# Patient Record
Sex: FEMALE | Race: WHITE | Employment: OTHER | URBAN - METROPOLITAN AREA
[De-identification: names, ages, dates, MRNs, and addresses within clinical notes are randomized per-mention and may not be internally consistent; named-entity substitution may affect disease eponyms.]

---

## 2017-01-13 ENCOUNTER — HOSPITAL ENCOUNTER (OUTPATIENT)
Dept: MAMMOGRAPHY | Age: 67
Discharge: HOME | End: 2017-01-13
Attending: INTERNAL MEDICINE
Payer: MEDICARE

## 2017-01-13 DIAGNOSIS — C50.919 MALIGNANT NEOPLASM OF BREAST (HCC): ICD-10-CM

## 2017-01-13 PROCEDURE — 77063 BREAST TOMOSYNTHESIS BI: CPT

## 2017-01-18 ENCOUNTER — CLINICAL SUPPORT (OUTPATIENT)
Dept: CARDIOLOGY CLINIC | Age: 67
End: 2017-01-18

## 2017-01-18 DIAGNOSIS — I50.32 CHRONIC DIASTOLIC CONGESTIVE HEART FAILURE (HCC): ICD-10-CM

## 2017-06-14 ENCOUNTER — OFFICE VISIT (OUTPATIENT)
Dept: CARDIOLOGY CLINIC | Age: 67
End: 2017-06-14

## 2017-06-14 VITALS
HEIGHT: 65 IN | BODY MASS INDEX: 27.99 KG/M2 | HEART RATE: 92 BPM | WEIGHT: 168 LBS | SYSTOLIC BLOOD PRESSURE: 122 MMHG | DIASTOLIC BLOOD PRESSURE: 62 MMHG

## 2017-06-14 DIAGNOSIS — R06.02 SHORTNESS OF BREATH: ICD-10-CM

## 2017-06-14 DIAGNOSIS — I10 ESSENTIAL HYPERTENSION: ICD-10-CM

## 2017-06-14 DIAGNOSIS — I50.32 CHRONIC DIASTOLIC CONGESTIVE HEART FAILURE (HCC): Primary | ICD-10-CM

## 2017-06-14 DIAGNOSIS — Z95.810 AUTOMATIC IMPLANTABLE CARDIOVERTER-DEFIBRILLATOR IN SITU: ICD-10-CM

## 2017-06-14 DIAGNOSIS — I34.1 MVP (MITRAL VALVE PROLAPSE): ICD-10-CM

## 2017-06-14 NOTE — PROGRESS NOTES
HISTORY OF PRESENT ILLNESS  Sj Colby is a 75 y.o. female. HPI Comments: Patient with cmp,chf.post  icd   On follow up patient denies any chest pains, palpitation or other significant symptoms. CHF   The history is provided by the patient. This is a recurrent problem. The problem occurs constantly. The problem has been gradually improving. Associated symptoms include shortness of breath. Pertinent negatives include no chest pain. The symptoms are aggravated by exertion. The symptoms are relieved by rest.   Valvular Heart Disease   The history is provided by the patient. This is a chronic problem. The problem occurs constantly. The problem has not changed since onset. Associated symptoms include shortness of breath. Pertinent negatives include no chest pain. Cardiomyopathy   The history is provided by the patient. This is a chronic problem. The problem has been resolved. Associated symptoms include shortness of breath. Pertinent negatives include no chest pain. Hypertension   The history is provided by the patient. This is a chronic problem. The problem occurs constantly. The problem has not changed since onset. Associated symptoms include shortness of breath. Pertinent negatives include no chest pain. Review of Systems   Constitutional: Negative for chills and fever. HENT: Negative for nosebleeds. Eyes: Negative for blurred vision and double vision. Respiratory: Positive for shortness of breath. Negative for cough, hemoptysis, sputum production and wheezing. Cardiovascular: Negative for chest pain, palpitations, orthopnea, claudication, leg swelling and PND. Gastrointestinal: Negative for heartburn, nausea and vomiting. Musculoskeletal: Negative for myalgias. Skin: Negative for rash. Neurological: Negative for dizziness and weakness. Endo/Heme/Allergies: Does not bruise/bleed easily.      Family History   Problem Relation Age of Onset    Hypertension Mother    [address] [facility] Cholesterol Mother     Heart Disease Father      paemaker implant at 80       Past Medical History:   Diagnosis Date    Abnormal nuclear cardiac imaging test 06/11/2010    Lg fixed anterior, septal, apical, inferoseptal defect sugg RCA & LAD disease or cardiomyopathy. EF 20%. Global hykn. Nondiagnostic EKG.  Adenocarcinoma of breast; locally advanced invasive ductal adenocarcinoma of left breast     Automatic implantable cardiac defibrillator in situ     Automatic implantable cardiac defibrillator in situ     Breast cancer (HCC)     Cancer ([address]     breast cancer left    Chemotherapy convalescence or palliative care 2012    Chronic combined systolic and diastolic heart failure (HCC)     Remains symptomatic, nyha class 3 ef improving.  Congestive heart failure, unspecified     chronic class ll    Echocardiogram 09/27/2010    EF 30%. Global hykn. Mild MR. Mild TR.  Hyperlipidemia     Hypertension     Mitral valve disorders 1/15/2014    mild to moderate mr     Mitral valve disorders 1/15/2014    mild to moderate mr     MVP (mitral valve prolapse)     Nonischemic cardiomyopathy (HCC)     EF 20-30% despite medical therapy    Nonspecific abnormal electrocardiogram (ECG) (EKG)     Osteopenia     Other primary cardiomyopathies     Pacemaker 10/27/10    s/p implantation, without complication    Poisoning by other and unspecified agents primarily affecting the cardiovascular system     Ef slightly better to 45%, STABLE back on chemo.  Radiation therapy     Radiation therapy complication 4780    S/P cardiac catheterization 07/08/10    Patent coronary arteries. LVEDP 25.  EF 25%. Global hykn. Moderate MR.  RA 10.  RV 40/10. PA 40/20.  20.  CO/CI 4.5/2.45 (Larry).     Shortness of breath     Syncope and collapse     Thyroid disease     goiter       Past Surgical History:   Procedure Laterality Date    CARDIAC SURG PROCEDURE UNLIST      Difibrillator; BI V ICD    HX MASTECTOMY 09/28/11    modified radical mastectomy and axillary dissection    HX ORTHOPAEDIC      HX OTHER SURGICAL      surgery to remove part of liver    HX PACEMAKER  10/27/10    s/p Medtronic biventricular AICD, without complication    HX RADICAL MASTECTOMY      NEUROLOGICAL PROCEDURE UNLISTED      Cervical fusion       Social History   Substance Use Topics    Smoking status: Never Smoker    Smokeless tobacco: Never Used    Alcohol use No       Allergies   Allergen Reactions    Adhesive Other (comments)     blisters       Current Outpatient Prescriptions   Medication Sig    carvedilol (COREG) 25 mg tablet TAKE 1 TABLET TWICE A DAY  WITH MEALS    furosemide (LASIX) 20 mg tablet TAKE 1 TABLET DAILY    lisinopril (PRINIVIL, ZESTRIL) 10 mg tablet Take 1 Tab by mouth daily.  zaleplon (SONATA) 10 mg capsule Take 10 mg by mouth nightly.  oxyCODONE-acetaminophen (PERCOCET) 5-325 mg per tablet 1 or 2 tablets by mouth every 4 hours as needed    multivitamin (HAIR,SKIN & NAILS) tablet Take 1 Tab by mouth daily.  raloxifene (EVISTA) 60 mg tablet Take  by mouth daily.  cyclobenzaprine (FLEXERIL) 10 mg tablet Take  by mouth three (3) times daily as needed.  MULTIVITAMIN PO Take  by mouth daily.  OTHER,NON-FORMULARY, daily. Vitamin D3 2000IU QD     No current facility-administered medications for this visit. Visit Vitals    /62    Pulse 92    Ht 5' 5\" (1.651 m)    Wt 76.2 kg (168 lb)    BMI 27.96 kg/m2         Physical Exam   Constitutional: She is oriented to person, place, and time. She appears well-developed and well-nourished. HENT:   Head: Normocephalic and atraumatic. Eyes: Conjunctivae are normal.   Neck: Neck supple. No JVD present. No tracheal deviation present. No thyromegaly present. Cardiovascular: Normal rate and regular rhythm. PMI is not displaced. Exam reveals no gallop, no S3 and no decreased pulses. Murmur heard.   High-pitched blowing holosystolic murmur is present with a grade of 2/6  at the apex  Pulmonary/Chest: No respiratory distress. She has no wheezes. She has no rales. She exhibits no tenderness. Abdominal: Soft. There is no tenderness. Musculoskeletal: She exhibits no edema. Neurological: She is alert and oriented to person, place, and time. Skin: Skin is warm. Psychiatric: She has a normal mood and affect. Ms. Javed Curiel has a reminder for a \"due or due soon\" health maintenance. I have asked that she contact her primary care provider for follow-up on this health maintenance. CARDIOLOGY STUDIES 10/1/2012 8/1/2012 5/1/2012 4/1/2012 2/1/2012 12/1/2011 9/1/2011   Myocardial Perfusion Scan Result - - - - - - -   Echocardiogram - Complete Result 50-55% EF 46% 45% 45-50% EF 45-50% EF 40% mild MR &TR EF 57%, mild mr   Coronary Angiogram Result - - - - - - -     SUMMARY:echo:6/2014  Left ventricle: The ventricle was mildly dilated. Systolic function was  normal. Ejection fraction was estimated in the range of 50 % to 55 %. There were no regional wall motion abnormalities. Doppler parameters were  consistent with abnormal left ventricular relaxation (grade 1 diastolic  dysfunction). Left atrium: The atrium was mildly dilated. Mitral valve: There was systolic bowing of the posterior leaflet, but  without diagnostic evidence for prolapse. There was mild to moderate  regurgitation. SUMMARY:echo:12/2014  Left ventricle: Systolic function was at the lower limits of normal.  Ejection fraction was estimated to be 53 %. There were no regional wall  motion abnormalities. Doppler parameters were consistent with abnormal  left ventricular relaxation (grade 1 diastolic dysfunction). Right ventricle: A pacing wire was present. Mitral valve: There was systolic bowing of the posterior leaflet, but  without diagnostic evidence for prolapse. There was mild regurgitation. Tricuspid valve: Pulmonary artery systolic pressure: 22 mmHg. 12/2015:echo    SUMMARY:  Left ventricle: Systolic function was normal. Ejection fraction was  estimated in the range of 50 % to 55 %. There was mild diffuse  hypokinesis. Doppler parameters were consistent with abnormal left  ventricular relaxation (grade 1 diastolic dysfunction). Mitral valve: There was systolic bowing of the anterior and posterior  leaflets, but without diagnostic evidence for prolapse. There was mild  regurgitation. Tricuspid valve: There was mild regurgitation. Tricuspid regurgitation  peak velocity: 2.3 m/sec. Pulmonary artery systolic pressure: 25 mmHg. Assessment         ICD-10-CM ICD-9-CM    1. Chronic diastolic congestive heart failure (HCC) I50.32 428.32      428.0     recent increase sob  lvef normal   2. Essential hypertension I10 401.9     stable   3. MVP (mitral valve prolapse) I34.1 424.0     mild mr  stable   4. Automatic implantable cardioverter-defibrillator in situ Z95.810 V45.02     normal function   5. Shortness of breath R06.02 786.05 PFT DLCO    check pft   Tounge swelling not related to lisinopril as she had swelling even after stopping lisinopril    There are no discontinued medications. Orders Placed This Encounter    PFT DLCO     Standing Status:   Future     Standing Expiration Date:   12/12/2017       Follow-up Disposition:  Return in about 4 weeks (around 7/12/2017).

## 2017-06-14 NOTE — LETTER
Christina Howard 1950 6/14/2017 Dear Gerardo Al MD 
 
I had the pleasure of evaluating  Ms. Elaine Milton in office today. Below are the relevant portions of my assessment and plan of care. ICD-10-CM ICD-9-CM 1. Chronic diastolic congestive heart failure (HCC) I50.32 428.32   
  428.0   
 recent increase sob 
lvef normal  
2. Essential hypertension I10 401.9   
 stable 3. MVP (mitral valve prolapse) I34.1 424.0   
 mild mr 
stable 4. Automatic implantable cardioverter-defibrillator in situ Z95.810 V45.02   
 normal function 5. Shortness of breath R06.02 786.05 PFT DLCO check pft Current Outpatient Prescriptions Medication Sig Dispense Refill  carvedilol (COREG) 25 mg tablet TAKE 1 TABLET TWICE A DAY  WITH MEALS 180 Tab 3  furosemide (LASIX) 20 mg tablet TAKE 1 TABLET DAILY 90 Tab 3  
 lisinopril (PRINIVIL, ZESTRIL) 10 mg tablet Take 1 Tab by mouth daily. 90 Tab 1  
 zaleplon (SONATA) 10 mg capsule Take 10 mg by mouth nightly.  oxyCODONE-acetaminophen (PERCOCET) 5-325 mg per tablet 1 or 2 tablets by mouth every 4 hours as needed 40 Tab 0  
 multivitamin (HAIR,SKIN & NAILS) tablet Take 1 Tab by mouth daily.  raloxifene (EVISTA) 60 mg tablet Take  by mouth daily.  cyclobenzaprine (FLEXERIL) 10 mg tablet Take  by mouth three (3) times daily as needed.  MULTIVITAMIN PO Take  by mouth daily.  OTHER,NON-FORMULARY, daily. Vitamin D3 2000IU QD Orders Placed This Encounter  PFT DLCO Standing Status:   Future Standing Expiration Date:   12/12/2017 If you have questions, please do not hesitate to call me. I look forward to following Ms. Trevino along with you. Sincerely, Anahy Velazquez MD

## 2017-06-14 NOTE — MR AVS SNAPSHOT
Visit Information Date & Time Provider Department Dept. Phone Encounter #  
 6/14/2017 10:00 AM Janene Garvin MD [facility] [address] [place] [address] [id number] Follow-up Instructions Return in about 4 weeks (around 7/12/2017). Follow-up and Disposition History Your Appointments 7/10/2017 10:45 AM  
Office Visit with Janene Garvin MD  
[facility]) Appt Note: 4 wk post pft; 4 wk post pft w/ medtronic [address], [address] [place] [address]  
[address], [address] [address]  
  
    
 10/16/2017  8:00 AM  
PROCEDURE with [facility] [place] ([address]) Appt Note: carelink [address], [address] [facility] [address]  
[address], [address] [place] [address] 1/15/2018  8:00 AM  
CARELINK with [facility] [place] ([address]) Appt Note: carelink [address], [address] [place] [address]  
[address], [address] [place] [address] 4/16/2018  8:00 AM  
[facility]EL[facility] [place] ([address]) Appt Note: carelink [address], [address] [facility] [address]  
[phone]  
  
    
 7/23/2018 10:30 AM  
PROCEDURE with Janene Garvin MD  
[facility]) Appt Note: medtronic ck [address], [address] [place] [address]  
[address], [address] [place] [address] Upcoming Health Maintenance Date Due Hepatitis C Screening 1950 DTaP/Tdap/Td series (1 - Tdap) 1/26/1970 FOBT Q 1 YEAR AGE 50-75 1/26/1999 ZOSTER VACCINE AGE 60> 1/26/2009 GLAUCOMA SCREENING Q2Y 1/26/2014 Pneumococcal 65+ High/Highest Risk (1 of 2 - PCV13) 1/26/2014 MEDICARE YEARLY EXAM 1/26/2014 INFLUENZA AGE 9 TO ADULT 8/1/2017 BREAST CANCER SCRN MAMMOGRAM 1/13/2019 Allergies as of 6/14/2017  Review Complete On: 6/14/2017 By: Ricardo Gomes MD  
  
 Severity Noted Reaction Type Reactions Adhesive  10/27/2011    Other (comments)  
 blisters Current Immunizations  Never Reviewed No immunizations on file. Not reviewed this visit You Were Diagnosed With   
  
 Codes Comments Chronic diastolic congestive heart failure (HCC)    -  Primary ICD-10-CM: I50.32 
ICD-9-CM: 428.32, 428.0 recent increase sob 
lvef normal  
 Essential hypertension     ICD-10-CM: I10 
ICD-9-CM: 401.9 stable MVP (mitral valve prolapse)     ICD-10-CM: I34.1 ICD-9-CM: 424.0 mild mr 
stable Automatic implantable cardioverter-defibrillator in situ     ICD-10-CM: Z95.810 ICD-9-CM: V45.02 normal function Shortness of breath     ICD-10-CM: R06.02 
ICD-9-CM: 786.05 check pft  
  
Vitals BP Pulse Height(growth percentile) Weight(growth percentile) BMI OB Status 122/62 92 5' 5\" (1.651 m) 168 lb (76.2 kg) 27.96 kg/m2 Postmenopausal  
 Smoking Status Never Smoker Vitals History BMI and BSA Data Body Mass Index Body Surface Area  
 27.96 kg/m 2 1.87 m 2 Preferred Pharmacy Pharmacy Name Phone  [address] [phone] Your Updated Medication List  
  
   
This list is accurate as of: 6/14/17 10:32 AM.  Always use your most recent med list.  
  
  
  
  
 carvedilol 25 mg tablet Commonly known as:  COREG  
TAKE 1 TABLET TWICE A DAY  WITH MEALS  
  
 EVISTA 60 mg tablet Generic drug:  raloxifene Take  by mouth daily. FLEXERIL 10 mg tablet Generic drug:  cyclobenzaprine Take  by mouth three (3) times daily as needed. furosemide 20 mg tablet Commonly known as:  LASIX TAKE 1 TABLET DAILY  
  
 HAIR,SKIN AND NAILS tablet Generic drug:  multivitamin Take 1 Tab by mouth daily. lisinopril 10 mg tablet Commonly known as:  Barbarann Plunk Take 1 Tab by mouth daily. MULTIVITAMIN PO Take  by mouth daily. OTHER(NON-FORMULARY)  
daily. Vitamin D3 2000IU QD  
  
 oxyCODONE-acetaminophen 5-325 mg per tablet Commonly known as:  PERCOCET  
1 or 2 tablets by mouth every 4 hours as needed  
  
 zaleplon 10 mg capsule Commonly known as:  SONATA Take 10 mg by mouth nightly. Follow-up Instructions Return in about 4 weeks (around 7/12/2017). To-Do List   
 06/21/2017 PFT:  PFT DLCO Introducing [facility] SERVICES! Dear Sowmya Chery: 
Thank you for requesting a [employer] account. Our records indicate that you already have an active [employer] account. You can access your account anytime at [email or web address] Did you know that you can access your hospital and ER discharge instructions at any time in [employer]? You can also review all of your test results from your hospital stay or ER visit. Additional Information If you have questions, please visit the Frequently Asked Questions section of the [employer] website at [email or web address] Remember, [employer] is NOT to be used for urgent needs. For medical emergencies, dial 911. Now available from your iPhone and Android! Please provide this summary of care documentation to your next provider. Your primary care clinician is listed as Marybeth Mccarthy. If you have any questions after today's visit, please call [phone].

## 2017-06-29 ENCOUNTER — HOSPITAL ENCOUNTER (OUTPATIENT)
Dept: RESPIRATORY THERAPY | Age: 67
Discharge: HOME | End: 2017-06-29
Attending: INTERNAL MEDICINE
Payer: MEDICARE

## 2017-06-29 DIAGNOSIS — R06.02 SHORTNESS OF BREATH: ICD-10-CM

## 2017-06-29 PROCEDURE — 94729 DIFFUSING CAPACITY: CPT

## 2017-06-29 PROCEDURE — 94727 GAS DIL/WSHOT DETER LNG VOL: CPT

## 2017-06-29 PROCEDURE — 94060 EVALUATION OF WHEEZING: CPT

## 2017-07-03 ENCOUNTER — TELEPHONE (OUTPATIENT)
Dept: CARDIOLOGY CLINIC | Age: 67
End: 2017-07-03

## 2017-07-03 NOTE — TELEPHONE ENCOUNTER
Called and scheduled patient for Pulmonary eval for 7/11/17 @ 10:45am with Dr Jackie Lovell and advised patient of date and time of appointment.

## 2017-07-03 NOTE — TELEPHONE ENCOUNTER
----- Message from Dipak Hernandez MD sent at 6/30/2017  8:02 AM EDT -----  Abnormal pft  Refer to pulmonary

## 2017-07-10 ENCOUNTER — OFFICE VISIT (OUTPATIENT)
Dept: CARDIOLOGY CLINIC | Age: 67
End: 2017-07-10

## 2017-07-10 VITALS
BODY MASS INDEX: 28.49 KG/M2 | DIASTOLIC BLOOD PRESSURE: 63 MMHG | HEIGHT: 65 IN | HEART RATE: 83 BPM | SYSTOLIC BLOOD PRESSURE: 121 MMHG | WEIGHT: 171 LBS

## 2017-07-10 DIAGNOSIS — I10 ESSENTIAL HYPERTENSION: ICD-10-CM

## 2017-07-10 DIAGNOSIS — I42.8 NONISCHEMIC CARDIOMYOPATHY (HCC): ICD-10-CM

## 2017-07-10 DIAGNOSIS — Z95.810 AUTOMATIC IMPLANTABLE CARDIOVERTER-DEFIBRILLATOR IN SITU: ICD-10-CM

## 2017-07-10 DIAGNOSIS — R94.2 ABNORMAL PFT: ICD-10-CM

## 2017-07-10 DIAGNOSIS — I50.32 CHRONIC DIASTOLIC CONGESTIVE HEART FAILURE (HCC): Primary | ICD-10-CM

## 2017-07-10 RX ORDER — LISINOPRIL 10 MG/1
TABLET ORAL
Qty: 90 TAB | Refills: 1 | Status: SHIPPED | OUTPATIENT
Start: 2017-07-10 | End: 2018-01-31 | Stop reason: SDUPTHER

## 2017-07-10 NOTE — PROGRESS NOTES
HISTORY OF PRESENT ILLNESS  Mohsen Pimentel is a 75 y.o. female. HPI Comments: Patient with cmp,chf.post  icd   On follow up patient denies any chest pains, palpitation or other significant symptoms. Patient is here for follow up of diagnostic tests. Results will be discussed. Valvular Heart Disease   The history is provided by the patient. This is a chronic problem. The problem occurs constantly. The problem has not changed since onset. Associated symptoms include shortness of breath. Pertinent negatives include no chest pain. Cardiomyopathy   The history is provided by the patient. This is a chronic problem. The problem has been resolved. Associated symptoms include shortness of breath. Pertinent negatives include no chest pain. Hypertension   The history is provided by the patient. This is a chronic problem. The problem occurs constantly. The problem has not changed since onset. Associated symptoms include shortness of breath. Pertinent negatives include no chest pain. CHF   The history is provided by the patient. This is a recurrent problem. The problem occurs constantly. The problem has been gradually improving. Associated symptoms include shortness of breath. Pertinent negatives include no chest pain. The symptoms are aggravated by exertion. The symptoms are relieved by rest.       Review of Systems   Constitutional: Negative for chills and fever. HENT: Negative for nosebleeds. Eyes: Negative for blurred vision and double vision. Respiratory: Positive for shortness of breath. Negative for cough, hemoptysis, sputum production and wheezing. Cardiovascular: Negative for chest pain, palpitations, orthopnea, claudication, leg swelling and PND. Gastrointestinal: Negative for heartburn, nausea and vomiting. Musculoskeletal: Negative for myalgias. Skin: Negative for rash. Neurological: Negative for dizziness and weakness. Endo/Heme/Allergies: Does not bruise/bleed easily.      Family History   Problem Relation Age of Onset    Hypertension Mother     High Cholesterol Mother     Heart Disease Father      paemaker implant at 80       Past Medical History:   Diagnosis Date    Abnormal nuclear cardiac imaging test 06/11/2010    Lg fixed anterior, septal, apical, inferoseptal defect sugg RCA & LAD disease or cardiomyopathy. EF 20%. Global hykn. Nondiagnostic EKG.  Adenocarcinoma of breast; locally advanced invasive ductal adenocarcinoma of left breast     Automatic implantable cardiac defibrillator in situ     Automatic implantable cardiac defibrillator in situ     Breast cancer (HCC)     Cancer ([address]     breast cancer left    Chemotherapy convalescence or palliative care 2012    Chronic combined systolic and diastolic heart failure (HCC)     Remains symptomatic, nyha class 3 ef improving.  Congestive heart failure, unspecified     chronic class ll    Echocardiogram 09/27/2010    EF 30%. Global hykn. Mild MR. Mild TR.  Hyperlipidemia     Hypertension     Mitral valve disorders 1/15/2014    mild to moderate mr     Mitral valve disorders 1/15/2014    mild to moderate mr     MVP (mitral valve prolapse)     Nonischemic cardiomyopathy (HCC)     EF 20-30% despite medical therapy    Nonspecific abnormal electrocardiogram (ECG) (EKG)     Osteopenia     Other primary cardiomyopathies     Pacemaker 10/27/10    s/p implantation, without complication    Poisoning by other and unspecified agents primarily affecting the cardiovascular system     Ef slightly better to 45%, STABLE back on chemo.  Radiation therapy     Radiation therapy complication 9289    S/P cardiac catheterization 07/08/10    Patent coronary arteries. LVEDP 25.  EF 25%. Global hykn. Moderate MR.  RA 10.  RV 40/10. PA 40/20.  20.  CO/CI 4.5/2.45 (Larry).     Shortness of breath     Syncope and collapse     Thyroid disease     goiter       Past Surgical History:   Procedure Laterality Date    CARDIAC SURG PROCEDURE UNLIST      Difibrillator; BI V ICD    HX MASTECTOMY  09/28/11    modified radical mastectomy and axillary dissection    HX ORTHOPAEDIC      HX OTHER SURGICAL      surgery to remove part of liver    HX PACEMAKER  10/27/10    s/p Medtronic biventricular AICD, without complication    HX RADICAL MASTECTOMY      NEUROLOGICAL PROCEDURE UNLISTED      Cervical fusion       Social History   Substance Use Topics    Smoking status: Never Smoker    Smokeless tobacco: Never Used    Alcohol use No       Allergies   Allergen Reactions    Adhesive Other (comments)     blisters       Current Outpatient Prescriptions   Medication Sig    carvedilol (COREG) 25 mg tablet TAKE 1 TABLET TWICE A DAY  WITH MEALS    furosemide (LASIX) 20 mg tablet TAKE 1 TABLET DAILY    lisinopril (PRINIVIL, ZESTRIL) 10 mg tablet Take 1 Tab by mouth daily.  zaleplon (SONATA) 10 mg capsule Take 10 mg by mouth nightly.  oxyCODONE-acetaminophen (PERCOCET) 5-325 mg per tablet 1 or 2 tablets by mouth every 4 hours as needed    multivitamin (HAIR,SKIN & NAILS) tablet Take 1 Tab by mouth daily.  cyclobenzaprine (FLEXERIL) 10 mg tablet Take  by mouth three (3) times daily as needed.  MULTIVITAMIN PO Take  by mouth daily.  OTHER,NON-FORMULARY, daily. Vitamin D3 2000IU QD    raloxifene (EVISTA) 60 mg tablet Take  by mouth daily. No current facility-administered medications for this visit. Visit Vitals    /63    Pulse 83    Ht 5' 5\" (1.651 m)    Wt 77.6 kg (171 lb)    BMI 28.46 kg/m2         Physical Exam   Constitutional: She is oriented to person, place, and time. She appears well-developed and well-nourished. HENT:   Head: Normocephalic and atraumatic. Eyes: Conjunctivae are normal.   Neck: Neck supple. No JVD present. No tracheal deviation present. No thyromegaly present. Cardiovascular: Normal rate and regular rhythm. PMI is not displaced.   Exam reveals no gallop, no S3 and no decreased pulses. Murmur heard. High-pitched blowing holosystolic murmur is present with a grade of 2/6  at the apex  Pulmonary/Chest: No respiratory distress. She has no wheezes. She has no rales. She exhibits no tenderness. Abdominal: Soft. There is no tenderness. Musculoskeletal: She exhibits no edema. Neurological: She is alert and oriented to person, place, and time. Skin: Skin is warm. Psychiatric: She has a normal mood and affect. Ms. Enzo Guzman has a reminder for a \"due or due soon\" health maintenance. I have asked that she contact her primary care provider for follow-up on this health maintenance. CARDIOLOGY STUDIES 10/1/2012 8/1/2012 5/1/2012 4/1/2012 2/1/2012 12/1/2011 9/1/2011   Myocardial Perfusion Scan Result - - - - - - -   Echocardiogram - Complete Result 50-55% EF 46% 45% 45-50% EF 45-50% EF 40% mild MR &TR EF 57%, mild mr   Coronary Angiogram Result - - - - - - -   Some recent data might be hidden     SUMMARY:echo:6/2014  Left ventricle: The ventricle was mildly dilated. Systolic function was  normal. Ejection fraction was estimated in the range of 50 % to 55 %. There were no regional wall motion abnormalities. Doppler parameters were  consistent with abnormal left ventricular relaxation (grade 1 diastolic  dysfunction). Left atrium: The atrium was mildly dilated. Mitral valve: There was systolic bowing of the posterior leaflet, but  without diagnostic evidence for prolapse. There was mild to moderate  regurgitation. SUMMARY:echo:12/2014  Left ventricle: Systolic function was at the lower limits of normal.  Ejection fraction was estimated to be 53 %. There were no regional wall  motion abnormalities. Doppler parameters were consistent with abnormal  left ventricular relaxation (grade 1 diastolic dysfunction). Right ventricle: A pacing wire was present. Mitral valve: There was systolic bowing of the posterior leaflet, but  without diagnostic evidence for prolapse.  There was mild regurgitation. Tricuspid valve: Pulmonary artery systolic pressure: 22 mmHg. 12/2015:echo    SUMMARY:  Left ventricle: Systolic function was normal. Ejection fraction was  estimated in the range of 50 % to 55 %. There was mild diffuse  hypokinesis. Doppler parameters were consistent with abnormal left  ventricular relaxation (grade 1 diastolic dysfunction). Mitral valve: There was systolic bowing of the anterior and posterior  leaflets, but without diagnostic evidence for prolapse. There was mild  regurgitation. Tricuspid valve: There was mild regurgitation. Tricuspid regurgitation  peak velocity: 2.3 m/sec. Pulmonary artery systolic pressure: 25 mmHg. pft-6/2017  Maximal Mid Expiratory Flow rate is reduced to 43 % predicted  Forced Expiratory Volume in one second is reduced to 69 % predicted  FEV 1% is reduced     Volumes: All Volumes are reduced except for RV    Flow Volume Loop:  Nonspecific obstructive pattern in Flow Volume Loop    Bronchodilator:  No significant improvement with bronchodilator therapy    Diffusion:  Abnormal Diffusion Capacity reduced to 62 % predicted  DLCO normalizes to alveolar ventilation    Impression:  Moderate obstructive defect, Predominately small airways, Mild restrictive ventilatory defect, Reduced diffusion capacity indicating a decrease in alveolar surface area for gas exchange      Assessment         ICD-10-CM ICD-9-CM    1. Chronic diastolic congestive heart failure (HCC) I50.32 428.32 SINGLE,DUAL,OR MULTIPLE LEAD IMPLANT CARD DEFIB SYST     428.0 IMPLANTABLE CARDIOVASULAR DB SYST    stable  compensated   2. Essential hypertension I10 401.9    3. Nonischemic cardiomyopathy (HCC) I42.8 425.4     ef better   4. Automatic implantable cardioverter-defibrillator in situ Z95.810 V45.02 SINGLE,DUAL,OR MULTIPLE LEAD IMPLANT CARD DEFIB SYST      IMPLANTABLE CARDIOVASULAR DB SYST    stable   5.  Abnormal PFT R94.2 794.2     referred to pulmonary   Tounge swelling not related to lisinopril as she had swelling even after stopping lisinopril    There are no discontinued medications. Orders Placed This Encounter    IMPLANTABLE CARDIOVASULAR DB SYST    SINGLE,DUAL,OR MULTIPLE LEAD IMPLANT CARD DEFIB SYST     Order Specific Question:   Reason for Exam:     Answer:   icd       Follow-up Disposition:  Return in about 6 months (around 1/10/2018).

## 2017-07-10 NOTE — MR AVS SNAPSHOT
Visit Information Date & Time Provider Department Dept. Phone Encounter #  
 7/10/2017 10:45 AM Fred Sun MD [facility] [id number] Follow-up Instructions Return in about 6 months (around 1/10/2018). Your Appointments 7/11/2017 10:45 AM  
New Patient with Giovanni Kebede MD  
[address] ([facility]) Appt Note: DR Jovita Mayo REYES-ABN PFT DONE [facility] 6/29/17  
 [address] [facility], [address]  
  
   
 [address] [place] [address], . [phone]  
  
    
 10/16/2017  8:00 AM  
[facility] [place] [facility] [place] ([facility]) Appt Note: carelink [address], [address] [place] [address]  
[address], [address] [place] [address] [address] 1/15/2018  8:00 AM  
CARELINK with [facility] [place] [place] ([facility]) Appt Note: carelink [address], [address] [place] [address]  
[address], [address] [address]  
  
    
 1/22/2018 10:00 AM  
Office Visit with Fred Sun MD  
[facility]) Appt Note: 6m  
 [address], [address] [place] [address]  
[address], [address] [place] [address] [address] 4/16/2018  8:00 AM  
[facility] with [facility] [place] [facility] [place] ([facility]) Appt Note: carelink [address], [address] [place] [address]  
[phone]  
  
    
 7/23/2018 10:30 AM  
PROCEDURE with Fred Sun MD  
[facility]) Appt Note: medtr[employer] ck [address], [address] [place] [address]  
[address], [address] [place] [address] [address] Upcoming Health Maintenance  Date Due  
 Hepatitis C Screening 1950 DTaP/Tdap/Td series (1 - Tdap) 1/26/1970 FOBT Q 1 YEAR AGE 50-75 1/26/1999 ZOSTER VACCINE AGE 60> 1/26/2009 GLAUCOMA SCREENING Q2Y 1/26/2014 Pneumococcal 65+ High/Highest Risk (1 of 2 - PCV13) 1/26/2014 MEDICARE YEARLY EXAM 1/26/2014 INFLUENZA AGE 9 TO ADULT 8/1/2017 BREAST CANCER SCRN MAMMOGRAM 1/13/2019 Allergies as of 7/10/2017  Review Complete On: 7/10/2017 By: Gerda Menendez MD  
  
 Severity Noted Reaction Type Reactions Adhesive  10/27/2011    Other (comments)  
 blisters Current Immunizations  Never Reviewed No immunizations on file. Not reviewed this visit You Were Diagnosed With   
  
 Codes Comments Chronic diastolic congestive heart failure (HCC)    -  Primary ICD-10-CM: I50.32 
ICD-9-CM: 428.32, 428.0 stable 
compensated Essential hypertension     ICD-10-CM: I10 
ICD-9-CM: 401.9 Nonischemic cardiomyopathy ([facility] [address]     ICD-10-CM: I42.8 ICD-9-CM: 425.4 ef better Automatic implantable cardioverter-defibrillator in situ     ICD-10-CM: Z95.810 ICD-9-CM: V45.02 stable Abnormal PFT     ICD-10-CM: R94.2 ICD-9-CM: 794.2 referred to pulmonary Vitals BP Pulse Height(growth percentile) Weight(growth percentile) BMI OB Status 121/63 83 5' 5\" (1.651 m) 171 lb (77.6 kg) 28.46 kg/m2 Postmenopausal  
 Smoking Status Never Smoker Vitals History BMI and BSA Data Body Mass Index Body Surface Area  
 28.46 kg/m 2 1.89 m 2 Preferred Pharmacy Pharmacy Name Phone  [facility] [phone] Your Updated Medication List  
  
   
This list is accurate as of: 7/10/17 11:06 AM.  Always use your most recent med list.  
  
  
  
  
 carvedilol 25 mg tablet Commonly known as:  COREG  
TAKE 1 TABLET TWICE A DAY  WITH MEALS  
  
 EVISTA 60 mg tablet Generic drug:  raloxifene Take  by mouth daily. FLEXERIL 10 mg tablet Generic drug:  cyclobenzaprine Take  by mouth three (3) times daily as needed. furosemide 20 mg tablet Commonly known as:  LASIX TAKE 1 TABLET DAILY  
  
 HAIR,SKIN AND NAILS tablet Generic drug:  multivitamin Take 1 Tab by mouth daily. lisinopril 10 mg tablet Commonly known as:  [place] Kofi Take 1 Tab by mouth daily. MULTIVITAMIN PO Take  by mouth daily. OTHER(NON-FORMULARY)  
daily. Vitamin D3 2000IU QD  
  
 oxyCODONE-acetaminophen 5-325 mg per tablet Commonly known as:  PERCOCET  
1 or 2 tablets by mouth every 4 hours as needed  
  
 zaleplon 10 mg capsule Commonly known as:  SONATA Take 10 mg by mouth nightly. We Performed the Following IMPLANTABLE CARDIOVASULAR DB SYST C2014697 CPT(R)] [facility] MULTIPLE LEAD IMPLANT CARD DEFIB SYST [31604 CPT(R)] Follow-up Instructions Return in about 6 months (around 1/10/2018). Introducing [facility]! Dear Alka Mariano: 
Thank you for requesting a [employer] account. Our records indicate that you already have an active TopPatch account. You can access your account anytime at [email or web address] Did you know that you can access your hospital and ER discharge instructions at any time in TopPatch? You can also review all of your test results from your hospital stay or ER visit. Additional Information If you have questions, please visit the Frequently Asked Questions section of the TopPatch website at [email or web address] Remember, TopPatch is NOT to be used for urgent needs. For medical emergencies, dial 911. Now available from your iPhone and Android! Please provide this summary of care documentation to your next provider. Your primary care clinician is listed as Junior Singh. If you have any questions after today's visit, please call [phone].

## 2017-07-10 NOTE — PROGRESS NOTES
1. Have you been to the ER, urgent care clinic since your last visit? Hospitalized since your last visit? No    2. Have you seen or consulted any other health care providers outside of the [address] [place] [address] since your last visit? Include any pap smears or colon screening. No     3. Since your last visit, have you had any of the following symptoms? shortness of breath. 4.  Have you had any blood work, X-rays or cardiac testing? No              5.  Where do you normally have your labs drawn?   [facility]    6. Do you need any refills today?    No

## 2017-07-11 ENCOUNTER — OFFICE VISIT (OUTPATIENT)
Dept: PULMONOLOGY | Age: 67
End: 2017-07-11

## 2017-07-11 ENCOUNTER — HOSPITAL ENCOUNTER (OUTPATIENT)
Dept: LAB | Age: 67
Discharge: HOME | End: 2017-07-11
Payer: MEDICARE

## 2017-07-11 VITALS
BODY MASS INDEX: 28.32 KG/M2 | TEMPERATURE: 98.1 F | WEIGHT: 170 LBS | OXYGEN SATURATION: 97 % | DIASTOLIC BLOOD PRESSURE: 66 MMHG | RESPIRATION RATE: 18 BRPM | HEIGHT: 65 IN | SYSTOLIC BLOOD PRESSURE: 112 MMHG | HEART RATE: 82 BPM

## 2017-07-11 DIAGNOSIS — R94.2 ABNORMAL PFT: ICD-10-CM

## 2017-07-11 DIAGNOSIS — R06.02 SOB (SHORTNESS OF BREATH): ICD-10-CM

## 2017-07-11 DIAGNOSIS — I42.8 OTHER CARDIOMYOPATHY (HCC): ICD-10-CM

## 2017-07-11 DIAGNOSIS — R06.02 SOB (SHORTNESS OF BREATH): Primary | ICD-10-CM

## 2017-07-11 LAB
BASOPHILS # BLD AUTO: 0 K/UL (ref 0–0.06)
BASOPHILS # BLD: 0 % (ref 0–2)
DIFFERENTIAL METHOD BLD: NORMAL
EOSINOPHIL # BLD: 0.1 K/UL (ref 0–0.4)
EOSINOPHIL NFR BLD: 2 % (ref 0–5)
ERYTHROCYTE [DISTWIDTH] IN BLOOD BY AUTOMATED COUNT: 13.3 % (ref 11.6–14.5)
HCT VFR BLD AUTO: 40.6 % (ref 35–45)
HGB BLD-MCNC: 13.4 G/DL (ref 12–16)
LYMPHOCYTES # BLD AUTO: 27 % (ref 21–52)
LYMPHOCYTES # BLD: 1.6 K/UL (ref 0.9–3.6)
MCH RBC QN AUTO: 31.2 PG (ref 24–34)
MCHC RBC AUTO-ENTMCNC: 33 G/DL (ref 31–37)
MCV RBC AUTO: 94.6 FL (ref 74–97)
MONOCYTES # BLD: 0.5 K/UL (ref 0.05–1.2)
MONOCYTES NFR BLD AUTO: 8 % (ref 3–10)
NEUTS SEG # BLD: 3.9 K/UL (ref 1.8–8)
NEUTS SEG NFR BLD AUTO: 63 % (ref 40–73)
PLATELET # BLD AUTO: 240 K/UL (ref 135–420)
PMV BLD AUTO: 11.1 FL (ref 9.2–11.8)
RBC # BLD AUTO: 4.29 M/UL (ref 4.2–5.3)
WBC # BLD AUTO: 6 K/UL (ref 4.6–13.2)

## 2017-07-11 PROCEDURE — 85025 COMPLETE CBC W/AUTO DIFF WBC: CPT | Performed by: INTERNAL MEDICINE

## 2017-07-11 PROCEDURE — 36415 COLL VENOUS BLD VENIPUNCTURE: CPT | Performed by: INTERNAL MEDICINE

## 2017-07-11 PROCEDURE — 82785 ASSAY OF IGE: CPT | Performed by: INTERNAL MEDICINE

## 2017-07-11 RX ORDER — MONTELUKAST SODIUM 10 MG/1
10 TABLET ORAL DAILY
Qty: 30 TAB | Refills: 6 | Status: SHIPPED | OUTPATIENT
Start: 2017-07-11 | End: 2017-07-13 | Stop reason: SDUPTHER

## 2017-07-11 RX ORDER — OMEPRAZOLE 40 MG/1
40 CAPSULE, DELAYED RELEASE ORAL DAILY
COMMUNITY
End: 2019-05-30

## 2017-07-11 NOTE — PROGRESS NOTES
PATIENT'S O2 SATS:   97% Room Air Rest, 82 Heart Rate                                          96% Room Air Activity, 98 Heart Rate - Patient Walked 330  Ft

## 2017-07-11 NOTE — PROGRESS NOTES
[facility]  Pulmonary, Critical Care, and Sleep Medicine      Pulmonary Office Initial Consultation    Name: Ernesto Albarado     : 1950     Date: 2017        Subjective:   Patient has been referred for evaluation of:  SOB and abnormal PFT's. Patient is a 75 y.o. female has been experiencing SOB for past 1 year. SOB:   Symptoms occur with exertion and at night. Able to walk about about 3-4  Blocks. Cannot climb stairs. Activities of daily living are affected and improves with pacing. Has orthopnea/ paroxysmal nocturnal dyspnea  SOB relieved with pacing and resting. C/o sinus congestion. Denies any significant Cough:  Has some wheezing, no chest pain, fever, chills, night sweats dyspepsia, reflux. Has had AICD placed and replaced. Left mastectomy with chemo- radiation : 5 years back  Weight gain of 50 lbs over few years. Comorbid conditions include- DM, HTN, CHF- nonischemic cardiomyopathy, Breast ca- treated: s/p mastectomy -L and chemo radiation  Non smoker  Occupational exposure-none    Past Medical History:   Diagnosis Date    Abnormal nuclear cardiac imaging test 2010    Lg fixed anterior, septal, apical, inferoseptal defect sugg RCA & LAD disease or cardiomyopathy. EF 20%. Global hykn. Nondiagnostic EKG.  Adenocarcinoma of breast; locally advanced invasive ductal adenocarcinoma of left breast     Automatic implantable cardiac defibrillator in situ     Automatic implantable cardiac defibrillator in situ     Breast cancer (HCC)     Cancer ([facility] [address]     breast cancer left    Chemotherapy convalescence or palliative care     Chronic combined systolic and diastolic heart failure (HCC)     Remains symptomatic, nyha class 3 ef improving.  Congestive heart failure, unspecified     chronic class ll    Echocardiogram 2010    EF 30%. Global hykn. Mild MR. Mild TR.     Hyperlipidemia     Hypertension     Mitral valve disorders 1/15/2014 mild to moderate mr     Mitral valve disorders 1/15/2014    mild to moderate mr     MVP (mitral valve prolapse)     Nonischemic cardiomyopathy (HCC)     EF 20-30% despite medical therapy    Nonspecific abnormal electrocardiogram (ECG) (EKG)     Osteopenia     Other primary cardiomyopathies     Pacemaker 10/27/10    s/p implantation, without complication    Poisoning by other and unspecified agents primarily affecting the cardiovascular system     Ef slightly better to 45%, STABLE back on chemo.  Radiation therapy     Radiation therapy complication 3684    S/P cardiac catheterization 07/08/10    Patent coronary arteries. LVEDP 25.  EF 25%. Global hykn. Moderate MR.  RA 10.  RV 40/10. PA 40/20.  20.  CO/CI 4.5/2.45 (Larry).  Shortness of breath     Syncope and collapse     Thyroid disease     goiter     Past Surgical History:   Procedure Laterality Date    CARDIAC SURG PROCEDURE UNLIST      Difibrillator; BI V ICD    HX MASTECTOMY  09/28/11    modified radical mastectomy and axillary dissection    HX ORTHOPAEDIC      HX OTHER SURGICAL      surgery to remove part of liver    HX PACEMAKER  10/27/10    s/p Medtronic biventricular AICD, without complication    HX RADICAL MASTECTOMY      NEUROLOGICAL PROCEDURE UNLISTED      Cervical fusion     Social History     Social History    Marital status:      Spouse name: N/A    Number of children: N/A    Years of education: N/A     Social History Main Topics    Smoking status: Never Smoker    Smokeless tobacco: Never Used    Alcohol use No    Drug use: No    Sexual activity: Not Asked     Other Topics Concern    None     Social History Narrative     Family History   Problem Relation Age of Onset    Hypertension Mother     High Cholesterol Mother     Heart Disease Father      paemaker implant at 80     Allergies   Allergen Reactions    Adhesive Other (comments)     blisters     .   Current Outpatient Prescriptions   Medication Sig Dispense Refill    omeprazole (PRILOSEC) 40 mg capsule Take 40 mg by mouth daily.  lisinopril (PRINIVIL, ZESTRIL) 10 mg tablet TAKE 1 TABLET DAILY 90 Tab 1    carvedilol (COREG) 25 mg tablet TAKE 1 TABLET TWICE A DAY  WITH MEALS 180 Tab 3    furosemide (LASIX) 20 mg tablet TAKE 1 TABLET DAILY 90 Tab 3    zaleplon (SONATA) 10 mg capsule Take 10 mg by mouth nightly.  oxyCODONE-acetaminophen (PERCOCET) 5-325 mg per tablet 1 or 2 tablets by mouth every 4 hours as needed 40 Tab 0    multivitamin (HAIR,SKIN & NAILS) tablet Take 1 Tab by mouth daily.  raloxifene (EVISTA) 60 mg tablet Take  by mouth daily.  cyclobenzaprine (FLEXERIL) 10 mg tablet Take  by mouth three (3) times daily as needed.  MULTIVITAMIN PO Take  by mouth daily.  OTHER,NON-FORMULARY, daily.  Vitamin D3 2000IU QD           Review of Systems:  HEENT: No epistaxis, no nasal drainage, no difficulty in swallowing, no redness in eyes  Respiratory: as above  Cardiovascular: no chest pain, no palpitations, no chronic leg edema, no syncope  Gastrointestinal: no abd pain, no vomiting, no diarrhea, no bleeding symptoms  Genitourinary: No urinary symptoms or hematuria  Integument/breast: No ulcers or rashes  Musculoskeletal:Neg  Neurological: No focal weakness, no seizures, no headaches  Behvioral/Psych: No anxiety, no depression  Constitutional: No fever, no chills, no weight loss, no night sweats     Objective:     Visit Vitals    /66 (BP 1 Location: Right arm, BP Patient Position: Sitting)    Pulse 82    Temp 98.1 °F (36.7 °C) (Oral)    Resp 18    Ht 5' 5\" (1.651 m)    Wt 77.1 kg (170 lb)    SpO2 97%  Comment: RA Rest    BMI 28.29 kg/m2        Physical Exam:   General: comfortable, no acute distress  HEENT: pupils reactive, sclera anicteric, EOM intact  Neck: No adenopathy or thyroid swelling, no lymphadenopathy or JVD, supple  Chest: multiple scars from previous surgery, surgically absent left breast  CVS: S1S2  RS: Mod AE bilaterally, no tactile fremitus or egophony, no accessory muscle use  Abd: soft, non tender, no hepatosplenomegaly  Neuro: non focal, awake, alert  Extrm: no leg edema, clubbing or cyanosis  Skin: no rash    Data review:   Pertinent labs: CBC, BMP, LFT's    PFT:    Date FVC FEV1  FEV1/FVC NPA03-14 TLC RV RV/TLC VC DLCO   6/29/2017 75 69 67 43 77 81 nl  62                                       FLOWS:  Maximal Mid Expiratory Flow rate is reduced to 43 % predicted  Forced Expiratory Volume in one second is reduced to 69 % predicted  FEV 1% is reduced   Volumes: All Volumes are reduced except for RV  Flow Volume Loop:  Nonspecific obstructive pattern in Flow Volume Loop  Bronchodilator:  No significant improvement with bronchodilator therapy  Diffusion:  Abnormal Diffusion Capacity reduced to 62 % predicted  DLCO normalizes to alveolar ventilation  Impression:  Moderate obstructive defect, Predominately small airways, Mild restrictive ventilatory defect, Reduced diffusion capacity indicating a decrease in alveolar surface area for gas exchange    6 min walk test;walked 330 feet with no O2 desaturation or significant heart rate change. DI- stable    Echo: 1/18/2017:  Left ventricle: Systolic function was normal. Ejection fraction was  estimated to be 50 %. There were no regional wall motion abnormalities. Doppler parameters were consistent with abnormal left ventricular  relaxation (grade 1 diastolic dysfunction). Right ventricle: The size was normal. Systolic function was reduced. Left atrium: Size was normal.  Right atrium: Size was normal.  Mitral valve: There was systolic bowing of the anterior and posterior  leaflets, but without diagnostic evidence for prolapse. There was mild  regurgitation. Aortic valve: The valve was trileaflet. Leaflets exhibited normal  thickness and normal cuspal separation. Tricuspid valve: Pulmonary artery systolic pressure: 18 mmHg. Pulmonic valve:  There was mild regurgitation. Imaging:  I have personally reviewed the patients radiographs and have reviewed the reports:  CXr 7/11/2017:   My read- no acute process. CXR 8/26/2014:  Comparison 02/05/1  AICD. No change in lead placement. No visualized lead fracture. Lungs appear  unremarkable. Normal size heart. Impression: No acute findings. Patient Active Problem List   Diagnosis Code    Congestive heart failure (HCC) I50.9    Hypertension I10    MVP (mitral valve prolapse) I34.1    Nonischemic cardiomyopathy (HCC) I42.8    Cancer (HCC) C80.1    Adenocarcinoma of breast; locally advanced invasive ductal adenocarcinoma of left breast C50.919    Poisoning by other and unspecified agents primarily affecting the cardiovascular system T46.904A    Syncope and collapse R55    Other primary cardiomyopathies I42.8    Shortness of breath R06.02    Nonspecific abnormal electrocardiogram (ECG) (EKG) R94.31    Automatic implantable cardioverter-defibrillator in situ Z95.810    Mitral valve disorders I05.9    Abnormal PFT R94.2     IMPRESSION:   · SOB on exertion with nocturnal decompensation. Clinical data suggests multifactorial etiology with progressive symptoms. over past 1 year: worsening reactive airways due to untreated rhinosinusitis, silent reflux and or a component of diastolic heart failure. Doubt if any relationship to chemo- radiation for breast ca ( completed 5 years back). Doubt VTE  · Abnormal PFT- combined restrictive and Obstructive component with reduced DLCO ( corrects with Volume adjustment.) Suspect restrictive component from chest wall deformity  · H/o invasive ductal breast Ca  · H/o non ischemic cardiomyopathy  · AICD      RECOMMENDATIONS:   · Will obtain CXR and if needed HRCT  · Will start with treating obstructive airways component- predominant small airways so will use Spiriva Respimet 1.25 ( asthma dose).   · Add singulair to address chronic rhinosinuitis component  · Check CBC and IgE  · GERD precautions  · Preventive vaccinations  · Monitor response to interventions and make appropriate adjustments  · Will consider Sleep evaluation if fails to improve  · Healthy weight and active lifestyle  · Follow up in 2 months     Health maintenance screens deferred to Primary care provider.      Mert Gallardo MD

## 2017-07-11 NOTE — LETTER
Request for Examination Exam Date: 2017 Patient's Name:  Edmundo Galvez SS#:  [id number] :  1950 Pregnant: No 
 
Address:  [address] [place] [address] Phone#:  [phone] (home) Ordering Physician: Millie Robertson MD 
 
Technician: Catrachita Rooney LPN Insurance Information: See Attached Exams Ordered: CXR: PA & LAT Clinical Data/ Brief History: Adenocarcinoma of left breast C50.919, CHF I50.32 Preliminary Exam Report: 
 
 
 
 
 
 
 
 
___________________________  __________________ ___________ Radiologist                  Date         Time

## 2017-07-11 NOTE — PATIENT INSTRUCTIONS

## 2017-07-11 NOTE — COMMUNICATION BODY
[facility]  Pulmonary, Critical Care, and Sleep Medicine      Pulmonary Office Initial Consultation    Name: Yudelka Jimenez     : 1950     Date: 2017        Subjective:   Patient has been referred for evaluation of:  SOB and abnormal PFT's. Patient is a 75 y.o. female has been experiencing SOB for past 1 year. SOB:   Symptoms occur with exertion and at night. Able to walk about about 3-4  Blocks. Cannot climb stairs. Activities of daily living are affected and improves with pacing. Has orthopnea/ paroxysmal nocturnal dyspnea  SOB relieved with pacing and resting. C/o sinus congestion. Denies any significant Cough:  Has some wheezing, no chest pain, fever, chills, night sweats dyspepsia, reflux. Has had AICD placed and replaced. Left mastectomy with chemo- radiation : 5 years back  Weight gain of 50 lbs over few years. Comorbid conditions include- DM, HTN, CHF- nonischemic cardiomyopathy, Breast ca- treated: s/p mastectomy -L and chemo radiation  Non smoker  Occupational exposure-none    Past Medical History:   Diagnosis Date    Abnormal nuclear cardiac imaging test 2010    Lg fixed anterior, septal, apical, inferoseptal defect sugg RCA & LAD disease or cardiomyopathy. EF 20%. Global hykn. Nondiagnostic EKG.  Adenocarcinoma of breast; locally advanced invasive ductal adenocarcinoma of left breast     Automatic implantable cardiac defibrillator in situ     Automatic implantable cardiac defibrillator in situ     Breast cancer (HCC)     Cancer ([facility] [address]     breast cancer left    Chemotherapy convalescence or palliative care     Chronic combined systolic and diastolic heart failure (HCC)     Remains symptomatic, nyha class 3 ef improving.  Congestive heart failure, unspecified     chronic class ll    Echocardiogram 2010    EF 30%. Global hykn. Mild MR. Mild TR.     Hyperlipidemia     Hypertension     Mitral valve disorders 1/15/2014 mild to moderate mr     Mitral valve disorders 1/15/2014    mild to moderate mr     MVP (mitral valve prolapse)     Nonischemic cardiomyopathy (HCC)     EF 20-30% despite medical therapy    Nonspecific abnormal electrocardiogram (ECG) (EKG)     Osteopenia     Other primary cardiomyopathies     Pacemaker 10/27/10    s/p implantation, without complication    Poisoning by other and unspecified agents primarily affecting the cardiovascular system     Ef slightly better to 45%, STABLE back on chemo.  Radiation therapy     Radiation therapy complication 3414    S/P cardiac catheterization 07/08/10    Patent coronary arteries. LVEDP 25.  EF 25%. Global hykn. Moderate MR.  RA 10.  RV 40/10. PA 40/20.  20.  CO/CI 4.5/2.45 (Larry).  Shortness of breath     Syncope and collapse     Thyroid disease     goiter     Past Surgical History:   Procedure Laterality Date    CARDIAC SURG PROCEDURE UNLIST      Difibrillator; BI V ICD    HX MASTECTOMY  09/28/11    modified radical mastectomy and axillary dissection    HX ORTHOPAEDIC      HX OTHER SURGICAL      surgery to remove part of liver    HX PACEMAKER  10/27/10    s/p Medtronic biventricular AICD, without complication    HX RADICAL MASTECTOMY      NEUROLOGICAL PROCEDURE UNLISTED      Cervical fusion     Social History     Social History    Marital status:      Spouse name: N/A    Number of children: N/A    Years of education: N/A     Social History Main Topics    Smoking status: Never Smoker    Smokeless tobacco: Never Used    Alcohol use No    Drug use: No    Sexual activity: Not Asked     Other Topics Concern    None     Social History Narrative     Family History   Problem Relation Age of Onset    Hypertension Mother     High Cholesterol Mother     Heart Disease Father      paemaker implant at 80     Allergies   Allergen Reactions    Adhesive Other (comments)     blisters     .   Current Outpatient Prescriptions   Medication Sig Dispense Refill    omeprazole (PRILOSEC) 40 mg capsule Take 40 mg by mouth daily.  lisinopril (PRINIVIL, ZESTRIL) 10 mg tablet TAKE 1 TABLET DAILY 90 Tab 1    carvedilol (COREG) 25 mg tablet TAKE 1 TABLET TWICE A DAY  WITH MEALS 180 Tab 3    furosemide (LASIX) 20 mg tablet TAKE 1 TABLET DAILY 90 Tab 3    zaleplon (SONATA) 10 mg capsule Take 10 mg by mouth nightly.  oxyCODONE-acetaminophen (PERCOCET) 5-325 mg per tablet 1 or 2 tablets by mouth every 4 hours as needed 40 Tab 0    multivitamin (HAIR,SKIN & NAILS) tablet Take 1 Tab by mouth daily.  raloxifene (EVISTA) 60 mg tablet Take  by mouth daily.  cyclobenzaprine (FLEXERIL) 10 mg tablet Take  by mouth three (3) times daily as needed.  MULTIVITAMIN PO Take  by mouth daily.  OTHER,NON-FORMULARY, daily.  Vitamin D3 2000IU QD           Review of Systems:  HEENT: No epistaxis, no nasal drainage, no difficulty in swallowing, no redness in eyes  Respiratory: as above  Cardiovascular: no chest pain, no palpitations, no chronic leg edema, no syncope  Gastrointestinal: no abd pain, no vomiting, no diarrhea, no bleeding symptoms  Genitourinary: No urinary symptoms or hematuria  Integument/breast: No ulcers or rashes  Musculoskeletal:Neg  Neurological: No focal weakness, no seizures, no headaches  Behvioral/Psych: No anxiety, no depression  Constitutional: No fever, no chills, no weight loss, no night sweats     Objective:     Visit Vitals    /66 (BP 1 Location: Right arm, BP Patient Position: Sitting)    Pulse 82    Temp 98.1 °F (36.7 °C) (Oral)    Resp 18    Ht 5' 5\" (1.651 m)    Wt 77.1 kg (170 lb)    SpO2 97%  Comment: RA Rest    BMI 28.29 kg/m2        Physical Exam:   General: comfortable, no acute distress  HEENT: pupils reactive, sclera anicteric, EOM intact  Neck: No adenopathy or thyroid swelling, no lymphadenopathy or JVD, supple  Chest: multiple scars from previous surgery, surgically absent left breast  CVS: S1S2  RS: Mod AE bilaterally, no tactile fremitus or egophony, no accessory muscle use  Abd: soft, non tender, no hepatosplenomegaly  Neuro: non focal, awake, alert  Extrm: no leg edema, clubbing or cyanosis  Skin: no rash    Data review:   Pertinent labs: CBC, BMP, LFT's    PFT:    Date FVC FEV1  FEV1/FVC [id number] TLC RV RV/TLC VC DLCO   6/29/2017 75 69 67 43 77 81 nl  62                                       FLOWS:  Maximal Mid Expiratory Flow rate is reduced to 43 % predicted  Forced Expiratory Volume in one second is reduced to 69 % predicted  FEV 1% is reduced   Volumes: All Volumes are reduced except for RV  Flow Volume Loop:  Nonspecific obstructive pattern in Flow Volume Loop  Bronchodilator:  No significant improvement with bronchodilator therapy  Diffusion:  Abnormal Diffusion Capacity reduced to 62 % predicted  DLCO normalizes to alveolar ventilation  Impression:  Moderate obstructive defect, Predominately small airways, Mild restrictive ventilatory defect, Reduced diffusion capacity indicating a decrease in alveolar surface area for gas exchange    6 min walk test;walked 330 feet with no O2 desaturation or significant heart rate change. DI- stable    Echo: 1/18/2017:  Left ventricle: Systolic function was normal. Ejection fraction was  estimated to be 50 %. There were no regional wall motion abnormalities. Doppler parameters were consistent with abnormal left ventricular  relaxation (grade 1 diastolic dysfunction). Right ventricle: The size was normal. Systolic function was reduced. Left atrium: Size was normal.  Right atrium: Size was normal.  Mitral valve: There was systolic bowing of the anterior and posterior  leaflets, but without diagnostic evidence for prolapse. There was mild  regurgitation. Aortic valve: The valve was trileaflet. Leaflets exhibited normal  thickness and normal cuspal separation. Tricuspid valve: Pulmonary artery systolic pressure: 18 mmHg. Pulmonic valve:  There was mild regurgitation. Imaging:  I have personally reviewed the patients radiographs and have reviewed the reports:  CXr 7/11/2017:   My read- no acute process. CXR 8/26/2014:  Comparison 02/05/1  AICD. No change in lead placement. No visualized lead fracture. Lungs appear  unremarkable. Normal size heart. Impression: No acute findings. Patient Active Problem List   Diagnosis Code    Congestive heart failure (HCC) I50.9    Hypertension I10    MVP (mitral valve prolapse) I34.1    Nonischemic cardiomyopathy (HCC) I42.8    Cancer (HCC) C80.1    Adenocarcinoma of breast; locally advanced invasive ductal adenocarcinoma of left breast C50.919    Poisoning by other and unspecified agents primarily affecting the cardiovascular system T46.904A    Syncope and collapse R55    Other primary cardiomyopathies I42.8    Shortness of breath R06.02    Nonspecific abnormal electrocardiogram (ECG) (EKG) R94.31    Automatic implantable cardioverter-defibrillator in situ Z95.810    Mitral valve disorders I05.9    Abnormal PFT R94.2     IMPRESSION:   · SOB on exertion with nocturnal decompensation. Clinical data suggests multifactorial etiology with progressive symptoms. over past 1 year: worsening reactive airways due to untreated rhinosinusitis, silent reflux and or a component of diastolic heart failure. Doubt if any relationship to chemo- radiation for breast ca ( completed 5 years back). Doubt VTE  · Abnormal PFT- combined restrictive and Obstructive component with reduced DLCO ( corrects with Volume adjustment.) Suspect restrictive component from chest wall deformity  · H/o invasive ductal breast Ca  · H/o non ischemic cardiomyopathy  · AICD      RECOMMENDATIONS:   · Will obtain CXR and if needed HRCT  · Will start with treating obstructive airways component- predominant small airways so will use Spiriva Respimet 1.25 ( asthma dose).   · Add singulair to address chronic rhinosinuitis component  · Check CBC and IgE  · GERD precautions  · Preventive vaccinations  · Monitor response to interventions and make appropriate adjustments  · Will consider Sleep evaluation if fails to improve  · Healthy weight and active lifestyle  · Follow up in 2 months     Health maintenance screens deferred to Primary care provider.      Checo Pacheco MD

## 2017-07-11 NOTE — LETTER
2017 7:37 PM 
 
Patient:  Kaykay Jhaveri YOB: 1950 Date of Visit: 2017 Dear Juanis Wilson MD 
[address] [place] [address] [address] 
VIA Facsimile: [phone] 
 : Thank you for referring Ms. Daniel Tariq to me for evaluation/treatment. Below are the relevant portions of my assessment and plan of care. [facility] Pulmonary, Critical Care, and Sleep Medicine Pulmonary Office Initial Consultation Name: Kaykay Jhaveri : 1950 Date: 2017 Subjective:  
Patient has been referred for evaluation of:  SOB and abnormal PFT's. Patient is a 75 y.o. female has been experiencing SOB for past 1 year. SOB:  
Symptoms occur with exertion and at night. Able to walk about about 3-4  Blocks. Cannot climb stairs. Activities of daily living are affected and improves with pacing. Has orthopnea/ paroxysmal nocturnal dyspnea SOB relieved with pacing and resting. C/o sinus congestion. Denies any significant Cough: 
Has some wheezing, no chest pain, fever, chills, night sweats dyspepsia, reflux. Has had AICD placed and replaced. Left mastectomy with chemo- radiation : 5 years back Weight gain of 50 lbs over few years. Comorbid conditions include- DM, HTN, CHF- nonischemic cardiomyopathy, Breast ca- treated: s/p mastectomy -L and chemo radiation Non smoker Occupational exposure-none Past Medical History:  
Diagnosis Date  Abnormal nuclear cardiac imaging test 2010 Lg fixed anterior, septal, apical, inferoseptal defect sugg RCA & LAD disease or cardiomyopathy. EF 20%. Global hykn. Nondiagnostic EKG.  Adenocarcinoma of breast; locally advanced invasive ductal adenocarcinoma of left breast   
 Automatic implantable cardiac defibrillator in situ  Automatic implantable cardiac defibrillator in situ  Breast cancer ([facility] [place] [address]  Cancer ([facility] [place] [address] breast cancer left  Chemotherapy convalescence or palliative care 2012  Chronic combined systolic and diastolic heart failure ([facility] [address] Remains symptomatic, nyha class 3 ef improving.  Congestive heart failure, unspecified   
 chronic class ll  Echocardiogram 09/27/2010 EF 30%. Global hykn. Mild MR. Mild TR.  Hyperlipidemia  Hypertension  Mitral valve disorders 1/15/2014  
 mild to moderate mr  Mitral valve disorders 1/15/2014  
 mild to moderate mr  MVP (mitral valve prolapse)  Nonischemic cardiomyopathy ([address] EF 20-30% despite medical therapy  Nonspecific abnormal electrocardiogram (ECG) (EKG)  Osteopenia  Other primary cardiomyopathies  Pacemaker 10/27/10  
 s/p implantation, without complication  Poisoning by other and unspecified agents primarily affecting the cardiovascular system Ef slightly better to 45%, STABLE back on chemo.  Radiation therapy  Radiation therapy complication 0131  S/P cardiac catheterization 07/08/10 Patent coronary arteries. LVEDP 25.  EF 25%. Global hykn. Moderate MR.  RA 10.  RV 40/10. PA 40/20.  20.  CO/CI 4.5/2.45 (Larry).  Shortness of breath  Syncope and collapse  Thyroid disease   
 goiter Past Surgical History:  
Procedure Laterality Date  CARDIAC SURG PROCEDURE UNLIST Difibrillator; BI V ICD  HX MASTECTOMY  09/28/11  
 modified radical mastectomy and axillary dissection  HX ORTHOPAEDIC    
 HX OTHER SURGICAL    
 surgery to remove part of liver  HX PACEMAKER  10/27/10  
 s/p Medtronic biventricular AICD, without complication  HX RADICAL MASTECTOMY  NEUROLOGICAL PROCEDURE UNLISTED Cervical fusion Social History Social History  Marital status:  Spouse name: N/A  
 Number of children: N/A  
 Years of education: N/A Social History Main Topics  Smoking status: Never Smoker  Smokeless tobacco: Never Used  Alcohol use No  
 Drug use:  No  
  Sexual activity: Not Asked Other Topics Concern  None Social History Narrative Family History Problem Relation Age of Onset  Hypertension Mother  High Cholesterol Mother  Heart Disease Father   
  paemaker implant at 80 Allergies Allergen Reactions  Adhesive Other (comments)  
  blisters Garoyce Fraser Current Outpatient Prescriptions Medication Sig Dispense Refill  omeprazole (PRILOSEC) 40 mg capsule Take 40 mg by mouth daily.  lisinopril (PRINIVIL, ZESTRIL) 10 mg tablet TAKE 1 TABLET DAILY 90 Tab 1  carvedilol (COREG) 25 mg tablet TAKE 1 TABLET TWICE A DAY  WITH MEALS 180 Tab 3  furosemide (LASIX) 20 mg tablet TAKE 1 TABLET DAILY 90 Tab 3  
 zaleplon (SONATA) 10 mg capsule Take 10 mg by mouth nightly.  oxyCODONE-acetaminophen (PERCOCET) 5-325 mg per tablet 1 or 2 tablets by mouth every 4 hours as needed 40 Tab 0  
 multivitamin (HAIR,SKIN & NAILS) tablet Take 1 Tab by mouth daily.  raloxifene (EVISTA) 60 mg tablet Take  by mouth daily.  cyclobenzaprine (FLEXERIL) 10 mg tablet Take  by mouth three (3) times daily as needed.  MULTIVITAMIN PO Take  by mouth daily.  OTHER,NON-FORMULARY, daily. Vitamin D3 2000IU QD Review of Systems: 
HEENT: No epistaxis, no nasal drainage, no difficulty in swallowing, no redness in eyes Respiratory: as above Cardiovascular: no chest pain, no palpitations, no chronic leg edema, no syncope Gastrointestinal: no abd pain, no vomiting, no diarrhea, no bleeding symptoms Genitourinary: No urinary symptoms or hematuria Integument/breast: No ulcers or rashes Musculoskeletal:Neg 
Neurological: No focal weakness, no seizures, no headaches Behvioral/Psych: No anxiety, no depression Constitutional: No fever, no chills, no weight loss, no night sweats Objective:  
 
Visit Vitals  /66 (BP 1 Location: Right arm, BP Patient Position: Sitting)  Pulse 82  Temp 98.1 °F (36.7 °C) (Oral)  Resp 18  Ht 5' 5\" (1.651 m)  Wt 77.1 kg (170 lb)  SpO2 97% Comment: RA Rest  
 BMI 28.29 kg/m2 Physical Exam:  
General: comfortable, no acute distress HEENT: pupils reactive, sclera anicteric, EOM intact Neck: No adenopathy or thyroid swelling, no lymphadenopathy or JVD, supple Chest: multiple scars from previous surgery, surgically absent left breast 
CVS: S1S2 
RS: Mod AE bilaterally, no tactile fremitus or egophony, no accessory muscle use Abd: soft, non tender, no hepatosplenomegaly Neuro: non focal, awake, alert Extrm: no leg edema, clubbing or cyanosis Skin: no rash Data review:  
Pertinent labs: CBC, BMP, LFT's PFT: 
 
Date FVC FEV1  FEV1/FVC [id number] TLC RV RV/TLC VC DLCO  
6/29/2017 75 69 67 43 77 81 nl  62 FLOWS: 
Maximal Mid Expiratory Flow rate is reduced to 43 % predicted Forced Expiratory Volume in one second is reduced to 69 % predicted FEV 1% is reduced Volumes: All Volumes are reduced except for RV Flow Volume Loop: 
Nonspecific obstructive pattern in Flow Volume Loop Bronchodilator: No significant improvement with bronchodilator therapy Diffusion: Abnormal Diffusion Capacity reduced to 62 % predicted DLCO normalizes to alveolar ventilation Impression: Moderate obstructive defect, Predominately small airways, Mild restrictive ventilatory defect, Reduced diffusion capacity indicating a decrease in alveolar surface area for gas exchange 6 min walk test;walked 330 feet with no O2 desaturation or significant heart rate change. DI- stable Echo: 1/18/2017: 
Left ventricle: Systolic function was normal. Ejection fraction was 
estimated to be 50 %. There were no regional wall motion abnormalities. Doppler parameters were consistent with abnormal left ventricular 
relaxation (grade 1 diastolic dysfunction). Right ventricle: The size was normal. Systolic function was reduced.  
Left atrium: Size was normal. 
 Right atrium: Size was normal. 
Mitral valve: There was systolic bowing of the anterior and posterior 
leaflets, but without diagnostic evidence for prolapse. There was mild 
regurgitation. Aortic valve: The valve was trileaflet. Leaflets exhibited normal 
thickness and normal cuspal separation. Tricuspid valve: Pulmonary artery systolic pressure: 18 mmHg. Pulmonic valve: There was mild regurgitation. Imaging: 
I have personally reviewed the patients radiographs and have reviewed the reports: 
CXr 7/11/2017: My read- no acute process. CXR 8/26/2014: 
Comparison 02/05/1 
AICD. No change in lead placement. No visualized lead fracture. Lungs appear 
unremarkable. Normal size heart. Impression: No acute findings. Patient Active Problem List  
Diagnosis Code  Congestive heart failure (HCC) I50.9  Hypertension I10  
 MVP (mitral valve prolapse) I34.1  Nonischemic cardiomyopathy (HCC) I42.8  Cancer ([facility] [address] C80.1  Adenocarcinoma of breast; locally advanced invasive ductal adenocarcinoma of left breast C50.919  
 Poisoning by other and unspecified agents primarily affecting the cardiovascular system T46.904A  Syncope and collapse R55  Other primary cardiomyopathies I42.8  Shortness of breath R06.02  
 Nonspecific abnormal electrocardiogram (ECG) (EKG) R94.31  
 Automatic implantable cardioverter-defibrillator in situ Z95.810  
 Mitral valve disorders I05.9  Abnormal PFT R94.2 IMPRESSION:  
· SOB on exertion with nocturnal decompensation. Clinical data suggests multifactorial etiology with progressive symptoms. over past 1 year: worsening reactive airways due to untreated rhinosinusitis, silent reflux and or a component of diastolic heart failure. Doubt if any relationship to chemo- radiation for breast ca ( completed 5 years back). Doubt VTE · Abnormal PFT- combined restrictive and Obstructive component with reduced DLCO ( corrects with Volume adjustment.) Suspect restrictive component from chest wall deformity · H/o invasive ductal breast Ca · H/o non ischemic cardiomyopathy · AICD RECOMMENDATIONS:  
· Will obtain CXR and if needed HRCT · Will start with treating obstructive airways component- predominant small airways so will use Spiriva Respimet 1.25 ( asthma dose). · Add singulair to address chronic rhinosinuitis component · Check CBC and IgE · GERD precautions · Preventive vaccinations · Monitor response to interventions and make appropriate adjustments · Will consider Sleep evaluation if fails to improve · Healthy weight and active lifestyle · Follow up in 2 months Health maintenance screens deferred to Primary care provider. Berenice Clark MD 
 
 
 
 
 
 
If you have questions, please do not hesitate to call me. I look forward to following MsJesus Belinda along with you.  
 
 
 
Sincerely, 
 
 
Berenice Clark MD

## 2017-07-11 NOTE — MR AVS SNAPSHOT
Visit Information Date & Time Provider Department Dept. Phone Encounter #  
 7/11/2017 10:45 AM Bart Martínez MD [facility] [id number] Your Appointments 10/16/2017  8:00 AM  
PROCEDURE with [facility] ([facility]) Appt Note: carelink [address], [address] [place] [address]  
[address], [address] [place] 1/15/2018  8:00 AM  
CARELINK with [facility] ([facility]) Appt Note: carelink [address], [address] [place] [address]  
[address], [address] [address]  
  
    
 1/22/2018 10:00 AM  
Office Visit with Gerald Giraldo MD  
[facility]) Appt Note: 6m  
 [address], [address] [place] [address]  
[address], [address] 4/16/2018  8:00 AM  
CARELINK with [facility] ([facility]) Appt Note: carelink [address], [address] [place] [address]  
[phone]  
  
    
 7/23/2018 10:30 AM  
PROCEDURE with Gerald Giraldo MD  
[facility]) Appt Note: medtr[employer] ck [address], [address] [place] [address]  
[address], [address] [place] Upcoming Health Maintenance Date Due Hepatitis C Screening 1950 DTaP/Tdap/Td series (1 - Tdap) 1/26/1970 FOBT Q 1 YEAR AGE 50-75 1/26/1999 ZOSTER VACCINE AGE 60> 1/26/2009 GLAUCOMA SCREENING Q2Y 1/26/2014 Pneumococcal 65+ High/Highest Risk (1 of 2 - PCV13) 1/26/2014 MEDICARE YEARLY EXAM 1/26/2014 INFLUENZA AGE 9 TO ADULT 8/1/2017 BREAST CANCER SCRN MAMMOGRAM 1/13/2019 Allergies as of 7/11/2017  Review Complete On: 7/11/2017 By: Santo Man MD  
  
 Severity Noted Reaction Type Reactions Adhesive  10/27/2011    Other (comments)  
 blisters Current Immunizations  Reviewed on 7/11/2017 Name Date Influenza High Dose Vaccine PF 9/1/2016 Pneumococcal Conjugate (PCV-13) 10/1/2016 Pneumococcal Polysaccharide (PPSV-23) 10/1/2015 Reviewed by Cookie Rueda LPN on 2/22/4107 at 44:55 AM  
You Were Diagnosed With   
  
 Codes Comments SOB (shortness of breath)    -  Primary ICD-10-CM: R06.02 
ICD-9-CM: 786.05 Abnormal PFT     ICD-10-CM: R94.2 ICD-9-CM: 794.2 Vitals BP Pulse Temp Resp Height(growth percentile) Weight(growth percentile) 112/66 (BP 1 Location: Right arm, BP Patient Position: Sitting) 82 98.1 °F (36.7 °C) (Oral) 18 5' 5\" (1.651 m) 170 lb (77.1 kg) SpO2 BMI OB Status Smoking Status 97% 28.29 kg/m2 Postmenopausal Never Smoker BMI and BSA Data Body Mass Index Body Surface Area  
 28.29 kg/m 2 1.88 m 2 Preferred Pharmacy Pharmacy Name Phone  [facility] [phone] Your Updated Medication List  
  
   
This list is accurate as of: 7/11/17 11:39 AM.  Always use your most recent med list.  
  
  
  
  
 carvedilol 25 mg tablet Commonly known as:  COREG  
TAKE 1 TABLET TWICE A DAY  WITH MEALS  
  
 EVISTA 60 mg tablet Generic drug:  raloxifene Take  by mouth daily. FLEXERIL 10 mg tablet Generic drug:  cyclobenzaprine Take  by mouth three (3) times daily as needed. furosemide 20 mg tablet Commonly known as:  LASIX TAKE 1 TABLET DAILY  
  
 HAIR,SKIN AND NAILS tablet Generic drug:  multivitamin Take 1 Tab by mouth daily. lisinopril 10 mg tablet Commonly known as:  PRINIVIL, ZESTRIL  
TAKE 1 TABLET DAILY  
  
 montelukast 10 mg tablet Commonly known as:  SINGULAIR Take 1 Tab by mouth daily.   
  
 MULTIVITAMIN PO  
 Take  by mouth daily. omeprazole 40 mg capsule Commonly known as:  PRILOSEC Take 40 mg by mouth daily. OTHER(NON-FORMULARY)  
daily. Vitamin D3 2000IU QD  
  
 oxyCODONE-acetaminophen 5-325 mg per tablet Commonly known as:  PERCOCET  
1 or 2 tablets by mouth every 4 hours as needed * tiotropium bromide 1.25 mcg/actuation inhaler Commonly known as:  Yu Given Take 2 Puffs by inhalation daily. * tiotropium bromide 1.25 mcg/actuation inhaler Commonly known as:  Yu Given Take 2 Puffs by inhalation daily. zaleplon 10 mg capsule Commonly known as:  SONATA Take 10 mg by mouth nightly. * Notice: This list has 2 medication(s) that are the same as other medications prescribed for you. Read the directions carefully, and ask your doctor or other care provider to review them with you. Prescriptions Sent to Pharmacy Refills  
 montelukast (SINGULAIR) 10 mg tablet 6 Sig: Take 1 Tab by mouth daily. Class: Normal  
 Pharmacy: [facility] [address] [facility] Ph #: [phone] Route: Oral  
 tiotropium bromide (SPIRIVA RESPIMAT) 1.25 mcg/actuation inhaler 3 Sig: Take 2 Puffs by inhalation daily. Class: Normal  
 Pharmacy: [facility] [address] [place] [facility] Ph #: [phone] Route: Inhalation We Performed the Following AMB POC XRAY, CHEST, 2 VIEWS, FRONTAL [09906 CPT(R)] To-Do List   
 07/11/2017 Lab:  CBC WITH AUTOMATED DIFF   
  
 07/11/2017 Lab:  IMMUNOGLOBULIN E, QT Patient Instructions Gastroesophageal Reflux Disease (GERD): Care Instructions Your Care Instructions Gastroesophageal reflux disease (GERD) is the backward flow of stomach acid into the esophagus. The esophagus is the tube that leads from your throat to your stomach. A one-way valve prevents the stomach acid from moving up into this tube.  When you have GERD, this valve does not close tightly enough. If you have mild GERD symptoms including heartburn, you may be able to control the problem with antacids or over-the-counter medicine. Changing your diet, losing weight, and making other lifestyle changes can also help reduce symptoms. Follow-up care is a key part of your treatment and safety. Be sure to make and go to all appointments, and call your doctor if you are having problems. Its also a good idea to know your test results and keep a list of the medicines you take. How can you care for yourself at home? · Take your medicines exactly as prescribed. Call your doctor if you think you are having a problem with your medicine. · Your doctor may recommend over-the-counter medicine. For mild or occasional indigestion, antacids, such as Tums, Gaviscon, Mylanta, or Maalox, may help. Your doctor also may recommend over-the-counter acid reducers, such as Pepcid AC, Tagamet HB, Zantac 75, or Prilosec. Read and follow all instructions on the label. If you use these medicines often, talk with your doctor. · Change your eating habits. ¨ Its best to eat several small meals instead of two or three large meals. ¨ After you eat, wait 2 to 3 hours before you lie down. ¨ Chocolate, mint, and alcohol can make GERD worse. ¨ Spicy foods, foods that have a lot of acid (like tomatoes and oranges), and coffee can make GERD symptoms worse in some people. If your symptoms are worse after you eat a certain food, you may want to stop eating that food to see if your symptoms get better. · Do not smoke or chew tobacco. Smoking can make GERD worse. If you need help quitting, talk to your doctor about stop-smoking programs and medicines. These can increase your chances of quitting for good. · If you have GERD symptoms at night, raise the head of your bed 6 to 8 inches by putting the frame on blocks or placing a foam wedge under the head of your mattress. (Adding extra pillows does not work.) · Do not wear tight clothing around your middle. · Lose weight if you need to. Losing just 5 to 10 pounds can help. When should you call for help? Call your doctor now or seek immediate medical care if: 
· You have new or different belly pain. · Your stools are black and tarlike or have streaks of blood. Watch closely for changes in your health, and be sure to contact your doctor if: 
· Your symptoms have not improved after 2 days. · Food seems to catch in your throat or chest. 
Where can you learn more? Go to http://karthik-miracle.info/. Enter C580 in the search box to learn more about \"Gastroesophageal Reflux Disease (GERD): Care Instructions. \" Current as of: August 9, 2016 Content Version: 11.3 © 0210-3362 [employer]. Care instructions adapted under license by [employer] (which disclaims liability or warranty for this information). If you have questions about a medical condition or this instruction, always ask your healthcare professional. [address] any warranty or liability for your use of this information. Introducing [facility]! Dear Cole Viveros: 
Thank you for requesting a [employer] account. Our records indicate that you already have an active [employer] account. You can access your account anytime at [email or web address] Did you know that you can access your hospital and ER discharge instructions at any time in [employer]? You can also review all of your test results from your hospital stay or ER visit. Additional Information If you have questions, please visit the Frequently Asked Questions section of the [employer] website at [email or web address] Remember, [employer] is NOT to be used for urgent needs. For medical emergencies, dial 911. Now available from your iPhone and Android! Please provide this summary of care documentation to your next provider. Your primary care clinician is listed as Mikel Espinal. If you have any questions after today's visit, please call [phone].

## 2017-07-12 LAB — IGE SERPL-ACNC: 17 IU/ML (ref 0–100)

## 2017-07-13 RX ORDER — MONTELUKAST SODIUM 10 MG/1
10 TABLET ORAL DAILY
Qty: 90 TAB | Refills: 3 | Status: SHIPPED | COMMUNITY
Start: 2017-07-13 | End: 2018-07-06 | Stop reason: SDUPTHER

## 2017-09-22 ENCOUNTER — OFFICE VISIT (OUTPATIENT)
Dept: PULMONOLOGY | Age: 67
End: 2017-09-22

## 2017-09-22 VITALS
TEMPERATURE: 98.6 F | WEIGHT: 168 LBS | SYSTOLIC BLOOD PRESSURE: 106 MMHG | OXYGEN SATURATION: 98 % | DIASTOLIC BLOOD PRESSURE: 62 MMHG | RESPIRATION RATE: 16 BRPM | HEIGHT: 65 IN | HEART RATE: 88 BPM | BODY MASS INDEX: 27.99 KG/M2

## 2017-09-22 DIAGNOSIS — J43.9 MIXED RESTRICTIVE AND OBSTRUCTIVE LUNG DISEASE (HCC): ICD-10-CM

## 2017-09-22 DIAGNOSIS — J30.1 NON-SEASONAL ALLERGIC RHINITIS DUE TO POLLEN: ICD-10-CM

## 2017-09-22 DIAGNOSIS — J45.40 MODERATE PERSISTENT ASTHMA WITHOUT COMPLICATION: Primary | ICD-10-CM

## 2017-09-22 DIAGNOSIS — J98.4 MIXED RESTRICTIVE AND OBSTRUCTIVE LUNG DISEASE (HCC): ICD-10-CM

## 2017-09-22 RX ORDER — BUDESONIDE AND FORMOTEROL FUMARATE DIHYDRATE 80; 4.5 UG/1; UG/1
2 AEROSOL RESPIRATORY (INHALATION) 2 TIMES DAILY
COMMUNITY
End: 2017-10-26 | Stop reason: SDUPTHER

## 2017-09-22 NOTE — COMMUNICATION BODY
[facility]  Pulmonary, Critical Care, and Sleep Medicine      Pulmonary Office visit    Name: Mohsen Pimentel     : 1950     Date: 2017        Subjective:   Patient has been referred for evaluation of:  SOB and abnormal PFT's.    17  Improved significantly after starting Singulair and Spiriva Respimet. Improved SOB and cough. Developed cough with nasal congestion, postnasal drip last week and diagnosed with Bronchitis by PCP- added Symbicort and cough suppressant with benefit noted. Denies much productive mucus. Denies fever, chills. Denies any chest pain. No nausea, vomiting. HPI:  Patient is a 75 y.o. female has been experiencing SOB for past 1 year. SOB:   Symptoms occur with exertion and at night. Able to walk about about 3-4  Blocks. Cannot climb stairs. Activities of daily living are affected and improves with pacing. Has orthopnea/ paroxysmal nocturnal dyspnea  SOB relieved with pacing and resting. C/o sinus congestion. Denies any significant Cough:  Has some wheezing, no chest pain, fever, chills, night sweats dyspepsia, reflux. Has had AICD placed and replaced. Left mastectomy with chemo- radiation : 5 years back  Weight gain of 50 lbs over few years. Comorbid conditions include- DM, HTN, CHF- nonischemic cardiomyopathy, Breast ca- treated: s/p mastectomy -L and chemo radiation  Non smoker  Occupational exposure-none    Past Medical History:   Diagnosis Date    Abnormal nuclear cardiac imaging test 2010    Lg fixed anterior, septal, apical, inferoseptal defect sugg RCA & LAD disease or cardiomyopathy. EF 20%. Global hykn. Nondiagnostic EKG.     Adenocarcinoma of breast; locally advanced invasive ductal adenocarcinoma of left breast     Automatic implantable cardiac defibrillator in situ     Automatic implantable cardiac defibrillator in situ     [facility]     Cancer ([facility]     breast cancer left    Chemotherapy convalescence or palliative care 2012    Chronic combined systolic and diastolic heart failure (HCC)     Remains symptomatic, nyha class 3 ef improving.  Congestive heart failure, unspecified     chronic class ll    Echocardiogram 09/27/2010    EF 30%. Global hykn. Mild MR. Mild TR.  Hyperlipidemia     Hypertension     Mitral valve disorders 1/15/2014    mild to moderate mr     Mitral valve disorders 1/15/2014    mild to moderate mr     MVP (mitral valve prolapse)     Nonischemic cardiomyopathy (HCC)     EF 20-30% despite medical therapy    Nonspecific abnormal electrocardiogram (ECG) (EKG)     Osteopenia     Other primary cardiomyopathies     Pacemaker 10/27/10    s/p implantation, without complication    Poisoning by other and unspecified agents primarily affecting the cardiovascular system     Ef slightly better to 45%, STABLE back on chemo.  Radiation therapy     Radiation therapy complication 8921    S/P cardiac catheterization 07/08/10    Patent coronary arteries. LVEDP 25.  EF 25%. Global hykn. Moderate MR.  RA 10.  RV 40/10. PA 40/20.  20.  CO/CI 4.5/2.45 (Larry).  Shortness of breath     Syncope and collapse     Thyroid disease     goiter     Past Surgical History:   Procedure Laterality Date    CARDIAC SURG PROCEDURE UNLIST      Difibrillator; BI V ICD    HX MASTECTOMY  09/28/11    modified radical mastectomy and axillary dissection    HX ORTHOPAEDIC      HX OTHER SURGICAL      surgery to remove part of liver    HX PACEMAKER  10/27/10    s/p Medtronic biventricular AICD, without complication    HX RADICAL MASTECTOMY      NEUROLOGICAL PROCEDURE UNLISTED      Cervical fusion       Allergies   Allergen Reactions    Adhesive Other (comments)     blisters     . Current Outpatient Prescriptions   Medication Sig Dispense Refill    budesonide-formoterol (SYMBICORT) 80-4.5 mcg/actuation HFAA inhaler Take 2 Puffs by inhalation two (2) times a day.       inhalational spacing device 1 Each by Does Not Apply route as needed. 1 Device 0    montelukast (SINGULAIR) 10 mg tablet Take 1 Tab by mouth daily. 90 Tab 3    omeprazole (PRILOSEC) 40 mg capsule Take 40 mg by mouth daily.  tiotropium bromide (SPIRIVA RESPIMAT) 1.25 mcg/actuation inhaler Take 2 Puffs by inhalation daily. 1 Inhaler 0    tiotropium bromide (SPIRIVA RESPIMAT) 1.25 mcg/actuation inhaler Take 2 Puffs by inhalation daily. 3 Inhaler 3    lisinopril (PRINIVIL, ZESTRIL) 10 mg tablet TAKE 1 TABLET DAILY 90 Tab 1    carvedilol (COREG) 25 mg tablet TAKE 1 TABLET TWICE A DAY  WITH MEALS 180 Tab 3    furosemide (LASIX) 20 mg tablet TAKE 1 TABLET DAILY 90 Tab 3    zaleplon (SONATA) 10 mg capsule Take 10 mg by mouth nightly.  multivitamin (HAIR,SKIN & NAILS) tablet Take 1 Tab by mouth daily.  raloxifene (EVISTA) 60 mg tablet Take  by mouth daily.  cyclobenzaprine (FLEXERIL) 10 mg tablet Take  by mouth three (3) times daily as needed.  MULTIVITAMIN PO Take  by mouth daily.  OTHER,NON-FORMULARY, daily.  Vitamin D3 2000IU QD      oxyCODONE-acetaminophen (PERCOCET) 5-325 mg per tablet 1 or 2 tablets by mouth every 4 hours as needed 40 Tab 0     Review of Systems:  HEENT: No epistaxis, no nasal drainage, no difficulty in swallowing, no redness in eyes  Respiratory: as above  Cardiovascular: no chest pain, no palpitations, no chronic leg edema, no syncope  Gastrointestinal: no abd pain, no vomiting, no diarrhea, no bleeding symptoms  Genitourinary: No urinary symptoms or hematuria  Integument/breast: No ulcers or rashes  Musculoskeletal:Neg  Neurological: No focal weakness, no seizures, no headaches  Behvioral/Psych: No anxiety, no depression  Constitutional: No fever, no chills, no weight loss, no night sweats     Objective:     Visit Vitals    /62 (BP 1 Location: Right arm, BP Patient Position: Sitting)    Pulse 88    Temp 98.6 °F (37 °C) (Oral)    Resp 16    Ht 5' 5\" (1.651 m)  Wt 76.2 kg (168 lb)    SpO2 98%  Comment: RA Rest    BMI 27.96 kg/m2        Physical Exam:   General: comfortable, no acute distress  HEENT: pupils reactive, sclera anicteric, EOM intact  Neck: No adenopathy or thyroid swelling, no lymphadenopathy or JVD, supple  Chest: multiple scars from previous surgery, surgically absent left breast  CVS: S1S2  RS: Mod AE bilaterally, no tactile fremitus or egophony, no accessory muscle use  Abd: soft, non tender, no hepatosplenomegaly  Neuro: non focal, awake, alert  Extrm: no leg edema, clubbing or cyanosis  Skin: no rash    Data review:   Pertinent labs: CBC, BMP, LFT's    PFT:    Date FVC FEV1  FEV1/FVC [id number] TLC RV RV/TLC VC DLCO   6/29/2017 75 69 67 43 77 81 nl  62                                       FLOWS:  Maximal Mid Expiratory Flow rate is reduced to 43 % predicted  Forced Expiratory Volume in one second is reduced to 69 % predicted  FEV 1% is reduced   Volumes: All Volumes are reduced except for RV  Flow Volume Loop:  Nonspecific obstructive pattern in Flow Volume Loop  Bronchodilator:  No significant improvement with bronchodilator therapy  Diffusion:  Abnormal Diffusion Capacity reduced to 62 % predicted  DLCO normalizes to alveolar ventilation  Impression:  Moderate obstructive defect, Predominately small airways, Mild restrictive ventilatory defect, Reduced diffusion capacity indicating a decrease in alveolar surface area for gas exchange    6 min walk test;walked 330 feet with no O2 desaturation or significant heart rate change. DI- stable    Echo: 1/18/2017:  Left ventricle: Systolic function was normal. Ejection fraction was  estimated to be 50 %. There were no regional wall motion abnormalities. Doppler parameters were consistent with abnormal left ventricular  relaxation (grade 1 diastolic dysfunction). Right ventricle: The size was normal. Systolic function was reduced.   Left atrium: Size was normal.  Right atrium: Size was normal.  Mitral valve: There was systolic bowing of the anterior and posterior  leaflets, but without diagnostic evidence for prolapse. There was mild  regurgitation. Aortic valve: The valve was trileaflet. Leaflets exhibited normal  thickness and normal cuspal separation. Tricuspid valve: Pulmonary artery systolic pressure: 18 mmHg. Pulmonic valve: There was mild regurgitation. Imaging:  I have personally reviewed the patients radiographs and have reviewed the reports:  CXr 7/11/2017:   My read- no acute process. CXR 8/26/2014:  Comparison 02/05/1  AICD. No change in lead placement. No visualized lead fracture. Lungs appear  unremarkable. Normal size heart. Impression: No acute findings. Patient Active Problem List   Diagnosis Code    Congestive heart failure (HCC) I50.9    Hypertension I10    MVP (mitral valve prolapse) I34.1    Nonischemic cardiomyopathy (HCC) I42.8    Cancer (HCC) C80.1    Adenocarcinoma of breast; locally advanced invasive ductal adenocarcinoma of left breast C50.919    Poisoning by other and unspecified agents primarily affecting the cardiovascular system T46.904A    Syncope and collapse R55    Other primary cardiomyopathies I42.8    Shortness of breath R06.02    Nonspecific abnormal electrocardiogram (ECG) (EKG) R94.31    Automatic implantable cardioverter-defibrillator in situ Z95.810    Mitral valve disorders I05.9    Abnormal PFT R94.2     IMPRESSION:   · SOB on exertion with nocturnal decompensation. Clinical data suggests multifactorial etiology with progressive symptoms. over past 1 year: worsening reactive airways due to untreated rhinosinusitis, silent reflux and or a component of diastolic heart failure. Doubt if any relationship to chemo- radiation for breast ca ( completed 5 years back). Doubt VTE  · Abnormal PFT- combined restrictive and Obstructive component with reduced DLCO ( corrects with Volume adjustment.) Suspect restrictive component from chest wall deformity. Improved symptoms with bronchodilators  · H/o invasive ductal breast Ca  · H/o non ischemic cardiomyopathy  · AICD      RECOMMENDATIONS:     · Continue treating obstructive airways component- predominant small airways so will use Spiriva Respimet 1.25 ( asthma dose). and Symbicort added  · Continue singulair to address chronic rhinosinuitis component  · Checked CBC and IgE- no eosinophilia and normal IgE  · GERD precautions  · Preventive vaccinations  · Monitor response to interventions and make appropriate adjustments  · Will consider CXR/HRCT and consider Sleep evaluation if fails to improve  · Healthy weight and active lifestyle  · Follow up in 3 months     Health maintenance screens deferred to Primary care provider.      Dayo Christianson MD

## 2017-09-22 NOTE — MR AVS SNAPSHOT
Visit Information Date & Time Provider Department Dept. Phone Encounter #  
 9/22/2017  1:30 PM Checo Pacheco MD [facility] \[facility]\"" [id number] Follow-up Instructions Return in about 3 months (around 12/22/2017). Your Appointments 10/16/2017  8:00 AM  
PROCEDURE with [facility] PACER Cardiology [facility] ([facility]) Appt Note: carelink [address] [facility] [address]  
[address], [address] [place] 1/15/2018  8:00 AM  
CARELINK with [facility] [place] [facility] [place] [place] Cardiology [facility] ([facility]) Appt Note: carelink [address], [address] [facility] [address]  
[address], [address] [address]  
  
    
 1/22/2018 10:00 AM  
Office Visit with Sharmin Austin MD  
[facility]) Appt Note: 6m  
 [address], [address] [facility] [address]  
[address], [address] [place] 4/16/2018  8:00 AM  
CARELINK with [facility] [place] [place] Cardiology [facility] ([facility]) Appt Note: carelink [address], [address] [facility] [address]  
[phone]  
  
    
 7/23/2018 10:30 AM  
PROCEDURE with Sharmin Austin MD  
[facility]) Appt Note: medtr[employer] ck [address], [address] [facility] [address]  
[address], [address] [place] [address] [place] Upcoming Health Maintenance Date Due Hepatitis C Screening 1950 DTaP/Tdap/Td series (1 - Tdap) 1/26/1970 FOBT Q 1 YEAR AGE 50-75 1/26/1999 ZOSTER VACCINE AGE 60> 11/26/2008 GLAUCOMA SCREENING Q2Y 1/26/2014 MEDICARE YEARLY EXAM 1/26/2014 INFLUENZA AGE 9 TO ADULT 8/1/2017 BREAST CANCER SCRN MAMMOGRAM 1/13/2019 Allergies as of 9/22/2017  Review Complete On: 9/22/2017 By: Reese Malloy MD  
  
 Severity Noted Reaction Type Reactions Adhesive  10/27/2011    Other (comments)  
 blisters Current Immunizations  Reviewed on 9/22/2017 Name Date Influenza High Dose Vaccine PF 9/1/2016 Pneumococcal Conjugate (PCV-13) 10/1/2016 Pneumococcal Polysaccharide (PPSV-23) 10/1/2015 Reviewed by Emily Seth LPN on 0/35/1899 at  1:53 PM  
Vitals BP Pulse Temp Resp Height(growth percentile) Weight(growth percentile) 106/62 (BP 1 Location: Right arm, BP Patient Position: Sitting) 88 98.6 °F (37 °C) (Oral) 16 5' 5\" (1.651 m) 168 lb (76.2 kg) SpO2 BMI OB Status Smoking Status 98% 27.96 kg/m2 Postmenopausal Never Smoker BMI and BSA Data Body Mass Index Body Surface Area  
 27.96 kg/m 2 1.87 m 2 Preferred Pharmacy Pharmacy Name Phone [facility], [address] Your Updated Medication List  
  
   
This list is accurate as of: 9/22/17  2:02 PM.  Always use your most recent med list.  
  
  
  
  
 carvedilol 25 mg tablet Commonly known as:  COREG  
TAKE 1 TABLET TWICE A DAY  WITH MEALS  
  
 EVISTA 60 mg tablet Generic drug:  raloxifene Take  by mouth daily. FLEXERIL 10 mg tablet Generic drug:  cyclobenzaprine Take  by mouth three (3) times daily as needed. furosemide 20 mg tablet Commonly known as:  LASIX TAKE 1 TABLET DAILY  
  
 HAIR,SKIN AND NAILS tablet Generic drug:  multivitamin Take 1 Tab by mouth daily. inhalational spacing device 1 Each by Does Not Apply route as needed. lisinopril 10 mg tablet Commonly known as:  PRINIVIL, ZESTRIL  
TAKE 1 TABLET DAILY  
  
 montelukast 10 mg tablet Commonly known as:  SINGULAIR Take 1 Tab by mouth daily. MULTIVITAMIN PO Take  by mouth daily. omeprazole 40 mg capsule Commonly known as:  PRILOSEC  
 Take 40 mg by mouth daily. OTHER(NON-FORMULARY)  
daily. Vitamin D3 2000IU QD  
  
 oxyCODONE-acetaminophen 5-325 mg per tablet Commonly known as:  PERCOCET  
1 or 2 tablets by mouth every 4 hours as needed SYMBICORT 80-4.5 mcg/actuation Hfaa inhaler Generic drug:  budesonide-formoterol Take 2 Puffs by inhalation two (2) times a day. * tiotropium bromide 1.25 mcg/actuation inhaler Commonly known as:  Sueellen Tamiko Take 2 Puffs by inhalation daily. * tiotropium bromide 1.25 mcg/actuation inhaler Commonly known as:  Sueellen Tamiko Take 2 Puffs by inhalation daily. zaleplon 10 mg capsule Commonly known as:  SONATA Take 10 mg by mouth nightly. * Notice: This list has 2 medication(s) that are the same as other medications prescribed for you. Read the directions carefully, and ask your doctor or other care provider to review them with you. Follow-up Instructions Return in about 3 months (around 12/22/2017). To-Do List   
 01/15/2018 10:30 AM  
  Appointment with ELMIRA TEE at [address] [place] [address] ([phone]) PAYMENT  For Non-Medicare patients - $15.00 will be collected from you at the time of your exam.  You will be billed $35.00 from the reading [facility]. OUTSIDE FILMS  - Any outside films related to the study being scheduled should be brought with you on the day of the exam.  If this cannot be done there may be a delay in the reading of the study. MEDICATIONS  - Patient must bring a complete list of all medications currently taking to include prescriptions, over-the-counter meds, herbals, vitamins & any dietary supplements  GENERAL INSTRUCTIONS  - On the day of your exam do not use any bath powder, deodorant or lotions on the armpit area. -Tenderness of breasts may cause an increase of discomfort during procedure.   If you are experiencing breast tenderness on the day of your appointment and would like to reschedule, please call [phone]. Introducing [facility]! Dear Tyra Torres: 
Thank you for requesting a [employer] account. Our records indicate that you already have an active [employer] account. You can access your account anytime at [email or web address] Did you know that you can access your hospital and ER discharge instructions at any time in [employer]? You can also review all of your test results from your hospital stay or ER visit. Additional Information If you have questions, please visit the Frequently Asked Questions section of the [employer] website at [email or web address] Remember, [employer] is NOT to be used for urgent needs. For medical emergencies, dial 911. Now available from your iPhone and Android! Please provide this summary of care documentation to your next provider. Your primary care clinician is listed as Maximino Toure. If you have any questions after today's visit, please call [phone].

## 2017-09-22 NOTE — PROGRESS NOTES
Ms. Ashlie Souza has a reminder for a \"due or due soon\" health maintenance. I have asked that she contact her primary care provider for follow-up on this health maintenance.

## 2017-09-22 NOTE — LETTER
2017 2:23 PM 
 
Patient:  Edmundo Galvez YOB: 1950 Date of Visit: 2017 Dear Ambreen Lozano MD 
[address] [place] [address] [address] 
VIA Facsimile: [phone] 
 : Thank you for referring Ms. Marin Mosley to me for evaluation/treatment. Below are the relevant portions of my assessment and plan of care. [facility] Pulmonary, Critical Care, and Sleep Medicine Pulmonary Office visit Name: Edmundo Galvez : 1950 Date: 2017 Subjective:  
Patient has been referred for evaluation of:  SOB and abnormal PFT's. 
 
17 Improved significantly after starting Singulair and Spiriva Respimet. Improved SOB and cough. Developed cough with nasal congestion, postnasal drip last week and diagnosed with Bronchitis by PCP- added Symbicort and cough suppressant with benefit noted. Denies much productive mucus. Denies fever, chills. Denies any chest pain. No nausea, vomiting. HPI: 
Patient is a 75 y.o. female has been experiencing SOB for past 1 year. SOB:  
Symptoms occur with exertion and at night. Able to walk about about 3-4  Blocks. Cannot climb stairs. Activities of daily living are affected and improves with pacing. Has orthopnea/ paroxysmal nocturnal dyspnea SOB relieved with pacing and resting. C/o sinus congestion. Denies any significant Cough: 
Has some wheezing, no chest pain, fever, chills, night sweats dyspepsia, reflux. Has had AICD placed and replaced. Left mastectomy with chemo- radiation : 5 years back Weight gain of 50 lbs over few years. Comorbid conditions include- DM, HTN, CHF- nonischemic cardiomyopathy, Breast ca- treated: s/p mastectomy -L and chemo radiation Non smoker Occupational exposure-none Past Medical History:  
Diagnosis Date  Abnormal nuclear cardiac imaging test 2010  Lg fixed anterior, septal, apical, inferoseptal defect sugg RCA & LAD disease or cardiomyopathy. EF 20%. Global hykn. Nondiagnostic EKG.  Adenocarcinoma of breast; locally advanced invasive ductal adenocarcinoma of left breast   
 Automatic implantable cardiac defibrillator in situ  Automatic implantable cardiac defibrillator in situ  Breast cancer ([facility] [address]  Cancer ([facility] [address] breast cancer left  Chemotherapy convalescence or palliative care 2012  Chronic combined systolic and diastolic heart failure ([facility] [address] Remains symptomatic, nyha class 3 ef improving.  Congestive heart failure, unspecified   
 chronic class ll  Echocardiogram 09/27/2010 EF 30%. Global hykn. Mild MR. Mild TR.  Hyperlipidemia  Hypertension  Mitral valve disorders 1/15/2014  
 mild to moderate mr  Mitral valve disorders 1/15/2014  
 mild to moderate mr  MVP (mitral valve prolapse)  Nonischemic cardiomyopathy ([facility] [address] EF 20-30% despite medical therapy  Nonspecific abnormal electrocardiogram (ECG) (EKG)  Osteopenia  Other primary cardiomyopathies  Pacemaker 10/27/10  
 s/p implantation, without complication  Poisoning by other and unspecified agents primarily affecting the cardiovascular system Ef slightly better to 45%, STABLE back on chemo.  Radiation therapy  Radiation therapy complication 8662  S/P cardiac catheterization 07/08/10 Patent coronary arteries. LVEDP 25.  EF 25%. Global hykn. Moderate MR.  RA 10.  RV 40/10. PA 40/20.  20.  CO/CI 4.5/2.45 (Larry).  Shortness of breath  Syncope and collapse  Thyroid disease   
 goiter Past Surgical History:  
Procedure Laterality Date  CARDIAC SURG PROCEDURE UNLIST Difibrillator; BI V ICD  HX MASTECTOMY  09/28/11  
 modified radical mastectomy and axillary dissection  HX ORTHOPAEDIC    
 HX OTHER SURGICAL    
 surgery to remove part of liver  HX PACEMAKER  10/27/10  
 s/p [employer]tronic biventricular AICD, without complication  HX RADICAL MASTECTOMY  NEUROLOGICAL PROCEDURE UNLISTED Cervical fusion Allergies Allergen Reactions  Adhesive Other (comments)  
  blisters Annamary Ripa Current Outpatient Prescriptions Medication Sig Dispense Refill  budesonide-formoterol (SYMBICORT) 80-4.5 mcg/actuation HFAA inhaler Take 2 Puffs by inhalation two (2) times a day.  inhalational spacing device 1 Each by Does Not Apply route as needed. 1 Device 0  
 montelukast (SINGULAIR) 10 mg tablet Take 1 Tab by mouth daily. 90 Tab 3  
 omeprazole (PRILOSEC) 40 mg capsule Take 40 mg by mouth daily.  tiotropium bromide (SPIRIVA RESPIMAT) 1.25 mcg/actuation inhaler Take 2 Puffs by inhalation daily. 1 Inhaler 0  
 tiotropium bromide (SPIRIVA RESPIMAT) 1.25 mcg/actuation inhaler Take 2 Puffs by inhalation daily. 3 Inhaler 3  
 lisinopril (PRINIVIL, ZESTRIL) 10 mg tablet TAKE 1 TABLET DAILY 90 Tab 1  carvedilol (COREG) 25 mg tablet TAKE 1 TABLET TWICE A DAY  WITH MEALS 180 Tab 3  furosemide (LASIX) 20 mg tablet TAKE 1 TABLET DAILY 90 Tab 3  
 zaleplon (SONATA) 10 mg capsule Take 10 mg by mouth nightly.  multivitamin (HAIR,SKIN & NAILS) tablet Take 1 Tab by mouth daily.  raloxifene (EVISTA) 60 mg tablet Take  by mouth daily.  cyclobenzaprine (FLEXERIL) 10 mg tablet Take  by mouth three (3) times daily as needed.  MULTIVITAMIN PO Take  by mouth daily.  OTHER,NON-FORMULARY, daily. Vitamin D3 2000IU QD    
 oxyCODONE-acetaminophen (PERCOCET) 5-325 mg per tablet 1 or 2 tablets by mouth every 4 hours as needed 40 Tab 0 Review of Systems: 
HEENT: No epistaxis, no nasal drainage, no difficulty in swallowing, no redness in eyes Respiratory: as above Cardiovascular: no chest pain, no palpitations, no chronic leg edema, no syncope Gastrointestinal: no abd pain, no vomiting, no diarrhea, no bleeding symptoms Genitourinary: No urinary symptoms or hematuria Integument/breast: No ulcers or rashes Musculoskeletal:Neg 
Neurological: No focal weakness, no seizures, no headaches Behvioral/Psych: No anxiety, no depression Constitutional: No fever, no chills, no weight loss, no night sweats Objective:  
 
Visit Vitals  /62 (BP 1 Location: Right arm, BP Patient Position: Sitting)  Pulse 88  Temp 98.6 °F (37 °C) (Oral)  Resp 16  
 Ht 5' 5\" (1.651 m)  Wt 76.2 kg (168 lb)  SpO2 98% Comment: RA Rest  
 BMI 27.96 kg/m2 Physical Exam:  
General: comfortable, no acute distress HEENT: pupils reactive, sclera anicteric, EOM intact Neck: No adenopathy or thyroid swelling, no lymphadenopathy or JVD, supple Chest: multiple scars from previous surgery, surgically absent left breast 
CVS: S1S2 
RS: Mod AE bilaterally, no tactile fremitus or egophony, no accessory muscle use Abd: soft, non tender, no hepatosplenomegaly Neuro: non focal, awake, alert Extrm: no leg edema, clubbing or cyanosis Skin: no rash Data review:  
Pertinent labs: CBC, BMP, LFT's PFT: 
 
Date FVC FEV1  FEV1/FVC UNA53-01 TLC RV RV/TLC VC DLCO  
6/29/2017 75 69 67 43 77 81 nl  62 FLOWS: 
Maximal Mid Expiratory Flow rate is reduced to 43 % predicted Forced Expiratory Volume in one second is reduced to 69 % predicted FEV 1% is reduced Volumes: All Volumes are reduced except for RV Flow Volume Loop: 
Nonspecific obstructive pattern in Flow Volume Loop Bronchodilator: No significant improvement with bronchodilator therapy Diffusion: Abnormal Diffusion Capacity reduced to 62 % predicted DLCO normalizes to alveolar ventilation Impression: Moderate obstructive defect, Predominately small airways, Mild restrictive ventilatory defect, Reduced diffusion capacity indicating a decrease in alveolar surface area for gas exchange 6 min walk test;walked 330 feet with no O2 desaturation or significant heart rate change. DI- stable Echo: 1/18/2017: 
 Left ventricle: Systolic function was normal. Ejection fraction was 
estimated to be 50 %. There were no regional wall motion abnormalities. Doppler parameters were consistent with abnormal left ventricular 
relaxation (grade 1 diastolic dysfunction). Right ventricle: The size was normal. Systolic function was reduced. Left atrium: Size was normal. 
Right atrium: Size was normal. 
Mitral valve: There was systolic bowing of the anterior and posterior 
leaflets, but without diagnostic evidence for prolapse. There was mild 
regurgitation. Aortic valve: The valve was trileaflet. Leaflets exhibited normal 
thickness and normal cuspal separation. Tricuspid valve: Pulmonary artery systolic pressure: 18 mmHg. Pulmonic valve: There was mild regurgitation. Imaging: 
I have personally reviewed the patients radiographs and have reviewed the reports: 
CXr 7/11/2017: My read- no acute process. CXR 8/26/2014: 
Comparison 02/05/1 
AICD. No change in lead placement. No visualized lead fracture. Lungs appear 
unremarkable. Normal size heart. Impression: No acute findings. Patient Active Problem List  
Diagnosis Code  Congestive heart failure (HCC) I50.9  Hypertension I10  
 MVP (mitral valve prolapse) I34.1  Nonischemic cardiomyopathy (HCC) I42.8  Cancer ([facility] [address] C80.1  Adenocarcinoma of breast; locally advanced invasive ductal adenocarcinoma of left breast C50.919  
 Poisoning by other and unspecified agents primarily affecting the cardiovascular system T46.904A  Syncope and collapse R55  Other primary cardiomyopathies I42.8  Shortness of breath R06.02  
 Nonspecific abnormal electrocardiogram (ECG) (EKG) R94.31  
 Automatic implantable cardioverter-defibrillator in situ Z95.810  
 Mitral valve disorders I05.9  Abnormal PFT R94.2 IMPRESSION:  
· SOB on exertion with nocturnal decompensation. Clinical data suggests multifactorial etiology with progressive symptoms. over past 1 year: worsening reactive airways due to untreated rhinosinusitis, silent reflux and or a component of diastolic heart failure. Doubt if any relationship to chemo- radiation for breast ca ( completed 5 years back). Doubt VTE · Abnormal PFT- combined restrictive and Obstructive component with reduced DLCO ( corrects with Volume adjustment.) Suspect restrictive component from chest wall deformity. Improved symptoms with bronchodilators · H/o invasive ductal breast Ca · H/o non ischemic cardiomyopathy · AICD RECOMMENDATIONS:  
 
· Continue treating obstructive airways component- predominant small airways so will use Spiriva Respimet 1.25 ( asthma dose). and Symbicort added · Continue singulair to address chronic rhinosinuitis component · Checked CBC and IgE- no eosinophilia and normal IgE · GERD precautions · Preventive vaccinations · Monitor response to interventions and make appropriate adjustments · Will consider CXR/HRCT and consider Sleep evaluation if fails to improve · Healthy weight and active lifestyle · Follow up in 3 months Health maintenance screens deferred to Primary care provider. Checo Pacheco MD 
 
 
 
 
 
 
If you have questions, please do not hesitate to call me. I look forward to following MsJesus Belinda along with you.  
 
 
 
Sincerely, 
 
 
Checo Pacheco MD

## 2017-09-22 NOTE — PROGRESS NOTES
[facility]  Pulmonary, Critical Care, and Sleep Medicine      Pulmonary Office visit    Name: Freddy Barrios     : 1950     Date: 2017        Subjective:   Patient has been referred for evaluation of:  SOB and abnormal PFT's.    17  Improved significantly after starting Singulair and Spiriva Respimet. Improved SOB and cough. Developed cough with nasal congestion, postnasal drip last week and diagnosed with Bronchitis by PCP- added Symbicort and cough suppressant with benefit noted. Denies much productive mucus. Denies fever, chills. Denies any chest pain. No nausea, vomiting. HPI:  Patient is a 75 y.o. female has been experiencing SOB for past 1 year. SOB:   Symptoms occur with exertion and at night. Able to walk about about 3-4  Blocks. Cannot climb stairs. Activities of daily living are affected and improves with pacing. Has orthopnea/ paroxysmal nocturnal dyspnea  SOB relieved with pacing and resting. C/o sinus congestion. Denies any significant Cough:  Has some wheezing, no chest pain, fever, chills, night sweats dyspepsia, reflux. Has had AICD placed and replaced. Left mastectomy with chemo- radiation : 5 years back  Weight gain of 50 lbs over few years. Comorbid conditions include- DM, HTN, CHF- nonischemic cardiomyopathy, Breast ca- treated: s/p mastectomy -L and chemo radiation  Non smoker  Occupational exposure-none    Past Medical History:   Diagnosis Date    Abnormal nuclear cardiac imaging test 2010    Lg fixed anterior, septal, apical, inferoseptal defect sugg RCA & LAD disease or cardiomyopathy. EF 20%. Global hykn. Nondiagnostic EKG.     Adenocarcinoma of breast; locally advanced invasive ductal adenocarcinoma of left breast     Automatic implantable cardiac defibrillator in situ     Automatic implantable cardiac defibrillator in situ     Breast cancer ([facility] [address]     Cancer ([facility] [address]     breast cancer left    Chemotherapy convalescence or palliative care 2012    Chronic combined systolic and diastolic heart failure (HCC)     Remains symptomatic, nyha class 3 ef improving.  Congestive heart failure, unspecified     chronic class ll    Echocardiogram 09/27/2010    EF 30%. Global hykn. Mild MR. Mild TR.  Hyperlipidemia     Hypertension     Mitral valve disorders 1/15/2014    mild to moderate mr     Mitral valve disorders 1/15/2014    mild to moderate mr     MVP (mitral valve prolapse)     Nonischemic cardiomyopathy (HCC)     EF 20-30% despite medical therapy    Nonspecific abnormal electrocardiogram (ECG) (EKG)     Osteopenia     Other primary cardiomyopathies     Pacemaker 10/27/10    s/p implantation, without complication    Poisoning by other and unspecified agents primarily affecting the cardiovascular system     Ef slightly better to 45%, STABLE back on chemo.  Radiation therapy     Radiation therapy complication 5592    S/P cardiac catheterization 07/08/10    Patent coronary arteries. LVEDP 25.  EF 25%. Global hykn. Moderate MR.  RA 10.  RV 40/10. PA 40/20.  20.  CO/CI 4.5/2.45 (Larry).  Shortness of breath     Syncope and collapse     Thyroid disease     goiter     Past Surgical History:   Procedure Laterality Date    CARDIAC SURG PROCEDURE UNLIST      Difibrillator; BI V ICD    HX MASTECTOMY  09/28/11    modified radical mastectomy and axillary dissection    HX ORTHOPAEDIC      HX OTHER SURGICAL      surgery to remove part of liver    HX PACEMAKER  10/27/10    s/p Medtronic biventricular AICD, without complication    HX RADICAL MASTECTOMY      NEUROLOGICAL PROCEDURE UNLISTED      Cervical fusion       Allergies   Allergen Reactions    Adhesive Other (comments)     blisters     . Current Outpatient Prescriptions   Medication Sig Dispense Refill    budesonide-formoterol (SYMBICORT) 80-4.5 mcg/actuation HFAA inhaler Take 2 Puffs by inhalation two (2) times a day.       inhalational spacing device 1 Each by Does Not Apply route as needed. 1 Device 0    montelukast (SINGULAIR) 10 mg tablet Take 1 Tab by mouth daily. 90 Tab 3    omeprazole (PRILOSEC) 40 mg capsule Take 40 mg by mouth daily.  tiotropium bromide (SPIRIVA RESPIMAT) 1.25 mcg/actuation inhaler Take 2 Puffs by inhalation daily. 1 Inhaler 0    tiotropium bromide (SPIRIVA RESPIMAT) 1.25 mcg/actuation inhaler Take 2 Puffs by inhalation daily. 3 Inhaler 3    lisinopril (PRINIVIL, ZESTRIL) 10 mg tablet TAKE 1 TABLET DAILY 90 Tab 1    carvedilol (COREG) 25 mg tablet TAKE 1 TABLET TWICE A DAY  WITH MEALS 180 Tab 3    furosemide (LASIX) 20 mg tablet TAKE 1 TABLET DAILY 90 Tab 3    zaleplon (SONATA) 10 mg capsule Take 10 mg by mouth nightly.  multivitamin (HAIR,SKIN & NAILS) tablet Take 1 Tab by mouth daily.  raloxifene (EVISTA) 60 mg tablet Take  by mouth daily.  cyclobenzaprine (FLEXERIL) 10 mg tablet Take  by mouth three (3) times daily as needed.  MULTIVITAMIN PO Take  by mouth daily.  OTHER,NON-FORMULARY, daily.  Vitamin D3 2000IU QD      oxyCODONE-acetaminophen (PERCOCET) 5-325 mg per tablet 1 or 2 tablets by mouth every 4 hours as needed 40 Tab 0     Review of Systems:  HEENT: No epistaxis, no nasal drainage, no difficulty in swallowing, no redness in eyes  Respiratory: as above  Cardiovascular: no chest pain, no palpitations, no chronic leg edema, no syncope  Gastrointestinal: no abd pain, no vomiting, no diarrhea, no bleeding symptoms  Genitourinary: No urinary symptoms or hematuria  Integument/breast: No ulcers or rashes  Musculoskeletal:Neg  Neurological: No focal weakness, no seizures, no headaches  Behvioral/Psych: No anxiety, no depression  Constitutional: No fever, no chills, no weight loss, no night sweats     Objective:     Visit Vitals    /62 (BP 1 Location: Right arm, BP Patient Position: Sitting)    Pulse 88    Temp 98.6 °F (37 °C) (Oral)    Resp 16    Ht 5' 5\" (1.651 m)  Wt 76.2 kg (168 lb)    SpO2 98%  Comment: RA Rest    BMI 27.96 kg/m2        Physical Exam:   General: comfortable, no acute distress  HEENT: pupils reactive, sclera anicteric, EOM intact  Neck: No adenopathy or thyroid swelling, no lymphadenopathy or JVD, supple  Chest: multiple scars from previous surgery, surgically absent left breast  CVS: S1S2  RS: Mod AE bilaterally, no tactile fremitus or egophony, no accessory muscle use  Abd: soft, non tender, no hepatosplenomegaly  Neuro: non focal, awake, alert  Extrm: no leg edema, clubbing or cyanosis  Skin: no rash    Data review:   Pertinent labs: CBC, BMP, LFT's    PFT:    Date FVC FEV1  FEV1/FVC PKO51-89 TLC RV RV/TLC VC DLCO   6/29/2017 75 69 67 43 77 81 nl  62                                       FLOWS:  Maximal Mid Expiratory Flow rate is reduced to 43 % predicted  Forced Expiratory Volume in one second is reduced to 69 % predicted  FEV 1% is reduced   Volumes: All Volumes are reduced except for RV  Flow Volume Loop:  Nonspecific obstructive pattern in Flow Volume Loop  Bronchodilator:  No significant improvement with bronchodilator therapy  Diffusion:  Abnormal Diffusion Capacity reduced to 62 % predicted  DLCO normalizes to alveolar ventilation  Impression:  Moderate obstructive defect, Predominately small airways, Mild restrictive ventilatory defect, Reduced diffusion capacity indicating a decrease in alveolar surface area for gas exchange    6 min walk test;walked 330 feet with no O2 desaturation or significant heart rate change. DI- stable    Echo: 1/18/2017:  Left ventricle: Systolic function was normal. Ejection fraction was  estimated to be 50 %. There were no regional wall motion abnormalities. Doppler parameters were consistent with abnormal left ventricular  relaxation (grade 1 diastolic dysfunction). Right ventricle: The size was normal. Systolic function was reduced.   Left atrium: Size was normal.  Right atrium: Size was normal.  Mitral valve: There was systolic bowing of the anterior and posterior  leaflets, but without diagnostic evidence for prolapse. There was mild  regurgitation. Aortic valve: The valve was trileaflet. Leaflets exhibited normal  thickness and normal cuspal separation. Tricuspid valve: Pulmonary artery systolic pressure: 18 mmHg. Pulmonic valve: There was mild regurgitation. Imaging:  I have personally reviewed the patients radiographs and have reviewed the reports:  CXr 7/11/2017:   My read- no acute process. CXR 8/26/2014:  Comparison 02/05/1  AICD. No change in lead placement. No visualized lead fracture. Lungs appear  unremarkable. Normal size heart. Impression: No acute findings. Patient Active Problem List   Diagnosis Code    Congestive heart failure (HCC) I50.9    Hypertension I10    MVP (mitral valve prolapse) I34.1    Nonischemic cardiomyopathy (HCC) I42.8    Cancer (HCC) C80.1    Adenocarcinoma of breast; locally advanced invasive ductal adenocarcinoma of left breast C50.919    Poisoning by other and unspecified agents primarily affecting the cardiovascular system T46.904A    Syncope and collapse R55    Other primary cardiomyopathies I42.8    Shortness of breath R06.02    Nonspecific abnormal electrocardiogram (ECG) (EKG) R94.31    Automatic implantable cardioverter-defibrillator in situ Z95.810    Mitral valve disorders I05.9    Abnormal PFT R94.2     IMPRESSION:   · SOB on exertion with nocturnal decompensation. Clinical data suggests multifactorial etiology with progressive symptoms. over past 1 year: worsening reactive airways due to untreated rhinosinusitis, silent reflux and or a component of diastolic heart failure. Doubt if any relationship to chemo- radiation for breast ca ( completed 5 years back). Doubt VTE  · Abnormal PFT- combined restrictive and Obstructive component with reduced DLCO ( corrects with Volume adjustment.) Suspect restrictive component from chest wall deformity. Improved symptoms with bronchodilators  · H/o invasive ductal breast Ca  · H/o non ischemic cardiomyopathy  · AICD      RECOMMENDATIONS:     · Continue treating obstructive airways component- predominant small airways so will use Spiriva Respimet 1.25 ( asthma dose). and Symbicort added  · Continue singulair to address chronic rhinosinuitis component  · Checked CBC and IgE- no eosinophilia and normal IgE  · GERD precautions  · Preventive vaccinations  · Monitor response to interventions and make appropriate adjustments  · Will consider CXR/HRCT and consider Sleep evaluation if fails to improve  · Healthy weight and active lifestyle  · Follow up in 3 months     Health maintenance screens deferred to Primary care provider.      Fede Gomez MD

## 2017-10-24 ENCOUNTER — TELEPHONE (OUTPATIENT)
Dept: PULMONOLOGY | Age: 67
End: 2017-10-24

## 2017-10-24 NOTE — TELEPHONE ENCOUNTER
Spoke with pt. She was seen in 9/2017 and Spiriva was added. Pt taking Spiriva, Symbicort and Singular. About 3am she wakes up with feeling of SOB.

## 2017-10-24 NOTE — TELEPHONE ENCOUNTER
Pt states that she don't think her meds last long enough think she need something else states that she can't breath at night.  pls call [phone]

## 2017-10-26 RX ORDER — BUDESONIDE AND FORMOTEROL FUMARATE DIHYDRATE 80; 4.5 UG/1; UG/1
2 AEROSOL RESPIRATORY (INHALATION) 2 TIMES DAILY
Qty: 3 INHALER | Refills: 1 | Status: SHIPPED | OUTPATIENT
Start: 2017-10-26 | End: 2017-10-30 | Stop reason: CLARIF

## 2017-10-30 ENCOUNTER — DOCUMENTATION ONLY (OUTPATIENT)
Dept: PULMONOLOGY | Age: 67
End: 2017-10-30

## 2017-10-30 RX ORDER — FLUTICASONE PROPIONATE AND SALMETEROL 250; 50 UG/1; UG/1
1 POWDER RESPIRATORY (INHALATION) 2 TIMES DAILY
Qty: 1 INHALER | Refills: 0 | Status: SHIPPED | COMMUNITY
Start: 2017-10-30 | End: 2017-11-10

## 2017-10-30 NOTE — PROGRESS NOTES
Called pt regarding Symbicort ins will not cover unless she tried the preferred formulary. D/C Symbicort 80/4.5 mcg 2 puffs BID. She report tried one puff a day  Inhaler-does not last long. Will try Advair Discus 250//50 mcg 1 puff via inhalation BID.   Sample # 1 pt will  and will try first before mail order px

## 2017-11-10 DIAGNOSIS — R06.02 SHORTNESS OF BREATH: ICD-10-CM

## 2017-11-10 DIAGNOSIS — R94.2 ABNORMAL PFT: Primary | ICD-10-CM

## 2017-11-10 RX ORDER — BUDESONIDE AND FORMOTEROL FUMARATE DIHYDRATE 80; 4.5 UG/1; UG/1
2 AEROSOL RESPIRATORY (INHALATION) 2 TIMES DAILY
Qty: 1 INHALER | Refills: 3 | Status: SHIPPED | OUTPATIENT
Start: 2017-11-10 | End: 2018-01-22

## 2017-11-10 RX ORDER — BUDESONIDE AND FORMOTEROL FUMARATE DIHYDRATE 80; 4.5 UG/1; UG/1
2 AEROSOL RESPIRATORY (INHALATION) 2 TIMES DAILY
Qty: 1 INHALER | Refills: 0 | Status: SHIPPED | COMMUNITY
Start: 2017-11-10 | End: 2017-12-19 | Stop reason: CLARIF

## 2017-11-10 NOTE — TELEPHONE ENCOUNTER
Spoke with patient, she has not tried either [facility] or [employer]. Pt would prefer to try the [employer] first since 2 x per day instead of once a day.

## 2017-11-10 NOTE — TELEPHONE ENCOUNTER
Pt ins will not cover Symbicort until trial of preferred formulary  Advair-pt tried report not effective. Will try [facility]- sample and script sent to pt mail order pharmacy. If this failed, next will be Breo before going back to [facility] pre authorization then.

## 2017-11-10 NOTE — TELEPHONE ENCOUNTER
[place] to start prior auth. For Symbicort since failed Advair. During the process the pt also must have tried and failed [facility] and [facility] which are covered medications.

## 2017-11-10 NOTE — TELEPHONE ENCOUNTER
Pt was taking Symbicort but had to change to Advair due to formulary per note in system from Samia Ramírez NP 10-31-17. Pt has been taking the Advair and states it is not helping, she is having tightness in her chest and wants to go back to Symbicort.

## 2017-11-10 NOTE — TELEPHONE ENCOUNTER
Pt to  sample for [facility]. If this does not work for pt she will then need to try Breo.

## 2017-11-10 NOTE — TELEPHONE ENCOUNTER
Pt said that she was given a sample of advair  but she does not feel like it is helping her. She wants to know if she can go back on the symbicort.  Please call pt [phone]

## 2017-12-04 RX ORDER — FUROSEMIDE 20 MG/1
TABLET ORAL
Qty: 90 TAB | Refills: 3 | Status: SHIPPED | OUTPATIENT
Start: 2017-12-04 | End: 2018-07-09

## 2017-12-04 RX ORDER — CARVEDILOL 25 MG/1
TABLET ORAL
Qty: 180 TAB | Refills: 3 | Status: SHIPPED | OUTPATIENT
Start: 2017-12-04 | End: 2018-09-11 | Stop reason: SDUPTHER

## 2017-12-19 ENCOUNTER — OFFICE VISIT (OUTPATIENT)
Dept: PULMONOLOGY | Age: 67
End: 2017-12-19

## 2017-12-19 VITALS
SYSTOLIC BLOOD PRESSURE: 124 MMHG | HEIGHT: 65 IN | RESPIRATION RATE: 16 BRPM | HEART RATE: 89 BPM | DIASTOLIC BLOOD PRESSURE: 50 MMHG | WEIGHT: 168 LBS | TEMPERATURE: 98.2 F | BODY MASS INDEX: 27.99 KG/M2 | OXYGEN SATURATION: 98 %

## 2017-12-19 DIAGNOSIS — J44.9 CHRONIC ASTHMATIC BRONCHITIS (HCC): Primary | ICD-10-CM

## 2017-12-19 DIAGNOSIS — J30.89 CHRONIC NONSEASONAL ALLERGIC RHINITIS DUE TO OTHER ALLERGEN: ICD-10-CM

## 2017-12-19 RX ORDER — CHOLECALCIFEROL (VITAMIN D3) 125 MCG
1 CAPSULE ORAL DAILY
Status: ON HOLD | COMMUNITY
End: 2019-09-26

## 2017-12-19 RX ORDER — MOMETASONE FUROATE 50 UG/1
2 SPRAY, METERED NASAL DAILY
Qty: 1 CONTAINER | Refills: 3 | Status: SHIPPED | OUTPATIENT
Start: 2017-12-19 | End: 2018-10-04 | Stop reason: SDUPTHER

## 2017-12-19 NOTE — PROGRESS NOTES
[facility]  Pulmonary, Critical Care, and Sleep Medicine      Pulmonary Office visit    Name: Tre Cordon     : 1950     Date: 2017        Subjective:   Patient has been referred for evaluation of:  SOB and abnormal PFT's.    17  Improved significantly after starting Singulair and Spiriva Respimet. and now [facility]  Improved SOB and cough. Developed cough with nasal congestion, postnasal drip last week and diagnosed with Bronchitis  Denies much productive mucus. Denies fever, chills. Denies any chest pain. No nausea, vomiting. HPI:  Patient is a 75 y.o. female has been experiencing SOB for past 1 year. SOB:   Symptoms occur with exertion and at night. Able to walk about about 3-4  Blocks. Cannot climb stairs. Activities of daily living are affected and improves with pacing. Has orthopnea/ paroxysmal nocturnal dyspnea  SOB relieved with pacing and resting. C/o sinus congestion. Denies any significant Cough:  Has some wheezing, no chest pain, fever, chills, night sweats dyspepsia, reflux. Has had AICD placed and replaced. Left mastectomy with chemo- radiation : 5 years back  Weight gain of 50 lbs over few years. Comorbid conditions include- DM, HTN, CHF- nonischemic cardiomyopathy, Breast ca- treated: s/p mastectomy -L and chemo radiation  Non smoker  Occupational exposure-none    Past Medical History:   Diagnosis Date    Abnormal nuclear cardiac imaging test 2010    Lg fixed anterior, septal, apical, inferoseptal defect sugg RCA & LAD disease or cardiomyopathy. EF 20%. Global hykn. Nondiagnostic EKG.     Adenocarcinoma of breast; locally advanced invasive ductal adenocarcinoma of left breast     Automatic implantable cardiac defibrillator in situ     Automatic implantable cardiac defibrillator in situ     [facility] [address]     Cancer ([facility] [address]     breast cancer left    Chemotherapy convalescence or palliative care 2012    Chronic combined systolic and diastolic heart failure (HCC)     Remains symptomatic, nyha class 3 ef improving.  Congestive heart failure, unspecified     chronic class ll    Echocardiogram 09/27/2010    EF 30%. Global hykn. Mild MR. Mild TR.  Hyperlipidemia     Hypertension     Mitral valve disorders(424.0) 1/15/2014    mild to moderate mr     Mitral valve disorders(424.0) 1/15/2014    mild to moderate mr     MVP (mitral valve prolapse)     Nonischemic cardiomyopathy (HCC)     EF 20-30% despite medical therapy    Nonspecific abnormal electrocardiogram (ECG) (EKG)     Osteopenia     Other primary cardiomyopathies     Pacemaker 10/27/10    s/p implantation, without complication    Poisoning by other and unspecified agents primarily affecting the cardiovascular system(972.9)     Ef slightly better to 45%, STABLE back on chemo.  Radiation therapy     Radiation therapy complication 2428    S/P cardiac catheterization 07/08/10    Patent coronary arteries. LVEDP 25.  EF 25%. Global hykn. Moderate MR.  RA 10.  RV 40/10. PA 40/20.  20.  CO/CI 4.5/2.45 (Larry).  Shortness of breath     Syncope and collapse     Thyroid disease     goiter     Past Surgical History:   Procedure Laterality Date    CARDIAC SURG PROCEDURE UNLIST      Difibrillator; BI V ICD    HX MASTECTOMY  09/28/11    modified radical mastectomy and axillary dissection    HX ORTHOPAEDIC      HX OTHER SURGICAL      surgery to remove part of liver    HX PACEMAKER  10/27/10    s/p [employer]tronic biventricular AICD, without complication    HX RADICAL MASTECTOMY      NEUROLOGICAL PROCEDURE UNLISTED      Cervical fusion       Allergies   Allergen Reactions    Adhesive Other (comments)     blisters     . Current Outpatient Prescriptions   Medication Sig Dispense Refill    cholecalciferol, vitamin D3, 2,000 unit tab Take  by mouth.       furosemide (LASIX) 20 mg tablet TAKE 1 TABLET DAILY 90 Tab 3    budesonide-formoterol (SYMBICORT) 80-4.5 mcg/actuation HFAA Take 2 Puffs by inhalation two (2) times a day. 1 Inhaler 3    mometasone-formoterol (DULERA) 100-5 mcg/actuation HFA inhaler Take 2 Puffs by inhalation two (2) times a day. 3 Inhaler 3    montelukast (SINGULAIR) 10 mg tablet Take 1 Tab by mouth daily. 90 Tab 3    omeprazole (PRILOSEC) 40 mg capsule Take 40 mg by mouth daily.  tiotropium bromide (SPIRIVA RESPIMAT) 1.25 mcg/actuation inhaler Take 2 Puffs by inhalation daily. 3 Inhaler 3    lisinopril (PRINIVIL, ZESTRIL) 10 mg tablet TAKE 1 TABLET DAILY 90 Tab 1    zaleplon (SONATA) 10 mg capsule Take 10 mg by mouth nightly.  multivitamin (HAIR,SKIN & NAILS) tablet Take 1 Tab by mouth daily.  raloxifene (EVISTA) 60 mg tablet Take  by mouth daily.  MULTIVITAMIN PO Take  by mouth daily.  carvedilol (COREG) 25 mg tablet TAKE 1 TABLET TWICE A DAY  WITH MEALS 180 Tab 3    budesonide-formoterol (SYMBICORT) 80-4.5 mcg/actuation HFAA Take 2 Puffs by inhalation two (2) times a day. 1 Inhaler 0    mometasone-formoterol (DULERA) 100-5 mcg/actuation HFA inhaler Take 2 Puffs by inhalation two (2) times a day. 1 Inhaler 0    inhalational spacing device 1 Each by Does Not Apply route as needed. 1 Device 0    oxyCODONE-acetaminophen (PERCOCET) 5-325 mg per tablet 1 or 2 tablets by mouth every 4 hours as needed 40 Tab 0    cyclobenzaprine (FLEXERIL) 10 mg tablet Take  by mouth three (3) times daily as needed.          Review of Systems:  HEENT: No epistaxis, no nasal drainage, no difficulty in swallowing, no redness in eyes  Respiratory: as above  Cardiovascular: no chest pain, no palpitations, no chronic leg edema, no syncope  Gastrointestinal: no abd pain, no vomiting, no diarrhea, no bleeding symptoms  Genitourinary: No urinary symptoms or hematuria  Integument/breast: No ulcers or rashes  Musculoskeletal:Neg  Neurological: No focal weakness, no seizures, no headaches  Behvioral/Psych: No anxiety, no depression  Constitutional: No fever, no chills, no weight loss, no night sweats     Objective:     Visit Vitals    /50 (BP 1 Location: Right arm, BP Patient Position: Sitting)    Pulse 89    Temp 98.2 °F (36.8 °C) (Oral)    Resp 16    Ht 5' 5\" (1.651 m)    Wt 76.2 kg (168 lb)    SpO2 98%    BMI 27.96 kg/m2        Physical Exam:   General: comfortable, no acute distress  HEENT: pupils reactive, sclera anicteric, EOM intact  Neck: No adenopathy or thyroid swelling, no lymphadenopathy or JVD, supple  Chest: multiple scars from previous surgery, surgically absent left breast  CVS: S1S2  RS: Mod AE bilaterally, no tactile fremitus or egophony, no accessory muscle use  Abd: soft, non tender, no hepatosplenomegaly  Neuro: non focal, awake, alert  Extrm: no leg edema, clubbing or cyanosis  Skin: no rash    Data review:   Pertinent labs: CBC, BMP, LFT's    PFT:    Date FVC FEV1  FEV1/FVC [id number] TLC RV RV/TLC VC DLCO   6/29/2017 75 69 67 43 77 81 nl  62                                       FLOWS:  Maximal Mid Expiratory Flow rate is reduced to 43 % predicted  Forced Expiratory Volume in one second is reduced to 69 % predicted  FEV 1% is reduced   Volumes: All Volumes are reduced except for RV  Flow Volume Loop:  Nonspecific obstructive pattern in Flow Volume Loop  Bronchodilator:  No significant improvement with bronchodilator therapy  Diffusion:  Abnormal Diffusion Capacity reduced to 62 % predicted  DLCO normalizes to alveolar ventilation  Impression:  Moderate obstructive defect, Predominately small airways, Mild restrictive ventilatory defect, Reduced diffusion capacity indicating a decrease in alveolar surface area for gas exchange    6 min walk test;walked 330 feet with no O2 desaturation or significant heart rate change. DI- stable    Echo: 1/18/2017:  Left ventricle: Systolic function was normal. Ejection fraction was  estimated to be 50 %. There were no regional wall motion abnormalities.   Doppler parameters were consistent with abnormal left ventricular  relaxation (grade 1 diastolic dysfunction). Right ventricle: The size was normal. Systolic function was reduced. Left atrium: Size was normal.  Right atrium: Size was normal.  Mitral valve: There was systolic bowing of the anterior and posterior  leaflets, but without diagnostic evidence for prolapse. There was mild  regurgitation. Aortic valve: The valve was trileaflet. Leaflets exhibited normal  thickness and normal cuspal separation. Tricuspid valve: Pulmonary artery systolic pressure: 18 mmHg. Pulmonic valve: There was mild regurgitation. Imaging:  I have personally reviewed the patients radiographs and have reviewed the reports:  CXr 7/11/2017:   My read- no acute process. CXR 8/26/2014:  Comparison 02/05/1  AICD. No change in lead placement. No visualized lead fracture. Lungs appear  unremarkable. Normal size heart. Impression: No acute findings. Patient Active Problem List   Diagnosis Code    Congestive heart failure (HCC) I50.9    Hypertension I10    MVP (mitral valve prolapse) I34.1    Nonischemic cardiomyopathy (HCC) I42.8    Cancer (HCC) C80.1    Adenocarcinoma of breast; locally advanced invasive ductal adenocarcinoma of left breast C50.919    Poisoning by other and unspecified agents primarily affecting the cardiovascular system(972.9) T46.904A    Syncope and collapse R55    Other primary cardiomyopathies I42.8    Shortness of breath R06.02    Nonspecific abnormal electrocardiogram (ECG) (EKG) R94.31    Automatic implantable cardioverter-defibrillator in situ Z95.810    Mitral valve disorders(424.0) I05.9    Abnormal PFT R94.2     IMPRESSION:   · SOB on exertion with nocturnal decompensation. Clinical data suggests multifactorial etiology with progressive symptoms. over past 1 year: worsening reactive airways due to untreated rhinosinusitis, silent reflux and or a component of diastolic heart failure.  Finally some improvement with current inhalers. Doubt if any relationship to chemo- radiation for breast ca ( completed 5 years back). Doubt VTE  · Abnormal PFT- combined restrictive and Obstructive component with reduced DLCO ( corrects with Volume adjustment.) Suspect restrictive component from chest wall deformity. Improved symptoms with bronchodilators  · H/o invasive ductal breast Ca  · H/o non ischemic cardiomyopathy  · AICD      RECOMMENDATIONS:     · Continue treating obstructive airways component- predominant small airways so will use Spiriva Respimet 1.25 ( asthma dose) and Dulera  · Continue singulair to address chronic rhinosinuitis component  · Add Nasonex   · GERD precautions  · Preventive vaccinations  · Monitor response to interventions and make appropriate adjustments  · Will consider CXR/HRCT and consider Sleep evaluation if fails to improve  · Healthy weight and active lifestyle  · Follow up in 3 months     Health maintenance screens deferred to Primary care provider.      Shira Morales MD

## 2018-01-15 ENCOUNTER — HOSPITAL ENCOUNTER (OUTPATIENT)
Dept: MAMMOGRAPHY | Age: 68
Discharge: HOME | End: 2018-01-15
Attending: INTERNAL MEDICINE
Payer: MEDICARE

## 2018-01-15 DIAGNOSIS — Z12.31 VISIT FOR SCREENING MAMMOGRAM: ICD-10-CM

## 2018-01-15 PROCEDURE — 77063 BREAST TOMOSYNTHESIS BI: CPT

## 2018-01-22 ENCOUNTER — OFFICE VISIT (OUTPATIENT)
Dept: CARDIOLOGY CLINIC | Age: 68
End: 2018-01-22

## 2018-01-22 VITALS
HEART RATE: 80 BPM | BODY MASS INDEX: 27.66 KG/M2 | OXYGEN SATURATION: 97 % | HEIGHT: 65 IN | WEIGHT: 166 LBS | SYSTOLIC BLOOD PRESSURE: 122 MMHG | DIASTOLIC BLOOD PRESSURE: 60 MMHG

## 2018-01-22 DIAGNOSIS — I50.32 CHRONIC DIASTOLIC CONGESTIVE HEART FAILURE (HCC): Primary | ICD-10-CM

## 2018-01-22 DIAGNOSIS — I42.8 NONISCHEMIC CARDIOMYOPATHY (HCC): ICD-10-CM

## 2018-01-22 DIAGNOSIS — I10 ESSENTIAL HYPERTENSION: ICD-10-CM

## 2018-01-22 DIAGNOSIS — Z95.810 AUTOMATIC IMPLANTABLE CARDIOVERTER-DEFIBRILLATOR IN SITU: ICD-10-CM

## 2018-01-22 DIAGNOSIS — I05.9 MITRAL VALVE DISEASE: ICD-10-CM

## 2018-01-22 NOTE — PROGRESS NOTES
1. Have you been to the ER, urgent care clinic since your last visit? Hospitalized since your last visit? No    2. Have you seen or consulted any other health care providers outside of the [address] [place] [address] since your last visit? Include any pap smears or colon screening.   PCP and Dr Darcie Atwood      Patient did not bring meds or list but states everything is the same

## 2018-01-22 NOTE — PROGRESS NOTES
HISTORY OF PRESENT ILLNESS  Anders Pennington is a 75 y.o. female. HPI Comments: Patient with cmp,chf.post  icd   On follow up patient denies any chest pains, palpitation or other significant symptoms. Patient is here for follow up of diagnostic tests. Results will be discussed. CHF   The history is provided by the patient. This is a recurrent problem. The problem occurs constantly. The problem has been gradually improving. Associated symptoms include shortness of breath. Pertinent negatives include no chest pain. The symptoms are aggravated by exertion. The symptoms are relieved by rest.   Hypertension   The history is provided by the patient. This is a chronic problem. The problem occurs constantly. The problem has not changed since onset. Associated symptoms include shortness of breath. Pertinent negatives include no chest pain. Valvular Heart Disease   The history is provided by the patient. This is a chronic problem. The problem occurs constantly. The problem has not changed since onset. Associated symptoms include shortness of breath. Pertinent negatives include no chest pain. Cardiomyopathy   The history is provided by the patient. This is a chronic problem. The problem has been resolved. Associated symptoms include shortness of breath. Pertinent negatives include no chest pain. Review of Systems   Constitutional: Negative for chills and fever. HENT: Negative for nosebleeds. Eyes: Negative for blurred vision and double vision. Respiratory: Positive for shortness of breath. Negative for cough, hemoptysis, sputum production and wheezing. Cardiovascular: Negative for chest pain, palpitations, orthopnea, claudication, leg swelling and PND. Gastrointestinal: Negative for heartburn, nausea and vomiting. Musculoskeletal: Negative for myalgias. Skin: Negative for rash. Neurological: Negative for dizziness and weakness. Endo/Heme/Allergies: Does not bruise/bleed easily.      Family History   Problem Relation Age of Onset    Hypertension Mother     High Cholesterol Mother     Heart Disease Father      paemaker implant at 80       Past Medical History:   Diagnosis Date    Abnormal nuclear cardiac imaging test 06/11/2010    Lg fixed anterior, septal, apical, inferoseptal defect sugg RCA & LAD disease or cardiomyopathy. EF 20%. Global hykn. Nondiagnostic EKG.  Adenocarcinoma of breast; locally advanced invasive ductal adenocarcinoma of left breast     Automatic implantable cardiac defibrillator in situ     Automatic implantable cardiac defibrillator in situ     Breast cancer (HCC)     Cancer ([facility] [address]     breast cancer left    Chemotherapy convalescence or palliative care 2012    Chronic combined systolic and diastolic heart failure (HCC)     Remains symptomatic, nyha class 3 ef improving.  Congestive heart failure, unspecified     chronic class ll    Echocardiogram 09/27/2010    EF 30%. Global hykn. Mild MR. Mild TR.  Hyperlipidemia     Hypertension     Mitral valve disorders(424.0) 1/15/2014    mild to moderate mr     Mitral valve disorders(424.0) 1/15/2014    mild to moderate mr     MVP (mitral valve prolapse)     Nonischemic cardiomyopathy (HCC)     EF 20-30% despite medical therapy    Nonspecific abnormal electrocardiogram (ECG) (EKG)     Osteopenia     Other primary cardiomyopathies     Pacemaker 10/27/10    s/p implantation, without complication    Poisoning by other and unspecified agents primarily affecting the cardiovascular system(972.9)     Ef slightly better to 45%, STABLE back on chemo.  Radiation therapy     Radiation therapy complication 8272    S/P cardiac catheterization 07/08/10    Patent coronary arteries. LVEDP 25.  EF 25%. Global hykn. Moderate MR.  RA 10.  RV 40/10. PA 40/20.  20.  CO/CI 4.5/2.45 (Larry).     Shortness of breath     Syncope and collapse     Thyroid disease     goiter       Past Surgical History:   Procedure Laterality Date    CARDIAC SURG PROCEDURE UNLIST      Difibrillator; BI V ICD    HX MASTECTOMY  09/28/11    modified radical mastectomy and axillary dissection    HX ORTHOPAEDIC      HX OTHER SURGICAL      surgery to remove part of liver    HX PACEMAKER  10/27/10    s/p [employer]tr[employer] biventricular AICD, without complication    HX RADICAL MASTECTOMY      NEUROLOGICAL PROCEDURE UNLISTED      Cervical fusion       Social History   Substance Use Topics    Smoking status: Never Smoker    Smokeless tobacco: Never Used    Alcohol use No       Allergies   Allergen Reactions    Adhesive Other (comments)     blisters       Current Outpatient Prescriptions   Medication Sig    cholecalciferol, vitamin D3, 2,000 unit tab Take  by mouth.  mometasone (NASONEX) 50 mcg/actuation nasal spray 2 Sprays by Both Nostrils route daily.  carvedilol (COREG) 25 mg tablet TAKE 1 TABLET TWICE A DAY  WITH MEALS    furosemide (LASIX) 20 mg tablet TAKE 1 TABLET DAILY    mometasone-formoterol (DULERA) 100-5 mcg/actuation HFA inhaler Take 2 Puffs by inhalation two (2) times a day.  montelukast (SINGULAIR) 10 mg tablet Take 1 Tab by mouth daily.  omeprazole (PRILOSEC) 40 mg capsule Take 40 mg by mouth daily.  tiotropium bromide (SPIRIVA RESPIMAT) 1.25 mcg/actuation inhaler Take 2 Puffs by inhalation daily.  lisinopril (PRINIVIL, ZESTRIL) 10 mg tablet TAKE 1 TABLET DAILY    multivitamin (HAIR,SKIN & NAILS) tablet Take 1 Tab by mouth daily.  raloxifene (EVISTA) 60 mg tablet Take  by mouth daily.  MULTIVITAMIN PO Take  by mouth daily. No current facility-administered medications for this visit. Visit Vitals    /60    Pulse 80    Ht 5' 5\" (1.651 m)    Wt 75.3 kg (166 lb)    SpO2 97%    BMI 27.62 kg/m2         Physical Exam   Constitutional: She is oriented to person, place, and time. She appears well-developed and well-nourished. HENT:   Head: Normocephalic and atraumatic.    Eyes: Conjunctivae are normal.   Neck: Neck supple. No JVD present. No tracheal deviation present. No thyromegaly present. Cardiovascular: Normal rate and regular rhythm. PMI is not displaced. Exam reveals no gallop, no S3 and no decreased pulses. Murmur heard. High-pitched blowing holosystolic murmur is present with a grade of 2/6  at the apex  Pulmonary/Chest: No respiratory distress. She has no wheezes. She has no rales. She exhibits no tenderness. Abdominal: Soft. There is no tenderness. Musculoskeletal: She exhibits no edema. Neurological: She is alert and oriented to person, place, and time. Skin: Skin is warm. Psychiatric: She has a normal mood and affect. Ms. Yady Lawson has a reminder for a \"due or due soon\" health maintenance. I have asked that she contact her primary care provider for follow-up on this health maintenance. CARDIOLOGY STUDIES 10/1/2012 8/1/2012 5/1/2012 4/1/2012 2/1/2012 12/1/2011 9/1/2011   Myocardial Perfusion Scan Result - - - - - - -   Echocardiogram - Complete Result 50-55% EF 46% 45% 45-50% EF 45-50% EF 40% mild MR &TR EF 57%, mild mr   Coronary Angiogram Result - - - - - - -   Some recent data might be hidden     SUMMARY:echo:6/2014  Left ventricle: The ventricle was mildly dilated. Systolic function was  normal. Ejection fraction was estimated in the range of 50 % to 55 %. There were no regional wall motion abnormalities. Doppler parameters were  consistent with abnormal left ventricular relaxation (grade 1 diastolic  dysfunction). Left atrium: The atrium was mildly dilated. Mitral valve: There was systolic bowing of the posterior leaflet, but  without diagnostic evidence for prolapse. There was mild to moderate  regurgitation. SUMMARY:echo:12/2014  Left ventricle: Systolic function was at the lower limits of normal.  Ejection fraction was estimated to be 53 %. There were no regional wall  motion abnormalities.  Doppler parameters were consistent with abnormal  left ventricular relaxation (grade 1 diastolic dysfunction). Right ventricle: A pacing wire was present. Mitral valve: There was systolic bowing of the posterior leaflet, but  without diagnostic evidence for prolapse. There was mild regurgitation. Tricuspid valve: Pulmonary artery systolic pressure: 22 mmHg. 12/2015:echo    SUMMARY:  Left ventricle: Systolic function was normal. Ejection fraction was  estimated in the range of 50 % to 55 %. There was mild diffuse  hypokinesis. Doppler parameters were consistent with abnormal left  ventricular relaxation (grade 1 diastolic dysfunction). Mitral valve: There was systolic bowing of the anterior and posterior  leaflets, but without diagnostic evidence for prolapse. There was mild  regurgitation. Tricuspid valve: There was mild regurgitation. Tricuspid regurgitation  peak velocity: 2.3 m/sec. Pulmonary artery systolic pressure: 25 mmHg. pft-6/2017  Maximal Mid Expiratory Flow rate is reduced to 43 % predicted  Forced Expiratory Volume in one second is reduced to 69 % predicted  FEV 1% is reduced     Volumes: All Volumes are reduced except for RV    Flow Volume Loop:  Nonspecific obstructive pattern in Flow Volume Loop    Bronchodilator:  No significant improvement with bronchodilator therapy    Diffusion:  Abnormal Diffusion Capacity reduced to 62 % predicted  DLCO normalizes to alveolar ventilation    Impression:  Moderate obstructive defect, Predominately small airways, Mild restrictive ventilatory defect, Reduced diffusion capacity indicating a decrease in alveolar surface area for gas exchange      Assessment         ICD-10-CM ICD-9-CM    1. Chronic diastolic congestive heart failure (HCC) I50.32 428.32 2D ECHO COMPLETE ADULT (TTE)     428.0     stable  lvef better   2. Essential hypertension I10 401.9 2D ECHO COMPLETE ADULT (TTE)    controlled   3. Nonischemic cardiomyopathy (HCC) I42.8 425.4     imoproved ef   4.  Automatic implantable cardioverter-defibrillator in situ Z95.810 V45.02     normal function   5. Mitral valve disease I05.9 394.9     mr mild   Tounge swelling not related to lisinopril as she had swelling even after stopping lisinopril        Orders Placed This Encounter    2D ECHO COMPLETE ADULT (TTE)     Standing Status:   Future     Standing Expiration Date:   7/21/2018     Order Specific Question:   Reason for Exam:     Answer:   see diagnosis       Follow-up Disposition:  Return in about 6 months (around 7/22/2018).

## 2018-01-22 NOTE — MR AVS SNAPSHOT
Jackie Pulse 
 
 
 [address], [address] [facility] [address] 
[phone] Patient: Brian Barkley MRN: [id number] IFT:5/67/2672 Visit Information Date & Time Provider Department Dept. Phone Encounter #  
 1/22/2018 10:00 AM Kailash Cantu MD [facility] [address] [place] [address] [id number] Follow-up Instructions Return in about 6 months (around 7/22/2018). Your Appointments 4/16/2018  8:00 AM  
[facility] PAC[facility] [place] ([address]) Appt Note: carelink [address], [address] [facility] [address]  
[phone]  
  
   
 [address], [address] [place] [address] Road [address]  
  
    
 7/23/2018 10:30 AM  
PROCEDURE with Kailash Cantu MD  
[facility]) Appt Note: medtronic ck [address], [address] [facility] [address]  
[address] [facility] [place], [address] [facility] [place] [address] [facility] Upcoming Health Maintenance Date Due Hepatitis C Screening 1950 DTaP/Tdap/Td series (1 - Tdap) 1/26/1970 FOBT Q 1 YEAR AGE 50-75 1/26/1999 ZOSTER VACCINE AGE 60> 11/26/2008 GLAUCOMA SCREENING Q2Y 1/26/2014 MEDICARE YEARLY EXAM 1/26/2014 Influenza Age 5 to Adult 8/1/2017 BREAST CANCER SCRN MAMMOGRAM 1/15/2020 Allergies as of 1/22/2018  Review Complete On: 1/22/2018 By: Kailash Cantu MD  
  
 Severity Noted Reaction Type Reactions Adhesive  10/27/2011    Other (comments)  
 blisters Current Immunizations  Reviewed on 12/19/2017 Name Date Influenza High Dose Vaccine PF 9/1/2016 Pneumococcal Conjugate (PCV-13) 10/1/2016 Pneumococcal Polysaccharide (PPSV-23) 10/1/2015 Not reviewed this visit You Were Diagnosed With   
  
 Codes Comments Chronic diastolic congestive heart failure (HCC)    -  Primary ICD-10-CM: I50.32 
ICD-9-CM: 428.32, 428.0 stable 
lvef better Essential hypertension     ICD-10-CM: I10 
ICD-9-CM: 401.9 controlled Nonischemic cardiomyopathy ([facility] [address]     ICD-10-CM: I42.8 ICD-9-CM: 425.4 imoproved ef Automatic implantable cardioverter-defibrillator in situ     ICD-10-CM: Z95.810 ICD-9-CM: V45.02 normal function Mitral valve disease     ICD-10-CM: I05.9 ICD-9-CM: 394.9 mr mild Vitals BP Pulse Height(growth percentile) Weight(growth percentile) SpO2 BMI  
 122/60 80 5' 5\" (1.651 m) 166 lb (75.3 kg) 97% 27.62 kg/m2 OB Status Smoking Status Postmenopausal Never Smoker Vitals History BMI and BSA Data Body Mass Index Body Surface Area  
 27.62 kg/m 2 1.86 m 2 Preferred Pharmacy Pharmacy Name Phone  [address] [phone] Your Updated Medication List  
  
   
This list is accurate as of: 1/22/18 10:24 AM.  Always use your most recent med list.  
  
  
  
  
 carvedilol 25 mg tablet Commonly known as:  COREG  
TAKE 1 TABLET TWICE A DAY  WITH MEALS  
  
 cholecalciferol (vitamin D3) 2,000 unit Tab Take  by mouth. EVISTA 60 mg tablet Generic drug:  raloxifene Take  by mouth daily. furosemide 20 mg tablet Commonly known as:  LASIX TAKE 1 TABLET DAILY  
  
 HAIR,SKIN AND NAILS tablet Generic drug:  multivitamin Take 1 Tab by mouth daily. lisinopril 10 mg tablet Commonly known as:  PRINIVIL, ZESTRIL  
TAKE 1 TABLET DAILY  
  
 mometasone 50 mcg/actuation nasal spray Commonly known as:  NASONEX  
2 Sprays by Both Nostrils route daily. mometasone-formoterol 100-5 mcg/actuation HFA inhaler Commonly known as:  Wendy Peterman Take 2 Puffs by inhalation two (2) times a day. montelukast 10 mg tablet Commonly known as:  SINGULAIR Take 1 Tab by mouth daily. MULTIVITAMIN PO Take  by mouth daily. omeprazole 40 mg capsule Commonly known as:  PRILOSEC Take 40 mg by mouth daily. tiotropium bromide 1.25 mcg/actuation inhaler Commonly known as:  Beau Holloway Take 2 Puffs by inhalation daily. Follow-up Instructions Return in about 6 months (around 7/22/2018). To-Do List   
 01/25/2018 Cardiac Services:  2D ECHO COMPLETE ADULT (TTE) Introducing [facility] SERVICES! Dear Cindy Haro: 
Thank you for requesting a [email or web address] account. Our records indicate that you already have an active [email or web address] account. You can access your account anytime at [email or web address] Did you know that you can access your hospital and ER discharge instructions at any time in [email or web address]? You can also review all of your test results from your hospital stay or ER visit. Additional Information If you have questions, please visit the Frequently Asked Questions section of the [email or web address] website at [email or web address] Remember, [email or web address] is NOT to be used for urgent needs. For medical emergencies, dial 911. Now available from your iPhone and Android! Please provide this summary of care documentation to your next provider. Your primary care clinician is listed as Tasha Barajas. If you have any questions after today's visit, please call [phone].

## 2018-01-22 NOTE — LETTER
Darnell Coombs 1950 1/22/2018 Dear Ulysses Bolk, MD 
 
I had the pleasure of evaluating  Ms. Carmina Scherer in office today. Below are the relevant portions of my assessment and plan of care. ICD-10-CM ICD-9-CM 1. Chronic diastolic congestive heart failure (HCC) I50.32 428.32 2D ECHO COMPLETE ADULT (TTE) 428.0   
 stable 
lvef better 2. Essential hypertension I10 401.9 2D ECHO COMPLETE ADULT (TTE)  
 controlled 3. Nonischemic cardiomyopathy (HCC) I42.8 425.4   
 imoproved ef  
4. Automatic implantable cardioverter-defibrillator in situ Z95.810 V45.02   
 normal function 5. Mitral valve disease I05.9 394.9   
 mr mild Current Outpatient Prescriptions Medication Sig Dispense Refill  cholecalciferol, vitamin D3, 2,000 unit tab Take  by mouth.  mometasone (NASONEX) 50 mcg/actuation nasal spray 2 Sprays by Both Nostrils route daily. 1 Container 3  carvedilol (COREG) 25 mg tablet TAKE 1 TABLET TWICE A DAY  WITH MEALS 180 Tab 3  furosemide (LASIX) 20 mg tablet TAKE 1 TABLET DAILY 90 Tab 3  
 mometasone-formoterol (DULERA) 100-5 mcg/actuation HFA inhaler Take 2 Puffs by inhalation two (2) times a day. 3 Inhaler 3  
 montelukast (SINGULAIR) 10 mg tablet Take 1 Tab by mouth daily. 90 Tab 3  
 omeprazole (PRILOSEC) 40 mg capsule Take 40 mg by mouth daily.  tiotropium bromide (SPIRIVA RESPIMAT) 1.25 mcg/actuation inhaler Take 2 Puffs by inhalation daily. 3 Inhaler 3  
 lisinopril (PRINIVIL, ZESTRIL) 10 mg tablet TAKE 1 TABLET DAILY 90 Tab 1  
 multivitamin (HAIR,SKIN & NAILS) tablet Take 1 Tab by mouth daily.  raloxifene (EVISTA) 60 mg tablet Take  by mouth daily.  MULTIVITAMIN PO Take  by mouth daily. Orders Placed This Encounter  2D ECHO COMPLETE ADULT (TTE) Standing Status:   Future Standing Expiration Date:   7/21/2018 Order Specific Question:   Reason for Exam: Answer:   see diagnosis If you have questions, please do not hesitate to call me. I look forward to following Ms. Trevino along with you. Sincerely, Brendan Ramirez MD

## 2018-02-01 RX ORDER — LISINOPRIL 10 MG/1
TABLET ORAL
Qty: 90 TAB | Refills: 1 | Status: SHIPPED | OUTPATIENT
Start: 2018-02-01 | End: 2018-09-11 | Stop reason: SDUPTHER

## 2018-05-04 RX ORDER — TIOTROPIUM BROMIDE INHALATION SPRAY 1.56 UG/1
SPRAY, METERED RESPIRATORY (INHALATION)
Qty: 1 INHALER | Refills: 3 | Status: SHIPPED | OUTPATIENT
Start: 2018-05-04 | End: 2018-05-08 | Stop reason: SDUPTHER

## 2018-05-08 NOTE — TELEPHONE ENCOUNTER
We received a fax from [facility] that the prescription for Spiriva Respimat be ordered for 90 day supply with 3 refills. The order is pended to BLANKA Marshall NP.

## 2018-07-06 RX ORDER — MONTELUKAST SODIUM 10 MG/1
10 TABLET ORAL DAILY
Qty: 90 TAB | Refills: 3 | Status: SHIPPED | OUTPATIENT
Start: 2018-07-06 | End: 2020-09-08 | Stop reason: SDUPTHER

## 2018-07-09 ENCOUNTER — OFFICE VISIT (OUTPATIENT)
Dept: CARDIOLOGY CLINIC | Age: 68
End: 2018-07-09

## 2018-07-09 VITALS
SYSTOLIC BLOOD PRESSURE: 134 MMHG | DIASTOLIC BLOOD PRESSURE: 66 MMHG | HEART RATE: 71 BPM | HEIGHT: 65 IN | BODY MASS INDEX: 28.16 KG/M2 | WEIGHT: 169 LBS

## 2018-07-09 DIAGNOSIS — I05.9 MITRAL VALVE DISEASE: ICD-10-CM

## 2018-07-09 DIAGNOSIS — I50.32 CHRONIC DIASTOLIC CONGESTIVE HEART FAILURE (HCC): Primary | ICD-10-CM

## 2018-07-09 DIAGNOSIS — Z95.810 AUTOMATIC IMPLANTABLE CARDIOVERTER-DEFIBRILLATOR IN SITU: ICD-10-CM

## 2018-07-09 DIAGNOSIS — I42.8 NONISCHEMIC CARDIOMYOPATHY (HCC): ICD-10-CM

## 2018-07-09 NOTE — PROGRESS NOTES
Patient didn't bring medications, verbally reviewed    1. Have you been to the ER, urgent care clinic since your last visit? Hospitalized since your last visit? No    2. Have you seen or consulted any other health care providers outside of the [facility] since your last visit? Include any pap smears or colon screening.  Yes Where: PCP Routine/ Oncology Routine / Pulmonary Routine

## 2018-07-09 NOTE — LETTER
[facility] 1950 7/9/2018 Dear Shameka Presisabel, MD 
 
I had the pleasure of evaluating  Ms. Gilles Hand in office today. Below are the relevant portions of my assessment and plan of care. ICD-10-CM ICD-9-CM 1. Chronic diastolic congestive heart failure (HCC) I50.32 428.32 IMPLANTABLE CARDIOVASULAR DB SYST  
  428.0 SINGLE,DUAL,OR MULTIPLE LEAD IMPLANT CARD DEFIB SYST  
 compensated 2. Mitral valve disease I05.9 394.9   
 stable 3. Nonischemic cardiomyopathy (HCC) I42.8 425.4   
 improved 4. Automatic implantable cardioverter-defibrillator in situ Z95.810 V45.02 IMPLANTABLE CARDIOVASULAR DB SYST  
   SINGLE,DUAL,OR MULTIPLE LEAD IMPLANT CARD DEFIB SYST  
 normal function Current Outpatient Prescriptions Medication Sig Dispense Refill  calcium-cholecalciferol, d3, (CALCIUM 600 + D) 600-125 mg-unit tab Take  by mouth.  montelukast (SINGULAIR) 10 mg tablet Take 1 Tab by mouth daily. 90 Tab 3  
 tiotropium bromide (SPIRIVA RESPIMAT) 1.25 mcg/actuation inhaler Take 2 Puffs by inhalation daily. 3 Inhaler 3  
 lisinopril (PRINIVIL, ZESTRIL) 10 mg tablet TAKE 1 TABLET DAILY 90 Tab 1  cholecalciferol, vitamin D3, 2,000 unit tab Take  by mouth.  mometasone (NASONEX) 50 mcg/actuation nasal spray 2 Sprays by Both Nostrils route daily. 1 Container 3  carvedilol (COREG) 25 mg tablet TAKE 1 TABLET TWICE A DAY  WITH MEALS 180 Tab 3  
 mometasone-formoterol (DULERA) 100-5 mcg/actuation HFA inhaler Take 2 Puffs by inhalation two (2) times a day. 3 Inhaler 3  
 omeprazole (PRILOSEC) 40 mg capsule Take 40 mg by mouth daily.  multivitamin (HAIR,SKIN & NAILS) tablet Take 1 Tab by mouth daily.  raloxifene (EVISTA) 60 mg tablet Take  by mouth daily. Orders Placed This Encounter  IMPLANTABLE CARDIOVASULAR DB SYST  
 SINGLE,DUAL,OR MULTIPLE LEAD IMPLANT CARD DEFIB SYST Order Specific Question:   Reason for Exam:   Answer:   icd  
  calcium-cholecalciferol, d3, (CALCIUM 600 + D) 600-125 mg-unit tab Sig: Take  by mouth. If you have questions, please do not hesitate to call me. I look forward to following MsJesus Maodoyle along with you. Sincerely, Marv Wilson MD

## 2018-07-09 NOTE — MR AVS SNAPSHOT
[address] [facility] [address] 
 
 
 [address], [address] [facility] [address] 
[phone] Patient: Sharan Babcock MRN: [id number] LBT:5/76/6599 Visit Information Date & Time Provider Department Dept. Phone Encounter #  
 7/9/2018 10:30 AM Jose F Santos MD [facility] [address] [place] [address] [id number] Follow-up Instructions Return in about 3 months (around 10/9/2018). Your Appointments 10/8/2018  8:15 AM  
[facility] PACER [facility] [place] ([facility]) Appt Note: carelink; cl flanagan [address], [address] [facility] [address]  
[phone]  
  
   
 [address], [address] [place] [address]  
  
    
 10/15/2018 10:00 AM  
Office Visit with Jose F Santos MD  
[facility]) Appt Note: 3 months [address], [address] [facility] [address]  
[address], [address] [facility] [address] 1/14/2019  9:00 AM  
[facility] BP [facility] [place] ([facility]) Appt Note: cl flanagan [address], [address] [facility] [address]  
[address], [address] [facility] [address] 4/15/2019  9:00 AM  
[facility] BP [facility] [place] ([facility]) Appt Note: cl flanagan [address], [address] [facility] [address]  
[phone] Upcoming Health Maintenance Date Due Hepatitis C Screening 1950 DTaP/Tdap/Td series (1 - Tdap) 1/26/1970 FOBT Q 1 YEAR AGE 50-75 1/26/1999 ZOSTER VACCINE AGE 60> 11/26/2008 GLAUCOMA SCREENING Q2Y 1/26/2014 MEDICARE YEARLY EXAM 3/14/2018 Influenza Age 5 to Adult 8/1/2018 BREAST CANCER SCRN MAMMOGRAM 1/15/2020 Allergies as of 7/9/2018  Review Complete On: 7/9/2018 By: Jose F Santos MD  
  
 Severity Noted Reaction Type Reactions Adhesive  10/27/2011    Other (comments)  
 blisters Current Immunizations  Reviewed on 12/19/2017 Name Date Influenza High Dose Vaccine PF 9/1/2016 Pneumococcal Conjugate (PCV-13) 10/1/2016 Pneumococcal Polysaccharide (PPSV-23) 10/1/2015 Not reviewed this visit You Were Diagnosed With   
  
 Codes Comments Chronic diastolic congestive heart failure (HCC)    -  Primary ICD-10-CM: I50.32 
ICD-9-CM: 428.32, 428.0 compensated Mitral valve disease     ICD-10-CM: I05.9 ICD-9-CM: 394.9 stable Nonischemic cardiomyopathy ([facility] [address]     ICD-10-CM: I42.8 ICD-9-CM: 425.4 improved Automatic implantable cardioverter-defibrillator in situ     ICD-10-CM: Z95.810 ICD-9-CM: V45.02 normal function Vitals BP Pulse Height(growth percentile) Weight(growth percentile) BMI OB Status 134/66 71 5' 5\" (1.651 m) 169 lb (76.7 kg) 28.12 kg/m2 Postmenopausal  
 Smoking Status Never Smoker Vitals History BMI and BSA Data Body Mass Index Body Surface Area  
 28.12 kg/m 2 1.88 m 2 Preferred Pharmacy Pharmacy Name Phone Poly Woo, [address] Your Updated Medication List  
  
   
This list is accurate as of 7/9/18 10:55 AM.  Always use your most recent med list.  
  
  
  
  
 CALCIUM 600 + D 600-125 mg-unit Tab Generic drug:  calcium-cholecalciferol (d3) Take  by mouth. carvedilol 25 mg tablet Commonly known as:  COREG  
TAKE 1 TABLET TWICE A DAY  WITH MEALS  
  
 cholecalciferol (vitamin D3) 2,000 unit Tab Take  by mouth. EVISTA 60 mg tablet Generic drug:  raloxifene Take  by mouth daily. HAIR,SKIN AND NAILS tablet Generic drug:  multivitamin Take 1 Tab by mouth daily. lisinopril 10 mg tablet Commonly known as:  PRINIVIL, ZESTRIL  
TAKE 1 TABLET DAILY  
  
 mometasone 50 mcg/actuation nasal spray Commonly known as:  NASONEX  
2 Sprays by Both Nostrils route daily. mometasone-formoterol 100-5 mcg/actuation HFA inhaler Commonly known as:  Marija Clink Take 2 Puffs by inhalation two (2) times a day. montelukast 10 mg tablet Commonly known as:  SINGULAIR Take 1 Tab by mouth daily. omeprazole 40 mg capsule Commonly known as:  PRILOSEC Take 40 mg by mouth daily. tiotropium bromide 1.25 mcg/actuation inhaler Commonly known as:  Robertha Raja Take 2 Puffs by inhalation daily. We Performed the Following IMPLANTABLE CARDIOVASULAR DB SYST [id number] CPT(R)] [facility] - [facility] MULTIPLE LEAD IMPLANT CARD DEFIB SYST [79177 CPT(R)] Follow-up Instructions Return in about 3 months (around 10/9/2018). To-Do List   
 01/21/2019 2:30 PM  
  Appointment with [facility] DEV TEE at [address] [place] [address] ([phone]) PAYMENT  For Non-Medicare patients - $15.00 will be collected from you at the time of your exam.  You will be billed $35.00 from the reading [facility]. OUTSIDE FILMS  - Any outside films related to the study being scheduled should be brought with you on the day of the exam.  If this cannot be done there may be a delay in the reading of the study. MEDICATIONS  - Patient must bring a complete list of all medications currently taking to include prescriptions, over-the-counter meds, herbals, vitamins & any dietary supplements  GENERAL INSTRUCTIONS  - On the day of your exam do not use any bath powder, deodorant or lotions on the armpit area. -Tenderness of breasts may cause an increase of discomfort during procedure. If you are experiencing breast tenderness on the day of your appointment and would like to reschedule, please call [phone].   
  
 01/21/2019 3:00 PM  
  Appointment with [facility] BONE DEXA RM 1 at [facility] RAD BONE DENSITY ([phone]) OUTSIDE FILMS  - Any outside films related to the study being scheduled should be brought with you on the day of the exam.  If this cannot be done there may be a delay in the reading of the study. MEDICATIONS  - Patient must bring a complete list of all medications currently taking to include prescriptions, over-the-counter meds, herbals, vitamins & any dietary supplements - Patient must discontinue use of calcium, vitamins, or calcium supplements including antacids (calcium based) 24 hours before scan. GENERAL INSTRUCTIONS  - [facility] cannot accommodate patients on stretchers, patient must be able to walk into the room and be able to sit up for a portion of the exam.  
  
  
Introducing [facility] SERVICES! Dear Maria Del Rosario Kamas: 
Thank you for requesting a [employer] account. Our records indicate that you already have an active [employer] account. You can access your account anytime at [email or web address] Did you know that you can access your hospital and ER discharge instructions at any time in [employer]? You can also review all of your test results from your hospital stay or ER visit. Additional Information If you have questions, please visit the Frequently Asked Questions section of the [employer] website at [email or web address] Remember, [employer] is NOT to be used for urgent needs. For medical emergencies, dial 911. Now available from your iPhone and Android! Please provide this summary of care documentation to your next provider. Your primary care clinician is listed as Konrad Sicard. If you have any questions after today's visit, please call [phone].

## 2018-07-09 NOTE — PROGRESS NOTES
HISTORY OF PRESENT ILLNESS  General Deb is a 70 y.o. female. HPI Comments: Patient with cmp,chf.post  icd   On follow up patient denies any chest pains, palpitation or other significant symptoms. Patient is here for follow up of diagnostic tests. Results will be discussed. He feels extremely fatigued tired and weak. Recently reported decrease in renal function. CHF   The history is provided by the patient. This is a recurrent problem. The problem occurs constantly. The problem has been gradually improving. Associated symptoms include shortness of breath. Pertinent negatives include no chest pain. The symptoms are aggravated by exertion. The symptoms are relieved by rest.   Hypertension   The history is provided by the patient. This is a chronic problem. The problem occurs constantly. The problem has not changed since onset. Associated symptoms include shortness of breath. Pertinent negatives include no chest pain. Valvular Heart Disease   The history is provided by the patient. This is a chronic problem. The problem occurs constantly. The problem has not changed since onset. Associated symptoms include shortness of breath. Pertinent negatives include no chest pain. Cardiomyopathy   The history is provided by the patient. This is a chronic problem. The problem has been resolved. Associated symptoms include shortness of breath. Pertinent negatives include no chest pain. Review of Systems   Constitutional: Negative for chills and fever. HENT: Negative for nosebleeds. Eyes: Negative for blurred vision and double vision. Respiratory: Positive for shortness of breath. Negative for cough, hemoptysis, sputum production and wheezing. Cardiovascular: Negative for chest pain, palpitations, orthopnea, claudication, leg swelling and PND. Gastrointestinal: Negative for heartburn, nausea and vomiting. Musculoskeletal: Negative for myalgias. Skin: Negative for rash.    Neurological: Negative for dizziness and weakness. Endo/Heme/Allergies: Does not bruise/bleed easily. Family History   Problem Relation Age of Onset    Hypertension Mother     High Cholesterol Mother     Heart Disease Father      paemaker implant at 80       Past Medical History:   Diagnosis Date    Abnormal nuclear cardiac imaging test 06/11/2010    Lg fixed anterior, septal, apical, inferoseptal defect sugg RCA & LAD disease or cardiomyopathy. EF 20%. Global hykn. Nondiagnostic EKG.  Adenocarcinoma of breast; locally advanced invasive ductal adenocarcinoma of left breast     Automatic implantable cardiac defibrillator in situ     Automatic implantable cardiac defibrillator in situ     Breast cancer (HCC)     Cancer ([facility] [address]     breast cancer left    Chemotherapy convalescence or palliative care 2012    Chronic combined systolic and diastolic heart failure (HCC)     Remains symptomatic, nyha class 3 ef improving.  Congestive heart failure, unspecified     chronic class ll    Echocardiogram 09/27/2010    EF 30%. Global hykn. Mild MR. Mild TR.  Hyperlipidemia     Hypertension     Mitral valve disorders(424.0) 1/15/2014    mild to moderate mr     Mitral valve disorders(424.0) 1/15/2014    mild to moderate mr     MVP (mitral valve prolapse)     Nonischemic cardiomyopathy (HCC)     EF 20-30% despite medical therapy    Nonspecific abnormal electrocardiogram (ECG) (EKG)     Osteopenia     Other primary cardiomyopathies ([facility] [address]     Pacemaker 10/27/10    s/p implantation, without complication    Poisoning by other and unspecified agents primarily affecting the cardiovascular system(972.9)     Ef slightly better to 45%, STABLE back on chemo.  Radiation therapy     Radiation therapy complication 5724    S/P cardiac catheterization 07/08/10    Patent coronary arteries. LVEDP 25.  EF 25%. Global hykn. Moderate MR.  RA 10.  RV 40/10. PA 40/20.  20.  CO/CI 4.5/2.45 (Larry).     Shortness of breath     Syncope and collapse     Thyroid disease     goiter       Past Surgical History:   Procedure Laterality Date    CARDIAC SURG PROCEDURE UNLIST      Difibrillator; BI V ICD    HX MASTECTOMY  09/28/11    modified radical mastectomy and axillary dissection    HX ORTHOPAEDIC      HX OTHER SURGICAL      surgery to remove part of liver    HX PACEMAKER  10/27/10    s/p [employer] biventricular AICD, without complication    HX RADICAL MASTECTOMY      NEUROLOGICAL PROCEDURE UNLISTED      Cervical fusion       Social History   Substance Use Topics    Smoking status: Never Smoker    Smokeless tobacco: Never Used    Alcohol use No       Allergies   Allergen Reactions    Adhesive Other (comments)     blisters       Current Outpatient Prescriptions   Medication Sig    calcium-cholecalciferol, d3, (CALCIUM 600 + D) 600-125 mg-unit tab Take  by mouth.  montelukast (SINGULAIR) 10 mg tablet Take 1 Tab by mouth daily.  tiotropium bromide (SPIRIVA RESPIMAT) 1.25 mcg/actuation inhaler Take 2 Puffs by inhalation daily.  lisinopril (PRINIVIL, ZESTRIL) 10 mg tablet TAKE 1 TABLET DAILY    cholecalciferol, vitamin D3, 2,000 unit tab Take  by mouth.  mometasone (NASONEX) 50 mcg/actuation nasal spray 2 Sprays by Both Nostrils route daily.  carvedilol (COREG) 25 mg tablet TAKE 1 TABLET TWICE A DAY  WITH MEALS    mometasone-formoterol (DULERA) 100-5 mcg/actuation HFA inhaler Take 2 Puffs by inhalation two (2) times a day.  omeprazole (PRILOSEC) 40 mg capsule Take 40 mg by mouth daily.  multivitamin (HAIR,SKIN & NAILS) tablet Take 1 Tab by mouth daily.  raloxifene (EVISTA) 60 mg tablet Take  by mouth daily. No current facility-administered medications for this visit. Visit Vitals    /66    Pulse 71    Ht 5' 5\" (1.651 m)    Wt 76.7 kg (169 lb)    BMI 28.12 kg/m2         Physical Exam   Constitutional: She is oriented to person, place, and time. She appears well-developed and well-nourished. HENT:   Head: Normocephalic and atraumatic. Eyes: Conjunctivae are normal.   Neck: Neck supple. No JVD present. No tracheal deviation present. No thyromegaly present. Cardiovascular: Normal rate and regular rhythm. PMI is not displaced. Exam reveals no gallop, no S3 and no decreased pulses. Murmur heard. High-pitched blowing holosystolic murmur is present with a grade of 2/6  at the apex  Pulmonary/Chest: No respiratory distress. She has no wheezes. She has no rales. She exhibits no tenderness. Abdominal: Soft. There is no tenderness. Musculoskeletal: She exhibits no edema. Neurological: She is alert and oriented to person, place, and time. Skin: Skin is warm. Psychiatric: She has a normal mood and affect. Ms. Poncho Pagan has a reminder for a \"due or due soon\" health maintenance. I have asked that she contact her primary care provider for follow-up on this health maintenance. CARDIOLOGY STUDIES 10/1/2012 8/1/2012 5/1/2012 4/1/2012 2/1/2012 12/1/2011 9/1/2011   Myocardial Perfusion Scan Result - - - - - - -   Echocardiogram - Complete Result 50-55% EF 46% 45% 45-50% EF 45-50% EF 40% mild MR &TR EF 57%, mild mr   Coronary Angiogram Result - - - - - - -   Some recent data might be hidden     SUMMARY:echo:6/2014  Left ventricle: The ventricle was mildly dilated. Systolic function was  normal. Ejection fraction was estimated in the range of 50 % to 55 %. There were no regional wall motion abnormalities. Doppler parameters were  consistent with abnormal left ventricular relaxation (grade 1 diastolic  dysfunction). Left atrium: The atrium was mildly dilated. Mitral valve: There was systolic bowing of the posterior leaflet, but  without diagnostic evidence for prolapse. There was mild to moderate  regurgitation. SUMMARY:echo:12/2014  Left ventricle: Systolic function was at the lower limits of normal.  Ejection fraction was estimated to be 53 %.  There were no regional wall  motion abnormalities. Doppler parameters were consistent with abnormal  left ventricular relaxation (grade 1 diastolic dysfunction). Right ventricle: A pacing wire was present. Mitral valve: There was systolic bowing of the posterior leaflet, but  without diagnostic evidence for prolapse. There was mild regurgitation. Tricuspid valve: Pulmonary artery systolic pressure: 22 mmHg. 12/2015:echo    SUMMARY:  Left ventricle: Systolic function was normal. Ejection fraction was  estimated in the range of 50 % to 55 %. There was mild diffuse  hypokinesis. Doppler parameters were consistent with abnormal left  ventricular relaxation (grade 1 diastolic dysfunction). Mitral valve: There was systolic bowing of the anterior and posterior  leaflets, but without diagnostic evidence for prolapse. There was mild  regurgitation. Tricuspid valve: There was mild regurgitation. Tricuspid regurgitation  peak velocity: 2.3 m/sec. Pulmonary artery systolic pressure: 25 mmHg. pft-6/2017  Maximal Mid Expiratory Flow rate is reduced to 43 % predicted  Forced Expiratory Volume in one second is reduced to 69 % predicted  FEV 1% is reduced     Volumes: All Volumes are reduced except for RV    Flow Volume Loop:  Nonspecific obstructive pattern in Flow Volume Loop    Bronchodilator:  No significant improvement with bronchodilator therapy    Diffusion:  Abnormal Diffusion Capacity reduced to 62 % predicted  DLCO normalizes to alveolar ventilation    Impression:  Moderate obstructive defect, Predominately small airways, Mild restrictive ventilatory defect, Reduced diffusion capacity indicating a decrease in alveolar surface area for gas exchange  I Have personally reviewed recent relevant labs available and discussed with patient    Interpretation Summary -6/2018  · Calculated left ventricular ejection fraction is 55%. Left ventricular mild concentric hypertrophy observed.  Mild (grade 1) left ventricular diastolic dysfunction. · Left atrial cavity size is mildly dilated. · There was systolic bowing of the anterior and posterior leaflets, but without diagnostic evidence for prolapse. Mild mitral valve regurgitation. · Mild tricuspid valve regurgitation is present. Pulmonary arterial systolic pressure is 18 mmHg. · Mild pulmonic valve regurgitation is present. · Small pericardial effusion. Assessment         ICD-10-CM ICD-9-CM    1. Chronic diastolic congestive heart failure (HCC) I50.32 428.32 IMPLANTABLE CARDIOVASULAR DB SYST     428.0 SINGLE,DUAL,OR MULTIPLE LEAD IMPLANT CARD DEFIB SYST    compensated   2. Mitral valve disease I05.9 394.9     stable   3. Nonischemic cardiomyopathy (HCC) I42.8 425.4     improved   4. Automatic implantable cardioverter-defibrillator in situ Z95.810 V45.02 IMPLANTABLE CARDIOVASULAR DB SYST      SINGLE,DUAL,OR MULTIPLE LEAD IMPLANT CARD DEFIB SYST    normal function   Tounge swelling not related to lisinopril as she had swelling even after stopping lisinopril    07/09/18  I will hold Lasix asrecent decrease in renal function. No clinical sign of fluid overload. Orders Placed This Encounter    IMPLANTABLE CARDIOVASULAR DB SYST    SINGLE,DUAL,OR MULTIPLE LEAD IMPLANT CARD DEFIB SYST     Order Specific Question:   Reason for Exam:     Answer:   icd       Follow-up Disposition:  Return in about 3 months (around 10/9/2018).

## 2018-09-11 RX ORDER — CARVEDILOL 25 MG/1
TABLET ORAL
Qty: 6 TAB | Refills: 1 | Status: SHIPPED | OUTPATIENT
Start: 2018-09-11 | End: 2018-11-27 | Stop reason: SDUPTHER

## 2018-09-11 RX ORDER — LISINOPRIL 10 MG/1
TABLET ORAL
Qty: 3 TAB | Refills: 1 | Status: SHIPPED | OUTPATIENT
Start: 2018-09-11 | End: 2018-09-15 | Stop reason: SDUPTHER

## 2018-09-17 RX ORDER — LISINOPRIL 10 MG/1
TABLET ORAL
Qty: 90 TAB | Refills: 1 | Status: SHIPPED | OUTPATIENT
Start: 2018-09-17 | End: 2018-10-15 | Stop reason: SDUPTHER

## 2018-09-17 RX ORDER — LISINOPRIL 10 MG/1
TABLET ORAL
Qty: 90 TAB | Refills: 2 | Status: SHIPPED | OUTPATIENT
Start: 2018-09-17 | End: 2019-04-29 | Stop reason: SDUPTHER

## 2018-10-02 ENCOUNTER — TELEPHONE (OUTPATIENT)
Dept: PULMONOLOGY | Age: 68
End: 2018-10-02

## 2018-10-02 RX ORDER — MONTELUKAST SODIUM 10 MG/1
10 TABLET ORAL DAILY
Qty: 90 TAB | Refills: 3 | Status: SHIPPED | OUTPATIENT
Start: 2018-10-02 | End: 2018-10-15 | Stop reason: SDUPTHER

## 2018-10-02 RX ORDER — AZITHROMYCIN 500 MG/1
500 TABLET, FILM COATED ORAL DAILY
Qty: 3 TAB | Refills: 0 | Status: SHIPPED | OUTPATIENT
Start: 2018-10-02 | End: 2018-10-15 | Stop reason: ALTCHOICE

## 2018-10-02 RX ORDER — PREDNISONE 10 MG/1
TABLET ORAL
Qty: 18 TAB | Refills: 0 | Status: SHIPPED | OUTPATIENT
Start: 2018-10-02 | End: 2018-10-15 | Stop reason: ALTCHOICE

## 2018-10-02 NOTE — TELEPHONE ENCOUNTER
Pr c/o dry hacky cough, wheezing, tightness in chest x 4 weeks. Getting worse over time. Not chest pain. No fever. Taking Spiriva and Symbicort. Does not have a rescue inhaler.  Also needs refill on Singular to [employer] [facility]

## 2018-10-09 RX ORDER — MOMETASONE FUROATE 50 UG/1
SPRAY, METERED NASAL
Qty: 1 CONTAINER | Refills: 3 | Status: SHIPPED | OUTPATIENT
Start: 2018-10-09 | End: 2019-01-06 | Stop reason: SDUPTHER

## 2018-10-15 ENCOUNTER — OFFICE VISIT (OUTPATIENT)
Dept: CARDIOLOGY CLINIC | Age: 68
End: 2018-10-15

## 2018-10-15 VITALS
SYSTOLIC BLOOD PRESSURE: 141 MMHG | DIASTOLIC BLOOD PRESSURE: 62 MMHG | BODY MASS INDEX: 29.16 KG/M2 | WEIGHT: 175 LBS | HEART RATE: 77 BPM | HEIGHT: 65 IN

## 2018-10-15 DIAGNOSIS — I50.32 CHRONIC DIASTOLIC CONGESTIVE HEART FAILURE (HCC): Primary | ICD-10-CM

## 2018-10-15 DIAGNOSIS — I10 ESSENTIAL HYPERTENSION: ICD-10-CM

## 2018-10-15 DIAGNOSIS — I42.8 NONISCHEMIC CARDIOMYOPATHY (HCC): ICD-10-CM

## 2018-10-15 DIAGNOSIS — J20.9 ACUTE BRONCHITIS, UNSPECIFIED ORGANISM: ICD-10-CM

## 2018-10-15 DIAGNOSIS — Z95.810 AUTOMATIC IMPLANTABLE CARDIOVERTER-DEFIBRILLATOR IN SITU: ICD-10-CM

## 2018-10-15 DIAGNOSIS — I05.9 MITRAL VALVE DISEASE: ICD-10-CM

## 2018-10-15 NOTE — LETTER
Shari Has 1950 
 
10/15/2018 Dear MD Donna Hill MD Estrella Factor, MD 
 
I had the pleasure of evaluating  Ms. Rick Olmstead in office today. Below are the relevant portions of my assessment and plan of care. ICD-10-CM ICD-9-CM 1. Chronic diastolic congestive heart failure (HCC) I50.32 428.32   
  428.0 Stable. Continue treatment. Class II 2. Mitral valve disease I05.9 394.9 Mitral regurgitation stable. 3. Nonischemic cardiomyopathy (HCC) I42.8 425.4 LVEF improved 4. Automatic implantable cardioverter-defibrillator in situ Z95.810 V45.02 Normal function. 5. Essential hypertension I10 401.9 Stable 6. Acute bronchitis, unspecified organism J20.9 466.0 REFERRAL TO PULMONARY DISEASE Persistent cough and wheezing in spite of prednisone and antibiotic. Will refer to pulmonary Current Outpatient Prescriptions Medication Sig Dispense Refill  mometasone (NASONEX) 50 mcg/actuation nasal spray USE 2 SPRAYS IN EACH       NOSTRIL DAILY 1 Container 3  
 lisinopril (PRINIVIL, ZESTRIL) 10 mg tablet TAKE 1 TABLET DAILY 90 Tab 2  carvedilol (COREG) 25 mg tablet TAKE 1 TABLET TWICE A DAY  WITH MEALS 6 Tab 1  calcium-cholecalciferol, d3, (CALCIUM 600 + D) 600-125 mg-unit tab Take  by mouth.  montelukast (SINGULAIR) 10 mg tablet Take 1 Tab by mouth daily. 90 Tab 3  
 tiotropium bromide (SPIRIVA RESPIMAT) 1.25 mcg/actuation inhaler Take 2 Puffs by inhalation daily. 3 Inhaler 3  cholecalciferol, vitamin D3, 2,000 unit tab Take  by mouth.  mometasone-formoterol (DULERA) 100-5 mcg/actuation HFA inhaler Take 2 Puffs by inhalation two (2) times a day. 3 Inhaler 3  
 omeprazole (PRILOSEC) 40 mg capsule Take 40 mg by mouth daily.  multivitamin (HAIR,SKIN & NAILS) tablet Take 1 Tab by mouth daily.  raloxifene (EVISTA) 60 mg tablet Take  by mouth daily. Orders Placed This Encounter  REFERRAL TO PULMONARY DISEASE Referral Priority:   Routine Referral Type:   Consultation Referral Reason:   Specialty Services Required Referred to Provider:   Vijaya Gutierrez MD  
 
If you have questions, please do not hesitate to call me. I look forward to following Ms. Trevino along with you. Sincerely, Ellie Mohamud MD

## 2018-10-15 NOTE — PROGRESS NOTES
Patient didn't bring medications, verbally reviewed    1. Have you been to the ER, urgent care clinic since your last visit? Hospitalized since your last visit? No    2. Have you seen or consulted any other health care providers outside of the [address] [place] [address] since your last visit? Include any pap smears or colon screening.  Yes Where: PCP Reason for visit: Routine

## 2018-10-15 NOTE — PROGRESS NOTES
HISTORY OF PRESENT ILLNESS  Stephen Jones is a 70 y.o. female. HPI Comments: Patient with cmp,chf.post  icd   On follow up patient denies any chest pains, palpitation or other significant symptoms. Patient is here for follow up of diagnostic tests. Results will be discussed. He feels extremely fatigued tired and weak. Recently reported decrease in renal function. CHF   The history is provided by the patient. This is a recurrent problem. The problem occurs constantly. The problem has been gradually improving. Associated symptoms include shortness of breath. Pertinent negatives include no chest pain. The symptoms are aggravated by exertion. The symptoms are relieved by rest.   Cardiomyopathy   The history is provided by the patient. This is a chronic problem. The problem has been resolved. Associated symptoms include shortness of breath. Pertinent negatives include no chest pain. Hypertension   The history is provided by the patient. This is a chronic problem. The problem occurs constantly. The problem has not changed since onset. Associated symptoms include shortness of breath. Pertinent negatives include no chest pain. Valvular Heart Disease   The history is provided by the patient. This is a chronic problem. The problem occurs constantly. The problem has not changed since onset. Associated symptoms include shortness of breath. Pertinent negatives include no chest pain. Review of Systems   Constitutional: Negative for chills and fever. HENT: Negative for nosebleeds. Eyes: Negative for blurred vision and double vision. Respiratory: Positive for shortness of breath. Negative for cough, hemoptysis, sputum production and wheezing. Cardiovascular: Negative for chest pain, palpitations, orthopnea, claudication, leg swelling and PND. Gastrointestinal: Negative for heartburn, nausea and vomiting. Musculoskeletal: Negative for myalgias. Skin: Negative for rash.    Neurological: Negative for dizziness and weakness. Endo/Heme/Allergies: Does not bruise/bleed easily. Family History   Problem Relation Age of Onset    Hypertension Mother     High Cholesterol Mother     Heart Disease Father      paemaker implant at 80       Past Medical History:   Diagnosis Date    Abnormal nuclear cardiac imaging test 06/11/2010    Lg fixed anterior, septal, apical, inferoseptal defect sugg RCA & LAD disease or cardiomyopathy. EF 20%. Global hykn. Nondiagnostic EKG.  Acute bronchitis 10/15/2018    Persistent cough and wheezing in spite of prednisone and antibiotic. Will refer to pulmonary    Adenocarcinoma of breast; locally advanced invasive ductal adenocarcinoma of left breast     Automatic implantable cardiac defibrillator in situ     Automatic implantable cardiac defibrillator in situ     [facility] [address]     Cancer ([facility] [address]     breast cancer left    Chemotherapy convalescence or palliative care 2012    Chronic combined systolic and diastolic heart failure (HCC)     Remains symptomatic, nyha class 3 ef improving.  Congestive heart failure, unspecified     chronic class ll    Echocardiogram 09/27/2010    EF 30%. Global hykn. Mild MR. Mild TR.  Hyperlipidemia     Hypertension     Mitral valve disorders(424.0) 1/15/2014    mild to moderate mr     Mitral valve disorders(424.0) 1/15/2014    mild to moderate mr     MVP (mitral valve prolapse)     Nonischemic cardiomyopathy (HCC)     EF 20-30% despite medical therapy    Nonspecific abnormal electrocardiogram (ECG) (EKG)     Osteopenia     Other primary cardiomyopathies     Pacemaker 10/27/10    s/p implantation, without complication    Poisoning by other and unspecified agents primarily affecting the cardiovascular system(972.9)     Ef slightly better to 45%, STABLE back on chemo.  Radiation therapy     Radiation therapy complication 1974    S/P cardiac catheterization 07/08/10    Patent coronary arteries. LVEDP 25.  EF 25%. Global hykn. Moderate MR.  RA 10.  RV 40/10. PA 40/20.  20.  CO/CI 4.5/2.45 (Larry).  Shortness of breath     Syncope and collapse     Thyroid disease     goiter       Past Surgical History:   Procedure Laterality Date    CARDIAC SURG PROCEDURE UNLIST      Difibrillator; BI V ICD    HX MASTECTOMY  09/28/11    modified radical mastectomy and axillary dissection    HX ORTHOPAEDIC      HX OTHER SURGICAL      surgery to remove part of liver    HX PACEMAKER  10/27/10    s/p Medtronic biventricular AICD, without complication    HX RADICAL MASTECTOMY      NEUROLOGICAL PROCEDURE UNLISTED      Cervical fusion       Social History   Substance Use Topics    Smoking status: Never Smoker    Smokeless tobacco: Never Used    Alcohol use No       Allergies   Allergen Reactions    Adhesive Other (comments)     blisters       Current Outpatient Prescriptions   Medication Sig    mometasone (NASONEX) 50 mcg/actuation nasal spray USE 2 SPRAYS IN EACH       NOSTRIL DAILY    lisinopril (PRINIVIL, ZESTRIL) 10 mg tablet TAKE 1 TABLET DAILY    carvedilol (COREG) 25 mg tablet TAKE 1 TABLET TWICE A DAY  WITH MEALS    calcium-cholecalciferol, d3, (CALCIUM 600 + D) 600-125 mg-unit tab Take  by mouth.  montelukast (SINGULAIR) 10 mg tablet Take 1 Tab by mouth daily.  tiotropium bromide (SPIRIVA RESPIMAT) 1.25 mcg/actuation inhaler Take 2 Puffs by inhalation daily.  cholecalciferol, vitamin D3, 2,000 unit tab Take  by mouth.  mometasone-formoterol (DULERA) 100-5 mcg/actuation HFA inhaler Take 2 Puffs by inhalation two (2) times a day.  omeprazole (PRILOSEC) 40 mg capsule Take 40 mg by mouth daily.  multivitamin (HAIR,SKIN & NAILS) tablet Take 1 Tab by mouth daily.  raloxifene (EVISTA) 60 mg tablet Take  by mouth daily. No current facility-administered medications for this visit.         Visit Vitals    /62    Pulse 77    Ht 5' 5\" (1.651 m)    Wt 79.4 kg (175 lb)    BMI 29.12 kg/m2 Physical Exam   Constitutional: She is oriented to person, place, and time. She appears well-developed and well-nourished. HENT:   Head: Normocephalic and atraumatic. Eyes: Conjunctivae are normal.   Neck: Neck supple. No JVD present. No tracheal deviation present. No thyromegaly present. Cardiovascular: Normal rate and regular rhythm. PMI is not displaced. Exam reveals no gallop, no S3 and no decreased pulses. Murmur heard. High-pitched blowing holosystolic murmur is present with a grade of 2/6  at the apex  Pulmonary/Chest: No respiratory distress. She has wheezes. She has no rales. She exhibits no tenderness. Abdominal: Soft. There is no tenderness. Musculoskeletal: She exhibits no edema. Neurological: She is alert and oriented to person, place, and time. Skin: Skin is warm. Psychiatric: She has a normal mood and affect. Ms. Lauren Choudhary has a reminder for a \"due or due soon\" health maintenance. I have asked that she contact her primary care provider for follow-up on this health maintenance. No flowsheet data found. SUMMARY:echo:6/2014  Left ventricle: The ventricle was mildly dilated. Systolic function was  normal. Ejection fraction was estimated in the range of 50 % to 55 %. There were no regional wall motion abnormalities. Doppler parameters were  consistent with abnormal left ventricular relaxation (grade 1 diastolic  dysfunction). Left atrium: The atrium was mildly dilated. Mitral valve: There was systolic bowing of the posterior leaflet, but  without diagnostic evidence for prolapse. There was mild to moderate  regurgitation. SUMMARY:echo:12/2014  Left ventricle: Systolic function was at the lower limits of normal.  Ejection fraction was estimated to be 53 %. There were no regional wall  motion abnormalities. Doppler parameters were consistent with abnormal  left ventricular relaxation (grade 1 diastolic dysfunction).     Right ventricle: A pacing wire was present. Mitral valve: There was systolic bowing of the posterior leaflet, but  without diagnostic evidence for prolapse. There was mild regurgitation. Tricuspid valve: Pulmonary artery systolic pressure: 22 mmHg. 12/2015:echo    SUMMARY:  Left ventricle: Systolic function was normal. Ejection fraction was  estimated in the range of 50 % to 55 %. There was mild diffuse  hypokinesis. Doppler parameters were consistent with abnormal left  ventricular relaxation (grade 1 diastolic dysfunction). Mitral valve: There was systolic bowing of the anterior and posterior  leaflets, but without diagnostic evidence for prolapse. There was mild  regurgitation. Tricuspid valve: There was mild regurgitation. Tricuspid regurgitation  peak velocity: 2.3 m/sec. Pulmonary artery systolic pressure: 25 mmHg. pft-6/2017  Maximal Mid Expiratory Flow rate is reduced to 43 % predicted  Forced Expiratory Volume in one second is reduced to 69 % predicted  FEV 1% is reduced     Volumes: All Volumes are reduced except for RV    Flow Volume Loop:  Nonspecific obstructive pattern in Flow Volume Loop    Bronchodilator:  No significant improvement with bronchodilator therapy    Diffusion:  Abnormal Diffusion Capacity reduced to 62 % predicted  DLCO normalizes to alveolar ventilation    Impression:  Moderate obstructive defect, Predominately small airways, Mild restrictive ventilatory defect, Reduced diffusion capacity indicating a decrease in alveolar surface area for gas exchange  I Have personally reviewed recent relevant labs available and discussed with patient    Interpretation Summary -6/2018  · Calculated left ventricular ejection fraction is 55%. Left ventricular mild concentric hypertrophy observed. Mild (grade 1) left ventricular diastolic dysfunction. · Left atrial cavity size is mildly dilated. · There was systolic bowing of the anterior and posterior leaflets, but without diagnostic evidence for prolapse.  Mild mitral valve regurgitation. · Mild tricuspid valve regurgitation is present. Pulmonary arterial systolic pressure is 18 mmHg. · Mild pulmonic valve regurgitation is present. · Small pericardial effusion. Assessment         ICD-10-CM ICD-9-CM    1. Chronic diastolic congestive heart failure (HCC) I50.32 428.32      428.0     Stable. Continue treatment. Class II   2. Mitral valve disease I05.9 394.9     Mitral regurgitation stable. 3. Nonischemic cardiomyopathy (HCC) I42.8 425.4     LVEF improved   4. Automatic implantable cardioverter-defibrillator in situ Z95.810 V45.02     Normal function. 5. Essential hypertension I10 401.9     Stable   6. Acute bronchitis, unspecified organism J20.9 466.0 REFERRAL TO PULMONARY DISEASE    Persistent cough and wheezing in spite of prednisone and antibiotic. Will refer to pulmonary   Tounge swelling not related to lisinopril as she had swelling even after stopping lisinopril  7/2018  I will hold Lasix as recent decrease in renal function. No clinical sign of fluid overload. 10/2018  Shortness of breath due to acute bronchitis bilateral wheezing. Will refer back to pulmonary. No sign of fluid overload. Will keep off Lasix  Orders Placed This Encounter    REFERRAL TO PULMONARY DISEASE     Referral Priority:   Routine     Referral Type:   Consultation     Referral Reason:   Specialty Services Required     Referred to Provider:   Hue Mendoza MD       Follow-up Disposition:  Return in about 3 months (around 1/15/2019) for Clinical Data setup/transmission.

## 2018-10-15 NOTE — MR AVS SNAPSHOT
[address] [facility] [address] 
 
 
 [address] [facility], [address] [facility] [address] 
[phone] Patient: Venessa Obrien MRN: [id number] OTU:5/35/[id number] Visit Information Date & Time Provider Department Dept. Phone Encounter #  
 10/15/2018 10:00 AM Yas Keller MD [facility] [address] [facility] [id number] Follow-up Instructions Return in about 3 months (around 1/15/2019) for carelink setup/transmission. Your Appointments 11/19/2018  9:45 AM  
Follow Up with Aleyda Capone MD  
[address] (Jenita Locket) Appt Note: from 12/2017  
 [address] [facility]  
  
   
 [address] [place] [address], [address] [place] [address] Drive [address]  
  
    
 1/14/2019  9:00 AM  
[facility] [place] (Jenita Locket) Appt Note: cl flanagan [address] [facility] [facility] [address]  
[address], [address] [address]  
  
    
 1/21/2019 10:00 AM  
Office Visit with Yas Keller MD  
[facility]) Appt Note: 3 months [address] [facility] [facility] [address]  
[address], [address] [place] [address] Sylvie [place] 4/15/2019  9:00 AM  
[facility] [place] (Jenita Locket) Appt Note: cl fl[place]agan [address] [facility] [facility] [address]  
[phone]  
  
    
 7/22/2019 11:00 AM  
PROCEDURE with Yas Keller MD  
[facility]) Appt Note: medtronic check [address], [address] [place] [address]  
[address], [address] [place] [address] [place] Upcoming Health Maintenance Date Due Hepatitis C Screening 1950 DTaP/Tdap/Td series (1 - Tdap) 1/26/1970 Shingrix Vaccine Age 50> (1 of 2) 1/26/1999 FOBT Q 1 YEAR AGE 50-75 1/26/1999 GLAUCOMA SCREENING Q2Y 1/26/2014 MEDICARE YEARLY EXAM 3/14/2018 Influenza Age 5 to Adult 8/1/2018 BREAST CANCER SCRN MAMMOGRAM 1/15/2020 Allergies as of 10/15/2018  Review Complete On: 10/15/2018 By: Ree Ramirez' Severity Noted Reaction Type Reactions Adhesive  10/27/2011    Other (comments)  
 blisters Current Immunizations  Reviewed on 12/19/2017 Name Date Influenza High Dose Vaccine PF 9/1/2016 Pneumococcal Conjugate (PCV-13) 10/1/2016 Pneumococcal Polysaccharide (PPSV-23) 10/1/2015 Not reviewed this visit You Were Diagnosed With   
  
 Codes Comments Chronic diastolic congestive heart failure (HCC)    -  Primary ICD-10-CM: I50.32 
ICD-9-CM: 428.32, 428.0 Stable. Continue treatment. Class II Mitral valve disease     ICD-10-CM: I05.9 ICD-9-CM: 394.9 Mitral regurgitation stable. Nonischemic cardiomyopathy ([facility] [address]     ICD-10-CM: I42.8 ICD-9-CM: 425.4 LVEF improved Automatic implantable cardioverter-defibrillator in situ     ICD-10-CM: Z95.810 ICD-9-CM: V45.02 Normal function. Essential hypertension     ICD-10-CM: I10 
ICD-9-CM: 401.9 Stable Acute bronchitis, unspecified organism     ICD-10-CM: J20.9 ICD-9-CM: 466.0 Persistent cough and wheezing in spite of prednisone and antibiotic. Will refer to pulmonary Vitals BP Pulse Height(growth percentile) Weight(growth percentile) BMI OB Status 141/62 77 5' 5\" (1.651 m) 175 lb (79.4 kg) 29.12 kg/m2 Postmenopausal  
 Smoking Status Never Smoker Vitals History BMI and BSA Data Body Mass Index Body Surface Area  
 29.12 kg/m 2 1.91 m 2 Preferred Pharmacy Pharmacy Name Phone [facility] [address] [phone] Your Updated Medication List  
  
   
This list is accurate as of 10/15/18 10:07 AM.  Always use your most recent med list.  
  
  
  
  
 CALCIUM 600 + D 600-125 mg-unit Tab Generic drug:  calcium-cholecalciferol (d3) Take  by mouth. carvedilol 25 mg tablet Commonly known as:  COREG  
TAKE 1 TABLET TWICE A DAY  WITH MEALS  
  
 cholecalciferol (vitamin D3) 2,000 unit Tab Take  by mouth. EVISTA 60 mg tablet Generic drug:  raloxifene Take  by mouth daily. HAIR,SKIN AND NAILS tablet Generic drug:  multivitamin Take 1 Tab by mouth daily. lisinopril 10 mg tablet Commonly known as:  PRINIVIL, ZESTRIL  
TAKE 1 TABLET DAILY  
  
 mometasone 50 mcg/actuation nasal spray Commonly known as:  NASONEX  
USE 2 SPRAYS IN EACH       NOSTRIL DAILY  
  
 mometasone-formoterol 100-5 mcg/actuation HFA inhaler Commonly known as:  Shelby Stack Take 2 Puffs by inhalation two (2) times a day. montelukast 10 mg tablet Commonly known as:  SINGULAIR Take 1 Tab by mouth daily. omeprazole 40 mg capsule Commonly known as:  PRILOSEC Take 40 mg by mouth daily. tiotropium bromide 1.25 mcg/actuation inhaler Commonly known as:  Regina Churchman Take 2 Puffs by inhalation daily. We Performed the Following REFERRAL TO PULMONARY DISEASE [ZDH13 Custom] Follow-up Instructions Return in about 3 months (around 1/15/2019) for carelink setup/transmission. To-Do List   
 01/29/2019 11:00 AM  
  Appointment with ELMIRA TEE at [address] [place] [address] ([phone]) PAYMENT  For Non-Medicare patients - $15.00 will be collected from you at the time of your exam.  You will be billed $35.00 from the reading [facility]. OUTSIDE FILMS  - Any outside films related to the study being scheduled should be brought with you on the day of the exam.  If this cannot be done there may be a delay in the reading of the study.   MEDICATIONS  - Patient must bring a complete list of all medications currently taking to include prescriptions, over-the-counter meds, herbals, vitamins & any dietary supplements  GENERAL INSTRUCTIONS  - On the day of your exam do not use any bath powder, deodorant or lotions on the armpit area. -Tenderness of breasts may cause an increase of discomfort during procedure. If you are experiencing breast tenderness on the day of your appointment and would like to reschedule, please call [phone].   
  
 01/29/2019 11:30 AM  
  Appointment with [facility] BONE DEXA RM 1 at [facility] RAD BONE DENSITY ([phone]) OUTSIDE FILMS  - Any outside films related to the study being scheduled should be brought with you on the day of the exam.  If this cannot be done there may be a delay in the reading of the study. MEDICATIONS  - Patient must bring a complete list of all medications currently taking to include prescriptions, over-the-counter meds, herbals, vitamins & any dietary supplements - Patient must discontinue use of calcium, vitamins, or calcium supplements including antacids (calcium based) 24 hours before scan. FOR DEXA  PATIENTS:  CHECK IN INSTRUCTIONS Check in to the [facility] [address] 15 minutes prior to your appointment. [facility] (3rd Floor)  [address] [place] [address] cannot accommodate patients on stretchers, patient must be able to walk into the room and be able to sit up for a portion of the exam.  
  
  
Referral Information Referral ID Referred By Referred To  
  
 [id number] AMY, 128 Ayesha Arroyo MD   
   [address] [place] [address], [address] [facility] Phone: [phone] Fax: [phone] Visits Status Start Date End Date 1 New Request 10/15/18 10/15/19 If your referral has a status of pending review or denied, additional information will be sent to support the outcome of this decision. Introducing [facility]! Dear Aram De La Fuente: Thank you for requesting a [employer] account. Our records indicate that you already have an active [employer] account. You can access your account anytime at [email or web address] Did you know that you can access your hospital and ER discharge instructions at any time in [employer]? You can also review all of your test results from your hospital stay or ER visit. Additional Information If you have questions, please visit the Frequently Asked Questions section of the [employer] website at [email or web address] Remember, [employer] is NOT to be used for urgent needs. For medical emergencies, dial 911. Now available from your iPhone and Android! Please provide this summary of care documentation to your next provider. Your primary care clinician is listed as Kenton Route. If you have any questions after today's visit, please call [phone].

## 2018-10-16 ENCOUNTER — OFFICE VISIT (OUTPATIENT)
Dept: PULMONOLOGY | Age: 68
End: 2018-10-16

## 2018-10-16 VITALS
TEMPERATURE: 98.6 F | HEIGHT: 65 IN | DIASTOLIC BLOOD PRESSURE: 78 MMHG | RESPIRATION RATE: 18 BRPM | HEART RATE: 88 BPM | WEIGHT: 176 LBS | OXYGEN SATURATION: 97 % | SYSTOLIC BLOOD PRESSURE: 144 MMHG | BODY MASS INDEX: 29.32 KG/M2

## 2018-10-16 DIAGNOSIS — R49.0 HOARSENESS: ICD-10-CM

## 2018-10-16 DIAGNOSIS — R05.9 COUGH: ICD-10-CM

## 2018-10-16 DIAGNOSIS — J45.909 UNCOMPLICATED ASTHMA, UNSPECIFIED ASTHMA SEVERITY, UNSPECIFIED WHETHER PERSISTENT: Primary | ICD-10-CM

## 2018-10-16 RX ORDER — PREDNISONE 20 MG/1
TABLET ORAL
Qty: 14 TAB | Refills: 0 | Status: SHIPPED | OUTPATIENT
Start: 2018-10-16 | End: 2018-11-19 | Stop reason: ALTCHOICE

## 2018-10-16 NOTE — MR AVS SNAPSHOT
[address] [facility], [address] [address] 
[phone] Patient: Annalee Garcia MRN: [id number] SNI:[id number]/90/3461 Visit Information Date & Time Provider Department Dept. Phone Encounter #  
 10/16/2018  3:45 PM Helen Lyons MD [facility] [place] [phone] Follow-up Instructions Return in about 3 weeks (around 11/6/2018). Your Appointments 11/19/2018  9:45 AM  
Follow Up with Edilma Al MD  
[address] ([address]) Appt Note: from 12/2017  
 [address] [place] [address], [address]  
  
   
 [address] [place] [address], [address] [place] [address] Drive [address]  
  
    
 1/14/2019  9:00 AM  
[facility]  [facility] [place] ([address]) Appt Note: cl flanagan [address], [address] [facility] [address]  
[address], [address] [address]  
  
    
 1/21/2019 10:00 AM  
Office Visit with Aldo Gómez MD  
[facility]) Appt Note: 3 months [address], [address] [place] [address]  
[address], [address] [facility] [address] 4/15/2019  9:00 AM  
[facility]  [facility] ([address]) Appt Note: cl flanagan [address], [address] [facility] [address]  
[phone]  
  
    
 7/22/2019 11:00 AM  
PROCEDURE with Aldo Gómez MD  
[facility]) Appt Note: medtronic check [address], [address] [place] [address]  
[address], [facility] Upcoming Health Maintenance Date Due Hepatitis C Screening 1950 DTaP/Tdap/Td series (1 - Tdap) 1/26/1970 Shingrix Vaccine Age 50> (1 of 2) 1/26/1999 FOBT Q 1 YEAR AGE 50-75 1/26/1999 GLAUCOMA SCREENING Q2Y 1/26/2014 MEDICARE YEARLY EXAM 3/14/2018 Influenza Age 5 to Adult 8/1/2018 BREAST CANCER SCRN MAMMOGRAM 1/15/2020 Allergies as of 10/16/2018  Review Complete On: 10/16/2018 By: Coco Keller LPN Severity Noted Reaction Type Reactions Adhesive  10/27/2011    Other (comments)  
 blisters Current Immunizations  Reviewed on 12/19/2017 Name Date Influenza High Dose Vaccine PF 9/1/2016 Pneumococcal Conjugate (PCV-13) 10/1/2016 Pneumococcal Polysaccharide (PPSV-23) 10/1/2015 Not reviewed this visit You Were Diagnosed With   
  
 Codes Comments Uncomplicated asthma, unspecified asthma severity, unspecified whether persistent    -  Primary ICD-10-CM: J45.909 ICD-9-CM: 493.90 Cough     ICD-10-CM: R05 ICD-9-CM: 786.2 Hoarseness     ICD-10-CM: R49.0 ICD-9-CM: 784.42 Vitals BP Pulse Temp Resp Height(growth percentile) Weight(growth percentile) 144/78 (BP 1 Location: Left arm, BP Patient Position: Sitting) 88 98.6 °F (37 °C) (Oral) 18 5' 5\" (1.651 m) 176 lb (79.8 kg) SpO2 BMI OB Status Smoking Status 97% 29.29 kg/m2 Postmenopausal Never Smoker BMI and BSA Data Body Mass Index Body Surface Area  
 29.29 kg/m 2 1.91 m 2 Preferred Pharmacy Pharmacy Name [facility], [address] Your Updated Medication List  
  
   
This list is accurate as of 10/16/18  5:20 PM.  Always use your most recent med list.  
  
  
  
  
 CALCIUM 600 + D 600-125 mg-unit Tab Generic drug:  calcium-cholecalciferol (d3) Take  by mouth. carvedilol 25 mg tablet Commonly known as:  COREG  
TAKE 1 TABLET TWICE A DAY  WITH MEALS  
  
 cholecalciferol (vitamin D3) 2,000 unit Tab Take  by mouth. EVISTA 60 mg tablet Generic drug:  raloxifene Take  by mouth daily. HAIR,SKIN AND NAILS tablet Generic drug:  multivitamin Take 1 Tab by mouth daily. lisinopril 10 mg tablet Commonly known as:  PRINIVIL, ZESTRIL  
TAKE 1 TABLET DAILY  
  
 mometasone 50 mcg/actuation nasal spray Commonly known as:  NASONEX  
USE 2 SPRAYS IN EACH       NOSTRIL DAILY  
  
 mometasone-formoterol 100-5 mcg/actuation HFA inhaler Commonly known as:  Rachel Anthony Take 2 Puffs by inhalation two (2) times a day. montelukast 10 mg tablet Commonly known as:  SINGULAIR Take 1 Tab by mouth daily. omeprazole 40 mg capsule Commonly known as:  PRILOSEC Take 40 mg by mouth daily. predniSONE 20 mg tablet Commonly known as:  DELTASONE  
2 tablets now then 2 tablets every morning with breakfast  
  
 tiotropium bromide 1.25 mcg/actuation inhaler Commonly known as:  Verla Schools Take 2 Puffs by inhalation daily. Prescriptions Sent to Pharmacy Refills  
 predniSONE (DELTASONE) 20 mg tablet 0 Si tablets now then 2 tablets every morning with breakfast  
 Class: Normal  
 Pharmacy: [address] #: [phone] Follow-up Instructions Return in about 3 weeks (around 2018). To-Do List   
 2019 11:00 AM  
  Appointment with ELMIRA TEE at [address] [place] [address] ([phone]) PAYMENT  For Non-Medicare patients - $15.00 will be collected from you at the time of your exam.  You will be billed $35.00 from the reading [facility]. OUTSIDE FILMS  - Any outside films related to the study being scheduled should be brought with you on the day of the exam.  If this cannot be done there may be a delay in the reading of the study.   MEDICATIONS  - Patient must bring a complete list of all medications currently taking to include prescriptions, over-the-counter meds, herbals, vitamins & any dietary supplements  GENERAL INSTRUCTIONS  - On the day of your exam do not use any bath powder, deodorant or lotions on the armpit area. -Tenderness of breasts may cause an increase of discomfort during procedure. If you are experiencing breast tenderness on the day of your appointment and would like to reschedule, please call [phone].   
  
 01/29/2019 11:30 AM  
  Appointment with [facility] DEXA RM 1 at [facility] ([phone]) OUTSIDE FILMS  - Any outside films related to the study being scheduled should be brought with you on the day of the exam.  If this cannot be done there may be a delay in the reading of the study. MEDICATIONS  - Patient must bring a complete list of all medications currently taking to include prescriptions, over-the-counter meds, herbals, vitamins & any dietary supplements - Patient must discontinue use of calcium, vitamins, or calcium supplements including antacids (calcium based) 24 hours before scan. FOR DEXA  PATIENTS:  CHECK IN INSTRUCTIONS Check in to the [facility] [address] 15 minutes prior to your appointment. [place] [address] (3rd Floor)  [address] [place] [place] [address] cannot accommodate patients on stretchers, patient must be able to walk into the room and be able to sit up for a portion of the exam.  
  
  
Patient Instructions Continue Spiriva Respimat 2 inhalations daily Continue Symbicort 2 inhalations twice daily try using a spacer which she would exhale first and then inhale the medication from the tube always wash mouth with water and spit it out after inhaling your 2 puffs of Symbicort Albuterol inhalations every 4 hours as needed but if you require albuterol too often to control respiratory symptoms call the office for severe symptoms go to the emergency room Prednisone 2 tablets every morning with breakfast for 6 days Always call for symptoms such as worsening shortness of breath Introducing hospitals & HEALTH SERVICES! Dear Bronson Star: Thank you for requesting a [employer] account. Our records indicate that you already have an active [employer] account. You can access your account anytime at [email or web address] Did you know that you can access your hospital and ER discharge instructions at any time in [employer]? You can also review all of your test results from your hospital stay or ER visit. Additional Information If you have questions, please visit the Frequently Asked Questions section of the [employer] website at [email or web address] Remember, [employer] is NOT to be used for urgent needs. For medical emergencies, dial 911. Now available from your iPhone and Android! Please provide this summary of care documentation to your next provider. Your primary care clinician is listed as Jayce Dahl. If you have any questions after today's visit, please call [phone].

## 2018-10-16 NOTE — PROGRESS NOTES
[facility]  Pulmonary, Critical Care, and Sleep Medicine      Chief complaint:  Coughing shortness of breath hoarseness    HPI:    Tracy Daily    is 70years old and returns the office today for follow-up concerning 6 weeks of coughing which was treated with antibiotics initially persists as well as intermittent shortness of breath and also the patient relates episodic hoarseness for the last several years. She denies any chest pain leg pain or swelling and relates she continues to take Symbicort and Spiriva      Allergies   Allergen Reactions    Adhesive Other (comments)     blisters     Current Outpatient Prescriptions   Medication Sig    mometasone (NASONEX) 50 mcg/actuation nasal spray USE 2 SPRAYS IN EACH       NOSTRIL DAILY    lisinopril (PRINIVIL, ZESTRIL) 10 mg tablet TAKE 1 TABLET DAILY    carvedilol (COREG) 25 mg tablet TAKE 1 TABLET TWICE A DAY  WITH MEALS    calcium-cholecalciferol, d3, (CALCIUM 600 + D) 600-125 mg-unit tab Take  by mouth.  montelukast (SINGULAIR) 10 mg tablet Take 1 Tab by mouth daily.  tiotropium bromide (SPIRIVA RESPIMAT) 1.25 mcg/actuation inhaler Take 2 Puffs by inhalation daily.  cholecalciferol, vitamin D3, 2,000 unit tab Take  by mouth.  omeprazole (PRILOSEC) 40 mg capsule Take 40 mg by mouth daily.  multivitamin (HAIR,SKIN & NAILS) tablet Take 1 Tab by mouth daily.  raloxifene (EVISTA) 60 mg tablet Take  by mouth daily.  mometasone-formoterol (DULERA) 100-5 mcg/actuation HFA inhaler Take 2 Puffs by inhalation two (2) times a day. No current facility-administered medications for this visit. Past Medical History:   Diagnosis Date    Abnormal nuclear cardiac imaging test 06/11/2010    Lg fixed anterior, septal, apical, inferoseptal defect sugg RCA & LAD disease or cardiomyopathy. EF 20%. Global hykn. Nondiagnostic EKG.     Acute bronchitis 10/15/2018    Persistent cough and wheezing in spite of prednisone and antibiotic. Will refer to pulmonary    Adenocarcinoma of breast; locally advanced invasive ductal adenocarcinoma of left breast     Automatic implantable cardiac defibrillator in situ     Automatic implantable cardiac defibrillator in situ     [facility]     Cancer ([facility]     breast cancer left    Chemotherapy convalescence or palliative care 2012    Chronic combined systolic and diastolic heart failure (HCC)     Remains symptomatic, nyha class 3 ef improving.  Congestive heart failure, unspecified     chronic class ll    Echocardiogram 09/27/2010    EF 30%. Global hykn. Mild MR. Mild TR.  Hyperlipidemia     Hypertension     Mitral valve disorders(424.0) 1/15/2014    mild to moderate mr     Mitral valve disorders(424.0) 1/15/2014    mild to moderate mr     MVP (mitral valve prolapse)     Nonischemic cardiomyopathy (HCC)     EF 20-30% despite medical therapy    Nonspecific abnormal electrocardiogram (ECG) (EKG)     Osteopenia     Other primary cardiomyopathies     Pacemaker 10/27/10    s/p implantation, without complication    Poisoning by other and unspecified agents primarily affecting the cardiovascular system(972.9)     Ef slightly better to 45%, STABLE back on chemo.  Radiation therapy     Radiation therapy complication 2265    S/P cardiac catheterization 07/08/10    Patent coronary arteries. LVEDP 25.  EF 25%. Global hykn. Moderate MR.  RA 10.  RV 40/10. PA 40/20.  20.  CO/CI 4.5/2.45 (Larry).     Shortness of breath     Syncope and collapse     Thyroid disease     goiter     Past Surgical History:   Procedure Laterality Date    CARDIAC SURG PROCEDURE UNLIST      Difibrillator; BI V ICD    HX MASTECTOMY  09/28/11    modified radical mastectomy and axillary dissection    HX ORTHOPAEDIC      HX OTHER SURGICAL      surgery to remove part of liver    HX PACEMAKER  10/27/10    s/p Medtronic biventricular AICD, without complication    HX RADICAL MASTECTOMY      NEUROLOGICAL PROCEDURE UNLISTED      Cervical fusion     Social History     Social History    Marital status:      Spouse name: N/A    Number of children: N/A    Years of education: N/A     Occupational History    Not on file. Social History Main Topics    Smoking status: Never Smoker    Smokeless tobacco: Never Used    Alcohol use No    Drug use: No    Sexual activity: Not on file     Other Topics Concern    Not on file     Social History Narrative     Family History   Problem Relation Age of Onset    Hypertension Mother     High Cholesterol Mother     Heart Disease Father      paemaker implant at 80       Review of systems:  She denies fever chills poor appetite weight loss facial pain nasal stuffiness or allergy symptoms at this time although she has a history of seasonal al occasional sinus problems    Physical Exam:  Visit Vitals    /78 (BP 1 Location: Left arm, BP Patient Position: Sitting)    Pulse 88    Temp 98.6 °F (37 °C) (Oral)    Resp 18    Ht 5' 5\" (1.651 m)    Wt 79.8 kg (176 lb)    SpO2 97%  Comment: RA Rest    BMI 29.29 kg/m2       Well-developed well-nourished  HEENT: WNL chest: Symmetrical expansion no dullness no wheezes rales rubs  Heart: Regular rhythm no gallop no murmur no JVD no peripheral edema  Extremities: No cyanosis clubbing or  Neurological: Alert and oriented    Labs:    O2 sat room air at rest 97%    Impression:     Chronic intermittent hoarseness possibly due to inhaled steroids  Hoarseness coughing occasional wheezing and shortness of breath despite absence of wheezing most likely related to asthma    Plan:     7-day course of prednisone 40 mg a day  Continue Spiriva and Symbicort with spacer which may help hoarseness  Consider other evaluation for hoarseness if spacer unsuccessful  As needed albuterol follow-up in 3 weeks with Dr.V Colton Padron [facility]    CC: MD Nav Oropeza MD    [address] [facility]. [address]  [place], [address] [address]     P: [phone]     F: [phone]

## 2018-10-16 NOTE — PATIENT INSTRUCTIONS
Continue Spiriva Respimat 2 inhalations daily    Continue Symbicort 2 inhalations twice daily try using a spacer which she would exhale first and then inhale the medication from the tube always wash mouth with water and spit it out after inhaling your 2 puffs of Symbicort    Albuterol inhalations every 4 hours as needed but if you require albuterol too often to control respiratory symptoms call the office for severe symptoms go to the emergency room    Prednisone 2 tablets every morning with breakfast for 6 days    Always call for symptoms such as worsening shortness of breath

## 2018-10-16 NOTE — PROGRESS NOTES
Ms. Julita Arroyo has a reminder for a \"due or due soon\" health maintenance. I have asked that she contact her primary care provider for follow-up on this health maintenance. Chief Complaint   Patient presents with    Cough    Shortness of Breath     1. Have you been to the ER, urgent care clinic since your last visit? Hospitalized since your last visit? No    2. Have you seen or consulted any other health care providers outside of the [address] [place] [address] since your last visit? Include any pap smears or colon screening.  No

## 2018-11-16 ENCOUNTER — HOSPITAL ENCOUNTER (OUTPATIENT)
Dept: ULTRASOUND IMAGING | Age: 68
Discharge: HOME | End: 2018-11-16
Attending: FAMILY MEDICINE
Payer: MEDICARE

## 2018-11-16 DIAGNOSIS — E04.1 THYROID NODULE: ICD-10-CM

## 2018-11-16 PROCEDURE — 76536 US EXAM OF HEAD AND NECK: CPT

## 2018-11-19 ENCOUNTER — OFFICE VISIT (OUTPATIENT)
Dept: PULMONOLOGY | Age: 68
End: 2018-11-19

## 2018-11-19 VITALS
SYSTOLIC BLOOD PRESSURE: 130 MMHG | HEART RATE: 86 BPM | RESPIRATION RATE: 20 BRPM | DIASTOLIC BLOOD PRESSURE: 76 MMHG | OXYGEN SATURATION: 92 % | BODY MASS INDEX: 28.99 KG/M2 | HEIGHT: 65 IN | WEIGHT: 174 LBS | TEMPERATURE: 97.6 F

## 2018-11-19 DIAGNOSIS — R94.2 ABNORMAL PFT: ICD-10-CM

## 2018-11-19 DIAGNOSIS — R05.9 COUGH: ICD-10-CM

## 2018-11-19 DIAGNOSIS — I42.8 NONISCHEMIC CARDIOMYOPATHY (HCC): ICD-10-CM

## 2018-11-19 DIAGNOSIS — J84.9 ILD (INTERSTITIAL LUNG DISEASE) (HCC): ICD-10-CM

## 2018-11-19 DIAGNOSIS — J45.42 MODERATE PERSISTENT ASTHMA WITH STATUS ASTHMATICUS: Primary | ICD-10-CM

## 2018-11-19 DIAGNOSIS — C50.912 ADENOCARCINOMA OF LEFT BREAST (HCC): ICD-10-CM

## 2018-11-19 RX ORDER — IPRATROPIUM BROMIDE AND ALBUTEROL SULFATE 2.5; .5 MG/3ML; MG/3ML
3 SOLUTION RESPIRATORY (INHALATION)
Qty: 120 NEBULE | Refills: 3 | Status: SHIPPED | OUTPATIENT
Start: 2018-11-19 | End: 2019-05-02 | Stop reason: SDUPTHER

## 2018-11-19 RX ORDER — IPRATROPIUM BROMIDE AND ALBUTEROL SULFATE 2.5; .5 MG/3ML; MG/3ML
3 SOLUTION RESPIRATORY (INHALATION)
Qty: 1 NEBULE | Refills: 0 | Status: SHIPPED | COMMUNITY
Start: 2018-11-19 | End: 2018-11-19

## 2018-11-19 NOTE — LETTER
2018 11:48 AM 
 
Patient:  Matt Ward YOB: 1950 Date of Visit: 2018 Dear Casi Flood MD 
[address] [place] [address] [address] [address] VIA Facsimile: [phone] 
 : Thank you for referring Ms. Gabbi Carreon to me for evaluation/treatment. Below are the relevant portions of my assessment and plan of care. [facility] Pulmonary, Critical Care, and Sleep Medicine Pulmonary Office visit Name: Matt Ward : 1950 Date: 2018 Subjective:  
Patient has been referred for evaluation of:  SOB and abnormal PFT's. Recently has had increased SOB and cough- persistent despite treatment. Saw Dr. John Salas and Dr. Andreas Amos in October. 18 Has had continued c/o cough- choking with minimal expectoration. C/o markedly increased SOB with minimal activity. Antibiotics and Prednisone taper with minimal improvement. Started on trelegy last week by Dr. Andreas Amos. Feels weak, fatigued . Appetite OK. Denies much productive mucus. Denies fever, chills. Denies any chest pain. No nausea, vomiting. HPI: 
Patient is a 70 y.o. female has been experiencing SOB for past 1 year. SOB:  
Symptoms occur with exertion and at night. Able to walk about about 3-4  Blocks. Cannot climb stairs. Activities of daily living are affected and improves with pacing. Has orthopnea/ paroxysmal nocturnal dyspnea SOB relieved with pacing and resting. C/o sinus congestion. Denies any significant Cough: 
Has some wheezing, no chest pain, fever, chills, night sweats dyspepsia, reflux. Has had AICD placed and replaced. Left mastectomy with chemo- radiation : 5 years back Weight gain of 50 lbs over few years. Comorbid conditions include- DM, HTN, CHF- nonischemic cardiomyopathy, Breast ca- treated: s/p mastectomy -L and chemo radiation Non smoker Occupational exposure-none Past Medical History:  
Diagnosis Date  Abnormal nuclear cardiac imaging test 06/11/2010 Lg fixed anterior, septal, apical, inferoseptal defect sugg RCA & LAD disease or cardiomyopathy. EF 20%. Global hykn. Nondiagnostic EKG.  Acute bronchitis 10/15/2018 Persistent cough and wheezing in spite of prednisone and antibiotic. 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   
 Automatic implantable cardiac defibrillator in situ  Automatic implantable cardiac defibrillator in situ  Breast [facility] ([facility] [address]  Cancer ([facility] [address] breast cancer left  Chemotherapy convalescence or palliative care 2012  Chronic combined systolic and diastolic heart failure ([facility] [address] Remains symptomatic, nyha class 3 ef improving.  Congestive heart failure, unspecified   
 chronic class ll  Echocardiogram 09/27/2010 EF 30%. Global hykn. Mild MR. Mild TR.  Hyperlipidemia  Hypertension  Mitral valve disorders(424.0) 1/15/2014  
 mild to moderate mr  Mitral valve disorders(424.0) 1/15/2014  
 mild to moderate mr  MVP (mitral valve prolapse)  Nonischemic cardiomyopathy ([facility] [address] EF 20-30% despite medical therapy  Nonspecific abnormal electrocardiogram (ECG) (EKG)  Osteopenia  Other primary cardiomyopathies  Pacemaker 10/27/10  
 s/p implantation, without complication  Poisoning by other and unspecified agents primarily affecting the cardiovascular system(972.9) Ef slightly better to 45%, STABLE back on chemo.  Radiation therapy  Radiation therapy complication 4882  S/P cardiac catheterization 07/08/10 Patent coronary arteries. LVEDP 25.  EF 25%. Global hykn. Moderate MR.  RA 10.  RV 40/10. PA 40/20.  20.  CO/CI 4.5/2.45 (Larry).  Shortness of breath  Syncope and collapse  Thyroid disease   
 goiter Past Surgical History:  
Procedure Laterality Date  CARDIAC SURG PROCEDURE UNLIST Difibrillator; BI V ICD  HX MASTECTOMY  09/28/11  
 modified radical mastectomy and axillary dissection  HX ORTHOPAEDIC    
 HX OTHER SURGICAL    
 surgery to remove part of liver  HX PACEMAKER  10/27/10  
 s/p Medtronic biventricular AICD, without complication  HX RADICAL MASTECTOMY  NEUROLOGICAL PROCEDURE UNLISTED Cervical fusion Allergies Allergen Reactions  Adhesive Other (comments)  
  blisters Mickeal Rink Current Outpatient Medications Medication Sig Dispense Refill  fluticasone/umeclidin/vilanter (TRELEGY ELLIPTA IN) Take  by inhalation.  mometasone (NASONEX) 50 mcg/actuation nasal spray USE 2 SPRAYS IN EACH       NOSTRIL DAILY 1 Container 3  
 lisinopril (PRINIVIL, ZESTRIL) 10 mg tablet TAKE 1 TABLET DAILY 90 Tab 2  carvedilol (COREG) 25 mg tablet TAKE 1 TABLET TWICE A DAY  WITH MEALS 6 Tab 1  
 montelukast (SINGULAIR) 10 mg tablet Take 1 Tab by mouth daily. 90 Tab 3  cholecalciferol, vitamin D3, 2,000 unit tab Take  by mouth.  omeprazole (PRILOSEC) 40 mg capsule Take 40 mg by mouth daily.  multivitamin (HAIR,SKIN & NAILS) tablet Take 1 Tab by mouth daily.  raloxifene (EVISTA) 60 mg tablet Take  by mouth daily.  calcium-cholecalciferol, d3, (CALCIUM 600 + D) 600-125 mg-unit tab Take  by mouth.  tiotropium bromide (SPIRIVA RESPIMAT) 1.25 mcg/actuation inhaler Take 2 Puffs by inhalation daily. 3 Inhaler 3  
 mometasone-formoterol (DULERA) 100-5 mcg/actuation HFA inhaler Take 2 Puffs by inhalation two (2) times a day. 3 Inhaler 3 Review of Systems: 
HEENT: No epistaxis, no nasal drainage, no difficulty in swallowing, no redness in eyes Respiratory: as above Cardiovascular: no chest pain, no palpitations, no chronic leg edema, no syncope Gastrointestinal: no abd pain, no vomiting, no diarrhea, no bleeding symptoms Genitourinary: No urinary symptoms or hematuria Integument/breast: No ulcers or rashes Musculoskeletal:Neg 
 Neurological: No focal weakness, no seizures, no headaches Behvioral/Psych: No anxiety, no depression Constitutional: No fever, no chills, no weight loss, no night sweats Objective: 
 
Visit Vitals /76 (BP 1 Location: Right arm, BP Patient Position: Sitting) Pulse 86 Temp 97.6 °F (36.4 °C) (Oral) Resp 20 Ht 5' 5\" (1.651 m) Wt 78.9 kg (174 lb) SpO2 92% BMI 28.96 kg/m² Physical Exam:  
General: comfortable, no acute distress HEENT: pupils reactive, sclera anicteric, EOM intact Neck: No adenopathy or thyroid swelling, no lymphadenopathy or JVD, supple Chest: multiple scars from previous surgery, surgically absent left breast 
CVS: S1S2 
RS: Mod AE bilaterally, no tactile fremitus or egophony, no accessory muscle use, faint crackles Abd: soft, non tender, no hepatosplenomegaly Neuro: non focal, awake, alert Extrm: no leg edema, clubbing or cyanosis Skin: no rash Data review: 
Pertinent labs: CBC, BMP, LFT's PFT: 
 
Date FVC FEV1  FEV1/FVC [id number] TLC RV RV/TLC VC DLCO  
6/29/2017 75 69 67 43 77 81 nl  62 FLOWS: 
Maximal Mid Expiratory Flow rate is reduced to 43 % predicted Forced Expiratory Volume in one second is reduced to 69 % predicted FEV 1% is reduced Volumes: All Volumes are reduced except for RV Flow Volume Loop: 
Nonspecific obstructive pattern in Flow Volume Loop Bronchodilator: No significant improvement with bronchodilator therapy Diffusion: Abnormal Diffusion Capacity reduced to 62 % predicted DLCO normalizes to alveolar ventilation Impression: Moderate obstructive defect, Predominately small airways, Mild restrictive ventilatory defect, Reduced diffusion capacity indicating a decrease in alveolar surface area for gas exchange 6 min walk test;walked 330 feet with no O2 desaturation or significant heart rate change. DI- stable Echo: 1/18/2017: 
 Left ventricle: Systolic function was normal. Ejection fraction was 
estimated to be 50 %. There were no regional wall motion abnormalities. Doppler parameters were consistent with abnormal left ventricular 
relaxation (grade 1 diastolic dysfunction). Right ventricle: The size was normal. Systolic function was reduced. Left atrium: Size was normal. 
Right atrium: Size was normal. 
Mitral valve: There was systolic bowing of the anterior and posterior 
leaflets, but without diagnostic evidence for prolapse. There was mild 
regurgitation. Aortic valve: The valve was trileaflet. Leaflets exhibited normal 
thickness and normal cuspal separation. Tricuspid valve: Pulmonary artery systolic pressure: 18 mmHg. Pulmonic valve: There was mild regurgitation. Imaging: 
I have personally reviewed the patients radiographs and have reviewed the reports: CXR Results  (Last 48 hours) None My read on CXR- 11/19/18- Bilateral interstitial infiltrates- new compared to last CXR 
 
 
CXR 8/26/2014: 
 
AICD. No change in lead placement. No visualized lead fracture. Lungs appear 
unremarkable. Normal size heart. Impression: No acute findings. Patient Active Problem List  
Diagnosis Code  Congestive heart failure (HCC) I50.9  Hypertension I10  
 MVP (mitral valve prolapse) I34.1  Nonischemic cardiomyopathy (HCC) I42.8  Cancer ([facility] [address] C80.1  Adenocarcinoma of breast; locally advanced invasive ductal adenocarcinoma of left breast C50.919  
 Poisoning by other and unspecified agents primarily affecting the cardiovascular system(972.9) T46.904A  Syncope and collapse R55  Other primary cardiomyopathies I42.8  Shortness of breath R06.02  
 Nonspecific abnormal electrocardiogram (ECG) (EKG) R94.31  
 Automatic implantable cardioverter-defibrillator in situ Z95.810  
 Mitral valve disease I05.9  Abnormal PFT R94.2  Acute bronchitis J20.9 IMPRESSION:  
 · SOB on exertion with significant symptomatic decompensation. CXR concerning for ILD- chronic diastolic heart failure/ vs ? Carcinomatosis or chemo- radiation late pneumonitis · Clinical data suggests multifactorial etiology with progressive symptoms. over past 1 year: worsening reactive airways due to untreated rhinosinusitis, silent reflux and or a component of diastolic heart failure. · Abnormal PFT- combined restrictive and Obstructive component with reduced DLCO ( corrects with Volume adjustment.) Suspect restrictive component from chest wall deformity. Had initial Improved symptoms with bronchodilators · H/o invasive ductal breast Ca · H/o non ischemic cardiomyopathy · AICD RECOMMENDATIONS:  
 
· Continue treating obstructive airways component- TRELEGY · Add nebulized bronchodilators responded in clinic after treatment. · Will obtain HRCT and decide on next steps · Will check PFT's · Continue singulair to address chronic rhinosinuitis component- Nasonex · GERD precautions · Preventive vaccinations- recieved · Monitor response to interventions and make appropriate adjustments · Healthy weight and active lifestyle · Follow up in 1 week after completion of HRCT and PFT Health maintenance screens deferred to Primary care provider. Michael Hinojosa MD 
 
 
 
 
 
 
If you have questions, please do not hesitate to call me. I look forward to following MsJesus Belinda along with you.  
 
 
 
Sincerely, 
 
 
Michael Hinojosa MD

## 2018-11-19 NOTE — PROGRESS NOTES
[facility] Pulmonary, Critical Care, and Sleep Medicine Pulmonary Office visit Name: Igor Saeed : 1950 Date: 2018 Subjective:  
Patient has been referred for evaluation of:  SOB and abnormal PFT's. Recently has had increased SOB and cough- persistent despite treatment. Saw Dr. Dereje Cunningham and Dr. Marnie Paige in October. 18 Has had continued c/o cough- choking with minimal expectoration. C/o markedly increased SOB with minimal activity. Antibiotics and Prednisone taper with minimal improvement. Started on trelegy last week by Dr. Marnie Paige. Feels weak, fatigued . Appetite OK. Denies much productive mucus. Denies fever, chills. Denies any chest pain. No nausea, vomiting. HPI: 
Patient is a 70 y.o. female has been experiencing SOB for past 1 year. SOB:  
Symptoms occur with exertion and at night. Able to walk about about 3-4  Blocks. Cannot climb stairs. Activities of daily living are affected and improves with pacing. Has orthopnea/ paroxysmal nocturnal dyspnea SOB relieved with pacing and resting. C/o sinus congestion. Denies any significant Cough: 
Has some wheezing, no chest pain, fever, chills, night sweats dyspepsia, reflux. Has had AICD placed and replaced. Left mastectomy with chemo- radiation : 5 years back Weight gain of 50 lbs over few years. Comorbid conditions include- DM, HTN, CHF- nonischemic cardiomyopathy, Breast ca- treated: s/p mastectomy -L and chemo radiation Non smoker Occupational exposure-none Past Medical History:  
Diagnosis Date  Abnormal nuclear cardiac imaging test 2010 Lg fixed anterior, septal, apical, inferoseptal defect sugg RCA & LAD disease or cardiomyopathy. EF 20%. Global hykn. Nondiagnostic EKG.  Acute bronchitis 10/15/2018 Persistent cough and wheezing in spite of prednisone and antibiotic. 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   
 Automatic implantable cardiac defibrillator in situ  Automatic implantable cardiac defibrillator in situ  Breast cancer ([facility] [address]  Cancer ([facility] [address] breast cancer left  Chemotherapy convalescence or palliative care 2012  Chronic combined systolic and diastolic heart failure ([facility] [address] Remains symptomatic, nyha class 3 ef improving.  Congestive heart failure, unspecified   
 chronic class ll  Echocardiogram 09/27/2010 EF 30%. Global hykn. Mild MR. Mild TR.  Hyperlipidemia  Hypertension  Mitral valve disorders(424.0) 1/15/2014  
 mild to moderate mr  Mitral valve disorders(424.0) 1/15/2014  
 mild to moderate mr  MVP (mitral valve prolapse)  Nonischemic cardiomyopathy ([facility] [address] EF 20-30% despite medical therapy  Nonspecific abnormal electrocardiogram (ECG) (EKG)  Osteopenia  Other primary cardiomyopathies  Pacemaker 10/27/10  
 s/p implantation, without complication  Poisoning by other and unspecified agents primarily affecting the cardiovascular system(972.9) Ef slightly better to 45%, STABLE back on chemo.  Radiation therapy  Radiation therapy complication 6666  S/P cardiac catheterization 07/08/10 Patent coronary arteries. LVEDP 25.  EF 25%. Global hykn. Moderate MR.  RA 10.  RV 40/10. PA 40/20.  20.  CO/CI 4.5/2.45 (Larry).  Shortness of breath  Syncope and collapse  Thyroid disease   
 goiter Past Surgical History:  
Procedure Laterality Date  CARDIAC SURG PROCEDURE UNLIST Difibrillator; BI V ICD  HX MASTECTOMY  09/28/11  
 modified radical mastectomy and axillary dissection  HX ORTHOPAEDIC    
 HX OTHER SURGICAL    
 surgery to remove part of liver  HX PACEMAKER  10/27/10  
 s/p [employer]tr[employer] biventricular AICD, without complication  HX RADICAL MASTECTOMY  NEUROLOGICAL PROCEDURE UNLISTED    
 Cervical fusion Allergies Allergen Reactions  Adhesive Other (comments)  
  blisters Mike Resendez Current Outpatient Medications Medication Sig Dispense Refill  fluticasone/umeclidin/vilanter (TRELEGY ELLIPTA IN) Take  by inhalation.  mometasone (NASONEX) 50 mcg/actuation nasal spray USE 2 SPRAYS IN EACH       NOSTRIL DAILY 1 Container 3  
 lisinopril (PRINIVIL, ZESTRIL) 10 mg tablet TAKE 1 TABLET DAILY 90 Tab 2  carvedilol (COREG) 25 mg tablet TAKE 1 TABLET TWICE A DAY  WITH MEALS 6 Tab 1  
 montelukast (SINGULAIR) 10 mg tablet Take 1 Tab by mouth daily. 90 Tab 3  cholecalciferol, vitamin D3, 2,000 unit tab Take  by mouth.  omeprazole (PRILOSEC) 40 mg capsule Take 40 mg by mouth daily.  multivitamin (HAIR,SKIN & NAILS) tablet Take 1 Tab by mouth daily.  raloxifene (EVISTA) 60 mg tablet Take  by mouth daily.  calcium-cholecalciferol, d3, (CALCIUM 600 + D) 600-125 mg-unit tab Take  by mouth.  tiotropium bromide (SPIRIVA RESPIMAT) 1.25 mcg/actuation inhaler Take 2 Puffs by inhalation daily. 3 Inhaler 3  
 mometasone-formoterol (DULERA) 100-5 mcg/actuation HFA inhaler Take 2 Puffs by inhalation two (2) times a day. 3 Inhaler 3 Review of Systems: 
HEENT: No epistaxis, no nasal drainage, no difficulty in swallowing, no redness in eyes Respiratory: as above Cardiovascular: no chest pain, no palpitations, no chronic leg edema, no syncope Gastrointestinal: no abd pain, no vomiting, no diarrhea, no bleeding symptoms Genitourinary: No urinary symptoms or hematuria Integument/breast: No ulcers or rashes Musculoskeletal:Neg 
Neurological: No focal weakness, no seizures, no headaches Behvioral/Psych: No anxiety, no depression Constitutional: No fever, no chills, no weight loss, no night sweats Objective:  
 
Visit Vitals /76 (BP 1 Location: Right arm, BP Patient Position: Sitting) Pulse 86 Temp 97.6 °F (36.4 °C) (Oral) Resp 20 Ht 5' 5\" (1.651 m) Wt 78.9 kg (174 lb) SpO2 92% BMI 28.96 kg/m² Physical Exam:  
General: comfortable, no acute distress HEENT: pupils reactive, sclera anicteric, EOM intact Neck: No adenopathy or thyroid swelling, no lymphadenopathy or JVD, supple Chest: multiple scars from previous surgery, surgically absent left breast 
CVS: S1S2 
RS: Mod AE bilaterally, no tactile fremitus or egophony, no accessory muscle use, faint crackles Abd: soft, non tender, no hepatosplenomegaly Neuro: non focal, awake, alert Extrm: no leg edema, clubbing or cyanosis Skin: no rash Data review: 
Pertinent labs: CBC, BMP, LFT's PFT: 
 
Date FVC FEV1  FEV1/FVC BRY73-68 TLC RV RV/TLC VC DLCO  
6/29/2017 75 69 67 43 77 81 nl  62 FLOWS: 
Maximal Mid Expiratory Flow rate is reduced to 43 % predicted Forced Expiratory Volume in one second is reduced to 69 % predicted FEV 1% is reduced Volumes: All Volumes are reduced except for RV Flow Volume Loop: 
Nonspecific obstructive pattern in Flow Volume Loop Bronchodilator: No significant improvement with bronchodilator therapy Diffusion: Abnormal Diffusion Capacity reduced to 62 % predicted DLCO normalizes to alveolar ventilation Impression: Moderate obstructive defect, Predominately small airways, Mild restrictive ventilatory defect, Reduced diffusion capacity indicating a decrease in alveolar surface area for gas exchange 6 min walk test;walked 330 feet with no O2 desaturation or significant heart rate change. DI- stable Echo: 1/18/2017: 
Left ventricle: Systolic function was normal. Ejection fraction was 
estimated to be 50 %. There were no regional wall motion abnormalities. Doppler parameters were consistent with abnormal left ventricular 
relaxation (grade 1 diastolic dysfunction). Right ventricle: The size was normal. Systolic function was reduced.  
Left atrium: Size was normal. 
Right atrium: Size was normal. 
 Mitral valve: There was systolic bowing of the anterior and posterior 
leaflets, but without diagnostic evidence for prolapse. There was mild 
regurgitation. Aortic valve: The valve was trileaflet. Leaflets exhibited normal 
thickness and normal cuspal separation. Tricuspid valve: Pulmonary artery systolic pressure: 18 mmHg. Pulmonic valve: There was mild regurgitation. Imaging: 
I have personally reviewed the patients radiographs and have reviewed the reports: CXR Results  (Last 48 hours) None My read on CXR- 11/19/18- Bilateral interstitial infiltrates- new compared to last CXR 
 
 
CXR 8/26/2014: 
 
AICD. No change in lead placement. No visualized lead fracture. Lungs appear 
unremarkable. Normal size heart. Impression: No acute findings. Patient Active Problem List  
Diagnosis Code  Congestive heart failure (HCC) I50.9  Hypertension I10  
 MVP (mitral valve prolapse) I34.1  Nonischemic cardiomyopathy (HCC) I42.8  Cancer ([facility] [address] C80.1  Adenocarcinoma of breast; locally advanced invasive ductal adenocarcinoma of left breast C50.919  
 Poisoning by other and unspecified agents primarily affecting the cardiovascular system(972.9) T46.904A  Syncope and collapse R55  Other primary cardiomyopathies I42.8  Shortness of breath R06.02  
 Nonspecific abnormal electrocardiogram (ECG) (EKG) R94.31  
 Automatic implantable cardioverter-defibrillator in situ Z95.810  
 Mitral valve disease I05.9  Abnormal PFT R94.2  Acute bronchitis J20.9 IMPRESSION:  
· SOB on exertion with significant symptomatic decompensation. CXR concerning for ILD- chronic diastolic heart failure/ vs ? Carcinomatosis or chemo- radiation late pneumonitis · Clinical data suggests multifactorial etiology with progressive symptoms. over past 1 year: worsening reactive airways due to untreated rhinosinusitis, silent reflux and or a component of diastolic heart failure. · Abnormal PFT- combined restrictive and Obstructive component -Asthma with reduced DLCO ( corrects with Volume adjustment.) Suspect restrictive component from chest wall deformity. Had initial Improved symptoms with bronchodilators · H/o invasive ductal breast Ca · H/o non ischemic cardiomyopathy · AICD RECOMMENDATIONS:  
 
· Continue treating obstructive airways component- TRELEGY · Add nebulized bronchodilators responded in clinic after treatment. · Will obtain HRCT and decide on next steps · Will check PFT's · Continue singulair to address chronic rhinosinuitis component- Nasonex · GERD precautions · Preventive vaccinations- recieved · Monitor response to interventions and make appropriate adjustments · Healthy weight and active lifestyle · Follow up in 1 week after completion of HRCT and PFT Health maintenance screens deferred to Primary care provider.  
  
Vijaya Gutierrez MD

## 2018-11-19 NOTE — COMMUNICATION BODY
[facility] Pulmonary, Critical Care, and Sleep Medicine Pulmonary Office visit Name: Kendra Travis : 1950 Date: 2018 Subjective:  
Patient has been referred for evaluation of:  SOB and abnormal PFT's. Recently has had increased SOB and cough- persistent despite treatment. Saw Dr. Cole Aceves and Dr. Michelle Mendoza in October. 18 Has had continued c/o cough- choking with minimal expectoration. C/o markedly increased SOB with minimal activity. Antibiotics and Prednisone taper with minimal improvement. Started on trelegy last week by Dr. Michelle Mendoza. Feels weak, fatigued . Appetite OK. Denies much productive mucus. Denies fever, chills. Denies any chest pain. No nausea, vomiting. HPI: 
Patient is a 70 y.o. female has been experiencing SOB for past 1 year. SOB:  
Symptoms occur with exertion and at night. Able to walk about about 3-4  Blocks. Cannot climb stairs. Activities of daily living are affected and improves with pacing. Has orthopnea/ paroxysmal nocturnal dyspnea SOB relieved with pacing and resting. C/o sinus congestion. Denies any significant Cough: 
Has some wheezing, no chest pain, fever, chills, night sweats dyspepsia, reflux. Has had AICD placed and replaced. Left mastectomy with chemo- radiation : 5 years back Weight gain of 50 lbs over few years. Comorbid conditions include- DM, HTN, CHF- nonischemic cardiomyopathy, Breast ca- treated: s/p mastectomy -L and chemo radiation Non smoker Occupational exposure-none Past Medical History:  
Diagnosis Date  Abnormal nuclear cardiac imaging test 2010 Lg fixed anterior, septal, apical, inferoseptal defect sugg RCA & LAD disease or cardiomyopathy. EF 20%. Global hykn. Nondiagnostic EKG.  Acute bronchitis 10/15/2018 Persistent cough and wheezing in spite of prednisone and antibiotic. 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   
 Automatic implantable cardiac defibrillator in situ  Automatic implantable cardiac defibrillator in situ  Breast cancer ([facility] [address]  Cancer ([facility] [address] breast cancer left  Chemotherapy convalescence or palliative care 2012  Chronic combined systolic and diastolic heart failure ([facility] [address] Remains symptomatic, nyha class 3 ef improving.  Congestive heart failure, unspecified   
 chronic class ll  Echocardiogram 09/27/2010 EF 30%. Global hykn. Mild MR. Mild TR.  Hyperlipidemia  Hypertension  Mitral valve disorders(424.0) 1/15/2014  
 mild to moderate mr  Mitral valve disorders(424.0) 1/15/2014  
 mild to moderate mr  MVP (mitral valve prolapse)  Nonischemic cardiomyopathy ([facility] [address] EF 20-30% despite medical therapy  Nonspecific abnormal electrocardiogram (ECG) (EKG)  Osteopenia  Other primary cardiomyopathies  Pacemaker 10/27/10  
 s/p implantation, without complication  Poisoning by other and unspecified agents primarily affecting the cardiovascular system(972.9) Ef slightly better to 45%, STABLE back on chemo.  Radiation therapy  Radiation therapy complication 0154  S/P cardiac catheterization 07/08/10 Patent coronary arteries. LVEDP 25.  EF 25%. Global hykn. Moderate MR.  RA 10.  RV 40/10. PA 40/20.  20.  CO/CI 4.5/2.45 (Larry).  Shortness of breath  Syncope and collapse  Thyroid disease   
 goiter Past Surgical History:  
Procedure Laterality Date  CARDIAC SURG PROCEDURE UNLIST Difibrillator; BI V ICD  HX MASTECTOMY  09/28/11  
 modified radical mastectomy and axillary dissection  HX ORTHOPAEDIC    
 HX OTHER SURGICAL    
 surgery to remove part of liver  HX PACEMAKER  10/27/10  
 s/p Medtronic biventricular AICD, without complication  HX RADICAL MASTECTOMY  NEUROLOGICAL PROCEDURE UNLISTED    
 Cervical fusion Allergies Allergen Reactions  Adhesive Other (comments)  
  blisters Mike Resendez Current Outpatient Medications Medication Sig Dispense Refill  fluticasone/umeclidin/vilanter (TRELEGY ELLIPTA IN) Take  by inhalation.  mometasone (NASONEX) 50 mcg/actuation nasal spray USE 2 SPRAYS IN EACH       NOSTRIL DAILY 1 Container 3  
 lisinopril (PRINIVIL, ZESTRIL) 10 mg tablet TAKE 1 TABLET DAILY 90 Tab 2  carvedilol (COREG) 25 mg tablet TAKE 1 TABLET TWICE A DAY  WITH MEALS 6 Tab 1  
 montelukast (SINGULAIR) 10 mg tablet Take 1 Tab by mouth daily. 90 Tab 3  cholecalciferol, vitamin D3, 2,000 unit tab Take  by mouth.  omeprazole (PRILOSEC) 40 mg capsule Take 40 mg by mouth daily.  multivitamin (HAIR,SKIN & NAILS) tablet Take 1 Tab by mouth daily.  raloxifene (EVISTA) 60 mg tablet Take  by mouth daily.  calcium-cholecalciferol, d3, (CALCIUM 600 + D) 600-125 mg-unit tab Take  by mouth.  tiotropium bromide (SPIRIVA RESPIMAT) 1.25 mcg/actuation inhaler Take 2 Puffs by inhalation daily. 3 Inhaler 3  
 mometasone-formoterol (DULERA) 100-5 mcg/actuation HFA inhaler Take 2 Puffs by inhalation two (2) times a day. 3 Inhaler 3 Review of Systems: 
HEENT: No epistaxis, no nasal drainage, no difficulty in swallowing, no redness in eyes Respiratory: as above Cardiovascular: no chest pain, no palpitations, no chronic leg edema, no syncope Gastrointestinal: no abd pain, no vomiting, no diarrhea, no bleeding symptoms Genitourinary: No urinary symptoms or hematuria Integument/breast: No ulcers or rashes Musculoskeletal:Neg 
Neurological: No focal weakness, no seizures, no headaches Behvioral/Psych: No anxiety, no depression Constitutional: No fever, no chills, no weight loss, no night sweats Objective:  
 
Visit Vitals /76 (BP 1 Location: Right arm, BP Patient Position: Sitting) Pulse 86 Temp 97.6 °F (36.4 °C) (Oral) Resp 20 Ht 5' 5\" (1.651 m) Wt 78.9 kg (174 lb) SpO2 92% BMI 28.96 kg/m² Physical Exam:  
General: comfortable, no acute distress HEENT: pupils reactive, sclera anicteric, EOM intact Neck: No adenopathy or thyroid swelling, no lymphadenopathy or JVD, supple Chest: multiple scars from previous surgery, surgically absent left breast 
CVS: S1S2 
RS: Mod AE bilaterally, no tactile fremitus or egophony, no accessory muscle use, faint crackles Abd: soft, non tender, no hepatosplenomegaly Neuro: non focal, awake, alert Extrm: no leg edema, clubbing or cyanosis Skin: no rash Data review: 
Pertinent labs: CBC, BMP, LFT's PFT: 
 
Date FVC FEV1  FEV1/FVC ZDL14-04 TLC RV RV/TLC VC DLCO  
6/29/2017 75 69 67 43 77 81 nl  62 FLOWS: 
Maximal Mid Expiratory Flow rate is reduced to 43 % predicted Forced Expiratory Volume in one second is reduced to 69 % predicted FEV 1% is reduced Volumes: All Volumes are reduced except for RV Flow Volume Loop: 
Nonspecific obstructive pattern in Flow Volume Loop Bronchodilator: No significant improvement with bronchodilator therapy Diffusion: Abnormal Diffusion Capacity reduced to 62 % predicted DLCO normalizes to alveolar ventilation Impression: Moderate obstructive defect, Predominately small airways, Mild restrictive ventilatory defect, Reduced diffusion capacity indicating a decrease in alveolar surface area for gas exchange 6 min walk test;walked 330 feet with no O2 desaturation or significant heart rate change. DI- stable Echo: 1/18/2017: 
Left ventricle: Systolic function was normal. Ejection fraction was 
estimated to be 50 %. There were no regional wall motion abnormalities. Doppler parameters were consistent with abnormal left ventricular 
relaxation (grade 1 diastolic dysfunction). Right ventricle: The size was normal. Systolic function was reduced.  
Left atrium: Size was normal. 
Right atrium: Size was normal. 
 Mitral valve: There was systolic bowing of the anterior and posterior 
leaflets, but without diagnostic evidence for prolapse. There was mild 
regurgitation. Aortic valve: The valve was trileaflet. Leaflets exhibited normal 
thickness and normal cuspal separation. Tricuspid valve: Pulmonary artery systolic pressure: 18 mmHg. Pulmonic valve: There was mild regurgitation. Imaging: 
I have personally reviewed the patients radiographs and have reviewed the reports: CXR Results  (Last 48 hours) None My read on CXR- 11/19/18- Bilateral interstitial infiltrates- new compared to last CXR 
 
 
CXR 8/26/2014: 
 
AICD. No change in lead placement. No visualized lead fracture. Lungs appear 
unremarkable. Normal size heart. Impression: No acute findings. Patient Active Problem List  
Diagnosis Code  Congestive heart failure (HCC) I50.9  Hypertension I10  
 MVP (mitral valve prolapse) I34.1  Nonischemic cardiomyopathy (HCC) I42.8  Cancer ([facility] [address] C80.1  Adenocarcinoma of breast; locally advanced invasive ductal adenocarcinoma of left breast C50.919  
 Poisoning by other and unspecified agents primarily affecting the cardiovascular system(972.9) T46.904A  Syncope and collapse R55  Other primary cardiomyopathies I42.8  Shortness of breath R06.02  
 Nonspecific abnormal electrocardiogram (ECG) (EKG) R94.31  
 Automatic implantable cardioverter-defibrillator in situ Z95.810  
 Mitral valve disease I05.9  Abnormal PFT R94.2  Acute bronchitis J20.9 IMPRESSION:  
· SOB on exertion with significant symptomatic decompensation. CXR concerning for ILD- chronic diastolic heart failure/ vs ? Carcinomatosis or chemo- radiation late pneumonitis · Clinical data suggests multifactorial etiology with progressive symptoms. over past 1 year: worsening reactive airways due to untreated rhinosinusitis, silent reflux and or a component of diastolic heart failure. · Abnormal PFT- combined restrictive and Obstructive component with reduced DLCO ( corrects with Volume adjustment.) Suspect restrictive component from chest wall deformity. Had initial Improved symptoms with bronchodilators · H/o invasive ductal breast Ca · H/o non ischemic cardiomyopathy · AICD RECOMMENDATIONS:  
 
· Continue treating obstructive airways component- TRELEGY · Add nebulized bronchodilators responded in clinic after treatment. · Will obtain HRCT and decide on next steps · Will check PFT's · Continue singulair to address chronic rhinosinuitis component- Nasonex · GERD precautions · Preventive vaccinations- recieved · Monitor response to interventions and make appropriate adjustments · Healthy weight and active lifestyle · Follow up in 1 week after completion of HRCT and PFT Health maintenance screens deferred to Primary care provider.  
  
Yandel Mckeon MD

## 2018-11-19 NOTE — PROGRESS NOTES
Chief Complaint Patient presents with  Asthma  Cough  Shortness of Breath 1. Have you been to the ER, urgent care clinic since your last visit? Hospitalized since your last visit? No 
 
2. Have you seen or consulted any other health care providers outside of the [facility] since your last visit? Include any pap smears or colon screening.  Yes Where: Dr Michelle Mendoza PCP

## 2018-11-20 ENCOUNTER — TELEPHONE (OUTPATIENT)
Dept: PULMONOLOGY | Age: 68
End: 2018-11-20

## 2018-11-20 NOTE — TELEPHONE ENCOUNTER
[facility] received the nebulizer order. With Medicare guidelines the note has to have the diagnosis of asthma in the note somewhere. With review that is not listed.

## 2018-11-23 ENCOUNTER — CLINICAL SUPPORT (OUTPATIENT)
Dept: PULMONOLOGY | Age: 68
End: 2018-11-23

## 2018-11-23 VITALS — WEIGHT: 172 LBS | HEIGHT: 65 IN | BODY MASS INDEX: 28.66 KG/M2

## 2018-11-23 DIAGNOSIS — J40 BRONCHITIS: Primary | ICD-10-CM

## 2018-11-24 ENCOUNTER — HOSPITAL ENCOUNTER (OUTPATIENT)
Dept: CT IMAGING | Age: 68
Discharge: HOME | End: 2018-11-24
Attending: INTERNAL MEDICINE
Payer: MEDICARE

## 2018-11-24 DIAGNOSIS — R05.9 COUGH: ICD-10-CM

## 2018-11-24 DIAGNOSIS — J84.9 ILD (INTERSTITIAL LUNG DISEASE) (HCC): ICD-10-CM

## 2018-11-24 PROCEDURE — 71250 CT THORAX DX C-: CPT

## 2018-11-27 ENCOUNTER — OFFICE VISIT (OUTPATIENT)
Dept: PULMONOLOGY | Age: 68
End: 2018-11-27

## 2018-11-27 VITALS
OXYGEN SATURATION: 91 % | DIASTOLIC BLOOD PRESSURE: 62 MMHG | TEMPERATURE: 97.3 F | RESPIRATION RATE: 16 BRPM | WEIGHT: 172 LBS | SYSTOLIC BLOOD PRESSURE: 104 MMHG | HEIGHT: 65 IN | HEART RATE: 94 BPM | BODY MASS INDEX: 28.66 KG/M2

## 2018-11-27 DIAGNOSIS — I42.8 NONISCHEMIC CARDIOMYOPATHY (HCC): ICD-10-CM

## 2018-11-27 DIAGNOSIS — J45.40 MODERATE PERSISTENT ASTHMA WITHOUT COMPLICATION: ICD-10-CM

## 2018-11-27 DIAGNOSIS — C78.00 MALIGNANT NEOPLASM METASTATIC TO LUNG, UNSPECIFIED LATERALITY (HCC): Primary | ICD-10-CM

## 2018-11-27 DIAGNOSIS — C50.912 ADENOCARCINOMA OF LEFT BREAST (HCC): ICD-10-CM

## 2018-11-27 RX ORDER — CARVEDILOL 25 MG/1
TABLET ORAL
Qty: 180 TAB | Refills: 3 | Status: SHIPPED | OUTPATIENT
Start: 2018-11-27 | End: 2019-01-21

## 2018-11-27 NOTE — COMMUNICATION BODY
[facility] Pulmonary, Critical Care, and Sleep Medicine Pulmonary Office visit Name: Stephen Jones : 1950 Date: 2018 Subjective:  
Patient has been referred for evaluation of:  SOB and abnormal PFT's. Recently has had increased SOB and cough- persistent despite treatment. Saw Dr. Adam Mckeon and Dr. Alejandro Fox in October. 18 Here for follow up to discuss results of recent testing- Chest CT, PFT's 
Has had continued c/o cough- choking with minimal expectoration. C/o markedly increased SOB with minimal activity. Antibiotics and Prednisone taper with minimal improvement. Started on trelegy l by Dr. Alejandro Fox. Feels weak, fatigued . Appetite OK. Denies much productive mucus. Denies fever, chills. Denies any chest pain. No nausea, vomiting. HPI: 
Patient is a 70 y.o. female has been experiencing SOB for past 1 year. SOB:  
Symptoms occur with exertion and at night. Able to walk about about 3-4  Blocks. Cannot climb stairs. Activities of daily living are affected and improves with pacing. Has orthopnea/ paroxysmal nocturnal dyspnea SOB relieved with pacing and resting. C/o sinus congestion. Denies any significant Cough: 
Has some wheezing, no chest pain, fever, chills, night sweats dyspepsia, reflux. Has had AICD placed and replaced. Left mastectomy with chemo- radiation : 5 years back Weight gain of 50 lbs over few years. Comorbid conditions include- DM, HTN, CHF- nonischemic cardiomyopathy, Breast ca- treated: s/p mastectomy -L and chemo radiation Non smoker Occupational exposure-none Past Medical History:  
Diagnosis Date  Abnormal nuclear cardiac imaging test 2010 Lg fixed anterior, septal, apical, inferoseptal defect sugg RCA & LAD disease or cardiomyopathy. EF 20%. Global hykn. Nondiagnostic EKG.  Acute bronchitis 10/15/2018 Persistent cough and wheezing in spite of prednisone and antibiotic. 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   
 Automatic implantable cardiac defibrillator in situ  Automatic implantable cardiac defibrillator in situ  Breast cancer ([facility] [address]  Cancer ([facility] [address] breast cancer left  Chemotherapy convalescence or palliative care 2012  Chronic combined systolic and diastolic heart failure ([facility] [address] Remains symptomatic, nyha class 3 ef improving.  Congestive heart failure, unspecified   
 chronic class ll  Echocardiogram 09/27/2010 EF 30%. Global hykn. Mild MR. Mild TR.  Hyperlipidemia  Hypertension  Mitral valve disorders(424.0) 1/15/2014  
 mild to moderate mr  Mitral valve disorders(424.0) 1/15/2014  
 mild to moderate mr  MVP (mitral valve prolapse)  Nonischemic cardiomyopathy ([facility] [address] EF 20-30% despite medical therapy  Nonspecific abnormal electrocardiogram (ECG) (EKG)  Osteopenia  Other primary cardiomyopathies  Pacemaker 10/27/10  
 s/p implantation, without complication  Poisoning by other and unspecified agents primarily affecting the cardiovascular system(972.9) Ef slightly better to 45%, STABLE back on chemo.  Radiation therapy  Radiation therapy complication 1057  S/P cardiac catheterization 07/08/10 Patent coronary arteries. LVEDP 25.  EF 25%. Global hykn. Moderate MR.  RA 10.  RV 40/10. PA 40/20.  20.  CO/CI 4.5/2.45 (Larry).  Shortness of breath  Syncope and collapse  Thyroid disease   
 goiter Past Surgical History:  
Procedure Laterality Date  CARDIAC SURG PROCEDURE UNLIST Difibrillator; BI V ICD  HX MASTECTOMY  09/28/11  
 modified radical mastectomy and axillary dissection  HX ORTHOPAEDIC    
 HX OTHER SURGICAL    
 surgery to remove part of liver  HX PACEMAKER  10/27/10  
 s/p [employer]tronic biventricular AICD, without complication  HX RADICAL MASTECTOMY  NEUROLOGICAL PROCEDURE UNLISTED Cervical fusion Allergies Allergen Reactions  Adhesive Other (comments)  
  blisters Génesis Oneill Current Outpatient Medications Medication Sig Dispense Refill  carvedilol (COREG) 25 mg tablet TAKE 1 TABLET TWICE A DAY  WITH MEALS 180 Tab 3  
 fluticasone/umeclidin/vilanter (TRELEGY ELLIPTA IN) Take  by inhalation.  mometasone (NASONEX) 50 mcg/actuation nasal spray USE 2 SPRAYS IN EACH       NOSTRIL DAILY 1 Container 3  
 lisinopril (PRINIVIL, ZESTRIL) 10 mg tablet TAKE 1 TABLET DAILY 90 Tab 2  
 montelukast (SINGULAIR) 10 mg tablet Take 1 Tab by mouth daily. 90 Tab 3  cholecalciferol, vitamin D3, 2,000 unit tab Take  by mouth.  omeprazole (PRILOSEC) 40 mg capsule Take 40 mg by mouth daily.  multivitamin (HAIR,SKIN & NAILS) tablet Take 1 Tab by mouth daily.  raloxifene (EVISTA) 60 mg tablet Take  by mouth daily.  albuterol-ipratropium (DUO-NEB) 2.5 mg-0.5 mg/3 ml nebu 3 mL by Nebulization route every six (6) hours as needed. 120 Nebule 3  
 calcium-cholecalciferol, d3, (CALCIUM 600 + D) 600-125 mg-unit tab Take  by mouth.  mometasone-formoterol (DULERA) 100-5 mcg/actuation HFA inhaler Take 2 Puffs by inhalation two (2) times a day. 3 Inhaler 3 Review of Systems: 
HEENT: No epistaxis, no nasal drainage, no difficulty in swallowing, no redness in eyes Respiratory: as above Cardiovascular: no chest pain, no palpitations, no chronic leg edema, no syncope Gastrointestinal: no abd pain, no vomiting, no diarrhea, no bleeding symptoms Genitourinary: No urinary symptoms or hematuria Integument/breast: No ulcers or rashes Musculoskeletal:Neg 
Neurological: No focal weakness, no seizures, no headaches Behvioral/Psych: No anxiety, no depression Constitutional: No fever, no chills, no weight loss, no night sweats Objective:  
 
Visit Vitals /62 (BP 1 Location: Right arm, BP Patient Position: Sitting) Pulse 94 Temp 97.3 °F (36.3 °C) (Oral) Resp 16 Ht 5' 5\" (1.651 m) Wt 78 kg (172 lb) SpO2 91% BMI 28.62 kg/m² Physical Exam:  
General: comfortable, no acute distress HEENT: pupils reactive, sclera anicteric, EOM intact Neck: No adenopathy or thyroid swelling, no lymphadenopathy or JVD, supple Chest: multiple scars from previous surgery, surgically absent left breast 
CVS: S1S2 
RS: Mod AE bilaterally, no tactile fremitus or egophony, no accessory muscle use, faint crackles Abd: soft, non tender, no hepatosplenomegaly Neuro: non focal, awake, alert Extrm: no leg edema, clubbing or cyanosis Skin: no rash Data review: 
Pertinent labs: CBC, BMP, LFT's PFT: 
 
Date FVC FEV1  FEV1/FVC HTG90-53 TLC RV RV/TLC VC DLCO  
6/29/2017 75 69 67 43 77 81 nl  62  
11/2018  46  35     49 FLOWS: 
Maximal Mid Expiratory Flow rate is reduced to 43 % predicted Forced Expiratory Volume in one second is reduced to 69 % predicted FEV 1% is reduced Volumes: All Volumes are reduced except for RV Flow Volume Loop: 
Nonspecific obstructive pattern in Flow Volume Loop Bronchodilator: No significant improvement with bronchodilator therapy Diffusion: Abnormal Diffusion Capacity reduced to 62 % predicted DLCO normalizes to alveolar ventilation Impression: Moderate obstructive defect, Predominately small airways, Mild restrictive ventilatory defect, Reduced diffusion capacity indicating a decrease in alveolar surface area for gas exchange 6 min walk test;walked 330 feet with no O2 desaturation or significant heart rate change. DI- stable Echo: 1/18/2017: 
Left ventricle: Systolic function was normal. Ejection fraction was 
estimated to be 50 %. There were no regional wall motion abnormalities. Doppler parameters were consistent with abnormal left ventricular 
relaxation (grade 1 diastolic dysfunction). Right ventricle: The size was normal. Systolic function was reduced. Left atrium: Size was normal. 
Right atrium: Size was normal. 
Mitral valve: There was systolic bowing of the anterior and posterior 
leaflets, but without diagnostic evidence for prolapse. There was mild 
regurgitation. Aortic valve: The valve was trileaflet. Leaflets exhibited normal 
thickness and normal cuspal separation. Tricuspid valve: Pulmonary artery systolic pressure: 18 mmHg. Pulmonic valve: There was mild regurgitation. Imaging: 
I have personally reviewed the patients radiographs and have reviewed the reports: 
CT Results (most recent): 
Results from Hospital Encounter encounter on 11/24/18 CT CHEST WO CONT Narrative EX AM:  CT CHEST WO CONT INDICATION:   Interstitial lung disease. Cough. History of left breast cancer. COMPARISON: 5/21/2013 as well as 11/19/2018 exam's. CONTRAST:  None. TECHNIQUE: 
Multislice helical CT was performed from the thoracic inlet to the diaphragm 
without intravenous contrast administration. High resolution CT the chest was 
performed using 1.25 mm images on a high resolution algorithm from the thoracic 
inlet to the diaphragm in the supine position during inspiration. Selected 
expiratory images and prone inspiratory images of the lung bases were also 
acquired. Coronal and sagittal reformations were generated. FINDINGS: Limited evaluation of the thorax due to limited field-of-view as well 
as lack of intravenous contrast. 
 
Innumerable pulmonary nodules with associated patchy nodular opacities. Largest infiltrate right lung base medial and posterior segment. No pneumothorax or pleural effusions. Trachea bronchial tree is unremarkable. Heart is markedly enlarged without pericardial effusions. Left modified radical mastectomy. Lymph nodes in the AP window and middle compartment of the superior mediastinum. Largest series 3 image 22 measuring approximately 1.9 cm in its greatest 
dimension. Thyroid nodules. Limited evaluation. No pulmonary fibrosis is appreciated. Minimal air-trapping is seen. Impression IMPRESSION:  
1. Innumerable pulmonary nodules with associated nodular bilateral opacities as 
well as infiltrate right lung base without pneumothorax or pleural effusions. Metastatic disease with is suggested. Pneumonic infiltrate right lung base. 2. Minimal air trapping. Interstitial lung disease. Findings were discussed with Dr. Severo Daring. by me over the phone at the time and 
date of the dictation with read back. XR Results (most recent): 
Results from Appointment encounter on 11/19/18 XR CHEST PA LAT Narrative Chest PA and lateral 
 
INDICATION: Cough COMPARISON: 8/14 FINDINGS: 
 
Two views of the chest were obtained. Cardiac silhouette is mildly prominent in 
size. AICD type pacemaker is unchanged. Right lung base streaky opacity may 
represent atelectasis or developing infiltrate. Trace effusions may be 
considered. Osseous structures are stable. Impression IMPRESSION: 
 
Right lung base streaky opacity may represent atelectasis or developing 
infiltrate. CXR 8/26/2014: 
 
AICD. No change in lead placement. No visualized lead fracture. Lungs appear 
unremarkable. Normal size heart. Impression: No acute findings. Patient Active Problem List  
Diagnosis Code  Congestive heart failure (HCC) I50.9  Hypertension I10  
 MVP (mitral valve prolapse) I34.1  Nonischemic cardiomyopathy (HCC) I42.8  Cancer ([facility] [address] C80.1  Adenocarcinoma of breast; locally advanced invasive ductal adenocarcinoma of left breast C50.919  
 Poisoning by other and unspecified agents primarily affecting the cardiovascular system(972.9) T46.904A  Syncope and collapse R55  Other primary cardiomyopathies I42.8  Shortness of breath R06.02  
 Nonspecific abnormal electrocardiogram (ECG) (EKG) R94.31  
  Automatic implantable cardioverter-defibrillator in situ Z95.810  
 Mitral valve disease I05.9  Abnormal PFT R94.2  Acute bronchitis J20.9 IMPRESSION:  
· SOB on exertion with significant symptomatic decompensation. CXR concerning for ILD- chronic diastolic heart failure/ vs ? Carcinomatosis. Ct scan confirms multiple mets to both lungs. · Clinical data suggests multifactorial etiology with progressive symptoms. over past 1 year: worsening reactive airways due to untreated rhinosinusitis, silent reflux and or a component of diastolic heart failure. · Abnormal PFT- combined restrictive and Obstructive component-progression noted -Asthma with reduced DLCO ( corrects with Volume adjustment.) Suspect restrictive component from chest wall deformity. Had initial Improved symptoms with bronchodilators but now worsening could be endobronchial mets · H/o invasive ductal breast Ca · H/o non ischemic cardiomyopathy · AICD RECOMMENDATIONS:  
 
· Discussed PFT and Chest CT results in details and recommended Oncology input to  Facilitate further treatment. Discussed with Dr. Abdiel Barroso. She will see patient soon. Likely no need for further tissue · Continue treating obstructive airways component- TRELEGY · Continue Add nebulized bronchodilators responded in clinic after treatment. · Continue singulair to address chronic rhinosinuitis component- Nasonex · GERD precautions · Preventive vaccinations- recieved · Monitor response to interventions and make appropriate adjustments · Healthy weight and active lifestyle · Follow up in 2 months Health maintenance screens deferred to Primary care provider.  
  
Federica Larsen MD

## 2018-11-27 NOTE — PROGRESS NOTES
Chief Complaint Patient presents with  Asthma  
  follow up from 11/19/2018; CXR 11/19/2018, CT 11/24/2018, PFT 11/23/2018  Cough  Other ILD, Abnormal PFT 1. Have you been to the ER, urgent care clinic since your last visit? Hospitalized since your last visit? No 
 
2. Have you seen or consulted any other health care providers outside of the [address] [place] [address] since your last visit? Include any pap smears or colon screening.  Yes Where: Dr. Prem Sen, PCP

## 2018-11-27 NOTE — LETTER
2018 1:32 PM 
 
Patient:  Virginie Mayo YOB: 1950 Date of Visit: 2018 Dear Joanna Espinosa MD 
[address] [place] [address] [address] [address] VIA Facsimile: [phone] 
 : Thank you for referring Ms. Edward Garcia to me for evaluation/treatment. Below are the relevant portions of my assessment and plan of care. [facility] Pulmonary, Critical Care, and Sleep Medicine Pulmonary Office visit Name: Virginie Mayo : 1950 Date: 2018 Subjective:  
Patient has been referred for evaluation of:  SOB and abnormal PFT's. Recently has had increased SOB and cough- persistent despite treatment. Saw Dr. Brent Mullins and Dr. Karyn Rivas in October. 18 Here for follow up to discuss results of recent testing- Chest CT, PFT's 
Has had continued c/o cough- choking with minimal expectoration. C/o markedly increased SOB with minimal activity. Antibiotics and Prednisone taper with minimal improvement. Started on trelegy l by Dr. Karyn Rivas. Feels weak, fatigued . Appetite OK. Denies much productive mucus. Denies fever, chills. Denies any chest pain. No nausea, vomiting. HPI: 
Patient is a 70 y.o. female has been experiencing SOB for past 1 year. SOB:  
Symptoms occur with exertion and at night. Able to walk about about 3-4  Blocks. Cannot climb stairs. Activities of daily living are affected and improves with pacing. Has orthopnea/ paroxysmal nocturnal dyspnea SOB relieved with pacing and resting. C/o sinus congestion. Denies any significant Cough: 
Has some wheezing, no chest pain, fever, chills, night sweats dyspepsia, reflux. Has had AICD placed and replaced. Left mastectomy with chemo- radiation : 5 years back Weight gain of 50 lbs over few years. Comorbid conditions include- DM, HTN, CHF- nonischemic cardiomyopathy, Breast ca- treated: s/p mastectomy -L and chemo radiation Non smoker Occupational exposure-none Past Medical History:  
Diagnosis Date  Abnormal nuclear cardiac imaging test 06/11/2010 Lg fixed anterior, septal, apical, inferoseptal defect sugg RCA & LAD disease or cardiomyopathy. EF 20%. Global hykn. Nondiagnostic EKG.  Acute bronchitis 10/15/2018 Persistent cough and wheezing in spite of prednisone and antibiotic. 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   
 Automatic implantable cardiac defibrillator in situ  Automatic implantable cardiac defibrillator in situ  Breast cancer ([facility] [address]  Cancer ([address] breast cancer left  Chemotherapy convalescence or palliative care 2012  Chronic combined systolic and diastolic heart failure ([address] Remains symptomatic, nyha class 3 ef improving.  Congestive heart failure, unspecified   
 chronic class ll  Echocardiogram 09/27/2010 EF 30%. Global hykn. Mild MR. Mild TR.  Hyperlipidemia  Hypertension  Mitral valve disorders(424.0) 1/15/2014  
 mild to moderate mr  Mitral valve disorders(424.0) 1/15/2014  
 mild to moderate mr  MVP (mitral valve prolapse)  Nonischemic cardiomyopathy ([address] EF 20-30% despite medical therapy  Nonspecific abnormal electrocardiogram (ECG) (EKG)  Osteopenia  Other primary cardiomyopathies  Pacemaker 10/27/10  
 s/p implantation, without complication  Poisoning by other and unspecified agents primarily affecting the cardiovascular system(972.9) Ef slightly better to 45%, STABLE back on chemo.  Radiation therapy  Radiation therapy complication 9390  S/P cardiac catheterization 07/08/10 Patent coronary arteries. LVEDP 25.  EF 25%. Global hykn. Moderate MR.  RA 10.  RV 40/10. PA 40/20.  20.  CO/CI 4.5/2.45 (Larry).  Shortness of breath  Syncope and collapse  Thyroid disease   
 goiter Past Surgical History: Procedure Laterality Date  CARDIAC SURG PROCEDURE UNLIST Difibrillator; BI V ICD  HX MASTECTOMY  09/28/11  
 modified radical mastectomy and axillary dissection  HX ORTHOPAEDIC    
 HX OTHER SURGICAL    
 surgery to remove part of liver  HX PACEMAKER  10/27/10  
 s/p [employer] biventricular AICD, without complication  HX RADICAL MASTECTOMY  NEUROLOGICAL PROCEDURE UNLISTED Cervical fusion Allergies Allergen Reactions  Adhesive Other (comments)  
  blisters Aysha Palumbo Current Outpatient Medications Medication Sig Dispense Refill  carvedilol (COREG) 25 mg tablet TAKE 1 TABLET TWICE A DAY  WITH MEALS 180 Tab 3  
 fluticasone/umeclidin/vilanter (TRELEGY ELLIPTA IN) Take  by inhalation.  mometasone (NASONEX) 50 mcg/actuation nasal spray USE 2 SPRAYS IN EACH       NOSTRIL DAILY 1 Container 3  
 lisinopril (PRINIVIL, ZESTRIL) 10 mg tablet TAKE 1 TABLET DAILY 90 Tab 2  
 montelukast (SINGULAIR) 10 mg tablet Take 1 Tab by mouth daily. 90 Tab 3  cholecalciferol, vitamin D3, 2,000 unit tab Take  by mouth.  omeprazole (PRILOSEC) 40 mg capsule Take 40 mg by mouth daily.  multivitamin (HAIR,SKIN & NAILS) tablet Take 1 Tab by mouth daily.  raloxifene (EVISTA) 60 mg tablet Take  by mouth daily.  albuterol-ipratropium (DUO-NEB) 2.5 mg-0.5 mg/3 ml nebu 3 mL by Nebulization route every six (6) hours as needed. 120 Nebule 3  
 calcium-cholecalciferol, d3, (CALCIUM 600 + D) 600-125 mg-unit tab Take  by mouth.  mometasone-formoterol (DULERA) 100-5 mcg/actuation HFA inhaler Take 2 Puffs by inhalation two (2) times a day. 3 Inhaler 3 Review of Systems: 
HEENT: No epistaxis, no nasal drainage, no difficulty in swallowing, no redness in eyes Respiratory: as above Cardiovascular: no chest pain, no palpitations, no chronic leg edema, no syncope Gastrointestinal: no abd pain, no vomiting, no diarrhea, no bleeding symptoms Genitourinary: No urinary symptoms or hematuria Integument/breast: No ulcers or rashes Musculoskeletal:Neg 
Neurological: No focal weakness, no seizures, no headaches Behvioral/Psych: No anxiety, no depression Constitutional: No fever, no chills, no weight loss, no night sweats Objective: 
 
Visit Vitals /62 (BP 1 Location: Right arm, BP Patient Position: Sitting) Pulse 94 Temp 97.3 °F (36.3 °C) (Oral) Resp 16 Ht 5' 5\" (1.651 m) Wt 78 kg (172 lb) SpO2 91% BMI 28.62 kg/m² Physical Exam:  
General: comfortable, no acute distress HEENT: pupils reactive, sclera anicteric, EOM intact Neck: No adenopathy or thyroid swelling, no lymphadenopathy or JVD, supple Chest: multiple scars from previous surgery, surgically absent left breast 
CVS: S1S2 
RS: Mod AE bilaterally, no tactile fremitus or egophony, no accessory muscle use, faint crackles Abd: soft, non tender, no hepatosplenomegaly Neuro: non focal, awake, alert Extrm: no leg edema, clubbing or cyanosis Skin: no rash Data review: 
Pertinent labs: CBC, BMP, LFT's PFT: 
 
Date FVC FEV1  FEV1/FVC [id number] TLC RV RV/TLC VC DLCO  
6/29/2017 75 69 67 43 77 81 nl  62  
11/2018  46  35     49 FLOWS: 
Maximal Mid Expiratory Flow rate is reduced to 43 % predicted Forced Expiratory Volume in one second is reduced to 69 % predicted FEV 1% is reduced Volumes: All Volumes are reduced except for RV Flow Volume Loop: 
Nonspecific obstructive pattern in Flow Volume Loop Bronchodilator: No significant improvement with bronchodilator therapy Diffusion: Abnormal Diffusion Capacity reduced to 62 % predicted DLCO normalizes to alveolar ventilation Impression: Moderate obstructive defect, Predominately small airways, Mild restrictive ventilatory defect, Reduced diffusion capacity indicating a decrease in alveolar surface area for gas exchange 6 min walk test;walked 330 feet with no O2 desaturation or significant heart rate change. DI- stable Echo: 1/18/2017: 
Left ventricle: Systolic function was normal. Ejection fraction was 
estimated to be 50 %. There were no regional wall motion abnormalities. Doppler parameters were consistent with abnormal left ventricular 
relaxation (grade 1 diastolic dysfunction). Right ventricle: The size was normal. Systolic function was reduced. Left atrium: Size was normal. 
Right atrium: Size was normal. 
Mitral valve: There was systolic bowing of the anterior and posterior 
leaflets, but without diagnostic evidence for prolapse. There was mild 
regurgitation. Aortic valve: The valve was trileaflet. Leaflets exhibited normal 
thickness and normal cuspal separation. Tricuspid valve: Pulmonary artery systolic pressure: 18 mmHg. Pulmonic valve: There was mild regurgitation. Imaging: 
I have personally reviewed the patients radiographs and have reviewed the reports: 
CT Results (most recent): 
Results from Hospital Encounter encounter on 11/24/18 CT CHEST WO CONT Narrative EX AM:  CT CHEST WO CONT INDICATION:   Interstitial lung disease. Cough. History of left breast cancer. COMPARISON: 5/21/2013 as well as 11/19/2018 exam's. CONTRAST:  None. TECHNIQUE: 
Multislice helical CT was performed from the thoracic inlet to the diaphragm 
without intravenous contrast administration. High resolution CT the chest was 
performed using 1.25 mm images on a high resolution algorithm from the thoracic 
inlet to the diaphragm in the supine position during inspiration. Selected 
expiratory images and prone inspiratory images of the lung bases were also 
acquired. Coronal and sagittal reformations were generated. FINDINGS: Limited evaluation of the thorax due to limited field-of-view as well 
as lack of intravenous contrast. 
 
Innumerable pulmonary nodules with associated patchy nodular opacities. Largest infiltrate right lung base medial and posterior segment. No pneumothorax or pleural effusions. Trachea bronchial tree is unremarkable. Heart is markedly enlarged without pericardial effusions. Left modified radical mastectomy. Lymph nodes in the AP window and middle compartment of the superior mediastinum. Largest series 3 image 22 measuring approximately 1.9 cm in its greatest 
dimension. Thyroid nodules. Limited evaluation. No pulmonary fibrosis is appreciated. Minimal air-trapping is seen. Impression IMPRESSION:  
1. Innumerable pulmonary nodules with associated nodular bilateral opacities as 
well as infiltrate right lung base without pneumothorax or pleural effusions. Metastatic disease with is suggested. Pneumonic infiltrate right lung base. 2. Minimal air trapping. Interstitial lung disease. Findings were discussed with Dr. Maryann Velasquez. by me over the phone at the time and 
date of the dictation with read back. XR Results (most recent): 
Results from Appointment encounter on 11/19/18 XR CHEST PA LAT Narrative Chest PA and lateral 
 
INDICATION: Cough COMPARISON: 8/14 FINDINGS: 
 
Two views of the chest were obtained. Cardiac silhouette is mildly prominent in 
size. AICD type pacemaker is unchanged. Right lung base streaky opacity may 
represent atelectasis or developing infiltrate. Trace effusions may be 
considered. Osseous structures are stable. Impression IMPRESSION: 
 
Right lung base streaky opacity may represent atelectasis or developing 
infiltrate. CXR 8/26/2014: 
 
AICD. No change in lead placement. No visualized lead fracture. Lungs appear 
unremarkable. Normal size heart. Impression: No acute findings. Patient Active Problem List  
Diagnosis Code  Congestive heart failure (HCC) I50.9  Hypertension I10  
 MVP (mitral valve prolapse) I34.1  Nonischemic cardiomyopathy (HCC) I42.8  Cancer ([facility] [address] C80.1  Adenocarcinoma of breast; locally advanced invasive ductal adenocarcinoma of left breast C50.919  
 Poisoning by other and unspecified agents primarily affecting the cardiovascular system(972.9) T46.904A  Syncope and collapse R55  Other primary cardiomyopathies I42.8  Shortness of breath R06.02  
 Nonspecific abnormal electrocardiogram (ECG) (EKG) R94.31  
 Automatic implantable cardioverter-defibrillator in situ Z95.810  
 Mitral valve disease I05.9  Abnormal PFT R94.2  Acute bronchitis J20.9 IMPRESSION:  
· SOB on exertion with significant symptomatic decompensation. CXR concerning for ILD- chronic diastolic heart failure/ vs ? Carcinomatosis. Ct scan confirms multiple mets to both lungs. · Clinical data suggests multifactorial etiology with progressive symptoms. over past 1 year: worsening reactive airways due to untreated rhinosinusitis, silent reflux and or a component of diastolic heart failure. · Abnormal PFT- combined restrictive and Obstructive component-progression noted -Asthma with reduced DLCO ( corrects with Volume adjustment.) Suspect restrictive component from chest wall deformity. Had initial Improved symptoms with bronchodilators but now worsening could be endobronchial mets · H/o invasive ductal breast Ca · H/o non ischemic cardiomyopathy · AICD RECOMMENDATIONS:  
 
· Discussed PFT and Chest CT results in details and recommended Oncology input to  Facilitate further treatment. Discussed with Dr. Reynaldo Vincent. She will see patient soon. Likely no need for further tissue · Continue treating obstructive airways component- TRELEGY · Continue Add nebulized bronchodilators responded in clinic after treatment. · Continue singulair to address chronic rhinosinuitis component- Nasonex · GERD precautions · Preventive vaccinations- recieved · Monitor response to interventions and make appropriate adjustments · Healthy weight and active lifestyle · Follow up in 2 months Health maintenance screens deferred to Primary care provider. Connor Gao MD 
 
 
 
 
 
 
If you have questions, please do not hesitate to call me. I look forward to following Ms. Maodoyle along with you.  
 
 
 
Sincerely, 
 
 
Connor Gao MD

## 2018-11-27 NOTE — PROGRESS NOTES
[facility] Pulmonary, Critical Care, and Sleep Medicine Pulmonary Office visit Name: Lynne Burgos : 1950 Date: 2018 Subjective:  
Patient has been referred for evaluation of:  SOB and abnormal PFT's. Recently has had increased SOB and cough- persistent despite treatment. Saw Dr. Radha Carpenter and Dr. Allen Dorsey in October. 18 Here for follow up to discuss results of recent testing- Chest CT, PFT's 
Has had continued c/o cough- choking with minimal expectoration. C/o markedly increased SOB with minimal activity. Antibiotics and Prednisone taper with minimal improvement. Started on trelegy l by Dr. Allen Dorsey. Feels weak, fatigued . Appetite OK. Denies much productive mucus. Denies fever, chills. Denies any chest pain. No nausea, vomiting. HPI: 
Patient is a 70 y.o. female has been experiencing SOB for past 1 year. SOB:  
Symptoms occur with exertion and at night. Able to walk about about 3-4  Blocks. Cannot climb stairs. Activities of daily living are affected and improves with pacing. Has orthopnea/ paroxysmal nocturnal dyspnea SOB relieved with pacing and resting. C/o sinus congestion. Denies any significant Cough: 
Has some wheezing, no chest pain, fever, chills, night sweats dyspepsia, reflux. Has had AICD placed and replaced. Left mastectomy with chemo- radiation : 5 years back Weight gain of 50 lbs over few years. Comorbid conditions include- DM, HTN, CHF- nonischemic cardiomyopathy, Breast ca- treated: s/p mastectomy -L and chemo radiation Non smoker Occupational exposure-none Past Medical History:  
Diagnosis Date  Abnormal nuclear cardiac imaging test 2010 Lg fixed anterior, septal, apical, inferoseptal defect sugg RCA & LAD disease or cardiomyopathy. EF 20%. Global hykn. Nondiagnostic EKG.  Acute bronchitis 10/15/2018 Persistent cough and wheezing in spite of prednisone and antibiotic. 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   
 Automatic implantable cardiac defibrillator in situ  Automatic implantable cardiac defibrillator in situ  Breast cancer ([facility] [address]  Cancer ([facility] [address] breast cancer left  Chemotherapy convalescence or palliative care 2012  Chronic combined systolic and diastolic heart failure ([facility] [address] Remains symptomatic, nyha class 3 ef improving.  Congestive heart failure, unspecified   
 chronic class ll  Echocardiogram 09/27/2010 EF 30%. Global hykn. Mild MR. Mild TR.  Hyperlipidemia  Hypertension  Mitral valve disorders(424.0) 1/15/2014  
 mild to moderate mr  Mitral valve disorders(424.0) 1/15/2014  
 mild to moderate mr  MVP (mitral valve prolapse)  Nonischemic cardiomyopathy ([facility] [address] EF 20-30% despite medical therapy  Nonspecific abnormal electrocardiogram (ECG) (EKG)  Osteopenia  Other primary cardiomyopathies  Pacemaker 10/27/10  
 s/p implantation, without complication  Poisoning by other and unspecified agents primarily affecting the cardiovascular system(972.9) Ef slightly better to 45%, STABLE back on chemo.  Radiation therapy  Radiation therapy complication 0776  S/P cardiac catheterization 07/08/10 Patent coronary arteries. LVEDP 25.  EF 25%. Global hykn. Moderate MR.  RA 10.  RV 40/10. PA 40/20.  20.  CO/CI 4.5/2.45 (Larry).  Shortness of breath  Syncope and collapse  Thyroid disease   
 goiter Past Surgical History:  
Procedure Laterality Date  CARDIAC SURG PROCEDURE UNLIST Difibrillator; BI V ICD  HX MASTECTOMY  09/28/11  
 modified radical mastectomy and axillary dissection  HX ORTHOPAEDIC    
 HX OTHER SURGICAL    
 surgery to remove part of liver  HX PACEMAKER  10/27/10  
 s/p [employer] biventricular AICD, without complication  HX RADICAL MASTECTOMY  NEUROLOGICAL PROCEDURE UNLISTED Cervical fusion Allergies Allergen Reactions  Adhesive Other (comments)  
  blisters Trea Molinal Current Outpatient Medications Medication Sig Dispense Refill  carvedilol (COREG) 25 mg tablet TAKE 1 TABLET TWICE A DAY  WITH MEALS 180 Tab 3  
 fluticasone/umeclidin/vilanter (TRELEGY ELLIPTA IN) Take  by inhalation.  mometasone (NASONEX) 50 mcg/actuation nasal spray USE 2 SPRAYS IN EACH       NOSTRIL DAILY 1 Container 3  
 lisinopril (PRINIVIL, ZESTRIL) 10 mg tablet TAKE 1 TABLET DAILY 90 Tab 2  
 montelukast (SINGULAIR) 10 mg tablet Take 1 Tab by mouth daily. 90 Tab 3  cholecalciferol, vitamin D3, 2,000 unit tab Take  by mouth.  omeprazole (PRILOSEC) 40 mg capsule Take 40 mg by mouth daily.  multivitamin (HAIR,SKIN & NAILS) tablet Take 1 Tab by mouth daily.  raloxifene (EVISTA) 60 mg tablet Take  by mouth daily.  albuterol-ipratropium (DUO-NEB) 2.5 mg-0.5 mg/3 ml nebu 3 mL by Nebulization route every six (6) hours as needed. 120 Nebule 3  
 calcium-cholecalciferol, d3, (CALCIUM 600 + D) 600-125 mg-unit tab Take  by mouth.  mometasone-formoterol (DULERA) 100-5 mcg/actuation HFA inhaler Take 2 Puffs by inhalation two (2) times a day. 3 Inhaler 3 Review of Systems: 
HEENT: No epistaxis, no nasal drainage, no difficulty in swallowing, no redness in eyes Respiratory: as above Cardiovascular: no chest pain, no palpitations, no chronic leg edema, no syncope Gastrointestinal: no abd pain, no vomiting, no diarrhea, no bleeding symptoms Genitourinary: No urinary symptoms or hematuria Integument/breast: No ulcers or rashes Musculoskeletal:Neg 
Neurological: No focal weakness, no seizures, no headaches Behvioral/Psych: No anxiety, no depression Constitutional: No fever, no chills, no weight loss, no night sweats Objective:  
 
Visit Vitals /62 (BP 1 Location: Right arm, BP Patient Position: Sitting) Pulse 94 Temp 97.3 °F (36.3 °C) (Oral) Resp 16 Ht 5' 5\" (1.651 m) Wt 78 kg (172 lb) SpO2 91% BMI 28.62 kg/m² Physical Exam:  
General: comfortable, no acute distress HEENT: pupils reactive, sclera anicteric, EOM intact Neck: No adenopathy or thyroid swelling, no lymphadenopathy or JVD, supple Chest: multiple scars from previous surgery, surgically absent left breast 
CVS: S1S2 
RS: Mod AE bilaterally, no tactile fremitus or egophony, no accessory muscle use, faint crackles Abd: soft, non tender, no hepatosplenomegaly Neuro: non focal, awake, alert Extrm: no leg edema, clubbing or cyanosis Skin: no rash Data review: 
Pertinent labs: CBC, BMP, LFT's PFT: 
 
Date FVC FEV1  FEV1/FVC [id number] TLC RV RV/TLC VC DLCO  
6/29/2017 75 69 67 43 77 81 nl  62  
11/2018  46  35     49 FLOWS: 
Maximal Mid Expiratory Flow rate is reduced to 43 % predicted Forced Expiratory Volume in one second is reduced to 69 % predicted FEV 1% is reduced Volumes: All Volumes are reduced except for RV Flow Volume Loop: 
Nonspecific obstructive pattern in Flow Volume Loop Bronchodilator: No significant improvement with bronchodilator therapy Diffusion: Abnormal Diffusion Capacity reduced to 62 % predicted DLCO normalizes to alveolar ventilation Impression: Moderate obstructive defect, Predominately small airways, Mild restrictive ventilatory defect, Reduced diffusion capacity indicating a decrease in alveolar surface area for gas exchange 6 min walk test;walked 330 feet with no O2 desaturation or significant heart rate change. DI- stable Echo: 1/18/2017: 
Left ventricle: Systolic function was normal. Ejection fraction was 
estimated to be 50 %. There were no regional wall motion abnormalities. Doppler parameters were consistent with abnormal left ventricular 
relaxation (grade 1 diastolic dysfunction). Right ventricle: The size was normal. Systolic function was reduced. Left atrium: Size was normal. 
Right atrium: Size was normal. 
Mitral valve: There was systolic bowing of the anterior and posterior 
leaflets, but without diagnostic evidence for prolapse. There was mild 
regurgitation. Aortic valve: The valve was trileaflet. Leaflets exhibited normal 
thickness and normal cuspal separation. Tricuspid valve: Pulmonary artery systolic pressure: 18 mmHg. Pulmonic valve: There was mild regurgitation. Imaging: 
I have personally reviewed the patients radiographs and have reviewed the reports: 
CT Results (most recent): 
Results from Hospital Encounter encounter on 11/24/18 CT CHEST WO CONT Narrative EX AM:  CT CHEST WO CONT INDICATION:   Interstitial lung disease. Cough. History of left breast cancer. COMPARISON: 5/21/2013 as well as 11/19/2018 exam's. CONTRAST:  None. TECHNIQUE: 
Multislice helical CT was performed from the thoracic inlet to the diaphragm 
without intravenous contrast administration. High resolution CT the chest was 
performed using 1.25 mm images on a high resolution algorithm from the thoracic 
inlet to the diaphragm in the supine position during inspiration. Selected 
expiratory images and prone inspiratory images of the lung bases were also 
acquired. Coronal and sagittal reformations were generated. FINDINGS: Limited evaluation of the thorax due to limited field-of-view as well 
as lack of intravenous contrast. 
 
Innumerable pulmonary nodules with associated patchy nodular opacities. Largest infiltrate right lung base medial and posterior segment. No pneumothorax or pleural effusions. Trachea bronchial tree is unremarkable. Heart is markedly enlarged without pericardial effusions. Left modified radical mastectomy. Lymph nodes in the AP window and middle compartment of the superior mediastinum. Largest series 3 image 22 measuring approximately 1.9 cm in its greatest 
dimension. Thyroid nodules. Limited evaluation. No pulmonary fibrosis is appreciated. Minimal air-trapping is seen. Impression IMPRESSION:  
1. Innumerable pulmonary nodules with associated nodular bilateral opacities as 
well as infiltrate right lung base without pneumothorax or pleural effusions. Metastatic disease with is suggested. Pneumonic infiltrate right lung base. 2. Minimal air trapping. Interstitial lung disease. Findings were discussed with Dr. Jessica Workman by me over the phone at the time and 
date of the dictation with read back. XR Results (most recent): 
Results from Appointment encounter on 11/19/18 XR CHEST PA LAT Narrative Chest PA and lateral 
 
INDICATION: Cough COMPARISON: 8/14 FINDINGS: 
 
Two views of the chest were obtained. Cardiac silhouette is mildly prominent in 
size. AICD type pacemaker is unchanged. Right lung base streaky opacity may 
represent atelectasis or developing infiltrate. Trace effusions may be 
considered. Osseous structures are stable. Impression IMPRESSION: 
 
Right lung base streaky opacity may represent atelectasis or developing 
infiltrate. CXR 8/26/2014: 
 
AICD. No change in lead placement. No visualized lead fracture. Lungs appear 
unremarkable. Normal size heart. Impression: No acute findings. Patient Active Problem List  
Diagnosis Code  Congestive heart failure (HCC) I50.9  Hypertension I10  
 MVP (mitral valve prolapse) I34.1  Nonischemic cardiomyopathy (HCC) I42.8  Cancer ([facility] [address] C80.1  Adenocarcinoma of breast; locally advanced invasive ductal adenocarcinoma of left breast C50.919  
 Poisoning by other and unspecified agents primarily affecting the cardiovascular system(972.9) T46.904A  Syncope and collapse R55  Other primary cardiomyopathies I42.8  Shortness of breath R06.02  
 Nonspecific abnormal electrocardiogram (ECG) (EKG) R94.31  
  Automatic implantable cardioverter-defibrillator in situ Z95.810  
 Mitral valve disease I05.9  Abnormal PFT R94.2  Acute bronchitis J20.9 IMPRESSION:  
· SOB on exertion with significant symptomatic decompensation. CXR concerning for ILD- chronic diastolic heart failure/ vs ? Carcinomatosis. Ct scan confirms multiple mets to both lungs. · Clinical data suggests multifactorial etiology with progressive symptoms. over past 1 year: worsening reactive airways due to untreated rhinosinusitis, silent reflux and or a component of diastolic heart failure. · Abnormal PFT- combined restrictive and Obstructive component-progression noted -Asthma with reduced DLCO ( corrects with Volume adjustment.) Suspect restrictive component from chest wall deformity. Had initial Improved symptoms with bronchodilators but now worsening could be endobronchial mets · H/o invasive ductal breast Ca · H/o non ischemic cardiomyopathy · AICD RECOMMENDATIONS:  
 
· Discussed PFT and Chest CT results in details and recommended Oncology input to  Facilitate further treatment. Discussed with Dr. Abdiel Barroso. She will see patient soon. Likely no need for further tissue · Continue treating obstructive airways component- TRELEGY · Continue Add nebulized bronchodilators responded in clinic after treatment. · Continue singulair to address chronic rhinosinuitis component- Nasonex · GERD precautions · Preventive vaccinations- recieved · Monitor response to interventions and make appropriate adjustments · Healthy weight and active lifestyle · Follow up in 2 months Health maintenance screens deferred to Primary care provider.  
  
Federica Larsen MD

## 2018-12-04 ENCOUNTER — TELEPHONE (OUTPATIENT)
Dept: PULMONOLOGY | Age: 68
End: 2018-12-04

## 2018-12-04 RX ORDER — ACETAMINOPHEN, DIPHENHYDRAMINE HCL, PHENYLEPHRINE HCL 325; 25; 5 MG/1; MG/1; MG/1
1 TABLET ORAL
COMMUNITY
End: 2019-11-04

## 2018-12-04 RX ORDER — GLUCOSAMINE/CHONDR SU A SOD 750-600 MG
1 TABLET ORAL DAILY
COMMUNITY
End: 2019-11-04

## 2018-12-07 ENCOUNTER — HOSPITAL ENCOUNTER (OUTPATIENT)
Dept: PET IMAGING | Age: 68
Discharge: HOME | End: 2018-12-07
Attending: INTERNAL MEDICINE
Payer: MEDICARE

## 2018-12-07 ENCOUNTER — HOSPITAL ENCOUNTER (OUTPATIENT)
Dept: CT IMAGING | Age: 68
Discharge: HOME | End: 2018-12-07
Attending: INTERNAL MEDICINE
Payer: MEDICARE

## 2018-12-07 DIAGNOSIS — C50.412 MALIGNANT NEOPLASM OF UPPER-OUTER QUADRANT OF LEFT FEMALE BREAST (HCC): ICD-10-CM

## 2018-12-07 DIAGNOSIS — C50.412 PRIMARY MALIGNANT NEOPLASM OF UPPER OUTER QUADRANT OF FEMALE BREAST, LEFT (HCC): ICD-10-CM

## 2018-12-07 LAB — CREAT UR-MCNC: 0.8 MG/DL (ref 0.6–1.3)

## 2018-12-07 PROCEDURE — 82565 ASSAY OF CREATININE: CPT

## 2018-12-07 PROCEDURE — 74011636320 HC RX REV CODE- 636/320: Performed by: INTERNAL MEDICINE

## 2018-12-07 PROCEDURE — 70460 CT HEAD/BRAIN W/DYE: CPT

## 2018-12-07 PROCEDURE — A9552 F18 FDG: HCPCS

## 2018-12-07 RX ADMIN — IOPAMIDOL 80 ML: 612 INJECTION, SOLUTION INTRAVENOUS at 12:00

## 2018-12-10 ENCOUNTER — ANESTHESIA EVENT (OUTPATIENT)
Dept: ENDOSCOPY | Age: 68
End: 2018-12-10
Payer: MEDICARE

## 2018-12-11 ENCOUNTER — APPOINTMENT (OUTPATIENT)
Dept: GENERAL RADIOLOGY | Age: 68
End: 2018-12-11
Attending: INTERNAL MEDICINE
Payer: MEDICARE

## 2018-12-11 ENCOUNTER — ANESTHESIA (OUTPATIENT)
Dept: ENDOSCOPY | Age: 68
End: 2018-12-11
Payer: MEDICARE

## 2018-12-11 ENCOUNTER — HOSPITAL ENCOUNTER (OUTPATIENT)
Age: 68
Setting detail: OUTPATIENT SURGERY
Discharge: HOME | End: 2018-12-11
Attending: INTERNAL MEDICINE | Admitting: INTERNAL MEDICINE
Payer: MEDICARE

## 2018-12-11 VITALS
TEMPERATURE: 96.8 F | RESPIRATION RATE: 14 BRPM | WEIGHT: 170 LBS | SYSTOLIC BLOOD PRESSURE: 134 MMHG | HEIGHT: 65 IN | OXYGEN SATURATION: 91 % | HEART RATE: 76 BPM | DIASTOLIC BLOOD PRESSURE: 71 MMHG | BODY MASS INDEX: 28.32 KG/M2

## 2018-12-11 LAB
ATRIAL RATE: 78 BPM
CALCULATED P AXIS, ECG09: 33 DEGREES
CALCULATED R AXIS, ECG10: -15 DEGREES
CALCULATED T AXIS, ECG11: 64 DEGREES
DIAGNOSIS, 93000: NORMAL
P-R INTERVAL, ECG05: 160 MS
Q-T INTERVAL, ECG07: 412 MS
QRS DURATION, ECG06: 130 MS
QTC CALCULATION (BEZET), ECG08: 469 MS
VENTRICULAR RATE, ECG03: 78 BPM

## 2018-12-11 PROCEDURE — 93005 ELECTROCARDIOGRAM TRACING: CPT

## 2018-12-11 PROCEDURE — 74011250636 HC RX REV CODE- 250/636: Performed by: INTERNAL MEDICINE

## 2018-12-11 PROCEDURE — 88112 CYTOPATH CELL ENHANCE TECH: CPT

## 2018-12-11 PROCEDURE — 88305 TISSUE EXAM BY PATHOLOGIST: CPT

## 2018-12-11 PROCEDURE — 76040000008: Performed by: INTERNAL MEDICINE

## 2018-12-11 PROCEDURE — 88172 CYTP DX EVAL FNA 1ST EA SITE: CPT

## 2018-12-11 PROCEDURE — 74011000250 HC RX REV CODE- 250

## 2018-12-11 PROCEDURE — 71045 X-RAY EXAM CHEST 1 VIEW: CPT

## 2018-12-11 PROCEDURE — 88173 CYTOPATH EVAL FNA REPORT: CPT

## 2018-12-11 PROCEDURE — 77030012699 HC VLV SUC CNTRL OCOA -A: Performed by: INTERNAL MEDICINE

## 2018-12-11 PROCEDURE — 74011000250 HC RX REV CODE- 250: Performed by: NURSE ANESTHETIST, CERTIFIED REGISTERED

## 2018-12-11 PROCEDURE — 77030018823 HC SLV COMPR VENO -B: Performed by: INTERNAL MEDICINE

## 2018-12-11 PROCEDURE — 77030003406 HC NDL ASPIR BIOP OCOA -C: Performed by: INTERNAL MEDICINE

## 2018-12-11 PROCEDURE — 74011250636 HC RX REV CODE- 250/636

## 2018-12-11 PROCEDURE — 77030009046 HC CATH BRNCH BLLN OCOA -B: Performed by: INTERNAL MEDICINE

## 2018-12-11 PROCEDURE — 77030013140 HC MSK NEB VYRM -A: Performed by: INTERNAL MEDICINE

## 2018-12-11 PROCEDURE — 88177 CYTP FNA EVAL EA ADDL: CPT

## 2018-12-11 PROCEDURE — 77030003454 HC NDL BIOP BRONCH BSC -B: Performed by: INTERNAL MEDICINE

## 2018-12-11 PROCEDURE — 76060000033 HC ANESTHESIA 1 TO 1.5 HR: Performed by: INTERNAL MEDICINE

## 2018-12-11 PROCEDURE — 77030019988 HC FCPS ENDOSC DISP BSC -B: Performed by: INTERNAL MEDICINE

## 2018-12-11 PROCEDURE — 74011000250 HC RX REV CODE- 250: Performed by: INTERNAL MEDICINE

## 2018-12-11 PROCEDURE — 88342 IMHCHEM/IMCYTCHM 1ST ANTB: CPT

## 2018-12-11 PROCEDURE — 88374 M/PHMTRC ALYS ISHQUANT/SEMIQ: CPT

## 2018-12-11 PROCEDURE — 74011250636 HC RX REV CODE- 250/636: Performed by: NURSE ANESTHETIST, CERTIFIED REGISTERED

## 2018-12-11 RX ORDER — ROCURONIUM BROMIDE 10 MG/ML
INJECTION, SOLUTION INTRAVENOUS AS NEEDED
Status: DISCONTINUED | OUTPATIENT
Start: 2018-12-11 | End: 2018-12-11 | Stop reason: HOSPADM

## 2018-12-11 RX ORDER — SODIUM CHLORIDE 0.9 % (FLUSH) 0.9 %
5-10 SYRINGE (ML) INJECTION EVERY 8 HOURS
Status: DISCONTINUED | OUTPATIENT
Start: 2018-12-11 | End: 2018-12-11 | Stop reason: HOSPADM

## 2018-12-11 RX ORDER — PROPOFOL 10 MG/ML
INJECTION, EMULSION INTRAVENOUS AS NEEDED
Status: DISCONTINUED | OUTPATIENT
Start: 2018-12-11 | End: 2018-12-11 | Stop reason: HOSPADM

## 2018-12-11 RX ORDER — LIDOCAINE HYDROCHLORIDE 20 MG/ML
INJECTION, SOLUTION INFILTRATION; PERINEURAL AS NEEDED
Status: DISCONTINUED | OUTPATIENT
Start: 2018-12-11 | End: 2018-12-11 | Stop reason: HOSPADM

## 2018-12-11 RX ORDER — LIDOCAINE HYDROCHLORIDE 20 MG/ML
INJECTION, SOLUTION EPIDURAL; INFILTRATION; INTRACAUDAL; PERINEURAL AS NEEDED
Status: DISCONTINUED | OUTPATIENT
Start: 2018-12-11 | End: 2018-12-11 | Stop reason: HOSPADM

## 2018-12-11 RX ORDER — SODIUM CHLORIDE, SODIUM LACTATE, POTASSIUM CHLORIDE, CALCIUM CHLORIDE 600; 310; 30; 20 MG/100ML; MG/100ML; MG/100ML; MG/100ML
75 INJECTION, SOLUTION INTRAVENOUS CONTINUOUS
Status: DISCONTINUED | OUTPATIENT
Start: 2018-12-11 | End: 2018-12-11 | Stop reason: HOSPADM

## 2018-12-11 RX ORDER — FAMOTIDINE 20 MG/1
20 TABLET, FILM COATED ORAL ONCE
Status: DISCONTINUED | OUTPATIENT
Start: 2018-12-11 | End: 2018-12-11 | Stop reason: HOSPADM

## 2018-12-11 RX ORDER — SODIUM CHLORIDE 0.9 % (FLUSH) 0.9 %
5-10 SYRINGE (ML) INJECTION AS NEEDED
Status: DISCONTINUED | OUTPATIENT
Start: 2018-12-11 | End: 2018-12-11 | Stop reason: HOSPADM

## 2018-12-11 RX ORDER — SODIUM CHLORIDE, SODIUM LACTATE, POTASSIUM CHLORIDE, CALCIUM CHLORIDE 600; 310; 30; 20 MG/100ML; MG/100ML; MG/100ML; MG/100ML
25 INJECTION, SOLUTION INTRAVENOUS CONTINUOUS
Status: DISCONTINUED | OUTPATIENT
Start: 2018-12-11 | End: 2018-12-11 | Stop reason: HOSPADM

## 2018-12-11 RX ORDER — IPRATROPIUM BROMIDE AND ALBUTEROL SULFATE 2.5; .5 MG/3ML; MG/3ML
3 SOLUTION RESPIRATORY (INHALATION)
Status: COMPLETED | OUTPATIENT
Start: 2018-12-11 | End: 2018-12-11

## 2018-12-11 RX ORDER — FENTANYL CITRATE 50 UG/ML
INJECTION, SOLUTION INTRAMUSCULAR; INTRAVENOUS AS NEEDED
Status: DISCONTINUED | OUTPATIENT
Start: 2018-12-11 | End: 2018-12-11 | Stop reason: HOSPADM

## 2018-12-11 RX ORDER — SUCCINYLCHOLINE CHLORIDE 20 MG/ML
INJECTION INTRAMUSCULAR; INTRAVENOUS AS NEEDED
Status: DISCONTINUED | OUTPATIENT
Start: 2018-12-11 | End: 2018-12-11 | Stop reason: HOSPADM

## 2018-12-11 RX ORDER — DEXAMETHASONE SODIUM PHOSPHATE 4 MG/ML
INJECTION, SOLUTION INTRA-ARTICULAR; INTRALESIONAL; INTRAMUSCULAR; INTRAVENOUS; SOFT TISSUE AS NEEDED
Status: DISCONTINUED | OUTPATIENT
Start: 2018-12-11 | End: 2018-12-11 | Stop reason: HOSPADM

## 2018-12-11 RX ADMIN — ROCURONIUM BROMIDE 10 MG: 10 INJECTION, SOLUTION INTRAVENOUS at 13:14

## 2018-12-11 RX ADMIN — LIDOCAINE HYDROCHLORIDE 40 MG: 20 INJECTION, SOLUTION EPIDURAL; INFILTRATION; INTRACAUDAL; PERINEURAL at 12:56

## 2018-12-11 RX ADMIN — FENTANYL CITRATE 50 MCG: 50 INJECTION, SOLUTION INTRAMUSCULAR; INTRAVENOUS at 12:56

## 2018-12-11 RX ADMIN — DEXAMETHASONE SODIUM PHOSPHATE 8 MG: 4 INJECTION, SOLUTION INTRA-ARTICULAR; INTRALESIONAL; INTRAMUSCULAR; INTRAVENOUS; SOFT TISSUE at 13:11

## 2018-12-11 RX ADMIN — PROPOFOL 20 MG: 10 INJECTION, EMULSION INTRAVENOUS at 13:22

## 2018-12-11 RX ADMIN — PROPOFOL 30 MG: 10 INJECTION, EMULSION INTRAVENOUS at 13:02

## 2018-12-11 RX ADMIN — FENTANYL CITRATE 50 MCG: 50 INJECTION, SOLUTION INTRAMUSCULAR; INTRAVENOUS at 12:55

## 2018-12-11 RX ADMIN — IPRATROPIUM BROMIDE AND ALBUTEROL SULFATE 3 ML: .5; 3 SOLUTION RESPIRATORY (INHALATION) at 14:30

## 2018-12-11 RX ADMIN — SUCCINYLCHOLINE CHLORIDE 100 MG: 20 INJECTION INTRAMUSCULAR; INTRAVENOUS at 12:59

## 2018-12-11 RX ADMIN — ROCURONIUM BROMIDE 10 MG: 10 INJECTION, SOLUTION INTRAVENOUS at 13:21

## 2018-12-11 RX ADMIN — FAMOTIDINE 20 MG: 10 INJECTION, SOLUTION INTRAVENOUS at 12:29

## 2018-12-11 RX ADMIN — SODIUM CHLORIDE, SODIUM LACTATE, POTASSIUM CHLORIDE, AND CALCIUM CHLORIDE 75 ML/HR: 600; 310; 30; 20 INJECTION, SOLUTION INTRAVENOUS at 12:19

## 2018-12-11 RX ADMIN — PROPOFOL 100 MG: 10 INJECTION, EMULSION INTRAVENOUS at 12:58

## 2018-12-11 NOTE — H&P
H&P Update: 
Demetrius Corey was seen and examined. History and physical has been reviewed. The patient has been examined. There have been no significant clinical changes since the completion of the originally dated History and Physical by Dr. Maren Espinal on 11/27/18. Pt is appropriate for bronchoscopy with EBUS/TBNA and biopsies. Mert Nava MD/MPH Pulmonary, Critical Care Medicine [facility]

## 2018-12-11 NOTE — ANESTHESIA PREPROCEDURE EVALUATION
Anesthetic History               Review of Systems / Medical History      Pulmonary            Asthma        Neuro/Psych   Within defined limits           Cardiovascular    Hypertension          Pacemaker      Comments: EF 20-30% despite medical therapy   GI/Hepatic/Renal     GERD           Endo/Other      Hypothyroidism       Other Findings              Physical Exam    Airway  Mallampati: II  TM Distance: 4 - 6 cm  Neck ROM: normal range of motion   Mouth opening: Normal     Cardiovascular    Rhythm: regular  Rate: normal         Dental    Dentition: Poor dentition     Pulmonary  Breath sounds clear to auscultation               Abdominal  GI exam deferred       Other Findings            Anesthetic Plan    ASA: 3  Anesthesia type: general          Induction: Intravenous  Anesthetic plan and risks discussed with: Patient

## 2018-12-11 NOTE — PROCEDURES
[address] [facility], [address] [facility]  Pulmonary, Critical Care, and Sleep Medicine          Name: Annalee Garcia MRN: [id number]   : 1950 Hospital: [address] [place] [address]   Date: 2018        Bronchoscopy with Endobronchial Ultrasound and Transbronchial Needle Aspiration Report    Procedure: Diagnostic bronchoscopy with biopsies including EBUS/TBNA    Indication: Abnormal chest imaging and mediastinal/hilar lymphadenopathy with multiple nodules    Consent/Treatment: Informed consent was obtained from the  patient after risks, benefits and alternatives were explained. Timeout verified the correct patient and correct procedure. Anesthesia:   General anesthesia was performed by anesthesiology    Procedure Details:   -- The bronchoscope was introduced through an endotracheal tube. -- The trachea and fani were completely inspected and showed very extensive airway inflammation. Pt developed coughing paroxysms with very minimal bronchoscope manipulation. -- The right-sided endobronchial anatomy was completely inspected and showed extensive airway inflammation, along with endobronchial invasion most pronounced in the bronchus intermedius. An obstructing mass was seen blocking the entire right middle lobe, and right lower lobe. -- The left-sided endobronchial anatomy was completely inspected and showed extensive airway inflammation. -- The flexible bronchoscope was removed and the endobronchial ultrasound scope was inserted into the endotracheal tube  -- The mediastinal and hilar lymph nodes were inspected showing lymphadenopathy at the following sites: Station 4R, 4L, 7, 10R, 11R, and 11L  -- Transbronchial needle aspiration using a 21 gauge needle was then performed using ultrasound guidance to the following lymph nodes/sites: Station 7--8 passes were made.   On-site pathologist noted lesional cells on multiple passes, some additional passes were made for cell block. Also since patient has metastatic disease within the lungs, no further nodes/sites were sampled. --The endobronchial ultrasound was placed above the obstructing right middle lobe lesion and showed increased vascularity within the center using Doppler. -- Following transbronchial needle aspiration, the endobronchial ultrasound scope was removed and the flexible bronchoscope was then reinserted. -- The airways were reinspected and showed no active bleeding. Bloody secretions were aspirated as completely as possible  -- The bronchoscope was inserted into the bronchus intermedius. The radial EBUS probe was inserted without guide sheath and found abnormal tissue at the obstructing mass, but showed patent airways beyond. Transbronchial biopsies were obtained from that site. Due to the bleeding from the site, only one biopsy was made. -- Endobronchial biopsies were obtained at the RML x 2 passes. Additional passes were not obtained due to increased oozing from the site. -- Bleeding was controlled with multiple aliquots of cold saline. -- Bronchial washings were collected for cytology  -- BAL was performed at the bronchus intermedius/RML/RLL  -- The flexible bronchoscope was then removed. -- CXR was ordered post procedure which did not show evidence of a pneumothorax.     --Images from the procedure are below and scanned into the patient's chart    Specimens:   Bronchial washings were sent for  cytology  The bronchoscope was wedged in the RML/RLL and bronchoalveolar lavage was performed; material was sent for  cytology  Endobronchial biopsies from the RML were sent for surgical pathology  Transbronchial biopsy from RML was performed using radial EBUS guidance and sent for  cytology  Transbronchial needle aspiration from station 7 was sent for  cytology    Rapid On-Site Evaluation: A preliminary diagnosis of carcinoma    Complications: none    Estimated Blood Loss: Jam Solis MD/MPH     Pulmonary, Critical Care Medicine  [facility]

## 2018-12-11 NOTE — DISCHARGE INSTRUCTIONS
Bronchoscopy: What to Expect at [address] [facility]    Bronchoscopy lets your doctor look at your airway through a tube called a bronchoscope. Afterward, you may feel tired for 1 or 2 days. Your mouth may feel very dry for several hours after the procedure. You may also have a sore throat and a hoarse voice for a few days. Sucking on throat lozenges or gargling with warm salt water may help soothe your sore throat. If a sample of tissue (biopsy) was taken, you may spit up a small amount of blood or have bloody saliva. This is normal.  This care sheet gives you a general idea about how long it will take for you to recover. But each person recovers at a different pace. Follow the steps below to get better as quickly as possible. How can you care for yourself at home? Activity    · Do not eat anything for 2 hours after the procedure.     · Rest when you feel tired. Getting enough sleep will help you recover.     · Avoid strenuous activities, such as bicycle riding, jogging, weight lifting, or aerobic exercise, until your doctor says it is okay.     · Ask your doctor when you can drive again. Diet    · You can eat your normal diet. If your stomach is upset, try bland, low-fat foods like plain rice, broiled chicken, toast, and yogurt.     · If it is painful to swallow, start out with cold drinks, flavored ice pops, and ice cream. Next, try soft foods like pudding, yogurt, canned or cooked fruit, scrambled eggs, and mashed potatoes. Avoid eating hard or scratchy foods like chips or raw vegetables. Avoid orange or tomato juice and other acidic foods that can sting the throat.     · Drink plenty of fluids to avoid becoming dehydrated (unless your doctor tells you not to). Medicines    · Take pain medicines exactly as directed. ? If the doctor gave you a prescription medicine for pain, take it as prescribed.   ? If you are not taking a prescription pain medicine, ask your doctor if you can take an over-the-counter medicine.     · If you think your pain medicine is making you sick to your stomach:  ? Take your medicine after meals (unless your doctor has told you not to). ? Ask your doctor for a different pain medicine.     · If your doctor prescribed antibiotics, take them as directed. Do not stop taking them just because you feel better. You need to take the full course of antibiotics. Follow-up care is a key part of your treatment and safety. Be sure to make and go to all appointments, and call your doctor if you are having problems. It's also a good idea to know your test results and keep a list of the medicines you take. When should you call for help? Call 911 anytime you think you may need emergency care. For example, call if:    · You passed out (lost consciousness).     · You have sudden chest pain and shortness of breath.     · You cough up large amounts of bright red blood.     · You have severe pain in your chest.     · You have severe trouble breathing.    Call your doctor now or seek immediate medical care if:    · You cough up more than a few tablespoons of blood.     · You have pain that does not get better after you take pain medicine.     · You have a fever over 100°F.     · You still sound hoarse after a few days.     · You have bubbles under the skin around the collarbone. These may crackle and pop when you press on them.    Watch closely for changes in your health, and be sure to contact your doctor if you have any problems. Where can you learn more? Go to http://karthik-miracle.info/. Enter B891 in the search box to learn more about \"Bronchoscopy: What to Expect at Home. \"  Current as of: December 6, 2017  Content Version: 11.8  © 3681-7209 [employer]. Care instructions adapted under license by [employer] (which disclaims liability or warranty for this information).  If you have questions about a medical condition or this instruction, always ask your healthcare professional. [place] [address] any warranty or liability for your use of this information. DISCHARGE SUMMARY from Nurse    PATIENT INSTRUCTIONS:    After general anesthesia or intravenous sedation, for 24 hours or while taking prescription Narcotics:  · Limit your activities  · Do not drive and operate hazardous machinery  · Do not make important personal or business decisions  · Do  not drink alcoholic beverages  · If you have not urinated within 8 hours after discharge, please contact your surgeon on call. Report the following to your surgeon:  · Excessive pain, swelling, redness or odor of or around the surgical area  · Temperature over 100.5  · Nausea and vomiting lasting longer than 4 hours or if unable to take medications  · Any signs of decreased circulation or nerve impairment to extremity: change in color, persistent  numbness, tingling, coldness or increase pain  · Any questions    *  Please give a list of your current medications to your Primary Care Provider. *  Please update this list whenever your medications are discontinued, doses are      changed, or new medications (including over-the-counter products) are added. *  Please carry medication information at all times in case of emergency situations. These are general instructions for a healthy lifestyle:    No smoking/ No tobacco products/ Avoid exposure to second hand smoke  Surgeon General's Warning:  Quitting smoking now greatly reduces serious risk to your health.     Obesity, smoking, and sedentary lifestyle greatly increases your risk for illness    A healthy diet, regular physical exercise & weight monitoring are important for maintaining a healthy lifestyle    You may be retaining fluid if you have a history of heart failure or if you experience any of the following symptoms:  Weight gain of 3 pounds or more overnight or 5 pounds in a week, increased swelling in our hands or feet or shortness of breath while lying flat in bed. Please call your doctor as soon as you notice any of these symptoms; do not wait until your next office visit. Recognize signs and symptoms of STROKE:    F-face looks uneven    A-arms unable to move or move unevenly    S-speech slurred or non-existent    T-time-call 911 as soon as signs and symptoms begin-DO NOT go       Back to bed or wait to see if you get better-TIME IS BRAIN. Warning Signs of HEART ATTACK     Call 911 if you have these symptoms:   Chest discomfort. Most heart attacks involve discomfort in the center of the chest that lasts more than a few minutes, or that goes away and comes back. It can feel like uncomfortable pressure, squeezing, fullness, or pain.  Discomfort in other areas of the upper body. Symptoms can include pain or discomfort in one or both arms, the back, neck, jaw, or stomach.  Shortness of breath with or without chest discomfort.  Other signs may include breaking out in a cold sweat, nausea, or lightheadedness. Don't wait more than five minutes to call 911 - MINUTES MATTER! Fast action can save your life. Calling 911 is almost always the fastest way to get lifesaving treatment. Emergency Medical Services staff can begin treatment when they arrive -- up to an hour sooner than if someone gets to the hospital by car. The discharge information has been reviewed with the patient and spouse. The patient and spouse verbalized understanding. Discharge medications reviewed with the patient and spouse and appropriate educational materials and side effects teaching were provided.   ___________________________________________________________________________________________________________________________________

## 2018-12-12 ENCOUNTER — TELEPHONE (OUTPATIENT)
Dept: CARDIOLOGY CLINIC | Age: 68
End: 2018-12-12

## 2018-12-12 DIAGNOSIS — I50.32 CHRONIC DIASTOLIC CONGESTIVE HEART FAILURE (HCC): Primary | ICD-10-CM

## 2018-12-12 DIAGNOSIS — I42.8 NONISCHEMIC CARDIOMYOPATHY (HCC): ICD-10-CM

## 2018-12-12 NOTE — ANESTHESIA POSTPROCEDURE EVALUATION
Procedure(s):  ENDOSCOPIC BRONCHOSCOPY ULTRASOUND (EBUS)/ FNA / Bx's / Washings / BAL.     Anesthesia Post Evaluation      Multimodal analgesia: multimodal analgesia used between 6 hours prior to anesthesia start to PACU discharge  Patient location during evaluation: bedside  Patient participation: complete - patient participated  Level of consciousness: awake  Pain management: adequate  Airway patency: patent  Anesthetic complications: no  Cardiovascular status: stable  Respiratory status: acceptable  Hydration status: acceptable  Post anesthesia nausea and vomiting:  controlled      Visit Vitals  /71 (BP 1 Location: Right arm, BP Patient Position: At rest)   Pulse 76   Temp 36 °C (96.8 °F)   Resp 14   Ht 5' 5\" (1.651 m)   Wt 77.1 kg (170 lb)   SpO2 91%   BMI 28.29 kg/m²

## 2018-12-14 ENCOUNTER — TELEPHONE (OUTPATIENT)
Dept: PULMONOLOGY | Age: 68
End: 2018-12-14

## 2018-12-14 RX ORDER — PREDNISONE 20 MG/1
40 TABLET ORAL
Qty: 10 TAB | Refills: 0 | Status: SHIPPED | OUTPATIENT
Start: 2018-12-14 | End: 2018-12-19

## 2018-12-14 RX ORDER — BENZONATATE 200 MG/1
200 CAPSULE ORAL
Qty: 30 CAP | Refills: 0 | Status: SHIPPED | OUTPATIENT
Start: 2018-12-14 | End: 2018-12-24

## 2018-12-14 NOTE — TELEPHONE ENCOUNTER
Called patient gave her prelim path report showing metastatic adenocarcinoma on biopsies. Pt has continued cough, will prescribe prednisone x 5 days and tessalon pearls TID. Also advised to take antacid while on steroids.       Garry Kim MD/MPH     Pulmonary, Critical Care Medicine  [facility]

## 2018-12-17 ENCOUNTER — HOSPITAL ENCOUNTER (OUTPATIENT)
Dept: INTERVENTIONAL RADIOLOGY/VASCULAR | Age: 68
Discharge: HOME | End: 2018-12-17
Attending: RADIOLOGY | Admitting: RADIOLOGY
Payer: MEDICARE

## 2018-12-17 PROCEDURE — 77001 FLUOROGUIDE FOR VEIN DEVICE: CPT

## 2018-12-17 NOTE — PROCEDURES
INTERVENTIONAL RADIOLOGY POST PICC LINE NOTE       December 17, 2018       12:16 PM     Preoperative Diagnosis:   IV Access    Postoperative Diagnosis:  Same. :  Santo Nova PA-C    Assistant:  None. Attending Physician: Dr. Jesús Mehta    Type of Anesthesia:  1% Lidocaine local    Procedure/Description: right basilic upper extremity PICC Line. Findings:  right basilic 5 [place] catheter placed. Tip at SVC/RA junction. OK to use. Length 37 cm. Estimated blood Loss: Minimal    Specimen Removed: None    Drains: None     Complications:  None. Condition:  Stable.     Discharge Plan:  discharge home

## 2018-12-19 ENCOUNTER — HOSPITAL ENCOUNTER (OUTPATIENT)
Dept: NON INVASIVE DIAGNOSTICS | Age: 68
Discharge: HOME | End: 2018-12-19
Attending: INTERNAL MEDICINE
Payer: MEDICARE

## 2018-12-19 VITALS
HEIGHT: 65 IN | DIASTOLIC BLOOD PRESSURE: 62 MMHG | BODY MASS INDEX: 28.32 KG/M2 | SYSTOLIC BLOOD PRESSURE: 104 MMHG | WEIGHT: 170 LBS

## 2018-12-19 DIAGNOSIS — I42.8 NONISCHEMIC CARDIOMYOPATHY (HCC): ICD-10-CM

## 2018-12-19 DIAGNOSIS — I50.32 CHRONIC DIASTOLIC CONGESTIVE HEART FAILURE (HCC): ICD-10-CM

## 2018-12-19 LAB
ECHO AO ROOT DIAM: 2.82 CM
ECHO LA AREA 4C: 20.8 CM2
ECHO LA VOL 2C: 57.69 ML (ref 22–52)
ECHO LA VOL 4C: 55.9 ML (ref 22–52)
ECHO LA VOL BP: 66.75 ML (ref 22–52)
ECHO LA VOL/BSA BIPLANE: 36.16 ML/M2 (ref 16–28)
ECHO LA VOLUME INDEX A2C: 31.25 ML/M2 (ref 16–28)
ECHO LA VOLUME INDEX A4C: 30.28 ML/M2 (ref 16–28)
ECHO LV E' LATERAL VELOCITY: 5.35 CM/S
ECHO LV E' SEPTAL VELOCITY: 5.5 CM/S
ECHO LV GLOBAL LONGITUDINAL STRAIN (GLS): -13.7 %
ECHO LV INTERNAL DIMENSION DIASTOLIC: 4.13 CM (ref 3.9–5.3)
ECHO LV INTERNAL DIMENSION SYSTOLIC: 3.02 CM
ECHO LV IVSD: 0.93 CM (ref 0.6–0.9)
ECHO LV MASS 2D: 156.1 G (ref 67–162)
ECHO LV MASS INDEX 2D: 84.6 G/M2 (ref 43–95)
ECHO LV POSTERIOR WALL DIASTOLIC: 1.1 CM (ref 0.6–0.9)
ECHO LVOT DIAM: 1.99 CM
ECHO LVOT PEAK GRADIENT: 2.6 MMHG
ECHO LVOT PEAK VELOCITY: 80.11 CM/S
ECHO LVOT VTI: 18.57 CM
ECHO MV A VELOCITY: 108.51 CM/S
ECHO MV AREA PISA: 0.1 CM2
ECHO MV E DECELERATION TIME (DT): 200.9 MS
ECHO MV E VELOCITY: 0.77 CM/S
ECHO MV E/A RATIO: 0.01
ECHO MV E/E' LATERAL: 0.14
ECHO MV E/E' RATIO (AVERAGED): 0.14
ECHO MV E/E' SEPTAL: 0.14
ECHO MV EROA PISA: 0.1 CM2
ECHO MV REGURGITANT PEAK GRADIENT: 130.5 MMHG
ECHO MV REGURGITANT PEAK VELOCITY: 571.19 CM/S
ECHO MV REGURGITANT RADIUS PISA: 0.39 CM
ECHO MV REGURGITANT VOLUME: 10.85 CC
ECHO MV REGURGITANT VTIA: 203.98 CM
ECHO PULMONARY ARTERY SYSTOLIC PRESSURE (PASP): 28 MMHG
ECHO TV REGURGITANT MAX VELOCITY: 252.8 CM/S
ECHO TV REGURGITANT PEAK GRADIENT: 25.6 MMHG

## 2018-12-19 PROCEDURE — 0399T ECHO ADULT COMPLETE: CPT

## 2019-01-16 ENCOUNTER — HOSPITAL ENCOUNTER (OUTPATIENT)
Dept: INTERVENTIONAL RADIOLOGY/VASCULAR | Age: 69
Discharge: HOME | End: 2019-01-16
Attending: INTERNAL MEDICINE | Admitting: RADIOLOGY
Payer: MEDICARE

## 2019-01-16 VITALS
TEMPERATURE: 98.3 F | OXYGEN SATURATION: 90 % | DIASTOLIC BLOOD PRESSURE: 69 MMHG | WEIGHT: 170 LBS | HEIGHT: 65 IN | BODY MASS INDEX: 28.32 KG/M2 | SYSTOLIC BLOOD PRESSURE: 149 MMHG | RESPIRATION RATE: 15 BRPM | HEART RATE: 90 BPM

## 2019-01-16 DIAGNOSIS — C50.412 MALIGNANT NEOPLASM OF UPPER-OUTER QUADRANT OF LEFT FEMALE BREAST (HCC): ICD-10-CM

## 2019-01-16 LAB
ANION GAP BLD CALC-SCNC: 15 MMOL/L (ref 10–20)
ANION GAP SERPL CALC-SCNC: 6 MMOL/L (ref 3–18)
APTT PPP: 26.2 SEC (ref 23–36.4)
BUN BLD-MCNC: 18 MG/DL (ref 7–18)
BUN SERPL-MCNC: 19 MG/DL (ref 7–18)
BUN/CREAT SERPL: 22 (ref 12–20)
CA-I BLD-MCNC: 1.25 MMOL/L (ref 1.12–1.32)
CALCIUM SERPL-MCNC: 8.6 MG/DL (ref 8.5–10.1)
CHLORIDE BLD-SCNC: 105 MMOL/L (ref 100–108)
CHLORIDE SERPL-SCNC: 108 MMOL/L (ref 100–108)
CO2 BLD-SCNC: 25 MMOL/L (ref 19–24)
CO2 SERPL-SCNC: 26 MMOL/L (ref 21–32)
CREAT SERPL-MCNC: 0.86 MG/DL (ref 0.6–1.3)
CREAT UR-MCNC: 0.8 MG/DL (ref 0.6–1.3)
ERYTHROCYTE [DISTWIDTH] IN BLOOD BY AUTOMATED COUNT: 13.4 % (ref 11.6–14.5)
GLUCOSE BLD STRIP.AUTO-MCNC: 136 MG/DL (ref 74–106)
GLUCOSE SERPL-MCNC: 134 MG/DL (ref 74–99)
HCT VFR BLD AUTO: 33.4 % (ref 35–45)
HCT VFR BLD CALC: 32 % (ref 36–49)
HGB BLD-MCNC: 10.9 G/DL (ref 12–16)
HGB BLD-MCNC: 11.4 G/DL (ref 12–16)
INR BLD: 1 (ref 0.9–1.2)
INR PPP: 1 (ref 0.8–1.2)
MCH RBC QN AUTO: 31.6 PG (ref 24–34)
MCHC RBC AUTO-ENTMCNC: 34.1 G/DL (ref 31–37)
MCV RBC AUTO: 92.5 FL (ref 74–97)
PLATELET # BLD AUTO: 370 K/UL (ref 135–420)
PMV BLD AUTO: 9.5 FL (ref 9.2–11.8)
POTASSIUM BLD-SCNC: 4.1 MMOL/L (ref 3.5–5.5)
POTASSIUM SERPL-SCNC: 4 MMOL/L (ref 3.5–5.5)
PROTHROMBIN TIME: 13 SEC (ref 11.5–15.2)
RBC # BLD AUTO: 3.61 M/UL (ref 4.2–5.3)
SODIUM BLD-SCNC: 140 MMOL/L (ref 136–145)
SODIUM SERPL-SCNC: 140 MMOL/L (ref 136–145)
WBC # BLD AUTO: 9 K/UL (ref 4.6–13.2)

## 2019-01-16 PROCEDURE — 77030022017 HC DRSG HEMO QCLOT ZMED -A

## 2019-01-16 PROCEDURE — 85730 THROMBOPLASTIN TIME PARTIAL: CPT

## 2019-01-16 PROCEDURE — 85027 COMPLETE CBC AUTOMATED: CPT

## 2019-01-16 PROCEDURE — 77030031139 HC SUT VCRL2 J&J -A

## 2019-01-16 PROCEDURE — C1769 GUIDE WIRE: HCPCS

## 2019-01-16 PROCEDURE — 74011250636 HC RX REV CODE- 250/636: Performed by: RADIOLOGY

## 2019-01-16 PROCEDURE — 36561 INSERT TUNNELED CV CATH: CPT

## 2019-01-16 PROCEDURE — 80048 BASIC METABOLIC PNL TOTAL CA: CPT

## 2019-01-16 PROCEDURE — 74011250636 HC RX REV CODE- 250/636

## 2019-01-16 PROCEDURE — 74011250636 HC RX REV CODE- 250/636: Performed by: STUDENT IN AN ORGANIZED HEALTH CARE EDUCATION/TRAINING PROGRAM

## 2019-01-16 PROCEDURE — 74011000250 HC RX REV CODE- 250: Performed by: STUDENT IN AN ORGANIZED HEALTH CARE EDUCATION/TRAINING PROGRAM

## 2019-01-16 PROCEDURE — 80047 BASIC METABLC PNL IONIZED CA: CPT

## 2019-01-16 PROCEDURE — C1887 CATHETER, GUIDING: HCPCS

## 2019-01-16 PROCEDURE — 85610 PROTHROMBIN TIME: CPT

## 2019-01-16 PROCEDURE — C1893 INTRO/SHEATH, FIXED,NON-PEEL: HCPCS

## 2019-01-16 PROCEDURE — C1788 PORT, INDWELLING, IMP: HCPCS

## 2019-01-16 RX ORDER — CEFAZOLIN SODIUM 2 G/50ML
2 SOLUTION INTRAVENOUS ONCE
Status: COMPLETED | OUTPATIENT
Start: 2019-01-16 | End: 2019-01-16

## 2019-01-16 RX ORDER — FENTANYL CITRATE 50 UG/ML
12.5-5 INJECTION, SOLUTION INTRAMUSCULAR; INTRAVENOUS
Status: DISCONTINUED | OUTPATIENT
Start: 2019-01-16 | End: 2019-01-16 | Stop reason: HOSPADM

## 2019-01-16 RX ORDER — HEPARIN SODIUM (PORCINE) LOCK FLUSH IV SOLN 100 UNIT/ML 100 UNIT/ML
500 SOLUTION INTRAVENOUS AS NEEDED
Status: DISCONTINUED | OUTPATIENT
Start: 2019-01-16 | End: 2019-01-16 | Stop reason: HOSPADM

## 2019-01-16 RX ORDER — SODIUM CHLORIDE 9 MG/ML
20 INJECTION, SOLUTION INTRAVENOUS CONTINUOUS
Status: DISCONTINUED | OUTPATIENT
Start: 2019-01-16 | End: 2019-01-16 | Stop reason: HOSPADM

## 2019-01-16 RX ORDER — DIPHENHYDRAMINE HYDROCHLORIDE 50 MG/ML
INJECTION, SOLUTION INTRAMUSCULAR; INTRAVENOUS
Status: COMPLETED
Start: 2019-01-16 | End: 2019-01-16

## 2019-01-16 RX ORDER — MIDAZOLAM HYDROCHLORIDE 1 MG/ML
1 INJECTION, SOLUTION INTRAMUSCULAR; INTRAVENOUS
Status: DISCONTINUED | OUTPATIENT
Start: 2019-01-16 | End: 2019-01-16 | Stop reason: HOSPADM

## 2019-01-16 RX ADMIN — MIDAZOLAM HYDROCHLORIDE 0.5 MG: 2 INJECTION, SOLUTION INTRAMUSCULAR; INTRAVENOUS at 08:50

## 2019-01-16 RX ADMIN — FENTANYL CITRATE 25 MCG: 50 INJECTION INTRAMUSCULAR; INTRAVENOUS at 08:50

## 2019-01-16 RX ADMIN — MIDAZOLAM HYDROCHLORIDE 1 MG: 2 INJECTION, SOLUTION INTRAMUSCULAR; INTRAVENOUS at 08:45

## 2019-01-16 RX ADMIN — HEPARIN SODIUM (PORCINE) LOCK FLUSH IV SOLN 100 UNIT/ML 500 UNITS: 100 SOLUTION at 09:17

## 2019-01-16 RX ADMIN — DIPHENHYDRAMINE HYDROCHLORIDE 25 MG: 50 INJECTION INTRAMUSCULAR; INTRAVENOUS at 09:15

## 2019-01-16 RX ADMIN — LIDOCAINE HYDROCHLORIDE 300 MG: 10; .005 INJECTION, SOLUTION EPIDURAL; INFILTRATION; INTRACAUDAL; PERINEURAL at 08:45

## 2019-01-16 RX ADMIN — FENTANYL CITRATE 25 MCG: 50 INJECTION INTRAMUSCULAR; INTRAVENOUS at 08:56

## 2019-01-16 RX ADMIN — FENTANYL CITRATE 50 MCG: 50 INJECTION INTRAMUSCULAR; INTRAVENOUS at 08:45

## 2019-01-16 RX ADMIN — CEFAZOLIN SODIUM 2 G: 2 SOLUTION INTRAVENOUS at 08:30

## 2019-01-16 RX ADMIN — MIDAZOLAM HYDROCHLORIDE 0.5 MG: 2 INJECTION, SOLUTION INTRAMUSCULAR; INTRAVENOUS at 08:55

## 2019-01-16 NOTE — DISCHARGE INSTRUCTIONS
[address] Implanted Port Discharge Instructions      General Instructions:   A port is like an implanted IV. They are usually ordered for patients who will be getting chemotherapy, but can also be used as an IV for long term antibiotics, large amounts of fluids, and/or blood products. Your blood can be drawn from your port for labs also. Those patients who do not have good veins find the ports convenient as they can get the IV they need with one stick. The port can be used long term, and the care is easy. The device is under the skin, and once the skin heals, care is minimal. All that is required is the nurse who accesses the port will need to flush it with heparinized saline after each use. Ports are usually placed in the chest wall, usually on the right side. But they can be place in the arms and in the abdomen. Home Care Instructions: If your port is in your arm, do not allow blood pressure or other IVs to be place in that arm. Do not allow bra straps or any clothing to rub the skin over the port. Do not bathe or swim until the skin has healed and if the port is accessed. Once it is healed, and when the port is not accessed, it is okay to bathe and swim. Restrict yourself to light activity for the first 5 days after getting the port put in, after that, resume normal activity slowly. You may resume your normal diet and medications. Follow-Up Instructions: Please see your oncologist, or whatever physician ordered the port as he/she has requested of you. Call If: You should call your Physician and/or the Radiology Nurse if you notice redness, pus, swelling, or pain from the area of your incision. Call if you should develop a fever. The nurses who access your port will know to call your doctor if the port does not seem to be working properly. You need to tell the nurses who use the port if you should have any pain or swelling at the site during an infusion.       DISCHARGE SUMMARY from Nurse    PATIENT INSTRUCTIONS:    After general anesthesia or intravenous sedation, for 24 hours or while taking prescription Narcotics:  · Limit your activities  · Do not drive and operate hazardous machinery  · Do not make important personal or business decisions  · Do  not drink alcoholic beverages  · If you have not urinated within 8 hours after discharge, please contact your surgeon on call. Report the following to your surgeon:  · Excessive pain, swelling, redness or odor of or around the surgical area  · Temperature over 100.5  · Nausea and vomiting lasting longer than 4 hours or if unable to take medications  · Any signs of decreased circulation or nerve impairment to extremity: change in color, persistent  numbness, tingling, coldness or increase pain  · Any questions    What to do at Home:    *  Please give a list of your current medications to your Primary Care Provider. *  Please update this list whenever your medications are discontinued, doses are      changed, or new medications (including over-the-counter products) are added. *  Please carry medication information at all times in case of emergency situations. These are general instructions for a healthy lifestyle:    No smoking/ No tobacco products/ Avoid exposure to second hand smoke  Surgeon General's Warning:  Quitting smoking now greatly reduces serious risk to your health. Obesity, smoking, and sedentary lifestyle greatly increases your risk for illness    A healthy diet, regular physical exercise & weight monitoring are important for maintaining a healthy lifestyle    You may be retaining fluid if you have a history of heart failure or if you experience any of the following symptoms:  Weight gain of 3 pounds or more overnight or 5 pounds in a week, increased swelling in our hands or feet or shortness of breath while lying flat in bed.   Please call your doctor as soon as you notice any of these symptoms; do not wait until your next office visit. Recognize signs and symptoms of STROKE:    F-face looks uneven    A-arms unable to move or move unevenly    S-speech slurred or non-existent    T-time-call 911 as soon as signs and symptoms begin-DO NOT go       Back to bed or wait to see if you get better-TIME IS BRAIN. Warning Signs of HEART ATTACK     Call 911 if you have these symptoms:   Chest discomfort. Most heart attacks involve discomfort in the center of the chest that lasts more than a few minutes, or that goes away and comes back. It can feel like uncomfortable pressure, squeezing, fullness, or pain.  Discomfort in other areas of the upper body. Symptoms can include pain or discomfort in one or both arms, the back, neck, jaw, or stomach.  Shortness of breath with or without chest discomfort.  Other signs may include breaking out in a cold sweat, nausea, or lightheadedness. Don't wait more than five minutes to call 911 - MINUTES MATTER! Fast action can save your life. Calling 911 is almost always the fastest way to get lifesaving treatment. Emergency Medical Services staff can begin treatment when they arrive -- up to an hour sooner than if someone gets to the hospital by car. The discharge information has been reviewed with the patient and caregiver. The patient and caregiver verbalized understanding. Discharge medications reviewed with the patient and caregiver and appropriate educational materials and side effects teaching were provided.   ___________________________________________________________________________________________________________________________________    To Reach Us:       Patient Signature:  Date: 1/16/2019  Discharging Nurse: Shelia Timmons RN

## 2019-01-16 NOTE — H&P
OUTPATIENT HISTORY AND PHYSICAL      Today 1/16/2019     Indication/Symptoms:   Rayray Lewis is a 70 y.o. female with a history of left-sided breast cancer on chemotherapy via existing PICC line placement who presents for an image-guided mediport placement with moderate sedation. Of note patient has a right-sided pacemaker in place and a history of left mastectomy and radiation. She has been NPO since midnight and takes no blood thinning medications. Current Meds:    Prior to Admission medications    Medication Sig Start Date End Date Taking? Authorizing Provider   Biotin 2,500 mcg cap Take  by mouth daily. Provider, Historical   melatonin 10 mg tab Take  by mouth nightly. Provider, Historical   carvedilol (COREG) 25 mg tablet TAKE 1 TABLET TWICE A DAY  WITH MEALS 11/27/18   Zurdo Galarza MD   fluticasone/umeclidin/vilanter (TRELEGY ELLIPTA IN) Take  by inhalation daily. Provider, Historical   albuterol-ipratropium (DUO-NEB) 2.5 mg-0.5 mg/3 ml nebu 3 mL by Nebulization route every six (6) hours as needed. 11/19/18   Shu Tavera MD   mometasone (NASONEX) 50 mcg/actuation nasal spray USE 2 SPRAYS IN [facility]       NOSTRIL DAILY 10/9/18   Kvng HINSON MD   lisinopril (PRINIVIL, ZESTRIL) 10 mg tablet TAKE 1 TABLET DAILY 9/17/18   Zurdo Galarza MD   montelukast (SINGULAIR) 10 mg tablet Take 1 Tab by mouth daily. 7/6/18   Shu Tavera MD   cholecalciferol, vitamin D3, 2,000 unit tab Take  by mouth. Provider, Historical   mometasone-formoterol (DULERA) 100-5 mcg/actuation HFA inhaler Take 2 Puffs by inhalation two (2) times a day. 11/10/17   Porsche Trevizo NP   omeprazole (PRILOSEC) 40 mg capsule Take 40 mg by mouth daily. Provider, Historical   raloxifene (EVISTA) 60 mg tablet Take  by mouth daily. Provider, Historical       Allergies:       Allergies   Allergen Reactions    Adhesive Other (comments)     blisters       Comorbid Conditions:    Past Medical History:   Diagnosis Date    Abnormal nuclear cardiac imaging test 06/11/2010    Lg fixed anterior, septal, apical, inferoseptal defect sugg RCA & LAD disease or cardiomyopathy. EF 20%. Global hykn. Nondiagnostic EKG.  Acute bronchitis 10/15/2018    Persistent cough and wheezing in spite of prednisone and antibiotic. Will refer to pulmonary    Adenocarcinoma of breast; locally advanced invasive ductal adenocarcinoma of left breast     Asthma     Automatic implantable cardiac defibrillator in situ     Automatic implantable cardiac defibrillator in situ     [facility]     Cancer ([facility]     breast cancer left    Chemotherapy convalescence or palliative care 2012    Chronic combined systolic and diastolic heart failure (HCC)     Remains symptomatic, nyha class 3 ef improving.  Congestive heart failure, unspecified     chronic class ll    Echocardiogram 09/27/2010    EF 30%. Global hykn. Mild MR. Mild TR.  GERD (gastroesophageal reflux disease)     Hyperlipidemia     Hypertension     Mitral valve disorders(424.0) 1/15/2014    mild to moderate mr     Mitral valve disorders(424.0) 1/15/2014    mild to moderate mr     MVP (mitral valve prolapse)     Nonischemic cardiomyopathy (HCC)     EF 20-30% despite medical therapy    Nonspecific abnormal electrocardiogram (ECG) (EKG)     Osteopenia     Other primary cardiomyopathies     Pacemaker 10/27/10    s/p implantation, without complication    Poisoning by other and unspecified agents primarily affecting the cardiovascular system(972.9)     Ef slightly better to 45%, STABLE back on chemo.  Radiation therapy     Radiation therapy complication 8016    S/P cardiac catheterization 07/08/10    Patent coronary arteries. LVEDP 25.  EF 25%. Global hykn. Moderate MR.  RA 10.  RV 40/10. PA 40/20.  20.  CO/CI 4.5/2.45 (Larry).     Shortness of breath     Syncope and collapse     Thyroid disease     goiter          Past Surgical History: Procedure Laterality Date    CARDIAC SURG PROCEDURE UNLIST      Difibrillator; BI V ICD    HX MASTECTOMY  09/28/11    modified radical mastectomy and axillary dissection    HX ORTHOPAEDIC      HX OTHER SURGICAL      surgery to remove part of liver    HX PACEMAKER  10/27/10    s/p [employer] biventricular AICD, without complication    HX RADICAL MASTECTOMY      NEUROLOGICAL PROCEDURE UNLISTED      Cervical fusion     Data:    Visit Vitals  /74 (BP 1 Location: Right arm)   Pulse 92   Temp 98.3 °F (36.8 °C)   Resp 23   Ht 5' 5\" (1.651 m)   Wt 77.1 kg (170 lb)   SpO2 93%   Breastfeeding? No   BMI 28.29 kg/m²   :  Recent Labs     01/16/19  0734        Recent Labs     01/16/19  0749   INR 1.0       The H & P and/or progress notes and any available imaging were reviewed. The risks, indications and possible alternatives to the procedure, including doing nothing, were discussed and informed consent was obtained. Physical Exam:      Mental status:   Alert and oriented. Examination specific to the procedure proposed to be performed and any co morbid conditions:   Mallampati classification 2 ,  ASA 3   Heart:   Regular rate. Lungs:   Normal respiratory effort. Symmetrical rise and fall of chest    The patient is an appropriate candidate to undergo the planned procedure and sedation.     Shaista Loaizama

## 2019-01-16 NOTE — PROCEDURES
Interventional Radiology Brief Post Procedure Note     Procedure Performed: Mediport     : Baldomero Delaney PA-C     Assistant: None    Attending: Dr. Ladan Bah     Pre-operative Diagnosis: Left breast cancer requiring chemotherapy treatment     Post-operative Diagnosis: Same      Anesthesia: 1% lidocaine and IV moderate sedation with Versed and Fentanyl administered and monitored by qualified nursing staff.      Findings:    - Written and verbal informed consent was obtained. - Time Out was performed. - Permanent image storage performed on PACS. - Image-guided left chest single lumen Mediport placement performed using maximum barrier precautions and sterile technique.    - Please refer to report on PACS for full details.      Specimens: None     Complications: No immediate     Estimated Blood Loss:  Minimal     Blood transfusions:  None.     Implants: Please see PACS report.      Condition: Stable      Disposition: Nursing recovery unit      Impression: Successful left chest Mediport placement     Baldomero Delaney [facility]

## 2019-01-16 NOTE — PROGRESS NOTES
Cath holding summary    Patient escorted to cath holding from waiting area ambulatory, alert and oriented x 4, voicing no complaints. Changed into gown and placed on monitor. NPO since MN. Lab results, med rec and H&P reviewed on chart. PIV x 1 inserted without difficulty. Family to bedside. 0359 via I-STAT PT-INR 1.0, k+ 4.1, creatinine 0.8, BUN 18    0940 patient arrived to cath holding awake and alert, vital signs stable, left upper chest site clean dry and intact with no hematoma present , no C/O pain will continue to monitor.      1150 patient discharged home with family, vital signs stable, left upper chest site clean dry and intact with no hematoma present , no C/O pain

## 2019-01-17 RX ORDER — MOMETASONE FUROATE 50 UG/1
SPRAY, METERED NASAL
Qty: 1 CONTAINER | Refills: 3 | Status: SHIPPED | OUTPATIENT
Start: 2019-01-17 | End: 2019-04-05

## 2019-01-21 ENCOUNTER — OFFICE VISIT (OUTPATIENT)
Dept: CARDIOLOGY CLINIC | Age: 69
End: 2019-01-21

## 2019-01-21 VITALS
DIASTOLIC BLOOD PRESSURE: 42 MMHG | HEIGHT: 65 IN | WEIGHT: 172 LBS | BODY MASS INDEX: 28.66 KG/M2 | HEART RATE: 88 BPM | SYSTOLIC BLOOD PRESSURE: 93 MMHG

## 2019-01-21 DIAGNOSIS — Z95.810 AUTOMATIC IMPLANTABLE CARDIOVERTER-DEFIBRILLATOR IN SITU: ICD-10-CM

## 2019-01-21 DIAGNOSIS — I42.8 NONISCHEMIC CARDIOMYOPATHY (HCC): ICD-10-CM

## 2019-01-21 DIAGNOSIS — C50.412 MALIGNANT NEOPLASM OF UPPER-OUTER QUADRANT OF LEFT FEMALE BREAST, UNSPECIFIED ESTROGEN RECEPTOR STATUS (HCC): ICD-10-CM

## 2019-01-21 DIAGNOSIS — I50.32 CHRONIC DIASTOLIC CONGESTIVE HEART FAILURE (HCC): Primary | ICD-10-CM

## 2019-01-21 RX ORDER — MULTIVIT WITH MINERALS/HERBS
1 TABLET ORAL DAILY
Status: ON HOLD | COMMUNITY
End: 2019-09-26

## 2019-01-21 RX ORDER — BENZONATATE 100 MG/1
100 CAPSULE ORAL
COMMUNITY
End: 2019-09-26

## 2019-01-21 RX ORDER — BISMUTH SUBSALICYLATE 262 MG
1 TABLET,CHEWABLE ORAL DAILY
COMMUNITY
End: 2019-11-04

## 2019-01-21 RX ORDER — LANOLIN ALCOHOL/MO/W.PET/CERES
3000 CREAM (GRAM) TOPICAL DAILY
Status: ON HOLD | COMMUNITY
End: 2019-08-27

## 2019-01-21 RX ORDER — CARVEDILOL 6.25 MG/1
6.25 TABLET ORAL 2 TIMES DAILY WITH MEALS
Qty: 60 TAB | Refills: 6 | Status: SHIPPED | OUTPATIENT
Start: 2019-01-21 | End: 2019-04-05 | Stop reason: SDUPTHER

## 2019-01-21 RX ORDER — DEXAMETHASONE 4 MG/1
TABLET ORAL EVERY 12 HOURS
COMMUNITY
End: 2019-04-05

## 2019-01-21 NOTE — LETTER
Ernesto Albarado 1950 1/21/2019 Dear MD Tami Myrick MD Alverta Brush, MD Johnanna Coffer, MD 
 
I had the pleasure of evaluating  Ms. Tavares Vieira in office today. Below are the relevant portions of my assessment and plan of care. ICD-10-CM ICD-9-CM 1. Chronic diastolic congestive heart failure (HCC) I50.32 428.32   
  428.0   
 stable 2. Nonischemic cardiomyopathy (HCC) I42.8 425.4 ECHO ADULT COMPLETE Ejection fraction remains normal pre-chemo 3. Automatic implantable cardioverter-defibrillator in situ Z95.810 V45.02   
 normal function 4. Malignant neoplasm of upper-outer quadrant of left female breast, unspecified estrogen receptor status (HCC) C50.412 174.4 ECHO ADULT COMPLETE Recurrent metastatic tumor now on chemotherapy again Current Outpatient Medications Medication Sig Dispense Refill  multivitamin (ONE A DAY) tablet Take 1 Tab by mouth daily.  b complex vitamins (B COMPLEX 1) tablet Take 1 Tab by mouth daily.  cyanocobalamin 1,000 mcg tablet Take 3,000 mcg by mouth daily.  VALERIAN ROOT by Does Not Apply route.  plant stanol eliezer (CHOLEST OFF PO) Take  by mouth.  benzonatate (TESSALON PERLES) 100 mg capsule Take 100 mg by mouth three (3) times daily as needed for Cough.  dexamethasone (DECADRON) 4 mg tablet Take  by mouth every twelve (12) hours.  carvedilol (COREG) 6.25 mg tablet Take 1 Tab by mouth two (2) times daily (with meals). 60 Tab 6  Biotin 2,500 mcg cap Take  by mouth daily.  melatonin 10 mg tab Take  by mouth nightly.  fluticasone/umeclidin/vilanter (TRELEGY ELLIPTA IN) Take  by inhalation daily.  albuterol-ipratropium (DUO-NEB) 2.5 mg-0.5 mg/3 ml nebu 3 mL by Nebulization route every six (6) hours as needed.  120 Nebule 3  
 lisinopril (PRINIVIL, ZESTRIL) 10 mg tablet TAKE 1 TABLET DAILY 90 Tab 2  
  montelukast (SINGULAIR) 10 mg tablet Take 1 Tab by mouth daily. 90 Tab 3  cholecalciferol, vitamin D3, 2,000 unit tab Take  by mouth.  omeprazole (PRILOSEC) 40 mg capsule Take 40 mg by mouth daily.  raloxifene (EVISTA) 60 mg tablet Take  by mouth daily.  mometasone (NASONEX) 50 mcg/actuation nasal spray USE 2 SPRAYS IN EACH       NOSTRIL DAILY 1 Container 3  
 mometasone-formoterol (DULERA) 100-5 mcg/actuation HFA inhaler Take 2 Puffs by inhalation two (2) times a day. 3 Inhaler 3 Orders Placed This Encounter  multivitamin (ONE A DAY) tablet Sig: Take 1 Tab by mouth daily.  b complex vitamins (B COMPLEX 1) tablet Sig: Take 1 Tab by mouth daily.  cyanocobalamin 1,000 mcg tablet Sig: Take 3,000 mcg by mouth daily.  VALERIAN ROOT Sig: by Does Not Apply route.  plant stanol eliezer (CHOLEST OFF PO) Sig: Take  by mouth.  benzonatate (TESSALON PERLES) 100 mg capsule Sig: Take 100 mg by mouth three (3) times daily as needed for Cough.  dexamethasone (DECADRON) 4 mg tablet Sig: Take  by mouth every twelve (12) hours.  carvedilol (COREG) 6.25 mg tablet Sig: Take 1 Tab by mouth two (2) times daily (with meals). Dispense:  60 Tab Refill:  6 If you have questions, please do not hesitate to call me. I look forward to following Ms. Trevino along with you. Sincerely, Gerald Giraldo MD

## 2019-01-21 NOTE — PROGRESS NOTES
Patient brought medications list.    1. Have you been to the ER, urgent care clinic since your last visit? Hospitalized since your last visit? No    2. Have you seen or consulted any other health care providers outside of the [address] [place] [address] since your last visit? Include any pap smears or colon screening.  Yes Where: [facility] Reason for visit: [facility]/Cancer Treatment

## 2019-01-21 NOTE — PROGRESS NOTES
HISTORY OF PRESENT ILLNESS  Flako Ventura is a 70 y.o. female. Patient with cmp,chf.post  icd   On follow up patient denies any chest pains, palpitation or other significant symptoms. Patient is here for follow up of diagnostic tests. Results will be discussed. He feels extremely fatigued tired and weak. Recently reported decrease in renal function. CHF   The history is provided by the patient. This is a recurrent problem. The problem occurs constantly. The problem has been gradually improving. Associated symptoms include shortness of breath. Pertinent negatives include no chest pain. The symptoms are aggravated by exertion. The symptoms are relieved by rest.   Cardiomyopathy   The history is provided by the patient. This is a chronic problem. The problem has been resolved. Associated symptoms include shortness of breath. Pertinent negatives include no chest pain. Hypertension   The history is provided by the patient. This is a chronic problem. The problem occurs constantly. The problem has not changed since onset. Associated symptoms include shortness of breath. Pertinent negatives include no chest pain. Valvular Heart Disease   The history is provided by the patient. This is a chronic problem. The problem occurs constantly. The problem has not changed since onset. Associated symptoms include shortness of breath. Pertinent negatives include no chest pain. Review of Systems   Constitutional: Negative for chills and fever. HENT: Negative for nosebleeds. Eyes: Negative for blurred vision and double vision. Respiratory: Positive for shortness of breath. Negative for cough, hemoptysis, sputum production and wheezing. Cardiovascular: Negative for chest pain, palpitations, orthopnea, claudication, leg swelling and PND. Gastrointestinal: Negative for heartburn, nausea and vomiting. Musculoskeletal: Negative for myalgias. Skin: Negative for rash.    Neurological: Negative for dizziness and weakness. Endo/Heme/Allergies: Does not bruise/bleed easily. Family History   Problem Relation Age of Onset    Hypertension Mother     High Cholesterol Mother     Heart Disease Father         paemaker implant at 80       Past Medical History:   Diagnosis Date    Abnormal nuclear cardiac imaging test 06/11/2010    Lg fixed anterior, septal, apical, inferoseptal defect sugg RCA & LAD disease or cardiomyopathy. EF 20%. Global hykn. Nondiagnostic EKG.  Acute bronchitis 10/15/2018    Persistent cough and wheezing in spite of prednisone and antibiotic. Will refer to pulmonary    Adenocarcinoma of breast; locally advanced invasive ductal adenocarcinoma of left breast     Asthma     Automatic implantable cardiac defibrillator in situ     Automatic implantable cardiac defibrillator in situ     Breast cancer ([facility] [address]     Cancer ([facility] [address]     breast cancer left    Chemotherapy convalescence or palliative care 2012    Chronic combined systolic and diastolic heart failure (HCC)     Remains symptomatic, nyha class 3 ef improving.  Congestive heart failure, unspecified     chronic class ll    Echocardiogram 09/27/2010    EF 30%. Global hykn. Mild MR. Mild TR.  GERD (gastroesophageal reflux disease)     Hyperlipidemia     Hypertension     Mitral valve disorders(424.0) 1/15/2014    mild to moderate mr     Mitral valve disorders(424.0) 1/15/2014    mild to moderate mr     MVP (mitral valve prolapse)     Nonischemic cardiomyopathy (HCC)     EF 20-30% despite medical therapy    Nonspecific abnormal electrocardiogram (ECG) (EKG)     Osteopenia     Other primary cardiomyopathies     Pacemaker 10/27/10    s/p implantation, without complication    Poisoning by other and unspecified agents primarily affecting the cardiovascular system(972.9)     Ef slightly better to 45%, STABLE back on chemo.     Radiation therapy     Radiation therapy complication 6308    S/P cardiac catheterization 07/08/10 Patent coronary arteries. LVEDP 25.  EF 25%. Global hykn. Moderate MR.  RA 10.  RV 40/10. PA 40/20.  20.  CO/CI 4.5/2.45 (Larry).  Shortness of breath     Syncope and collapse     Thyroid disease     goiter       Past Surgical History:   Procedure Laterality Date    CARDIAC SURG PROCEDURE UNLIST      Difibrillator; BI V ICD    HX MASTECTOMY  09/28/11    modified radical mastectomy and axillary dissection    HX ORTHOPAEDIC      HX OTHER SURGICAL      surgery to remove part of liver    HX PACEMAKER  10/27/10    s/p [employer] biventricular AICD, without complication    HX RADICAL MASTECTOMY      IR INSERT TUNL CVC W PORT OVER 5 YEARS  1/16/2019    NEUROLOGICAL PROCEDURE UNLISTED      Cervical fusion       Social History     Tobacco Use    Smoking status: Never Smoker    Smokeless tobacco: Never Used   Substance Use Topics    Alcohol use: No       Allergies   Allergen Reactions    Adhesive Other (comments)     blisters       Current Outpatient Medications   Medication Sig    multivitamin (ONE A DAY) tablet Take 1 Tab by mouth daily.  b complex vitamins (B COMPLEX 1) tablet Take 1 Tab by mouth daily.  cyanocobalamin 1,000 mcg tablet Take 3,000 mcg by mouth daily.  VALERIAN ROOT by Does Not Apply route.  plant stanol eliezer (CHOLEST OFF PO) Take  by mouth.  benzonatate (TESSALON PERLES) 100 mg capsule Take 100 mg by mouth three (3) times daily as needed for Cough.  dexamethasone (DECADRON) 4 mg tablet Take  by mouth every twelve (12) hours.  carvedilol (COREG) 6.25 mg tablet Take 1 Tab by mouth two (2) times daily (with meals).  Biotin 2,500 mcg cap Take  by mouth daily.  melatonin 10 mg tab Take  by mouth nightly.  fluticasone/umeclidin/vilanter (TRELEGY ELLIPTA IN) Take  by inhalation daily.  albuterol-ipratropium (DUO-NEB) 2.5 mg-0.5 mg/3 ml nebu 3 mL by Nebulization route every six (6) hours as needed.     lisinopril (PRINIVIL, ZESTRIL) 10 mg tablet TAKE 1 TABLET DAILY    montelukast (SINGULAIR) 10 mg tablet Take 1 Tab by mouth daily.  cholecalciferol, vitamin D3, 2,000 unit tab Take  by mouth.  omeprazole (PRILOSEC) 40 mg capsule Take 40 mg by mouth daily.  raloxifene (EVISTA) 60 mg tablet Take  by mouth daily.  mometasone (NASONEX) 50 mcg/actuation nasal spray USE 2 SPRAYS IN EACH       NOSTRIL DAILY    mometasone-formoterol (DULERA) 100-5 mcg/actuation HFA inhaler Take 2 Puffs by inhalation two (2) times a day. No current facility-administered medications for this visit. Visit Vitals  BP 93/42   Pulse 88   Ht 5' 5\" (1.651 m)   Wt 78 kg (172 lb)   BMI 28.62 kg/m²         Physical Exam   Constitutional: She is oriented to person, place, and time. She appears well-developed and well-nourished. HENT:   Head: Normocephalic and atraumatic. Eyes: Conjunctivae are normal.   Neck: Neck supple. No JVD present. No tracheal deviation present. No thyromegaly present. Cardiovascular: Normal rate and regular rhythm. PMI is not displaced. Exam reveals no gallop, no S3 and no decreased pulses. Murmur heard. High-pitched blowing holosystolic murmur is present with a grade of 2/6 at the apex. Pulmonary/Chest: No respiratory distress. She has wheezes. She has no rales. She exhibits no tenderness. Abdominal: Soft. There is no tenderness. Musculoskeletal: She exhibits no edema. Neurological: She is alert and oriented to person, place, and time. Skin: Skin is warm. Psychiatric: She has a normal mood and affect. Ms. Main Trejo has a reminder for a \"due or due soon\" health maintenance. I have asked that she contact her primary care provider for follow-up on this health maintenance. No flowsheet data found. SUMMARY:echo:6/2014  Left ventricle: The ventricle was mildly dilated. Systolic function was  normal. Ejection fraction was estimated in the range of 50 % to 55 %. There were no regional wall motion abnormalities.  Doppler parameters were  consistent with abnormal left ventricular relaxation (grade 1 diastolic  dysfunction). Left atrium: The atrium was mildly dilated. Mitral valve: There was systolic bowing of the posterior leaflet, but  without diagnostic evidence for prolapse. There was mild to moderate  regurgitation. SUMMARY:echo:12/2014  Left ventricle: Systolic function was at the lower limits of normal.  Ejection fraction was estimated to be 53 %. There were no regional wall  motion abnormalities. Doppler parameters were consistent with abnormal  left ventricular relaxation (grade 1 diastolic dysfunction). Right ventricle: A pacing wire was present. Mitral valve: There was systolic bowing of the posterior leaflet, but  without diagnostic evidence for prolapse. There was mild regurgitation. Tricuspid valve: Pulmonary artery systolic pressure: 22 mmHg. 12/2015:echo    SUMMARY:  Left ventricle: Systolic function was normal. Ejection fraction was  estimated in the range of 50 % to 55 %. There was mild diffuse  hypokinesis. Doppler parameters were consistent with abnormal left  ventricular relaxation (grade 1 diastolic dysfunction). Mitral valve: There was systolic bowing of the anterior and posterior  leaflets, but without diagnostic evidence for prolapse. There was mild  regurgitation. Tricuspid valve: There was mild regurgitation. Tricuspid regurgitation  peak velocity: 2.3 m/sec. Pulmonary artery systolic pressure: 25 mmHg. pft-6/2017  Maximal Mid Expiratory Flow rate is reduced to 43 % predicted  Forced Expiratory Volume in one second is reduced to 69 % predicted  FEV 1% is reduced     Volumes:   All Volumes are reduced except for RV    Flow Volume Loop:  Nonspecific obstructive pattern in Flow Volume Loop    Bronchodilator:  No significant improvement with bronchodilator therapy    Diffusion:  Abnormal Diffusion Capacity reduced to 62 % predicted  DLCO normalizes to alveolar ventilation    Impression:  Moderate obstructive defect, Predominately small airways, Mild restrictive ventilatory defect, Reduced diffusion capacity indicating a decrease in alveolar surface area for gas exchange  I Have personally reviewed recent relevant labs available and discussed with patient    Interpretation Summary -6/2018  · Calculated left ventricular ejection fraction is 55%. Left ventricular mild concentric hypertrophy observed. Mild (grade 1) left ventricular diastolic dysfunction. · Left atrial cavity size is mildly dilated. · There was systolic bowing of the anterior and posterior leaflets, but without diagnostic evidence for prolapse. Mild mitral valve regurgitation. · Mild tricuspid valve regurgitation is present. Pulmonary arterial systolic pressure is 18 mmHg. · Mild pulmonic valve regurgitation is present. · Small pericardial effusion. Interpretation Summary 12/2018    · Left ventricular low normal systolic function. Estimated left ventricular ejection fraction is 51 - 55%. Visually measured ejection fraction. Normal left ventricular wall motion, no regional wall motion abnormality noted. Moderate (grade 2) left ventricular diastolic dysfunction. · There is no evidence of pulmonary hypertension. · Mild to moderate mitral valve regurgitation. · Left atrial cavity size is mildly dilated. Assessment         ICD-10-CM ICD-9-CM    1. Chronic diastolic congestive heart failure (HCC) I50.32 428.32      428.0     stable   2. Nonischemic cardiomyopathy (HCC) I42.8 425.4 ECHO ADULT COMPLETE    Ejection fraction remains normal pre-chemo   3. Automatic implantable cardioverter-defibrillator in situ Z95.810 V45.02     normal function   4.  Malignant neoplasm of upper-outer quadrant of left female breast, unspecified estrogen receptor status ([facility] [address] C50.412 174.4 ECHO ADULT COMPLETE    Recurrent metastatic tumor now on chemotherapy again   Tounge swelling not related to lisinopril as she had swelling even after stopping lisinopril  7/2018  I will hold Lasix as recent decrease in renal function. No clinical sign of fluid overload. 10/2018  Shortness of breath due to acute bronchitis bilateral wheezing. Will refer back to pulmonary. No sign of fluid overload. Will keep off Lasix  1/2019  Metastatic breast cancer now back on chemotherapy. Lung metastasis likely cause for shortness of breath which is improving. Low blood pressure will reduce Coreg to 6.25 twice a day. Continue lisinopril. Recheck echo in 2 months on chemotherapy  Orders Placed This Encounter    carvedilol (COREG) 6.25 mg tablet     Sig: Take 1 Tab by mouth two (2) times daily (with meals). Dispense:  60 Tab     Refill:  6       Follow-up Disposition:  Return in about 3 months (around 4/21/2019).

## 2019-01-29 ENCOUNTER — APPOINTMENT (OUTPATIENT)
Dept: MAMMOGRAPHY | Age: 69
End: 2019-01-29
Attending: NURSE PRACTITIONER
Payer: MEDICARE

## 2019-01-29 ENCOUNTER — HOSPITAL ENCOUNTER (OUTPATIENT)
Dept: BONE DENSITY | Age: 69
Discharge: HOME | End: 2019-01-29
Attending: NURSE PRACTITIONER
Payer: MEDICARE

## 2019-01-29 DIAGNOSIS — Z78.0 POST-MENOPAUSAL: ICD-10-CM

## 2019-01-29 PROCEDURE — 77080 DXA BONE DENSITY AXIAL: CPT

## 2019-02-07 ENCOUNTER — OFFICE VISIT (OUTPATIENT)
Dept: PULMONOLOGY | Age: 69
End: 2019-02-07

## 2019-02-07 VITALS
DIASTOLIC BLOOD PRESSURE: 60 MMHG | RESPIRATION RATE: 20 BRPM | HEIGHT: 65 IN | SYSTOLIC BLOOD PRESSURE: 112 MMHG | HEART RATE: 99 BPM | TEMPERATURE: 97.1 F | BODY MASS INDEX: 29.16 KG/M2 | OXYGEN SATURATION: 97 % | WEIGHT: 175 LBS

## 2019-02-07 DIAGNOSIS — C50.912 ADENOCARCINOMA OF LEFT BREAST (HCC): ICD-10-CM

## 2019-02-07 DIAGNOSIS — C79.9 METASTASIS FROM BREAST CANCER (HCC): ICD-10-CM

## 2019-02-07 DIAGNOSIS — J44.9 CHRONIC ASTHMATIC BRONCHITIS (HCC): Primary | ICD-10-CM

## 2019-02-07 DIAGNOSIS — I42.8 NONISCHEMIC CARDIOMYOPATHY (HCC): ICD-10-CM

## 2019-02-07 DIAGNOSIS — C50.919 METASTASIS FROM BREAST CANCER (HCC): ICD-10-CM

## 2019-02-07 PROBLEM — C78.00 MALIGNANT NEOPLASM METASTATIC TO LUNG (HCC): Status: ACTIVE | Noted: 2019-02-07

## 2019-02-07 NOTE — PROGRESS NOTES
[facility] Pulmonary, Critical Care, and Sleep Medicine Pulmonary Office visit Name: Tanvi Brown : 1950 Date: 2019 Subjective:  
 
 
19 Recent diagnosis of metastatic breast Ca- recurrence with Lung mets- parenchymal and endobronchial. Currently being treated with Paclitaxel with hypersensitivity reaction despite dexamethasone. ow tolerating better with [employer] She has now completed 4 cycles of chemotherapy She has been experiencing soreness in mouth- improving after stopping Trelegy Continues to have some SOB- much better compared to prior to treatment. Feels stronger Appetite OK. Denies much productive mucus. Denies fever, chills. Denies any chest pain. No nausea, vomiting. Awaiting follow up CT scan ordered by Dr. Linsey Hernandez HPI: 
Patient is a 78 y.o. female has been experiencing SOB for past 1 year. SOB:  
Symptoms occur with exertion and at night. Able to walk about about 3-4  Blocks. Cannot climb stairs. Activities of daily living are affected and improves with pacing. Has orthopnea/ paroxysmal nocturnal dyspnea SOB relieved with pacing and resting. C/o sinus congestion. Denies any significant Cough: 
Has some wheezing, no chest pain, fever, chills, night sweats dyspepsia, reflux. Has had AICD placed and replaced. Left mastectomy with chemo- radiation : 5 years back Weight gain of 50 lbs over few years. Comorbid conditions include- DM, HTN, CHF- nonischemic cardiomyopathy, Breast ca- treated: s/p mastectomy -L and chemo radiation Non smoker Occupational exposure-none Past Medical History:  
Diagnosis Date  Abnormal nuclear cardiac imaging test 2010 Lg fixed anterior, septal, apical, inferoseptal defect sugg RCA & LAD disease or cardiomyopathy. EF 20%. Global hykn. Nondiagnostic EKG.  Acute bronchitis 10/15/2018 Persistent cough and wheezing in spite of prednisone and antibiotic. 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   
 Asthma  Automatic implantable cardiac defibrillator in situ  Automatic implantable cardiac defibrillator in situ  Breast [facility] ([facility]  Cancer ([facility] [facility] breast cancer left  Chemotherapy convalescence or palliative care 2012  Chronic combined systolic and diastolic [facility] Remains symptomatic, nyha class 3 ef improving.  Congestive heart failure, unspecified   
 chronic class ll  Echocardiogram 09/27/2010 EF 30%. Global hykn. Mild MR. Mild TR.  GERD (gastroesophageal reflux disease)  Hyperlipidemia  Hypertension  Mitral valve disorders(424.0) 1/15/2014  
 mild to moderate mr  Mitral valve disorders(424.0) 1/15/2014  
 mild to moderate mr  MVP (mitral valve prolapse)  Nonischemic cardiomyopathy ([facility] [facility] EF 20-30% despite medical therapy  Nonspecific abnormal electrocardiogram (ECG) (EKG)  Osteopenia  Other primary cardiomyopathies  Pacemaker 10/27/10  
 s/p implantation, without complication  Poisoning by other and unspecified agents primarily affecting the cardiovascular system(972.9) Ef slightly better to 45%, STABLE back on chemo.  Radiation therapy  Radiation therapy complication 4755  S/P cardiac catheterization 07/08/10 Patent coronary arteries. LVEDP 25.  EF 25%. Global hykn. Moderate MR.  RA 10.  RV 40/10. PA 40/20.  20.  CO/CI 4.5/2.45 (Larry).  Shortness of breath  Syncope and collapse  Thyroid disease   
 goiter Past Surgical History:  
Procedure Laterality Date  CARDIAC SURG PROCEDURE UNLIST Difibrillator; BI V ICD  HX MASTECTOMY  09/28/11  
 modified radical mastectomy and axillary dissection  HX ORTHOPAEDIC    
 HX OTHER SURGICAL    
 surgery to remove part of liver  HX PACEMAKER  10/27/10 s/p [employer] biventricular AICD, without complication  HX RADICAL MASTECTOMY  IR INSERT TUNL CVC W PORT OVER 5 YEARS  1/16/2019  
 NEUROLOGICAL PROCEDURE UNLISTED Cervical fusion Allergies Allergen Reactions  Adhesive Other (comments)  
  blisters [facility] Current Outpatient Medications Medication Sig Dispense Refill  tiotropium-olodaterol (STIOLTO RESPIMAT) 2.5-2.5 mcg/actuation inhaler Take 2 Actuation(s) by inhalation daily. 1 Inhaler 0  
 multivitamin (ONE A DAY) tablet Take 1 Tab by mouth daily.  b complex vitamins (B COMPLEX 1) tablet Take 1 Tab by mouth daily.  cyanocobalamin 1,000 mcg tablet Take 3,000 mcg by mouth daily.  VALERIAN ROOT by Does Not Apply route.  benzonatate (TESSALON PERLES) 100 mg capsule Take 100 mg by mouth three (3) times daily as needed for Cough.  dexamethasone (DECADRON) 4 mg tablet Take  by mouth every twelve (12) hours.  carvedilol (COREG) 6.25 mg tablet Take 1 Tab by mouth two (2) times daily (with meals). 60 Tab 6  Biotin 2,500 mcg cap Take  by mouth daily.  melatonin 10 mg tab Take  by mouth nightly.  albuterol-ipratropium (DUO-NEB) 2.5 mg-0.5 mg/3 ml nebu 3 mL by Nebulization route every six (6) hours as needed. 120 Nebule 3  
 lisinopril (PRINIVIL, ZESTRIL) 10 mg tablet TAKE 1 TABLET DAILY 90 Tab 2  
 montelukast (SINGULAIR) 10 mg tablet Take 1 Tab by mouth daily. 90 Tab 3  cholecalciferol, vitamin D3, 2,000 unit tab Take  by mouth.  omeprazole (PRILOSEC) 40 mg capsule Take 40 mg by mouth daily.  raloxifene (EVISTA) 60 mg tablet Take  by mouth daily.  plant stanol eliezer (CHOLEST OFF PO) Take  by mouth.  mometasone (NASONEX) 50 mcg/actuation nasal spray USE 2 SPRAYS IN EACH       NOSTRIL DAILY 1 Container 3 Review of Systems: 
HEENT: No epistaxis, no nasal drainage, no difficulty in swallowing, no redness in eyes Respiratory: as above Cardiovascular: no chest pain, no palpitations, no chronic leg edema, no syncope Gastrointestinal: no abd pain, no vomiting, no diarrhea, no bleeding symptoms Genitourinary: No urinary symptoms or hematuria Integument/breast: No ulcers or rashes Musculoskeletal:Neg 
Neurological: No focal weakness, no seizures, no headaches Behvioral/Psych: No anxiety, no depression Constitutional: No fever, no chills, no weight loss, no night sweats Objective:  
 
Visit Vitals /60 (BP 1 Location: Right arm, BP Patient Position: Sitting) Pulse 99 Temp 97.1 °F (36.2 °C) (Oral) Resp 20 Ht 5' 5\" (1.651 m) Wt 79.4 kg (175 lb) SpO2 97% BMI 29.12 kg/m² Physical Exam:  
General: comfortable, no acute distress HEENT: pupils reactive, sclera anicteric, EOM intact Neck: No adenopathy or thyroid swelling, no lymphadenopathy or JVD, supple Chest: multiple scars from previous surgery, surgically absent left breast 
CVS: S1S2 
RS: Mod AE bilaterally, no tactile fremitus or egophony, no accessory muscle use, faint crackles, no wheezing Abd: soft, non tender, no hepatosplenomegaly Neuro: non focal, awake, alert Extrm: no leg edema, clubbing or cyanosis Skin: no rash Data review: 
Pertinent labs: CBC, BMP, LFT's PFT: 
 
Date FVC FEV1  FEV1/FVC AET60-98 TLC RV RV/TLC VC DLCO  
6/29/2017 75 69 67 43 77 81 nl  62  
11/2018  46  35     49 FLOWS: 
Maximal Mid Expiratory Flow rate is reduced to 43 % predicted Forced Expiratory Volume in one second is reduced to 69 % predicted FEV 1% is reduced Volumes: All Volumes are reduced except for RV Flow Volume Loop: 
Nonspecific obstructive pattern in Flow Volume Loop Bronchodilator: No significant improvement with bronchodilator therapy Diffusion: Abnormal Diffusion Capacity reduced to 62 % predicted DLCO normalizes to alveolar ventilation Impression: Moderate obstructive defect, Predominately small airways, Mild restrictive ventilatory defect, Reduced diffusion capacity indicating a decrease in alveolar surface area for gas exchange 6 min walk test;walked 330 feet with no O2 desaturation or significant heart rate change. DI- stable Echo: 1/18/2017: 
Left ventricle: Systolic function was normal. Ejection fraction was 
estimated to be 50 %. There were no regional wall motion abnormalities. Doppler parameters were consistent with abnormal left ventricular 
relaxation (grade 1 diastolic dysfunction). Right ventricle: The size was normal. Systolic function was reduced. Left atrium: Size was normal. 
Right atrium: Size was normal. 
Mitral valve: There was systolic bowing of the anterior and posterior 
leaflets, but without diagnostic evidence for prolapse. There was mild 
regurgitation. Aortic valve: The valve was trileaflet. Leaflets exhibited normal 
thickness and normal cuspal separation. Tricuspid valve: Pulmonary artery systolic pressure: 18 mmHg. Pulmonic valve: There was mild regurgitation. Imaging: 
I have personally reviewed the patients radiographs and have reviewed the reports: 
CT Results (most recent): 
Results from Hospital Encounter encounter on 12/07/18 CT HEAD W CONT Narrative Head CT with IV contrast 
 
HISTORY: Primary malignant neoplasm of upper outer quadrant of the female breast 
COMPARISON: CT 12/20/2012 Technique: CT images of the head were obtained after intravenous contrast 
administration. Patient received 80 mL Isovue 300. All CT scans at this facility are performed using dose optimization technique as 
appropriate to the performed exam, to include automated exposure control, 
adjustment of the mA and/or kV according to patient's size (Including 
appropriate matching for site-specific examinations), or use of iterative 
reconstruction technique. FINDINGS:  
Limited evaluation for intracranial hemorrhage due to IV contrast. No evidence of acute parenchymal hemorrhage or extra-axial fluid collection. No mass effect 
or shift of midline structures. No evidence of acute infarct. No enhancing mass. Visualized paranasal sinuses and mastoid air cells are aerated. The calvarium is 
intact. Impression IMPRESSION: 
 
No enhancing mass to suggest metastatic disease to the brain. No evidence of acute intracranial abnormality. Thank you for this referral. 
 
  
 
XR Results (most recent): 
Results from [facility] Encounter encounter on 12/11/18 XR CHEST PORT Narrative INDICATION:  Post bronchoscopy. COMPARISON:  11/19/2018. FINDINGS: A portable AP radiograph of the chest was obtained at 1409 hours. Increased bilateral interstitial markings. Cardiac silhouette and overlying AICD 
type pacemaker is stable. Right lung base streaky opacity, similar, may 
represent atelectasis or developing infiltrate. Additionally trace effusion may 
also be considered. Osseous structures are stable. No definite large 
pneumothorax. Impression IMPRESSION: Increased bilateral interstitial markings may represent congestion. Right lung base streaky opacity as above. No large pneumothorax. CXR 8/26/2014: 
 
AICD. No change in lead placement. No visualized lead fracture. Lungs appear 
unremarkable. Normal size heart. Impression: No acute findings. Patient Active Problem List  
Diagnosis Code  Congestive heart failure (HCC) I50.9  Hypertension I10  
 MVP (mitral valve prolapse) I34.1  Nonischemic cardiomyopathy (HCC) I42.8  Cancer ([facility] C80.1  Adenocarcinoma of breast; locally advanced invasive ductal adenocarcinoma of left breast C50.919  
 Poisoning by other and unspecified agents primarily affecting the cardiovascular system(972.9) T46.904A  Syncope and collapse R55  Other primary cardiomyopathies I42.8  Shortness of breath R06.02  
 Nonspecific abnormal electrocardiogram (ECG) (EKG) R94.31  
  Automatic implantable cardioverter-defibrillator in situ Z95.810  
 Mitral valve disease I05.9  Abnormal PFT R94.2  Acute bronchitis J20.9  Chronic asthmatic bronchitis ([address] J44.9  Malignant neoplasm metastatic to lung (HCC) C78.00 IMPRESSION:  
· Metastatic breast cancer to Lung with extensive endobronchial involvement. Clinical response to chemotherapy. side effects from Trelegy and Paclitaxel noted · SOB on exertion - Carcinomatosis and  Endobronchial inflammation/obstruction from tumor load. · Clinical data suggests multifactorial etiology with progressive symptoms. over past 1 year: worsening reactive airways due to untreated rhinosinusitis, silent reflux and or a component of diastolic heart failure. · Abnormal PFT- combined restrictive and Obstructive component-progression noted -Asthma with reduced DLCO ( corrects with Volume adjustment.) Suspect restrictive component from chest wall deformity · H/o invasive ductal breast Ca · H/o non ischemic cardiomyopathy · AICD RECOMMENDATIONS:  
 
· Will start Stiolto as an alternate agent- longer acting bronchodilator. Sample provided and inhalation technique taught · Will await further response to chemotherapy · Continue treating obstructive airways component · Continue nebulized bronchodilators PRN 
· Continue singulair to address chronic rhinosinuitis component- Nasonex · GERD precautions · Preventive vaccinations- recieved · Monitor response to interventions and make appropriate adjustments · Healthy weight and active lifestyle · Ct scan chest and echo scheduled- follow results · Follow up in 2 months Health maintenance screens deferred to Primary care provider.  
  
Jackie Berger MD

## 2019-02-07 NOTE — LETTER
2019 1:04 PM 
 
Patient:  Wendie Joyner YOB: 1950 Date of Visit: 2019 Dear Lazaro Morgan MD 
[address] [facility] [address] [address] VIA Facsimile: [phone] Julia Espinoza MD 
[address] [facility] [address] [id number] [address] [address] VIA Facsimile: [phone] 
 : Thank you for referring Ms. Vineet Alba to me for evaluation/treatment. Below are the relevant portions of my assessment and plan of care. [facility] Pulmonary, Critical Care, and Sleep Medicine Pulmonary Office visit Name: Wendie Joyner : 1950 Date: 2019 Subjective:  
 
 
19 Recent diagnosis of metastatic breast Ca- recurrence with Lung mets- parenchymal and endobronchial. Currently being treated with Paclitaxel with hypersensitivity reaction despite dexamethasone. ow tolerating better with Moriah White[place]oke She has now completed 4 cycles of chemotherapy She has been experiencing soreness in mouth- improving after stopping Trelegy Continues to have some SOB- much better compared to prior to treatment. Feels stronger Appetite OK. Denies much productive mucus. Denies fever, chills. Denies any chest pain. No nausea, vomiting. Awaiting follow up CT scan ordered by Dr. Xenia Kirby HPI: 
Patient is a 78 y.o. female has been experiencing SOB for past 1 year. SOB:  
Symptoms occur with exertion and at night. Able to walk about about 3-4  Blocks. Cannot climb stairs. Activities of daily living are affected and improves with pacing. Has orthopnea/ paroxysmal nocturnal dyspnea SOB relieved with pacing and resting. C/o sinus congestion. Denies any significant Cough: 
Has some wheezing, no chest pain, fever, chills, night sweats dyspepsia, reflux. Has had AICD placed and replaced. Left mastectomy with chemo- radiation : 5 years back Weight gain of 50 lbs over few years. Comorbid conditions include- DM, HTN, CHF- nonischemic cardiomyopathy, Breast ca- treated: s/p mastectomy -L and chemo radiation Non smoker Occupational exposure-none Past Medical History:  
Diagnosis Date  Abnormal nuclear cardiac imaging test 06/11/2010 Lg fixed anterior, septal, apical, inferoseptal defect sugg RCA & LAD disease or cardiomyopathy. EF 20%. Global hykn. Nondiagnostic EKG.  Acute bronchitis 10/15/2018 Persistent cough and wheezing in spite of prednisone and antibiotic. 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   
 Asthma  Automatic implantable cardiac defibrillator in situ  Automatic implantable cardiac defibrillator in situ  Breast cancer ([facility] [address]  Cancer ([facility] [address] breast cancer left  Chemotherapy convalescence or palliative care 2012  Chronic combined systolic and diastolic heart failure ([facility] [address] Remains symptomatic, nyha class 3 ef improving.  Congestive heart failure, unspecified   
 chronic class ll  Echocardiogram 09/27/2010 EF 30%. Global hykn. Mild MR. Mild TR.  GERD (gastroesophageal reflux disease)  Hyperlipidemia  Hypertension  Mitral valve disorders(424.0) 1/15/2014  
 mild to moderate mr  Mitral valve disorders(424.0) 1/15/2014  
 mild to moderate mr  MVP (mitral valve prolapse)  Nonischemic cardiomyopathy ([address] EF 20-30% despite medical therapy  Nonspecific abnormal electrocardiogram (ECG) (EKG)  Osteopenia  Other primary cardiomyopathies  Pacemaker 10/27/10  
 s/p implantation, without complication  Poisoning by other and unspecified agents primarily affecting the cardiovascular system(972.9) Ef slightly better to 45%, STABLE back on chemo.  Radiation therapy  Radiation therapy complication 1766  S/P cardiac catheterization 07/08/10 Patent coronary arteries. LVEDP 25.  EF 25%. Global hykn. Moderate MR.  RA 10.  RV 40/10. PA 40/20.  20.  CO/CI 4.5/2.45 (Larry).  Shortness of breath  Syncope and collapse  Thyroid disease   
 goiter Past Surgical History:  
Procedure Laterality Date  CARDIAC SURG PROCEDURE UNLIST Difibrillator; BI V ICD  HX MASTECTOMY  09/28/11  
 modified radical mastectomy and axillary dissection  HX ORTHOPAEDIC    
 HX OTHER SURGICAL    
 surgery to remove part of liver  HX PACEMAKER  10/27/10  
 s/p [employer] biventricular AICD, without complication  HX RADICAL MASTECTOMY  IR INSERT TUNL CVC W PORT OVER 5 YEARS  1/16/2019  
 NEUROLOGICAL PROCEDURE UNLISTED Cervical fusion Allergies Allergen Reactions  Adhesive Other (comments)  
  blisters Mac Chavis Current Outpatient Medications Medication Sig Dispense Refill  tiotropium-olodaterol (STIOLTO RESPIMAT) 2.5-2.5 mcg/actuation inhaler Take 2 Actuation(s) by inhalation daily. 1 Inhaler 0  
 multivitamin (ONE A DAY) tablet Take 1 Tab by mouth daily.  b complex vitamins (B COMPLEX 1) tablet Take 1 Tab by mouth daily.  cyanocobalamin 1,000 mcg tablet Take 3,000 mcg by mouth daily.  VALERIAN ROOT by Does Not Apply route.  benzonatate (TESSALON PERLES) 100 mg capsule Take 100 mg by mouth three (3) times daily as needed for Cough.  dexamethasone (DECADRON) 4 mg tablet Take  by mouth every twelve (12) hours.  carvedilol (COREG) 6.25 mg tablet Take 1 Tab by mouth two (2) times daily (with meals). 60 Tab 6  Biotin 2,500 mcg cap Take  by mouth daily.  melatonin 10 mg tab Take  by mouth nightly.  albuterol-ipratropium (DUO-NEB) 2.5 mg-0.5 mg/3 ml nebu 3 mL by Nebulization route every six (6) hours as needed. 120 Nebule 3  
 lisinopril (PRINIVIL, ZESTRIL) 10 mg tablet TAKE 1 TABLET DAILY 90 Tab 2  
 montelukast (SINGULAIR) 10 mg tablet Take 1 Tab by mouth daily.  90 Tab 3  
  cholecalciferol, vitamin D3, 2,000 unit tab Take  by mouth.  omeprazole (PRILOSEC) 40 mg capsule Take 40 mg by mouth daily.  raloxifene (EVISTA) 60 mg tablet Take  by mouth daily.  plant stanol eliezer (CHOLEST OFF PO) Take  by mouth.  mometasone (NASONEX) 50 mcg/actuation nasal spray USE 2 SPRAYS IN EACH       NOSTRIL DAILY 1 Container 3 Review of Systems: 
HEENT: No epistaxis, no nasal drainage, no difficulty in swallowing, no redness in eyes Respiratory: as above Cardiovascular: no chest pain, no palpitations, no chronic leg edema, no syncope Gastrointestinal: no abd pain, no vomiting, no diarrhea, no bleeding symptoms Genitourinary: No urinary symptoms or hematuria Integument/breast: No ulcers or rashes Musculoskeletal:Neg 
Neurological: No focal weakness, no seizures, no headaches Behvioral/Psych: No anxiety, no depression Constitutional: No fever, no chills, no weight loss, no night sweats Objective: 
 
Visit Vitals /60 (BP 1 Location: Right arm, BP Patient Position: Sitting) Pulse 99 Temp 97.1 °F (36.2 °C) (Oral) Resp 20 Ht 5' 5\" (1.651 m) Wt 79.4 kg (175 lb) SpO2 97% BMI 29.12 kg/m² Physical Exam:  
General: comfortable, no acute distress HEENT: pupils reactive, sclera anicteric, EOM intact Neck: No adenopathy or thyroid swelling, no lymphadenopathy or JVD, supple Chest: multiple scars from previous surgery, surgically absent left breast 
CVS: S1S2 
RS: Mod AE bilaterally, no tactile fremitus or egophony, no accessory muscle use, faint crackles, no wheezing Abd: soft, non tender, no hepatosplenomegaly Neuro: non focal, awake, alert Extrm: no leg edema, clubbing or cyanosis Skin: no rash Data review: 
Pertinent labs: CBC, BMP, LFT's PFT: 
 
Date FVC FEV1  FEV1/FVC [id number] TLC RV RV/TLC VC DLCO  
6/29/2017 75 69 67 43 77 81 nl  62  
11/2018  46  35     49 FLOWS: 
 Maximal Mid Expiratory Flow rate is reduced to 43 % predicted Forced Expiratory Volume in one second is reduced to 69 % predicted FEV 1% is reduced Volumes: All Volumes are reduced except for RV Flow Volume Loop: 
Nonspecific obstructive pattern in Flow Volume Loop Bronchodilator: No significant improvement with bronchodilator therapy Diffusion: Abnormal Diffusion Capacity reduced to 62 % predicted DLCO normalizes to alveolar ventilation Impression: Moderate obstructive defect, Predominately small airways, Mild restrictive ventilatory defect, Reduced diffusion capacity indicating a decrease in alveolar surface area for gas exchange 6 min walk test;walked 330 feet with no O2 desaturation or significant heart rate change. DI- stable Echo: 1/18/2017: 
Left ventricle: Systolic function was normal. Ejection fraction was 
estimated to be 50 %. There were no regional wall motion abnormalities. Doppler parameters were consistent with abnormal left ventricular 
relaxation (grade 1 diastolic dysfunction). Right ventricle: The size was normal. Systolic function was reduced. Left atrium: Size was normal. 
Right atrium: Size was normal. 
Mitral valve: There was systolic bowing of the anterior and posterior 
leaflets, but without diagnostic evidence for prolapse. There was mild 
regurgitation. Aortic valve: The valve was trileaflet. Leaflets exhibited normal 
thickness and normal cuspal separation. Tricuspid valve: Pulmonary artery systolic pressure: 18 mmHg. Pulmonic valve: There was mild regurgitation. Imaging: 
I have personally reviewed the patients radiographs and have reviewed the reports: 
CT Results (most recent): 
Results from Hospital Encounter encounter on 12/07/18 CT HEAD W CONT Narrative Head CT with IV contrast 
 
HISTORY: Primary malignant neoplasm of upper outer quadrant of the female breast 
COMPARISON: CT 12/20/2012 Technique: CT images of the head were obtained after intravenous contrast 
administration. Patient received 80 mL Isovue 300. All CT scans at this facility are performed using dose optimization technique as 
appropriate to the performed exam, to include automated exposure control, 
adjustment of the mA and/or kV according to patient's size (Including 
appropriate matching for site-specific examinations), or use of iterative 
reconstruction technique. FINDINGS:  
Limited evaluation for intracranial hemorrhage due to IV contrast. No evidence 
of acute parenchymal hemorrhage or extra-axial fluid collection. No mass effect 
or shift of midline structures. No evidence of acute infarct. No enhancing mass. Visualized paranasal sinuses and mastoid air cells are aerated. The calvarium is 
intact. Impression IMPRESSION: 
 
No enhancing mass to suggest metastatic disease to the brain. No evidence of acute intracranial abnormality. Thank you for this referral. 
 
  
 
XR Results (most recent): 
Results from [facility] Encounter encounter on 12/11/18 XR CHEST PORT Narrative INDICATION:  Post bronchoscopy. COMPARISON:  11/19/2018. FINDINGS: A portable AP radiograph of the chest was obtained at 1409 hours. Increased bilateral interstitial markings. Cardiac silhouette and overlying AICD 
type pacemaker is stable. Right lung base streaky opacity, similar, may 
represent atelectasis or developing infiltrate. Additionally trace effusion may 
also be considered. Osseous structures are stable. No definite large 
pneumothorax. Impression IMPRESSION: Increased bilateral interstitial markings may represent congestion. Right lung base streaky opacity as above. No large pneumothorax. CXR 8/26/2014: 
 
AICD. No change in lead placement. No visualized lead fracture. Lungs appear 
unremarkable. Normal size heart. Impression: No acute findings. Patient Active Problem List  
Diagnosis Code  Congestive heart failure (HCC) I50.9  Hypertension I10  
 MVP (mitral valve prolapse) I34.1  Nonischemic cardiomyopathy (HCC) I42.8  Cancer ([facility] [address] C80.1  Adenocarcinoma of breast; locally advanced invasive ductal adenocarcinoma of left breast C50.919  
 Poisoning by other and unspecified agents primarily affecting the cardiovascular system(972.9) T46.904A  Syncope and collapse R55  Other primary cardiomyopathies I42.8  Shortness of breath R06.02  
 Nonspecific abnormal electrocardiogram (ECG) (EKG) R94.31  
 Automatic implantable cardioverter-defibrillator in situ Z95.810  
 Mitral valve disease I05.9  Abnormal PFT R94.2  Acute bronchitis J20.9  Chronic asthmatic bronchitis ([facility] [address] J44.9  Malignant neoplasm metastatic to lung (HCC) C78.00 IMPRESSION:  
· Metastatic breast cancer to Lung with extensive endobronchial involvement. Clinical response to chemotherapy. side effects from Trelegy and Paclitaxel noted · SOB on exertion - Carcinomatosis and  Endobronchial inflammation/obstruction from tumor load. · Clinical data suggests multifactorial etiology with progressive symptoms. over past 1 year: worsening reactive airways due to untreated rhinosinusitis, silent reflux and or a component of diastolic heart failure. · Abnormal PFT- combined restrictive and Obstructive component-progression noted -Asthma with reduced DLCO ( corrects with Volume adjustment.) Suspect restrictive component from chest wall deformity · H/o invasive ductal breast Ca · H/o non ischemic cardiomyopathy · AICD RECOMMENDATIONS:  
 
· Will start Stiolto as an alternate agent- longer acting bronchodilator. Sample provided and inhalation technique taught · Will await further response to chemotherapy · Continue treating obstructive airways component · Continue nebulized bronchodilators PRN 
· Continue singulair to address chronic rhinosinuitis component- Nasonex · GERD precautions · Preventive vaccinations- recieved · Monitor response to interventions and make appropriate adjustments · Healthy weight and active lifestyle · Ct scan chest and echo scheduled- follow results · Follow up in 2 months Health maintenance screens deferred to Primary care provider. Mert Gallardo MD 
 
 
 
 
 
 
If you have questions, please do not hesitate to call me. I look forward to following Ms. Trevino along with you.  
 
 
 
Sincerely, 
 
 
Mert Gallardo MD

## 2019-02-07 NOTE — COMMUNICATION BODY
[facility] Pulmonary, Critical Care, and Sleep Medicine Pulmonary Office visit Name: Armida Craig : 1950 Date: 2019 Subjective:  
 
 
19 Recent diagnosis of metastatic breast Ca- recurrence with Lung mets- parenchymal and endobronchial. Currently being treated with Paclitaxel with hypersensitivity reaction despite dexamethasone. ow tolerating better with [place] She has now completed 4 cycles of chemotherapy She has been experiencing soreness in mouth- improving after stopping Trelegy Continues to have some SOB- much better compared to prior to treatment. Feels stronger Appetite OK. Denies much productive mucus. Denies fever, chills. Denies any chest pain. No nausea, vomiting. Awaiting follow up CT scan ordered by Dr. Crissy Sosa HPI: 
Patient is a 78 y.o. female has been experiencing SOB for past 1 year. SOB:  
Symptoms occur with exertion and at night. Able to walk about about 3-4  Blocks. Cannot climb stairs. Activities of daily living are affected and improves with pacing. Has orthopnea/ paroxysmal nocturnal dyspnea SOB relieved with pacing and resting. C/o sinus congestion. Denies any significant Cough: 
Has some wheezing, no chest pain, fever, chills, night sweats dyspepsia, reflux. Has had AICD placed and replaced. Left mastectomy with chemo- radiation : 5 years back Weight gain of 50 lbs over few years. Comorbid conditions include- DM, HTN, CHF- nonischemic cardiomyopathy, Breast ca- treated: s/p mastectomy -L and chemo radiation Non smoker Occupational exposure-none Past Medical History:  
Diagnosis Date  Abnormal nuclear cardiac imaging test 2010 Lg fixed anterior, septal, apical, inferoseptal defect sugg RCA & LAD disease or cardiomyopathy. EF 20%. Global hykn. Nondiagnostic EKG.  Acute bronchitis 10/15/2018 Persistent cough and wheezing in spite of prednisone and antibiotic. 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   
 Asthma  Automatic implantable cardiac defibrillator in situ  Automatic implantable cardiac defibrillator in situ  Breast cancer ([facility]  Cancer ([facility] breast cancer left  Chemotherapy convalescence or palliative care 2012  Chronic combined systolic and diastolic heart failure ([facility] Remains symptomatic, nyha class 3 ef improving.  Congestive heart failure, unspecified   
 chronic class ll  Echocardiogram 09/27/2010 EF 30%. Global hykn. Mild MR. Mild TR.  GERD (gastroesophageal reflux disease)  Hyperlipidemia  Hypertension  Mitral valve disorders(424.0) 1/15/2014  
 mild to moderate mr  Mitral valve disorders(424.0) 1/15/2014  
 mild to moderate mr  MVP (mitral valve prolapse)  Nonischemic cardiomyopathy ([facility] EF 20-30% despite medical therapy  Nonspecific abnormal electrocardiogram (ECG) (EKG)  Osteopenia  Other primary cardiomyopathies  Pacemaker 10/27/10  
 s/p implantation, without complication  Poisoning by other and unspecified agents primarily affecting the cardiovascular system(972.9) Ef slightly better to 45%, STABLE back on chemo.  Radiation therapy  Radiation therapy complication 8357  S/P cardiac catheterization 07/08/10 Patent coronary arteries. LVEDP 25.  EF 25%. Global hykn. Moderate MR.  RA 10.  RV 40/10. PA 40/20.  20.  CO/CI 4.5/2.45 (Larry).  Shortness of breath  Syncope and collapse  Thyroid disease   
 goiter Past Surgical History:  
Procedure Laterality Date  CARDIAC SURG PROCEDURE UNLIST Difibrillator; BI V ICD  HX MASTECTOMY  09/28/11  
 modified radical mastectomy and axillary dissection  HX ORTHOPAEDIC    
 HX OTHER SURGICAL    
 surgery to remove part of liver  HX PACEMAKER  10/27/10 s/p [employer] biventricular AICD, without complication  HX RADICAL MASTECTOMY  IR INSERT TUNL CVC W PORT OVER 5 YEARS  1/16/2019  
 NEUROLOGICAL PROCEDURE UNLISTED Cervical fusion Allergies Allergen Reactions  Adhesive Other (comments)  
  blisters Lore Nance Current Outpatient Medications Medication Sig Dispense Refill  tiotropium-olodaterol (STIOLTO RESPIMAT) 2.5-2.5 mcg/actuation inhaler Take 2 Actuation(s) by inhalation daily. 1 Inhaler 0  
 multivitamin (ONE A DAY) tablet Take 1 Tab by mouth daily.  b complex vitamins (B COMPLEX 1) tablet Take 1 Tab by mouth daily.  cyanocobalamin 1,000 mcg tablet Take 3,000 mcg by mouth daily.  VALERIAN ROOT by Does Not Apply route.  benzonatate (TESSALON PERLES) 100 mg capsule Take 100 mg by mouth three (3) times daily as needed for Cough.  dexamethasone (DECADRON) 4 mg tablet Take  by mouth every twelve (12) hours.  carvedilol (COREG) 6.25 mg tablet Take 1 Tab by mouth two (2) times daily (with meals). 60 Tab 6  Biotin 2,500 mcg cap Take  by mouth daily.  melatonin 10 mg tab Take  by mouth nightly.  albuterol-ipratropium (DUO-NEB) 2.5 mg-0.5 mg/3 ml nebu 3 mL by Nebulization route every six (6) hours as needed. 120 Nebule 3  
 lisinopril (PRINIVIL, ZESTRIL) 10 mg tablet TAKE 1 TABLET DAILY 90 Tab 2  
 montelukast (SINGULAIR) 10 mg tablet Take 1 Tab by mouth daily. 90 Tab 3  cholecalciferol, vitamin D3, 2,000 unit tab Take  by mouth.  omeprazole (PRILOSEC) 40 mg capsule Take 40 mg by mouth daily.  raloxifene (EVISTA) 60 mg tablet Take  by mouth daily.  plant stanol eliezer (CHOLEST OFF PO) Take  by mouth.  mometasone (NASONEX) 50 mcg/actuation nasal spray USE 2 SPRAYS IN EACH       NOSTRIL DAILY 1 Container 3 Review of Systems: 
HEENT: No epistaxis, no nasal drainage, no difficulty in swallowing, no redness in eyes Respiratory: as above Cardiovascular: no chest pain, no palpitations, no chronic leg edema, no syncope Gastrointestinal: no abd pain, no vomiting, no diarrhea, no bleeding symptoms Genitourinary: No urinary symptoms or hematuria Integument/breast: No ulcers or rashes Musculoskeletal:Neg 
Neurological: No focal weakness, no seizures, no headaches Behvioral/Psych: No anxiety, no depression Constitutional: No fever, no chills, no weight loss, no night sweats Objective:  
 
Visit Vitals /60 (BP 1 Location: Right arm, BP Patient Position: Sitting) Pulse 99 Temp 97.1 °F (36.2 °C) (Oral) Resp 20 Ht 5' 5\" (1.651 m) Wt 79.4 kg (175 lb) SpO2 97% BMI 29.12 kg/m² Physical Exam:  
General: comfortable, no acute distress HEENT: pupils reactive, sclera anicteric, EOM intact Neck: No adenopathy or thyroid swelling, no lymphadenopathy or JVD, supple Chest: multiple scars from previous surgery, surgically absent left breast 
CVS: S1S2 
RS: Mod AE bilaterally, no tactile fremitus or egophony, no accessory muscle use, faint crackles, no wheezing Abd: soft, non tender, no hepatosplenomegaly Neuro: non focal, awake, alert Extrm: no leg edema, clubbing or cyanosis Skin: no rash Data review: 
Pertinent labs: CBC, BMP, LFT's PFT: 
 
Date FVC FEV1  FEV1/FVC [id number] TLC RV RV/TLC VC DLCO  
6/29/2017 75 69 67 43 77 81 nl  62  
11/2018  46  35     49 FLOWS: 
Maximal Mid Expiratory Flow rate is reduced to 43 % predicted Forced Expiratory Volume in one second is reduced to 69 % predicted FEV 1% is reduced Volumes: All Volumes are reduced except for RV Flow Volume Loop: 
Nonspecific obstructive pattern in Flow Volume Loop Bronchodilator: No significant improvement with bronchodilator therapy Diffusion: Abnormal Diffusion Capacity reduced to 62 % predicted DLCO normalizes to alveolar ventilation Impression: Moderate obstructive defect, Predominately small airways, Mild restrictive ventilatory defect, Reduced diffusion capacity indicating a decrease in alveolar surface area for gas exchange 6 min walk test;walked 330 feet with no O2 desaturation or significant heart rate change. DI- stable Echo: 1/18/2017: 
Left ventricle: Systolic function was normal. Ejection fraction was 
estimated to be 50 %. There were no regional wall motion abnormalities. Doppler parameters were consistent with abnormal left ventricular 
relaxation (grade 1 diastolic dysfunction). Right ventricle: The size was normal. Systolic function was reduced. Left atrium: Size was normal. 
Right atrium: Size was normal. 
Mitral valve: There was systolic bowing of the anterior and posterior 
leaflets, but without diagnostic evidence for prolapse. There was mild 
regurgitation. Aortic valve: The valve was trileaflet. Leaflets exhibited normal 
thickness and normal cuspal separation. Tricuspid valve: Pulmonary artery systolic pressure: 18 mmHg. Pulmonic valve: There was mild regurgitation. Imaging: 
I have personally reviewed the patients radiographs and have reviewed the reports: 
CT Results (most recent): 
Results from Hospital Encounter encounter on 12/07/18 CT HEAD W CONT Narrative Head CT with IV contrast 
 
HISTORY: Primary malignant neoplasm of upper outer quadrant of the female breast 
COMPARISON: CT 12/20/2012 Technique: CT images of the head were obtained after intravenous contrast 
administration. Patient received 80 mL Isovue 300. All CT scans at this facility are performed using dose optimization technique as 
appropriate to the performed exam, to include automated exposure control, 
adjustment of the mA and/or kV according to patient's size (Including 
appropriate matching for site-specific examinations), or use of iterative 
reconstruction technique. FINDINGS:  
Limited evaluation for intracranial hemorrhage due to IV contrast. No evidence of acute parenchymal hemorrhage or extra-axial fluid collection. No mass effect 
or shift of midline structures. No evidence of acute infarct. No enhancing mass. Visualized paranasal sinuses and mastoid air cells are aerated. The calvarium is 
intact. Impression IMPRESSION: 
 
No enhancing mass to suggest metastatic disease to the brain. No evidence of acute intracranial abnormality. Thank you for this referral. 
 
  
 
XR Results (most recent): 
Results from [facility] Encounter encounter on 12/11/18 XR CHEST PORT Narrative INDICATION:  Post bronchoscopy. COMPARISON:  11/19/2018. FINDINGS: A portable AP radiograph of the chest was obtained at 1409 hours. Increased bilateral interstitial markings. Cardiac silhouette and overlying AICD 
type pacemaker is stable. Right lung base streaky opacity, similar, may 
represent atelectasis or developing infiltrate. Additionally trace effusion may 
also be considered. Osseous structures are stable. No definite large 
pneumothorax. Impression IMPRESSION: Increased bilateral interstitial markings may represent congestion. Right lung base streaky opacity as above. No large pneumothorax. CXR 8/26/2014: 
 
AICD. No change in lead placement. No visualized lead fracture. Lungs appear 
unremarkable. Normal size heart. Impression: No acute findings. Patient Active Problem List  
Diagnosis Code  Congestive heart failure (HCC) I50.9  Hypertension I10  
 MVP (mitral valve prolapse) I34.1  Nonischemic cardiomyopathy (HCC) I42.8  Cancer ([facility] C80.1  Adenocarcinoma of breast; locally advanced invasive ductal adenocarcinoma of left breast C50.919  
 Poisoning by other and unspecified agents primarily affecting the cardiovascular system(972.9) T46.904A  Syncope and collapse R55  Other primary cardiomyopathies I42.8  Shortness of breath R06.02  
 Nonspecific abnormal electrocardiogram (ECG) (EKG) R94.31  
  Automatic implantable cardioverter-defibrillator in situ Z95.810  
 Mitral valve disease I05.9  Abnormal PFT R94.2  Acute bronchitis J20.9  Chronic asthmatic bronchitis ([address] J44.9  Malignant neoplasm metastatic to lung (HCC) C78.00 IMPRESSION:  
· Metastatic breast cancer to Lung with extensive endobronchial involvement. Clinical response to chemotherapy. side effects from Trelegy and Paclitaxel noted · SOB on exertion - Carcinomatosis and  Endobronchial inflammation/obstruction from tumor load. · Clinical data suggests multifactorial etiology with progressive symptoms. over past 1 year: worsening reactive airways due to untreated rhinosinusitis, silent reflux and or a component of diastolic heart failure. · Abnormal PFT- combined restrictive and Obstructive component-progression noted -Asthma with reduced DLCO ( corrects with Volume adjustment.) Suspect restrictive component from chest wall deformity · H/o invasive ductal breast Ca · H/o non ischemic cardiomyopathy · AICD RECOMMENDATIONS:  
 
· Will start Stiolto as an alternate agent- longer acting bronchodilator. Sample provided and inhalation technique taught · Will await further response to chemotherapy · Continue treating obstructive airways component · Continue nebulized bronchodilators PRN 
· Continue singulair to address chronic rhinosinuitis component- Nasonex · GERD precautions · Preventive vaccinations- recieved · Monitor response to interventions and make appropriate adjustments · Healthy weight and active lifestyle · Ct scan chest and echo scheduled- follow results · Follow up in 2 months Health maintenance screens deferred to Primary care provider.  
  
Africa Fitzgerald MD

## 2019-02-07 NOTE — PROGRESS NOTES
Chief Complaint Patient presents with  Asthma  Cough  Lung Cancer 1. Have you been to the ER, urgent care clinic since your last visit? Hospitalized since your last visit? No 
 
2. Have you seen or consulted any other health care providers outside of the [facility] since your last visit? Include any pap smears or colon screening.  Yes Where: Dr David Boucher, Dr Paula Collins PCP

## 2019-02-07 NOTE — PATIENT INSTRUCTIONS
Learning About Using an Incentive Spirometer What is an incentive spirometer? An incentive spirometer is a handheld device that exercises your lungs and measures how much air you can breathe in. It tells you and your doctor how well your lungs are working. The spirometer can help you practice taking deep breaths. Deep breaths can help open your airways and prevent fluid or mucus from building up in your lungs, and make it easier for you to breathe. Using the device can help prevent serious lung infections like pneumonia, improve your breathing after you've had pneumonia or surgery, and keep your airways open and lungs active if you can't get out of bed. How do you use an incentive spirometer? When you use an incentive spirometer, you breathe in air through a tube that is connected to a large air column containing a piston or ball. As you breathe in, the piston or ball inside the column moves up. The height of the piston or ball shows how much air you breathed in. You may feel lightheaded when you breathe in deeply for this exercise. If you feel dizzy or feel like you're going to pass out, stop the exercise and rest. 
Each time you do this exercise, keep track of your progress by writing down how high the piston or ball moves up the column. 1. Move the slider on the outside of the large column to the level that you want to reach or that your doctor recommended. 2. Sit or stand up straight, and hold the spirometer in front of you. Be sure to keep it level. 3. To start, breathe out normally. Then close your lips tightly around the mouthpiece. Make sure that you don't block the mouthpiece with your tongue. 4. Take a slow, deep breath. Breathe in as deeply as you can. As you breathe in, the piston or ball inside the large column will move up. Try to move the piston or ball as high up as you can or to the level your doctor recommended.  When you can't breathe in anymore, hold your breath for 2 to 5 seconds. 5. Relax, take the mouthpiece out of your mouth, and breathe out normally. 6. Repeat steps 1 through 5 as many times as your doctor tells you to. 
7. After you've taken the recommended number of breaths, try to cough a few times. This will help loosen any mucus that has built up in your lungs. It will make it easier for you to breathe. If you just had surgery on your belly or chest, hold a pillow over your cut (incision) when you cough. Follow-up care is a key part of your treatment and safety. Be sure to make and go to all appointments, and call your doctor if you are having problems. It's also a good idea to know your test results and keep a list of the medicines you take. Where can you learn more? Go to http://karthik-miracle.info/. Enter E028 in the search box to learn more about \"Learning About Using an [employer]. \" Current as of: September 5, 2018 Content Version: 11.9 © 9738-0963 [employer]. Care instructions adapted under license by [employer] (which disclaims liability or warranty for this information). If you have questions about a medical condition or this instruction, always ask your healthcare professional. [address] any warranty or liability for your use of this information.

## 2019-02-10 ENCOUNTER — HOSPITAL ENCOUNTER (OUTPATIENT)
Dept: CT IMAGING | Age: 69
Discharge: HOME | End: 2019-02-10
Attending: INTERNAL MEDICINE
Payer: MEDICARE

## 2019-02-10 ENCOUNTER — HOSPITAL ENCOUNTER (EMERGENCY)
Age: 69
Discharge: STILL ADMITTED OR ADMINISTRATIVE | End: 2019-02-10
Attending: EMERGENCY MEDICINE
Payer: MEDICARE

## 2019-02-10 DIAGNOSIS — C50.412 MALIGNANT NEOPLASM OF UPPER-OUTER QUADRANT OF LEFT FEMALE BREAST (HCC): ICD-10-CM

## 2019-02-10 LAB — CREAT UR-MCNC: 0.8 MG/DL (ref 0.6–1.3)

## 2019-02-10 PROCEDURE — 82565 ASSAY OF CREATININE: CPT

## 2019-02-10 PROCEDURE — 74011636320 HC RX REV CODE- 636/320: Performed by: INTERNAL MEDICINE

## 2019-02-10 PROCEDURE — 71260 CT THORAX DX C+: CPT

## 2019-02-10 PROCEDURE — 75810000275 HC EMERGENCY DEPT VISIT NO LEVEL OF CARE

## 2019-02-10 RX ADMIN — IOPAMIDOL 80 ML: 612 INJECTION, SOLUTION INTRAVENOUS at 15:51

## 2019-02-13 ENCOUNTER — TELEPHONE (OUTPATIENT)
Dept: PULMONOLOGY | Age: 69
End: 2019-02-13

## 2019-02-13 NOTE — TELEPHONE ENCOUNTER
Pt stated Stiolto Thursday 6 days ago. Starting yesterday she started with horsiness and cough. Pt using the Nebulizer tx but horsiness and cough worse today then yesterday.

## 2019-02-14 RX ORDER — NYSTATIN 500000 [USP'U]/1
1 TABLET, COATED ORAL 3 TIMES DAILY
Qty: 30 TAB | Refills: 0 | Status: SHIPPED | OUTPATIENT
Start: 2019-02-14 | End: 2019-04-05

## 2019-02-19 NOTE — TELEPHONE ENCOUNTER
Pt received the 1 x 30 day refill on [facility] to [id number] today.  She wants for #90 day to go to [facility]

## 2019-03-22 ENCOUNTER — HOSPITAL ENCOUNTER (OUTPATIENT)
Dept: PET IMAGING | Age: 69
Discharge: HOME | End: 2019-03-22
Attending: PHYSICIAN ASSISTANT
Payer: MEDICARE

## 2019-03-22 DIAGNOSIS — C50.412 MALIGNANT NEOPLASM OF UPPER-OUTER QUADRANT OF LEFT FEMALE BREAST (HCC): ICD-10-CM

## 2019-03-22 PROCEDURE — A9552 F18 FDG: HCPCS

## 2019-04-05 ENCOUNTER — OFFICE VISIT (OUTPATIENT)
Dept: CARDIOLOGY CLINIC | Age: 69
End: 2019-04-05

## 2019-04-05 VITALS
HEART RATE: 90 BPM | WEIGHT: 174.6 LBS | HEIGHT: 65 IN | BODY MASS INDEX: 29.09 KG/M2 | SYSTOLIC BLOOD PRESSURE: 136 MMHG | DIASTOLIC BLOOD PRESSURE: 68 MMHG

## 2019-04-05 DIAGNOSIS — I50.32 CHRONIC DIASTOLIC CONGESTIVE HEART FAILURE (HCC): ICD-10-CM

## 2019-04-05 DIAGNOSIS — I05.9 MITRAL VALVE DISEASE: ICD-10-CM

## 2019-04-05 DIAGNOSIS — I42.8 NONISCHEMIC CARDIOMYOPATHY (HCC): Primary | ICD-10-CM

## 2019-04-05 DIAGNOSIS — Z95.810 AUTOMATIC IMPLANTABLE CARDIOVERTER-DEFIBRILLATOR IN SITU: ICD-10-CM

## 2019-04-05 RX ORDER — CARVEDILOL 6.25 MG/1
6.25 TABLET ORAL 2 TIMES DAILY WITH MEALS
Qty: 180 TAB | Refills: 3 | Status: SHIPPED | OUTPATIENT
Start: 2019-04-05 | End: 2019-05-06

## 2019-04-05 RX ORDER — DULOXETINE HYDROCHLORIDE 60 MG/1
60 CAPSULE, DELAYED RELEASE ORAL DAILY
COMMUNITY

## 2019-04-05 NOTE — PROGRESS NOTES
HISTORY OF PRESENT ILLNESS  Marika Cabrera is a 78 y.o. female. Patient with cmp,chf.post  icd   On follow up patient denies any chest pains, palpitation or other significant symptoms. Patient is here for follow up of diagnostic tests. Results will be discussed. He feels extremely fatigued tired and weak. Recently reported decrease in renal function. CHF   The history is provided by the patient. This is a recurrent problem. The problem occurs constantly. The problem has been gradually improving. Associated symptoms include shortness of breath. Pertinent negatives include no chest pain. The symptoms are aggravated by exertion. The symptoms are relieved by rest.   Cardiomyopathy   The history is provided by the patient. This is a chronic problem. The problem has been resolved. Associated symptoms include shortness of breath. Pertinent negatives include no chest pain. Hypertension   The history is provided by the patient. This is a chronic problem. The problem occurs constantly. The problem has not changed since onset. Associated symptoms include shortness of breath. Pertinent negatives include no chest pain. Valvular Heart Disease   The history is provided by the patient. This is a chronic problem. The problem occurs constantly. The problem has not changed since onset. Associated symptoms include shortness of breath. Pertinent negatives include no chest pain. Review of Systems   Constitutional: Negative for chills and fever. HENT: Negative for nosebleeds. Eyes: Negative for blurred vision and double vision. Respiratory: Positive for shortness of breath. Negative for cough, hemoptysis, sputum production and wheezing. Cardiovascular: Negative for chest pain, palpitations, orthopnea, claudication, leg swelling and PND. Gastrointestinal: Negative for heartburn, nausea and vomiting. Musculoskeletal: Negative for myalgias. Skin: Negative for rash.    Neurological: Negative for dizziness and weakness. Endo/Heme/Allergies: Does not bruise/bleed easily. Family History   Problem Relation Age of Onset    Hypertension Mother     High Cholesterol Mother     Heart Disease Father         paemaker implant at 80       Past Medical History:   Diagnosis Date    Abnormal nuclear cardiac imaging test 06/11/2010    Lg fixed anterior, septal, apical, inferoseptal defect sugg RCA & LAD disease or cardiomyopathy. EF 20%. Global hykn. Nondiagnostic EKG.  Acute bronchitis 10/15/2018    Persistent cough and wheezing in spite of prednisone and antibiotic. Will refer to pulmonary    Adenocarcinoma of breast; locally advanced invasive ductal adenocarcinoma of left breast     Asthma     Automatic implantable cardiac defibrillator in situ     Automatic implantable cardiac defibrillator in situ     [facility] ([facility] [address]     Cancer ([facility] [address]     breast cancer left    Chemotherapy convalescence or palliative care 2012    Chronic combined systolic and diastolic heart failure (HCC)     Remains symptomatic, nyha class 3 ef improving.  Congestive heart failure, unspecified     chronic class ll    Echocardiogram 09/27/2010    EF 30%. Global hykn. Mild MR. Mild TR.  GERD (gastroesophageal reflux disease)     Hyperlipidemia     Hypertension     Mitral valve disorders(424.0) 1/15/2014    mild to moderate mr     Mitral valve disorders(424.0) 1/15/2014    mild to moderate mr     MVP (mitral valve prolapse)     Nonischemic cardiomyopathy (HCC)     EF 20-30% despite medical therapy    Nonspecific abnormal electrocardiogram (ECG) (EKG)     Osteopenia     Other primary cardiomyopathies     Pacemaker 10/27/10    s/p implantation, without complication    Poisoning by other and unspecified agents primarily affecting the cardiovascular system(972.9)     Ef slightly better to 45%, STABLE back on chemo.     Radiation therapy     Radiation therapy complication 2623    S/P cardiac catheterization 07/08/10 Patent coronary arteries. LVEDP 25.  EF 25%. Global hykn. Moderate MR.  RA 10.  RV 40/10. PA 40/20.  20.  CO/CI 4.5/2.45 (Larry).  Shortness of breath     Syncope and collapse     Thyroid disease     goiter       Past Surgical History:   Procedure Laterality Date    CARDIAC SURG PROCEDURE UNLIST      Difibrillator; BI V ICD    HX MASTECTOMY  09/28/11    modified radical mastectomy and axillary dissection    HX ORTHOPAEDIC      HX OTHER SURGICAL      surgery to remove part of liver    HX PACEMAKER  10/27/10    s/p [employer] biventricular AICD, without complication    HX RADICAL MASTECTOMY      IR INSERT TUNL CVC W PORT OVER 5 YEARS  1/16/2019    NEUROLOGICAL PROCEDURE UNLISTED      Cervical fusion       Social History     Tobacco Use    Smoking status: Never Smoker    Smokeless tobacco: Never Used   Substance Use Topics    Alcohol use: No       Allergies   Allergen Reactions    Adhesive Other (comments)     blisters       Current Outpatient Medications   Medication Sig    DULoxetine (CYMBALTA) 30 mg capsule Take 30 mg by mouth daily.  carvedilol (COREG) 6.25 mg tablet Take 1 Tab by mouth two (2) times daily (with meals).  umeclidinium (INCRUSE ELLIPTA) 62.5 mcg/actuation inhaler Take 1 Puff by inhalation daily.  multivitamin (ONE A DAY) tablet Take 1 Tab by mouth daily.  b complex vitamins (B COMPLEX 1) tablet Take 1 Tab by mouth daily.  cyanocobalamin 1,000 mcg tablet Take 3,000 mcg by mouth daily.  VALERIAN ROOT by Does Not Apply route.  plant stanol eliezer (CHOLEST OFF PO) Take  by mouth.  benzonatate (TESSALON PERLES) 100 mg capsule Take 100 mg by mouth three (3) times daily as needed for Cough.  Biotin 2,500 mcg cap Take  by mouth daily.  melatonin 10 mg tab Take  by mouth nightly.  albuterol-ipratropium (DUO-NEB) 2.5 mg-0.5 mg/3 ml nebu 3 mL by Nebulization route every six (6) hours as needed.     lisinopril (PRINIVIL, ZESTRIL) 10 mg tablet TAKE 1 TABLET DAILY    montelukast (SINGULAIR) 10 mg tablet Take 1 Tab by mouth daily.  cholecalciferol, vitamin D3, 2,000 unit tab Take  by mouth.  omeprazole (PRILOSEC) 40 mg capsule Take 40 mg by mouth daily.  raloxifene (EVISTA) 60 mg tablet Take  by mouth daily. No current facility-administered medications for this visit. Visit Vitals  /68   Pulse 90   Ht 5' 5\" (1.651 m)   Wt 79.2 kg (174 lb 9.6 oz)   BMI 29.05 kg/m²         Physical Exam   Constitutional: She is oriented to person, place, and time. She appears well-developed and well-nourished. HENT:   Head: Normocephalic and atraumatic. Eyes: Conjunctivae are normal.   Neck: Neck supple. No JVD present. No tracheal deviation present. No thyromegaly present. Cardiovascular: Normal rate and regular rhythm. PMI is not displaced. Exam reveals no gallop, no S3 and no decreased pulses. Murmur heard. High-pitched blowing holosystolic murmur is present with a grade of 2/6 at the apex. Pulmonary/Chest: No respiratory distress. She has wheezes. She has no rales. She exhibits no tenderness. Abdominal: Soft. There is no tenderness. Musculoskeletal: She exhibits no edema. Neurological: She is alert and oriented to person, place, and time. Skin: Skin is warm. Psychiatric: She has a normal mood and affect. Ms. Amber Marshall has a reminder for a \"due or due soon\" health maintenance. I have asked that she contact her primary care provider for follow-up on this health maintenance. No flowsheet data found. SUMMARY:echo:6/2014  Left ventricle: The ventricle was mildly dilated. Systolic function was  normal. Ejection fraction was estimated in the range of 50 % to 55 %. There were no regional wall motion abnormalities. Doppler parameters were  consistent with abnormal left ventricular relaxation (grade 1 diastolic  dysfunction). Left atrium: The atrium was mildly dilated. Mitral valve:  There was systolic bowing of the posterior leaflet, but  without diagnostic evidence for prolapse. There was mild to moderate  regurgitation. SUMMARY:echo:12/2014  Left ventricle: Systolic function was at the lower limits of normal.  Ejection fraction was estimated to be 53 %. There were no regional wall  motion abnormalities. Doppler parameters were consistent with abnormal  left ventricular relaxation (grade 1 diastolic dysfunction). Right ventricle: A pacing wire was present. Mitral valve: There was systolic bowing of the posterior leaflet, but  without diagnostic evidence for prolapse. There was mild regurgitation. Tricuspid valve: Pulmonary artery systolic pressure: 22 mmHg. 12/2015:echo    SUMMARY:  Left ventricle: Systolic function was normal. Ejection fraction was  estimated in the range of 50 % to 55 %. There was mild diffuse  hypokinesis. Doppler parameters were consistent with abnormal left  ventricular relaxation (grade 1 diastolic dysfunction). Mitral valve: There was systolic bowing of the anterior and posterior  leaflets, but without diagnostic evidence for prolapse. There was mild  regurgitation. Tricuspid valve: There was mild regurgitation. Tricuspid regurgitation  peak velocity: 2.3 m/sec. Pulmonary artery systolic pressure: 25 mmHg. pft-6/2017  Maximal Mid Expiratory Flow rate is reduced to 43 % predicted  Forced Expiratory Volume in one second is reduced to 69 % predicted  FEV 1% is reduced     Volumes:   All Volumes are reduced except for RV    Flow Volume Loop:  Nonspecific obstructive pattern in Flow Volume Loop    Bronchodilator:  No significant improvement with bronchodilator therapy    Diffusion:  Abnormal Diffusion Capacity reduced to 62 % predicted  DLCO normalizes to alveolar ventilation    Impression:  Moderate obstructive defect, Predominately small airways, Mild restrictive ventilatory defect, Reduced diffusion capacity indicating a decrease in alveolar surface area for gas exchange  I Have personally reviewed recent relevant labs available and discussed with patient    Interpretation Summary -6/2018  · Calculated left ventricular ejection fraction is 55%. Left ventricular mild concentric hypertrophy observed. Mild (grade 1) left ventricular diastolic dysfunction. · Left atrial cavity size is mildly dilated. · There was systolic bowing of the anterior and posterior leaflets, but without diagnostic evidence for prolapse. Mild mitral valve regurgitation. · Mild tricuspid valve regurgitation is present. Pulmonary arterial systolic pressure is 18 mmHg. · Mild pulmonic valve regurgitation is present. · Small pericardial effusion. Interpretation Summary 12/2018    · Left ventricular low normal systolic function. Estimated left ventricular ejection fraction is 51 - 55%. Visually measured ejection fraction. Normal left ventricular wall motion, no regional wall motion abnormality noted. Moderate (grade 2) left ventricular diastolic dysfunction. · There is no evidence of pulmonary hypertension. · Mild to moderate mitral valve regurgitation. · Left atrial cavity size is mildly dilated. Interpretation Summary 3/2019       · Normal cavity size, wall thickness and diastolic function. There is low normal systolic function. The estimated ejection fraction is 51 - 55%. No regional wall motion abnormality noted. Global longitudinal strain is -13.00%. Abnormal left ventricular strain. · Normal right ventricular size and function. Pacing wire present  · Mild to moderate mitral valve regurgitation. Mild prolapse of the posterior leaflet within the mitral valve. · Mild pulmonic valve regurgitation is present. · Mild tricuspid valve regurgitation. Pulmonary arterial systolic pressure is 38.0 mmHg. Mild pulmonary hypertension. Assessment         ICD-10-CM ICD-9-CM    1. Nonischemic cardiomyopathy (HCC) I42.8 425.4     Stable. Last EF 51-55%. Reduced strain continue to monitor   2.  Chronic diastolic congestive heart failure (HCC) I50.32 428.32      428.0     Stable continue treatment   3. Automatic implantable cardioverter-defibrillator in situ Z95.810 V45.02     Normal function   4. Mitral valve disease I05.9 394.9     Mild to moderate mitral regurgitation continue to monitor   Tounge swelling not related to lisinopril as she had swelling even after stopping lisinopril  7/2018  I will hold Lasix as recent decrease in renal function. No clinical sign of fluid overload. 10/2018  Shortness of breath due to acute bronchitis bilateral wheezing. Will refer back to pulmonary. No sign of fluid overload. Will keep off Lasix  1/2019  Metastatic breast cancer now back on chemotherapy. Lung metastasis likely cause for shortness of breath which is improving. Low blood pressure will reduce Coreg to 6.25 twice a day. Continue lisinopril. Recheck echo in 2 months on chemotherapy  4/2019  Cardiac status stable. Echo EF remains stable continue Coreg and lisinopril. Check echo in 3 months. Patient remains on chemotherapy      Orders Placed This Encounter    carvedilol (COREG) 6.25 mg tablet     Sig: Take 1 Tab by mouth two (2) times daily (with meals). Dispense:  180 Tab     Refill:  3       Follow-up and Dispositions    · Return in about 3 months (around 7/5/2019).

## 2019-04-12 ENCOUNTER — OFFICE VISIT (OUTPATIENT)
Dept: PULMONOLOGY | Age: 69
End: 2019-04-12

## 2019-04-12 VITALS
HEART RATE: 109 BPM | SYSTOLIC BLOOD PRESSURE: 144 MMHG | BODY MASS INDEX: 30.16 KG/M2 | OXYGEN SATURATION: 97 % | WEIGHT: 181 LBS | RESPIRATION RATE: 20 BRPM | TEMPERATURE: 97.5 F | HEIGHT: 65 IN | DIASTOLIC BLOOD PRESSURE: 74 MMHG

## 2019-04-12 DIAGNOSIS — C78.01 MALIGNANT NEOPLASM METASTATIC TO RIGHT LUNG (HCC): Primary | ICD-10-CM

## 2019-04-12 DIAGNOSIS — J44.9 CHRONIC ASTHMATIC BRONCHITIS (HCC): ICD-10-CM

## 2019-04-12 DIAGNOSIS — I42.8 NONISCHEMIC CARDIOMYOPATHY (HCC): ICD-10-CM

## 2019-04-12 DIAGNOSIS — C50.912 ADENOCARCINOMA OF LEFT BREAST (HCC): ICD-10-CM

## 2019-04-12 PROBLEM — J30.1 SEASONAL ALLERGIC RHINITIS DUE TO POLLEN: Status: ACTIVE | Noted: 2019-04-12

## 2019-04-12 NOTE — LETTER
4/12/19 Patient: Jeanie Swann YOB: 1950 Date of Visit: 4/12/2019 Leighton Sims MD 
[address] [place] [address] [address] [address] VIA Facsimile: [phone] Dear Leighton Sims MD, Thank you for referring Ms. Bon Westfall to [facility] [place] for evaluation. My notes for this consultation are attached. If you have questions, please do not hesitate to call me. I look forward to following your patient along with you.  
 
 
Sincerely, 
 
Mindi Pollock MD

## 2019-04-12 NOTE — COMMUNICATION BODY
[facility] Pulmonary, Critical Care, and Sleep Medicine Pulmonary Office visit Name: Rosaroi Gamez : 1950 Date: 2019 Subjective:  
 
 
19 Recent diagnosis of metastatic breast Ca- recurrence with Lung mets- parenchymal and endobronchial. Currently being treated with Paclitaxel and dexamethasone She has shown good response to treatment which was recently validated with a PET CT. She was doing well on incruse until about 2 days back when she started with itchy throat, some wheezing and coughing which she relates to the pollen in the air. Continues to have some SOB- much better compared to prior to treatment. Feels stronger Appetite OK. Denies much productive mucus. Denies fever, chills. Denies any chest pain. No nausea, vomiting. Also completed her echocardiogram testing. HPI: 
Patient is a 78 y.o. female has been experiencing SOB for past 1 year. SOB:  
Symptoms occur with exertion and at night. Able to walk about about 3-4  Blocks. Cannot climb stairs. Activities of daily living are affected and improves with pacing. Has orthopnea/ paroxysmal nocturnal dyspnea SOB relieved with pacing and resting. C/o sinus congestion. Denies any significant Cough: 
Has some wheezing, no chest pain, fever, chills, night sweats dyspepsia, reflux. Has had AICD placed and replaced. Left mastectomy with chemo- radiation : 5 years back Weight gain of 50 lbs over few years. Comorbid conditions include- DM, HTN, CHF- nonischemic cardiomyopathy, Breast ca- treated: s/p mastectomy -L and chemo radiation Non smoker Occupational exposure-none Past Medical History:  
Diagnosis Date  Abnormal nuclear cardiac imaging test 2010 Lg fixed anterior, septal, apical, inferoseptal defect sugg RCA & LAD disease or cardiomyopathy. EF 20%. Global hykn. Nondiagnostic EKG.  Acute bronchitis 10/15/2018 Persistent cough and wheezing in spite of prednisone and antibiotic. 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   
 Asthma  Automatic implantable cardiac defibrillator in situ  Automatic implantable cardiac defibrillator in situ  [facility]  Cancer ([facility] breast cancer left  Chemotherapy convalescence or palliative care 2012  Chronic combined systolic and diastolic heart failure ([facility] Remains symptomatic, nyha class 3 ef improving.  Congestive heart failure, unspecified   
 chronic class ll  Echocardiogram 09/27/2010 EF 30%. Global hykn. Mild MR. Mild TR.  GERD (gastroesophageal reflux disease)  Hyperlipidemia  Hypertension  Mitral valve disorders(424.0) 1/15/2014  
 mild to moderate mr  Mitral valve disorders(424.0) 1/15/2014  
 mild to moderate mr  MVP (mitral valve prolapse)  Nonischemic cardiomyopathy ([facility] EF 20-30% despite medical therapy  Nonspecific abnormal electrocardiogram (ECG) (EKG)  Osteopenia  Other primary cardiomyopathies  Pacemaker 10/27/10  
 s/p implantation, without complication  Poisoning by other and unspecified agents primarily affecting the cardiovascular system(972.9) Ef slightly better to 45%, STABLE back on chemo.  Radiation therapy  Radiation therapy complication 4814  S/P cardiac catheterization 07/08/10 Patent coronary arteries. LVEDP 25.  EF 25%. Global hykn. Moderate MR.  RA 10.  RV 40/10. PA 40/20.  20.  CO/CI 4.5/2.45 (Larry).  Shortness of breath  Syncope and collapse  Thyroid disease   
 goiter Past Surgical History:  
Procedure Laterality Date  CARDIAC SURG PROCEDURE UNLIST Difibrillator; BI V ICD  HX MASTECTOMY  09/28/11  
 modified radical mastectomy and axillary dissection  HX ORTHOPAEDIC    
 HX OTHER SURGICAL    
 surgery to remove part of liver  HX PACEMAKER  10/27/10  
 s/p [employer] biventricular AICD, without complication  HX RADICAL MASTECTOMY  IR INSERT TUNL CVC W PORT OVER 5 YEARS  1/16/2019  
 NEUROLOGICAL PROCEDURE UNLISTED Cervical fusion Allergies Allergen Reactions  Adhesive Other (comments)  
  blisters [facility] Current Outpatient Medications Medication Sig Dispense Refill  DULoxetine (CYMBALTA) 30 mg capsule Take 30 mg by mouth daily.  carvedilol (COREG) 6.25 mg tablet Take 1 Tab by mouth two (2) times daily (with meals). 180 Tab 3  
 umeclidinium (INCRUSE ELLIPTA) 62.5 mcg/actuation inhaler Take 1 Puff by inhalation daily. 3 Inhaler 3  
 multivitamin (ONE A DAY) tablet Take 1 Tab by mouth daily.  b complex vitamins (B COMPLEX 1) tablet Take 1 Tab by mouth daily.  cyanocobalamin 1,000 mcg tablet Take 3,000 mcg by mouth daily.  plant stanol eliezer (CHOLEST OFF PO) Take  by mouth.  benzonatate (TESSALON PERLES) 100 mg capsule Take 100 mg by mouth three (3) times daily as needed for Cough.  melatonin 10 mg tab Take  by mouth nightly.  albuterol-ipratropium (DUO-NEB) 2.5 mg-0.5 mg/3 ml nebu 3 mL by Nebulization route every six (6) hours as needed. 120 Nebule 3  
 lisinopril (PRINIVIL, ZESTRIL) 10 mg tablet TAKE 1 TABLET DAILY 90 Tab 2  
 montelukast (SINGULAIR) 10 mg tablet Take 1 Tab by mouth daily. 90 Tab 3  cholecalciferol, vitamin D3, 2,000 unit tab Take  by mouth.  omeprazole (PRILOSEC) 40 mg capsule Take 40 mg by mouth daily.  raloxifene (EVISTA) 60 mg tablet Take  by mouth daily.  VALERIAN ROOT by Does Not Apply route.  Biotin 2,500 mcg cap Take  by mouth daily. Review of Systems: 
HEENT: No epistaxis, +nasal drainage, + itching- throat,no difficulty in swallowing, no redness in eyes Respiratory: as above Cardiovascular: no chest pain, no palpitations, no chronic leg edema, no syncope Gastrointestinal: no abd pain, no vomiting, no diarrhea, no bleeding symptoms Genitourinary: No urinary symptoms or hematuria Integument/breast: No ulcers or rashes Musculoskeletal:Neg 
Neurological: No focal weakness, no seizures, no headaches Behvioral/Psych: No anxiety, no depression Constitutional: No fever, no chills, no weight loss, no night sweats Objective:  
 
Visit Vitals /74 (BP 1 Location: Right arm, BP Patient Position: Sitting) Pulse (!) 109 Temp 97.5 °F (36.4 °C) (Oral) Resp 20 Ht 5' 5\" (1.651 m) Wt 82.1 kg (181 lb) SpO2 97% BMI 30.12 kg/m² Physical Exam:  
General: comfortable, no acute distress HEENT: pupils reactive, sclera anicteric, EOM intact Neck: No adenopathy or thyroid swelling, no lymphadenopathy or JVD, supple Chest: multiple scars from previous surgery, surgically absent left breast 
CVS: S1S2 
RS: Mod AE bilaterally, no tactile fremitus or egophony, no accessory muscle use, faint crackles, no wheezing Abd: soft, non tender, no hepatosplenomegaly Neuro: non focal, awake, alert Extrm: no leg edema, clubbing or cyanosis Skin: no rash Data review: 
Pertinent labs: CBC, BMP, LFT's PFT: 
 
Date FVC FEV1  FEV1/FVC [id number] TLC RV RV/TLC VC DLCO  
6/29/2017 75 69 67 43 77 81 nl  62  
11/2018  46  35     49 FLOWS: 
Maximal Mid Expiratory Flow rate is reduced to 43 % predicted Forced Expiratory Volume in one second is reduced to 69 % predicted FEV 1% is reduced Volumes: All Volumes are reduced except for RV Flow Volume Loop: 
Nonspecific obstructive pattern in Flow Volume Loop Bronchodilator: No significant improvement with bronchodilator therapy Diffusion: Abnormal Diffusion Capacity reduced to 62 % predicted DLCO normalizes to alveolar ventilation Impression: Moderate obstructive defect, Predominately small airways, Mild restrictive ventilatory defect, Reduced diffusion capacity indicating a decrease in alveolar surface area for gas exchange 6 min walk test;walked 330 feet with no O2 desaturation or significant heart rate change. DI- stable Echo 3/2019: 
· Normal cavity size, wall thickness and diastolic function. There is low normal systolic function. The estimated ejection fraction is 51 - 55%. No regional wall motion abnormality noted. Global longitudinal strain is -13.00%. Abnormal left ventricular strain. · Normal right ventricular size and function. Pacing wire present · Mild to moderate mitral valve regurgitation. Mild prolapse of the posterior leaflet within the mitral valve. · Mild pulmonic valve regurgitation is present. · Mild tricuspid valve regurgitation. Pulmonary arterial systolic pressure is 96.0 mmHg. Mild pulmonary hypertension. Echo: 1/18/2017: 
Left ventricle: Systolic function was normal. Ejection fraction was 
estimated to be 50 %. There were no regional wall motion abnormalities. Doppler parameters were consistent with abnormal left ventricular 
relaxation (grade 1 diastolic dysfunction). Right ventricle: The size was normal. Systolic function was reduced. Left atrium: Size was normal. 
Right atrium: Size was normal. 
Mitral valve: There was systolic bowing of the anterior and posterior 
leaflets, but without diagnostic evidence for prolapse. There was mild 
regurgitation. Aortic valve: The valve was trileaflet. Leaflets exhibited normal 
thickness and normal cuspal separation. Tricuspid valve: Pulmonary artery systolic pressure: 18 mmHg. Pulmonic valve: There was mild regurgitation. Imaging: 
I have personally reviewed the patients radiographs and have reviewed the reports: PET-CT 3/22/2019: 
IMPRESSION: 
1. Significant interval treatment response compared 12/18 -Hypermetabolic tumor has resolved 
-Some small lung nodules remain, nonspecific (no worsening of lung nodules) -Stable blastic manubrial metastasis 2. Mild superior endplate compression fracture T11 which is interval new and is 
probably benign/osteoporotic. 
-May have a few millimeter retropulsed bone but no osseous central spinal 
stenosis is evident 
  
Incidental findings: 
-Gallstone 
-Trace pericardial effusion. Cardiomegaly 
-Fibroid uterus CT Results (most recent): 
Results from [facility] [place]counter encounter on 02/10/19 CT CHEST W CONT Narrative CT CHEST WITH ENHANCEMENT INDICATION: Left breast cancer, lung cancer undergoing follow-up. TECHNIQUE: Axial images obtained from the thoracic inlet to the level of the 
diaphragm following uneventful administration of 80 cc Isovue-300 nonionic 
intravenous contrast. Coronal and sagittal reformatted images were obtained. All CT scans at this facility are performed using dose optimization technique as 
appropriate to a performed exam, to include automated exposure control, 
adjustment of the mA and/or kV according to patient size (including appropriate 
matching first site-specific examinations), or use of iterative reconstruction 
technique. COMPARISON: PET/CT 12/7/2018. CHEST FINDINGS:  
Left anterior chest wall Mediport with lead in the distal SVC. Thyroid: Unremarkable in its visualized aspects. Pericardium/ Heart: No significant effusion. Right anterior chest wall cardiac 
device with leads in the right atrium, right ventricle and coronary sinus. Aorta/ Vessels: No aneurysm or dissection. Lymph Nodes: Decreased anterior mediastinal lymph node, previously FDG avid 
measuring 4 mm transverse diameter, previously 8 mm. Decreased right lower 
paratracheal lymph nodes measuring up to 6 mm (18), previously 9 mm. Subcarinal 
lymph node, previously 12 mm. Right hilar nodes are better defined without 
contrast measuring up to 9 mm (26). Left infrahilar node measures 8 mm (29). Arun Brinks Lungs: There is similar endobronchial opacification in the proximal right lower lobe bronchus (31). Decreased bronchovascular consolidation, now more tubular in 
appearance measuring 2.9 x 1.7 cm, previously 3.3 x 3.1 cm (remeasured). There 
is a similar opacity with centrilobular densities in the right upper lobe (26). Resolved airspace opacity in the posterior right upper lobe. Resolved other 
reticular and nodular opacities. Decreased size/density and number of scattered 
pulmonary nodules including decreased density of a 5 mm anterior right upper 
lobe opacity, previously 7 mm, 5 x 3 mm linear density in the right major 
fissure (28), previously 7 x 5 mm, 4 mm anterior left lower lobe nodule (34), 
previously 6 mm and a 2 mm in the AP dimension more peripheral left lower lobe 
nodule (53), previously 4 mm. There is improved subsegmental atelectasis right 
lower lobe with similar subsegmental atelectasis or scarring left lower lobe. Jamas Dubonnet Several of the pulmonary nodules are similar including a 8 mm left lower lobe 
pulmonary nodule (40). Pleura: Mild pleural thickening. Upper Abdomen: Extrahepatic biliary duct is upper limits of normal for patient's 
age at 7 mm. No intrahepatic ductal dilation. Bones/soft tissues: Left mastectomy. Incompletely visualized anterior fusion. Similar regions of sclerosis manubrium and sternum. osteopenia. Impression IMPRESSION: 
 
1. Decreased size of previously FDG avid anterior mediastinal lymph node 
compatible with positive treatment response. Decreased additional mediastinal 
lymph nodes. 2.  Unchanged manubrial and sternal sclerosis. 3.  Decreased size and number of several of the pulmonary nodules/consolidations 
suggesting a positive treatment response and/or improved infection/inflammation. XR Results (most recent): 
Results from Hospital Encounter encounter on 12/11/18 XR CHEST PORT Narrative INDICATION:  Post bronchoscopy. COMPARISON:  11/19/2018.  
 
FINDINGS: A portable AP radiograph of the chest was obtained at 1409 hours. 
Increased bilateral interstitial markings. Cardiac silhouette and overlying AICD 
type pacemaker is stable. Right lung base streaky opacity, similar, may 
represent atelectasis or developing infiltrate. Additionally trace effusion may 
also be considered. Osseous structures are stable. No definite large 
pneumothorax. Impression IMPRESSION: Increased bilateral interstitial markings may represent congestion. Right lung base streaky opacity as above. No large pneumothorax. CXR 8/26/2014: 
 
AICD. No change in lead placement. No visualized lead fracture. Lungs appear 
unremarkable. Normal size heart. Impression: No acute findings. Patient Active Problem List  
Diagnosis Code  Congestive heart failure (HCC) I50.9  Hypertension I10  
 MVP (mitral valve prolapse) I34.1  Nonischemic cardiomyopathy (HCC) I42.8  Cancer ([facility] [address] C80.1  Adenocarcinoma of breast; locally advanced invasive ductal adenocarcinoma of left breast C50.919  
 Poisoning by other and unspecified agents primarily affecting the cardiovascular system(972.9) T46.904A  Syncope and collapse R55  Other primary cardiomyopathies I42.8  Shortness of breath R06.02  
 Nonspecific abnormal electrocardiogram (ECG) (EKG) R94.31  
 Automatic implantable cardioverter-defibrillator in situ Z95.810  
 Mitral valve disease I05.9  Abnormal PFT R94.2  Acute bronchitis J20.9  Chronic asthmatic bronchitis ([address] J44.9  Malignant neoplasm metastatic to lung (HCC) C78.00 IMPRESSION:  
· Metastatic breast cancer to Lung with extensive endobronchial involvement. Clinical response to chemotherapy. Improved PET/CT findings and symptoms of wheezing shortness of breath and cough. · SOB on exertion - Carcinomatosis and  Endobronchial inflammation/obstruction from tumor load. -Improving · Acute flareup of allergic rhinitis · Clinical data suggests multifactorial etiology with progressive symptoms. over past 1 year: worsening reactive airways due to untreated rhinosinusitis, silent reflux and or a component of diastolic heart failure. · Abnormal PFT- combined restrictive and Obstructive component-progression noted -Asthma with reduced DLCO ( corrects with Volume adjustment.) Suspect restrictive component from chest wall deformity · Pulm HTN-new likely group 2 and 3 
· H/o invasive ductal breast Ca · H/o non ischemic cardiomyopathy · AICD RECOMMENDATIONS:  
 
· Will continue Incruse- longer acting bronchodilator. Sample provided and inhalation technique taught · Will await further response to chemotherapy · Continue treating obstructive airways component · Continue nebulized bronchodilators PRN 
· Continue singulair to address chronic rhinosinuitis component- Nasonex , add Allegra for the current pollen season · GERD precautions · Preventive vaccinations- recieved · Monitor response to interventions and make appropriate adjustments · Healthy weight and active lifestyle · Discussed the results of PET -Ct scan chest and echo · Will follow for progression of pulmonary hypertension, check oxygen saturation and provide supplemental oxygen if meets criteria · Follow up in 2 months Health maintenance screens deferred to Primary care provider.  
  
Tenzin Salmon MD

## 2019-04-12 NOTE — LETTER
2019 11:19 AM 
 
Patient:  Citlali Johnson YOB: 1950 Date of Visit: 2019 Dear Ladan Arechiga MD 
[facility] [address] [place] [address] [id number] [place] [address] VIA Facsimile: [phone] 
 : Thank you for referring Ms. Delilah Hinds to me for evaluation/treatment. Below are the relevant portions of my assessment and plan of care. [facility] Pulmonary, Critical Care, and Sleep Medicine Pulmonary Office visit Name: Citlali Johnson : 1950 Date: 2019 Subjective:  
 
 
19 Recent diagnosis of metastatic breast Ca- recurrence with Lung mets- parenchymal and endobronchial. Currently being treated with Paclitaxel and dexamethasone She has shown good response to treatment which was recently validated with a PET CT. She was doing well on incruse until about 2 days back when she started with itchy throat, some wheezing and coughing which she relates to the pollen in the air. Continues to have some SOB- much better compared to prior to treatment. Feels stronger Appetite OK. Denies much productive mucus. Denies fever, chills. Denies any chest pain. No nausea, vomiting. Also completed her echocardiogram testing. HPI: 
Patient is a 78 y.o. female has been experiencing SOB for past 1 year. SOB:  
Symptoms occur with exertion and at night. Able to walk about about 3-4  Blocks. Cannot climb stairs. Activities of daily living are affected and improves with pacing. Has orthopnea/ paroxysmal nocturnal dyspnea SOB relieved with pacing and resting. C/o sinus congestion. Denies any significant Cough: 
Has some wheezing, no chest pain, fever, chills, night sweats dyspepsia, reflux. Has had AICD placed and replaced. Left mastectomy with chemo- radiation : 5 years back Weight gain of 50 lbs over few years.  
Comorbid conditions include- DM, HTN, CHF- nonischemic cardiomyopathy, Breast ca- treated: s/p mastectomy -L and chemo radiation Non smoker Occupational exposure-none Past Medical History:  
Diagnosis Date  Abnormal nuclear cardiac imaging test 06/11/2010 Lg fixed anterior, septal, apical, inferoseptal defect sugg RCA & LAD disease or cardiomyopathy. EF 20%. Global hykn. Nondiagnostic EKG.  Acute bronchitis 10/15/2018 Persistent cough and wheezing in spite of prednisone and antibiotic. 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   
 Asthma  Automatic implantable cardiac defibrillator in situ  Automatic implantable cardiac defibrillator in situ  Breast cancer ([facility]  Cancer ([facility] breast cancer left  Chemotherapy convalescence or palliative care 2012  Chronic combined systolic and diastolic heart failure ([facility] Remains symptomatic, nyha class 3 ef improving.  Congestive heart failure, unspecified   
 chronic class ll  Echocardiogram 09/27/2010 EF 30%. Global hykn. Mild MR. Mild TR.  GERD (gastroesophageal reflux disease)  Hyperlipidemia  Hypertension  Mitral valve disorders(424.0) 1/15/2014  
 mild to moderate mr  Mitral valve disorders(424.0) 1/15/2014  
 mild to moderate mr  MVP (mitral valve prolapse)  Nonischemic cardiomyopathy ([address] EF 20-30% despite medical therapy  Nonspecific abnormal electrocardiogram (ECG) (EKG)  Osteopenia  Other primary cardiomyopathies  Pacemaker 10/27/10  
 s/p implantation, without complication  Poisoning by other and unspecified agents primarily affecting the cardiovascular system(972.9) Ef slightly better to 45%, STABLE back on chemo.  Radiation therapy  Radiation therapy complication 1940  S/P cardiac catheterization 07/08/10 Patent coronary arteries. LVEDP 25.  EF 25%. Global hykn. Moderate MR. RA 10.  RV 40/10. PA 40/20.  20.  CO/CI 4.5/2.45 (Larry).  Shortness of breath  Syncope and collapse  Thyroid disease   
 goiter Past Surgical History:  
Procedure Laterality Date  CARDIAC SURG PROCEDURE UNLIST Difibrillator; BI V ICD  HX MASTECTOMY  09/28/11  
 modified radical mastectomy and axillary dissection  HX ORTHOPAEDIC    
 HX OTHER SURGICAL    
 surgery to remove part of liver  HX PACEMAKER  10/27/10  
 s/p [employer] biventricular AICD, without complication  HX RADICAL MASTECTOMY  IR INSERT TUNL CVC W PORT OVER 5 YEARS  1/16/2019  
 NEUROLOGICAL PROCEDURE UNLISTED Cervical fusion Allergies Allergen Reactions  Adhesive Other (comments)  
  blisters Joe Hidalgo Current Outpatient Medications Medication Sig Dispense Refill  DULoxetine (CYMBALTA) 30 mg capsule Take 30 mg by mouth daily.  carvedilol (COREG) 6.25 mg tablet Take 1 Tab by mouth two (2) times daily (with meals). 180 Tab 3  
 umeclidinium (INCRUSE ELLIPTA) 62.5 mcg/actuation inhaler Take 1 Puff by inhalation daily. 3 Inhaler 3  
 multivitamin (ONE A DAY) tablet Take 1 Tab by mouth daily.  b complex vitamins (B COMPLEX 1) tablet Take 1 Tab by mouth daily.  cyanocobalamin 1,000 mcg tablet Take 3,000 mcg by mouth daily.  plant stanol eliezer (CHOLEST OFF PO) Take  by mouth.  benzonatate (TESSALON PERLES) 100 mg capsule Take 100 mg by mouth three (3) times daily as needed for Cough.  melatonin 10 mg tab Take  by mouth nightly.  albuterol-ipratropium (DUO-NEB) 2.5 mg-0.5 mg/3 ml nebu 3 mL by Nebulization route every six (6) hours as needed. 120 Nebule 3  
 lisinopril (PRINIVIL, ZESTRIL) 10 mg tablet TAKE 1 TABLET DAILY 90 Tab 2  
 montelukast (SINGULAIR) 10 mg tablet Take 1 Tab by mouth daily. 90 Tab 3  cholecalciferol, vitamin D3, 2,000 unit tab Take  by mouth.  omeprazole (PRILOSEC) 40 mg capsule Take 40 mg by mouth daily.  raloxifene (EVISTA) 60 mg tablet Take  by mouth daily.  VALERIAN ROOT by Does Not Apply route.  Biotin 2,500 mcg cap Take  by mouth daily. Review of Systems: 
HEENT: No epistaxis, +nasal drainage, + itching- throat,no difficulty in swallowing, no redness in eyes Respiratory: as above Cardiovascular: no chest pain, no palpitations, no chronic leg edema, no syncope Gastrointestinal: no abd pain, no vomiting, no diarrhea, no bleeding symptoms Genitourinary: No urinary symptoms or hematuria Integument/breast: No ulcers or rashes Musculoskeletal:Neg 
Neurological: No focal weakness, no seizures, no headaches Behvioral/Psych: No anxiety, no depression Constitutional: No fever, no chills, no weight loss, no night sweats Objective: 
 
Visit Vitals /74 (BP 1 Location: Right arm, BP Patient Position: Sitting) Pulse (!) 109 Temp 97.5 °F (36.4 °C) (Oral) Resp 20 Ht 5' 5\" (1.651 m) Wt 82.1 kg (181 lb) SpO2 97% BMI 30.12 kg/m² Physical Exam:  
General: comfortable, no acute distress HEENT: pupils reactive, sclera anicteric, EOM intact Neck: No adenopathy or thyroid swelling, no lymphadenopathy or JVD, supple Chest: multiple scars from previous surgery, surgically absent left breast 
CVS: S1S2 
RS: Mod AE bilaterally, no tactile fremitus or egophony, no accessory muscle use, faint crackles, no wheezing Abd: soft, non tender, no hepatosplenomegaly Neuro: non focal, awake, alert Extrm: no leg edema, clubbing or cyanosis Skin: no rash Data review: 
Pertinent labs: CBC, BMP, LFT's PFT: 
 
Date FVC FEV1  FEV1/FVC [id number] TLC RV RV/TLC VC DLCO  
6/29/2017 75 69 67 43 77 81 nl  62  
11/2018  46  35     49 FLOWS: 
Maximal Mid Expiratory Flow rate is reduced to 43 % predicted Forced Expiratory Volume in one second is reduced to 69 % predicted FEV 1% is reduced Volumes: All Volumes are reduced except for RV Flow Volume Loop: Nonspecific obstructive pattern in Flow Volume Loop Bronchodilator: No significant improvement with bronchodilator therapy Diffusion: Abnormal Diffusion Capacity reduced to 62 % predicted DLCO normalizes to alveolar ventilation Impression: Moderate obstructive defect, Predominately small airways, Mild restrictive ventilatory defect, Reduced diffusion capacity indicating a decrease in alveolar surface area for gas exchange 6 min walk test;walked 330 feet with no O2 desaturation or significant heart rate change. DI- stable Echo 3/2019: 
· Normal cavity size, wall thickness and diastolic function. There is low normal systolic function. The estimated ejection fraction is 51 - 55%. No regional wall motion abnormality noted. Global longitudinal strain is -13.00%. Abnormal left ventricular strain. · Normal right ventricular size and function. Pacing wire present · Mild to moderate mitral valve regurgitation. Mild prolapse of the posterior leaflet within the mitral valve. · Mild pulmonic valve regurgitation is present. · Mild tricuspid valve regurgitation. Pulmonary arterial systolic pressure is 38.9 mmHg. Mild pulmonary hypertension. Echo: 1/18/2017: 
Left ventricle: Systolic function was normal. Ejection fraction was 
estimated to be 50 %. There were no regional wall motion abnormalities. Doppler parameters were consistent with abnormal left ventricular 
relaxation (grade 1 diastolic dysfunction). Right ventricle: The size was normal. Systolic function was reduced. Left atrium: Size was normal. 
Right atrium: Size was normal. 
Mitral valve: There was systolic bowing of the anterior and posterior 
leaflets, but without diagnostic evidence for prolapse. There was mild 
regurgitation. Aortic valve: The valve was trileaflet. Leaflets exhibited normal 
thickness and normal cuspal separation. Tricuspid valve: Pulmonary artery systolic pressure: 18 mmHg. Pulmonic valve: There was mild regurgitation. Imaging: 
I have personally reviewed the patients radiographs and have reviewed the reports: PET-CT 3/22/2019: 
IMPRESSION: 
1. Significant interval treatment response compared 12/18 -Hypermetabolic tumor has resolved 
-Some small lung nodules remain, nonspecific (no worsening of lung nodules) -Stable blastic manubrial metastasis 2. Mild superior endplate compression fracture T11 which is interval new and is 
probably benign/osteoporotic. 
-May have a few millimeter retropulsed bone but no osseous central spinal 
stenosis is evident 
  
Incidental findings: 
-Gallstone 
-Trace pericardial effusion. Cardiomegaly 
-Fibroid uterus CT Results (most recent): 
Results from [facility] Encounter encounter on 02/10/19 CT CHEST W CONT Narrative CT CHEST WITH ENHANCEMENT INDICATION: Left breast cancer, lung cancer undergoing follow-up. TECHNIQUE: Axial images obtained from the thoracic inlet to the level of the 
diaphragm following uneventful administration of 80 cc Isovue-300 nonionic 
intravenous contrast. Coronal and sagittal reformatted images were obtained. All CT scans at this facility are performed using dose optimization technique as 
appropriate to a performed exam, to include automated exposure control, 
adjustment of the mA and/or kV according to patient size (including appropriate 
matching first site-specific examinations), or use of iterative reconstruction 
technique. COMPARISON: PET/CT 12/7/2018. CHEST FINDINGS:  
Left anterior chest wall Mediport with lead in the distal SVC. Thyroid: Unremarkable in its visualized aspects. Pericardium/ Heart: No significant effusion. Right anterior chest wall cardiac 
device with leads in the right atrium, right ventricle and coronary sinus. Aorta/ Vessels: No aneurysm or dissection. Lymph Nodes: Decreased anterior mediastinal lymph node, previously FDG avid 
measuring 4 mm transverse diameter, previously 8 mm. Decreased right lower paratracheal lymph nodes measuring up to 6 mm (18), previously 9 mm. Subcarinal 
lymph node, previously 12 mm. Right hilar nodes are better defined without 
contrast measuring up to 9 mm (26). Left infrahilar node measures 8 mm (29). Oris Hidalgo Lungs: There is similar endobronchial opacification in the proximal right lower 
lobe bronchus (31). Decreased bronchovascular consolidation, now more tubular in 
appearance measuring 2.9 x 1.7 cm, previously 3.3 x 3.1 cm (remeasured). There 
is a similar opacity with centrilobular densities in the right upper lobe (26). Resolved airspace opacity in the posterior right upper lobe. Resolved other 
reticular and nodular opacities. Decreased size/density and number of scattered 
pulmonary nodules including decreased density of a 5 mm anterior right upper 
lobe opacity, previously 7 mm, 5 x 3 mm linear density in the right major 
fissure (28), previously 7 x 5 mm, 4 mm anterior left lower lobe nodule (34), 
previously 6 mm and a 2 mm in the AP dimension more peripheral left lower lobe 
nodule (53), previously 4 mm. There is improved subsegmental atelectasis right 
lower lobe with similar subsegmental atelectasis or scarring left lower lobe. Oris Hidalgo Several of the pulmonary nodules are similar including a 8 mm left lower lobe 
pulmonary nodule (40). Pleura: Mild pleural thickening. Upper Abdomen: Extrahepatic biliary duct is upper limits of normal for patient's 
age at 7 mm. No intrahepatic ductal dilation. Bones/soft tissues: Left mastectomy. Incompletely visualized anterior fusion. Similar regions of sclerosis manubrium and sternum. osteopenia. Impression IMPRESSION: 
 
1. Decreased size of previously FDG avid anterior mediastinal lymph node 
compatible with positive treatment response. Decreased additional mediastinal 
lymph nodes. 2.  Unchanged manubrial and sternal sclerosis. 3.  Decreased size and number of several of the pulmonary nodules/consolidations suggesting a positive treatment response and/or improved infection/inflammation. XR Results (most recent): 
Results from Hospital Encounter encounter on 12/11/18 XR CHEST PORT Narrative INDICATION:  Post bronchoscopy. COMPARISON:  11/19/2018. FINDINGS: A portable AP radiograph of the chest was obtained at 1409 hours. Increased bilateral interstitial markings. Cardiac silhouette and overlying AICD 
type pacemaker is stable. Right lung base streaky opacity, similar, may 
represent atelectasis or developing infiltrate. Additionally trace effusion may 
also be considered. Osseous structures are stable. No definite large 
pneumothorax. Impression IMPRESSION: Increased bilateral interstitial markings may represent congestion. Right lung base streaky opacity as above. No large pneumothorax. CXR 8/26/2014: 
 
AICD. No change in lead placement. No visualized lead fracture. Lungs appear 
unremarkable. Normal size heart. Impression: No acute findings. Patient Active Problem List  
Diagnosis Code  Congestive heart failure (HCC) I50.9  Hypertension I10  
 MVP (mitral valve prolapse) I34.1  Nonischemic cardiomyopathy (HCC) I42.8  Cancer ([facility] C80.1  Adenocarcinoma of breast; locally advanced invasive ductal adenocarcinoma of left breast C50.919  
 Poisoning by other and unspecified agents primarily affecting the cardiovascular system(972.9) T46.904A  Syncope and collapse R55  Other primary cardiomyopathies I42.8  Shortness of breath R06.02  
 Nonspecific abnormal electrocardiogram (ECG) (EKG) R94.31  
 Automatic implantable cardioverter-defibrillator in situ Z95.810  
 Mitral valve disease I05.9  Abnormal PFT R94.2  Acute bronchitis J20.9  Chronic asthmatic bronchitis ([facility] J44.9  Malignant neoplasm metastatic to lung (HCC) C78.00 IMPRESSION:  
· Metastatic breast cancer to Lung with extensive endobronchial involvement. Clinical response to chemotherapy. Improved PET/CT findings and symptoms of wheezing shortness of breath and cough. · SOB on exertion - Carcinomatosis and  Endobronchial inflammation/obstruction from tumor load. -Improving · Acute flareup of allergic rhinitis · Clinical data suggests multifactorial etiology with progressive symptoms. over past 1 year: worsening reactive airways due to untreated rhinosinusitis, silent reflux and or a component of diastolic heart failure. · Abnormal PFT- combined restrictive and Obstructive component-progression noted -Asthma with reduced DLCO ( corrects with Volume adjustment.) Suspect restrictive component from chest wall deformity · Pulm HTN-new likely group 2 and 3 
· H/o invasive ductal breast Ca · H/o non ischemic cardiomyopathy · AICD RECOMMENDATIONS:  
 
· Will continue Incruse- longer acting bronchodilator. Sample provided and inhalation technique taught · Will await further response to chemotherapy · Continue treating obstructive airways component · Continue nebulized bronchodilators PRN 
· Continue singulair to address chronic rhinosinuitis component- Nasonex , add Allegra for the current pollen season · GERD precautions · Preventive vaccinations- recieved · Monitor response to interventions and make appropriate adjustments · Healthy weight and active lifestyle · Discussed the results of PET -Ct scan chest and echo · Will follow for progression of pulmonary hypertension, check oxygen saturation and provide supplemental oxygen if meets criteria · Follow up in 2 months Health maintenance screens deferred to Primary care provider. Rodriguez Hernandez MD 
 
 
 
 
 
 
If you have questions, please do not hesitate to call me. I look forward to following Ms. Trevino along with you.  
 
 
 
Sincerely, 
 
 
Rodriguez Hernandez MD

## 2019-04-12 NOTE — PROGRESS NOTES
[facility] Pulmonary, Critical Care, and Sleep Medicine Pulmonary Office visit Name: Odell Ken : 1950 Date: 2019 Subjective:  
 
 
19 Recent diagnosis of metastatic breast Ca- recurrence with Lung mets- parenchymal and endobronchial. Currently being treated with Paclitaxel and dexamethasone She has shown good response to treatment which was recently validated with a PET CT. She was doing well on incruse until about 2 days back when she started with itchy throat, some wheezing and coughing which she relates to the pollen in the air. Continues to have some SOB- much better compared to prior to treatment. Feels stronger Appetite OK. Denies much productive mucus. Denies fever, chills. Denies any chest pain. No nausea, vomiting. Also completed her echocardiogram testing. HPI: 
Patient is a 78 y.o. female has been experiencing SOB for past 1 year. SOB:  
Symptoms occur with exertion and at night. Able to walk about about 3-4  Blocks. Cannot climb stairs. Activities of daily living are affected and improves with pacing. Has orthopnea/ paroxysmal nocturnal dyspnea SOB relieved with pacing and resting. C/o sinus congestion. Denies any significant Cough: 
Has some wheezing, no chest pain, fever, chills, night sweats dyspepsia, reflux. Has had AICD placed and replaced. Left mastectomy with chemo- radiation : 5 years back Weight gain of 50 lbs over few years. Comorbid conditions include- DM, HTN, CHF- nonischemic cardiomyopathy, Breast ca- treated: s/p mastectomy -L and chemo radiation Non smoker Occupational exposure-none Past Medical History:  
Diagnosis Date  Abnormal nuclear cardiac imaging test 2010 Lg fixed anterior, septal, apical, inferoseptal defect sugg RCA & LAD disease or cardiomyopathy. EF 20%. Global hykn. Nondiagnostic EKG.  Acute bronchitis 10/15/2018 Persistent cough and wheezing in spite of prednisone and antibiotic. 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   
 Asthma  Automatic implantable cardiac defibrillator in situ  Automatic implantable cardiac defibrillator in situ  [facility]  Cancer ([facility] breast cancer left  Chemotherapy convalescence or palliative care 2012  Chronic combined systolic and diastolic heart failure ([facility] Remains symptomatic, nyha class 3 ef improving.  Congestive heart failure, unspecified   
 chronic class ll  Echocardiogram 09/27/2010 EF 30%. Global hykn. Mild MR. Mild TR.  GERD (gastroesophageal reflux disease)  Hyperlipidemia  Hypertension  Mitral valve disorders(424.0) 1/15/2014  
 mild to moderate mr  Mitral valve disorders(424.0) 1/15/2014  
 mild to moderate mr  MVP (mitral valve prolapse)  Nonischemic cardiomyopathy ([facility] EF 20-30% despite medical therapy  Nonspecific abnormal electrocardiogram (ECG) (EKG)  Osteopenia  Other primary cardiomyopathies  Pacemaker 10/27/10  
 s/p implantation, without complication  Poisoning by other and unspecified agents primarily affecting the cardiovascular system(972.9) Ef slightly better to 45%, STABLE back on chemo.  Radiation therapy  Radiation therapy complication 3812  S/P cardiac catheterization 07/08/10 Patent coronary arteries. LVEDP 25.  EF 25%. Global hykn. Moderate MR.  RA 10.  RV 40/10. PA 40/20.  20.  CO/CI 4.5/2.45 (Larry).  Shortness of breath  Syncope and collapse  Thyroid disease   
 goiter Past Surgical History:  
Procedure Laterality Date  CARDIAC SURG PROCEDURE UNLIST Difibrillator; BI V ICD  HX MASTECTOMY  09/28/11  
 modified radical mastectomy and axillary dissection  HX ORTHOPAEDIC    
 HX OTHER SURGICAL    
 surgery to remove part of liver  HX PACEMAKER  10/27/10  
 s/p [employer] biventricular AICD, without complication  HX RADICAL MASTECTOMY  IR INSERT TUNL CVC W PORT OVER 5 YEARS  1/16/2019  
 NEUROLOGICAL PROCEDURE UNLISTED Cervical fusion Allergies Allergen Reactions  Adhesive Other (comments)  
  blisters Marsha Cramona Current Outpatient Medications Medication Sig Dispense Refill  DULoxetine (CYMBALTA) 30 mg capsule Take 30 mg by mouth daily.  carvedilol (COREG) 6.25 mg tablet Take 1 Tab by mouth two (2) times daily (with meals). 180 Tab 3  
 umeclidinium (INCRUSE ELLIPTA) 62.5 mcg/actuation inhaler Take 1 Puff by inhalation daily. 3 Inhaler 3  
 multivitamin (ONE A DAY) tablet Take 1 Tab by mouth daily.  b complex vitamins (B COMPLEX 1) tablet Take 1 Tab by mouth daily.  cyanocobalamin 1,000 mcg tablet Take 3,000 mcg by mouth daily.  plant stanol eliezer (CHOLEST OFF PO) Take  by mouth.  benzonatate (TESSALON PERLES) 100 mg capsule Take 100 mg by mouth three (3) times daily as needed for Cough.  melatonin 10 mg tab Take  by mouth nightly.  albuterol-ipratropium (DUO-NEB) 2.5 mg-0.5 mg/3 ml nebu 3 mL by Nebulization route every six (6) hours as needed. 120 Nebule 3  
 lisinopril (PRINIVIL, ZESTRIL) 10 mg tablet TAKE 1 TABLET DAILY 90 Tab 2  
 montelukast (SINGULAIR) 10 mg tablet Take 1 Tab by mouth daily. 90 Tab 3  cholecalciferol, vitamin D3, 2,000 unit tab Take  by mouth.  omeprazole (PRILOSEC) 40 mg capsule Take 40 mg by mouth daily.  raloxifene (EVISTA) 60 mg tablet Take  by mouth daily.  VALERIAN ROOT by Does Not Apply route.  Biotin 2,500 mcg cap Take  by mouth daily. Review of Systems: 
HEENT: No epistaxis, +nasal drainage, + itching- throat,no difficulty in swallowing, no redness in eyes Respiratory: as above Cardiovascular: no chest pain, no palpitations, no chronic leg edema, no syncope Gastrointestinal: no abd pain, no vomiting, no diarrhea, no bleeding symptoms Genitourinary: No urinary symptoms or hematuria Integument/breast: No ulcers or rashes Musculoskeletal:Neg 
Neurological: No focal weakness, no seizures, no headaches Behvioral/Psych: No anxiety, no depression Constitutional: No fever, no chills, no weight loss, no night sweats Objective:  
 
Visit Vitals /74 (BP 1 Location: Right arm, BP Patient Position: Sitting) Pulse (!) 109 Temp 97.5 °F (36.4 °C) (Oral) Resp 20 Ht 5' 5\" (1.651 m) Wt 82.1 kg (181 lb) SpO2 97% BMI 30.12 kg/m² Physical Exam:  
General: comfortable, no acute distress HEENT: pupils reactive, sclera anicteric, EOM intact Neck: No adenopathy or thyroid swelling, no lymphadenopathy or JVD, supple Chest: multiple scars from previous surgery, surgically absent left breast 
CVS: S1S2 
RS: Mod AE bilaterally, no tactile fremitus or egophony, no accessory muscle use, faint crackles, no wheezing Abd: soft, non tender, no hepatosplenomegaly Neuro: non focal, awake, alert Extrm: no leg edema, clubbing or cyanosis Skin: no rash Data review: 
Pertinent labs: CBC, BMP, LFT's PFT: 
 
Date FVC FEV1  FEV1/FVC [id number] TLC RV RV/TLC VC DLCO  
6/29/2017 75 69 67 43 77 81 nl  62  
11/2018  46  35     49 FLOWS: 
Maximal Mid Expiratory Flow rate is reduced to 43 % predicted Forced Expiratory Volume in one second is reduced to 69 % predicted FEV 1% is reduced Volumes: All Volumes are reduced except for RV Flow Volume Loop: 
Nonspecific obstructive pattern in Flow Volume Loop Bronchodilator: No significant improvement with bronchodilator therapy Diffusion: Abnormal Diffusion Capacity reduced to 62 % predicted DLCO normalizes to alveolar ventilation Impression: Moderate obstructive defect, Predominately small airways, Mild restrictive ventilatory defect, Reduced diffusion capacity indicating a decrease in alveolar surface area for gas exchange 6 min walk test;walked 330 feet with no O2 desaturation or significant heart rate change. DI- stable Echo 3/2019: 
· Normal cavity size, wall thickness and diastolic function. There is low normal systolic function. The estimated ejection fraction is 51 - 55%. No regional wall motion abnormality noted. Global longitudinal strain is -13.00%. Abnormal left ventricular strain. · Normal right ventricular size and function. Pacing wire present · Mild to moderate mitral valve regurgitation. Mild prolapse of the posterior leaflet within the mitral valve. · Mild pulmonic valve regurgitation is present. · Mild tricuspid valve regurgitation. Pulmonary arterial systolic pressure is 10.7 mmHg. Mild pulmonary hypertension. Echo: 1/18/2017: 
Left ventricle: Systolic function was normal. Ejection fraction was 
estimated to be 50 %. There were no regional wall motion abnormalities. Doppler parameters were consistent with abnormal left ventricular 
relaxation (grade 1 diastolic dysfunction). Right ventricle: The size was normal. Systolic function was reduced. Left atrium: Size was normal. 
Right atrium: Size was normal. 
Mitral valve: There was systolic bowing of the anterior and posterior 
leaflets, but without diagnostic evidence for prolapse. There was mild 
regurgitation. Aortic valve: The valve was trileaflet. Leaflets exhibited normal 
thickness and normal cuspal separation. Tricuspid valve: Pulmonary artery systolic pressure: 18 mmHg. Pulmonic valve: There was mild regurgitation. Imaging: 
I have personally reviewed the patients radiographs and have reviewed the reports: PET-CT 3/22/2019: 
IMPRESSION: 
1. Significant interval treatment response compared 12/18 -Hypermetabolic tumor has resolved 
-Some small lung nodules remain, nonspecific (no worsening of lung nodules) -Stable blastic manubrial metastasis 2. Mild superior endplate compression fracture T11 which is interval new and is 
probably benign/osteoporotic. 
-May have a few millimeter retropulsed bone but no osseous central spinal 
stenosis is evident 
  
Incidental findings: 
-Gallstone 
-Trace pericardial effusion. Cardiomegaly 
-Fibroid uterus CT Results (most recent): 
Results from [facility] Encounter encounter on 02/10/19 CT CHEST W CONT Narrative CT CHEST WITH ENHANCEMENT INDICATION: Left breast cancer, lung cancer undergoing follow-up. TECHNIQUE: Axial images obtained from the thoracic inlet to the level of the 
diaphragm following uneventful administration of 80 cc Isovue-300 nonionic 
intravenous contrast. Coronal and sagittal reformatted images were obtained. All CT scans at this facility are performed using dose optimization technique as 
appropriate to a performed exam, to include automated exposure control, 
adjustment of the mA and/or kV according to patient size (including appropriate 
matching first site-specific examinations), or use of iterative reconstruction 
technique. COMPARISON: PET/CT 12/7/2018. CHEST FINDINGS:  
Left anterior chest wall Mediport with lead in the distal SVC. Thyroid: Unremarkable in its visualized aspects. Pericardium/ Heart: No significant effusion. Right anterior chest wall cardiac 
device with leads in the right atrium, right ventricle and coronary sinus. Aorta/ Vessels: No aneurysm or dissection. Lymph Nodes: Decreased anterior mediastinal lymph node, previously FDG avid 
measuring 4 mm transverse diameter, previously 8 mm. Decreased right lower 
paratracheal lymph nodes measuring up to 6 mm (18), previously 9 mm. Subcarinal 
lymph node, previously 12 mm. Right hilar nodes are better defined without 
contrast measuring up to 9 mm (26). Left infrahilar node measures 8 mm (29). Aneta Martin Lungs: There is similar endobronchial opacification in the proximal right lower lobe bronchus (31). Decreased bronchovascular consolidation, now more tubular in 
appearance measuring 2.9 x 1.7 cm, previously 3.3 x 3.1 cm (remeasured). There 
is a similar opacity with centrilobular densities in the right upper lobe (26). Resolved airspace opacity in the posterior right upper lobe. Resolved other 
reticular and nodular opacities. Decreased size/density and number of scattered 
pulmonary nodules including decreased density of a 5 mm anterior right upper 
lobe opacity, previously 7 mm, 5 x 3 mm linear density in the right major 
fissure (28), previously 7 x 5 mm, 4 mm anterior left lower lobe nodule (34), 
previously 6 mm and a 2 mm in the AP dimension more peripheral left lower lobe 
nodule (53), previously 4 mm. There is improved subsegmental atelectasis right 
lower lobe with similar subsegmental atelectasis or scarring left lower lobe. Diaz Hidalgo Several of the pulmonary nodules are similar including a 8 mm left lower lobe 
pulmonary nodule (40). Pleura: Mild pleural thickening. Upper Abdomen: Extrahepatic biliary duct is upper limits of normal for patient's 
age at 7 mm. No intrahepatic ductal dilation. Bones/soft tissues: Left mastectomy. Incompletely visualized anterior fusion. Similar regions of sclerosis manubrium and sternum. osteopenia. Impression IMPRESSION: 
 
1. Decreased size of previously FDG avid anterior mediastinal lymph node 
compatible with positive treatment response. Decreased additional mediastinal 
lymph nodes. 2.  Unchanged manubrial and sternal sclerosis. 3.  Decreased size and number of several of the pulmonary nodules/consolidations 
suggesting a positive treatment response and/or improved infection/inflammation. XR Results (most recent): 
Results from Hospital Encounter encounter on 12/11/18 XR CHEST PORT Narrative INDICATION:  Post bronchoscopy. COMPARISON:  11/19/2018.  
 
FINDINGS: A portable AP radiograph of the chest was obtained at 1409 hours. 
Increased bilateral interstitial markings. Cardiac silhouette and overlying AICD 
type pacemaker is stable. Right lung base streaky opacity, similar, may 
represent atelectasis or developing infiltrate. Additionally trace effusion may 
also be considered. Osseous structures are stable. No definite large 
pneumothorax. Impression IMPRESSION: Increased bilateral interstitial markings may represent congestion. Right lung base streaky opacity as above. No large pneumothorax. CXR 8/26/2014: 
 
AICD. No change in lead placement. No visualized lead fracture. Lungs appear 
unremarkable. Normal size heart. Impression: No acute findings. Patient Active Problem List  
Diagnosis Code  Congestive heart failure (HCC) I50.9  Hypertension I10  
 MVP (mitral valve prolapse) I34.1  Nonischemic cardiomyopathy (HCC) I42.8  Cancer ([facility] C80.1  Adenocarcinoma of breast; locally advanced invasive ductal adenocarcinoma of left breast C50.919  
 Poisoning by other and unspecified agents primarily affecting the cardiovascular system(972.9) T46.904A  Syncope and collapse R55  Other primary cardiomyopathies I42.8  Shortness of breath R06.02  
 Nonspecific abnormal electrocardiogram (ECG) (EKG) R94.31  
 Automatic implantable cardioverter-defibrillator in situ Z95.810  
 Mitral valve disease I05.9  Abnormal PFT R94.2  Acute bronchitis J20.9  Chronic asthmatic bronchitis ([address] J44.9  Malignant neoplasm metastatic to lung (HCC) C78.00 IMPRESSION:  
· Metastatic breast cancer to Lung with extensive endobronchial involvement. Clinical response to chemotherapy. Improved PET/CT findings and symptoms of wheezing shortness of breath and cough. · SOB on exertion - Carcinomatosis and  Endobronchial inflammation/obstruction from tumor load. -Improving · Acute flareup of allergic rhinitis · Clinical data suggests multifactorial etiology with progressive symptoms. over past 1 year: worsening reactive airways due to untreated rhinosinusitis, silent reflux and or a component of diastolic heart failure. · Abnormal PFT- combined restrictive and Obstructive component-progression noted -Asthma with reduced DLCO ( corrects with Volume adjustment.) Suspect restrictive component from chest wall deformity · Pulm HTN-new likely group 2 and 3 
· H/o invasive ductal breast Ca · H/o non ischemic cardiomyopathy · AICD RECOMMENDATIONS:  
 
· Will continue Incruse- longer acting bronchodilator. Sample provided and inhalation technique taught · Will await further response to chemotherapy · Continue treating obstructive airways component · Continue nebulized bronchodilators PRN 
· Continue singulair to address chronic rhinosinuitis component- Nasonex , add Allegra for the current pollen season · GERD precautions · Preventive vaccinations- recieved · Monitor response to interventions and make appropriate adjustments · Healthy weight and active lifestyle · Discussed the results of PET -Ct scan chest and echo · Will follow for progression of pulmonary hypertension, check oxygen saturation and provide supplemental oxygen if meets criteria · Follow up in 2 months Health maintenance screens deferred to Primary care provider.  
  
Jeanie Banegas MD

## 2019-04-12 NOTE — PROGRESS NOTES
[address] [facility] [address] [facility], [address] [address] [facility]) [phone] ([address] [place] [address]) [phone] Simple walk test done in office today. Qualifying O2 sats:  
 
O2 Sat at rest on room air is: 96 %, Pulse 95, SOB scale 5 Walked: 29   m on Room Air : 96 %, Pulse 108, SOB scale 6 
    68   m on Room Air : 91 %, Pulse 112, SOB scale 6 
    102 m on Room Air : 93 %, Pulse 123, SOB scale 7 O2 Sat at rest on room air O2 is : 95 %, Pulse 112, SOB scale 7 Tech comments regarding testing: Patient completed simple walk test. Patient c/o mild to moderate shortness of breath 5-7/10, with some non productive coughing. Patient's oxygen remained stable at 91% or above after completing 102 meters.

## 2019-04-12 NOTE — PROGRESS NOTES
Regi Garcia presents today for Chief Complaint Patient presents with  Asthma CT done 2/10, PET CT done 3/22  Cough  Lung Cancer Is someone accompanying this pt? Spouse Is the patient using any DME equipment during OV? No   
 -DME Company N/A Depression Screening: 
3 most recent PHQ Screens 4/12/2019 Little interest or pleasure in doing things Not at all Feeling down, depressed, irritable, or hopeless Not at all Total Score PHQ 2 0 Learning Assessment: 
Learning Assessment 12/19/2017 PRIMARY LEARNER Patient HIGHEST LEVEL OF EDUCATION - PRIMARY LEARNER  2 YEARS OF COLLEGE  
BARRIERS PRIMARY LEARNER -  
PRIMARY LANGUAGE ENGLISH  
LEARNER PREFERENCE PRIMARY READING  
ANSWERED BY patient, Alonso Dorsey RELATIONSHIP SELF Abuse Screening: No flowsheet data found. Fall Risk Fall Risk Assessment, last 12 mths 4/12/2019 Able to walk? Yes Fall in past 12 months? No  
 
 
 
Coordination of Care: 1. Have you been to the ER, urgent care clinic since your last visit? Hospitalized since your last visit? No 
 
2. Have you seen or consulted any other health care providers outside of the [address] [place] [address] since your last visit? Include any pap smears or colon screening. Dr Flores Galvan PCP, Dr Jaime Ma Medication variance in dosage/sig per patient as follows: No changes per patient

## 2019-04-17 ENCOUNTER — HOSPITAL ENCOUNTER (OUTPATIENT)
Dept: LAB | Age: 69
Discharge: HOME | End: 2019-04-17
Payer: MEDICARE

## 2019-04-17 LAB
CHOLEST SERPL-MCNC: 266 MG/DL
HDLC SERPL-MCNC: 60 MG/DL (ref 40–60)
HDLC SERPL: 4.4 {RATIO} (ref 0–5)
LDLC SERPL CALC-MCNC: 189.2 MG/DL (ref 0–100)
LIPID PROFILE,FLP: ABNORMAL
T4 FREE SERPL-MCNC: 1 NG/DL (ref 0.7–1.5)
TRIGL SERPL-MCNC: 84 MG/DL (ref ?–150)
TSH SERPL DL<=0.05 MIU/L-ACNC: 0.1 UIU/ML (ref 0.36–3.74)
VLDLC SERPL CALC-MCNC: 16.8 MG/DL

## 2019-04-17 PROCEDURE — 84439 ASSAY OF FREE THYROXINE: CPT

## 2019-04-17 PROCEDURE — 80061 LIPID PANEL: CPT

## 2019-04-17 PROCEDURE — 36415 COLL VENOUS BLD VENIPUNCTURE: CPT

## 2019-04-30 ENCOUNTER — OFFICE VISIT (OUTPATIENT)
Dept: PULMONOLOGY | Age: 69
End: 2019-04-30

## 2019-04-30 ENCOUNTER — APPOINTMENT (OUTPATIENT)
Dept: VASCULAR SURGERY | Age: 69
DRG: 291 | End: 2019-04-30
Attending: EMERGENCY MEDICINE
Payer: MEDICARE

## 2019-04-30 ENCOUNTER — HOSPITAL ENCOUNTER (EMERGENCY)
Age: 69
Discharge: HOME | DRG: 291 | End: 2019-04-30
Attending: EMERGENCY MEDICINE
Payer: MEDICARE

## 2019-04-30 ENCOUNTER — TELEPHONE (OUTPATIENT)
Dept: PULMONOLOGY | Age: 69
End: 2019-04-30

## 2019-04-30 VITALS
OXYGEN SATURATION: 95 % | DIASTOLIC BLOOD PRESSURE: 86 MMHG | SYSTOLIC BLOOD PRESSURE: 149 MMHG | HEIGHT: 65 IN | HEART RATE: 111 BPM | WEIGHT: 179 LBS | BODY MASS INDEX: 29.82 KG/M2 | RESPIRATION RATE: 24 BRPM | TEMPERATURE: 98.7 F

## 2019-04-30 VITALS
DIASTOLIC BLOOD PRESSURE: 76 MMHG | HEIGHT: 65 IN | RESPIRATION RATE: 22 BRPM | HEART RATE: 108 BPM | WEIGHT: 179.6 LBS | TEMPERATURE: 97.9 F | SYSTOLIC BLOOD PRESSURE: 130 MMHG | OXYGEN SATURATION: 95 % | BODY MASS INDEX: 29.92 KG/M2

## 2019-04-30 DIAGNOSIS — R06.2 WHEEZING: Primary | ICD-10-CM

## 2019-04-30 DIAGNOSIS — M79.89 SWELLING OF UPPER EXTREMITY: Primary | ICD-10-CM

## 2019-04-30 DIAGNOSIS — R06.02 SHORTNESS OF BREATH: ICD-10-CM

## 2019-04-30 DIAGNOSIS — C34.90 MALIGNANT NEOPLASM OF LUNG, UNSPECIFIED LATERALITY, UNSPECIFIED PART OF LUNG (HCC): ICD-10-CM

## 2019-04-30 DIAGNOSIS — R60.0 LOCALIZED EDEMA: ICD-10-CM

## 2019-04-30 PROCEDURE — 99283 EMERGENCY DEPT VISIT LOW MDM: CPT

## 2019-04-30 PROCEDURE — 93971 EXTREMITY STUDY: CPT

## 2019-04-30 PROCEDURE — 93005 ELECTROCARDIOGRAM TRACING: CPT

## 2019-04-30 RX ORDER — IPRATROPIUM BROMIDE AND ALBUTEROL SULFATE 2.5; .5 MG/3ML; MG/3ML
3 SOLUTION RESPIRATORY (INHALATION)
Qty: 1 NEBULE | Refills: 0 | Status: SHIPPED | COMMUNITY
Start: 2019-04-30 | End: 2019-04-30

## 2019-04-30 NOTE — PATIENT INSTRUCTIONS
Considering symptoms of : 
 
· Tachypnea, wheezing and worsening SOB x two weeks · Current treatment with chemotherapy for Lung CA · Acute edema in LUE 
· PMH of CHF, pacemaker · Abnormal CXR 4/30/19 Recommendations are for you to report to the ER for further evaluation. Follow up based on recommendations of ER or inpatient physician

## 2019-04-30 NOTE — PROGRESS NOTES
Marika Cabrera presents today for Chief Complaint Patient presents with  Shortness of Breath  
  sick visit  Other  
  last seen by Dr. Cortez Kwong for right lung CA & Chronic Asthmatic Bronchitis on 4/12/2019  Wheezing Is someone accompanying this pt? No 
 
Is the patient using any DME equipment during OV? Yes. nebulizer  
 -DME Company N/A Depression Screening: 
3 most recent PHQ Screens 4/30/2019 Little interest or pleasure in doing things Not at all Feeling down, depressed, irritable, or hopeless Not at all Total Score PHQ 2 0 Learning Assessment: 
Learning Assessment 12/19/2017 PRIMARY LEARNER Patient HIGHEST LEVEL OF EDUCATION - PRIMARY LEARNER  2 YEARS OF COLLEGE  
BARRIERS PRIMARY LEARNER -  
PRIMARY LANGUAGE ENGLISH  
LEARNER PREFERENCE PRIMARY READING  
ANSWERED BY patient, Yunier Lozano RELATIONSHIP SELF Abuse Screening: 
Abuse Screening Questionnaire 4/30/2019 Do you ever feel afraid of your partner? [place] Are you in a relationship with someone who physically or mentally threatens you? [place] Is it safe for you to go home? Melinda Gresham Fall Risk Fall Risk Assessment, last 12 mths 4/30/2019 Able to walk? Yes Fall in past 12 months? No  
 
 
 
Coordination of Care: 1. Have you been to the ER, urgent care clinic since your last visit? Hospitalized since your last visit? No 
 
2. Have you seen or consulted any other health care providers outside of the [facility] since your last visit? Include any pap smears or colon screening. Yes. Dr. Nisa Purdy, Oncologist, Dr. Patrick Oliveira, PCP Medication variance in dosage/sig per patient as follows: None.

## 2019-04-30 NOTE — ED PROVIDER NOTES
HPI patient is currently being treated for metastatic breast cancer with lung metastasis. She has chronic shortness of breath and dyspnea on exertion. She went for a pulmonology appointment today and at that time she also complains of swelling in her left arm is been going on for 2 weeks. Her pulmonologist referred her to the emergency room for evaluation of a possible DVT in the left arm. Patient says that several years ago she had a lymph node biopsy biopsy from that left arm and was told that that could be a possible cause for arm swelling in the future. Patient denies any chest pain or abdominal pain. She states she received a nebulizer treatment in the pulmonology office and this has improved her dyspnea at the current time. She denies any other symptoms or complaints. Past Medical History:  
Diagnosis Date  Abnormal nuclear cardiac imaging test 06/11/2010 Lg fixed anterior, septal, apical, inferoseptal defect sugg RCA & LAD disease or cardiomyopathy. EF 20%. Global hykn. Nondiagnostic EKG.  Acute bronchitis 10/15/2018 Persistent cough and wheezing in spite of prednisone and antibiotic. 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   
 Asthma  Automatic implantable cardiac defibrillator in situ  Automatic implantable cardiac defibrillator in situ  Breast cancer ([facility] [address]  Cancer ([facility] [address] breast cancer left  Chemotherapy convalescence or palliative care 2012  Chronic combined systolic and diastolic heart failure ([address] Remains symptomatic, nyha class 3 ef improving.  Congestive heart failure, unspecified   
 chronic class ll  Echocardiogram 09/27/2010 EF 30%. Global hykn. Mild MR. Mild TR.  GERD (gastroesophageal reflux disease)  Hyperlipidemia  Hypertension  Mitral valve disorders(424.0) 1/15/2014  
 mild to moderate mr  Mitral valve disorders(424.0) 1/15/2014 mild to moderate mr  MVP (mitral valve prolapse)  Nonischemic cardiomyopathy ([address] EF 20-30% despite medical therapy  Nonspecific abnormal electrocardiogram (ECG) (EKG)  Osteopenia  Other primary cardiomyopathies  Pacemaker 10/27/10  
 s/p implantation, without complication  Poisoning by other and unspecified agents primarily affecting the cardiovascular system(972.9) Ef slightly better to 45%, STABLE back on chemo.  Radiation therapy  Radiation therapy complication 6786  S/P cardiac catheterization 07/08/10 Patent coronary arteries. LVEDP 25.  EF 25%. Global hykn. Moderate MR.  RA 10.  RV 40/10. PA 40/20.  20.  CO/CI 4.5/2.45 (Larry).  Shortness of breath  Syncope and collapse  Thyroid disease   
 goiter Past Surgical History:  
Procedure Laterality Date  CARDIAC SURG PROCEDURE UNLIST Difibrillator; BI V ICD  HX MASTECTOMY  09/28/11  
 modified radical mastectomy and axillary dissection  HX ORTHOPAEDIC    
 HX OTHER SURGICAL    
 surgery to remove part of liver  HX PACEMAKER  10/27/10  
 s/p [employer] biventricular AICD, without complication  HX RADICAL MASTECTOMY  IR INSERT TUNL CVC W PORT OVER 5 YEARS  1/16/2019  
 NEUROLOGICAL PROCEDURE UNLISTED Cervical fusion Family History:  
Problem Relation Age of Onset  Hypertension Mother  High Cholesterol Mother  Heart Disease Father   
     paemaker implant at 80 Social History Socioeconomic History  Marital status:  Spouse name: Not on file  Number of children: Not on file  Years of education: Not on file  Highest education level: Not on file Occupational History  Not on file Social Needs  Financial resource strain: Not on file  Food insecurity:  
  Worry: Not on file Inability: Not on file  Transportation needs:  
  Medical: Not on file Non-medical: Not on file Tobacco Use  
  Smoking status: Never Smoker  Smokeless tobacco: Never Used Substance and Sexual Activity  Alcohol use: No  
 Drug use: No  
 Sexual activity: Not on file Lifestyle  Physical activity:  
  Days per week: Not on file Minutes per session: Not on file  Stress: Not on file Relationships  Social connections:  
  Talks on phone: Not on file Gets together: Not on file Attends Buddhist service: Not on file Active member of club or organization: Not on file Attends meetings of clubs or organizations: Not on file Relationship status: Not on file  Intimate partner violence:  
  Fear of current or ex partner: Not on file Emotionally abused: Not on file Physically abused: Not on file Forced sexual activity: Not on file Other Topics Concern  Not on file Social History Narrative  Not on file ALLERGIES: Adhesive Review of Systems Constitutional: Negative. HENT: Negative. Respiratory: Positive for shortness of breath. Cardiovascular: Negative. Gastrointestinal: Negative. Genitourinary: Negative. Musculoskeletal: Negative. Neurological: Negative. Psychiatric/Behavioral: Negative. Vitals:  
 04/30/19 1657 04/30/19 1658 BP:  149/86 Pulse:  (!) 111 Resp: 24 Temp: 98.7 °F (37.1 °C) SpO2:  95% Weight: 81.2 kg (179 lb) Height: 5' 5\" (1.651 m) Physical Exam  
Constitutional: She is oriented to person, place, and time. She appears well-developed. HENT:  
Head: Normocephalic and atraumatic. Mouth/Throat: Oropharynx is clear and moist.  
Eyes: Pupils are equal, round, and reactive to light. Conjunctivae and EOM are normal.  
Neck: Normal range of motion. Neck supple. Cardiovascular: Normal rate and regular rhythm. Pulmonary/Chest: Effort normal and breath sounds normal.  
Abdominal: Soft. Musculoskeletal: Normal range of motion. Neurological: She is alert and oriented to person, place, and time. Skin: Skin is warm and dry. Psychiatric: She has a normal mood and affect. Nursing note and vitals reviewed. MDM Procedures

## 2019-04-30 NOTE — TELEPHONE ENCOUNTER
Pt states she is having very severe SOB that she doesn't think is allergy related. Currently scheduled sick visit for 5/7 w/ NP Naina. Pt wanting to know if there's any medication that could help her until appt. Please advise [phone].

## 2019-04-30 NOTE — ED TRIAGE NOTES
Patient states left arm swelling x 2.5 weeks. States shortness of breath x 2 weeks. Sent by pulmonology to r/o dvt to left arm

## 2019-04-30 NOTE — PROGRESS NOTES
[facility] Pulmonary, Critical Care, and Sleep Medicine Pulmonary Office Progress Notes Name: Nora Rajan : 1950 Date: 2019 Subjective:  
 
2019 HPI Nora Rajan  is a 78 y.o. female with PMH of metastatic lung cancer, asthma, and CHF who presents for evaluation of increased shortness of breath and wheezing x 2 weeks. She is currently receiving chemotherapy via Mediport every other week and her last treatment was on Thursday. She reports that she also received high dose of steroids during her last treatment. Today she is somewhat ill-appearing, nontoxic with increased respiration rate and audible wheezing throughout lung fields. She states that she is short of breath with exertion and at rest, and has been unable to complete her normal activities of daily living. She states that she feels that she is not \"getting enough air\". She c/o an infrequent, intermittent ,nonproductive cough that has not worsened. She denies chest pain, hemoptysis, fever, chills or orthopnea. No leg/calf pain or swelling, however she complains of increased swelling to her left arm that appeared 2 days ago and she reports that she had lymph node removal from that arm 7 years ago. She takes Incruse Ellipta daily, Singulair 10 mg daily, and albuterol nebulizer treatments currently 2-3 times per day. CXR completed today and DuoNeb treatment given. Past Medical History:  
Diagnosis Date  Abnormal nuclear cardiac imaging test 2010 Lg fixed anterior, septal, apical, inferoseptal defect sugg RCA & LAD disease or cardiomyopathy. EF 20%. Global hykn. Nondiagnostic EKG.  Acute bronchitis 10/15/2018 Persistent cough and wheezing in spite of prednisone and antibiotic. 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   
 Asthma  Automatic implantable cardiac defibrillator in situ  Automatic implantable cardiac defibrillator in situ  Breast cancer ([facility] [address] [facility]  [facility] breast cancer left  Chemotherapy convalescence or palliative care 2012  Chronic combined systolic and diastolic heart failure ([facility] Remains symptomatic, nyha class 3 ef improving.  Congestive heart failure, unspecified   
 chronic class ll  Echocardiogram 09/27/2010 EF 30%. Global hykn. Mild MR. Mild TR.  GERD (gastroesophageal reflux disease)  Hyperlipidemia  Hypertension  Mitral valve disorders(424.0) 1/15/2014  
 mild to moderate mr  Mitral valve disorders(424.0) 1/15/2014  
 mild to moderate mr  MVP (mitral valve prolapse)  Nonischemic cardiomyopathy ([facility] EF 20-30% despite medical therapy  Nonspecific abnormal electrocardiogram (ECG) (EKG)  Osteopenia  Other primary cardiomyopathies  Pacemaker 10/27/10  
 s/p implantation, without complication  Poisoning by other and unspecified agents primarily affecting the cardiovascular system(972.9) Ef slightly better to 45%, STABLE back on chemo.  Radiation therapy  Radiation therapy complication 9943  S/P cardiac catheterization 07/08/10 Patent coronary arteries. LVEDP 25.  EF 25%. Global hykn. Moderate MR.  RA 10.  RV 40/10. PA 40/20.  20.  CO/CI 4.5/2.45 (Larry).  Shortness of breath  Syncope and collapse  Thyroid disease   
 goiter Allergies Allergen Reactions  Adhesive Other (comments)  
  blisters Current Outpatient Medications Medication Sig Dispense Refill  albuterol-ipratropium (DUO-NEB) 2.5 mg-0.5 mg/3 ml nebu 3 mL by Nebulization route now for 1 dose. 1 Nebule 0  
 umeclidinium (INCRUSE ELLIPTA) 62.5 mcg/actuation inhaler Take 1 Puff by inhalation daily. 3 Inhaler 0  
 DULoxetine (CYMBALTA) 30 mg capsule Take 30 mg by mouth daily.  carvedilol (COREG) 6.25 mg tablet Take 1 Tab by mouth two (2) times daily (with meals). 180 Tab 3  
 multivitamin (ONE A DAY) tablet Take 1 Tab by mouth daily.  b complex vitamins (B COMPLEX 1) tablet Take 1 Tab by mouth daily.  cyanocobalamin 1,000 mcg tablet Take 3,000 mcg by mouth daily.  plant stanol eliezer (CHOLEST OFF PO) Take 1 Tab by mouth daily.  benzonatate (TESSALON PERLES) 100 mg capsule Take 100 mg by mouth three (3) times daily as needed for Cough.  Biotin 2,500 mcg cap Take 1 Cap by mouth daily.  melatonin 10 mg tab Take 1 Tab by mouth nightly.  albuterol-ipratropium (DUO-NEB) 2.5 mg-0.5 mg/3 ml nebu 3 mL by Nebulization route every six (6) hours as needed. 120 Nebule 3  
 lisinopril (PRINIVIL, ZESTRIL) 10 mg tablet TAKE 1 TABLET DAILY 90 Tab 2  
 montelukast (SINGULAIR) 10 mg tablet Take 1 Tab by mouth daily. 90 Tab 3  cholecalciferol, vitamin D3, 2,000 unit tab Take 1 Tab by mouth daily.  omeprazole (PRILOSEC) 40 mg capsule Take 40 mg by mouth daily.  raloxifene (EVISTA) 60 mg tablet Take 60 mg by mouth daily.  VALERIAN ROOT by Does Not Apply route. Review of Systems: 
 
HEENT: No epistaxis, no nasal drainage, no difficulty in swallowing, no redness in eyes Respiratory: As stated above in HPI Cardiovascular: no chest pain, no palpitations, no chronic leg edema, no syncope. Increased swelling to left arm noticed 2 days ago Gastrointestinal: no abd pain, no vomiting, no diarrhea, no bleeding symptoms Genitourinary: No urinary symptoms or hematuria Integument/breast: No ulcers or rashes Musculoskeletal: No leg / calf pain Neurological: No focal weakness, no seizures, no headaches Behvioral/Psych: No anxiety, no depression Constitutional: No fever, chills or night sweats. No decreased appetite or weight loss Objective:  
 
Visit Vitals /76 (BP 1 Location: Left arm, BP Patient Position: Sitting) Pulse (!) 108 Temp 97.9 °F (36.6 °C) (Oral) Resp 22 Ht 5' 5\" (1.651 m) Wt 81.5 kg (179 lb 9.6 oz) SpO2 95% BMI 29.89 kg/m² PHYSICAL EXAM 
 
 
General: Oriented to person, place, and time. Well-developed, well-nourished. Increased respiratory rate, in no acute distress Head:   Normocephalic, without obvious abnormality, atraumatic Eyes:   Pupils reactive, conjunctivae / corneas clear. EOM's intact, no scleral icterus Nose:   Nares normal, no drainage. Throat:    Lips, mucosa and tongue normal. Teeth and gums normal 
     Neck:   Supple, symmetrical, trachea midline. No adenopathy or thyroid swelling; no carotid bruit or JVD. CVS:    Regular rate and rhythm. S1S2 normal,  no murmurs RS:      Symmetrical chest rise, moderate AE bilaterally. Diffuse wheezing throughout lung fields, somewhat improved after duoneb tx. No rales or rhonchi, no accessory muscle use. SpO2 is 95-98% on RA. Abd:     Soft, non-tender. No hepatosplenomegaly Neuro:   non focal, awake, alert and oriented to person, place, time and situation Extrm:   no leg edema,  clubbing or cyanosis. Swelling / edema to left arm Skin:   no rash Data review: Hospital Outpatient Visit on 04/17/2019 Component Date Value Ref Range Status  LIPID PROFILE 04/17/2019        Final  
 Cholesterol, total 04/17/2019 266* <200 MG/DL Final  
 Triglyceride 04/17/2019 84  <150 MG/DL Final  
 Comment: The drugs N-acetylcysteine (NAC) and 
Metamiszole have been found to cause falsely 
low results in this chemical assay. Please 
be sure to submit blood samples obtained BEFORE administration of either of these 
drugs to assure correct results.  
  
 HDL Cholesterol 04/17/2019 60  40 - 60 MG/DL Final  
 LDL, calculated 04/17/2019 189.2* 0 - 100 MG/DL Final  
 VLDL, calculated 04/17/2019 16.8  MG/DL Final  
  CHOL/HDL Ratio 04/17/2019 4.4  0 - 5.0   Final  
 TSH 04/17/2019 0.10* 0.36 - 3.74 uIU/mL Final  
 T4, Free 04/17/2019 1.0  0.7 - 1.5 NG/DL Final  
Ancillary Procedure on 03/08/2019 Component Date Value Ref Range Status  LA Volume 03/08/2019 56.9* 22 - 52 mL Final  
 Ao Root D 03/08/2019 3.28  cm Final  
 AO ASC D 03/08/2019 3.12  cm Final  
 Aortic Valve Systolic Peak Velocity 02/12/3876 118.17  cm/s Final  
 AoV PG 03/08/2019 5.6  mmHg Final  
 LVIDd 03/08/2019 5.11  3.9 - 5.3 cm Final  
 LVPWd 03/08/2019 1.03* 0.6 - 0.9 cm Final  
 LVIDs 03/08/2019 3.90  cm Final  
 IVSd 03/08/2019 1.12* 0.6 - 0.9 cm Final  
 LVOT Peak Velocity 03/08/2019 69.12  cm/s Final  
 LVOT Peak Gradient 03/08/2019 1.9  mmHg Final  
 LV E' Septal Velocity 03/08/2019 11.57  cm/s Final  
 LV E' Lateral Velocity 03/08/2019 11.82  cm/s Final  
 MVA (PHT) 03/08/2019 6.7  cm2 Final  
 Mitral Valve Area by Proximal Isov* 03/08/2019 0.1  cm2 Final  
 MV A Michael 03/08/2019 49.41  cm/s Final  
 MV E Michael 03/08/2019 114.80  cm/s Final  
 MV E/A 03/08/2019 2.32   Final  
 RVIDd 03/08/2019 3.77  cm Final  
 Mitral Effective Regurgitant Orifi* 03/08/2019 0.10  cm2 Final  
 Global Longitudinal Strain 03/08/2019 -13.00  % Final  
 LA Vol 4C 03/08/2019 39.95  22 - 52 mL Final  
 LA Vol 2C 03/08/2019 59.89* 22 - 52 mL Final  
 LA Area 2C 03/08/2019 20.34  cm2 Final  
 LA Area 4C 03/08/2019 16.8  cm2 Final  
 LV Mass AL 03/08/2019 246.0* 67 - 162 g Final  
 LV Mass AL Index 03/08/2019 131.6* 43 - 95 g/m2 Final  
 E/E' lateral 03/08/2019 9.71   Final  
 E/E' septal 03/08/2019 9.92   Final  
 Right Atrial Area 4C 03/08/2019 11.4  cm2 Final  
 E/E' ratio (averaged) 03/08/2019 9.82   Final  
 MV regurgitant volume 03/08/2019 20.03  cc Final  
 Mitral Regurgitant Velocity Time I* 03/08/2019 183.94  cm Final  
 Est. RA Pressure 03/08/2019 3.0  mmHg Final  
 Mitral Regurgitant Peak Velocity 03/08/2019 525.97  cm/s Final  
  Mitral Valve E Wave Deceleration T* 03/08/2019 113.5  ms Final  
 Mitral Valve Pressure Half-time 03/08/2019 32.9  ms Final  
 Left Atrium Major Axis 03/08/2019 3.41  cm Final  
 Tapse 03/08/2019 1.67  1.5 - 2.0 cm Final  
 Triscuspid Valve Regurgitation Pea* 03/08/2019 44.0  mmHg Final  
 Pulmonic Valve Max Velocity 03/08/2019 78.50  cm/s Final  
 TR Max Velocity 03/08/2019 331.65  cm/s Final  
 PISA MR Rad 03/08/2019 0.49  cm Final  
 LA Vol Index 03/08/2019 30.44  16 - 28 ml/m2 Final  
 PASP 03/08/2019 47.0  mmHg Final  
 LA Vol Index 03/08/2019 32.04  16 - 28 ml/m2 Final  
 LA Vol Index 03/08/2019 21.38  16 - 28 ml/m2 Final  
 MR Peak Gradient 03/08/2019 110.7  mmHg Final  
 PV peak gradient 03/08/2019 2.5  mmHg Final  
Hospital Outpatient Visit on 02/10/2019 Component Date Value Ref Range Status  Creatinine, POC 02/10/2019 0.8  0.6 - 1.3 MG/DL Final  
 GFRAA, POC 02/10/2019 >60  >60 ml/min/1.73m2 Final  
 GFRNA, POC 02/10/2019 >60  >60 ml/min/1.73m2 Final  
 Comment: Estimated GFR is calculated using the IDMS-traceable Modification of Diet in Renal Disease (MDRD) Study equation, reported for both  Americans (GFRAA) and non- Americans (GFRNA), and normalized to 1.73m2 body surface area. The physician must decide which value applies to the patient. The MDRD study equation should only be used in individuals age 25 or older. It has not been validated for the following: pregnant women, patients with serious comorbid conditions, or on certain medications, or persons with extremes of body size, muscle mass, or nutritional status. Admission on 01/16/2019, Discharged on 01/16/2019 Component Date Value Ref Range Status  Sodium 01/16/2019 140  136 - 145 mmol/L Final  
 Potassium 01/16/2019 4.0  3.5 - 5.5 mmol/L Final  
 Chloride 01/16/2019 108  100 - 108 mmol/L Final  
 CO2 01/16/2019 26  21 - 32 mmol/L Final  
 Anion gap 01/16/2019 6  3.0 - 18 mmol/L Final  
  Glucose 01/16/2019 134* 74 - 99 mg/dL Final  
 BUN 01/16/2019 19* 7.0 - 18 MG/DL Final  
 Creatinine 01/16/2019 0.86  0.6 - 1.3 MG/DL Final  
 BUN/Creatinine ratio 01/16/2019 22* 12 - 20   Final  
 GFR est AA 01/16/2019 >60  >60 ml/min/1.73m2 Final  
 GFR est non-AA 01/16/2019 >60  >60 ml/min/1.73m2 Final  
 Comment: (NOTE) Estimated GFR is calculated using the Modification of Diet in Renal  
Disease (MDRD) Study equation, reported for both  Americans [facility]) and non- Americans (GFRNA), and normalized to 1.73m2  
body surface area. The physician must decide which value applies to  
the patient. The MDRD study equation should only be used in  
individuals age 25 or older. It has not been validated for the  
following: pregnant women, patients with serious comorbid conditions,  
or on certain medications, or persons with extremes of body size,  
muscle mass, or nutritional status.  Calcium 01/16/2019 8.6  8.5 - 10.1 MG/DL Final  
 WBC 01/16/2019 9.0  4.6 - 13.2 K/uL Final  
 RBC 01/16/2019 3.61* 4.20 - 5.30 M/uL Final  
 HGB 01/16/2019 11.4* 12.0 - 16.0 g/dL Final  
 HCT 01/16/2019 33.4* 35.0 - 45.0 % Final  
 MCV 01/16/2019 92.5  74.0 - 97.0 FL Final  
 MCH 01/16/2019 31.6  24.0 - 34.0 PG Final  
 MCHC 01/16/2019 34.1  31.0 - 37.0 g/dL Final  
 RDW 01/16/2019 13.4  11.6 - 14.5 % Final  
 PLATELET 42/17/4533 901  135 - 420 K/uL Final  
 MPV 01/16/2019 9.5  9.2 - 11.8 FL Final  
 Prothrombin time 01/16/2019 13.0  11.5 - 15.2 sec Final  
 INR 01/16/2019 1.0  0.8 - 1.2   Final  
 Comment:            INR Therapeutic Ranges (on stable oral anticoagulant): INDICATION                INR 
DVT/PE/Atrial Fib          2.0-3.0 MI/Mechanical Heart Valve  2.5-3.5  aPTT 01/16/2019 26.2  23.0 - 36.4 SEC Final  
 INR (POC) 01/16/2019 1.0  <1.2   Final  
 The intended use of the i-STAT PT/INR is for monitoring oral anticoagulant therapy and not for evaluating individual factor deficiencies.  CO2, POC 01/16/2019 25* 19 - 24 MMOL/L Final  
 Glucose, POC 01/16/2019 136* 74 - 106 MG/DL Final  
 BUN, POC 01/16/2019 18  7 - 18 MG/DL Final  
 Creatinine, POC 01/16/2019 0.8  0.6 - 1.3 MG/DL Final  
 GFRAA, POC 01/16/2019 >60  >60 ml/min/1.73m2 Final  
 GFRNA, POC 01/16/2019 >60  >60 ml/min/1.73m2 Final  
 Comment: Estimated GFR is calculated using the IDMS-traceable Modification of Diet in Renal Disease (MDRD) Study equation, reported for both  Americans (GFRAA) and non- Americans (GFRNA), and normalized to 1.73m2 body surface area. The physician must decide which value applies to the patient. The MDRD study equation should only be used in individuals age 25 or older. It has not been validated for the following: pregnant women, patients with serious comorbid conditions, or on certain medications, or persons with extremes of body size, muscle mass, or nutritional status.  Sodium, POC 01/16/2019 140  136 - 145 MMOL/L Final  
 Potassium, POC 01/16/2019 4.1  3.5 - 5.5 MMOL/L Final  
 Calcium, ionized (POC) 01/16/2019 1.25  1.12 - 1.32 mmol/L Final  
 Chloride, POC 01/16/2019 105  100 - 108 MMOL/L Final  
 Anion gap, POC 01/16/2019 15  10 - 20   Final  
 Hematocrit, POC 01/16/2019 32* 36 - 49 % Final  
 Hemoglobin, POC 01/16/2019 10.9* 12 - 16 G/DL Final  
Hospital Outpatient Visit on 12/19/2018 Component Date Value Ref Range Status  LA Volume 12/19/2018 66.75* 22 - 52 mL Final  
 LV E' Lateral Velocity 12/19/2018 5.35  cm/s Final  
 LV E' Septal Velocity 12/19/2018 5.50  cm/s Final  
 Ao Root D 12/19/2018 2.82  cm Final  
 LVIDd 12/19/2018 4.13  3.9 - 5.3 cm Final  
 LVPWd 12/19/2018 1.10* 0.6 - 0.9 cm Final  
 LVIDs 12/19/2018 3.02  cm Final  
 IVSd 12/19/2018 0.93* 0.6 - 0.9 cm Final  
 LVOT d 12/19/2018 1.99  cm Final  
 LVOT Peak Velocity 12/19/2018 80.11  cm/s Final  
 LVOT Peak Gradient 12/19/2018 2.6  mmHg Final  
  LVOT VTI 12/19/2018 18.57  cm Final  
 Mitral Valve Area by Proximal Isov* 12/19/2018 0.1  cm2 Final  
 MV A Michael 12/19/2018 108.51  cm/s Final  
 MV E Michael 12/19/2018 0.77  cm/s Final  
 MV E/A 12/19/2018 0.01   Final  
 Mitral Effective Regurgitant Orifi* 12/19/2018 0.10  cm2 Final  
 LA Vol 4C 12/19/2018 55.90* 22 - 52 mL Final  
 LA Vol 2C 12/19/2018 57.69* 22 - 52 mL Final  
 LA Area 4C 12/19/2018 20.8  cm2 Final  
 LV Mass AL 12/19/2018 156.1  67 - 162 g Final  
 LV Mass AL Index 12/19/2018 84.6  43 - 95 g/m2 Final  
 E/E' lateral 12/19/2018 0.14   Final  
 E/E' septal 12/19/2018 0.14   Final  
 E/E' ratio (averaged) 12/19/2018 0.14   Final  
 MV regurgitant volume 12/19/2018 10.85  cc Final  
 Mitral Regurgitant Velocity Time I* 12/19/2018 203.98  cm Final  
 Mitral Regurgitant Peak Velocity 12/19/2018 571.19  cm/s Final  
 Mitral Valve E Wave Deceleration T* 12/19/2018 200.9  ms Final  
 Triscuspid Valve Regurgitation Pea* 12/19/2018 25.6  mmHg Final  
 TR Max Velocity 12/19/2018 252.80  cm/s Final  
 PISA MR Rad 12/19/2018 0.39  cm Final  
 LA Vol Index 12/19/2018 36.16  16 - 28 ml/m2 Final  
 LA Vol Index 12/19/2018 31.25  16 - 28 ml/m2 Final  
 LA Vol Index 12/19/2018 30.28  16 - 28 ml/m2 Final  
 MR Peak Gradient 12/19/2018 130.5  mmHg Final  
 Global Longitudinal Strain 12/19/2018 -13.70  % Final  
 PASP 12/19/2018 28  mmHg Final  
Admission on 12/11/2018, Discharged on 12/11/2018 Component Date Value Ref Range Status  Ventricular Rate 12/11/2018 78  BPM Final  
 Atrial Rate 12/11/2018 78  BPM Final  
 P-R Interval 12/11/2018 160  ms Final  
 QRS Duration 12/11/2018 130  ms Final  
 Q-T Interval 12/11/2018 412  ms Final  
 QTC Calculation (Bezet) 12/11/2018 469  ms Final  
 Calculated P Axis 12/11/2018 33  degrees Final  
 Calculated R Axis 12/11/2018 -15  degrees Final  
 Calculated T Axis 12/11/2018 64  degrees Final  
 Diagnosis 12/11/2018    Final  
 Value:Electronic ventricular pacemaker No previous ECGs available Confirmed by Radha Francois MD, Kemal Acevedo ([id number]) on 12/11/2018 4:04:19 PM 
  
Hospital Outpatient Visit on 12/07/2018 Component Date Value Ref Range Status  Creatinine, POC 12/07/2018 0.8  0.6 - 1.3 MG/DL Final  
 GFRAA, POC 12/07/2018 >60  >60 ml/min/1.73m2 Final  
 GFRNA, POC 12/07/2018 >60  >60 ml/min/1.73m2 Final  
 Comment: Estimated GFR is calculated using the IDMS-traceable Modification of Diet in Renal Disease (MDRD) Study equation, reported for both  Americans (GFRAA) and non- Americans (GFRNA), and normalized to 1.73m2 body surface area. The physician must decide which value applies to the patient. The MDRD study equation should only be used in individuals age 25 or older. It has not been validated for the following: pregnant women, patients with serious comorbid conditions, or on certain medications, or persons with extremes of body size, muscle mass, or nutritional status. PULMONARY FUNCTION TESTS Date FVC FEV1  FEV1/FVC [id number] TLC RV RV/TLC VC DLCO  
11/2018   46   35      49  
6/29/2017  75  69  67  43 77 81   62 Imaging: 
I have personally reviewed the patients radiographs and have reviewed the reports: XR Results (most recent): 
Results from Hospital Encounter encounter on 12/11/18 XR CHEST PORT Narrative INDICATION:  Post bronchoscopy. COMPARISON:  11/19/2018. FINDINGS: A portable AP radiograph of the chest was obtained at 1409 hours. Increased bilateral interstitial markings. Cardiac silhouette and overlying AICD 
type pacemaker is stable. Right lung base streaky opacity, similar, may 
represent atelectasis or developing infiltrate. Additionally trace effusion may 
also be considered. Osseous structures are stable. No definite large 
pneumothorax.  
  
 Impression IMPRESSION: Increased bilateral interstitial markings may represent congestion. Right lung base streaky opacity as above. No large pneumothorax. CT Results (most recent): 
Results from [facility] Encounter encounter on 02/10/19 CT CHEST W CONT Narrative CT CHEST WITH ENHANCEMENT INDICATION: Left breast cancer, lung cancer undergoing follow-up. TECHNIQUE: Axial images obtained from the thoracic inlet to the level of the 
diaphragm following uneventful administration of 80 cc Isovue-300 nonionic 
intravenous contrast. Coronal and sagittal reformatted images were obtained. All CT scans at this facility are performed using dose optimization technique as 
appropriate to a performed exam, to include automated exposure control, 
adjustment of the mA and/or kV according to patient size (including appropriate 
matching first site-specific examinations), or use of iterative reconstruction 
technique. COMPARISON: PET/CT 12/7/2018. CHEST FINDINGS:  
Left anterior chest wall Mediport with lead in the distal SVC. Thyroid: Unremarkable in its visualized aspects. Pericardium/ Heart: No significant effusion. Right anterior chest wall cardiac 
device with leads in the right atrium, right ventricle and coronary sinus. Aorta/ Vessels: No aneurysm or dissection. Lymph Nodes: Decreased anterior mediastinal lymph node, previously FDG avid 
measuring 4 mm transverse diameter, previously 8 mm. Decreased right lower 
paratracheal lymph nodes measuring up to 6 mm (18), previously 9 mm. Subcarinal 
lymph node, previously 12 mm. Right hilar nodes are better defined without 
contrast measuring up to 9 mm (26). Left infrahilar node measures 8 mm (29). Loletta Nunnery Lungs: There is similar endobronchial opacification in the proximal right lower 
lobe bronchus (31). Decreased bronchovascular consolidation, now more tubular in 
appearance measuring 2.9 x 1.7 cm, previously 3.3 x 3.1 cm (remeasured). There 
is a similar opacity with centrilobular densities in the right upper lobe (26). Resolved airspace opacity in the posterior right upper lobe. Resolved other 
reticular and nodular opacities. Decreased size/density and number of scattered 
pulmonary nodules including decreased density of a 5 mm anterior right upper 
lobe opacity, previously 7 mm, 5 x 3 mm linear density in the right major 
fissure (28), previously 7 x 5 mm, 4 mm anterior left lower lobe nodule (34), 
previously 6 mm and a 2 mm in the AP dimension more peripheral left lower lobe 
nodule (53), previously 4 mm. There is improved subsegmental atelectasis right 
lower lobe with similar subsegmental atelectasis or scarring left lower lobe. Arunjonh Villalba Several of the pulmonary nodules are similar including a 8 mm left lower lobe 
pulmonary nodule (40). Pleura: Mild pleural thickening. Upper Abdomen: Extrahepatic biliary duct is upper limits of normal for patient's 
age at 7 mm. No intrahepatic ductal dilation. Bones/soft tissues: Left mastectomy. Incompletely visualized anterior fusion. Similar regions of sclerosis manubrium and sternum. osteopenia. Impression IMPRESSION: 
 
1. Decreased size of previously FDG avid anterior mediastinal lymph node 
compatible with positive treatment response. Decreased additional mediastinal 
lymph nodes. 2.  Unchanged manubrial and sternal sclerosis. 3.  Decreased size and number of several of the pulmonary nodules/consolidations 
suggesting a positive treatment response and/or improved infection/inflammation. Patient Active Problem List  
Diagnosis Code  Congestive heart failure (HCC) I50.9  Hypertension I10  
 MVP (mitral valve prolapse) I34.1  Nonischemic cardiomyopathy (HCC) I42.8  Cancer ([facility] [address] [facility] C80.1  Adenocarcinoma of breast; locally advanced invasive ductal adenocarcinoma of left breast C50.919  
 Poisoning by other and unspecified agents primarily affecting the cardiovascular system(972.9) T46.904A  Syncope and collapse R55  Other primary cardiomyopathies I42.8  Shortness of breath R06.02  
 Nonspecific abnormal electrocardiogram (ECG) (EKG) R94.31  
 Automatic implantable cardioverter-defibrillator in situ Z95.810  
 Mitral valve disease I05.9  Abnormal PFT R94.2  Acute bronchitis J20.9  Chronic asthmatic bronchitis ([facility] [address] J44.9  Malignant neoplasm metastatic to lung (HCC) C78.00  Seasonal allergic rhinitis due to pollen J30.1 IMPRESSION:  
· Shortness of breath  worsening with exertion and at rest x2 weeks with audible diffuse wheezing throughout lung fields, somewhat improved after DuoNeb treatment. · Tachypnea · Left upper extremity edema · CHF · Hypertension · Lung cancer RECOMMENDATIONS:  
· Consulted with  pulmonolgist  Dr. Kamlesh Foster - questionable pleural effusion/ infiltrate noted on CXR. Patient is at increased risk for infection due to immunosuppression · Recommendation is  for patient to go to the ER for further evaluation / treatment / rule out PE. Patient has acute onset of left upper extremity edema (previous lymph node removal) and is at risk for thrombosis · Patient verbalized understanding, currently stable in NAD with  VS WNL,  transported to [facility] ER by .   
 
  
Danita Valles NP

## 2019-05-01 ENCOUNTER — TELEPHONE (OUTPATIENT)
Dept: PULMONOLOGY | Age: 69
End: 2019-05-01

## 2019-05-01 LAB
ATRIAL RATE: 110 BPM
CALCULATED P AXIS, ECG09: 36 DEGREES
CALCULATED R AXIS, ECG10: -65 DEGREES
CALCULATED T AXIS, ECG11: 82 DEGREES
DIAGNOSIS, 93000: NORMAL
P-R INTERVAL, ECG05: 118 MS
Q-T INTERVAL, ECG07: 394 MS
QRS DURATION, ECG06: 122 MS
QTC CALCULATION (BEZET), ECG08: 533 MS
VENTRICULAR RATE, ECG03: 110 BPM

## 2019-05-01 RX ORDER — LISINOPRIL 10 MG/1
TABLET ORAL
Qty: 90 TAB | Refills: 2 | Status: SHIPPED | OUTPATIENT
Start: 2019-05-01 | End: 2019-05-06

## 2019-05-01 NOTE — ED NOTES
1900 PM :Pt care assumed from Dr. Emmanuelle Farr , ED provider. Pt complaint(s), current treatment plan, progression and available diagnostic results have been discussed thoroughly. Rounding occurred: yes Intended Disposition: Home Pending diagnostic reports and/or labs (please list): US DVT LUE 
 
8:30 PM 
Ultrasound negative for DVT. Patient informed. The patient will be discharged home. Findings were discussed at length and questions were answered. Information on all newly prescribed medications was given. the patient was instructed to follow-up with her physician, or return to the Emergency Department with any worsened symptoms or concerns. Return precautions were given.

## 2019-05-01 NOTE — TELEPHONE ENCOUNTER
27 Kaitlin Reis and spoke to Cristal Diaz. I discussed recent visit with patient and expressed concerns regarding  Patient's SOB and abnormal CXR yesterday. Explained that patient had gone to the ER and had LUE duplex and bilateral LE study that was negative, however no bloodwork / CTA was completed and patient was discharged home. I ordered a stat CTA today, per CINTHIA Bee she will follow up with Dr. Gladys Maki regarding additional work up if needed.

## 2019-05-01 NOTE — DISCHARGE INSTRUCTIONS
You are seen in the emergency department tonight due to swelling in the left upper extremity, which is found not to be caused by a blood clot. Please continue to follow-up with your primary care physician or other care provider for continued management of this concern. If your symptoms worsen, or if you develop new concerning symptoms, do not hesitate to return to the emergency department for further evaluation.

## 2019-05-01 NOTE — ED NOTES
Adia Rivas is a 78 y.o. female that was discharged in stable. Pt was accompanied by spouse. Pt is not driving. The patients diagnosis, condition and treatment were explained to  patient and aftercare instructions were given. The patient verbalized understanding. Patient armband removed and shredded.

## 2019-05-02 ENCOUNTER — HOSPITAL ENCOUNTER (OUTPATIENT)
Dept: CT IMAGING | Age: 69
Discharge: HOME | DRG: 291 | End: 2019-05-02
Attending: NURSE PRACTITIONER
Payer: MEDICARE

## 2019-05-02 DIAGNOSIS — R06.02 SHORTNESS OF BREATH: ICD-10-CM

## 2019-05-02 LAB — CREAT UR-MCNC: 0.9 MG/DL (ref 0.6–1.3)

## 2019-05-02 PROCEDURE — 74011636320 HC RX REV CODE- 636/320: Performed by: NURSE PRACTITIONER

## 2019-05-02 PROCEDURE — 71275 CT ANGIOGRAPHY CHEST: CPT

## 2019-05-02 PROCEDURE — 82565 ASSAY OF CREATININE: CPT

## 2019-05-02 RX ORDER — IPRATROPIUM BROMIDE AND ALBUTEROL SULFATE 2.5; .5 MG/3ML; MG/3ML
3 SOLUTION RESPIRATORY (INHALATION)
Qty: 120 NEBULE | Refills: 3 | Status: SHIPPED | OUTPATIENT
Start: 2019-05-02 | End: 2021-11-12

## 2019-05-02 RX ADMIN — IOPAMIDOL 70 ML: 755 INJECTION, SOLUTION INTRAVENOUS at 12:18

## 2019-05-02 NOTE — TELEPHONE ENCOUNTER
[address] [facility]([phone]). WANTS TO TALK TO NURSE REGARDING PT'S ALBUTEROL SOLUTION. NEEDS TO TALK SPECIFICS. PLEASE CALL.

## 2019-05-03 ENCOUNTER — APPOINTMENT (OUTPATIENT)
Dept: GENERAL RADIOLOGY | Age: 69
DRG: 291 | End: 2019-05-03
Attending: STUDENT IN AN ORGANIZED HEALTH CARE EDUCATION/TRAINING PROGRAM
Payer: MEDICARE

## 2019-05-03 ENCOUNTER — HOSPITAL ENCOUNTER (OUTPATIENT)
Dept: NON INVASIVE DIAGNOSTICS | Age: 69
Discharge: HOME | DRG: 291 | End: 2019-05-03
Attending: PHYSICIAN ASSISTANT
Payer: MEDICARE

## 2019-05-03 ENCOUNTER — HOSPITAL ENCOUNTER (INPATIENT)
Age: 69
LOS: 3 days | Discharge: HOME | DRG: 291 | End: 2019-05-06
Attending: STUDENT IN AN ORGANIZED HEALTH CARE EDUCATION/TRAINING PROGRAM | Admitting: INTERNAL MEDICINE
Payer: MEDICARE

## 2019-05-03 VITALS
WEIGHT: 179 LBS | DIASTOLIC BLOOD PRESSURE: 68 MMHG | SYSTOLIC BLOOD PRESSURE: 136 MMHG | HEIGHT: 65 IN | BODY MASS INDEX: 29.82 KG/M2

## 2019-05-03 DIAGNOSIS — C78.01 MALIGNANT NEOPLASM METASTATIC TO RIGHT LUNG (HCC): ICD-10-CM

## 2019-05-03 DIAGNOSIS — R06.02 SHORTNESS OF BREATH: ICD-10-CM

## 2019-05-03 DIAGNOSIS — I05.9 MITRAL VALVE DISEASE: ICD-10-CM

## 2019-05-03 DIAGNOSIS — J44.9 CHRONIC ASTHMATIC BRONCHITIS (HCC): ICD-10-CM

## 2019-05-03 DIAGNOSIS — I50.9 ACUTE CONGESTIVE HEART FAILURE, UNSPECIFIED HEART FAILURE TYPE (HCC): Primary | ICD-10-CM

## 2019-05-03 PROBLEM — C78.00 LUNG METASTASES (HCC): Status: ACTIVE | Noted: 2019-05-03

## 2019-05-03 PROBLEM — I26.99 PULMONARY EMBOLISM (HCC): Status: ACTIVE | Noted: 2019-05-03

## 2019-05-03 PROBLEM — I42.0 DILATED CARDIOMYOPATHY (HCC): Status: ACTIVE | Noted: 2019-05-03

## 2019-05-03 PROBLEM — R09.02 HYPOXIA: Status: ACTIVE | Noted: 2019-05-03

## 2019-05-03 PROBLEM — J42 CHRONIC BRONCHITIS (HCC): Status: ACTIVE | Noted: 2019-05-03

## 2019-05-03 PROBLEM — C50.919 BREAST CANCER (HCC): Status: ACTIVE | Noted: 2019-05-03

## 2019-05-03 LAB
ALBUMIN SERPL-MCNC: 3.3 G/DL (ref 3.4–5)
ALBUMIN/GLOB SERPL: 1.2 {RATIO} (ref 0.8–1.7)
ALP SERPL-CCNC: 74 U/L (ref 45–117)
ALT SERPL-CCNC: 22 U/L (ref 13–56)
ANION GAP SERPL CALC-SCNC: 6 MMOL/L (ref 3–18)
APPEARANCE UR: CLEAR
APTT PPP: 29.5 SEC (ref 23–36.4)
ARTERIAL PATENCY WRIST A: YES
AST SERPL-CCNC: 14 U/L (ref 15–37)
ATRIAL RATE: 119 BPM
BACTERIA URNS QL MICRO: NEGATIVE /HPF
BASE DEFICIT BLD-SCNC: 1 MMOL/L
BASOPHILS # BLD: 0 K/UL (ref 0–0.1)
BASOPHILS NFR BLD: 0 % (ref 0–2)
BDY SITE: ABNORMAL
BILIRUB SERPL-MCNC: 0.3 MG/DL (ref 0.2–1)
BILIRUB UR QL: NEGATIVE
BNP SERPL-MCNC: 974 PG/ML (ref 0–900)
BUN SERPL-MCNC: 12 MG/DL (ref 7–18)
BUN/CREAT SERPL: 14 (ref 12–20)
CALCIUM SERPL-MCNC: 8.8 MG/DL (ref 8.5–10.1)
CALCULATED P AXIS, ECG09: 37 DEGREES
CALCULATED R AXIS, ECG10: -50 DEGREES
CALCULATED T AXIS, ECG11: 83 DEGREES
CHLORIDE SERPL-SCNC: 112 MMOL/L (ref 100–108)
CK MB CFR SERPL CALC: NORMAL % (ref 0–4)
CK MB SERPL-MCNC: <1 NG/ML (ref 5–25)
CK SERPL-CCNC: 27 U/L (ref 26–192)
CO2 SERPL-SCNC: 26 MMOL/L (ref 21–32)
COLOR UR: YELLOW
CREAT SERPL-MCNC: 0.85 MG/DL (ref 0.6–1.3)
DIAGNOSIS, 93000: NORMAL
DIFFERENTIAL METHOD BLD: ABNORMAL
ECHO LV EDV A2C: 107 ML
ECHO LV EDV A4C: 121 ML
ECHO LV EDV BP: 115.3 ML (ref 56–104)
ECHO LV EDV INDEX A4C: 64.1 ML/M2
ECHO LV EDV INDEX BP: 61.1 ML/M2
ECHO LV EDV NDEX A2C: 56.7 ML/M2
ECHO LV EJECTION FRACTION A2C: 24 %
ECHO LV EJECTION FRACTION A4C: 23 %
ECHO LV EJECTION FRACTION BIPLANE: 23.7 % (ref 55–100)
ECHO LV ESV A2C: 80.9 ML
ECHO LV ESV A4C: 92.9 ML
ECHO LV ESV BP: 88 ML (ref 19–49)
ECHO LV ESV INDEX A2C: 42.9 ML/M2
ECHO LV ESV INDEX A4C: 49.2 ML/M2
ECHO LV ESV INDEX BP: 46.6 ML/M2
ECHO LV GLOBAL LONGITUDINAL STRAIN (GLS): -6.6 %
ECHO LV INTERNAL DIMENSION DIASTOLIC: 5.23 CM (ref 3.9–5.3)
ECHO LV INTERNAL DIMENSION SYSTOLIC: 4.81 CM
ECHO LV IVSD: 1.1 CM (ref 0.6–0.9)
ECHO LV MASS 2D: 241.7 G (ref 67–162)
ECHO LV MASS INDEX 2D: 128.1 G/M2 (ref 43–95)
ECHO LV POSTERIOR WALL DIASTOLIC: 0.97 CM (ref 0.6–0.9)
ECHO MV AREA PISA: 0.2 CM2
ECHO MV EROA PISA: 0.2 CM2
ECHO MV REGURGITANT PEAK GRADIENT: 116.2 MMHG
ECHO MV REGURGITANT PEAK VELOCITY: 538.94 CM/S
ECHO MV REGURGITANT RADIUS PISA: 0.62 CM
ECHO MV REGURGITANT VOLUME: 27.62 CC
ECHO MV REGURGITANT VTIA: 165.21 CM
ECHO TV REGURGITANT MAX VELOCITY: 269.41 CM/S
ECHO TV REGURGITANT PEAK GRADIENT: 29 MMHG
EOSINOPHIL # BLD: 0 K/UL (ref 0–0.4)
EOSINOPHIL NFR BLD: 1 % (ref 0–5)
EPITH CASTS URNS QL MICRO: NORMAL /LPF (ref 0–5)
ERYTHROCYTE [DISTWIDTH] IN BLOOD BY AUTOMATED COUNT: 19.5 % (ref 11.6–14.5)
GAS FLOW.O2 O2 DELIVERY SYS: ABNORMAL L/MIN
GAS FLOW.O2 SETTING OXYMISER: 4 L/M
GLOBULIN SER CALC-MCNC: 2.8 G/DL (ref 2–4)
GLUCOSE SERPL-MCNC: 139 MG/DL (ref 74–99)
GLUCOSE UR STRIP.AUTO-MCNC: NEGATIVE MG/DL
HCO3 BLD-SCNC: 22.3 MMOL/L (ref 22–26)
HCT VFR BLD AUTO: 31 % (ref 35–45)
HGB BLD-MCNC: 10 G/DL (ref 12–16)
HGB UR QL STRIP: ABNORMAL
INR PPP: 1.3 (ref 0.8–1.2)
KETONES UR QL STRIP.AUTO: NEGATIVE MG/DL
LACTATE BLD-SCNC: 0.74 MMOL/L (ref 0.4–2)
LEUKOCYTE ESTERASE UR QL STRIP.AUTO: ABNORMAL
LIPASE SERPL-CCNC: 189 U/L (ref 73–393)
LYMPHOCYTES # BLD: 0.8 K/UL (ref 0.9–3.6)
LYMPHOCYTES NFR BLD: 15 % (ref 21–52)
MAGNESIUM SERPL-MCNC: 2 MG/DL (ref 1.6–2.6)
MCH RBC QN AUTO: 30.7 PG (ref 24–34)
MCHC RBC AUTO-ENTMCNC: 32.3 G/DL (ref 31–37)
MCV RBC AUTO: 95.1 FL (ref 74–97)
MONOCYTES # BLD: 0.5 K/UL (ref 0.05–1.2)
MONOCYTES NFR BLD: 9 % (ref 3–10)
NEUTS SEG # BLD: 4 K/UL (ref 1.8–8)
NEUTS SEG NFR BLD: 75 % (ref 40–73)
NITRITE UR QL STRIP.AUTO: NEGATIVE
P-R INTERVAL, ECG05: 128 MS
PCO2 BLD: 32.2 MMHG (ref 35–45)
PH BLD: 7.45 [PH] (ref 7.35–7.45)
PH UR STRIP: 5.5 [PH] (ref 5–8)
PLATELET # BLD AUTO: 392 K/UL (ref 135–420)
PMV BLD AUTO: 9.7 FL (ref 9.2–11.8)
PO2 BLD: 75 MMHG (ref 80–100)
POTASSIUM SERPL-SCNC: 3.7 MMOL/L (ref 3.5–5.5)
PROT SERPL-MCNC: 6.1 G/DL (ref 6.4–8.2)
PROT UR STRIP-MCNC: NEGATIVE MG/DL
PROTHROMBIN TIME: 16 SEC (ref 11.5–15.2)
Q-T INTERVAL, ECG07: 350 MS
QRS DURATION, ECG06: 118 MS
QTC CALCULATION (BEZET), ECG08: 492 MS
RBC # BLD AUTO: 3.26 M/UL (ref 4.2–5.3)
RBC #/AREA URNS HPF: NORMAL /HPF (ref 0–5)
SAO2 % BLD: 96 % (ref 92–97)
SERVICE CMNT-IMP: ABNORMAL
SODIUM SERPL-SCNC: 144 MMOL/L (ref 136–145)
SP GR UR REFRACTOMETRY: 1.01 (ref 1–1.03)
SPECIMEN TYPE: ABNORMAL
TROPONIN I SERPL-MCNC: <0.02 NG/ML (ref 0–0.04)
UROBILINOGEN UR QL STRIP.AUTO: 0.2 EU/DL (ref 0.2–1)
VENTRICULAR RATE, ECG03: 119 BPM
WBC # BLD AUTO: 5.3 K/UL (ref 4.6–13.2)
WBC URNS QL MICRO: NORMAL /HPF (ref 0–4)

## 2019-05-03 PROCEDURE — 65660000004 HC RM CVT STEPDOWN

## 2019-05-03 PROCEDURE — 85730 THROMBOPLASTIN TIME PARTIAL: CPT

## 2019-05-03 PROCEDURE — 83690 ASSAY OF LIPASE: CPT

## 2019-05-03 PROCEDURE — 74011000250 HC RX REV CODE- 250: Performed by: STUDENT IN AN ORGANIZED HEALTH CARE EDUCATION/TRAINING PROGRAM

## 2019-05-03 PROCEDURE — 93005 ELECTROCARDIOGRAM TRACING: CPT

## 2019-05-03 PROCEDURE — 85610 PROTHROMBIN TIME: CPT

## 2019-05-03 PROCEDURE — 74011250636 HC RX REV CODE- 250/636: Performed by: INTERNAL MEDICINE

## 2019-05-03 PROCEDURE — 81001 URINALYSIS AUTO W/SCOPE: CPT

## 2019-05-03 PROCEDURE — 71046 X-RAY EXAM CHEST 2 VIEWS: CPT

## 2019-05-03 PROCEDURE — 83735 ASSAY OF MAGNESIUM: CPT

## 2019-05-03 PROCEDURE — 77030029684 HC NEB SM VOL KT MONA -A

## 2019-05-03 PROCEDURE — 83880 ASSAY OF NATRIURETIC PEPTIDE: CPT

## 2019-05-03 PROCEDURE — 94761 N-INVAS EAR/PLS OXIMETRY MLT: CPT

## 2019-05-03 PROCEDURE — 85025 COMPLETE CBC W/AUTO DIFF WBC: CPT

## 2019-05-03 PROCEDURE — 80053 COMPREHEN METABOLIC PANEL: CPT

## 2019-05-03 PROCEDURE — 93325 DOPPLER ECHO COLOR FLOW MAPG: CPT

## 2019-05-03 PROCEDURE — 99285 EMERGENCY DEPT VISIT HI MDM: CPT

## 2019-05-03 PROCEDURE — 96374 THER/PROPH/DIAG INJ IV PUSH: CPT

## 2019-05-03 PROCEDURE — 96361 HYDRATE IV INFUSION ADD-ON: CPT

## 2019-05-03 PROCEDURE — 74011250636 HC RX REV CODE- 250/636: Performed by: STUDENT IN AN ORGANIZED HEALTH CARE EDUCATION/TRAINING PROGRAM

## 2019-05-03 PROCEDURE — 36600 WITHDRAWAL OF ARTERIAL BLOOD: CPT

## 2019-05-03 PROCEDURE — 82550 ASSAY OF CK (CPK): CPT

## 2019-05-03 PROCEDURE — 82803 BLOOD GASES ANY COMBINATION: CPT

## 2019-05-03 PROCEDURE — 83605 ASSAY OF LACTIC ACID: CPT

## 2019-05-03 PROCEDURE — 94640 AIRWAY INHALATION TREATMENT: CPT

## 2019-05-03 PROCEDURE — 74011000250 HC RX REV CODE- 250: Performed by: INTERNAL MEDICINE

## 2019-05-03 PROCEDURE — 74011250637 HC RX REV CODE- 250/637: Performed by: INTERNAL MEDICINE

## 2019-05-03 RX ORDER — DULOXETIN HYDROCHLORIDE 30 MG/1
30 CAPSULE, DELAYED RELEASE ORAL DAILY
Status: DISCONTINUED | OUTPATIENT
Start: 2019-05-04 | End: 2019-05-06 | Stop reason: HOSPADM

## 2019-05-03 RX ORDER — SODIUM CHLORIDE, SODIUM LACTATE, POTASSIUM CHLORIDE, CALCIUM CHLORIDE 600; 310; 30; 20 MG/100ML; MG/100ML; MG/100ML; MG/100ML
250 INJECTION, SOLUTION INTRAVENOUS CONTINUOUS
Status: DISCONTINUED | OUTPATIENT
Start: 2019-05-03 | End: 2019-05-04

## 2019-05-03 RX ORDER — POTASSIUM CHLORIDE 20 MEQ/1
40 TABLET, EXTENDED RELEASE ORAL
Status: COMPLETED | OUTPATIENT
Start: 2019-05-03 | End: 2019-05-03

## 2019-05-03 RX ORDER — DOCUSATE SODIUM 100 MG/1
100 CAPSULE, LIQUID FILLED ORAL
Status: DISCONTINUED | OUTPATIENT
Start: 2019-05-03 | End: 2019-05-06 | Stop reason: HOSPADM

## 2019-05-03 RX ORDER — FUROSEMIDE 10 MG/ML
20 INJECTION INTRAMUSCULAR; INTRAVENOUS
Status: COMPLETED | OUTPATIENT
Start: 2019-05-03 | End: 2019-05-03

## 2019-05-03 RX ORDER — BENZONATATE 100 MG/1
100 CAPSULE ORAL
Status: DISCONTINUED | OUTPATIENT
Start: 2019-05-03 | End: 2019-05-06 | Stop reason: HOSPADM

## 2019-05-03 RX ORDER — BUDESONIDE AND FORMOTEROL FUMARATE DIHYDRATE 160; 4.5 UG/1; UG/1
2 AEROSOL RESPIRATORY (INHALATION) 2 TIMES DAILY
Status: DISCONTINUED | OUTPATIENT
Start: 2019-05-03 | End: 2019-05-03 | Stop reason: SDUPTHER

## 2019-05-03 RX ORDER — ONDANSETRON 2 MG/ML
4 INJECTION INTRAMUSCULAR; INTRAVENOUS
Status: DISCONTINUED | OUTPATIENT
Start: 2019-05-03 | End: 2019-05-06 | Stop reason: HOSPADM

## 2019-05-03 RX ORDER — IPRATROPIUM BROMIDE AND ALBUTEROL SULFATE 2.5; .5 MG/3ML; MG/3ML
3 SOLUTION RESPIRATORY (INHALATION)
Status: COMPLETED | OUTPATIENT
Start: 2019-05-03 | End: 2019-05-03

## 2019-05-03 RX ORDER — OXYCODONE AND ACETAMINOPHEN 5; 325 MG/1; MG/1
1 TABLET ORAL
Status: DISCONTINUED | OUTPATIENT
Start: 2019-05-03 | End: 2019-05-06 | Stop reason: HOSPADM

## 2019-05-03 RX ORDER — NALOXONE HYDROCHLORIDE 0.4 MG/ML
0.4 INJECTION, SOLUTION INTRAMUSCULAR; INTRAVENOUS; SUBCUTANEOUS AS NEEDED
Status: DISCONTINUED | OUTPATIENT
Start: 2019-05-03 | End: 2019-05-06 | Stop reason: HOSPADM

## 2019-05-03 RX ORDER — BUDESONIDE AND FORMOTEROL FUMARATE DIHYDRATE 160; 4.5 UG/1; UG/1
2 AEROSOL RESPIRATORY (INHALATION)
Status: DISCONTINUED | OUTPATIENT
Start: 2019-05-03 | End: 2019-05-06 | Stop reason: HOSPADM

## 2019-05-03 RX ORDER — ALBUTEROL SULFATE 2.5 MG/.5ML
5 SOLUTION RESPIRATORY (INHALATION)
Status: COMPLETED | OUTPATIENT
Start: 2019-05-03 | End: 2019-05-03

## 2019-05-03 RX ORDER — POTASSIUM CHLORIDE 20 MEQ/1
20 TABLET, EXTENDED RELEASE ORAL
Status: DISCONTINUED | OUTPATIENT
Start: 2019-05-03 | End: 2019-05-03

## 2019-05-03 RX ORDER — ACETAMINOPHEN 325 MG/1
650 TABLET ORAL
Status: DISCONTINUED | OUTPATIENT
Start: 2019-05-03 | End: 2019-05-06 | Stop reason: HOSPADM

## 2019-05-03 RX ORDER — METOPROLOL TARTRATE 5 MG/5ML
2.5 INJECTION INTRAVENOUS
Status: DISCONTINUED | OUTPATIENT
Start: 2019-05-03 | End: 2019-05-06 | Stop reason: HOSPADM

## 2019-05-03 RX ORDER — FUROSEMIDE 10 MG/ML
40 INJECTION INTRAMUSCULAR; INTRAVENOUS 2 TIMES DAILY
Status: DISCONTINUED | OUTPATIENT
Start: 2019-05-03 | End: 2019-05-05

## 2019-05-03 RX ORDER — IPRATROPIUM BROMIDE AND ALBUTEROL SULFATE 2.5; .5 MG/3ML; MG/3ML
3 SOLUTION RESPIRATORY (INHALATION)
Status: DISCONTINUED | OUTPATIENT
Start: 2019-05-03 | End: 2019-05-06 | Stop reason: HOSPADM

## 2019-05-03 RX ORDER — RALOXIFENE HYDROCHLORIDE 60 MG/1
60 TABLET, FILM COATED ORAL DAILY
Status: DISCONTINUED | OUTPATIENT
Start: 2019-05-04 | End: 2019-05-03

## 2019-05-03 RX ORDER — HEPARIN SODIUM 5000 [USP'U]/ML
5000 INJECTION, SOLUTION INTRAVENOUS; SUBCUTANEOUS EVERY 8 HOURS
Status: DISCONTINUED | OUTPATIENT
Start: 2019-05-03 | End: 2019-05-03

## 2019-05-03 RX ORDER — CARVEDILOL 6.25 MG/1
6.25 TABLET ORAL 2 TIMES DAILY WITH MEALS
Status: DISCONTINUED | OUTPATIENT
Start: 2019-05-04 | End: 2019-05-06

## 2019-05-03 RX ORDER — MONTELUKAST SODIUM 10 MG/1
10 TABLET ORAL DAILY
Status: DISCONTINUED | OUTPATIENT
Start: 2019-05-04 | End: 2019-05-04

## 2019-05-03 RX ORDER — LANOLIN ALCOHOL/MO/W.PET/CERES
3 CREAM (GRAM) TOPICAL
Status: DISCONTINUED | OUTPATIENT
Start: 2019-05-03 | End: 2019-05-06 | Stop reason: HOSPADM

## 2019-05-03 RX ADMIN — METHYLPREDNISOLONE SODIUM SUCCINATE 125 MG: 125 INJECTION, POWDER, FOR SOLUTION INTRAMUSCULAR; INTRAVENOUS at 20:43

## 2019-05-03 RX ADMIN — ALBUTEROL SULFATE 5 MG: 2.5 SOLUTION RESPIRATORY (INHALATION) at 17:20

## 2019-05-03 RX ADMIN — FUROSEMIDE 20 MG: 10 INJECTION, SOLUTION INTRAMUSCULAR; INTRAVENOUS at 18:33

## 2019-05-03 RX ADMIN — RIVAROXABAN 15 MG: 15 TABLET, FILM COATED ORAL at 23:28

## 2019-05-03 RX ADMIN — METHYLPREDNISOLONE SODIUM SUCCINATE 125 MG: 125 INJECTION, POWDER, FOR SOLUTION INTRAMUSCULAR; INTRAVENOUS at 17:20

## 2019-05-03 RX ADMIN — SODIUM CHLORIDE, SODIUM LACTATE, POTASSIUM CHLORIDE, AND CALCIUM CHLORIDE 250 ML: 600; 310; 30; 20 INJECTION, SOLUTION INTRAVENOUS at 19:01

## 2019-05-03 RX ADMIN — SODIUM CHLORIDE, SODIUM LACTATE, POTASSIUM CHLORIDE, AND CALCIUM CHLORIDE 250 ML: 600; 310; 30; 20 INJECTION, SOLUTION INTRAVENOUS at 16:06

## 2019-05-03 RX ADMIN — FUROSEMIDE 40 MG: 10 INJECTION, SOLUTION INTRAMUSCULAR; INTRAVENOUS at 20:45

## 2019-05-03 RX ADMIN — IPRATROPIUM BROMIDE AND ALBUTEROL SULFATE 3 ML: .5; 3 SOLUTION RESPIRATORY (INHALATION) at 16:04

## 2019-05-03 RX ADMIN — POTASSIUM CHLORIDE 40 MEQ: 20 TABLET, EXTENDED RELEASE ORAL at 21:07

## 2019-05-03 RX ADMIN — METOPROLOL TARTRATE 2.5 MG: 5 INJECTION INTRAVENOUS at 22:40

## 2019-05-03 NOTE — ED PROVIDER NOTES
HPI  
 
77 yo F with pmh of Locally advanced invasive ductal adenocarcinoma of the left breast. ER/MN negative, HER-2 positive (Four cycles of docetaxel and cyclophosphamide - now on taxol) /  AICD for CHF (2/2 herceptin) / HTN / HLD / MVP / dx PE yesterday - presented to the ED with CC of SOB. Pt stated that shes had shortness of breath over the last three weeks that has worsened in the last 3 days. Noticed wheezing at baseline that has stabilized. She had no CP - pleuritic pain or hemoptysis. Due to these symptoms she was further evaluated with CTA and echo by her oncologist.  She was found to have subsegmental PE with acute on chronic CHF. April echo showed ef of 51-55%, now her EF is 26-30%. Now notes worsening sob with exertion. She has no other complaints. No f/c/ns - productive cough or other infectious complaints. Past Medical History:  
Diagnosis Date  Abnormal nuclear cardiac imaging test 06/11/2010 Lg fixed anterior, septal, apical, inferoseptal defect sugg RCA & LAD disease or cardiomyopathy. EF 20%. Global hykn. Nondiagnostic EKG.  Acute bronchitis 10/15/2018 Persistent cough and wheezing in spite of prednisone and antibiotic. 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   
 Asthma  Automatic implantable cardiac defibrillator in situ  Automatic implantable cardiac defibrillator in situ  Breast cancer ([address]  Cancer ([address] breast cancer left  Chemotherapy convalescence or palliative care 2012  Chronic combined systolic and diastolic heart failure ([address] Remains symptomatic, nyha class 3 ef improving.  Congestive heart failure, unspecified   
 chronic class ll  Echocardiogram 09/27/2010 EF 30%. Global hykn. Mild MR. Mild TR.  GERD (gastroesophageal reflux disease)  Hyperlipidemia  Hypertension  Mitral valve disorders(424.0) 1/15/2014 mild to moderate mr  Mitral valve disorders(424.0) 1/15/2014  
 mild to moderate mr  MVP (mitral valve prolapse)  Nonischemic cardiomyopathy ([address] EF 20-30% despite medical therapy  Nonspecific abnormal electrocardiogram (ECG) (EKG)  Osteopenia  Other primary cardiomyopathies  Pacemaker 10/27/10  
 s/p implantation, without complication  Poisoning by other and unspecified agents primarily affecting the cardiovascular system(972.9) Ef slightly better to 45%, STABLE back on chemo.  Radiation therapy  Radiation therapy complication 1504  S/P cardiac catheterization 07/08/10 Patent coronary arteries. LVEDP 25.  EF 25%. Global hykn. Moderate MR.  RA 10.  RV 40/10. PA 40/20.  20.  CO/CI 4.5/2.45 (Larry).  Shortness of breath  Syncope and collapse  Thyroid disease   
 goiter Past Surgical History:  
Procedure Laterality Date  CARDIAC SURG PROCEDURE UNLIST Difibrillator; BI V ICD  HX MASTECTOMY  09/28/11  
 modified radical mastectomy and axillary dissection  HX ORTHOPAEDIC    
 HX OTHER SURGICAL    
 surgery to remove part of liver  HX PACEMAKER  10/27/10  
 s/p Medtronic biventricular AICD, without complication  HX RADICAL MASTECTOMY  IR INSERT TUNL CVC W PORT OVER 5 YEARS  1/16/2019  
 NEUROLOGICAL PROCEDURE UNLISTED Cervical fusion Family History:  
Problem Relation Age of Onset  Hypertension Mother  High Cholesterol Mother  Heart Disease Father   
     paemaker implant at 80 Social History Socioeconomic History  Marital status:  Spouse name: Not on file  Number of children: Not on file  Years of education: Not on file  Highest education level: Not on file Occupational History  Not on file Social Needs  Financial resource strain: Not on file  Food insecurity:  
  Worry: Not on file Inability: Not on file  Transportation needs: Medical: Not on file Non-medical: Not on file Tobacco Use  Smoking status: Never Smoker  Smokeless tobacco: Never Used Substance and Sexual Activity  Alcohol use: No  
 Drug use: No  
 Sexual activity: Not on file Lifestyle  Physical activity:  
  Days per week: Not on file Minutes per session: Not on file  Stress: Not on file Relationships  Social connections:  
  Talks on phone: Not on file Gets together: Not on file Attends Jain service: Not on file Active member of club or organization: Not on file Attends meetings of clubs or organizations: Not on file Relationship status: Not on file  Intimate partner violence:  
  Fear of current or ex partner: Not on file Emotionally abused: Not on file Physically abused: Not on file Forced sexual activity: Not on file Other Topics Concern  Not on file Social History Narrative  Not on file ALLERGIES: Adhesive Review of Systems Constitutional: Negative for fatigue, fever and unexpected weight change. HENT: Negative for postnasal drip, rhinorrhea, trouble swallowing and voice change. Eyes: Negative for pain and visual disturbance. Respiratory: Positive for cough, shortness of breath and wheezing. Negative for chest tightness. Cardiovascular: Positive for palpitations. Negative for chest pain and leg swelling. Gastrointestinal: Negative for abdominal distention, blood in stool, constipation, diarrhea, nausea and vomiting. Endocrine: Negative for polyuria. Genitourinary: Negative for dysuria. Musculoskeletal: Negative for arthralgias, neck pain and neck stiffness. Skin: Negative for color change. Neurological: Negative for dizziness, seizures, facial asymmetry and numbness. Psychiatric/Behavioral: Negative for agitation and behavioral problems. Vitals:  
 05/03/19 1515 BP: (!) 161/93 Pulse: (!) 120 Resp: 26 SpO2: 97% Physical Exam  
Constitutional: She is oriented to person, place, and time. Non-toxic appearance. No distress. HENT:  
Head: Normocephalic and atraumatic. Mouth/Throat: No oropharyngeal exudate. Eyes: Pupils are equal, round, and reactive to light. EOM are normal.  
Neck: Normal range of motion. No tracheal deviation present. No thyromegaly present. Cardiovascular: Exam reveals no gallop and no decreased pulses. Tachycardic - regular rate Pulmonary/Chest: Tachypnea noted. She is in respiratory distress. She has wheezes. She has rales in the right lower field and the left lower field. She exhibits no mass and no tenderness. Wheezes in the upper fields - rales in the lower fields bilaterally Abdominal: Soft. Bowel sounds are normal. She exhibits no distension. There is no tenderness. There is no rebound and no guarding. Musculoskeletal: Normal range of motion. Right lower leg: Normal.  
     Left lower leg: Normal.  
Neurological: She is alert and oriented to person, place, and time. She is not disoriented. Skin: Skin is warm and dry. Psychiatric: She has a normal mood and affect. MDM As above with a 76-year-old female with past medical history of metastatic breast cancer with lung involvement, currently on multiple cardiotoxic chemotherapeutic agent, presented to the ED with shortness of breath. The patient has known new pulmonary embolism with a newly decreased ejection fraction. Seen that the patient both had cardiac and pulmonary abnormalities she was further evaluated with/infectious work-up. On arrival the patient did have bilateral wheezes in the upper lung fields with rhonchi in the right lower lung field. Therefore she was treated with 2 DuoNeb's and steroids. The patient's respirations did clear and she was symptomatically improved.  
 
The patient's work-up showed a new BNP elevation to 800 with a new oxygen demand. The patient was persistently tachycardic to the 110s despite appearing euvolemic on exam.  With exertion the patient's heart rate would increase to the 140s. Patient was newly on Eliquis, only taking her third dose at the time of arrival.  She has no signs or symptoms of failing this anticoagulant therapy at this time. She has no evidence of ongoing massive PE currently. Therefore she was maintained on Eliquis, not switched over to a heparin drip. Overall we have a 79-year-old female presenting with newly diagnosed PE and acute on chronic CHF with dramatically decreased ejection fraction and worsening symptomatic shortness of breath. Her cardiopulmonary work-up is as above, and she was treated with 20 of IV Lasix. She will be admitted to the telemetry floor for further evaluation. 5:45 PM: patient re evaluated and is doing well. She is breathing comfortably on oxygen. No acute distress. 6:20 pm - hospitalist paged. Discussed with the hospitalist and will admit to tele 7:16 PM 
Discussed with dr Manuelito Stapleton / dr Portillo Vance from cardiology will see the patient in the morning Patient was discussed with on oncology, cardiology, and the hospitalist.  She was admitted to the floor in good condition. Procedures

## 2019-05-04 LAB
ANION GAP SERPL CALC-SCNC: 8 MMOL/L (ref 3–18)
BASOPHILS # BLD: 0 K/UL (ref 0–0.1)
BASOPHILS NFR BLD: 0 % (ref 0–2)
BUN SERPL-MCNC: 10 MG/DL (ref 7–18)
BUN/CREAT SERPL: 12 (ref 12–20)
CALCIUM SERPL-MCNC: 9.3 MG/DL (ref 8.5–10.1)
CHLORIDE SERPL-SCNC: 108 MMOL/L (ref 100–108)
CO2 SERPL-SCNC: 28 MMOL/L (ref 21–32)
CREAT SERPL-MCNC: 0.81 MG/DL (ref 0.6–1.3)
DIFFERENTIAL METHOD BLD: ABNORMAL
EOSINOPHIL # BLD: 0 K/UL (ref 0–0.4)
EOSINOPHIL NFR BLD: 0 % (ref 0–5)
ERYTHROCYTE [DISTWIDTH] IN BLOOD BY AUTOMATED COUNT: 19.1 % (ref 11.6–14.5)
GLUCOSE SERPL-MCNC: 156 MG/DL (ref 74–99)
HCT VFR BLD AUTO: 33.3 % (ref 35–45)
HGB BLD-MCNC: 10.8 G/DL (ref 12–16)
LYMPHOCYTES # BLD: 0.4 K/UL (ref 0.9–3.6)
LYMPHOCYTES NFR BLD: 9 % (ref 21–52)
MAGNESIUM SERPL-MCNC: 2.1 MG/DL (ref 1.6–2.6)
MCH RBC QN AUTO: 30.6 PG (ref 24–34)
MCHC RBC AUTO-ENTMCNC: 32.4 G/DL (ref 31–37)
MCV RBC AUTO: 94.3 FL (ref 74–97)
MONOCYTES # BLD: 0.1 K/UL (ref 0.05–1.2)
MONOCYTES NFR BLD: 2 % (ref 3–10)
NEUTS SEG # BLD: 3.9 K/UL (ref 1.8–8)
NEUTS SEG NFR BLD: 89 % (ref 40–73)
PHOSPHATE SERPL-MCNC: 4.1 MG/DL (ref 2.5–4.9)
PLATELET # BLD AUTO: 409 K/UL (ref 135–420)
PMV BLD AUTO: 9.9 FL (ref 9.2–11.8)
POTASSIUM SERPL-SCNC: 3.6 MMOL/L (ref 3.5–5.5)
RBC # BLD AUTO: 3.53 M/UL (ref 4.2–5.3)
SODIUM SERPL-SCNC: 144 MMOL/L (ref 136–145)
WBC # BLD AUTO: 4.4 K/UL (ref 4.6–13.2)

## 2019-05-04 PROCEDURE — 74011250637 HC RX REV CODE- 250/637: Performed by: INTERNAL MEDICINE

## 2019-05-04 PROCEDURE — 74011250636 HC RX REV CODE- 250/636: Performed by: INTERNAL MEDICINE

## 2019-05-04 PROCEDURE — 80048 BASIC METABOLIC PNL TOTAL CA: CPT

## 2019-05-04 PROCEDURE — 65660000004 HC RM CVT STEPDOWN

## 2019-05-04 PROCEDURE — 84100 ASSAY OF PHOSPHORUS: CPT

## 2019-05-04 PROCEDURE — 74011636637 HC RX REV CODE- 636/637: Performed by: INTERNAL MEDICINE

## 2019-05-04 PROCEDURE — 83735 ASSAY OF MAGNESIUM: CPT

## 2019-05-04 PROCEDURE — 74011000250 HC RX REV CODE- 250: Performed by: INTERNAL MEDICINE

## 2019-05-04 PROCEDURE — 85025 COMPLETE CBC W/AUTO DIFF WBC: CPT

## 2019-05-04 RX ORDER — POTASSIUM CHLORIDE 20 MEQ/1
40 TABLET, EXTENDED RELEASE ORAL 3 TIMES DAILY
Status: COMPLETED | OUTPATIENT
Start: 2019-05-04 | End: 2019-05-05

## 2019-05-04 RX ORDER — PREDNISONE 20 MG/1
20 TABLET ORAL 2 TIMES DAILY WITH MEALS
Status: DISCONTINUED | OUTPATIENT
Start: 2019-05-04 | End: 2019-05-06 | Stop reason: HOSPADM

## 2019-05-04 RX ORDER — DIGOXIN 125 MCG
0.12 TABLET ORAL DAILY
Status: DISCONTINUED | OUTPATIENT
Start: 2019-05-05 | End: 2019-05-06 | Stop reason: HOSPADM

## 2019-05-04 RX ORDER — LOSARTAN POTASSIUM 50 MG/1
50 TABLET ORAL DAILY
Status: DISCONTINUED | OUTPATIENT
Start: 2019-05-04 | End: 2019-05-06 | Stop reason: HOSPADM

## 2019-05-04 RX ORDER — DIGOXIN 0.25 MG/ML
250 INJECTION INTRAMUSCULAR; INTRAVENOUS
Status: COMPLETED | OUTPATIENT
Start: 2019-05-04 | End: 2019-05-04

## 2019-05-04 RX ADMIN — POTASSIUM CHLORIDE 40 MEQ: 20 TABLET, EXTENDED RELEASE ORAL at 21:13

## 2019-05-04 RX ADMIN — LOSARTAN POTASSIUM 50 MG: 50 TABLET ORAL at 08:45

## 2019-05-04 RX ADMIN — RIVAROXABAN 15 MG: 15 TABLET, FILM COATED ORAL at 08:45

## 2019-05-04 RX ADMIN — BUDESONIDE AND FORMOTEROL FUMARATE DIHYDRATE 2 PUFF: 160; 4.5 AEROSOL RESPIRATORY (INHALATION) at 05:43

## 2019-05-04 RX ADMIN — BUDESONIDE AND FORMOTEROL FUMARATE DIHYDRATE 2 PUFF: 160; 4.5 AEROSOL RESPIRATORY (INHALATION) at 08:00

## 2019-05-04 RX ADMIN — RIVAROXABAN 15 MG: 15 TABLET, FILM COATED ORAL at 16:50

## 2019-05-04 RX ADMIN — CARVEDILOL 6.25 MG: 6.25 TABLET, FILM COATED ORAL at 08:45

## 2019-05-04 RX ADMIN — DULOXETINE HYDROCHLORIDE 30 MG: 30 CAPSULE, DELAYED RELEASE ORAL at 08:44

## 2019-05-04 RX ADMIN — CARVEDILOL 6.25 MG: 6.25 TABLET, FILM COATED ORAL at 16:51

## 2019-05-04 RX ADMIN — POTASSIUM CHLORIDE 40 MEQ: 20 TABLET, EXTENDED RELEASE ORAL at 16:50

## 2019-05-04 RX ADMIN — MONTELUKAST SODIUM 10 MG: 10 TABLET, FILM COATED ORAL at 08:45

## 2019-05-04 RX ADMIN — BUDESONIDE AND FORMOTEROL FUMARATE DIHYDRATE 2 PUFF: 160; 4.5 AEROSOL RESPIRATORY (INHALATION) at 19:41

## 2019-05-04 RX ADMIN — BENZONATATE 100 MG: 100 CAPSULE ORAL at 18:40

## 2019-05-04 RX ADMIN — METHYLPREDNISOLONE SODIUM SUCCINATE 40 MG: 40 INJECTION, POWDER, FOR SOLUTION INTRAMUSCULAR; INTRAVENOUS at 05:39

## 2019-05-04 RX ADMIN — DIGOXIN 250 MCG: 0.25 INJECTION INTRAMUSCULAR; INTRAVENOUS at 08:44

## 2019-05-04 RX ADMIN — BENZONATATE 100 MG: 100 CAPSULE ORAL at 22:04

## 2019-05-04 RX ADMIN — METOPROLOL TARTRATE 2.5 MG: 5 INJECTION INTRAVENOUS at 21:15

## 2019-05-04 RX ADMIN — METHYLPREDNISOLONE SODIUM SUCCINATE 40 MG: 40 INJECTION, POWDER, FOR SOLUTION INTRAMUSCULAR; INTRAVENOUS at 14:00

## 2019-05-04 RX ADMIN — IPRATROPIUM BROMIDE AND ALBUTEROL SULFATE 3 ML: .5; 3 SOLUTION RESPIRATORY (INHALATION) at 17:47

## 2019-05-04 RX ADMIN — PREDNISONE 20 MG: 20 TABLET ORAL at 16:51

## 2019-05-04 RX ADMIN — IPRATROPIUM BROMIDE AND ALBUTEROL SULFATE 3 ML: .5; 3 SOLUTION RESPIRATORY (INHALATION) at 22:04

## 2019-05-04 RX ADMIN — FUROSEMIDE 40 MG: 10 INJECTION, SOLUTION INTRAMUSCULAR; INTRAVENOUS at 08:44

## 2019-05-04 RX ADMIN — FUROSEMIDE 40 MG: 10 INJECTION, SOLUTION INTRAMUSCULAR; INTRAVENOUS at 18:00

## 2019-05-04 RX ADMIN — TIOTROPIUM BROMIDE 18 MCG: 18 CAPSULE ORAL; RESPIRATORY (INHALATION) at 09:00

## 2019-05-04 NOTE — PROGRESS NOTES
Pt with PMH of breast cancer and recent Dx of lung mets Dx yesterday with PE and started on Xarelto Hx of CHF but improved to EF of 51-55% but got worse after Chemotherapy Presented in respiratory distress Admitted for Acute on chronic CHF and recent dx of  PE

## 2019-05-04 NOTE — PROGRESS NOTES
[facility] Progress Note Patient: Theron Galeana Age: 78 y.o. : 1950 MR#: [id number] SSN: [id number] Date/Time: 2019 Subjective:  
 
Review of systems No CP  
NO NVD No SOB  NO Cough Assessment/Plan:  
Acute on chronic systolic HF - Sys HF due to use of Herceptin use 1 Non Ischemic cardiomyopathy  
 
- continue diuretics  
- already feeling well  
- continue O2 via NC to maintain o2 sats above 94% 2 HTN 
- Coreg / losartan / continue and monitor 3 Recent diagnosis of PE - Bilateral  
- on Xarelto - restarted  
 
 
4 H/o Asthma /COPD with bronchospasm  
- continue BD / change steroids to PO Full CODE Case discussed with:  [x]Patient  [x] Family ( In room with patient )    []Nursing  []Case Management DVT Prophylaxis:  []Lovenox  []Hep SQ  []SCDs  []Coumadin   []On Heparin gtt Objective:  
VS:  
Visit Vitals /62 Pulse (!) 120 Temp 99 °F (37.2 °C) Resp 20 Wt 81.2 kg (179 lb) SpO2 94% Breastfeeding? No  
BMI 29.79 kg/m² Tmax/24hrs: Temp (24hrs), Av.1 °F (36.7 °C), Min:97.8 °F (36.6 °C), Max:99 °F (37.2 °C) IOBRIEF Intake/Output Summary (Last 24 hours) at 2019 1505 Last data filed at 2019 1335 Gross per 24 hour Intake 720 ml Output 1450 ml Net -730 ml General:  Alert, cooperative, no acute distress Cardiovascular: S1S2 - regular , No Murmur Pulmonary: Equal expansion , No Use of accessory muscles , No Rales, Bilateral  Rhonchi  + GI:  +BS in all four quadrants, soft, non-tender Extremities:  No edema; 2+ dorsalis pedis pulses bilaterally Neuro: Alert and oriented X 2. Medications:  
Current Facility-Administered Medications Medication Dose Route Frequency  [START ON 2019] digoxin (LANOXIN) tablet 0.125 mg  0.125 mg Oral DAILY  losartan (COZAAR) tablet 50 mg  50 mg Oral DAILY  lactated Ringers infusion 250 mL  250 mL IntraVENous CONTINUOUS  
 furosemide (LASIX) injection 40 mg  40 mg IntraVENous BID  albuterol-ipratropium (DUO-NEB) 2.5 MG-0.5 MG/3 ML  3 mL Nebulization Q4H PRN  
 metoprolol (LOPRESSOR) injection 2.5 mg  2.5 mg IntraVENous Q6H PRN  
 budesonide-formoterol (SYMBICORT) 160-4.5 mcg/actuation HFA inhaler 2 Puff  2 Puff Inhalation BID RT  
 benzonatate (TESSALON) capsule 100 mg  100 mg Oral TID PRN  
 carvedilol (COREG) tablet 6.25 mg  6.25 mg Oral BID WITH MEALS  DULoxetine (CYMBALTA) capsule 30 mg  30 mg Oral DAILY  melatonin tablet 3 mg  3 mg Oral QHS PRN  
 montelukast (SINGULAIR) tablet 10 mg  10 mg Oral DAILY  tiotropium (SPIRIVA) inhalation capsule 18 mcg  1 Cap Inhalation DAILY  acetaminophen (TYLENOL) tablet 650 mg  650 mg Oral Q6H PRN  
 oxyCODONE-acetaminophen (PERCOCET) 5-325 mg per tablet 1 Tab  1 Tab Oral Q6H PRN  
 naloxone (NARCAN) injection 0.4 mg  0.4 mg IntraVENous PRN  
 ondansetron (ZOFRAN) injection 4 mg  4 mg IntraVENous Q6H PRN  
 docusate sodium (COLACE) capsule 100 mg  100 mg Oral BID PRN  
 methylPREDNISolone (PF) (SOLU-MEDROL) injection 40 mg  40 mg IntraVENous Q8H  
 rivaroxaban (XARELTO) tablet 15 mg  15 mg Oral BID WITH MEALS Labs:   
Recent Labs 05/04/19 
0514 05/03/19 [address] [place] [address] WBC 4.4* 5.3 HGB 10.8* 10.0* HCT 33.3* 31.0*  
 392 Recent Labs 05/04/19 
0514 05/03/19 [address] [place] [address]  144  
K 3.6 3.7  112* CO2 28 26 * 139* BUN 10 12 CREA 0.81 0.85 CA 9.3 8.8 MG 2.1 2.0 PHOS 4.1  --   
ALB  --  3.3* SGOT  --  14* ALT  --  22 INR  --  1.3* Signed By: Cece Obrien MD   
 May 4, 2019

## 2019-05-04 NOTE — H&P
History & Physical 
Patient: Lin Loyola MRN: [id number]  CSN: [id number] YOB: 1950  Age: 78 y.o. Sex: female DOA: 5/3/2019 Chief Complaint:  
Chief Complaint Patient presents with  Shortness of Breath HPI:  
 
Lin Loyola is a 78 y.o.  female who has remote HX of breast cancer with recently dx with 2 ry lung cancer on chemotherapy. Pt was in ER yesterday with a CC of SOB and was Dx with acute and subacute PEs. Pt was vitally stable and was discharged on Xarelto 15 mg BID Pt also has HX of CHF with 30% EF but repeat Echo few months ago after optimizing meds with cardiology showed Improvement of her EF to 51-55% but then shortly after Chemotherapy she received another echo with EF of 26% Pt presented to ER again today with a CC of worsening SOB despite starting her Xarelto with heavy congestion. Pt states that her SOB was getting worse with increasing LE edema for the last 3 weeks and her Sxs got worse since yesterday. Continues to have heavy wheezing and rales on exam and in distress Will be admitted for CHF exacerbation worsened by PE and lung cancer Past Medical History:  
Diagnosis Date  Abnormal nuclear cardiac imaging test 06/11/2010 Lg fixed anterior, septal, apical, inferoseptal defect sugg RCA & LAD disease or cardiomyopathy. EF 20%. Global hykn. Nondiagnostic EKG.  Acute bronchitis 10/15/2018 Persistent cough and wheezing in spite of prednisone and antibiotic. 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   
 Asthma  Automatic implantable cardiac defibrillator in situ  Automatic implantable cardiac defibrillator in situ  Breast cancer [address]  Cancer ([address] breast cancer left  Chemotherapy convalescence or palliative care 2012  Chronic combined systolic and diastolic heart failure ([address] Remains symptomatic, nyha class 3 ef improving.  Congestive heart failure, unspecified   
 chronic class ll  Echocardiogram 09/27/2010 EF 30%. Global hykn. Mild MR. Mild TR.  GERD (gastroesophageal reflux disease)  Hyperlipidemia  Hypertension  Mitral valve disorders(424.0) 1/15/2014  
 mild to moderate mr  Mitral valve disorders(424.0) 1/15/2014  
 mild to moderate mr  MVP (mitral valve prolapse)  Nonischemic cardiomyopathy ([address] EF 20-30% despite medical therapy  Nonspecific abnormal electrocardiogram (ECG) (EKG)  Osteopenia  Other primary cardiomyopathies  Pacemaker 10/27/10  
 s/p implantation, without complication  Poisoning by other and unspecified agents primarily affecting the cardiovascular system(972.9) Ef slightly better to 45%, STABLE back on chemo.  Radiation therapy  Radiation therapy complication 0264  S/P cardiac catheterization 07/08/10 Patent coronary arteries. LVEDP 25.  EF 25%. Global hykn. Moderate MR.  RA 10.  RV 40/10. PA 40/20.  20.  CO/CI 4.5/2.45 (Larry).  Shortness of breath  Syncope and collapse  Thyroid disease   
 goiter Past Surgical History:  
Procedure Laterality Date  CARDIAC SURG PROCEDURE UNLIST Difibrillator; BI V ICD  HX MASTECTOMY  09/28/11  
 modified radical mastectomy and axillary dissection  HX ORTHOPAEDIC    
 HX OTHER SURGICAL    
 surgery to remove part of liver  HX PACEMAKER  10/27/10  
 s/p [employer] biventricular AICD, without complication  HX RADICAL MASTECTOMY  IR INSERT TUNL CVC W PORT OVER 5 YEARS  1/16/2019  
 NEUROLOGICAL PROCEDURE UNLISTED Cervical fusion Family History Problem Relation Age of Onset  Hypertension Mother  High Cholesterol Mother  Heart Disease Father   
     paemaker implant at 80 Social History Socioeconomic History  Marital status:  Spouse name: Not on file  Number of children: Not on file  Years of education: Not on file  Highest education level: Not on file Tobacco Use  Smoking status: Never Smoker  Smokeless tobacco: Never Used Substance and Sexual Activity  Alcohol use: No  
 Drug use: No  
 
 
Prior to Admission medications Medication Sig Start Date End Date Taking? Authorizing Provider  
lisinopril (PRINIVIL, ZESTRIL) 10 mg tablet TAKE 1 TABLET DAILY 5/1/19  Yes Disha Dailey NP  
carvedilol (COREG) 6.25 mg tablet Take 1 Tab by mouth two (2) times daily (with meals). 4/5/19  Yes Checo Crews MD  
b complex vitamins (B COMPLEX 1) tablet Take 1 Tab by mouth daily. Yes Provider, Historical  
cyanocobalamin 1,000 mcg tablet Take 3,000 mcg by mouth daily. Yes Provider, Historical  
benzonatate (TESSALON PERLES) 100 mg capsule Take 100 mg by mouth three (3) times daily as needed for Cough. Yes Provider, Historical  
Biotin 2,500 mcg cap Take 1 Cap by mouth daily. Yes Provider, Historical  
cholecalciferol, vitamin D3, 2,000 unit tab Take 1 Tab by mouth daily. Yes Provider, Historical  
omeprazole (PRILOSEC) 40 mg capsule Take 40 mg by mouth daily. Yes Provider, Historical  
albuterol-ipratropium (DUO-NEB) 2.5 mg-0.5 mg/3 ml nebu 3 mL by Nebulization route every six (6) hours as needed (wheezing or sob). File under Medicare Part B, ICD codes J44.9, C78.01 5/2/19   Erickson HINSON NP  
umeclidinium (INCRUSE ELLIPTA) 62.5 mcg/actuation inhaler Take 1 Puff by inhalation daily. 4/12/19   Deanne Mcghee MD  
DULoxetine (CYMBALTA) 30 mg capsule Take 30 mg by mouth daily. Provider, Historical  
multivitamin (ONE A DAY) tablet Take 1 Tab by mouth daily. Provider, Historical  
VALERIAN ROOT by Does Not Apply route. Provider, Historical  
plant stanol eliezer (CHOLEST OFF PO) Take 1 Tab by mouth daily. Provider, Historical  
melatonin 10 mg tab Take 1 Tab by mouth nightly.     Provider, Historical  
 montelukast (SINGULAIR) 10 mg tablet Take 1 Tab by mouth daily. 18   Gordon Stone MD  
raloxifene (EVISTA) 60 mg tablet Take 60 mg by mouth daily. Provider, Historical  
 
 
Allergies Allergen Reactions  Adhesive Other (comments)  
  blisters Review of Systems GENERAL: Patient alert, awake and oriented times 3, unable to communicate full sentences and in distress. HEENT: No change in vision, no earache, tinnitus, sore throat or sinus congestion. NECK: No pain or stiffness. PULMONARY: + shortness of breath, cough Yoni Jain. Cardiovascular: no pnd / orthopnea, no CP GASTROINTESTINAL: No abdominal pain, nausea, vomiting or diarrhea, melena or bright red blood per rectum. GENITOURINARY: No urinary frequency, urgency, hesitancy or dysuria. MUSCULOSKELETAL: No joint or muscle pain, no back pain, no recent trauma. DERMATOLOGIC: No rash, no itching, no lesions. ENDOCRINE: No polyuria, polydipsia, no heat or cold intolerance. No recent change in weight. HEMATOLOGICAL: No anemia or easy bruising or bleeding. NEUROLOGIC: No headache, seizures, numbness, tingling + weakness. Physical Exam:  
 
Physical Exam: 
Visit Vitals /69 (BP 1 Location: Right arm, BP Patient Position: At rest) Pulse (!) 128 Temp 98.5 °F (36.9 °C) Resp 20 Wt 81.2 kg (179 lb) SpO2 97% Breastfeeding? No  
BMI 29.79 kg/m² O2 Flow Rate (L/min): 5 l/min O2 Device: Nasal cannula Temp (24hrs), Av.4 °F (36.9 °C), Min:97.8 °F (36.6 °C), Max:99 °F (37.2 °C) 
  1901 -  0700 In: 0 Out: 400 [Urine:400]   701 -  1900 In: 1200 [P.O.:1200] Out: 2350 [Urine:2350] General:  Alert, cooperative, in distress, appears stated age. Head: Normocephalic, without obvious abnormality, atraumatic. Eyes:  Conjunctivae/corneas clear. PERRL, EOMs intact. Nose: Nares normal. No drainage or sinus tenderness. Neck: Supple, symmetrical, trachea midline, no adenopathy, thyroid: no enlargement, no carotid bruit and no JVD. Lungs:   Decrease BS Heart:  Regular rate and rhythm, S1, S2 tachy Abdomen: Soft, non-tender. Bowel sounds normal.   
Extremities: Extremities normal, atraumatic, no cyanosis or edema. Pulses: 2+ and symmetric all extremities. Skin:  No rashes or lesions Neurologic: AAOx3, No focal motor or sensory deficit. Labs Reviewed: 
 
Lab results reviewed. For significant abnormal values and values requiring intervention, see assessment and plan. CT, CXR and EKG Procedures/imaging: see electronic medical records for all procedures/Xrays and details which were not copied into this note but were reviewed prior to creation of Plan Assessment/Plan Principal Problem: 
  Acute exacerbation of CHF (congestive heart failure) ([address] (5/3/2019) Active Problems: 
  Dilated cardiomyopathy ([address] (5/3/2019) Breast cancer ([address] (5/3/2019) Pulmonary embolism ([address] (5/3/2019) Chronic bronchitis ([address] (5/3/2019) Hypoxia (5/3/2019) Lung metastases ([address] (5/3/2019) Pt is admitted for acute respiratory distress 2 ry to multiple etiologies  Including Combined CHF exacerbation PE Lung cancer HTN Tachycardia Will continue with IV diuresis Steroids and nebs Cardiology consult Continue Xarelto Fluid restriction Continue BB, ARB and asa DVT/GI Prophylaxis: Xarelto Plan of care is discussed in details with Patient/Family at bedside and agreed upon Yo Sen MD 
5/5/2019 8:59 PM

## 2019-05-04 NOTE — ED NOTES
MD Larson @ bedside to assess patient status. ABG ordered. Oxygen increased to 4 lpmRemains on nasal cannula, call bell within reach.

## 2019-05-04 NOTE — ED NOTES
MD Quick in the emergency room Notified of ABG done. Oxygen increased to 5 lpm by MD orders. . Lopressor given, continue to monitor.

## 2019-05-04 NOTE — PROGRESS NOTES
Hematology / Oncology Progress Note [facility] Patient: Dia Nuno  MRN: [id number]         CSN: [id number] YOB: 1950 Admit Date: 5/3/2019 Assessment:  
 
Principal Problem: 
  Acute exacerbation of CHF (congestive heart failure) ([facility] [address] (5/3/2019) Active Problems: 
  Dilated cardiomyopathy ([facility] [address] (5/3/2019) Breast cancer ([facility] [address] (5/3/2019) Pulmonary embolism ([facility] [address] (5/3/2019) Chronic bronchitis ([facility] [address] (5/3/2019) Hypoxia (5/3/2019) [facility] ([facility] (5/3/2019) 
 
invasive ductal adenocarcinoma of the left breast. ER/ND negative, HER-2 positive (Four cycles of docetaxel and cyclophosphamide - now on taxol)  AICD for CHF (2/2 herceptin) / HTN / HLD / MVP Plan: 1. Continue present care. 2. On xarelto. 3. Has appt in clinic with Dr Vangie Bird next Thursday. [address] [place] [address] Office [phone] Subjective:  
 
Patient has no new complaints, still has sob. Admitted for PE. Objective:  
 
Visit Vitals /62 Pulse (!) 120 Temp 99 °F (37.2 °C) Resp 20 Wt 81.2 kg (179 lb) SpO2 94% Breastfeeding? No  
BMI 29.79 kg/m² Temp (24hrs), Av.1 °F (36.7 °C), Min:97.8 °F (36.6 °C), Max:99 °F (37.2 °C) Intake/Output Summary (Last 24 hours) at 2019 1606 Last data filed at 2019 1335 Gross per 24 hour Intake 720 ml Output 1450 ml Net -730 ml  
 
 
Current Facility-Administered Medications Medication Dose Route Frequency  [START ON 2019] digoxin (LANOXIN) tablet 0.125 mg  0.125 mg Oral DAILY  losartan (COZAAR) tablet 50 mg  50 mg Oral DAILY  potassium chloride (K-DUR, KLOR-CON) SR tablet 40 mEq  40 mEq Oral TID  predniSONE (DELTASONE) tablet 20 mg  20 mg Oral BID WITH MEALS  furosemide (LASIX) injection 40 mg  40 mg IntraVENous BID  albuterol-ipratropium (DUO-NEB) 2.5 MG-0.5 MG/3 ML  3 mL Nebulization Q4H PRN  
  metoprolol (LOPRESSOR) injection 2.5 mg  2.5 mg IntraVENous Q6H PRN  
 budesonide-formoterol (SYMBICORT) 160-4.5 mcg/actuation HFA inhaler 2 Puff  2 Puff Inhalation BID RT  
 benzonatate (TESSALON) capsule 100 mg  100 mg Oral TID PRN  
 carvedilol (COREG) tablet 6.25 mg  6.25 mg Oral BID WITH MEALS  DULoxetine (CYMBALTA) capsule 30 mg  30 mg Oral DAILY  melatonin tablet 3 mg  3 mg Oral QHS PRN  
 tiotropium (SPIRIVA) inhalation capsule 18 mcg  1 Cap Inhalation DAILY  acetaminophen (TYLENOL) tablet 650 mg  650 mg Oral Q6H PRN  
 oxyCODONE-acetaminophen (PERCOCET) 5-325 mg per tablet 1 Tab  1 Tab Oral Q6H PRN  
 naloxone (NARCAN) injection 0.4 mg  0.4 mg IntraVENous PRN  
 ondansetron (ZOFRAN) injection 4 mg  4 mg IntraVENous Q6H PRN  
 docusate sodium (COLACE) capsule 100 mg  100 mg Oral BID PRN  
 rivaroxaban (XARELTO) tablet 15 mg  15 mg Oral BID WITH MEALS Review of Systems Constitutional: 
 Fever: Negative, Chills: Negative Malaise/Fatigue: Negative Diaphoresis: Negative Skin: 
 Rash: Negative,  Itching: Negative HENT: 
 Headache:Negative Nosebleeds: Negative, Stridor: Negative, Sore Throat: Negative Eyes: Jaudice: negative Blurred Vision: Negative, Double Vision: Negative Eye Redness: Negative Cardiovascular:  
 Chest Pain: Negative, Palpitations: Negative, Orthopnea: Negative Leg Swelling: Negative Respiratory: 
 Cough: Negative, Hemoptysis: Negative Shortness of Breath: Negative, Wheezing: Negative Gastrointestinal: 
Nausea: Negative, Vomiting: Negative Abdominal Pain: Negative, Diarrhea: Negative, Constipation: Negative Blood in Stool: Negative Genitourinary:  
 Dysuria: Negative, Urgency: Negative, Frequency: Negative Hematuria: Negative, Flank Pain: Negative Musculoskeletal: Myalgias: Negative, Neck Pain: Negative, Back Pain: Negative Joint Pain: Negative, Falls: Negative Neurological:  
 Dizziness: Negative, Tingling: Negative. Speech Change: Negative, Focal Weakness: Negative Seizures: Negative, LOC: Negative Physical Exam: 
 
Constitutional: Well nourished, no acute distress and alert and oriented x 3 HENT: Oral mucosa  moist, no icterus observed. Eyes: conjunctiva normal, LINDA. Neck: No LAD or jugular venous distension. Cardiovascular: heart sounds normal, normal rate and regular rhythm. Pulmonary/Chest Wall: breath sounds are clear bilaterally. Abdominal: Non tender, no palpable masses, no hepatosplenomegaly Musculoskeletal: Normal muscle strength and tone. Skin: Normal and no rashes or lesions CNS No focal deficits. Peripheral Vascular: No edema present in extremities. Labs: 
Recent Results (from the past 24 hour(s)) POC LACTIC ACID Collection Time: 05/03/19  5:07 PM  
Result Value Ref Range Lactic Acid (POC) 0.74 0.40 - 2.00 mmol/L  
URINALYSIS W/ RFLX MICROSCOPIC Collection Time: 05/03/19  5:15 PM  
Result Value Ref Range Color YELLOW Appearance CLEAR Specific gravity 1.008 1.005 - 1.030    
 pH (UA) 5.5 5.0 - 8.0 Protein NEGATIVE  NEG mg/dL Glucose NEGATIVE  NEG mg/dL Ketone NEGATIVE  NEG mg/dL Bilirubin NEGATIVE  NEG Blood MODERATE (A) NEG Urobilinogen 0.2 0.2 - 1.0 EU/dL Nitrites NEGATIVE  NEG Leukocyte Esterase TRACE (A) NEG URINE MICROSCOPIC ONLY Collection Time: 05/03/19  5:15 PM  
Result Value Ref Range WBC 5 to 10 0 - 4 /hpf  
 RBC 5 to 10 0 - 5 /hpf Epithelial cells 1+ 0 - 5 /lpf Bacteria NEGATIVE  NEG /hpf POC G3 Collection Time: 05/03/19  9:32 PM  
Result Value Ref Range Device: NASAL CANNULA Flow rate (POC) 4.0 L/M  
 pH (POC) 7.448 7.35 - 7.45    
 pCO2 (POC) 32.2 (L) 35.0 - 45.0 MMHG  
 pO2 (POC) 75 (L) 80 - 100 MMHG  
 HCO3 (POC) 22.3 22 - 26 MMOL/L  
 sO2 (POC) 96 92 - 97 % Base deficit (POC) 1 mmol/L Allens test (POC) YES Site RIGHT RADIAL  Specimen type (POC) ARTERIAL    
 Performed by Sanjeev Mancia METABOLIC PANEL, BASIC Collection Time: 05/04/19  5:14 AM  
Result Value Ref Range Sodium 144 136 - 145 mmol/L Potassium 3.6 3.5 - 5.5 mmol/L Chloride 108 100 - 108 mmol/L  
 CO2 28 21 - 32 mmol/L Anion gap 8 3.0 - 18 mmol/L Glucose 156 (H) 74 - 99 mg/dL BUN 10 7.0 - 18 MG/DL Creatinine 0.81 0.6 - 1.3 MG/DL  
 BUN/Creatinine ratio 12 12 - 20 GFR est AA >60 >60 ml/min/1.73m2 GFR est non-AA >60 >60 ml/min/1.73m2 Calcium 9.3 8.5 - 10.1 MG/DL MAGNESIUM Collection Time: 05/04/19  5:14 AM  
Result Value Ref Range Magnesium 2.1 1.6 - 2.6 mg/dL PHOSPHORUS Collection Time: 05/04/19  5:14 AM  
Result Value Ref Range Phosphorus 4.1 2.5 - 4.9 MG/DL  
CBC WITH AUTOMATED DIFF Collection Time: 05/04/19  5:14 AM  
Result Value Ref Range WBC 4.4 (L) 4.6 - 13.2 K/uL  
 RBC 3.53 (L) 4.20 - 5.30 M/uL  
 HGB 10.8 (L) 12.0 - 16.0 g/dL HCT 33.3 (L) 35.0 - 45.0 % MCV 94.3 74.0 - 97.0 FL  
 MCH 30.6 24.0 - 34.0 PG  
 MCHC 32.4 31.0 - 37.0 g/dL  
 RDW 19.1 (H) 11.6 - 14.5 % PLATELET 366 651 - 372 K/uL MPV 9.9 9.2 - 11.8 FL  
 NEUTROPHILS 89 (H) 40 - 73 % LYMPHOCYTES 9 (L) 21 - 52 % MONOCYTES 2 (L) 3 - 10 % EOSINOPHILS 0 0 - 5 % BASOPHILS 0 0 - 2 %  
 ABS. NEUTROPHILS 3.9 1.8 - 8.0 K/UL  
 ABS. LYMPHOCYTES 0.4 (L) 0.9 - 3.6 K/UL  
 ABS. MONOCYTES 0.1 0.05 - 1.2 K/UL  
 ABS. EOSINOPHILS 0.0 0.0 - 0.4 K/UL  
 ABS. BASOPHILS 0.0 0.0 - 0.1 K/UL  
 DF AUTOMATED Radiology: Xr Chest Pa Lat Result Date: 5/3/2019 IMPRESSION: Small left pleural effusion, similar to prior exam. Worsening bilateral interstitial opacities and more confluent patchy opacity in the left lung base. Stable appearance of the right lung base and costophrenic angle. Please see report for multiple additional findings and further details.

## 2019-05-04 NOTE — PROGRESS NOTES
0000-  Report received. Admitted from ED. 
0030-  Pt assisted to [facility]. 
0400-  Pt resting. 0735-  Bedside and Verbal shift change report given to Marta Andrade RN (oncoming nurse) by Marvel Ortega RN (offgoing nurse). Report included the following information SBAR, Kardex, ED Summary, Intake/Output, MAR, Recent Results and Cardiac Rhythm Paced.

## 2019-05-04 NOTE — PROGRESS NOTES
Bedside shift change report given to Kimberly Bernard RN (oncoming nurse) by Michael Graham RN (offgoing nurse). Report included the following information SBAR, Kardex, Intake/Output, Recent Results and Cardiac Rhythm paced/ST.

## 2019-05-04 NOTE — ED NOTES
Patient placed on transport monitor, preparing for transport. No complaints offered.  Remains on nasal cannula @   5 lpm.

## 2019-05-04 NOTE — CONSULTS
[facility] - Consult Note    Date of  Admission: 5/3/2019  2:54 PM     Primary Care Physician:  Rajesh Hay MD     Plan:     1) acute systolic CHF-stage C NYHA class III. Continue Lasix for additional 24 hours. Will change to p.o. from tomorrow. We will continue to optimize medications. We will add digoxin and Aldactone today. 2)Nonischemic cardiomyopathy-likely from Herceptin use. Status post ICD  3) hypertension  4) CA breast with lung mets. 5) moderate mitral regurgitation likely from non ischemic cardiomyopathy    Will follow. Echo report reviewed with patient. 05/03/19   ECHO ADULT FOLLOW-UP OR LIMITED 05/03/2019 5/3/2019    Narrative · Left Ventricle: Mild concentric hypertrophy. Severe global systolic   dysfunction. Estimated left ventricular ejection fraction is 26 - 30%. Biplane method used to measure ejection fraction. Left ventricular global   hypokinesis. · IVC/Hepatic Veins: Severely elevated central venous pressure (15+ mmHg);   IVC diameter is larger than 21 mm and collapses less than 50% with   respiration. · Pulmonary Artery: Mild pulmonary hypertension. Pulmonary arterial   systolic pressure is 44 mmHg. · Pericardium: Trivial-to-small circumferential pericardial effusion   measuring 10 mm. · Mitral Valve: Moderate mitral valve regurgitation. · Right Ventricle: Pacing wire present        Signed by: Loi Muir MD               XR Results (most recent):  Results from Hospital Encounter encounter on 05/03/19   XR CHEST PA LAT    Narrative History: Shortness of breath. COMPARISON: April 30, 2019. Frontal and lateral views of the chest.    FINDINGS:    ACDF stable. AICD stable. Telemetry leads noted. Port-A-Cath stable. Stable cardiac silhouette. Chronic osseous findings without definite acute osseous abnormality. Small left pleural effusion, similar to prior exam with worsened adjacent  airspace consolidation.  Slight worsening in bilateral interstitial markings. Blunting of the right costophrenic angle unchanged. Impression IMPRESSION:    Small left pleural effusion, similar to prior exam. Worsening bilateral  interstitial opacities and more confluent patchy opacity in the left lung base. Stable appearance of the right lung base and costophrenic angle. Please see report for multiple additional findings and further details. Assessment:     Hospital Problems  Date Reviewed: 4/30/2019          Codes Class Noted POA    Dilated cardiomyopathy ([facility] ICD-10-CM: I42.0  ICD-9-CM: 425.4  5/3/2019 Unknown        CHF (congestive heart failure) ([facility] ICD-10-CM: I50.9  ICD-9-CM: 428.0  5/3/2019 Unknown        Breast cancer ([facility] ICD-10-CM: C50.919  ICD-9-CM: 174.9  5/3/2019 Unknown        Pulmonary embolism ([facility] ICD-10-CM: I26.99  ICD-9-CM: 415.19  5/3/2019 Unknown        Chronic bronchitis ([facility] ICD-10-CM: J42  ICD-9-CM: 491.9  5/3/2019 Unknown        Hypoxia ICD-10-CM: R09.02  ICD-9-CM: 799.02  5/3/2019 Unknown        Lung metastases ([facility] ICD-10-CM: C78.00  ICD-9-CM: 197.0  5/3/2019 Unknown                   History of Present Illness: This is a 78 y.o. female admitted for CHF (congestive heart failure) ([facility] [I50.9]; Pulmonary embolism ([facility] [I26.99]; Chronic bronchitis ([facility] Lidia Sand; Hypoxia [R09.02]; Dilated cardiomyopathy ([facility] [I42.0]; Breast cancer ([facility] Raul Riso; Lung metastases ([facility] [C78.00]. Patient complains of:      Patient is 69-year-old female with severe breast with lung mets on Herceptin treatment as an outpatient. Admitted for worsening exertional dyspnea, orthopnea and PND. Underwent a recent echocardiogram which revealed an EF of 25% with moderate mitral regurgitation. Symptoms progressed in the past 1 week. No palpitations. No cough. No dizziness or lightheadedness. No fever or chills. No diaphoresis. No recent syncopal episodes. No blood in stool or urine. No nausea or vomit.  No recent CVA.        Cardiac risk factors: htn         Past Medical History:     Past Medical History:   Diagnosis Date    Abnormal nuclear cardiac imaging test 06/11/2010    Lg fixed anterior, septal, apical, inferoseptal defect sugg RCA & LAD disease or cardiomyopathy. EF 20%. Global hykn. Nondiagnostic EKG.  Acute bronchitis 10/15/2018    Persistent cough and wheezing in spite of prednisone and antibiotic. 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  Congestive heart failure, unspecified     chronic class ll    Echocardiogram 09/27/2010    EF 30%. Global hykn. Mild MR. Mild TR.  GERD (gastroesophageal reflux disease)     Hyperlipidemia     Hypertension     Mitral valve disorders(424.0) 1/15/2014    mild to moderate mr     Mitral valve disorders(424.0) 1/15/2014    mild to moderate mr     MVP (mitral valve prolapse)     Nonischemic cardiomyopathy (HCC)     EF 20-30% despite medical therapy    Nonspecific abnormal electrocardiogram (ECG) (EKG)     Osteopenia     Other primary cardiomyopathies     Pacemaker 10/27/10    s/p implantation, without complication    Poisoning by other and unspecified agents primarily affecting the cardiovascular system(972.9)     Ef slightly better to 45%, STABLE back on chemo.  Radiation therapy     Radiation therapy complication 3021    S/P cardiac catheterization 07/08/10    Patent coronary arteries. LVEDP 25.  EF 25%. Global hykn. Moderate MR.  RA 10.  RV 40/10. PA 40/20.  20.  CO/CI 4.5/2.45 (Larry).     Shortness of breath     Syncope and collapse     Thyroid disease     goiter         Social History:     Social History     Socioeconomic History    Marital status:      Spouse name: Not on file    Number of children: Not on file    Years of education: Not on file    Highest education level: Not on file   Tobacco Use    Smoking status: Never Smoker    Smokeless tobacco: Never Used   Substance and Sexual Activity    Alcohol use: No    Drug use: No        Family History:     Family History   Problem Relation Age of Onset    Hypertension Mother     High Cholesterol Mother     Heart Disease Father         paemaker implant at 80        Medications:      Allergies   Allergen Reactions    Adhesive Other (comments)     blisters        Current Facility-Administered Medications   Medication Dose Route Frequency    lactated Ringers infusion 250 mL  250 mL IntraVENous CONTINUOUS    furosemide (LASIX) injection 40 mg  40 mg IntraVENous BID    albuterol-ipratropium (DUO-NEB) 2.5 MG-0.5 MG/3 ML  3 mL Nebulization Q4H PRN    metoprolol (LOPRESSOR) injection 2.5 mg  2.5 mg IntraVENous Q6H PRN    budesonide-formoterol (SYMBICORT) 160-4.5 mcg/actuation HFA inhaler 2 Puff  2 Puff Inhalation BID RT    benzonatate (TESSALON) capsule 100 mg  100 mg Oral TID PRN    carvedilol (COREG) tablet 6.25 mg  6.25 mg Oral BID WITH MEALS    DULoxetine (CYMBALTA) capsule 30 mg  30 mg Oral DAILY    melatonin tablet 3 mg  3 mg Oral QHS PRN    montelukast (SINGULAIR) tablet 10 mg  10 mg Oral DAILY    tiotropium (SPIRIVA) inhalation capsule 18 mcg  1 Cap Inhalation DAILY    acetaminophen (TYLENOL) tablet 650 mg  650 mg Oral Q6H PRN    oxyCODONE-acetaminophen (PERCOCET) 5-325 mg per tablet 1 Tab  1 Tab Oral Q6H PRN    naloxone (NARCAN) injection 0.4 mg  0.4 mg IntraVENous PRN    ondansetron (ZOFRAN) injection 4 mg  4 mg IntraVENous Q6H PRN    docusate sodium (COLACE) capsule 100 mg  100 mg Oral BID PRN    methylPREDNISolone (PF) (SOLU-MEDROL) injection 40 mg  40 mg IntraVENous Q8H    rivaroxaban (XARELTO) tablet 15 mg  15 mg Oral BID WITH MEALS        Review Of Systems:       A comprehensive review of systems was negative except for that written in the HPI. Constitutional: No fever, no chills, no weight loss, no night sweats   HEENT: No epistaxis, no nasal drainage, no difficulty in swallowing, no redness in eyes  Respiratory:  negative for cough, sputum, hemoptysis, pleurisy/chest pain, wheezing,   Cardiovascular: no chest pain, no chest pressure,  no claudication no palpitations, no syncope  Gastrointestinal: no abd pain, no vomiting, no diarrhea, no bleeding symptoms  Genitourinary: No urinary symptoms or hematuria  Integument/breast: No ulcers or rashes  Musculoskeletal: no muscle pain, no weakness  Neurological: No focal weakness, no seizures, no headaches  Behvioral/Psych: No anxiety, no depression             Physical Exam:     Visit Vitals  /86   Pulse (!) 110   Temp 97.8 °F (36.6 °C)   Resp 20   Wt 81.2 kg (179 lb)   SpO2 98%   Breastfeeding? No   BMI 29.79 kg/m²     BP Readings from Last 3 Encounters:   05/04/19 154/86   05/03/19 136/68   04/30/19 149/86     Pulse Readings from Last 3 Encounters:   05/04/19 (!) 110   04/30/19 (!) 111   04/30/19 (!) 108     Wt Readings from Last 3 Encounters:   05/04/19 81.2 kg (179 lb)   05/03/19 81.2 kg (179 lb)   04/30/19 81.2 kg (179 lb)       General:  alert, cooperative, no distress, appears stated age  Skin: Warm and dry, acyanotic, normal color. Head: Normocephalic, atraumatic. Eyes: Sclerae anicteric, conjunctivae without injection. Neck:  nontender, no nuchal rigidity, no masses, no stridor, no carotid bruit, no JVD  Lungs:  Mild diffuse rhonchi  Heart:  regular rate and rhythm, systolic murmur: holosystolic 2/6, blowing at apex  Abdomen:  abdomen is soft without significant tenderness, masses, organomegaly or guarding  Extremities:  extremities normal, atraumatic, no cyanosis or edema. Peripheral pulses   Neurological: grossly intact. No focal abnormalities, moves all extremities well. Psychiatric Affect: The patient is awake, alert and oriented x3. mPortal is interactive and appropriate.    Data Review:     Recent Results (from the past 50 hour(s))   POC CREATININE    Collection Time: 05/02/19 11:54 AM   Result Value Ref Range    Creatinine, POC 0.9 0.6 - 1.3 MG/DL    GFRAA, POC >60 >60 ml/min/1.73m2    GFRNA, POC >60 >60 ml/min/1.73m2   ECHO ADULT FOLLOW-UP OR LIMITED    Collection Time: 05/03/19  9:51 AM   Result Value Ref Range    LVIDd 5.23 3.9 - 5.3 cm    LVPWd 0.97 (A) 0.6 - 0.9 cm    LVIDs 4.81 cm    IVSd 1.10 (A) 0.6 - 0.9 cm    LV ED Vol A2C 107.0 mL    LV ES Vol A4C 92.9 mL    LV ES Vol BP 88.0 (A) 19 - 49 mL    Mitral Valve Area by Proximal Isovelocity Surface Area 0.2 cm2    Mitral Effective Regurgitant Orifice Area 0.20 cm2    BP EF 23.7 (A) 55 - 100 %    LV Ejection Fraction MOD 4C 23 %    LV Ejection Fraction MOD 2C 24 %    Global Longitudinal Strain -6.60 %    LV Mass .7 (A) 67 - 162 g    LV Mass AL Index 128.1 (A) 43 - 95 g/m2    MV regurgitant volume 27.62 cc    LV ES Vol A2C 80.9 mL    LVES Vol Index BP 46.6 mL/m2    LV ED Vol A4C 121.0 mL    LVED Vol Index BP 61.1 mL/m2    Mitral Regurgitant Velocity Time Integral 165.21 cm    Mitral Regurgitant Peak Velocity 538.94 cm/s    Triscuspid Valve Regurgitation Peak Gradient 29.0 mmHg    LV ED Vol .3 (A) 56 - 104 ml    TR Max Velocity 269.41 cm/s    PISA MR Rad 0.62 cm    LVED Vol Index A4C 64.1 mL/m2    LVED Vol Index A2C 56.7 mL/m2    LVES Vol Index A4C 49.2 mL/m2    LVES Vol Index A2C 42.9 mL/m2    MR Peak Gradient 116.2 mmHg   CBC WITH AUTOMATED DIFF    Collection Time: 05/03/19  3:15 PM   Result Value Ref Range    WBC 5.3 4.6 - 13.2 K/uL    RBC 3.26 (L) 4.20 - 5.30 M/uL    HGB 10.0 (L) 12.0 - 16.0 g/dL    HCT 31.0 (L) 35.0 - 45.0 %    MCV 95.1 74.0 - 97.0 FL    MCH 30.7 24.0 - 34.0 PG    MCHC 32.3 31.0 - 37.0 g/dL    RDW 19.5 (H) 11.6 - 14.5 %    PLATELET 960 082 - 000 K/uL    MPV 9.7 9.2 - 11.8 FL    NEUTROPHILS 75 (H) 40 - 73 %    LYMPHOCYTES 15 (L) 21 - 52 %    MONOCYTES 9 3 - 10 %    EOSINOPHILS 1 0 - 5 %    BASOPHILS 0 0 - 2 %    ABS. NEUTROPHILS 4.0 1.8 - 8.0 K/UL    ABS. LYMPHOCYTES 0.8 (L) 0.9 - 3.6 K/UL    ABS. MONOCYTES 0.5 0.05 - 1.2 K/UL    ABS. EOSINOPHILS 0.0 0.0 - 0.4 K/UL    ABS. BASOPHILS 0.0 0.0 - 0.1 K/UL    DF AUTOMATED     METABOLIC PANEL, COMPREHENSIVE    Collection Time: 05/03/19  3:15 PM   Result Value Ref Range    Sodium 144 136 - 145 mmol/L    Potassium 3.7 3.5 - 5.5 mmol/L    Chloride 112 (H) 100 - 108 mmol/L    CO2 26 21 - 32 mmol/L    Anion gap 6 3.0 - 18 mmol/L    Glucose 139 (H) 74 - 99 mg/dL    BUN 12 7.0 - 18 MG/DL    Creatinine 0.85 0.6 - 1.3 MG/DL    BUN/Creatinine ratio 14 12 - 20      GFR est AA >60 >60 ml/min/1.73m2    GFR est non-AA >60 >60 ml/min/1.73m2    Calcium 8.8 8.5 - 10.1 MG/DL    Bilirubin, total 0.3 0.2 - 1.0 MG/DL    ALT (SGPT) 22 13 - 56 U/L    AST (SGOT) 14 (L) 15 - 37 U/L    Alk.  phosphatase 74 45 - 117 U/L    Protein, total 6.1 (L) 6.4 - 8.2 g/dL    Albumin 3.3 (L) 3.4 - 5.0 g/dL    Globulin 2.8 2.0 - 4.0 g/dL    A-G Ratio 1.2 0.8 - 1.7     PROTHROMBIN TIME + INR    Collection Time: 05/03/19  3:15 PM   Result Value Ref Range    Prothrombin time 16.0 (H) 11.5 - 15.2 sec    INR 1.3 (H) 0.8 - 1.2     PTT    Collection Time: 05/03/19  3:15 PM   Result Value Ref Range    aPTT 29.5 23.0 - 36.4 SEC   NT-PRO BNP    Collection Time: 05/03/19  3:15 PM   Result Value Ref Range    NT pro- (H) 0 - 900 PG/ML   LIPASE    Collection Time: 05/03/19  3:15 PM   Result Value Ref Range    Lipase 189 73 - 393 U/L   MAGNESIUM    Collection Time: 05/03/19  3:15 PM   Result Value Ref Range    Magnesium 2.0 1.6 - 2.6 mg/dL   CARDIAC PANEL,(CK, CKMB & TROPONIN)    Collection Time: 05/03/19  3:15 PM   Result Value Ref Range    CK 27 26 - 192 U/L    CK - MB <1.0 <3.6 ng/ml    CK-MB Index 0.0 - 4.0 %     CALCULATION NOT PERFORMED WHEN RESULT IS BELOW LINEAR LIMIT    Troponin-I, QT <0.02 0.0 - 0.045 NG/ML   EKG, 12 LEAD, INITIAL    Collection Time: 05/03/19  3:32 PM   Result Value Ref Range    Ventricular Rate 119 BPM    Atrial Rate 119 BPM    P-R Interval 128 ms    QRS Duration 118 ms    Q-T Interval 350 ms    QTC Calculation (Bezet) 492 ms    Calculated P Axis 37 degrees    Calculated R Axis -50 degrees    Calculated T Axis 83 degrees    Diagnosis       Electronic ventricular pacemaker  When compared with ECG of 30-APR-2019 17:07,  Vent.  rate has increased BY   9 BPM  Confirmed by Kofi Cummings MD, [facility] ([id number]) on 5/3/2019 3:54:14 PM     POC LACTIC ACID    Collection Time: 05/03/19  5:07 PM   Result Value Ref Range    Lactic Acid (POC) 0.74 0.40 - 2.00 mmol/L   URINALYSIS W/ RFLX MICROSCOPIC    Collection Time: 05/03/19  5:15 PM   Result Value Ref Range    Color YELLOW      Appearance CLEAR      Specific gravity 1.008 1.005 - 1.030      pH (UA) 5.5 5.0 - 8.0      Protein NEGATIVE  NEG mg/dL    Glucose NEGATIVE  NEG mg/dL    Ketone NEGATIVE  NEG mg/dL    Bilirubin NEGATIVE  NEG      Blood MODERATE (A) NEG      Urobilinogen 0.2 0.2 - 1.0 EU/dL    Nitrites NEGATIVE  NEG      Leukocyte Esterase TRACE (A) NEG     URINE MICROSCOPIC ONLY    Collection Time: 05/03/19  5:15 PM   Result Value Ref Range    WBC 5 to 10 0 - 4 /hpf    RBC 5 to 10 0 - 5 /hpf    Epithelial cells 1+ 0 - 5 /lpf    Bacteria NEGATIVE  NEG /hpf   POC G3    Collection Time: 05/03/19  9:32 PM   Result Value Ref Range    Device: NASAL CANNULA      Flow rate (POC) 4.0 L/M    pH (POC) 7.448 7.35 - 7.45      pCO2 (POC) 32.2 (L) 35.0 - 45.0 MMHG    pO2 (POC) 75 (L) 80 - 100 MMHG    HCO3 (POC) 22.3 22 - 26 MMOL/L    sO2 (POC) 96 92 - 97 %    Base deficit (POC) 1 mmol/L    Allens test (POC) YES      Site RIGHT RADIAL      Specimen type (POC) ARTERIAL      Performed by [address], BASIC    Collection Time: 05/04/19  5:14 AM Result Value Ref Range    Sodium 144 136 - 145 mmol/L    Potassium 3.6 3.5 - 5.5 mmol/L    Chloride 108 100 - 108 mmol/L    CO2 28 21 - 32 mmol/L    Anion gap 8 3.0 - 18 mmol/L    Glucose 156 (H) 74 - 99 mg/dL    BUN 10 7.0 - 18 MG/DL    Creatinine 0.81 0.6 - 1.3 MG/DL    BUN/Creatinine ratio 12 12 - 20      GFR est AA >60 >60 ml/min/1.73m2    GFR est non-AA >60 >60 ml/min/1.73m2    Calcium 9.3 8.5 - 10.1 MG/DL   MAGNESIUM    Collection Time: 05/04/19  5:14 AM   Result Value Ref Range    Magnesium 2.1 1.6 - 2.6 mg/dL   PHOSPHORUS    Collection Time: 05/04/19  5:14 AM   Result Value Ref Range    Phosphorus 4.1 2.5 - 4.9 MG/DL   CBC WITH AUTOMATED DIFF    Collection Time: 05/04/19  5:14 AM   Result Value Ref Range    WBC 4.4 (L) 4.6 - 13.2 K/uL    RBC 3.53 (L) 4.20 - 5.30 M/uL    HGB 10.8 (L) 12.0 - 16.0 g/dL    HCT 33.3 (L) 35.0 - 45.0 %    MCV 94.3 74.0 - 97.0 FL    MCH 30.6 24.0 - 34.0 PG    MCHC 32.4 31.0 - 37.0 g/dL    RDW 19.1 (H) 11.6 - 14.5 %    PLATELET 874 422 - 248 K/uL    MPV 9.9 9.2 - 11.8 FL    NEUTROPHILS 89 (H) 40 - 73 %    LYMPHOCYTES 9 (L) 21 - 52 %    MONOCYTES 2 (L) 3 - 10 %    EOSINOPHILS 0 0 - 5 %    BASOPHILS 0 0 - 2 %    ABS. NEUTROPHILS 3.9 1.8 - 8.0 K/UL    ABS. LYMPHOCYTES 0.4 (L) 0.9 - 3.6 K/UL    ABS. MONOCYTES 0.1 0.05 - 1.2 K/UL    ABS. EOSINOPHILS 0.0 0.0 - 0.4 K/UL    ABS.  BASOPHILS 0.0 0.0 - 0.1 K/UL    DF AUTOMATED           Intake/Output Summary (Last 24 hours) at 5/4/2019 0747  Last data filed at 5/4/2019 0400  Gross per 24 hour   Intake 240 ml   Output 350 ml   Net -110 ml       Cardiographics:       EKG Results     Procedure 720 Value Units Date/Time    EKG, 12 LEAD, INITIAL [[id number]] Collected:  05/03/19 1532    Order Status:  Completed Updated:  05/03/19 1554     Ventricular Rate 119 BPM      Atrial Rate 119 BPM      P-R Interval 128 ms      QRS Duration 118 ms      Q-T Interval 350 ms      QTC Calculation (Bezet) 492 ms      Calculated P Axis 37 degrees      Calculated R Axis -50 degrees      Calculated T Axis 83 degrees      Diagnosis --     Electronic ventricular pacemaker  When compared with ECG of 30-APR-2019 17:07,  Vent. rate has increased BY   9 BPM  Confirmed by Ellie Michelle MD, Phyllis Schmidt ([id number]) on 5/3/2019 3:54:14 PM          05/03/19   ECHO ADULT FOLLOW-UP OR LIMITED 05/03/2019 5/3/2019    Narrative · Left Ventricle: Mild concentric hypertrophy. Severe global systolic   dysfunction. Estimated left ventricular ejection fraction is 26 - 30%. Biplane method used to measure ejection fraction. Left ventricular global   hypokinesis. · IVC/Hepatic Veins: Severely elevated central venous pressure (15+ mmHg);   IVC diameter is larger than 21 mm and collapses less than 50% with   respiration. · Pulmonary Artery: Mild pulmonary hypertension. Pulmonary arterial   systolic pressure is 44 mmHg. · Pericardium: Trivial-to-small circumferential pericardial effusion   measuring 10 mm. · Mitral Valve: Moderate mitral valve regurgitation.   · Right Ventricle: Pacing wire present        Signed by: Bijal Valencia MD         Signed By: Veronica Stevens MD    May 4, 2019

## 2019-05-05 LAB
ANION GAP SERPL CALC-SCNC: 4 MMOL/L (ref 3–18)
BASOPHILS # BLD: 0 K/UL (ref 0–0.1)
BASOPHILS NFR BLD: 0 % (ref 0–2)
BUN SERPL-MCNC: 24 MG/DL (ref 7–18)
BUN/CREAT SERPL: 24 (ref 12–20)
CALCIUM SERPL-MCNC: 9.4 MG/DL (ref 8.5–10.1)
CHLORIDE SERPL-SCNC: 107 MMOL/L (ref 100–108)
CO2 SERPL-SCNC: 30 MMOL/L (ref 21–32)
CREAT SERPL-MCNC: 1 MG/DL (ref 0.6–1.3)
DIFFERENTIAL METHOD BLD: ABNORMAL
EOSINOPHIL # BLD: 0 K/UL (ref 0–0.4)
EOSINOPHIL NFR BLD: 0 % (ref 0–5)
ERYTHROCYTE [DISTWIDTH] IN BLOOD BY AUTOMATED COUNT: 19.3 % (ref 11.6–14.5)
GLUCOSE SERPL-MCNC: 160 MG/DL (ref 74–99)
HCT VFR BLD AUTO: 32.1 % (ref 35–45)
HGB BLD-MCNC: 10.1 G/DL (ref 12–16)
LYMPHOCYTES # BLD: 0.5 K/UL (ref 0.9–3.6)
LYMPHOCYTES NFR BLD: 5 % (ref 21–52)
MCH RBC QN AUTO: 29.8 PG (ref 24–34)
MCHC RBC AUTO-ENTMCNC: 31.5 G/DL (ref 31–37)
MCV RBC AUTO: 94.7 FL (ref 74–97)
MONOCYTES # BLD: 1.1 K/UL (ref 0.05–1.2)
MONOCYTES NFR BLD: 10 % (ref 3–10)
NEUTS SEG # BLD: 9.3 K/UL (ref 1.8–8)
NEUTS SEG NFR BLD: 85 % (ref 40–73)
PLATELET # BLD AUTO: 414 K/UL (ref 135–420)
PMV BLD AUTO: 9.8 FL (ref 9.2–11.8)
POTASSIUM SERPL-SCNC: 4.8 MMOL/L (ref 3.5–5.5)
RBC # BLD AUTO: 3.39 M/UL (ref 4.2–5.3)
SODIUM SERPL-SCNC: 141 MMOL/L (ref 136–145)
WBC # BLD AUTO: 10.9 K/UL (ref 4.6–13.2)

## 2019-05-05 PROCEDURE — 65660000004 HC RM CVT STEPDOWN

## 2019-05-05 PROCEDURE — 77010033678 HC OXYGEN DAILY

## 2019-05-05 PROCEDURE — 74011250637 HC RX REV CODE- 250/637: Performed by: INTERNAL MEDICINE

## 2019-05-05 PROCEDURE — 85025 COMPLETE CBC W/AUTO DIFF WBC: CPT

## 2019-05-05 PROCEDURE — 80048 BASIC METABOLIC PNL TOTAL CA: CPT

## 2019-05-05 PROCEDURE — 74011250636 HC RX REV CODE- 250/636: Performed by: INTERNAL MEDICINE

## 2019-05-05 PROCEDURE — 74011636637 HC RX REV CODE- 636/637: Performed by: INTERNAL MEDICINE

## 2019-05-05 RX ORDER — FUROSEMIDE 40 MG/1
40 TABLET ORAL DAILY
Status: DISCONTINUED | OUTPATIENT
Start: 2019-05-06 | End: 2019-05-06 | Stop reason: HOSPADM

## 2019-05-05 RX ADMIN — BUDESONIDE AND FORMOTEROL FUMARATE DIHYDRATE 2 PUFF: 160; 4.5 AEROSOL RESPIRATORY (INHALATION) at 08:00

## 2019-05-05 RX ADMIN — BENZONATATE 100 MG: 100 CAPSULE ORAL at 21:20

## 2019-05-05 RX ADMIN — PREDNISONE 20 MG: 20 TABLET ORAL at 17:30

## 2019-05-05 RX ADMIN — CARVEDILOL 6.25 MG: 6.25 TABLET, FILM COATED ORAL at 09:04

## 2019-05-05 RX ADMIN — BUDESONIDE AND FORMOTEROL FUMARATE DIHYDRATE 2 PUFF: 160; 4.5 AEROSOL RESPIRATORY (INHALATION) at 21:15

## 2019-05-05 RX ADMIN — PREDNISONE 20 MG: 20 TABLET ORAL at 09:04

## 2019-05-05 RX ADMIN — LOSARTAN POTASSIUM 50 MG: 50 TABLET ORAL at 09:04

## 2019-05-05 RX ADMIN — RIVAROXABAN 15 MG: 15 TABLET, FILM COATED ORAL at 09:04

## 2019-05-05 RX ADMIN — TIOTROPIUM BROMIDE 18 MCG: 18 CAPSULE ORAL; RESPIRATORY (INHALATION) at 09:00

## 2019-05-05 RX ADMIN — POTASSIUM CHLORIDE 40 MEQ: 20 TABLET, EXTENDED RELEASE ORAL at 09:04

## 2019-05-05 RX ADMIN — CARVEDILOL 6.25 MG: 6.25 TABLET, FILM COATED ORAL at 17:30

## 2019-05-05 RX ADMIN — FUROSEMIDE 40 MG: 10 INJECTION, SOLUTION INTRAMUSCULAR; INTRAVENOUS at 09:04

## 2019-05-05 RX ADMIN — DULOXETINE HYDROCHLORIDE 30 MG: 30 CAPSULE, DELAYED RELEASE ORAL at 09:04

## 2019-05-05 RX ADMIN — RIVAROXABAN 15 MG: 15 TABLET, FILM COATED ORAL at 17:30

## 2019-05-05 RX ADMIN — DIGOXIN 0.12 MG: 125 TABLET ORAL at 09:04

## 2019-05-05 NOTE — PROGRESS NOTES
[facility] 
 
 
CARDIOLOGY PROGRESS NOTE 
RECS: 
1) acute systolic CHF-stage C NYHA class III. Transition Lasix to PO. We will continue to optimize medications. Digoxin added yesterday. Add Aldactone 25 mg / day. 2)Nonischemic cardiomyopathy (EF 26-30%)-likely from Herceptin use. Status post ICD 3) hypertension 4) CA breast with lung mets. 5) moderate mitral regurgitation likely from non ischemic cardiomyopathy CHF improving-- will transition to po meds Start aldactone. 
 
  
05/03/19 ECHO ADULT FOLLOW-UP OR LIMITED 05/03/2019 5/3/2019  
  Narrative · Left Ventricle: Mild concentric hypertrophy. Severe global systolic  
dysfunction. Estimated left ventricular ejection fraction is 26 - 30%. Biplane method used to measure ejection fraction. Left ventricular global  
hypokinesis. · IVC/Hepatic Veins: Severely elevated central venous pressure (15+ mmHg); IVC diameter is larger than 21 mm and collapses less than 50% with  
respiration. · Pulmonary Artery: Mild pulmonary hypertension. Pulmonary arterial  
systolic pressure is 44 mmHg. · Pericardium: Trivial-to-small circumferential pericardial effusion  
measuring 10 mm. · Mitral Valve: Moderate mitral valve regurgitation. · Right Ventricle: Pacing wire present 
   
    Signed by: Dung Hines MD  
 
 
 
ASSESSMENT: 
Hospital Problems  Date Reviewed: 4/30/2019 Codes Class Noted POA Dilated cardiomyopathy ([facility] ICD-10-CM: I42.0 ICD-9-CM: 425.4  5/3/2019 Unknown * (Principal) Acute exacerbation of CHF (congestive heart failure) (HCC) ICD-10-CM: I50.9 ICD-9-CM: 428.0  5/3/2019 Unknown Breast cancer ([facility] ICD-10-CM: C50.919 ICD-9-CM: 174.9  5/3/2019 Unknown Pulmonary embolism [facility]) ICD-10-CM: I26.99 
ICD-9-CM: 415.19  5/3/2019 Unknown Chronic bronchitis ([facility] ICD-10-CM: U82 ICD-9-CM: 491.9  5/3/2019 Unknown  Hypoxia ICD-10-CM: R09.02 
 ICD-9-CM: 799.02  5/3/2019 Unknown Lung metastases ([facility] ICD-10-CM: C78.00 ICD-9-CM: 197.0  5/3/2019 Unknown SUBJECTIVE: 
No CP or SOB OBJECTIVE: 
 
VS:  
Visit Vitals /63 Pulse (!) 104 Temp 98.6 °F (37 °C) Resp 20 Wt 81.2 kg (179 lb) SpO2 96% Breastfeeding? No  
BMI 29.79 kg/m² Intake/Output Summary (Last 24 hours) at 5/5/2019 1118 Last data filed at 5/5/2019 1105 Gross per 24 hour Intake 960 ml Output 2800 ml Net -1840 ml  
 
TELE: V paced General: alert and in no apparent distress HENT: Normocephalic, atraumatic. Normal external eye. Neck :  JVD difficult to assess due to obesity Cardiac:  regular rate and rhythm, systolic murmur: holosystolic 2/6, blowing at apex Lungs: clear to auscultation bilaterally Abdomen: Soft, nontender, no masses Extremities:  No c/c/e, peripheral pulses present Labs: Results:  
   
Chemistry Recent Labs 05/05/19 
0350 05/04/19 
0514 05/03/19 [address] [place] [address] * 156* 139*  144 144  
K 4.8 3.6 3.7  108 112* CO2 30 28 26 BUN 24* 10 12 CREA 1.00 0.81 0.85 CA 9.4 9.3 8.8 AGAP 4 8 6 BUCR 24* 12 14 AP  --   --  74  
TP  --   --  6.1* ALB  --   --  3.3*  
GLOB  --   --  2.8 AGRAT  --   --  1.2  
  
CBC w/Diff Recent Labs 05/05/19 
0350 05/04/19 
0514 05/03/19 [address] [place] [address] WBC 10.9 4.4* 5.3  
RBC 3.39* 3.53* 3.26* HGB 10.1* 10.8* 10.0* HCT 32.1* 33.3* 31.0*  
 409 392 GRANS 85* 89* 75* LYMPH 5* 9* 15* EOS 0 0 1 Cardiac Enzymes Recent Labs 05/03/19 [address] [place] [address] CPK 27 CKND1 CALCULATION NOT PERFORMED WHEN RESULT IS BELOW LINEAR LIMIT Coagulation Recent Labs 05/03/19 [address] PTP 16.0* INR 1.3* APTT 29.5 Lipid Panel Lab Results Component Value Date/Time  Cholesterol, total 266 (H) 04/17/2019 11:38 AM  
 HDL Cholesterol 60 04/17/2019 11:38 AM  
 LDL, calculated 189.2 (H) 04/17/2019 11:38 AM  
 VLDL, calculated 16.8 04/17/2019 11:38 AM  
 Triglyceride 84 04/17/2019 11:38 AM  
 CHOL/HDL Ratio 4.4 04/17/2019 11:38 AM  
  
BNP No results for input(s): BNPP in the last 72 hours. Liver Enzymes Recent Labs 05/03/19 [address] [place] [address] TP 6.1* ALB 3.3* AP 74 SGOT 14* Thyroid Studies Lab Results Component Value Date/Time TSH 0.10 (L) 04/17/2019 11:38 AM  
    
 
 
 
Porsche Whitlock NP   Pager # [phone] supervised I have independently evaluated and examined the patient. All relevant labs and testing data's are reviewed. Care plan discussed and updated after review.  
 
Sherrine Castleman MD

## 2019-05-05 NOTE — ROUTINE PROCESS
1930-Bedside and verbal report from Ganesh Segura RN. Pt resting in bed, NAD, no apparent pain, no SOB, call bell within reach. 2030-Pt resting, NAD, no apparent pain, mild SOB on exertion to bedside commode, no c/o pain. Call bell within reach, pt able to turn self, skin intact. 2130-Pt resting, NAD, no apparent pain, no SOB at rest, no c/o pain, call bell within reach. 2230-Pt resting, NAD, no apparent pain, some SOB using bedside commode, gave PRN duo neb and cough med. 0000-Pt resting, NAD, no apparent pain, no SOB, call bell within reach, vital stable. V. Paced. 0200-Pt resting, NAD, no apparent pain, no SOB resting, pt assisted to bedside commode. Mild SOB. No cough noted. 0400-Pt resting, NAD, no apparent pain, no SOB, [facility]. Vitals stable. V. Paced. 0600-Pt sleeping, NAD, no apparent pain, no SOB, call carmichael within reach, V. Paced on the monitor at 102. 
 
0700-Bedside and verbal report given to Ganesh Segura RN.

## 2019-05-05 NOTE — PROGRESS NOTES
[facility] Patient: Stefany Carver Age: 78 y.o. : 1950 MR#: [id number] SSN: [id number] Date/Time: 2019 Subjective:  
 
Review of systems No CP  
NO NVD No SOB  NO Cough Assessment/Plan:  
Acute on chronic systolic HF - Sys HF due to use of Herceptin use 1 Non Ischemic cardiomyopathy - EF 26 - 30 % s/p H/o AICD  
- Clinically improving , now can sleep flat ,  
- continue diuretics / BB / Dig/ ARB  
- already feeling well  
- continue O2 via NC to maintain o2 sats above 94%  
- Follow with cardiology 2 HTN 
- Coreg / losartan / continue and monitor 3 Recent diagnosis of Acute PE - Bilateral  
- Follow DVT LL study ( ? Report in upper limb DVT study says \" No DVT demonstrated left lower extremity. \" - ? Error vs she had DVT study done - patient is refusing No DVT seen right common femoral vein.) - Acute on chronic PE 
- on Xarelto  
 
 
4 H/o Asthma /COPD with bronchospasm  
- continue BD / change steroids to PO  
 
5 H/o Breast a  
- Followed by oncology - primary oncology - Dr Michelle Ring 6 Tachycardia - paced rhythm OK to ambulate with assistance as tolerated Full CODE Case discussed with:  [x]Patient  [x] Family ( In room with patient )    []Nursing  []Case Management DVT Prophylaxis:  []Lovenox  []Hep SQ  []SCDs  []Coumadin   []On Heparin gtt Objective:  
VS:  
Visit Vitals /80 Pulse (!) 105 Temp 98.3 °F (36.8 °C) Resp 20 Wt 81.2 kg (179 lb) SpO2 98% Breastfeeding? No  
BMI 29.79 kg/m² Tmax/24hrs: Temp (24hrs), Av.5 °F (36.9 °C), Min:98.2 °F (36.8 °C), Max:99 °F (37.2 °C) IOBRIEF Intake/Output Summary (Last 24 hours) at 2019 5533 Last data filed at 2019 1074 Gross per 24 hour Intake 1200 ml Output 2700 ml Net -1500 ml General:  Alert, cooperative, no acute distress Cardiovascular: S1S2 - regular , No Murmur Pulmonary: Equal expansion , No Use of accessory muscles , No Rales, occasional high pitch rhonchi GI:  +BS in all four quadrants, soft, non-tender Extremities:  No edema; 2+ dorsalis pedis pulses bilaterally Neuro: Alert and oriented X 2. Medications:  
Current Facility-Administered Medications Medication Dose Route Frequency  digoxin (LANOXIN) tablet 0.125 mg  0.125 mg Oral DAILY  losartan (COZAAR) tablet 50 mg  50 mg Oral DAILY  potassium chloride (K-DUR, KLOR-CON) SR tablet 40 mEq  40 mEq Oral TID  predniSONE (DELTASONE) tablet 20 mg  20 mg Oral BID WITH MEALS  furosemide (LASIX) injection 40 mg  40 mg IntraVENous BID  albuterol-ipratropium (DUO-NEB) 2.5 MG-0.5 MG/3 ML  3 mL Nebulization Q4H PRN  
 metoprolol (LOPRESSOR) injection 2.5 mg  2.5 mg IntraVENous Q6H PRN  
 budesonide-formoterol (SYMBICORT) 160-4.5 mcg/actuation HFA inhaler 2 Puff  2 Puff Inhalation BID RT  
 benzonatate (TESSALON) capsule 100 mg  100 mg Oral TID PRN  
 carvedilol (COREG) tablet 6.25 mg  6.25 mg Oral BID WITH MEALS  DULoxetine (CYMBALTA) capsule 30 mg  30 mg Oral DAILY  melatonin tablet 3 mg  3 mg Oral QHS PRN  
 tiotropium (SPIRIVA) inhalation capsule 18 mcg  1 Cap Inhalation DAILY  acetaminophen (TYLENOL) tablet 650 mg  650 mg Oral Q6H PRN  
 oxyCODONE-acetaminophen (PERCOCET) 5-325 mg per tablet 1 Tab  1 Tab Oral Q6H PRN  
 naloxone (NARCAN) injection 0.4 mg  0.4 mg IntraVENous PRN  
 ondansetron (ZOFRAN) injection 4 mg  4 mg IntraVENous Q6H PRN  
 docusate sodium (COLACE) capsule 100 mg  100 mg Oral BID PRN  
 rivaroxaban (XARELTO) tablet 15 mg  15 mg Oral BID WITH MEALS Labs:   
Recent Labs 05/05/19 
0350 05/04/19 
0514 05/03/19 0499 52 06 34 WBC 10.9 4.4* 5.3 HGB 10.1* 10.8* 10.0* HCT 32.1* 33.3* 31.0*  
 409 392 Recent Labs 05/05/19 
0350 05/04/19 
0514 05/03/19 0499 52 06 34  144 144  
K 4.8 3.6 3.7  108 112* CO2 30 28 26 * 156* 139* BUN 24* 10 12 CREA 1.00 0.81 0.85 CA 9.4 9.3 8.8 MG  --  2.1 2.0 PHOS  --  4.1  --   
ALB  --   --  3.3* SGOT  --   --  14* ALT  --   --  22 INR  --   --  1.3* Signed By: Eyad Hector MD   
 May 5, 2019

## 2019-05-06 ENCOUNTER — APPOINTMENT (OUTPATIENT)
Dept: VASCULAR SURGERY | Age: 69
DRG: 291 | End: 2019-05-06
Attending: INTERNAL MEDICINE
Payer: MEDICARE

## 2019-05-06 VITALS
OXYGEN SATURATION: 95 % | HEART RATE: 114 BPM | RESPIRATION RATE: 18 BRPM | TEMPERATURE: 98.5 F | SYSTOLIC BLOOD PRESSURE: 143 MMHG | BODY MASS INDEX: 29.64 KG/M2 | DIASTOLIC BLOOD PRESSURE: 83 MMHG | WEIGHT: 178.13 LBS

## 2019-05-06 LAB
ANION GAP SERPL CALC-SCNC: 6 MMOL/L (ref 3–18)
BASOPHILS # BLD: 0 K/UL (ref 0–0.1)
BASOPHILS NFR BLD: 0 % (ref 0–2)
BUN SERPL-MCNC: 20 MG/DL (ref 7–18)
BUN/CREAT SERPL: 25 (ref 12–20)
CALCIUM SERPL-MCNC: 8.9 MG/DL (ref 8.5–10.1)
CHLORIDE SERPL-SCNC: 105 MMOL/L (ref 100–108)
CO2 SERPL-SCNC: 29 MMOL/L (ref 21–32)
CREAT SERPL-MCNC: 0.8 MG/DL (ref 0.6–1.3)
DIFFERENTIAL METHOD BLD: ABNORMAL
EOSINOPHIL # BLD: 0 K/UL (ref 0–0.4)
EOSINOPHIL NFR BLD: 0 % (ref 0–5)
ERYTHROCYTE [DISTWIDTH] IN BLOOD BY AUTOMATED COUNT: 18.7 % (ref 11.6–14.5)
GLUCOSE SERPL-MCNC: 114 MG/DL (ref 74–99)
HCT VFR BLD AUTO: 32.7 % (ref 35–45)
HGB BLD-MCNC: 10.4 G/DL (ref 12–16)
LYMPHOCYTES # BLD: 0.9 K/UL (ref 0.9–3.6)
LYMPHOCYTES NFR BLD: 10 % (ref 21–52)
MCH RBC QN AUTO: 29.8 PG (ref 24–34)
MCHC RBC AUTO-ENTMCNC: 31.8 G/DL (ref 31–37)
MCV RBC AUTO: 93.7 FL (ref 74–97)
MONOCYTES # BLD: 1.3 K/UL (ref 0.05–1.2)
MONOCYTES NFR BLD: 15 % (ref 3–10)
NEUTS SEG # BLD: 7 K/UL (ref 1.8–8)
NEUTS SEG NFR BLD: 75 % (ref 40–73)
PLATELET # BLD AUTO: 403 K/UL (ref 135–420)
PMV BLD AUTO: 9.8 FL (ref 9.2–11.8)
POTASSIUM SERPL-SCNC: 3.9 MMOL/L (ref 3.5–5.5)
RBC # BLD AUTO: 3.49 M/UL (ref 4.2–5.3)
SODIUM SERPL-SCNC: 140 MMOL/L (ref 136–145)
WBC # BLD AUTO: 9.3 K/UL (ref 4.6–13.2)

## 2019-05-06 PROCEDURE — 93970 EXTREMITY STUDY: CPT

## 2019-05-06 PROCEDURE — 74011250637 HC RX REV CODE- 250/637: Performed by: NURSE PRACTITIONER

## 2019-05-06 PROCEDURE — 94762 N-INVAS EAR/PLS OXIMTRY CONT: CPT

## 2019-05-06 PROCEDURE — 74011250636 HC RX REV CODE- 250/636: Performed by: INTERNAL MEDICINE

## 2019-05-06 PROCEDURE — 85025 COMPLETE CBC W/AUTO DIFF WBC: CPT

## 2019-05-06 PROCEDURE — 80048 BASIC METABOLIC PNL TOTAL CA: CPT

## 2019-05-06 PROCEDURE — 94640 AIRWAY INHALATION TREATMENT: CPT

## 2019-05-06 PROCEDURE — 74011636637 HC RX REV CODE- 636/637: Performed by: INTERNAL MEDICINE

## 2019-05-06 PROCEDURE — 74011250637 HC RX REV CODE- 250/637: Performed by: INTERNAL MEDICINE

## 2019-05-06 PROCEDURE — 74011000250 HC RX REV CODE- 250: Performed by: INTERNAL MEDICINE

## 2019-05-06 RX ORDER — FUROSEMIDE 40 MG/1
TABLET ORAL
Qty: 30 TAB | Refills: 0 | Status: SHIPPED | OUTPATIENT
Start: 2019-05-07 | End: 2019-05-08 | Stop reason: SDUPTHER

## 2019-05-06 RX ORDER — POTASSIUM CHLORIDE 1500 MG/1
20 TABLET, FILM COATED, EXTENDED RELEASE ORAL DAILY
Qty: 10 TAB | Refills: 0 | Status: SHIPPED | OUTPATIENT
Start: 2019-05-06 | End: 2019-05-30

## 2019-05-06 RX ORDER — DIGOXIN 125 MCG
0.12 TABLET ORAL DAILY
Qty: 30 TAB | Refills: 0 | Status: SHIPPED | OUTPATIENT
Start: 2019-05-07 | End: 2019-05-08 | Stop reason: SDUPTHER

## 2019-05-06 RX ORDER — LOSARTAN POTASSIUM 50 MG/1
50 TABLET ORAL DAILY
Qty: 30 TAB | Refills: 0 | Status: SHIPPED | OUTPATIENT
Start: 2019-05-07 | End: 2019-05-08 | Stop reason: SDUPTHER

## 2019-05-06 RX ORDER — HEPARIN SODIUM 1000 [USP'U]/ML
500 INJECTION, SOLUTION INTRAVENOUS; SUBCUTANEOUS ONCE
Status: DISCONTINUED | OUTPATIENT
Start: 2019-05-06 | End: 2019-05-06 | Stop reason: SDUPTHER

## 2019-05-06 RX ORDER — PREDNISONE 20 MG/1
20 TABLET ORAL 2 TIMES DAILY WITH MEALS
Qty: 4 TAB | Refills: 0 | Status: SHIPPED | OUTPATIENT
Start: 2019-05-06 | End: 2019-05-08

## 2019-05-06 RX ORDER — OXYCODONE AND ACETAMINOPHEN 5; 325 MG/1; MG/1
1 TABLET ORAL
Qty: 15 TAB | Refills: 0 | Status: SHIPPED | OUTPATIENT
Start: 2019-05-06 | End: 2019-05-09

## 2019-05-06 RX ORDER — CARVEDILOL 12.5 MG/1
12.5 TABLET ORAL 2 TIMES DAILY WITH MEALS
Qty: 60 TAB | Refills: 0 | Status: SHIPPED | OUTPATIENT
Start: 2019-05-06 | End: 2019-08-28

## 2019-05-06 RX ORDER — HEPARIN SODIUM (PORCINE) LOCK FLUSH IV SOLN 100 UNIT/ML 100 UNIT/ML
500 SOLUTION INTRAVENOUS ONCE
Status: COMPLETED | OUTPATIENT
Start: 2019-05-06 | End: 2019-05-06

## 2019-05-06 RX ORDER — LISINOPRIL 2.5 MG/1
2.5 TABLET ORAL DAILY
Status: DISCONTINUED | OUTPATIENT
Start: 2019-05-07 | End: 2019-05-06 | Stop reason: SDUPTHER

## 2019-05-06 RX ORDER — NALOXONE HYDROCHLORIDE 0.4 MG/ML
0.4 INJECTION, SOLUTION INTRAMUSCULAR; INTRAVENOUS; SUBCUTANEOUS AS NEEDED
Qty: 1 ML | Refills: 0 | Status: ON HOLD | OUTPATIENT
Start: 2019-05-06 | End: 2019-10-30

## 2019-05-06 RX ORDER — CARVEDILOL 12.5 MG/1
12.5 TABLET ORAL 2 TIMES DAILY WITH MEALS
Status: DISCONTINUED | OUTPATIENT
Start: 2019-05-06 | End: 2019-05-06 | Stop reason: HOSPADM

## 2019-05-06 RX ORDER — DOCUSATE SODIUM 100 MG/1
100 CAPSULE, LIQUID FILLED ORAL
Qty: 30 CAP | Refills: 0 | Status: SHIPPED | OUTPATIENT
Start: 2019-05-06 | End: 2019-05-08

## 2019-05-06 RX ORDER — BUDESONIDE AND FORMOTEROL FUMARATE DIHYDRATE 160; 4.5 UG/1; UG/1
2 AEROSOL RESPIRATORY (INHALATION) 2 TIMES DAILY
Qty: 1 INHALER | Refills: 0 | Status: SHIPPED | OUTPATIENT
Start: 2019-05-06 | End: 2020-08-31

## 2019-05-06 RX ORDER — MOMETASONE FUROATE 50 UG/1
2 SPRAY, METERED NASAL
COMMUNITY
End: 2019-09-26

## 2019-05-06 RX ADMIN — PREDNISONE 20 MG: 20 TABLET ORAL at 17:19

## 2019-05-06 RX ADMIN — PREDNISONE 20 MG: 20 TABLET ORAL at 08:58

## 2019-05-06 RX ADMIN — DIGOXIN 0.12 MG: 125 TABLET ORAL at 08:58

## 2019-05-06 RX ADMIN — CARVEDILOL 12.5 MG: 12.5 TABLET, FILM COATED ORAL at 17:19

## 2019-05-06 RX ADMIN — CARVEDILOL 6.25 MG: 6.25 TABLET, FILM COATED ORAL at 08:58

## 2019-05-06 RX ADMIN — BUDESONIDE AND FORMOTEROL FUMARATE DIHYDRATE 2 PUFF: 160; 4.5 AEROSOL RESPIRATORY (INHALATION) at 09:06

## 2019-05-06 RX ADMIN — HEPARIN SODIUM (PORCINE) LOCK FLUSH IV SOLN 100 UNIT/ML 500 UNITS: 100 SOLUTION at 17:44

## 2019-05-06 RX ADMIN — DULOXETINE HYDROCHLORIDE 30 MG: 30 CAPSULE, DELAYED RELEASE ORAL at 08:58

## 2019-05-06 RX ADMIN — LOSARTAN POTASSIUM 50 MG: 50 TABLET ORAL at 08:58

## 2019-05-06 RX ADMIN — FUROSEMIDE 40 MG: 40 TABLET ORAL at 08:58

## 2019-05-06 RX ADMIN — TIOTROPIUM BROMIDE 18 MCG: 18 CAPSULE ORAL; RESPIRATORY (INHALATION) at 17:38

## 2019-05-06 RX ADMIN — APIXABAN 10 MG: 5 TABLET, FILM COATED ORAL at 09:09

## 2019-05-06 RX ADMIN — IPRATROPIUM BROMIDE AND ALBUTEROL SULFATE 3 ML: .5; 3 SOLUTION RESPIRATORY (INHALATION) at 12:18

## 2019-05-06 NOTE — PROGRESS NOTES
Phone: [phone] Hematology / Oncology Progress Note [address] Patient: Claudette Gaskin   MRN: [id number]         CSN: [id number] YOB: 1950 Admit Date: 5/3/2019 Assessment:  
 
Principal Problem: 
  Acute exacerbation of CHF (congestive heart failure) ([address] (5/3/2019) Active Problems: 
  Dilated cardiomyopathy ([address] (5/3/2019) Breast cancer ([address] (5/3/2019) Pulmonary embolism ([address] (5/3/2019) Chronic bronchitis ([address] (5/3/2019) Hypoxia (5/3/2019) Lung metastases ([address] (5/3/2019) 
 
relapsed stage IV breast ca, her 2 ana positive , lung mets on herceptin, perjeta , taxol, with excellent response Cardiomyopathy, drug induced, relapsed, intially  Ac Pulm embolism Plan:  
 
eliquis written Diuresis, digoxin and carvedilol per cariology Venous doppler legs D/w cardiology [address] [place] [address] Office [phone] Subjective:  
 
Sob improved since Friday, able to lie down Objective:  
 
Visit Vitals /74 (BP 1 Location: Right arm, BP Patient Position: At rest) Pulse (!) 101 Temp 98.3 °F (36.8 °C) Resp 18 Wt 80.8 kg (178 lb 2.1 oz) SpO2 93% Breastfeeding? No  
BMI 29.64 kg/m² Temp (24hrs), Av.4 °F (36.9 °C), Min:97.7 °F (36.5 °C), Max:99 °F (37.2 °C) Intake/Output Summary (Last 24 hours) at 2019 1823 Last data filed at 2019 1751 Gross per 24 hour Intake 480 ml Output 1725 ml Net -1245 ml Review of Systems:  
Constitutional: negative for fevers, chills, sweats and positive for  fatigue Eyes: negative for visual disturbance, redness and icterus Ears, Nose, Mouth, Throat, and Face: negative for tinnitus, epistaxis, sore mouth and hoarseness Respiratory: negative for cough, sputum, hemoptysis, pleurisy/chest pain or wheezing Cardiovascular: negative for chest pain, chest pressure/discomfort, palpitations, irregular heart beats, syncope, paroxysmal nocturnal dyspnea impoved Gastrointestinal: negative for reflux symptoms, nausea, vomiting, change in bowel habits, melena, diarrhea, constipation and abdominal pain Genitourinary:negative for dysuria, nocturia, urinary incontinence, hesitancy and hematuria Integument: negative for rash, skin lesion(s) and pruritus Hematologic/Lymphatic: negative for easy bruising, bleeding and lymphadenopathy Musculoskeletal:negative for myalgias, arthralgias and bone pain Neurological: negative for headaches, dizziness, seizures, paresthesia and weakness Physical Exam: 
Constitutional: Alert, oriented, Eyes: PERRLA, anicteric, no redness Ears, nose, mouth, throat, and face: no mucositis, no thrush. Respiratory: ammon crackles bases Cardiovascular: S1S2, tachycardia Gastrointestinal: soft,  non tender, no HSM, normal bowel sounds Integument: no rash, no petechiae, no ecchymosis. Musculoskeletal: no edema, no cyanosis, no clubbing. Neurological: intact, cranial nerves, no focal motor or sensory deficits. Labs: 
Recent Results (from the past 24 hour(s)) CBC WITH AUTOMATED DIFF Collection Time: 05/06/19  6:05 AM  
Result Value Ref Range WBC 9.3 4.6 - 13.2 K/uL  
 RBC 3.49 (L) 4.20 - 5.30 M/uL  
 HGB 10.4 (L) 12.0 - 16.0 g/dL HCT 32.7 (L) 35.0 - 45.0 % MCV 93.7 74.0 - 97.0 FL  
 MCH 29.8 24.0 - 34.0 PG  
 MCHC 31.8 31.0 - 37.0 g/dL  
 RDW 18.7 (H) 11.6 - 14.5 % PLATELET 023 681 - 570 K/uL MPV 9.8 9.2 - 11.8 FL  
 NEUTROPHILS 75 (H) 40 - 73 % LYMPHOCYTES 10 (L) 21 - 52 % MONOCYTES 15 (H) 3 - 10 % EOSINOPHILS 0 0 - 5 % BASOPHILS 0 0 - 2 %  
 ABS. NEUTROPHILS 7.0 1.8 - 8.0 K/UL  
 ABS. LYMPHOCYTES 0.9 0.9 - 3.6 K/UL  
 ABS. MONOCYTES 1.3 (H) 0.05 - 1.2 K/UL  
 ABS. EOSINOPHILS 0.0 0.0 - 0.4 K/UL  
 ABS. BASOPHILS 0.0 0.0 - 0.1 K/UL  
 DF AUTOMATED METABOLIC PANEL, BASIC  Collection Time: 05/06/19  6:05 AM  
 Result Value Ref Range Sodium 140 136 - 145 mmol/L Potassium 3.9 3.5 - 5.5 mmol/L Chloride 105 100 - 108 mmol/L  
 CO2 29 21 - 32 mmol/L Anion gap 6 3.0 - 18 mmol/L Glucose 114 (H) 74 - 99 mg/dL BUN 20 (H) 7.0 - 18 MG/DL Creatinine 0.80 0.6 - 1.3 MG/DL  
 BUN/Creatinine ratio 25 (H) 12 - 20 GFR est AA >60 >60 ml/min/1.73m2 GFR est non-AA >60 >60 ml/min/1.73m2  Calcium 8.9 8.5 - 10.1 MG/DL

## 2019-05-06 NOTE — PROGRESS NOTES
Bedside and Verbal shift change report received from  Valente Reyes RN (off going nurse). Report included the following information SBAR, Kardex, MAR and Recent Results. Assumed care patient in bed sleeping comfortably. Fall prevention program maintained,call bell and bedside table within reach. No problems identified at this time ,will continue care. 1245- 6 minute walk done. Resting : 91% with out O2. Walking : 91-93% with out O2. Dyspnea noticeable after the walk but O2 saturation remains @ 91- 93%.

## 2019-05-06 NOTE — NURSE NAVIGATOR
Transitional Care Team: Initial & Discharge HUG Note Date of Assessment: 05/06/19 Time of Assessment:  01:45 PM 
 
Ced Walton is a 78 y.o. female inpatient at [facility]. Assessment & Plan  
-social: lives with her . She's independent of her ADL/IADLs. She has a nebulizer; no home oxygen. -Breast cancer w/ lung mets- sees Dr. Daniel Ruiz; she states she's getting chemo 
-no home oxygen needs; sats 92-93% after ambulating as per pt. -Cardiac diet- following per pt. -HF educational flyer was provided, and discussed w/ pt. and  
-she has a scale, and weights daily Primary Diagnosis: CHF, nonischemic cardiomyopathy- EF 26-30%, proBNP 974 
-acute PE & RLE DVT- she's on eliquis Note: she has an AICD Advance Directive:  not on file; education provided. Current Code Status:  Full Code Referral to Hospice/ Palliative Care Appropriate: no. 
 
Awareness of Medical Conditions: (Trajectory of illness and pts expectations). The patient and  are aware. Discharge Needs: (to include safety issues) none Barriers Identified: none Patient is willing to go to SNF/Inpatient Rehab if recommended: no 
 
Medication Review:  was performed. Can patient afford medications:  yes. Patient is Compliant with Medication regimen: yes Who manages medications at home: self HUG (Healthy Understanding of Goals) program introduced to patient/family. The Transitional Care Team bridges the gaps in care and education surrounding discharge from the acute care facility. The objective is to empower the patient and family in taking a proactive role in preventing readmission within the first thirty days after discharge. The team is also involved in the efforts to reduce readmission to the acute care setting after stabilization and discharge from the acute care environment either to skilled nursing facilities or community. [facility] RN/CNS will return with [facility] Calendar/ follow up appointments/ Ambulatory Nurse Navigator name and contact information when the patient is ready for discharge. Future Appointments Date Time Provider Priscila Joselyn 5/31/2019  9:30 AM [facility] E[facility] SCHED  
6/25/2019 11:15 AM Gordy Mancia MD [id number]  
7/22/2019 11:00 AM Te Theodore MD CAP TAMAR SCHED  
7/26/2019 10:00 AM Te Theodore MD CAP TAMAR SCHED  
 
-Follow up with Jeffery Castaneda MD (General Practice) -Follow up with Te Theodore MD (Cardiology) on 5/8/2019; @ 12:45  
-Follow up with Imani Farias MD (Hematology and Oncology) on 5/9/2019; @ 8:45 Patient education focused on readmission zones as described as: The Red Zone: High risk for readmission, days 1-21 The Yellow Zone: Moderate  risk for readmission, days 22-29 The Green Zone: Lower risk for readmission, days 30 and after PLAN: 
The patient will be discharged home without [facility] services tonight. The [facility] Team will attempt to follow the patient from a distance while inpatient as well as be available for further transition/disposition needs. The [facility] team will continue to offer support during the 30- 90 day discharge from acute care setting. Notified the appropriate TESS team members. Past Medical History:  
Diagnosis Date  Abnormal nuclear cardiac imaging test 06/11/2010 Lg fixed anterior, septal, apical, inferoseptal defect sugg RCA & LAD disease or cardiomyopathy. EF 20%. Global hykn. Nondiagnostic EKG.  Acute bronchitis 10/15/2018 Persistent cough and wheezing in spite of prednisone and antibiotic. 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   
 Asthma  Automatic implantable cardiac defibrillator in situ  Automatic implantable cardiac defibrillator in situ  [facility]  Cancer ([facility] breast cancer left  Chemotherapy convalescence or palliative care 2012  Chronic combined systolic and diastolic heart failure [facility] Remains symptomatic, nyha class 3 ef improving.  Congestive heart failure, unspecified   
 chronic class ll  Echocardiogram 09/27/2010 EF 30%. Global hykn. Mild MR. Mild TR.  GERD (gastroesophageal reflux disease)  Hyperlipidemia  Hypertension  Mitral valve disorders(424.0) 1/15/2014  
 mild to moderate mr  Mitral valve disorders(424.0) 1/15/2014  
 mild to moderate mr  MVP (mitral valve prolapse)  Nonischemic cardiomyopathy ([facility] EF 20-30% despite medical therapy  Nonspecific abnormal electrocardiogram (ECG) (EKG)  Osteopenia  Other primary cardiomyopathies  Pacemaker 10/27/10  
 s/p implantation, without complication  Poisoning by other and unspecified agents primarily affecting the cardiovascular system(972.9) Ef slightly better to 45%, STABLE back on chemo.  Radiation therapy  Radiation therapy complication 6040  S/P cardiac catheterization 07/08/10 Patent coronary arteries. LVEDP 25.  EF 25%. Global hykn. Moderate MR.  RA 10.  RV 40/10. PA 40/20.  20.  CO/CI 4.5/2.45 (Larry).  Shortness of breath  Syncope and collapse  Thyroid disease   
 melissa August MSN, RN, [facility] Nurse Navigator [phone]

## 2019-05-06 NOTE — PROGRESS NOTES
Problem: Falls - Risk of 
Goal: *Absence of Falls Description Document Mehreen Bradshaw Fall Risk and appropriate interventions in the flowsheet.  
Outcome: Progressing Towards Goal

## 2019-05-06 NOTE — PROGRESS NOTES
Problem: Heart Failure: Day 4 Goal: Off Pathway (Use only if patient is Off Pathway) Outcome: Progressing Towards Goal 
  
Problem: Heart Failure: Day 4 Goal: Activity/Safety Outcome: Progressing Towards Goal 
  
Problem: Heart Failure: Day 4 Goal: Diagnostic Test/Procedures Outcome: Progressing Towards Goal 
  
Problem: Heart Failure: Day 4 Goal: Respiratory Outcome: Progressing Towards Goal 
  
Problem: Heart Failure: Day 4 Goal: Treatments/Interventions/Procedures Outcome: Progressing Towards Goal

## 2019-05-06 NOTE — PROGRESS NOTES
[facility] 
 
 
CARDIOLOGY PROGRESS NOTE 
RECS: 
 
1. Acute systolic CHF-stage C NYHA class III- compnesated continue lasix PO, Digoxin and  Aldactone 25 mg day. 2. Nonischemic cardiomyopathy (EF 26-30%)-likely from Herceptin use. Status post ICD- increased coreg. Continue ARB 3. Acute PE- on Eliquis BID. Pulmonary following 4. Acute DVT- Medical team following 5. Hypertension- stable. 6. CA breast with lung mets. 7. Moderate mitral regurgitation likely from non ischemic cardiomyopathy 05/03/19 ECHO ADULT FOLLOW-UP OR LIMITED 05/03/2019 5/3/2019  
  Narrative · Left Ventricle: Mild concentric hypertrophy. Severe global systolic  
dysfunction. Estimated left ventricular ejection fraction is 26 - 30%. Biplane method used to measure ejection fraction. Left ventricular global  
hypokinesis. · IVC/Hepatic Veins: Severely elevated central venous pressure (15+ mmHg); IVC diameter is larger than 21 mm and collapses less than 50% with  
respiration. · Pulmonary Artery: Mild pulmonary hypertension. Pulmonary arterial  
systolic pressure is 44 mmHg. · Pericardium: Trivial-to-small circumferential pericardial effusion  
measuring 10 mm. · Mitral Valve: Moderate mitral valve regurgitation. · Right Ventricle: Pacing wire present 
   
    Signed by: Mala Watson MD  
 
 
 
ASSESSMENT: 
Hospital Problems  Date Reviewed: 4/30/2019 Codes Class Noted POA Dilated cardiomyopathy ([facility] ICD-10-CM: I42.0 ICD-9-CM: 425.4  5/3/2019 Unknown * (Principal) Acute exacerbation of CHF (congestive heart failure) (HCC) ICD-10-CM: I50.9 ICD-9-CM: 428.0  5/3/2019 Unknown [facility] cancer ([facility] ICD-10-CM: C50.919 ICD-9-CM: 174.9  5/3/2019 Unknown Pulmonary embolism [facility]) ICD-10-CM: I26.99 
ICD-9-CM: 415.19  5/3/2019 Unknown Chronic bronchitis ([facility] ICD-10-CM: Z39 ICD-9-CM: 491.9  5/3/2019 Unknown  Hypoxia ICD-10-CM: R09.02 
 ICD-9-CM: 799.02  5/3/2019 Unknown Lung metastases ([facility] ICD-10-CM: C78.00 ICD-9-CM: 197.0  5/3/2019 Unknown SUBJECTIVE: 
No CP or SOB OBJECTIVE: 
 
VS:  
Visit Vitals /74 (BP 1 Location: Right arm, BP Patient Position: At rest) Pulse (!) 101 Temp 98.3 °F (36.8 °C) Resp 18 Wt 80.8 kg (178 lb 2.1 oz) SpO2 93% Breastfeeding? No  
BMI 29.64 kg/m² Intake/Output Summary (Last 24 hours) at 5/6/2019 5426 Last data filed at 5/5/2019 1751 Gross per 24 hour Intake 480 ml Output 1725 ml Net -1245 ml  
 
TELE: V paced General: alert and in no apparent distress HENT: Normocephalic, atraumatic. Normal external eye. Neck :  JVD difficult to assess due to obesity Cardiac:  regular rate and rhythm, systolic murmur: holosystolic 2/6, blowing at apex Lungs: clear to auscultation bilaterally Abdomen: Soft, nontender, no masses Extremities:  No c/c/e, peripheral pulses present Labs: Results:  
   
Chemistry Recent Labs 05/06/19 
1253 05/05/19 
0350 05/04/19 
0514 05/03/19 [address] [place] [address] * 160* 156* 139*  141 144 144  
K 3.9 4.8 3.6 3.7  107 108 112* CO2 29 30 28 26 BUN 20* 24* 10 12 CREA 0.80 1.00 0.81 0.85 CA 8.9 9.4 9.3 8.8 AGAP 6 4 8 6 BUCR 25* 24* 12 14 AP  --   --   --  74  
TP  --   --   --  6.1* ALB  --   --   --  3.3*  
GLOB  --   --   --  2.8 AGRAT  --   --   --  1.2  
  
CBC w/Diff Recent Labs 05/06/19 
8692 05/05/19 
0350 05/04/19 
1571 WBC 9.3 10.9 4.4*  
RBC 3.49* 3.39* 3.53* HGB 10.4* 10.1* 10.8* HCT 32.7* 32.1* 33.3*  
 414 409 GRANS 75* 85* 89* LYMPH 10* 5* 9* EOS 0 0 0 Cardiac Enzymes Recent Labs 05/03/19 [address] [place] [address] CPK 27 CKND1 CALCULATION NOT PERFORMED WHEN RESULT IS BELOW LINEAR LIMIT Coagulation Recent Labs 05/03/19 [address] [place] [address] PTP 16.0* INR 1.3* APTT 29.5 Lipid Panel Lab Results Component Value Date/Time  Cholesterol, total 266 (H) 04/17/2019 11:38 AM  
 HDL Cholesterol 60 04/17/2019 11:38 AM  
 LDL, calculated 189.2 (H) 04/17/2019 11:38 AM  
 VLDL, calculated 16.8 04/17/2019 11:38 AM  
 Triglyceride 84 04/17/2019 11:38 AM  
 CHOL/HDL Ratio 4.4 04/17/2019 11:38 AM  
  
BNP No results for input(s): BNPP in the last 72 hours. Liver Enzymes Recent Labs 05/03/19 [address] [place] [address] TP 6.1* ALB 3.3* AP 74 SGOT 14* Thyroid Studies Lab Results Component Value Date/Time  TSH 0.10 (L) 04/17/2019 11:38 AM  
    
 
 
 
Disha Watters NP

## 2019-05-07 ENCOUNTER — PATIENT OUTREACH (OUTPATIENT)
Dept: CARDIOLOGY CLINIC | Age: 69
End: 2019-05-07

## 2019-05-07 RX ORDER — SUVOREXANT 15 MG/1
15 TABLET, FILM COATED ORAL
COMMUNITY
Start: 2019-04-01 | End: 2019-05-08

## 2019-05-07 NOTE — PROGRESS NOTES
Hospital Discharge Follow-Up Date/Time:  2019 3:12 PM 
 
Patient was admitted to [facility] on 5/3/19 and discharged on 19 for Acute PE/DVT and Non-Ischemic Cardiomyopathy. The physician discharge summary was available at the time of outreach. Patient was contacted within 2 business days of discharge. Top Challenges reviewed with the provider CHF and COPD Method of communication with provider :chart routing Inpatient RRAT score:  22 High Risk Was this a readmission? no  
Patient stated reason for the readmission: n/a Nurse Navigator (NN) contacted the patient by telephone to perform post hospital discharge assessment. Verified name and  with patient as identifiers. Provided introduction to self, and explanation of the Nurse Navigator role. Reviewed discharge instructions and red flags with patient who verbalized understanding. Patient given an opportunity to ask questions and does not have any further questions or concerns at this time. The patient agrees to contact the PCP office for questions related to their healthcare. NN provided contact information for future reference. Disease Specific:   CHF and COPD Heart Failure Note Do you have a Scale:    yes How often do you weigh:  hasn't been weighing - she said it's depressing when she gains weight. Reiterated importance of daily weights to help monitor fluid status. Daily Weight (document daily weights in flowsheets):   will start weighing daily now Amount:  n/a Provider Notified:   n/a  
 
Zone:(Pt Reported)  green EF: 26-30%  on 5/3/19 Type of HF:   HFrEF Cardiac Device present: ICD Heart Failure Medications: ACE/ARB, Betablocker, Diuretic, Potassium, Anticoagulant Summary of patient's top problems: 1. DVT/PE 2. CA Breast with Mets 3. CHF 4. COPD Home Health orders at discharge: none [address]: n/a Date of initial visit: n/a 
 
 Durable Medical Equipment ordered/company: n/a Durable Medical Equipment received: has nebulizer already Barriers to care? No barriers identified however would not be surprised if patient is depressed due to comorbitity's. Advance Care Planning:  
Does patient have an Advance Directive:  not on file Medication(s):  
New Medications at Discharge: Eliquis, Symbicort, Digoxin, Colace (not taking), Lasix, Cozaar, Percocet (not taking), Prednisone, Spiriva, and Potassium tablet Changed Medications at Discharge: Coreg dose change Discontinued Medications at Discharge: Prinivil Medication reconciliation was performed with patient, who verbalizes understanding of administration of home medications. There were no barriers to obtaining medications identified at this time. Referral to Pharm D needed: will have her review for possible interactions Current Outpatient Medications Medication Sig  
 mometasone (NASONEX) 50 mcg/actuation nasal spray 2 Sprays by Both Nostrils route daily as needed. Indications: inflammation of the nose due to an allergy  carvedilol (COREG) 12.5 mg tablet Take 1 Tab by mouth two (2) times daily (with meals).  apixaban (ELIQUIS) 5 mg tablet 2 tabs po BID for 6 days then 1 tab po BID until discontinued  budesonide-formoterol (SYMBICORT) 160-4.5 mcg/actuation HFAA Take 2 Puffs by inhalation two (2) times a day.  digoxin (LANOXIN) 0.125 mg tablet Take 1 Tab by mouth daily.  furosemide (LASIX) 40 mg tablet 1 tab po daily  losartan (COZAAR) 50 mg tablet Take 1 Tab by mouth daily.  predniSONE (DELTASONE) 20 mg tablet Take 20 mg by mouth two (2) times daily (with meals) for 2 days.  tiotropium (SPIRIVA) 18 mcg inhalation capsule Take 1 Cap by inhalation daily.  potassium chloride SR (K-TAB) 20 mEq tablet Take 1 Tab by mouth daily.   
 albuterol-ipratropium (DUO-NEB) 2.5 mg-0.5 mg/3 ml nebu 3 mL by Nebulization route every six (6) hours as needed (wheezing or sob). File under Medicare Part B, ICD codes J44.9, C78.01  
 umeclidinium (INCRUSE ELLIPTA) 62.5 mcg/actuation inhaler Take 1 Puff by inhalation daily.  DULoxetine (CYMBALTA) 30 mg capsule Take 30 mg by mouth daily.  multivitamin (ONE A DAY) tablet Take 1 Tab by mouth daily.  b complex vitamins (B COMPLEX 1) tablet Take 1 Tab by mouth daily.  cyanocobalamin 1,000 mcg tablet Take 3,000 mcg by mouth daily.  plant stanol eliezer (CHOLEST OFF PO) Take 1 Tab by mouth daily.  benzonatate (TESSALON PERLES) 100 mg capsule Take 100 mg by mouth three (3) times daily as needed for Cough.  Biotin 2,500 mcg cap Take 1 Cap by mouth daily.  melatonin 10 mg tab Take 1 Tab by mouth nightly.  montelukast (SINGULAIR) 10 mg tablet Take 1 Tab by mouth daily.  cholecalciferol, vitamin D3, 2,000 unit tab Take 1 Tab by mouth daily.  omeprazole (PRILOSEC) 40 mg capsule Take 40 mg by mouth daily.  raloxifene (EVISTA) 60 mg tablet Take 60 mg by mouth daily.  BELSOMRA 15 mg tablet Take 15 mg by mouth nightly as needed.  docusate sodium (COLACE) 100 mg capsule Take 1 Cap by mouth two (2) times daily as needed for Constipation for up to 90 days.  naloxone (NARCAN) 0.4 mg/mL injection 1 mL by IntraVENous route as needed for Other (Give if breaths per minute  less than 10, may repeat dose in 15 min and contact MD).  oxyCODONE-acetaminophen (PERCOCET) 5-325 mg per tablet Take 1 Tab by mouth every six (6) hours as needed for Pain for up to 3 days. Max Daily Amount: 4 Tabs. No current facility-administered medications for this visit. **I had RILEY Ctoa, Pharm-D review above med list.  She recommended checking with Pulm office re : Spiriva and Incluse. Meds same class and usually dont need both. Patient aware - she will call Pulm office to clarify meds. There are no discontinued medications.  
 
[facility] follow up appointment(s):  
 Future Appointments Date Time Provider Priscila Joselyn 5/8/2019 12:45 PM Checo Crews MD [facility]  
5/31/2019  9:30 AM [facility]  
6/25/2019 11:15 AM Deanne Mcghee MD [address]  
7/22/2019 11:00 AM Checo Crews MD [facility]  
7/26/2019 10:00 AM Checo Crews MD [facility] [address] Non-[facility] follow up appointment(s): Dr. Jerome Lindsey (PCP) pt to call for appt if hasn't heard from [facility] in next day. [facility]:  n/a  
 
 
Goals  Prepare patients and caregivers for end of life decisions (ie. need for hospice, pain management, symptom relief, advance directives etc.) Patient needs to start preparing for possible end of life decisions Needs to complete advance directive  Prevent complications post hospitalization. High risk for readmission dut to CHF and COPD - along with Met Breast CA. Stressed importance of daily weights Keep regular follow up's Take meds as directed

## 2019-05-08 ENCOUNTER — TELEPHONE (OUTPATIENT)
Dept: PULMONOLOGY | Age: 69
End: 2019-05-08

## 2019-05-08 ENCOUNTER — OFFICE VISIT (OUTPATIENT)
Dept: CARDIOLOGY CLINIC | Age: 69
End: 2019-05-08

## 2019-05-08 VITALS
HEIGHT: 65 IN | DIASTOLIC BLOOD PRESSURE: 62 MMHG | WEIGHT: 171.2 LBS | SYSTOLIC BLOOD PRESSURE: 106 MMHG | BODY MASS INDEX: 28.52 KG/M2 | HEART RATE: 100 BPM | OXYGEN SATURATION: 96 %

## 2019-05-08 DIAGNOSIS — I50.23 ACUTE ON CHRONIC SYSTOLIC CONGESTIVE HEART FAILURE (HCC): Primary | ICD-10-CM

## 2019-05-08 DIAGNOSIS — I42.8 NONISCHEMIC CARDIOMYOPATHY (HCC): ICD-10-CM

## 2019-05-08 DIAGNOSIS — I26.99 OTHER ACUTE PULMONARY EMBOLISM WITHOUT ACUTE COR PULMONALE (HCC): ICD-10-CM

## 2019-05-08 DIAGNOSIS — R06.02 SHORTNESS OF BREATH: ICD-10-CM

## 2019-05-08 DIAGNOSIS — C50.412 MALIGNANT NEOPLASM OF UPPER-OUTER QUADRANT OF LEFT FEMALE BREAST, UNSPECIFIED ESTROGEN RECEPTOR STATUS (HCC): ICD-10-CM

## 2019-05-08 DIAGNOSIS — Z95.810 AUTOMATIC IMPLANTABLE CARDIOVERTER-DEFIBRILLATOR IN SITU: ICD-10-CM

## 2019-05-08 RX ORDER — LOSARTAN POTASSIUM 50 MG/1
50 TABLET ORAL DAILY
Qty: 90 TAB | Refills: 3 | Status: SHIPPED | OUTPATIENT
Start: 2019-05-08 | End: 2019-07-26 | Stop reason: ALTCHOICE

## 2019-05-08 RX ORDER — DIGOXIN 125 MCG
0.12 TABLET ORAL DAILY
Qty: 90 TAB | Refills: 3 | Status: SHIPPED | OUTPATIENT
Start: 2019-05-08 | End: 2019-09-09 | Stop reason: SDUPTHER

## 2019-05-08 RX ORDER — FUROSEMIDE 40 MG/1
TABLET ORAL
Qty: 90 TAB | Refills: 3 | Status: SHIPPED | OUTPATIENT
Start: 2019-05-08 | End: 2019-08-28

## 2019-05-08 NOTE — TELEPHONE ENCOUNTER
pharmasist states she is sending [phone] for Dr. Branden Jacobs to sign next time in office for DuoNeb.  Pt has med already

## 2019-05-08 NOTE — PROGRESS NOTES
HISTORY OF PRESENT ILLNESS  Dina Seth is a 78 y.o. female. Patient with cmp,chf.post  icd   On follow up patient denies any chest pains, palpitation or other significant symptoms. Patient is here for follow up of diagnostic tests. Results will be discussed. He feels extremely fatigued tired and weak. Recently reported decrease in renal function. Patient seen for transition of care visit. Discharge date 5/6/2019  Nurse encounter 5/6/2019  Physician encounter 5/8/2019. Admitted with acute CHF, acute PE, DVT. Patient had worsening cardiomyopathy. Symptoms are slowly improving significant improvement of shortness of breath. Orthopnea significantly better. No edema. Still feels tired on minimal activity exertion    CHF   The history is provided by the patient. This is a recurrent problem. The problem occurs constantly. The problem has been gradually improving. Associated symptoms include shortness of breath. Pertinent negatives include no chest pain. The symptoms are aggravated by exertion. The symptoms are relieved by rest.   Cardiomyopathy   The history is provided by the patient. This is a chronic problem. The problem has been resolved. Associated symptoms include shortness of breath. Pertinent negatives include no chest pain. Hypertension   The history is provided by the patient. This is a chronic problem. The problem occurs constantly. The problem has not changed since onset. Associated symptoms include shortness of breath. Pertinent negatives include no chest pain. Valvular Heart Disease   The history is provided by the patient. This is a chronic problem. The problem occurs constantly. The problem has not changed since onset. Associated symptoms include shortness of breath. Pertinent negatives include no chest pain. Review of Systems   Constitutional: Negative for chills and fever. HENT: Negative for nosebleeds. Eyes: Negative for blurred vision and double vision.    Respiratory: Positive for shortness of breath. Negative for cough, hemoptysis, sputum production and wheezing. Cardiovascular: Negative for chest pain, palpitations, orthopnea, claudication, leg swelling and PND. Gastrointestinal: Negative for heartburn, nausea and vomiting. Musculoskeletal: Negative for myalgias. Skin: Negative for rash. Neurological: Negative for dizziness and weakness. Endo/Heme/Allergies: Does not bruise/bleed easily. Family History   Problem Relation Age of Onset    Hypertension Mother     High Cholesterol Mother     Heart Disease Father         paemaker implant at 80       Past Medical History:   Diagnosis Date    Abnormal nuclear cardiac imaging test 06/11/2010    Lg fixed anterior, septal, apical, inferoseptal defect sugg RCA & LAD disease or cardiomyopathy. EF 20%. Global hykn. Nondiagnostic EKG.  Acute bronchitis 10/15/2018    Persistent cough and wheezing in spite of prednisone and antibiotic. Will refer to pulmonary    Adenocarcinoma of breast; locally advanced invasive ductal adenocarcinoma of left breast     Asthma     Automatic implantable cardiac defibrillator in situ     Automatic implantable cardiac defibrillator in situ     Breast cancer [facility] [address]     Cancer ([facility] [address]     breast cancer left    Chemotherapy convalescence or palliative care 2012    Chronic combined systolic and diastolic heart failure (HCC)     Remains symptomatic, nyha class 3 ef improving.  Congestive heart failure, unspecified     chronic class ll    Echocardiogram 09/27/2010    EF 30%. Global hykn. Mild MR. Mild TR.     GERD (gastroesophageal reflux disease)     Hyperlipidemia     Hypertension     Mitral valve disorders(424.0) 1/15/2014    mild to moderate mr     Mitral valve disorders(424.0) 1/15/2014    mild to moderate mr     MVP (mitral valve prolapse)     Nonischemic cardiomyopathy (HCC)     EF 20-30% despite medical therapy    Nonspecific abnormal electrocardiogram (ECG) (EKG)  Osteopenia     Other primary cardiomyopathies     Pacemaker 10/27/10    s/p implantation, without complication    Poisoning by other and unspecified agents primarily affecting the cardiovascular system(972.9)     Ef slightly better to 45%, STABLE back on chemo.  Radiation therapy     Radiation therapy complication 2197    S/P cardiac catheterization 07/08/10    Patent coronary arteries. LVEDP 25.  EF 25%. Global hykn. Moderate MR.  RA 10.  RV 40/10. PA 40/20.  20.  CO/CI 4.5/2.45 (Larry).  Shortness of breath     Syncope and collapse     Thyroid disease     goiter       Past Surgical History:   Procedure Laterality Date    CARDIAC SURG PROCEDURE UNLIST      Difibrillator; BI V ICD    HX MASTECTOMY  09/28/11    modified radical mastectomy and axillary dissection    HX ORTHOPAEDIC      HX OTHER SURGICAL      surgery to remove part of liver    HX PACEMAKER  10/27/10    s/p [employer] biventricular AICD, without complication    HX RADICAL MASTECTOMY      IR INSERT TUNL CVC W PORT OVER 5 YEARS  1/16/2019    NEUROLOGICAL PROCEDURE UNLISTED      Cervical fusion       Social History     Tobacco Use    Smoking status: Never Smoker    Smokeless tobacco: Never Used   Substance Use Topics    Alcohol use: No       Allergies   Allergen Reactions    Adhesive Other (comments)     blisters       Current Outpatient Medications   Medication Sig    mometasone (NASONEX) 50 mcg/actuation nasal spray 2 Sprays by Both Nostrils route daily as needed. Indications: inflammation of the nose due to an allergy    carvedilol (COREG) 12.5 mg tablet Take 1 Tab by mouth two (2) times daily (with meals).  apixaban (ELIQUIS) 5 mg tablet 2 tabs po BID for 6 days then 1 tab po BID until discontinued    budesonide-formoterol (SYMBICORT) 160-4.5 mcg/actuation HFAA Take 2 Puffs by inhalation two (2) times a day.  digoxin (LANOXIN) 0.125 mg tablet Take 1 Tab by mouth daily.     furosemide (LASIX) 40 mg tablet 1 tab po daily    losartan (COZAAR) 50 mg tablet Take 1 Tab by mouth daily.  predniSONE (DELTASONE) 20 mg tablet Take 20 mg by mouth two (2) times daily (with meals) for 2 days.  tiotropium (SPIRIVA) 18 mcg inhalation capsule Take 1 Cap by inhalation daily.  potassium chloride SR (K-TAB) 20 mEq tablet Take 1 Tab by mouth daily.  albuterol-ipratropium (DUO-NEB) 2.5 mg-0.5 mg/3 ml nebu 3 mL by Nebulization route every six (6) hours as needed (wheezing or sob). File under Medicare Part B, ICD codes J44.9, C78.01    umeclidinium (INCRUSE ELLIPTA) 62.5 mcg/actuation inhaler Take 1 Puff by inhalation daily.  DULoxetine (CYMBALTA) 30 mg capsule Take 30 mg by mouth daily.  multivitamin (ONE A DAY) tablet Take 1 Tab by mouth daily.  b complex vitamins (B COMPLEX 1) tablet Take 1 Tab by mouth daily.  cyanocobalamin 1,000 mcg tablet Take 3,000 mcg by mouth daily.  plant stanol eliezer (CHOLEST OFF PO) Take 1 Tab by mouth daily.  benzonatate (TESSALON PERLES) 100 mg capsule Take 100 mg by mouth three (3) times daily as needed for Cough.  Biotin 2,500 mcg cap Take 1 Cap by mouth daily.  melatonin 10 mg tab Take 1 Tab by mouth nightly.  montelukast (SINGULAIR) 10 mg tablet Take 1 Tab by mouth daily.  cholecalciferol, vitamin D3, 2,000 unit tab Take 1 Tab by mouth daily.  omeprazole (PRILOSEC) 40 mg capsule Take 40 mg by mouth daily.  raloxifene (EVISTA) 60 mg tablet Take 60 mg by mouth daily.  naloxone (NARCAN) 0.4 mg/mL injection 1 mL by IntraVENous route as needed for Other (Give if breaths per minute  less than 10, may repeat dose in 15 min and contact MD).  oxyCODONE-acetaminophen (PERCOCET) 5-325 mg per tablet Take 1 Tab by mouth every six (6) hours as needed for Pain for up to 3 days. Max Daily Amount: 4 Tabs. No current facility-administered medications for this visit.         Visit Vitals  /62   Pulse 100   Ht 5' 5\" (1.651 m)   Wt 77.7 kg (171 lb 3.2 oz)   SpO2 96% BMI 28.49 kg/m²         Physical Exam   Constitutional: She is oriented to person, place, and time. She appears well-developed and well-nourished. HENT:   Head: Normocephalic and atraumatic. Eyes: Conjunctivae are normal.   Neck: Neck supple. No JVD present. No tracheal deviation present. No thyromegaly present. Cardiovascular: Normal rate and regular rhythm. PMI is not displaced. Exam reveals no gallop, no S3 and no decreased pulses. Murmur heard. High-pitched blowing holosystolic murmur is present with a grade of 2/6 at the apex. Pulmonary/Chest: No respiratory distress. She has wheezes. She has no rales. She exhibits no tenderness. Abdominal: Soft. There is no tenderness. Musculoskeletal: She exhibits no edema. Neurological: She is alert and oriented to person, place, and time. Skin: Skin is warm. Psychiatric: She has a normal mood and affect. Ms. Araceli Larose has a reminder for a \"due or due soon\" health maintenance. I have asked that she contact her primary care provider for follow-up on this health maintenance. No flowsheet data found. SUMMARY:echo:6/2014  Left ventricle: The ventricle was mildly dilated. Systolic function was  normal. Ejection fraction was estimated in the range of 50 % to 55 %. There were no regional wall motion abnormalities. Doppler parameters were  consistent with abnormal left ventricular relaxation (grade 1 diastolic  dysfunction). Left atrium: The atrium was mildly dilated. Mitral valve: There was systolic bowing of the posterior leaflet, but  without diagnostic evidence for prolapse. There was mild to moderate  regurgitation. SUMMARY:echo:12/2014  Left ventricle: Systolic function was at the lower limits of normal.  Ejection fraction was estimated to be 53 %. There were no regional wall  motion abnormalities. Doppler parameters were consistent with abnormal  left ventricular relaxation (grade 1 diastolic dysfunction). Right ventricle:  A pacing wire was present. Mitral valve: There was systolic bowing of the posterior leaflet, but  without diagnostic evidence for prolapse. There was mild regurgitation. Tricuspid valve: Pulmonary artery systolic pressure: 22 mmHg. 12/2015:echo    SUMMARY:  Left ventricle: Systolic function was normal. Ejection fraction was  estimated in the range of 50 % to 55 %. There was mild diffuse  hypokinesis. Doppler parameters were consistent with abnormal left  ventricular relaxation (grade 1 diastolic dysfunction). Mitral valve: There was systolic bowing of the anterior and posterior  leaflets, but without diagnostic evidence for prolapse. There was mild  regurgitation. Tricuspid valve: There was mild regurgitation. Tricuspid regurgitation  peak velocity: 2.3 m/sec. Pulmonary artery systolic pressure: 25 mmHg. pft-6/2017  Maximal Mid Expiratory Flow rate is reduced to 43 % predicted  Forced Expiratory Volume in one second is reduced to 69 % predicted  FEV 1% is reduced     Volumes: All Volumes are reduced except for RV    Flow Volume Loop:  Nonspecific obstructive pattern in Flow Volume Loop    Bronchodilator:  No significant improvement with bronchodilator therapy    Diffusion:  Abnormal Diffusion Capacity reduced to 62 % predicted  DLCO normalizes to alveolar ventilation    Impression:  Moderate obstructive defect, Predominately small airways, Mild restrictive ventilatory defect, Reduced diffusion capacity indicating a decrease in alveolar surface area for gas exchange  I Have personally reviewed recent relevant labs available and discussed with patient    Interpretation Summary -6/2018  · Calculated left ventricular ejection fraction is 55%. Left ventricular mild concentric hypertrophy observed. Mild (grade 1) left ventricular diastolic dysfunction. · Left atrial cavity size is mildly dilated.   · There was systolic bowing of the anterior and posterior leaflets, but without diagnostic evidence for prolapse. Mild mitral valve regurgitation. · Mild tricuspid valve regurgitation is present. Pulmonary arterial systolic pressure is 18 mmHg. · Mild pulmonic valve regurgitation is present. · Small pericardial effusion. Interpretation Summary 12/2018    · Left ventricular low normal systolic function. Estimated left ventricular ejection fraction is 51 - 55%. Visually measured ejection fraction. Normal left ventricular wall motion, no regional wall motion abnormality noted. Moderate (grade 2) left ventricular diastolic dysfunction. · There is no evidence of pulmonary hypertension. · Mild to moderate mitral valve regurgitation. · Left atrial cavity size is mildly dilated. Interpretation Summary 3/2019       · Normal cavity size, wall thickness and diastolic function. There is low normal systolic function. The estimated ejection fraction is 51 - 55%. No regional wall motion abnormality noted. Global longitudinal strain is -13.00%. Abnormal left ventricular strain. · Normal right ventricular size and function. Pacing wire present  · Mild to moderate mitral valve regurgitation. Mild prolapse of the posterior leaflet within the mitral valve. · Mild pulmonic valve regurgitation is present. · Mild tricuspid valve regurgitation. Pulmonary arterial systolic pressure is 17.6 mmHg. Mild pulmonary hypertension. 5/2019    · Left Ventricle: Mild concentric hypertrophy. Severe global systolic dysfunction. Estimated left ventricular ejection fraction is 26 - 30%. Biplane method used to measure ejection fraction. Left ventricular global hypokinesis. · IVC/Hepatic Veins: Severely elevated central venous pressure (15+ mmHg); IVC diameter is larger than 21 mm and collapses less than 50% with respiration. · Pulmonary Artery: Mild pulmonary hypertension. Pulmonary arterial systolic pressure is 44 mmHg. · Pericardium: Trivial-to-small circumferential pericardial effusion measuring 10 mm.   · Mitral Valve: Moderate mitral valve regurgitation. · Right Ventricle: Pacing wire present   Interpretation Summary 5/2019    · Acute occlusive thrombus present in the right peroneal vein. IMPRESSION: 5/2019     1. Positive examination for pulmonary emboli.   - There is likely a combination of acute and subacute PE in the lower lobe  segmental and subsegmental branch vessels. Assessment         ICD-10-CM ICD-9-CM    1. Acute on chronic systolic congestive heart failure (HCC) I50.23 428.23      428.0     Recent admission with worsening congestive heart failure. Ejection fraction is worse. Improving after discharge   2. Nonischemic cardiomyopathy (HCC) I42.8 425.4     Ejection fraction is worsened. Continues ACE inhibitor and Coreg   3. Automatic implantable cardioverter-defibrillator in situ Z95.810 V45.02     Normal function continue monitoring   4. Shortness of breath R06.02 786.05     Slowly improving after discharge had CHF. Metastatic disease of breast cancer   5. Malignant neoplasm of upper-outer quadrant of left female breast, unspecified estrogen receptor status (HCC) C50.412 174.4     Followed by Dr. Atwood Cluster  on treatment   6. Other acute pulmonary embolism without acute cor pulmonale ([facility] [address] I26.99 415.19     For 2019. Now on apixaban. Doses are being adjusted. Tounge swelling not related to lisinopril as she had swelling even after stopping lisinopril  7/2018  I will hold Lasix as recent decrease in renal function. No clinical sign of fluid overload. 10/2018  Shortness of breath due to acute bronchitis bilateral wheezing. Will refer back to pulmonary. No sign of fluid overload. Will keep off Lasix  1/2019  Metastatic breast cancer now back on chemotherapy. Lung metastasis likely cause for shortness of breath which is improving. Low blood pressure will reduce Coreg to 6.25 twice a day. Continue lisinopril. Recheck echo in 2 months on chemotherapy  4/2019  Cardiac status stable.   Echo EF remains stable continue Coreg and lisinopril. Check echo in 3 months. Patient remains on chemotherapy    5/2019  Recent admission with increased shortness of breath combination of pulmonary embolism and decompensated systolic heart failure with worsening ejection fraction. Class III CHF. 10 pound weight loss from peak. Continue current therapy. Continue anticoagulation. Follow-up 3 weeks        No orders of the defined types were placed in this encounter. Follow-up and Dispositions    · Return in about 3 weeks (around 5/29/2019).

## 2019-05-10 ENCOUNTER — TELEPHONE (OUTPATIENT)
Dept: PULMONOLOGY | Age: 69
End: 2019-05-10

## 2019-05-10 NOTE — TELEPHONE ENCOUNTER
PT DEMETRIS([phone]). 69 [facility] 5/6/19 AND SHE WANTS TO KNOW WHAT INHALERS SHE IS SUPPOSED TO BE TAKING. PLEASE CALL HER BACK.

## 2019-05-17 ENCOUNTER — PATIENT OUTREACH (OUTPATIENT)
Dept: CARDIOLOGY CLINIC | Age: 69
End: 2019-05-17

## 2019-05-17 NOTE — PROGRESS NOTES
NN contacted the patient by telephone to perform CHF follow Up. Noted Priorities/Plan:  Maintain stable weight      Daily Weight: stable per patient (increase or decrease or stable)  Zone: green   Signs/Symptoms: Edema  denies; SOB denies; orthopnea denies    Goals      Prepare patients and caregivers for end of life decisions (ie. need for hospice, pain management, symptom relief, advance directives etc.)      Patient needs to start preparing for possible end of life decisions    Needs to complete advance directive        Prevent complications post hospitalization. High risk for readmission dut to CHF and COPD - along with Met Breast CA. Stressed importance of daily weights   Keep regular follow up's   Take meds as directed                 Needs addressed from pathway:   Week 1-4   Provide Daily Disease Management  (NN initiated)  ? Daily weight (Before Breakfast-  Daily Zone Identification (symptom management; weight, edema, SOB, activity/sleep changes)-notify provider immediately as indicated  ? Cardiac Low-sodium Diet (No added sodium; 1500mg as indicated). If  Diabetic: include carbohydrate controlled   ? Fluid restriction (if indicated)  ? Comorbidity Management  ? Confirm follow-up appointments/transportation. Reschedule if needed. Additional assessment   ? Fall precautions    ? Activity tolerance assessment   (eg: Vital signs; level of consciousness; dyspnea on exertion; pillow usage; recliner vs bed)   ? Energy conservation management (balance activity with rest)  ? Labs/diagnostics *as indicated  ? Medication Therapy *as directed  ? Diet/appetite assessment  ? ED/Hospital utilization  ? Immunizations up to date        Pneumonia       Flu  ? Home assessment/modifications * as indicated   ? Transportation  assistance  ? Medication assistance  ? Home health services  ? Personal assistance  ? SNF utilization  Psychosocial: Reassurance/emotional support   Monitoring:  ?  Home health  ? H2H  ? [facility]  ? Tele-monitoring  ? Cardiac device monitoring  ? My Chart  Education:  ? Advanced care planning status   ? Support system identification ( eg: Caregiver, meal planning, community resources, family, friends, Gnosticist, support group)   ? Health literacy for heart failure  ? Caregiver education and preparation           Have you been to an ER/Hospital since discharge from [facility]? No      Have you followed up with PCP/Cardiologist/Specialist? Saw Dr. Adriane Jolley on 5/9/19    Transportation:    Diet: low salt  Activity/ADLs: independent. Medications Reconciled at this time:  no  Home health:  Company/Completion: none  Social Support: Family    Advanced Care Plan: needs to be addressed    Patient reminded that there is a physician on call 24 hours a day / 7 days a week (M-F 5pm to 8am and from Friday 5pm until Monday 8a for the weekend) should the patient have questions or concerns. Patient reminded to call 911 if situation is emergent or patient feels the situation is emergent. Pt verbalizes understanding.

## 2019-05-23 ENCOUNTER — PATIENT OUTREACH (OUTPATIENT)
Dept: CARDIOLOGY CLINIC | Age: 69
End: 2019-05-23

## 2019-05-30 ENCOUNTER — OFFICE VISIT (OUTPATIENT)
Dept: CARDIOLOGY CLINIC | Age: 69
End: 2019-05-30

## 2019-05-30 VITALS
HEIGHT: 65 IN | WEIGHT: 176.2 LBS | SYSTOLIC BLOOD PRESSURE: 111 MMHG | BODY MASS INDEX: 29.36 KG/M2 | DIASTOLIC BLOOD PRESSURE: 54 MMHG | HEART RATE: 106 BPM

## 2019-05-30 DIAGNOSIS — C50.412 MALIGNANT NEOPLASM OF UPPER-OUTER QUADRANT OF LEFT FEMALE BREAST, UNSPECIFIED ESTROGEN RECEPTOR STATUS (HCC): ICD-10-CM

## 2019-05-30 DIAGNOSIS — Z95.810 AUTOMATIC IMPLANTABLE CARDIOVERTER-DEFIBRILLATOR IN SITU: ICD-10-CM

## 2019-05-30 DIAGNOSIS — I42.8 NONISCHEMIC CARDIOMYOPATHY (HCC): ICD-10-CM

## 2019-05-30 DIAGNOSIS — I50.23 ACUTE ON CHRONIC SYSTOLIC CONGESTIVE HEART FAILURE (HCC): Primary | ICD-10-CM

## 2019-05-30 NOTE — PROGRESS NOTES
HISTORY OF PRESENT ILLNESS  Chencho Walters is a 78 y.o. female. Patient with cmp,chf.post  icd   On follow up patient denies any chest pains, palpitation or other significant symptoms. Patient is here for follow up of diagnostic tests. Results will be discussed. He feels extremely fatigued tired and weak. Recently reported decrease in renal function. Patient seen for transition of care visit. Discharge date 5/6/2019  Nurse encounter 5/6/2019  Physician encounter 5/8/2019. Admitted with acute CHF, acute PE, DVT. Patient had worsening cardiomyopathy. Symptoms are slowly improving significant improvement of shortness of breath. Orthopnea significantly better. No edema. Still feels tired on minimal activity exertion    CHF   The history is provided by the patient. This is a recurrent problem. The problem occurs constantly. The problem has been gradually improving. Associated symptoms include shortness of breath. Pertinent negatives include no chest pain. The symptoms are aggravated by exertion. The symptoms are relieved by rest.   Cardiomyopathy   The history is provided by the patient. This is a chronic problem. The problem has been resolved. Associated symptoms include shortness of breath. Pertinent negatives include no chest pain. Hypertension   The history is provided by the patient. This is a chronic problem. The problem occurs constantly. The problem has not changed since onset. Associated symptoms include shortness of breath. Pertinent negatives include no chest pain. Valvular Heart Disease   The history is provided by the patient. This is a chronic problem. The problem occurs constantly. The problem has not changed since onset. Associated symptoms include shortness of breath. Pertinent negatives include no chest pain. Review of Systems   Constitutional: Negative for chills and fever. HENT: Negative for nosebleeds. Eyes: Negative for blurred vision and double vision.    Respiratory: Positive for shortness of breath. Negative for cough, hemoptysis, sputum production and wheezing. Cardiovascular: Negative for chest pain, palpitations, orthopnea, claudication, leg swelling and PND. Gastrointestinal: Negative for heartburn, nausea and vomiting. Musculoskeletal: Negative for myalgias. Skin: Negative for rash. Neurological: Negative for dizziness and weakness. Endo/Heme/Allergies: Does not bruise/bleed easily. Family History   Problem Relation Age of Onset    Hypertension Mother     High Cholesterol Mother     Heart Disease Father         paemaker implant at 80       Past Medical History:   Diagnosis Date    Abnormal nuclear cardiac imaging test 06/11/2010    Lg fixed anterior, septal, apical, inferoseptal defect sugg RCA & LAD disease or cardiomyopathy. EF 20%. Global hykn. Nondiagnostic EKG.  Acute bronchitis 10/15/2018    Persistent cough and wheezing in spite of prednisone and antibiotic. Will refer to pulmonary    Adenocarcinoma of breast; locally advanced invasive ductal adenocarcinoma of left breast     Asthma     Automatic implantable cardiac defibrillator in situ     Automatic implantable cardiac defibrillator in situ     [facility] [facility]     Cancer ([facility] [address] [facility]     breast cancer left    Chemotherapy convalescence or palliative care 2012    Chronic combined systolic and diastolic heart failure (HCC)     Remains symptomatic, nyha class 3 ef improving.  Congestive heart failure, unspecified     chronic class ll    Echocardiogram 09/27/2010    EF 30%. Global hykn. Mild MR. Mild TR.     GERD (gastroesophageal reflux disease)     Hyperlipidemia     Hypertension     Mitral valve disorders(424.0) 1/15/2014    mild to moderate mr     Mitral valve disorders(424.0) 1/15/2014    mild to moderate mr     MVP (mitral valve prolapse)     Nonischemic cardiomyopathy (HCC)     EF 20-30% despite medical therapy    Nonspecific abnormal electrocardiogram (ECG) (EKG)  Osteopenia     Other primary cardiomyopathies     Pacemaker 10/27/10    s/p implantation, without complication    Poisoning by other and unspecified agents primarily affecting the cardiovascular system(972.9)     Ef slightly better to 45%, STABLE back on chemo.  Radiation therapy     Radiation therapy complication 6236    S/P cardiac catheterization 07/08/10    Patent coronary arteries. LVEDP 25.  EF 25%. Global hykn. Moderate MR.  RA 10.  RV 40/10. PA 40/20.  20.  CO/CI 4.5/2.45 (Larry).  Shortness of breath     Syncope and collapse     Thyroid disease     goiter       Past Surgical History:   Procedure Laterality Date    CARDIAC SURG PROCEDURE UNLIST      Difibrillator; BI V ICD    HX MASTECTOMY  09/28/11    modified radical mastectomy and axillary dissection    HX ORTHOPAEDIC      HX OTHER SURGICAL      surgery to remove part of liver    HX PACEMAKER  10/27/10    s/p [employer] biventricular AICD, without complication    HX RADICAL MASTECTOMY      IR INSERT TUNL CVC W PORT OVER 5 YEARS  1/16/2019    NEUROLOGICAL PROCEDURE UNLISTED      Cervical fusion       Social History     Tobacco Use    Smoking status: Never Smoker    Smokeless tobacco: Never Used   Substance Use Topics    Alcohol use: No       Allergies   Allergen Reactions    Adhesive Other (comments)     blisters       Current Outpatient Medications   Medication Sig    digoxin (LANOXIN) 0.125 mg tablet Take 1 Tab by mouth daily.  losartan (COZAAR) 50 mg tablet Take 1 Tab by mouth daily.  furosemide (LASIX) 40 mg tablet 1 tab po daily    carvedilol (COREG) 12.5 mg tablet Take 1 Tab by mouth two (2) times daily (with meals).  apixaban (ELIQUIS) 5 mg tablet 2 tabs po BID for 6 days then 1 tab po BID until discontinued    budesonide-formoterol (SYMBICORT) 160-4.5 mcg/actuation HFAA Take 2 Puffs by inhalation two (2) times a day.  tiotropium (SPIRIVA) 18 mcg inhalation capsule Take 1 Cap by inhalation daily.     albuterol-ipratropium (DUO-NEB) 2.5 mg-0.5 mg/3 ml nebu 3 mL by Nebulization route every six (6) hours as needed (wheezing or sob). File under Medicare Part B, ICD codes J44.9, C78.01    umeclidinium (INCRUSE ELLIPTA) 62.5 mcg/actuation inhaler Take 1 Puff by inhalation daily.  DULoxetine (CYMBALTA) 30 mg capsule Take 30 mg by mouth daily.  multivitamin (ONE A DAY) tablet Take 1 Tab by mouth daily.  b complex vitamins (B COMPLEX 1) tablet Take 1 Tab by mouth daily.  cyanocobalamin 1,000 mcg tablet Take 3,000 mcg by mouth daily.  plant stanol eliezer (CHOLEST OFF PO) Take 1 Tab by mouth daily.  benzonatate (TESSALON PERLES) 100 mg capsule Take 100 mg by mouth three (3) times daily as needed for Cough.  Biotin 2,500 mcg cap Take 1 Cap by mouth daily.  melatonin 10 mg tab Take 1 Tab by mouth nightly.  montelukast (SINGULAIR) 10 mg tablet Take 1 Tab by mouth daily.  cholecalciferol, vitamin D3, 2,000 unit tab Take 1 Tab by mouth daily.  raloxifene (EVISTA) 60 mg tablet Take 60 mg by mouth daily.  mometasone (NASONEX) 50 mcg/actuation nasal spray 2 Sprays by Both Nostrils route daily as needed. Indications: inflammation of the nose due to an allergy    naloxone (NARCAN) 0.4 mg/mL injection 1 mL by IntraVENous route as needed for Other (Give if breaths per minute  less than 10, may repeat dose in 15 min and contact MD). No current facility-administered medications for this visit. Visit Vitals  /54   Pulse (!) 106   Ht 5' 5\" (1.651 m)   Wt 79.9 kg (176 lb 3.2 oz)   BMI 29.32 kg/m²         Physical Exam   Constitutional: She is oriented to person, place, and time. She appears well-developed and well-nourished. HENT:   Head: Normocephalic and atraumatic. Eyes: Conjunctivae are normal.   Neck: Neck supple. No JVD present. No tracheal deviation present. No thyromegaly present. Cardiovascular: Normal rate and regular rhythm. PMI is not displaced.  Exam reveals no gallop, no S3 and no decreased pulses. Murmur heard. High-pitched blowing holosystolic murmur is present with a grade of 2/6 at the apex. Pulmonary/Chest: No respiratory distress. She has wheezes. She has no rales. She exhibits no tenderness. Abdominal: Soft. There is no tenderness. Musculoskeletal: She exhibits no edema. Neurological: She is alert and oriented to person, place, and time. Skin: Skin is warm. Psychiatric: She has a normal mood and affect. Ms. Ania Downing has a reminder for a \"due or due soon\" health maintenance. I have asked that she contact her primary care provider for follow-up on this health maintenance. No flowsheet data found. SUMMARY:echo:6/2014  Left ventricle: The ventricle was mildly dilated. Systolic function was  normal. Ejection fraction was estimated in the range of 50 % to 55 %. There were no regional wall motion abnormalities. Doppler parameters were  consistent with abnormal left ventricular relaxation (grade 1 diastolic  dysfunction). Left atrium: The atrium was mildly dilated. Mitral valve: There was systolic bowing of the posterior leaflet, but  without diagnostic evidence for prolapse. There was mild to moderate  regurgitation. SUMMARY:echo:12/2014  Left ventricle: Systolic function was at the lower limits of normal.  Ejection fraction was estimated to be 53 %. There were no regional wall  motion abnormalities. Doppler parameters were consistent with abnormal  left ventricular relaxation (grade 1 diastolic dysfunction). Right ventricle: A pacing wire was present. Mitral valve: There was systolic bowing of the posterior leaflet, but  without diagnostic evidence for prolapse. There was mild regurgitation. Tricuspid valve: Pulmonary artery systolic pressure: 22 mmHg. 12/2015:echo    SUMMARY:  Left ventricle: Systolic function was normal. Ejection fraction was  estimated in the range of 50 % to 55 %. There was mild diffuse  hypokinesis.  Doppler parameters were consistent with abnormal left  ventricular relaxation (grade 1 diastolic dysfunction). Mitral valve: There was systolic bowing of the anterior and posterior  leaflets, but without diagnostic evidence for prolapse. There was mild  regurgitation. Tricuspid valve: There was mild regurgitation. Tricuspid regurgitation  peak velocity: 2.3 m/sec. Pulmonary artery systolic pressure: 25 mmHg. pft-6/2017  Maximal Mid Expiratory Flow rate is reduced to 43 % predicted  Forced Expiratory Volume in one second is reduced to 69 % predicted  FEV 1% is reduced     Volumes: All Volumes are reduced except for RV    Flow Volume Loop:  Nonspecific obstructive pattern in Flow Volume Loop    Bronchodilator:  No significant improvement with bronchodilator therapy    Diffusion:  Abnormal Diffusion Capacity reduced to 62 % predicted  DLCO normalizes to alveolar ventilation    Impression:  Moderate obstructive defect, Predominately small airways, Mild restrictive ventilatory defect, Reduced diffusion capacity indicating a decrease in alveolar surface area for gas exchange  I Have personally reviewed recent relevant labs available and discussed with patient    Interpretation Summary -6/2018  · Calculated left ventricular ejection fraction is 55%. Left ventricular mild concentric hypertrophy observed. Mild (grade 1) left ventricular diastolic dysfunction. · Left atrial cavity size is mildly dilated. · There was systolic bowing of the anterior and posterior leaflets, but without diagnostic evidence for prolapse. Mild mitral valve regurgitation. · Mild tricuspid valve regurgitation is present. Pulmonary arterial systolic pressure is 18 mmHg. · Mild pulmonic valve regurgitation is present. · Small pericardial effusion. Interpretation Summary 12/2018    · Left ventricular low normal systolic function. Estimated left ventricular ejection fraction is 51 - 55%. Visually measured ejection fraction.  Normal left ventricular wall motion, no regional wall motion abnormality noted. Moderate (grade 2) left ventricular diastolic dysfunction. · There is no evidence of pulmonary hypertension. · Mild to moderate mitral valve regurgitation. · Left atrial cavity size is mildly dilated. Interpretation Summary 3/2019       · Normal cavity size, wall thickness and diastolic function. There is low normal systolic function. The estimated ejection fraction is 51 - 55%. No regional wall motion abnormality noted. Global longitudinal strain is -13.00%. Abnormal left ventricular strain. · Normal right ventricular size and function. Pacing wire present  · Mild to moderate mitral valve regurgitation. Mild prolapse of the posterior leaflet within the mitral valve. · Mild pulmonic valve regurgitation is present. · Mild tricuspid valve regurgitation. Pulmonary arterial systolic pressure is 24.7 mmHg. Mild pulmonary hypertension. 5/2019    · Left Ventricle: Mild concentric hypertrophy. Severe global systolic dysfunction. Estimated left ventricular ejection fraction is 26 - 30%. Biplane method used to measure ejection fraction. Left ventricular global hypokinesis. · IVC/Hepatic Veins: Severely elevated central venous pressure (15+ mmHg); IVC diameter is larger than 21 mm and collapses less than 50% with respiration. · Pulmonary Artery: Mild pulmonary hypertension. Pulmonary arterial systolic pressure is 44 mmHg. · Pericardium: Trivial-to-small circumferential pericardial effusion measuring 10 mm. · Mitral Valve: Moderate mitral valve regurgitation. · Right Ventricle: Pacing wire present   Interpretation Summary 5/2019    · Acute occlusive thrombus present in the right peroneal vein. IMPRESSION: 5/2019     1. Positive examination for pulmonary emboli.   - There is likely a combination of acute and subacute PE in the lower lobe  segmental and subsegmental branch vessels. Assessment         ICD-10-CM ICD-9-CM    1.  Acute on chronic systolic congestive heart failure (HCC) I50.23 428.23 ECHO ADULT FOLLOW-UP OR LIMITED     428.0     CHF is compensated fluid overload stable continue treatment   2. Nonischemic cardiomyopathy (HCC) I42.8 425.4     Low EF monitor continue Coreg and lisinopril with digoxin   3. Automatic implantable cardioverter-defibrillator in situ Z95.810 V45.02     Normal function. Fluid status stable   4. Malignant neoplasm of upper-outer quadrant of left female breast, unspecified estrogen receptor status (HCC) C50.412 174.4     On treatment   Tounge swelling not related to lisinopril as she had swelling even after stopping lisinopril  7/2018  I will hold Lasix as recent decrease in renal function. No clinical sign of fluid overload. 10/2018  Shortness of breath due to acute bronchitis bilateral wheezing. Will refer back to pulmonary. No sign of fluid overload. Will keep off Lasix  1/2019  Metastatic breast cancer now back on chemotherapy. Lung metastasis likely cause for shortness of breath which is improving. Low blood pressure will reduce Coreg to 6.25 twice a day. Continue lisinopril. Recheck echo in 2 months on chemotherapy  4/2019  Cardiac status stable. Echo EF remains stable continue Coreg and lisinopril. Check echo in 3 months. Patient remains on chemotherapy    5/2019  Recent admission with increased shortness of breath combination of pulmonary embolism and decompensated systolic heart failure with worsening ejection fraction. Class III CHF. 10 pound weight loss from peak. Continue current therapy. Continue anticoagulation. Follow-up 3 weeks  5/30/2019  Fluid overload stable. Remains weak. Follow-up echo in about a month to reassess LV function. No orders of the defined types were placed in this encounter. Follow-up and Dispositions    · Return in about 6 weeks (around 7/11/2019).

## 2019-05-30 NOTE — PROGRESS NOTES
1. Have you been to the ER, urgent care clinic since your last visit? Hospitalized since your last visit?     no    2. Have you seen or consulted any other health care providers outside of the [address] [place] [address] since your last visit? Include any pap smears or colon screening.       Yes When: x 1 week ago Reason for visit: oncologist

## 2019-05-31 ENCOUNTER — PATIENT OUTREACH (OUTPATIENT)
Dept: CARDIOLOGY CLINIC | Age: 69
End: 2019-05-31

## 2019-05-31 NOTE — PROGRESS NOTES
Patient attended appointments with her cardiologist, Dr Norma Swenson. on 5/0/19, Nurse Navigator reviewed EMR and will follow up on next scheduled outreach.

## 2019-06-04 ENCOUNTER — PATIENT OUTREACH (OUTPATIENT)
Dept: CARDIOLOGY CLINIC | Age: 69
End: 2019-06-04

## 2019-06-04 NOTE — PROGRESS NOTES
NN contacted the patient by telephone to perform CHF follow Up. Spoke to her  Elle Lopez. Noted Priorities/Plan:  Return to baseline, daily weights     Daily Weight: Last weight was 176 lbs on clinic appt on 5/30/19. Pt weights 174 lbs today. Zone: green     Signs/Symptoms: Edema none; SOB none; orthopnea none. Pt's  states pt is doing well. Goals      Prepare patients and caregivers for end of life decisions (ie. need for hospice, pain management, symptom relief, advance directives etc.)      Patient needs to start preparing for possible end of life decisions    Needs to complete advance directive        Prevent complications post hospitalization. High risk for readmission dut to CHF and COPD - along with Met Breast CA. Stressed importance of daily weights   Keep regular follow up's   Take meds as directed                 Needs addressed from pathway:   Week 1-4   Provide Daily Disease Management  (NN initiated)  ? Reviewed importance of Daily weight (Before Breakfast-  Reviewed Daily Zone Identification (symptom management; weight, edema, SOB, activity/sleep changes)-notify provider immediately as indicated  ? Reviewed Cardiac Low-sodium Diet and include carbohydrate controlled diet education  ? Reviewed importance of Fluid restriction  ? Comorbidity Management  ? Confirmed follow-up appointments/transportation. Additional assessment   ? Reinforced Fall precautions    ? Activity tolerance assessment: pt's  reports almost to baseline  ? Reviewed Energy conservation management and balancing activity with rest  ? Labs/diagnostics per MD office  ? Medication Therapy: no issues identified  ? Diet/appetite assessment: pt reports no issues  ? Reviewed appropriate ED/Hospital utilization  ? Immunizations up to date        Pneumonia       Flu  ? Home assessment/modifications: no needs identified  ? Pt denies transportation assistance needs  ?  Pt denies medication assistance needs  ? Home health services are in place     Psychosocial: Reassurance/emotional support     Monitoring:  ? Home health  ? H2H  ? Tele-monitoring  ? Cardiac device monitoring  ? My Chart    Education:  ? Reviewed Advanced care planning status   ? Support system identification ( eg: Caregiver, meal planning, community resources, family, friends, Rastafarian, support group)   ? Health literacy for heart failure  ? Caregiver education and preparation       Have you been to an ER/Hospital since discharge from [facility]?   No       Have you followed up with PCP/Cardiologist/Specialist?  5/9/19 Card appt w/ JOYA Tillman.  5/30/19 Card appt w/ TOMMY Tillman Upcoming  6/25/19 Pulm appt w/ SUJATA Tillman.  7/22/19 Card appt w/ Jacek Tillman      Transportation: [employer]  Diet: low salt  Activity/ADLs: independent. Medications Reconciled at this time:   declined. No changes since last clinic appt. Home health:  Company/Completion: none  Social Support: Family     Advanced Care Plan: not on file,  educated    Patient reminded that there is a physician on call 24 hours a day / 7 days a week (M-F 5pm to 8am and from Friday 5pm until Monday 8a for the weekend) should the patient have questions or concerns. Patient reminded to call 911 if situation is emergent or patient feels the situation is emergent. Pt verbalizes understanding.

## 2019-06-11 ENCOUNTER — PATIENT OUTREACH (OUTPATIENT)
Dept: CARDIOLOGY CLINIC | Age: 69
End: 2019-06-11

## 2019-06-18 ENCOUNTER — PATIENT OUTREACH (OUTPATIENT)
Dept: CARDIOLOGY CLINIC | Age: 69
End: 2019-06-18

## 2019-06-18 NOTE — PROGRESS NOTES
NN contacted the patient by telephone to perform CHF follow Up. Noted Priorities/Plan:  Return to baseline, daily weights     Daily Weight: Last weight was 174 lbs on last outreach on 6/4/19. Pt states she hasn't weighed herself today. Educated on importance of daily weights. Pt stated understanding. Zone: green     Signs/Symptoms: Edema none; SOB none; orthopnea none. Pt reports feeling well and no issues at this time. Goals      Prepare patients and caregivers for end of life decisions (ie. need for hospice, pain management, symptom relief, advance directives etc.)      Patient needs to start preparing for possible end of life decisions    Needs to complete advance directive        Prevent complications post hospitalization. High risk for readmission dut to CHF and COPD - along with Met Breast CA. Stressed importance of daily weights   Keep regular follow up's   Take meds as directed               Needs addressed from pathway:   Week 5-8   Provide Daily Disease Management (patient/caregiver initiated)  ? Reviewed and reinforced importance of Daily weight (Before Breakfast  ? Reviewed Daily Zone Identification (symptom management; weight, edema, SOB, activity/sleep changes)-notify provider immediately as indicated  ? Cardiac Low-sodium Diet (No added sodium; 1500mg as indicated)   ? Reviewed importance of Fluid restriction  ? Comorbidity Management  ? Confirmed follow-up appointments/transportation. Additional Assessments  ? Reinforced Fall precautions    ? Activity tolerance assessment: pt reports at baseline  ? Reviewed Energy conservation management and balancing activity w/ rest  ? Labs/diagnostics per MD office  ? Medication Therapy: no issues identified  ? Diet/appetite assessment: no issues noted  ? Reviewed appropriate ED/Hospital utilization  ? Immunizations up to date         Pneumonia        Flu  ?  Home re-assessment/modifications: no needs identified    Psychosocial: Reassurance and emotional support; depression screening. Monitoring:  ? My Chart    Education:  ? Advanced Care Planning status reviewed  ? Patient/Caregiver verifies support systems (meal planning, medication and transportation needs, community resources)  ? Health literacy for heart failure         Have you been to an ER/Hospital since discharge from [facility]?   No       Have you followed up with PCP/Cardiologist/Specialist?  5/9/19 Card appt w/ JOYA Gordillo.  5/30/19 Card appt w/ JOYA Gordillo. Upcoming  6/25/19 Pulm appt w/ Dr Quinton Graff, V.  7/22/19 Card appt w/ Michele Gordillo     Transportation: [employer]  Diet: low salt  Activity/ADLs: independent. Medications Reconciled at this time: pt declined. No changes since last clinic appt. Home health:  Company/Completion: none  Social Support: Family     Advanced Care Plan: not on file,  educated    Patient reminded that there is a physician on call 24 hours a day / 7 days a week (M-F 5pm to 8am and from Friday 5pm until Monday 8a for the weekend) should the patient have questions or concerns. Patient reminded to call 911 if situation is emergent or patient feels the situation is emergent. Pt verbalizes understanding.

## 2019-06-25 ENCOUNTER — OFFICE VISIT (OUTPATIENT)
Dept: PULMONOLOGY | Age: 69
End: 2019-06-25

## 2019-06-25 ENCOUNTER — PATIENT OUTREACH (OUTPATIENT)
Dept: CARDIOLOGY CLINIC | Age: 69
End: 2019-06-25

## 2019-06-25 VITALS
OXYGEN SATURATION: 97 % | HEIGHT: 65 IN | HEART RATE: 108 BPM | TEMPERATURE: 97.8 F | RESPIRATION RATE: 18 BRPM | WEIGHT: 174 LBS | DIASTOLIC BLOOD PRESSURE: 60 MMHG | SYSTOLIC BLOOD PRESSURE: 100 MMHG | BODY MASS INDEX: 28.99 KG/M2

## 2019-06-25 DIAGNOSIS — C78.01 MALIGNANT NEOPLASM METASTATIC TO RIGHT LUNG (HCC): ICD-10-CM

## 2019-06-25 DIAGNOSIS — I42.0 DILATED CARDIOMYOPATHY (HCC): ICD-10-CM

## 2019-06-25 DIAGNOSIS — I26.99 OTHER ACUTE PULMONARY EMBOLISM WITHOUT ACUTE COR PULMONALE (HCC): Primary | ICD-10-CM

## 2019-06-25 DIAGNOSIS — Z95.810 AUTOMATIC IMPLANTABLE CARDIOVERTER-DEFIBRILLATOR IN SITU: ICD-10-CM

## 2019-06-25 DIAGNOSIS — J44.9 CHRONIC ASTHMATIC BRONCHITIS (HCC): ICD-10-CM

## 2019-06-25 DIAGNOSIS — C50.912 ADENOCARCINOMA OF LEFT BREAST (HCC): ICD-10-CM

## 2019-06-25 NOTE — PROGRESS NOTES
Patient attended appointments with her pulmonologist, SUJATA Mills. on 6/25/19, Nurse Navigator reviewed EMR and will follow up on next scheduled outreach.

## 2019-06-25 NOTE — PROGRESS NOTES
[facility]  Pulmonary, Critical Care, and Sleep Medicine      Pulmonary Office visit    Name: Dina Seth     : 1950     Date: 2019        Subjective:       19   Patient was admitted with increasing SOB , decrease in exercise tolerance in May 2019. She was diagnosed with acute systolic Heart failure with EF,30% , heart failure associated with chemotherapy. And  Acute PE ( Acute DVT )   - improved with short course of Steroids and BD along with heart failure management with lasix , BB, ARB . Patient discharged on Eliquis . She has since had adjustments in her medications and continues to receive her chemotherapy. She is maintaining dry weight but states that she feels extremely fatigued and wiped out all the time. She is scheduled for a follow-up PET scan and echocardiogram in the next month    Background history   metastatic breast Ca- recurrence with Lung mets- parenchymal and endobronchial.   She has shown good response to treatment which was recently validated with a PET CT.      HPI:  Patient is a 78 y.o. female has been experiencing SOB for past 1 year. SOB:   Symptoms occur with exertion and at night. Able to walk about about 3-4  Blocks. Cannot climb stairs. Activities of daily living are affected and improves with pacing. Has orthopnea/ paroxysmal nocturnal dyspnea  SOB relieved with pacing and resting. C/o sinus congestion. Denies any significant Cough:  Has some wheezing, no chest pain, fever, chills, night sweats dyspepsia, reflux. Has had AICD placed and replaced. Left mastectomy with chemo- radiation : 5 years back  Weight gain of 50 lbs over few years.   Comorbid conditions include- DM, HTN, CHF- nonischemic cardiomyopathy, Breast ca- treated: s/p mastectomy -L and chemo radiation  Non smoker  Occupational exposure-none    Past Medical History:   Diagnosis Date    Abnormal nuclear cardiac imaging test 2010    Lg fixed anterior, septal, apical, inferoseptal defect sugg RCA & LAD disease or cardiomyopathy. EF 20%. Global hykn. Nondiagnostic EKG.  Acute bronchitis 10/15/2018    Persistent cough and wheezing in spite of prednisone and antibiotic. Will refer to pulmonary    Adenocarcinoma of breast; locally advanced invasive ductal adenocarcinoma of left breast     Asthma     Automatic implantable cardiac defibrillator in situ     Automatic implantable cardiac defibrillator in situ     [facility] [address]     Cancer ([facility] [address]     breast cancer left    Chemotherapy convalescence or palliative care 2012    Chronic combined systolic and diastolic heart failure (HCC)     Remains symptomatic, nyha class 3 ef improving.  Congestive heart failure, unspecified     chronic class ll    Echocardiogram 09/27/2010    EF 30%. Global hykn. Mild MR. Mild TR.  GERD (gastroesophageal reflux disease)     Hyperlipidemia     Hypertension     Mitral valve disorders(424.0) 1/15/2014    mild to moderate mr     Mitral valve disorders(424.0) 1/15/2014    mild to moderate mr     MVP (mitral valve prolapse)     Nonischemic cardiomyopathy (HCC)     EF 20-30% despite medical therapy    Nonspecific abnormal electrocardiogram (ECG) (EKG)     Osteopenia     Other primary cardiomyopathies     Pacemaker 10/27/10    s/p implantation, without complication    Poisoning by other and unspecified agents primarily affecting the cardiovascular system(972.9)     Ef slightly better to 45%, STABLE back on chemo.  Radiation therapy     Radiation therapy complication 6492    S/P cardiac catheterization 07/08/10    Patent coronary arteries. LVEDP 25.  EF 25%. Global hykn. Moderate MR.  RA 10.  RV 40/10. PA 40/20.  20.  CO/CI 4.5/2.45 (Larry).     Shortness of breath     Syncope and collapse     Thyroid disease     goiter     Past Surgical History:   Procedure Laterality Date    CARDIAC SURG PROCEDURE UNLIST      Difibrillator; BI V ICD    HX MASTECTOMY 09/28/11    modified radical mastectomy and axillary dissection    HX ORTHOPAEDIC      HX OTHER SURGICAL      surgery to remove part of liver    HX PACEMAKER  10/27/10    s/p [employer] biventricular AICD, without complication    HX RADICAL MASTECTOMY      IR INSERT TUNL CVC W PORT OVER 5 YEARS  1/16/2019    NEUROLOGICAL PROCEDURE UNLISTED      Cervical fusion       Allergies   Allergen Reactions    Adhesive Other (comments)     blisters     . Current Outpatient Medications   Medication Sig Dispense Refill    digoxin (LANOXIN) 0.125 mg tablet Take 1 Tab by mouth daily. 90 Tab 3    losartan (COZAAR) 50 mg tablet Take 1 Tab by mouth daily. 90 Tab 3    furosemide (LASIX) 40 mg tablet 1 tab po daily 90 Tab 3    mometasone (NASONEX) 50 mcg/actuation nasal spray 2 Sprays by Both Nostrils route daily as needed. Indications: inflammation of the nose due to an allergy      carvedilol (COREG) 12.5 mg tablet Take 1 Tab by mouth two (2) times daily (with meals). 60 Tab 0    apixaban (ELIQUIS) 5 mg tablet 2 tabs po BID for 6 days then 1 tab po BID until discontinued 60 Tab 0    budesonide-formoterol (SYMBICORT) 160-4.5 mcg/actuation HFAA Take 2 Puffs by inhalation two (2) times a day. 1 Inhaler 0    naloxone (NARCAN) 0.4 mg/mL injection 1 mL by IntraVENous route as needed for Other (Give if breaths per minute  less than 10, may repeat dose in 15 min and contact MD). 1 mL 0    tiotropium (SPIRIVA) 18 mcg inhalation capsule Take 1 Cap by inhalation daily. 30 Cap 0    albuterol-ipratropium (DUO-NEB) 2.5 mg-0.5 mg/3 ml nebu 3 mL by Nebulization route every six (6) hours as needed (wheezing or sob). File under Medicare Part B, ICD codes J44.9, C78.01 120 Nebule 3    umeclidinium (INCRUSE ELLIPTA) 62.5 mcg/actuation inhaler Take 1 Puff by inhalation daily. 3 Inhaler 0    DULoxetine (CYMBALTA) 30 mg capsule Take 30 mg by mouth daily.  multivitamin (ONE A DAY) tablet Take 1 Tab by mouth daily.       b complex vitamins (B COMPLEX 1) tablet Take 1 Tab by mouth daily.  cyanocobalamin 1,000 mcg tablet Take 3,000 mcg by mouth daily.  plant stanol eliezer (CHOLEST OFF PO) Take 1 Tab by mouth daily.  benzonatate (TESSALON PERLES) 100 mg capsule Take 100 mg by mouth three (3) times daily as needed for Cough.  Biotin 2,500 mcg cap Take 1 Cap by mouth daily.  melatonin 10 mg tab Take 1 Tab by mouth nightly.  montelukast (SINGULAIR) 10 mg tablet Take 1 Tab by mouth daily. 90 Tab 3    cholecalciferol, vitamin D3, 2,000 unit tab Take 1 Tab by mouth daily.  raloxifene (EVISTA) 60 mg tablet Take 60 mg by mouth daily.        Review of Systems:  HEENT: No epistaxis, +nasal drainage, + itching- throat,no difficulty in swallowing, no redness in eyes  Respiratory: as above  Cardiovascular: no chest pain, no palpitations, no chronic leg edema, no syncope  Gastrointestinal: no abd pain, no vomiting, no diarrhea, no bleeding symptoms  Genitourinary: No urinary symptoms or hematuria  Integument/breast: No ulcers or rashes  Musculoskeletal:Neg  Neurological: No focal weakness, no seizures, no headaches  Behvioral/Psych: No anxiety, no depression  Constitutional: No fever, no chills, no weight loss, no night sweats     Objective:     Visit Vitals  /60 (BP 1 Location: Left arm, BP Patient Position: Sitting)   Pulse (!) 108   Temp 97.8 °F (36.6 °C)   Resp 18   Ht 5' 5\" (1.651 m)   Wt 78.9 kg (174 lb)   SpO2 97%   BMI 28.96 kg/m²        Physical Exam:   General: comfortable, no acute distress  HEENT: pupils reactive, sclera anicteric, EOM intact  Neck: No adenopathy or thyroid swelling, no lymphadenopathy or JVD, supple  Chest: multiple scars from previous surgery, surgically absent left breast  CVS: S1S2  RS: Mod AE bilaterally, no tactile fremitus or egophony, no accessory muscle use, faint crackles, no wheezing  Abd: soft, non tender, no hepatosplenomegaly  Neuro: non focal, awake, alert  Extrm: no leg edema, clubbing or cyanosis  Skin: no rash    Data review:   Pertinent labs: CBC, BMP, LFT's    PFT:    Date FVC FEV1  FEV1/FVC [id number] TLC RV RV/TLC VC DLCO   6/29/2017 75 69 67 43 77 81 nl  62   11/2018  46  35     49                           FLOWS:  Maximal Mid Expiratory Flow rate is reduced to 43 % predicted  Forced Expiratory Volume in one second is reduced to 69 % predicted  FEV 1% is reduced   Volumes: All Volumes are reduced except for RV  Flow Volume Loop:  Nonspecific obstructive pattern in Flow Volume Loop  Bronchodilator:  No significant improvement with bronchodilator therapy  Diffusion:  Abnormal Diffusion Capacity reduced to 62 % predicted  DLCO normalizes to alveolar ventilation  Impression:  Moderate obstructive defect, Predominately small airways, Mild restrictive ventilatory defect, Reduced diffusion capacity indicating a decrease in alveolar surface area for gas exchange    6 min walk test;walked 330 feet with no O2 desaturation or significant heart rate change. DI- stable  05/03/19   ECHO ADULT FOLLOW-UP OR LIMITED 05/03/2019 5/3/2019    Narrative · Left Ventricle: Mild concentric hypertrophy. Severe global systolic   dysfunction. Estimated left ventricular ejection fraction is 26 - 30%. Biplane method used to measure ejection fraction. Left ventricular global   hypokinesis. · IVC/Hepatic Veins: Severely elevated central venous pressure (15+ mmHg);   IVC diameter is larger than 21 mm and collapses less than 50% with   respiration. · Pulmonary Artery: Mild pulmonary hypertension. Pulmonary arterial   systolic pressure is 44 mmHg. · Pericardium: Trivial-to-small circumferential pericardial effusion   measuring 10 mm. · Mitral Valve: Moderate mitral valve regurgitation. · Right Ventricle: Pacing wire present        Signed by: Cruz Holden MD     Echo 3/2019:  · Normal cavity size, wall thickness and diastolic function. There is low normal systolic function.  The estimated ejection fraction is 51 - 55%. No regional wall motion abnormality noted. Global longitudinal strain is -13.00%. Abnormal left ventricular strain. · Normal right ventricular size and function. Pacing wire present  · Mild to moderate mitral valve regurgitation. Mild prolapse of the posterior leaflet within the mitral valve. · Mild pulmonic valve regurgitation is present. · Mild tricuspid valve regurgitation. Pulmonary arterial systolic pressure is 34.7 mmHg. Mild pulmonary hypertension. Echo: 1/18/2017:  Left ventricle: Systolic function was normal. Ejection fraction was  estimated to be 50 %. There were no regional wall motion abnormalities. Doppler parameters were consistent with abnormal left ventricular  relaxation (grade 1 diastolic dysfunction). Right ventricle: The size was normal. Systolic function was reduced. Left atrium: Size was normal.  Right atrium: Size was normal.  Mitral valve: There was systolic bowing of the anterior and posterior  leaflets, but without diagnostic evidence for prolapse. There was mild  regurgitation. Aortic valve: The valve was trileaflet. Leaflets exhibited normal  thickness and normal cuspal separation. Tricuspid valve: Pulmonary artery systolic pressure: 18 mmHg. Pulmonic valve: There was mild regurgitation. Imaging:  I have personally reviewed the patients radiographs and have reviewed the reports:  CT Results (most recent):  Results from Hospital Encounter encounter on 05/02/19   CTA CHEST W OR W WO CONT    Narrative CT Chest Pulmonary Embolism Protocol     CPT CODE: [id number]    INDICATION: Patient with history of breast cancer and shortness of breath. History of metastatic disease to the chest.  Question pulmonary embolism.     TECHNIQUE: Thin collimation axial images obtained through the level of the  pulmonary arteries with additional imaging through the chest following the  uneventful administration of nonionic intravenous contrast.  Images  reconstructed into coronal and sagittal maximum intensity projections for better  evaluation of the tortuous and overlapping pulmonary vascular structures and to  reduce patient radiation dose. The patient received 70 cc of Isovue-370. All CT  scans are performed using dose optimization techniques as appropriate to the  performed exam including the following: Automated exposure control, adjustment  of mA and/or kV according to patient size, and use of iterative reconstructive  technique. COMPARISON: 2/10/2019; PET/CT report 3/22/2019. FINDINGS:    Limited by streak artifact from the cardiac device of the anterior right chest  wall. There are filling defects seen in right lower lobe segmental branch vessels on  image 116, 105 and subsegmental branches on image 134. Subsegmental filling  defects of the lingula on image 110. Hazy density in the left main pulmonary  artery likely streak artifact, no definite large central embolus. There is reflux of contrast into the intrahepatic IVC. There is no deviation of  the septum. The left ventricular to right ventricular ratio is less than 0.9    No aneurysmal dilatation of the thoracic aorta. Limited assessment but no  obvious finding for dissection. Mabeline Sink Heart size is within normal limits. No  pericardial effusion. Lymph nodes limited in visualization from the streak artifacts. Right  paratracheal nodes appear relatively similar in size from PET/CT. Right hilar  node measures 2.8 x 1.6 cm not significantly changed on a comparable image. Subcarinal lymph node measures 1.2 cm in short axis, increased from 2/2019 where  it measured 0.7 cm. Right upper lung nodule on image 43 measures 4.8 mm stable. Left upper lung  nodule on image 43 measures 2.4 mm, stable. 3 mm right upper lung nodule stable. Right upper lung nodule on image 76 3.5 mm stable. Secretions in the right lower  lobe bronchus possible secretions in the superior segment right lower lobe  bronchus.   Infrahilar nodule on the right measures 1.6 x 1.0 cm, image 24,  previously 1.1 x 1.1 cm. .      Small bilateral pleural effusions new from 3/2019. No pneumothorax. Visualized upper abdominal structures are without gross abnormality. Partial imaging of hardware in the cervical spine. Left-sided Mediport. Stable  sclerosis of the manubrium. Superior endplate compression fracture deformity of  T11 grossly unchanged from PET/CT. Impression IMPRESSION:    1. Positive examination for pulmonary emboli.   - There is likely a combination of acute and subacute PE in the lower lobe  segmental and subsegmental branch vessels. - Limited evaluation of the upper lobe branch vessels due to streak artifact.  -Called to Alma Renee NP on 5/2/19 at 1310 hours. 2. No CT evidence of right heart strain. 3. There is reflux of contrast into the intrahepatic IVC with flow mixing  artifact likely. Intrahepatic thrombus not entirely excluded. May benefit from  ultrasound correlation. 4. New small bilateral pleural effusions. 5. Grossly unchanged mediastinal lymph nodes. 6. Possible enlarging right infrahilar lymph node versus developing lung nodule. PET-CT 3/22/2019:  IMPRESSION:  1. Significant interval treatment response compared 77/87  -Hypermetabolic tumor has resolved  -Some small lung nodules remain, nonspecific (no worsening of lung nodules)  -Stable blastic manubrial metastasis  2. Mild superior endplate compression fracture T11 which is interval new and is  probably benign/osteoporotic.  -May have a few millimeter retropulsed bone but no osseous central spinal  stenosis is evident     Incidental findings:  -Gallstone  -Trace pericardial effusion.  Cardiomegaly  -Fibroid uterus    CT Results (most recent):  Results from Hospital Encounter encounter on 05/02/19   CTA CHEST W OR W WO CONT    Narrative CT Chest Pulmonary Embolism Protocol     CPT CODE: 18742    INDICATION: Patient with history of breast cancer and shortness of breath. History of metastatic disease to the chest.  Question pulmonary embolism. TECHNIQUE: Thin collimation axial images obtained through the level of the  pulmonary arteries with additional imaging through the chest following the  uneventful administration of nonionic intravenous contrast.  Images  reconstructed into coronal and sagittal maximum intensity projections for better  evaluation of the tortuous and overlapping pulmonary vascular structures and to  reduce patient radiation dose. The patient received 70 cc of Isovue-370. All CT  scans are performed using dose optimization techniques as appropriate to the  performed exam including the following: Automated exposure control, adjustment  of mA and/or kV according to patient size, and use of iterative reconstructive  technique. COMPARISON: 2/10/2019; PET/CT report 3/22/2019. FINDINGS:    Limited by streak artifact from the cardiac device of the anterior right chest  wall. There are filling defects seen in right lower lobe segmental branch vessels on  image 116, 105 and subsegmental branches on image 134. Subsegmental filling  defects of the lingula on image 110. Hazy density in the left main pulmonary  artery likely streak artifact, no definite large central embolus. There is reflux of contrast into the intrahepatic IVC. There is no deviation of  the septum. The left ventricular to right ventricular ratio is less than 0.9    No aneurysmal dilatation of the thoracic aorta. Limited assessment but no  obvious finding for dissection. Oris Hidalgo Heart size is within normal limits. No  pericardial effusion. Lymph nodes limited in visualization from the streak artifacts. Right  paratracheal nodes appear relatively similar in size from PET/CT. Right hilar  node measures 2.8 x 1.6 cm not significantly changed on a comparable image.   Subcarinal lymph node measures 1.2 cm in short axis, increased from 2/2019 where  it measured 0.7 cm.    Right upper lung nodule on image 43 measures 4.8 mm stable. Left upper lung  nodule on image 43 measures 2.4 mm, stable. 3 mm right upper lung nodule stable. Right upper lung nodule on image 76 3.5 mm stable. Secretions in the right lower  lobe bronchus possible secretions in the superior segment right lower lobe  bronchus. Infrahilar nodule on the right measures 1.6 x 1.0 cm, image 24,  previously 1.1 x 1.1 cm. .      Small bilateral pleural effusions new from 3/2019. No pneumothorax. Visualized upper abdominal structures are without gross abnormality. Partial imaging of hardware in the cervical spine. Left-sided Mediport. Stable  sclerosis of the manubrium. Superior endplate compression fracture deformity of  T11 grossly unchanged from PET/CT. Impression IMPRESSION:    1. Positive examination for pulmonary emboli.   - There is likely a combination of acute and subacute PE in the lower lobe  segmental and subsegmental branch vessels. - Limited evaluation of the upper lobe branch vessels due to streak artifact.  -Called to Violette Elliott NP on 5/2/19 at 1310 hours. 2. No CT evidence of right heart strain. 3. There is reflux of contrast into the intrahepatic IVC with flow mixing  artifact likely. Intrahepatic thrombus not entirely excluded. May benefit from  ultrasound correlation. 4. New small bilateral pleural effusions. 5. Grossly unchanged mediastinal lymph nodes. 6. Possible enlarging right infrahilar lymph node versus developing lung nodule. XR Results (most recent):  Results from Hospital Encounter encounter on 05/03/19   XR CHEST PA LAT    Narrative History: Shortness of breath. COMPARISON: April 30, 2019. Frontal and lateral views of the chest.    FINDINGS:    ACDF stable. AICD stable. Telemetry leads noted. Port-A-Cath stable. Stable cardiac silhouette. Chronic osseous findings without definite acute osseous abnormality.     Small left pleural effusion, similar to prior exam with worsened adjacent  airspace consolidation. Slight worsening in bilateral interstitial markings. Blunting of the right costophrenic angle unchanged. Impression IMPRESSION:    Small left pleural effusion, similar to prior exam. Worsening bilateral  interstitial opacities and more confluent patchy opacity in the left lung base. Stable appearance of the right lung base and costophrenic angle. Please see report for multiple additional findings and further details. CXR 8/26/2014:    AICD. No change in lead placement. No visualized lead fracture. Lungs appear  unremarkable. Normal size heart. Impression: No acute findings. Patient Active Problem List   Diagnosis Code    Congestive heart failure (HCC) I50.9    Hypertension I10    MVP (mitral valve prolapse) I34.1    Nonischemic cardiomyopathy (HCC) I42.8    Cancer (HCC) C80.1    Adenocarcinoma of breast; locally advanced invasive ductal adenocarcinoma of left breast C50.919    Poisoning by other and unspecified agents primarily affecting the cardiovascular system(972.9) T46.904A    Syncope and collapse R55    Other primary cardiomyopathies I42.8    Shortness of breath R06.02    Nonspecific abnormal electrocardiogram (ECG) (EKG) R94.31    Automatic implantable cardioverter-defibrillator in situ Z95.810    Mitral valve disease I05.9    Abnormal PFT R94.2    Acute bronchitis J20.9    Chronic asthmatic bronchitis (HCC) J44.9    Malignant neoplasm metastatic to lung (HCC) C78.00    Seasonal allergic rhinitis due to pollen J30.1    Dilated cardiomyopathy (HCC) I42.0    Acute exacerbation of CHF (congestive heart failure) (HCC) I50.9    Breast cancer (HCC) C50.919    Pulmonary embolism (HCC) I26.99    Chronic bronchitis (HCC) J42    Hypoxia R09.02    Lung metastases (HCC) C78.00     IMPRESSION:   · Acute PE-insetting of metastatic CA.   On anticoagulation and will need lifelong  · Metastatic breast cancer to Lung with extensive endobronchial involvement. Clinical response to chemotherapy. Improved PET/CT findings and symptoms of wheezing shortness of breath and cough. · SOB on exertion - Carcinomatosis and  Endobronchial inflammation/obstruction from tumor load. -Further complicated by cardiomyopathy- improving with adjustments in medications  · Abnormal PFT- combined restrictive and Obstructive component-progression noted -Asthma with reduced DLCO ( corrects with Volume adjustment.) Suspect restrictive component from chest wall deformity. · Pulm HTN-new likely group 2 and 3  · H/o invasive ductal breast Ca  · H/o non ischemic cardiomyopathy- recent worsening LV function EF of 30%  · AICD      RECOMMENDATIONS:     · Will continue Incruse- longer acting bronchodilator. continue Symbicort  · Continue anticoagulation-Eliquis lifelong  · Continue optimizing cardiac medications for cardiomyopathy  · Will await further response to chemotherapy  · Continue treating obstructive airways component-will recheck pulmonary function tests after completion of treatment  · Continue nebulized bronchodilators PRN  · Continue singulair to address chronic rhinosinuitis component- Nasonex , add Allegra for the current pollen season  · GERD precautions  · Preventive vaccinations- recieved  · Monitor response to interventions and make appropriate adjustments  · Healthy weight and active lifestyle  ·  follow-up PET -Ct scan chest and echo periodically  · Will follow for progression of pulmonary hypertension, check oxygen saturation and provide supplemental oxygen if meets criteria  · Follow up in 4 months  · Discussed with patient and      Health maintenance screens deferred to Primary care provider.      Dipti Mullins MD

## 2019-06-25 NOTE — PROGRESS NOTES
The pt. Has no resp. C/os. Accompanied by her . Hospitalized in May for heart failure. CTA 5/2, CXR & Echo 5/3, DVT 5/6. She c/o weakness and fatigue; sleeps 16 hours. Her  claims it is due to chemo treatments.

## 2019-06-28 ENCOUNTER — HOSPITAL ENCOUNTER (OUTPATIENT)
Dept: PHYSICAL THERAPY | Age: 69
Discharge: HOME | End: 2019-06-28
Payer: MEDICARE

## 2019-06-28 PROCEDURE — 97162 PT EVAL MOD COMPLEX 30 MIN: CPT

## 2019-06-28 PROCEDURE — 97140 MANUAL THERAPY 1/> REGIONS: CPT

## 2019-06-28 PROCEDURE — 97110 THERAPEUTIC EXERCISES: CPT

## 2019-06-28 PROCEDURE — 97535 SELF CARE MNGMENT TRAINING: CPT

## 2019-06-28 NOTE — PROGRESS NOTES
Lymphedema EVAL/DAILY NOTE    Patient Name: Rafaela Xavier  Date:2019  : 1950  [x]  Patient  Verified  Payor: [place] MEDICARE / Plan: [place] MEDICARE PART A & B / Product Type: Medicare /    In time: 12:45  Out time:1:15  Total Treatment Time (min): 90  Total Timed Codes (min): 40  1:1 Treatment Time (MC/BCBS only): 40   Visit #: 1 of 4-6    Referring Provider: Ashley Rosales MD  Next Appointment: 19  Treatment Area: Left arm pain [M79.602]  Lymphedema [I89.0]    Pain Level (0-10 scale): 0/10    Personal Goal:  \"I want to get a sleeve so this arm doesn't \"    Home Situation (DME-pump, family suppor):NA    Dominant Hand: right handed    Current or Previous Compression Garment(s):   []Stocking []Sleeve  [] Pantyhose                          [] Glove/gauntlet/toe                           []Brand:                          []mmHg:   [] Size:     Compression Pump:  []Brand:                                     []Date Prescribed:    Has your activity changed since diagnosis?   no    Limitations/Prior level of functioning: NA  · Fatigue Intensity: 8 on a scale of 0-10  · Distress Intensity: 0 on a scale of 0-10  Factors/Comments:     Subjective functional status:     Present Symptoms/History: Pt reports to PT with reports of right UE swelling. 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 The pt did have a PE 5/3/19 and is still on eliquis for preventions of additional clots. The pt does have a history of breast CA and a L mastectomy where they removed 10 axillary lymph nodes. The did have a biopsy with resulting positive lymph nodes from the lungs.    Medical Oncologist:  Dr Germaine Archuleta ([address] [place] [address])   Radiation Oncologist:    Primary Care Physician: Rachid Aguilar MD    Date of first cancer diagnosis/type/stage: 2018    Surgery:NA   Radiation:   Type   Field  Date of Completion   Number of Treatments                   Side Effects Encountered:    Chemotherapy:    Dates: 12/2019 - current; weekly infusions     Side Effects Encountered: peripheral neuropathy,         Precautions/Indications: NA    Diagnostic tests (imaging/bone scan/labs): NA       OBJECTIVE:   Edema:  []min []moderate  [] severe [] pitting  Circumferences:     Girth (cm): Right UE  6/28/19 Left UE  6/28/19   Palm:  18.5 18.5   Wrist:  16 17.5   Forearm: (18 cm from wrist) 25.5 27   Bicep:     (8cm from elbow) 30 33   Axilla: 32.5 35.5                                                    Girth (cm):  Right LE  6/28/19 Left LE  6/28/19   MTP: 19.5 19.5   Ankle: 20.5 20   Calf:      (22 cm from ankle) 38 36   Calf 2 (2 cm below popliteus) 39 36   Thigh     (8 cm fron knee) 42 41.5   Groin: 63 66       Skin Integrity      Sensation  []normal [] sensitive  [] decreased  Light touch/Sharp/Dull                Color []normal []red  [] pink               Scars/Burns/incisions:                Wounds: location:                      size:               depth:  Posture:    ROM:    Strength:    Breath Pattern Assessment:   Diaphragm___________________   Anterior Chest________________   Accessory Muscle Use______________    50 min []Eval                  []Re-Eval       15 min Therapeutic Exercise:  [] See flow sheet :   Rationale: increase ROM and increase strength to improve the patients ability to perform ADL's with improved UE AROM. 15 min Manual Therapy:  Girth measurements bilateral UE and LE   Rationale: decrease pain, increase ROM, increase tissue extensibility and decrease edema  to monitor edema development and treat appropriately to prevent difficulty with ambulation. 10 min Self Care/Home Management: self girth monitoring, skin care routine education, garment care instruction   Rationale: { to improve the patients skin health in order to prevent infection.           With   [] TE   [] TA   [] neuro   [] other: Patient Education: [x] Review HEP    [] Progressed/Changed HEP based on:   [] positioning   [] body mechanics   [] transfers   [] heat/ice application    [] other:      Other Objective/Functional Measures: See POC     Pain Level (0-10 scale) post treatment: 0/10    ASSESSMENT/Changes in Function: See POC    Justification for Eval Code Complexity: MODERATE  Patient History (low 0, mod 1-2, high 3-4): pulmonary embolism 5/3/19, relapse of breast CA on chemotherapy medication, C/S Fusion 2000, Asthma, osteopenia, HTN, CHF with EF 30%, treated breast CA, GERD, in situ defribillator,Left mastectomy 2011, mitral valve prolapse, goiter HIGH   Examination (low 1-2, mod 3+, high 4+): UE AROM, UE MMT, LE AROM, LE MMT, UE and LE girth measurements HIGH  Clinical Presentation (low: stable/uncomplicated; mod: evolving; high: unstable/unpredictable): evolving girth with compression MODERATE   Clinical Decision Making (low , mod 26-74, high 1-25): grade II lymphedema MODERATE           PLAN  []  Upgrade activities as tolerated     []  Continue plan of care  []  Update interventions per flow sheet       []  Discharge due to:_  []  Other:_      Carol Zhang 6/28/2019  7:50 AM

## 2019-06-28 NOTE — PROGRESS NOTES
[facility] [address] [address] [facility] PHYSICAL THERAPY  [address] [facility] [address], [address] - Phone: [phone]  Fax: [phone] / [address] [facility]  Patient Name: Altagracia Arana : 1950   Medical   Diagnosis: Left arm pain [M79.602]  Lymphedema [I89.0] Treatment Diagnosis: Grade II lymphedema   Onset Date: May 2019     Referral Source: Jacki Eddy MD [facility]): 2019   Prior Hospitalization: See medical history Provider #: [id number]   Prior Level of Function: Decreased activity tolerance due to CHF   Comorbidities: pulmonary embolism 5/3/19, relapse of breast CA on chemotherapy medication, C/S Fusion , Asthma, osteopenia, HTN, CHF with EF 30%, treated breast CA, GERD, in situ defribillator,Left mastectomy , mitral valve prolapse, goiter   Medications: Verified on Patient Summary List   The Plan of Care and following information is based on the information from the initial evaluation.   ===========================================================================================  Assessment / key information:  Altagracia Arana is a 78 y.o.  female with Dx: Left arm pain [M79.602]  Lymphedema [I89.0], signs and symptoms consistent with grade II lymphedema. Pt reports to PT with reports of right UE swelling. 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 The pt did have a PE 5/3/19 and is still on eliquis for preventions of additional clots. The pt does have a history of breast CA and a L mastectomy where they removed 10 axillary lymph nodes. The did have a biopsy with resulting positive lymph nodes from the lungs. Objective: The pt demonstrates minimal to no fibrosis of lymphatic fluid with mild pitting edema. Interesting to note is that in addition to the left UE, the right LE also demonstrates signs of lymphedema. Both the left UE and right LE demonstrate 3 cm increased girth compared to the contralateral extremity. However the right LE demonstrates no pitting edema at this time. In addition to lymphedema the pt demonstrates other functional deficits includin) decreased left shoulder flexion AROM/PROM (180 right, 130/150 left), 2) decreased left shoulder abduction AROM/PROM (180 right, 95/110 left), 3) decreased left shoulder flexion MMT (4-/5 left, 4/5 right), 4) decreased C/S AROM in several planes (35 extension, 15 bilateral lateral flexion), and decreased overall activity tolerance. Girth (cm): Right UE  19 Left UE  19   Palm:  18.5 18.5   Wrist:             16 17.5   Forearm: (18 cm from wrist) 25.5 27   Bicep:     (8cm from elbow) 30 33   Axilla: 32.5 35.5                                                        Girth (cm):  Right LE  19 Left LE  19   MTP: 19.5 19.5   Ankle: 20.5 20   Calf:      (22 cm from ankle) 38 36   Calf 2 (2 cm below popliteus) 39 36   Thigh     (8 cm fron knee) 42 41.5   Groin: 63 66     The patient would benefit from skilled PT to address the above listed impairments.  Thank you for your referral.  ===========================================================================================  Eval Complexity: History: HIGH Complexity :3+ comorbidities / personal factors will impact the outcome/ POC Exam:HIGH Complexity : 4+ Standardized tests and measures addressing body structure, function, activity limitation and / or participation in recreation  Presentation: MEDIUM Complexity : Evolving with changing characteristics  Clinical Decision Making:MEDIUM Complexity : FOTO score of 26-74Overall Complexity:MEDIUM    Problem List: pain affecting function, decrease ROM, decrease strength, edema affecting function, decrease ADL/ functional abilitiies, decrease activity tolerance and decrease flexibility/ joint mobility   Treatment Plan may include any combination of the following: Therapeutic exercise, Therapeutic activities, Neuromuscular re-education, Physical agent/modality, Manual therapy, Aquatic therapy, Patient education, Self Care training, Functional mobility training and Home safety training    Treatment Protocol:    o Manual Lymphatic Drainage   o Soft Tissue Mobilization/MFR   o Decongestive Exercises/Self MFR/Strengthening   o Education   o Compression Garment    Patient / Family readiness to learn indicated by: asking questions, trying to perform skills and interest  Persons(s) to be included in education: patient (P)  Barriers to Learning/Limitations: None  Measures taken: na   Patient Goal (s): \"I want to get a sleeve so this arm doesn't swell up anymore. \"   Patient self reported health status: fair  Rehabilitation Potential: good  Short Term Goals: To be accomplished in 2-3 weeks. 1) Pt will require verbal cues 0-25% of the time with performance of her HEP to improve symptoms at home. 2) Pt will demonstrate no more than 2 cm difference between contralateral UE and LE to improve efficiency with ambulation. 3) Patient will demonstrate at least 40 degrees of C/S extension AROM to improve efficiency with dressing. Long Term Goals: To be accomplished in 4-6 weeks. 1) Pt will be Independent with compression management routine to maintain LE girth measures. 2) Patient will demonstrate at least 110 degrees of abduction AROM on the left UE to improve efficiency with grooming ADL's.  3) Pt will don and doff a 20-30 mm Hg arm sleeve modified-independent to decrease UE edema and decrease heaviness with shoulder elevation.     Frequency / Duration:   Patient to be seen  2-3  times per week for 4-6  treatments:  Patient / Caregiver education and instruction: self care, activity modification and exercises    Therapist Signature: Ashanti Quintana DPT Date: 9/08/9260   Certification Period: 6/28/19 - 9/27/19 Time: 2:55 PM ===========================================================================================  I certify that the above Physical Therapy Services are being furnished while the patient is under my care. I agree with the treatment plan and certify that this therapy is necessary. Physician Signature:        Date:       Time:     Please sign and return to [employer] or you may fax the signed copy to [phone]. Thank you.

## 2019-07-02 ENCOUNTER — PATIENT OUTREACH (OUTPATIENT)
Dept: CARDIOLOGY CLINIC | Age: 69
End: 2019-07-02

## 2019-07-02 NOTE — PROGRESS NOTES
NN contacted the patient by telephone to perform CHF follow Up. Spoke to pt's , Anibal Asencio. Noted Priorities/Plan:  Return to baseline, daily weights     Daily Weight: Last weight was 174 lbs on last outreach on 6/4/19.  states weight today was 174. Zone: green     Signs/Symptoms: Edema none; SOB none; orthopnea none. Pt's  reports pt is feeling well and no cardiac issues at this time. Goals      Prepare patients and caregivers for end of life decisions (ie. need for hospice, pain management, symptom relief, advance directives etc.)      Patient needs to start preparing for possible end of life decisions    Needs to complete advance directive        Prevent complications post hospitalization. High risk for readmission dut to CHF and COPD - along with Met Breast CA. Stressed importance of daily weights   Keep regular follow up's   Take meds as directed               Needs addressed from pathway:   Week 5-8   Provide Daily Disease Management (patient/caregiver initiated)  ? Reviewed and reinforced importance of Daily weight (Before Breakfast  ? Reviewed Daily Zone Identification (symptom management; weight, edema, SOB, activity/sleep changes)-notify provider immediately as indicated  ? Cardiac Low-sodium Diet (No added sodium; 1500mg as indicated)   ? Reviewed importance of Fluid restriction  ? Comorbidity Management  ? Confirmed follow-up appointments/transportation. Additional Assessments  ? Reinforced Fall precautions    ? Activity tolerance assessment: pt's  reports at baseline  ? Reviewed Energy conservation management and balancing activity w/ rest  ? Labs/diagnostics per MD office  ? Medication Therapy: no issues identified  ? Diet/appetite assessment: no issues noted  ? Reviewed appropriate ED/Hospital utilization  ? Immunizations up to date         Pneumonia        Flu  ?  Home re-assessment/modifications: no needs identified    Psychosocial: Reassurance and emotional support; depression screening. Monitoring:  ? My Chart    Education:  ? Advanced Care Planning status reviewed  ? Patient/Caregiver verifies support systems (meal planning, medication and transportation needs, community resources)  ? Health literacy for heart failure         Have you been to an ER/Hospital since discharge from [facility]?   No       Have you followed up with PCP/Cardiologist/Specialist?  5/9/19 Card appt w/ TOMMY Yang  5/30/19 Card appt w/ TOMMY Yang  6/25/19 Pulm appt w/ SUJATA Yang. Upcoming  7/22/19 Card appt w/ Geoff Yang     Transportation: [place] [place]  Diet: low salt  Activity/ADLs: independent. Medications Reconciled at this time:  declined. No changes since last clinic appt. Home health:  Company/Completion: none  Social Support: Family     Advanced Care Plan: not on file,  educated    Patient reminded that there is a physician on call 24 hours a day / 7 days a week (M-F 5pm to 8am and from Friday 5pm until Monday 8a for the weekend) should the patient have questions or concerns. Patient reminded to call 911 if situation is emergent or patient feels the situation is emergent. Pt verbalizes understanding.

## 2019-07-05 ENCOUNTER — HOSPITAL ENCOUNTER (OUTPATIENT)
Dept: PET IMAGING | Age: 69
Discharge: HOME | End: 2019-07-05
Attending: INTERNAL MEDICINE
Payer: MEDICARE

## 2019-07-05 DIAGNOSIS — C50.919 BREAST CANCER, FEMALE (HCC): ICD-10-CM

## 2019-07-05 DIAGNOSIS — C50.412 MALIGNANT NEOPLASM OF UPPER-OUTER QUADRANT OF LEFT FEMALE BREAST (HCC): ICD-10-CM

## 2019-07-05 PROCEDURE — A9552 F18 FDG: HCPCS

## 2019-07-10 ENCOUNTER — PATIENT OUTREACH (OUTPATIENT)
Dept: CARDIOLOGY CLINIC | Age: 69
End: 2019-07-10

## 2019-07-10 NOTE — PROGRESS NOTES
NN contacted the patient by telephone to perform CHF follow Up. Noted Priorities/Plan:  Return to baseline, daily weights     Daily Weight: Last weight was 174 lbs on last outreach on 7/2/19. States weight today was 176. Zone: green     Signs/Symptoms: Edema none; SOB none; orthopnea none. Pt reports she is feeling well and no cardiac issues at this time. Goals      Prepare patients and caregivers for end of life decisions (ie. need for hospice, pain management, symptom relief, advance directives etc.)      Patient needs to start preparing for possible end of life decisions    Needs to complete advance directive        Prevent complications post hospitalization. High risk for readmission dut to CHF and COPD - along with Met Breast CA. Stressed importance of daily weights   Keep regular follow up's   Take meds as directed             Needs addressed from pathway:   Week 9-12    Provide Daily Disease Management  (patient/caregiver initiated)  ? Pt is maintaining Daily weight measurements (Before Breakfast  ? Reviewed Daily Zone Identification (symptom management; weight, edema, SOB, activity/sleep changes)-notify provider immediately as indicated  ? Reviewed Cardiac Low-sodium Diet (No added sodium; 1500mg as indicated). ? Reviewed importance of Fluid restriction  ? Comorbidity Management  ? Confirmed follow-up appointments and transportation. Additional Assessments  ? Reinforced Fall precautions    ? Activity tolerance assessment: pt reports at baseline  ? Reviewed Energy conservation management and balancing rest w/ activity  ? Labs/diagnostics per MD office  ? Medication Therapy: no issues identified  ? Diet/appetite assessment: pt reports normal appetitie  ? Reviewed appropriate ED/Hospital utilization    Psychosocial:  Reassurance and emotional support    Education/Discharge Planning  ? Verify DME needs; arrange home equipment: no needs at this time  ?  Arrange discharge follow-up appointments/transportation    ? Advanced care planning (e.g.: Palliative Care; Hospice)  ? Patient/Caregiver HF education literacy  ? Patient/Caregiver verifies support systems (meals/medication/transportation needs,   ? community resources       Have you been to an ER/Hospital since discharge from [facility]?   No       Have you followed up with PCP/Cardiologist/Specialist?  5/9/19 Card appt w/ JOYA Reed.  5/30/19 Card appt w/ JOYA Reed.  6/25/19 Pulm appt w/ SUJATA Reed. Upcoming  7/22/19 Card appt w/ Joseluis Reed Yandel     Transportation: [employer]  Diet: low salt  Activity/ADLs: independent. Medications Reconciled at this time: No changes since last clinic appt. Home health:  Company/Completion: none  Social Support: Family     Advanced Care Plan: not on file, pt educated    Patient reminded that there is a physician on call 24 hours a day / 7 days a week (M-F 5pm to 8am and from Friday 5pm until Monday 8a for the weekend) should the patient have questions or concerns. Patient reminded to call 911 if situation is emergent or patient feels the situation is emergent. Pt verbalizes understanding.

## 2019-07-19 ENCOUNTER — HOSPITAL ENCOUNTER (OUTPATIENT)
Dept: PHYSICAL THERAPY | Age: 69
Discharge: HOME | End: 2019-07-19
Payer: MEDICARE

## 2019-07-19 PROCEDURE — 97110 THERAPEUTIC EXERCISES: CPT

## 2019-07-19 PROCEDURE — 97140 MANUAL THERAPY 1/> REGIONS: CPT

## 2019-07-19 PROCEDURE — 97535 SELF CARE MNGMENT TRAINING: CPT

## 2019-07-19 NOTE — PROGRESS NOTES
PHYSICAL THERAPY - DAILY TREATMENT NOTE    Patient Name: Robel Merlos        Date: 2019  : 1950   YES Patient  Verified  Visit #:   2   of   2  Insurance: Payor: [place] MEDICARE / Plan: VA MEDICARE PART A & B / Product Type: Medicare /      In time: 10:00 Out time: 10:40   Total Treatment Time: 40     Medicare/BCBS Time Tracking (below)   Total Timed Codes (min):  40 1:1 Treatment Time:  40     TREATMENT AREA =  Left arm pain [M79.602]  Lymphedema [I89.0]    SUBJECTIVE    Pain Level (on 0 to 10 scale):  0  / 10   Medication Changes/New allergies or changes in medical history, any new surgeries or procedures? NO     If yes, update Summary List   Subjective Functional Status/Changes:  []  No changes reported     \"I don't have pain. Just the swelling. I don't want to have to come back. I'm tired and nauseated from my chemo. And chemo will never end. So if I can learn the exercises to do at home, then I'd rather just wrap up therapy. \"          OBJECTIVE    Modalities Rationale:  To improve activity tolerance for exercise performance and ADL's.    min [] Estim, type/location:                                      []  att     []  unatt     []  w/US     []  w/ice    []  w/heat    min []  Mechanical Traction: type/lbs                   []  pro   []  sup   []  int   []  cont    []  before manual    []  after manual    min []  Ultrasound, settings/location:      min []  Iontophoresis w/ dexamethasone, location:                                               []  take home patch       []  in clinic   NA min []  Ice     []  Heat    location/position:     min []  Vasopneumatic Device, press/temp:     min []  Other:    [x] Skin assessment post-treatment (if applicable):   [x]  intact    []  redness- no adverse reaction     []redness  adverse reaction:    15 min Therapeutic Exercise:  [x]  See flow sheet   Rationale:      increase ROM and increase strength to improve the patients ability to perform ADL's with improved UE overhead AROM. 15 min Manual Therapy: Left UE flexion and abduction contract-relax followed with PROM, return demonstration by caregiver   Rationale:      decrease pain, increase ROM, increase tissue extensibility and decrease edema  to improve patient's ability to improve efficiency with dressing. 10 min Self-Care: Donning and doffing of compression sleeve   Rationale: To improve skin health and prevent increased edema. min Patient Education:  YES  Reviewed HEP   []  Progressed/Changed HEP based on:   Patient reports compliance     Other Objective/Functional Measures:    See discharge note. Post Treatment Pain Level (on 0 to 10) scale:   0  / 10     ASSESSMENT    Assessment/Changes in Function:     See discharge note. []  See Progress Note/Recertification   Patient will continue to benefit from skilled PT services to modify and progress therapeutic interventions, address functional mobility deficits, address ROM deficits, address strength deficits, analyze and address soft tissue restrictions, analyze and cue movement patterns, analyze and modify body mechanics/ergonomics and assess and modify postural abnormalities to attain remaining goals. Progress toward goals / Updated goals:    Short Term Goals: To be accomplished in 2-3 weeks. 1) Pt will require verbal cues 0-25% of the time with performance of her HEP to improve symptoms at home. 2) Pt will demonstrate no more than 2 cm difference between contralateral UE and LE to improve efficiency with ambulation. MET. 3) Patient will demonstrate at least 40 degrees of C/S extension AROM to improve efficiency with dressing. Addressed with updated HEP and C/S retractions.     Long Term Goals: To be accomplished in 4-6 weeks. 1) Pt will be Independent with compression management routine to maintain LE girth measures.   2) Patient will demonstrate at least 110 degrees of abduction AROM on the left UE to improve efficiency with grooming ADL's. Addressed with manual interventions. 3) Pt will don and doff a 20-30 mm Hg arm sleeve modified-independent to decrease UE edema and decrease heaviness with shoulder elevation. Addressed with donning and doffing.      PLAN    [x]  Upgrade activities as tolerated YES Continue plan of care   []  Discharge due to :    []  Other:      Therapist: Billy Pay    Date: 7/19/2019 Time: 1:10 PM     Future Appointments   Date Time Provider Priscila Alves   7/24/2019  2:00 PM [facility]   7/26/2019 10:00 AM Brisa Lee MD [facility]   7/29/2019 10:15 AM Zoila Oviedo MD [id number] [facility]

## 2019-07-19 NOTE — PROGRESS NOTES
[facility] PHYSICAL THERAPY  [address] [facility] [address] [place], [address] - Phone: [phone]  Fax: [phone] SUMMARY FOR PHYSICAL THERAPY          Patient Name: Everett Rizzo : 1950   Treatment/Medical Diagnosis: Left arm pain [M79.602]  Lymphedema [I89.0]   Onset Date: May 2019    Referral Source: Kayode Ayala MD Start of Care [facility]): 19   Prior Hospitalization: See Medical History Provider #: [id number]   Prior Level of Function: Decreased activity tolerance due to CHF   Comorbidities: pulmonary embolism 5/3/19, relapse of breast CA on chemotherapy medication, C/S Fusion , Asthma, osteopenia, HTN, CHF with EF 30%, treated breast CA, GERD, in situ defribillator,Left mastectomy , mitral valve prolapse, goiter   Medications: Verified on Patient Summary List   Visits from [facility]: 2 Missed Visits: 0     Goal/Measure of Progress Goal Met? 1. Pt will be Independent with compression management routine to maintain LE girth measures. Status at last Eval: NA Current Status: independent yes   2. Patient will demonstrate at least 40 degrees of C/S extension AROM to improve efficiency with dressing. Status at last Eval: 35 Current Status: 40 yes   3. Patient will demonstrate at least 110 degrees of abduction AROM on the left UE to improve efficiency with grooming ADL's. Status at last Eval: 95 Current Status: 100 Progressing   4. Pt will don and doff a 20-30 mm Hg arm sleeve modified-independent to decrease UE edema and decrease heaviness with shoulder elevation. Status at last Eval: NA Current Status: independent yes     Key Functional Changes/Progress: The pt is independent with a HEP to address UE mobility. Pt declined PT due to chemotherapy side effects and transportation complications due to the far distance from the pt's home. The pt can don and doff her garment independently. The pt reports the sleeve feels comfortable.  The pt was sized for a gauntlet and instructed on how to order future garments from the DME provider selected and how to perform self girth monitoring. Girth (cm): Right UE  6/28/19 Left UE   6/28/19 Left UE  7/19/19   Palm:  18.5 18.5 18.5   Wrist:             16 17.5 18   Forearm: (18 cm from wrist) 25.5 27 28.5   Bicep:     (8cm from elbow) 30 33 31.5   Axilla: 32.5 35.5 39         Girth (cm):  Right LE  6/28/19 Left LE  6/28/19 Right LE  7/19/19   MTP: 19.5 19.5 20.5   Ankle: 20.5 20 21   Calf:      (22 cm from ankle) 38 36 38   Calf 2 (2 cm below popliteus) 39 36 36   Thigh     (8 cm fron knee) 42 41.6 42.5   Groin: 63 66 61         Assessments/Recommendations: Discontinue therapy. Progressing towards or have reached established goals. If you have any questions/comments please contact us directly at [phone]. Thank you for allowing us to assist in the care of your patient. Therapist Signature: Carol Zhang DPT Date: 7/19/2019    Reporting Period: 6/28/19 - 7/19/19 Time: 1:18 PM     ===========================================================================================  NOTE TO PHYSICIAN:  PLEASE COMPLETE THE ORDERS BELOW AND FAX TO   [facility] Physical Therapy: [phone]. If you are unable to process this request in 24 hours please contact our office: [phone].    ___ I have read the above report and request that my patient continue as recommended.   ___ I have read the above report and request that my patient continue therapy with the following changes/special instructions: ________________________________________________   ___ I have read the above report and request that my patient be discharged from therapy.      Physician Signature:        Date:       Time:

## 2019-07-24 ENCOUNTER — APPOINTMENT (OUTPATIENT)
Dept: PHYSICAL THERAPY | Age: 69
End: 2019-07-24
Payer: MEDICARE

## 2019-07-26 ENCOUNTER — OFFICE VISIT (OUTPATIENT)
Dept: CARDIOLOGY CLINIC | Age: 69
End: 2019-07-26

## 2019-07-26 VITALS
SYSTOLIC BLOOD PRESSURE: 122 MMHG | HEIGHT: 65 IN | WEIGHT: 176 LBS | DIASTOLIC BLOOD PRESSURE: 58 MMHG | HEART RATE: 88 BPM | BODY MASS INDEX: 29.32 KG/M2

## 2019-07-26 DIAGNOSIS — I10 ESSENTIAL HYPERTENSION: ICD-10-CM

## 2019-07-26 DIAGNOSIS — I42.8 NONISCHEMIC CARDIOMYOPATHY (HCC): ICD-10-CM

## 2019-07-26 DIAGNOSIS — I50.23 ACUTE ON CHRONIC SYSTOLIC CONGESTIVE HEART FAILURE (HCC): Primary | ICD-10-CM

## 2019-07-26 DIAGNOSIS — Z95.810 AUTOMATIC IMPLANTABLE CARDIOVERTER-DEFIBRILLATOR IN SITU: ICD-10-CM

## 2019-07-26 RX ORDER — NITROFURANTOIN 25; 75 MG/1; MG/1
100 CAPSULE ORAL 2 TIMES DAILY
Status: ON HOLD | COMMUNITY
End: 2019-08-27

## 2019-07-26 NOTE — PROGRESS NOTES
Patient didn't bring medications, verbally reviewed. 1. Have you been to the ER, urgent care clinic since your last visit? Hospitalized since your last visit? No    2. Have you seen or consulted any other health care providers outside of the [address] [place] [address] since your last visit? Include any pap smears or colon screening.  Yes Where: Oncology Reason for visit: Routine Visit

## 2019-07-26 NOTE — PROGRESS NOTES
HISTORY OF PRESENT ILLNESS  Jamar Hodges is a 78 y.o. female. Patient with cmp,chf.post  icd   On follow up patient denies any chest pains, palpitation or other significant symptoms. Patient is here for follow up of diagnostic tests. Results will be discussed. He feels extremely fatigued tired and weak. Recently reported decrease in renal function. Patient seen for transition of care visit. Discharge date 5/6/2019  Nurse encounter 5/6/2019  Physician encounter 5/8/2019. Admitted with acute CHF, acute PE, DVT. Patient had worsening cardiomyopathy. Symptoms are slowly improving significant improvement of shortness of breath. Orthopnea significantly better. No edema. Still feels tired on minimal activity exertion    CHF   The history is provided by the patient. This is a recurrent problem. The problem occurs constantly. The problem has been gradually improving. Associated symptoms include shortness of breath. Pertinent negatives include no chest pain. The symptoms are aggravated by exertion. The symptoms are relieved by rest.   Cardiomyopathy   The history is provided by the patient. This is a chronic problem. The problem has been resolved. Associated symptoms include shortness of breath. Pertinent negatives include no chest pain. Hypertension   The history is provided by the patient. This is a chronic problem. The problem occurs constantly. The problem has not changed since onset. Associated symptoms include shortness of breath. Pertinent negatives include no chest pain. Valvular Heart Disease   The history is provided by the patient. This is a chronic problem. The problem occurs constantly. The problem has not changed since onset. Associated symptoms include shortness of breath. Pertinent negatives include no chest pain. Review of Systems   Constitutional: Negative for chills and fever. HENT: Negative for nosebleeds. Eyes: Negative for blurred vision and double vision.    Respiratory: Positive for shortness of breath. Negative for cough, hemoptysis, sputum production and wheezing. Cardiovascular: Negative for chest pain, palpitations, orthopnea, claudication, leg swelling and PND. Gastrointestinal: Negative for heartburn, nausea and vomiting. Musculoskeletal: Negative for myalgias. Skin: Negative for rash. Neurological: Negative for dizziness and weakness. Endo/Heme/Allergies: Does not bruise/bleed easily. Family History   Problem Relation Age of Onset    Hypertension Mother     High Cholesterol Mother     Heart Disease Father         paemaker implant at 80       Past Medical History:   Diagnosis Date    Abnormal nuclear cardiac imaging test 06/11/2010    Lg fixed anterior, septal, apical, inferoseptal defect sugg RCA & LAD disease or cardiomyopathy. EF 20%. Global hykn. Nondiagnostic EKG.  Acute bronchitis 10/15/2018    Persistent cough and wheezing in spite of prednisone and antibiotic. Will refer to pulmonary    Adenocarcinoma of breast; locally advanced invasive ductal adenocarcinoma of left breast     Asthma     Automatic implantable cardiac defibrillator in situ     Automatic implantable cardiac defibrillator in situ     [facility] cancer ([facility]     Cancer ([facility] [address] [facility]     breast cancer left    Chemotherapy convalescence or palliative care 2012    Chronic combined systolic and diastolic heart failure (HCC)     Remains symptomatic, nyha class 3 ef improving.  Congestive heart failure, unspecified     chronic class ll    Echocardiogram 09/27/2010    EF 30%. Global hykn. Mild MR. Mild TR.     GERD (gastroesophageal reflux disease)     Hyperlipidemia     Hypertension     Mitral valve disorders(424.0) 1/15/2014    mild to moderate mr     Mitral valve disorders(424.0) 1/15/2014    mild to moderate mr     MVP (mitral valve prolapse)     Nonischemic cardiomyopathy (HCC)     EF 20-30% despite medical therapy    Nonspecific abnormal electrocardiogram (ECG) (EKG)  Osteopenia     Other primary cardiomyopathies     Pacemaker 10/27/10    s/p implantation, without complication    Poisoning by other and unspecified agents primarily affecting the cardiovascular system(972.9)     Ef slightly better to 45%, STABLE back on chemo.  Radiation therapy     Radiation therapy complication 5847    S/P cardiac catheterization 07/08/10    Patent coronary arteries. LVEDP 25.  EF 25%. Global hykn. Moderate MR.  RA 10.  RV 40/10. PA 40/20.  20.  CO/CI 4.5/2.45 (Larry).  Shortness of breath     Syncope and collapse     Thyroid disease     goiter       Past Surgical History:   Procedure Laterality Date    CARDIAC SURG PROCEDURE UNLIST      Difibrillator; BI V ICD    HX MASTECTOMY  09/28/11    modified radical mastectomy and axillary dissection    HX ORTHOPAEDIC      HX OTHER SURGICAL      surgery to remove part of liver    HX PACEMAKER  10/27/10    s/p [employer] biventricular AICD, without complication    HX RADICAL MASTECTOMY      IR INSERT TUNL CVC W PORT OVER 5 YEARS  1/16/2019    NEUROLOGICAL PROCEDURE UNLISTED      Cervical fusion       Social History     Tobacco Use    Smoking status: Never Smoker    Smokeless tobacco: Never Used   Substance Use Topics    Alcohol use: No       Allergies   Allergen Reactions    Adhesive Other (comments)     blisters       Current Outpatient Medications   Medication Sig    nitrofurantoin, macrocrystal-monohydrate, (MACROBID) 100 mg capsule Take 100 mg by mouth two (2) times a day.  sacubitril-valsartan (ENTRESTO) 49 mg/51 mg tablet Take 1 Tab by mouth two (2) times a day.  umeclidinium (INCRUSE ELLIPTA) 62.5 mcg/actuation inhaler Take 1 Puff by inhalation daily.  digoxin (LANOXIN) 0.125 mg tablet Take 1 Tab by mouth daily.  furosemide (LASIX) 40 mg tablet 1 tab po daily    mometasone (NASONEX) 50 mcg/actuation nasal spray 2 Sprays by Both Nostrils route daily as needed.  Indications: inflammation of the nose due to an allergy    carvedilol (COREG) 12.5 mg tablet Take 1 Tab by mouth two (2) times daily (with meals).  apixaban (ELIQUIS) 5 mg tablet 2 tabs po BID for 6 days then 1 tab po BID until discontinued    budesonide-formoterol (SYMBICORT) 160-4.5 mcg/actuation HFAA Take 2 Puffs by inhalation two (2) times a day.  albuterol-ipratropium (DUO-NEB) 2.5 mg-0.5 mg/3 ml nebu 3 mL by Nebulization route every six (6) hours as needed (wheezing or sob). File under Medicare Part B, ICD codes J44.9, C78.01    DULoxetine (CYMBALTA) 30 mg capsule Take 30 mg by mouth daily.  multivitamin (ONE A DAY) tablet Take 1 Tab by mouth daily.  b complex vitamins (B COMPLEX 1) tablet Take 1 Tab by mouth daily.  cyanocobalamin 1,000 mcg tablet Take 3,000 mcg by mouth daily.  plant stanol eliezer (CHOLEST OFF PO) Take 1 Tab by mouth daily.  benzonatate (TESSALON PERLES) 100 mg capsule Take 100 mg by mouth three (3) times daily as needed for Cough.  Biotin 2,500 mcg cap Take 1 Cap by mouth daily.  melatonin 10 mg tab Take 1 Tab by mouth nightly.  montelukast (SINGULAIR) 10 mg tablet Take 1 Tab by mouth daily.  cholecalciferol, vitamin D3, 2,000 unit tab Take 1 Tab by mouth daily.  raloxifene (EVISTA) 60 mg tablet Take 60 mg by mouth daily.  naloxone (NARCAN) 0.4 mg/mL injection 1 mL by IntraVENous route as needed for Other (Give if breaths per minute  less than 10, may repeat dose in 15 min and contact MD). No current facility-administered medications for this visit. Visit Vitals  /58   Pulse 88   Ht 5' 5\" (1.651 m)   Wt 79.8 kg (176 lb)   BMI 29.29 kg/m²         Physical Exam   Constitutional: She is oriented to person, place, and time. She appears well-developed and well-nourished. HENT:   Head: Normocephalic and atraumatic. Eyes: Conjunctivae are normal.   Neck: Neck supple. No JVD present. No tracheal deviation present. No thyromegaly present.    Cardiovascular: Normal rate and regular rhythm. PMI is not displaced. Exam reveals no gallop, no S3 and no decreased pulses. Murmur heard. High-pitched blowing holosystolic murmur is present with a grade of 2/6 at the apex. Pulmonary/Chest: No respiratory distress. She has wheezes. She has no rales. She exhibits no tenderness. Abdominal: Soft. There is no tenderness. Musculoskeletal: She exhibits no edema. Neurological: She is alert and oriented to person, place, and time. Skin: Skin is warm. Psychiatric: She has a normal mood and affect. Ms. Solano Needs has a reminder for a \"due or due soon\" health maintenance. I have asked that she contact her primary care provider for follow-up on this health maintenance. No flowsheet data found. SUMMARY:echo:6/2014  Left ventricle: The ventricle was mildly dilated. Systolic function was  normal. Ejection fraction was estimated in the range of 50 % to 55 %. There were no regional wall motion abnormalities. Doppler parameters were  consistent with abnormal left ventricular relaxation (grade 1 diastolic  dysfunction). Left atrium: The atrium was mildly dilated. Mitral valve: There was systolic bowing of the posterior leaflet, but  without diagnostic evidence for prolapse. There was mild to moderate  regurgitation. SUMMARY:echo:12/2014  Left ventricle: Systolic function was at the lower limits of normal.  Ejection fraction was estimated to be 53 %. There were no regional wall  motion abnormalities. Doppler parameters were consistent with abnormal  left ventricular relaxation (grade 1 diastolic dysfunction). Right ventricle: A pacing wire was present. Mitral valve: There was systolic bowing of the posterior leaflet, but  without diagnostic evidence for prolapse. There was mild regurgitation. Tricuspid valve: Pulmonary artery systolic pressure: 22 mmHg.    12/2015:echo    SUMMARY:  Left ventricle: Systolic function was normal. Ejection fraction was  estimated in the range of 50 % to 55 %. There was mild diffuse  hypokinesis. Doppler parameters were consistent with abnormal left  ventricular relaxation (grade 1 diastolic dysfunction). Mitral valve: There was systolic bowing of the anterior and posterior  leaflets, but without diagnostic evidence for prolapse. There was mild  regurgitation. Tricuspid valve: There was mild regurgitation. Tricuspid regurgitation  peak velocity: 2.3 m/sec. Pulmonary artery systolic pressure: 25 mmHg. pft-6/2017  Maximal Mid Expiratory Flow rate is reduced to 43 % predicted  Forced Expiratory Volume in one second is reduced to 69 % predicted  FEV 1% is reduced     Volumes: All Volumes are reduced except for RV    Flow Volume Loop:  Nonspecific obstructive pattern in Flow Volume Loop    Bronchodilator:  No significant improvement with bronchodilator therapy    Diffusion:  Abnormal Diffusion Capacity reduced to 62 % predicted  DLCO normalizes to alveolar ventilation    Impression:  Moderate obstructive defect, Predominately small airways, Mild restrictive ventilatory defect, Reduced diffusion capacity indicating a decrease in alveolar surface area for gas exchange  I Have personally reviewed recent relevant labs available and discussed with patient    Interpretation Summary -6/2018  · Calculated left ventricular ejection fraction is 55%. Left ventricular mild concentric hypertrophy observed. Mild (grade 1) left ventricular diastolic dysfunction. · Left atrial cavity size is mildly dilated. · There was systolic bowing of the anterior and posterior leaflets, but without diagnostic evidence for prolapse. Mild mitral valve regurgitation. · Mild tricuspid valve regurgitation is present. Pulmonary arterial systolic pressure is 18 mmHg. · Mild pulmonic valve regurgitation is present. · Small pericardial effusion. Interpretation Summary 12/2018    · Left ventricular low normal systolic function. Estimated left ventricular ejection fraction is 51 - 55%. Visually measured ejection fraction. Normal left ventricular wall motion, no regional wall motion abnormality noted. Moderate (grade 2) left ventricular diastolic dysfunction. · There is no evidence of pulmonary hypertension. · Mild to moderate mitral valve regurgitation. · Left atrial cavity size is mildly dilated. Interpretation Summary 3/2019       · Normal cavity size, wall thickness and diastolic function. There is low normal systolic function. The estimated ejection fraction is 51 - 55%. No regional wall motion abnormality noted. Global longitudinal strain is -13.00%. Abnormal left ventricular strain. · Normal right ventricular size and function. Pacing wire present  · Mild to moderate mitral valve regurgitation. Mild prolapse of the posterior leaflet within the mitral valve. · Mild pulmonic valve regurgitation is present. · Mild tricuspid valve regurgitation. Pulmonary arterial systolic pressure is 85.5 mmHg. Mild pulmonary hypertension. 5/2019    · Left Ventricle: Mild concentric hypertrophy. Severe global systolic dysfunction. Estimated left ventricular ejection fraction is 26 - 30%. Biplane method used to measure ejection fraction. Left ventricular global hypokinesis. · IVC/Hepatic Veins: Severely elevated central venous pressure (15+ mmHg); IVC diameter is larger than 21 mm and collapses less than 50% with respiration. · Pulmonary Artery: Mild pulmonary hypertension. Pulmonary arterial systolic pressure is 44 mmHg. · Pericardium: Trivial-to-small circumferential pericardial effusion measuring 10 mm. · Mitral Valve: Moderate mitral valve regurgitation. · Right Ventricle: Pacing wire present   Interpretation Summary 5/2019    · Acute occlusive thrombus present in the right peroneal vein. IMPRESSION: 5/2019     1. Positive examination for pulmonary emboli.   - There is likely a combination of acute and subacute PE in the lower lobe  segmental and subsegmental branch vessels. Interpretation Summary 7/2019    · Left Ventricle: Normal cavity size. Mild concentric hypertrophy. Severe systolic dysfunction. Estimated left ventricular ejection fraction is 21 - 25%. Abnormal left ventricular wall motion. Inconclusive left ventricular diastolic function. · Left Atrium: Severely dilated left atrium. · Mitral Valve: Mitral valve thickening. Mitral annular calcification. Moderate mitral valve regurgitation. · Tricuspid Valve: Mild tricuspid valve regurgitation is present. Pulmonary arterial systolic pressure is 99.9 mmHg. Mild pulmonary hypertension. · IVC/Hepatic Veins: Moderately elevated central venous pressure (10-15 mmHg); IVC diameter is larger than 21 mm and collapses more than 50% with respiration. · Pericardium: Trivial-to-small pericardial effusion. Assessment         ICD-10-CM ICD-9-CM    1. Acute on chronic systolic congestive heart failure (HCC) I50.23 428.23      428.0     Shortness of breath on minimal activity. Class III continue treatment   2. Nonischemic cardiomyopathy (HCC) I42.8 425.4     Worsening EF 21-25% on last echo. Adjust medication   3. Automatic implantable cardioverter-defibrillator in situ Z95.810 V45.02     Normal function   4. Essential hypertension I10 401.9     Stable   Tounge swelling not related to lisinopril as she had swelling even after stopping lisinopril  7/2018  I will hold Lasix as recent decrease in renal function. No clinical sign of fluid overload. 10/2018  Shortness of breath due to acute bronchitis bilateral wheezing. Will refer back to pulmonary. No sign of fluid overload. Will keep off Lasix  1/2019  Metastatic breast cancer now back on chemotherapy. Lung metastasis likely cause for shortness of breath which is improving. Low blood pressure will reduce Coreg to 6.25 twice a day. Continue lisinopril. Recheck echo in 2 months on chemotherapy  4/2019  Cardiac status stable.   Echo EF remains stable continue Coreg and lisinopril. Check echo in 3 months. Patient remains on chemotherapy    5/2019  Recent admission with increased shortness of breath combination of pulmonary embolism and decompensated systolic heart failure with worsening ejection fraction. Class III CHF. 10 pound weight loss from peak. Continue current therapy. Continue anticoagulation. Follow-up 3 weeks  5/30/2019  Fluid overload stable. Remains weak. Follow-up echo in about a month to reassess LV function. Orders Placed This Encounter    DISCONTD: sacubitril-valsartan (ENTRESTO) 49 mg/51 mg tablet     Sig: Take 1 Tab by mouth two (2) times a day. Dispense:  30 Tab     Refill:  6    sacubitril-valsartan (ENTRESTO) 49 mg/51 mg tablet     Sig: Take 1 Tab by mouth two (2) times a day. Dispense:  30 Tab     Refill:  6       Follow-up and Dispositions    · Return in about 3 months (around 10/26/2019).

## 2019-07-29 ENCOUNTER — PATIENT OUTREACH (OUTPATIENT)
Dept: CARDIOLOGY CLINIC | Age: 69
End: 2019-07-29

## 2019-07-29 NOTE — PROGRESS NOTES
Patient attended appointments with her cardiologist, Dr Kolby Gann. on 7/26/19, Nurse Navigator reviewed EMR and will follow up on next scheduled outreach.

## 2019-07-31 ENCOUNTER — PATIENT OUTREACH (OUTPATIENT)
Dept: CARDIOLOGY CLINIC | Age: 69
End: 2019-07-31

## 2019-08-12 ENCOUNTER — CLINICAL SUPPORT (OUTPATIENT)
Dept: CARDIOLOGY CLINIC | Age: 69
End: 2019-08-12

## 2019-08-12 DIAGNOSIS — I42.8 NONISCHEMIC CARDIOMYOPATHY (HCC): Primary | ICD-10-CM

## 2019-08-27 ENCOUNTER — APPOINTMENT (OUTPATIENT)
Dept: CT IMAGING | Age: 69
DRG: 689 | End: 2019-08-27
Attending: EMERGENCY MEDICINE
Payer: MEDICARE

## 2019-08-27 ENCOUNTER — APPOINTMENT (OUTPATIENT)
Dept: CT IMAGING | Age: 69
DRG: 689 | End: 2019-08-27
Attending: HOSPITALIST
Payer: MEDICARE

## 2019-08-27 ENCOUNTER — APPOINTMENT (OUTPATIENT)
Dept: GENERAL RADIOLOGY | Age: 69
DRG: 689 | End: 2019-08-27
Attending: EMERGENCY MEDICINE
Payer: MEDICARE

## 2019-08-27 ENCOUNTER — HOSPITAL ENCOUNTER (INPATIENT)
Age: 69
LOS: 1 days | Discharge: HOME HEALTH | DRG: 689 | End: 2019-08-28
Attending: EMERGENCY MEDICINE | Admitting: HOSPITALIST
Payer: MEDICARE

## 2019-08-27 DIAGNOSIS — I25.10 CORONARY ARTERY DISEASE INVOLVING NATIVE HEART WITHOUT ANGINA PECTORIS, UNSPECIFIED VESSEL OR LESION TYPE: Primary | ICD-10-CM

## 2019-08-27 DIAGNOSIS — N39.0 URINARY TRACT INFECTION WITHOUT HEMATURIA, SITE UNSPECIFIED: ICD-10-CM

## 2019-08-27 PROBLEM — Z79.01 CHRONIC ANTICOAGULATION: Status: ACTIVE | Noted: 2019-08-27

## 2019-08-27 PROBLEM — G93.40 ACUTE ENCEPHALOPATHY: Status: ACTIVE | Noted: 2019-08-27

## 2019-08-27 PROBLEM — E87.6 HYPOKALEMIA: Status: ACTIVE | Noted: 2019-08-27

## 2019-08-27 PROBLEM — R53.1 WEAKNESS GENERALIZED: Status: ACTIVE | Noted: 2019-08-27

## 2019-08-27 PROBLEM — E83.42 HYPOMAGNESEMIA: Status: ACTIVE | Noted: 2019-08-27

## 2019-08-27 PROBLEM — Z86.711 HISTORY OF PULMONARY EMBOLISM: Status: ACTIVE | Noted: 2019-08-27

## 2019-08-27 PROBLEM — R77.8 ELEVATED TROPONIN: Status: ACTIVE | Noted: 2019-08-27

## 2019-08-27 LAB
ALBUMIN SERPL-MCNC: 2.5 G/DL (ref 3.4–5)
ALBUMIN/GLOB SERPL: 0.8 {RATIO} (ref 0.8–1.7)
ALP SERPL-CCNC: 115 U/L (ref 45–117)
ALT SERPL-CCNC: 163 U/L (ref 13–56)
AMORPH CRY URNS QL MICRO: ABNORMAL
ANION GAP SERPL CALC-SCNC: 8 MMOL/L (ref 3–18)
APPEARANCE UR: ABNORMAL
AST SERPL-CCNC: 292 U/L (ref 10–38)
ATRIAL RATE: 95 BPM
BACTERIA URNS QL MICRO: ABNORMAL /HPF
BASOPHILS # BLD: 0 K/UL (ref 0–0.06)
BASOPHILS NFR BLD: 0 % (ref 0–3)
BILIRUB SERPL-MCNC: 0.3 MG/DL (ref 0.2–1)
BILIRUB UR QL: NEGATIVE
BUN SERPL-MCNC: 11 MG/DL (ref 7–18)
BUN/CREAT SERPL: 12 (ref 12–20)
CALCIUM SERPL-MCNC: 8.5 MG/DL (ref 8.5–10.1)
CALCULATED P AXIS, ECG09: 29 DEGREES
CALCULATED R AXIS, ECG10: -2 DEGREES
CALCULATED T AXIS, ECG11: 37 DEGREES
CAOX CRY URNS QL MICRO: ABNORMAL
CHLORIDE SERPL-SCNC: 104 MMOL/L (ref 100–111)
CK MB CFR SERPL CALC: ABNORMAL % (ref 0–4)
CK MB CFR SERPL CALC: ABNORMAL % (ref 0–4)
CK MB SERPL-MCNC: <1 NG/ML (ref 5–25)
CK MB SERPL-MCNC: <1 NG/ML (ref 5–25)
CK SERPL-CCNC: 34 U/L (ref 26–192)
CK SERPL-CCNC: 63 U/L (ref 26–192)
CO2 SERPL-SCNC: 29 MMOL/L (ref 21–32)
COLOR UR: ABNORMAL
CREAT SERPL-MCNC: 0.91 MG/DL (ref 0.6–1.3)
DIAGNOSIS, 93000: NORMAL
DIFFERENTIAL METHOD BLD: ABNORMAL
EOSINOPHIL # BLD: 0.1 K/UL (ref 0–0.4)
EOSINOPHIL NFR BLD: 1 % (ref 0–5)
EPITH CASTS URNS QL MICRO: ABNORMAL /LPF (ref 0–5)
ERYTHROCYTE [DISTWIDTH] IN BLOOD BY AUTOMATED COUNT: 16.9 % (ref 11.6–14.5)
GLOBULIN SER CALC-MCNC: 3.3 G/DL (ref 2–4)
GLUCOSE SERPL-MCNC: 144 MG/DL (ref 74–99)
GLUCOSE UR STRIP.AUTO-MCNC: NEGATIVE MG/DL
HCT VFR BLD AUTO: 28.4 % (ref 35–45)
HGB BLD-MCNC: 9.3 G/DL (ref 12–16)
HGB UR QL STRIP: ABNORMAL
KETONES UR QL STRIP.AUTO: 15 MG/DL
LACTATE BLD-SCNC: 1.98 MMOL/L (ref 0.4–2)
LEUKOCYTE ESTERASE UR QL STRIP.AUTO: ABNORMAL
LIPASE SERPL-CCNC: 253 U/L (ref 73–393)
LYMPHOCYTES # BLD: 0.9 K/UL (ref 0.8–3.5)
LYMPHOCYTES NFR BLD: 13 % (ref 20–51)
MAGNESIUM SERPL-MCNC: 1.4 MG/DL (ref 1.6–2.6)
MAGNESIUM SERPL-MCNC: 2 MG/DL (ref 1.6–2.6)
MCH RBC QN AUTO: 30.9 PG (ref 24–34)
MCHC RBC AUTO-ENTMCNC: 32.7 G/DL (ref 31–37)
MCV RBC AUTO: 94.4 FL (ref 74–97)
METAMYELOCYTES NFR BLD MANUAL: 3 %
MONOCYTES # BLD: 0.4 K/UL (ref 0–1)
MONOCYTES NFR BLD: 6 % (ref 2–9)
NEUTS BAND NFR BLD MANUAL: 3 % (ref 0–5)
NEUTS SEG # BLD: 5.5 K/UL (ref 1.8–8)
NEUTS SEG NFR BLD: 74 % (ref 42–75)
NITRITE UR QL STRIP.AUTO: NEGATIVE
NRBC BLD-RTO: 2 PER 100 WBC
P-R INTERVAL, ECG05: 154 MS
PH UR STRIP: 5.5 [PH] (ref 5–8)
PLATELET # BLD AUTO: 151 K/UL (ref 135–420)
PLATELET COMMENTS,PCOM: ABNORMAL
PMV BLD AUTO: 9.6 FL (ref 9.2–11.8)
POTASSIUM SERPL-SCNC: 2.9 MMOL/L (ref 3.5–5.5)
POTASSIUM SERPL-SCNC: 3.2 MMOL/L (ref 3.5–5.5)
PROT SERPL-MCNC: 5.8 G/DL (ref 6.4–8.2)
PROT UR STRIP-MCNC: ABNORMAL MG/DL
Q-T INTERVAL, ECG07: 378 MS
QRS DURATION, ECG06: 130 MS
QTC CALCULATION (BEZET), ECG08: 475 MS
RBC # BLD AUTO: 3.01 M/UL (ref 4.2–5.3)
RBC #/AREA URNS HPF: ABNORMAL /HPF (ref 0–5)
RBC MORPH BLD: ABNORMAL
RBC MORPH BLD: ABNORMAL
SODIUM SERPL-SCNC: 141 MMOL/L (ref 136–145)
SP GR UR REFRACTOMETRY: 1.02 (ref 1–1.03)
TROPONIN I SERPL-MCNC: 0.05 NG/ML (ref 0–0.04)
TROPONIN I SERPL-MCNC: 0.08 NG/ML (ref 0–0.04)
TROPONIN I SERPL-MCNC: 0.09 NG/ML (ref 0–0.04)
TROPONIN I SERPL-MCNC: 0.13 NG/ML (ref 0–0.04)
UROBILINOGEN UR QL STRIP.AUTO: 1 EU/DL (ref 0.2–1)
VENTRICULAR RATE, ECG03: 95 BPM
WBC # BLD AUTO: 7.2 K/UL (ref 4.6–13.2)
WBC URNS QL MICRO: ABNORMAL /HPF (ref 0–5)

## 2019-08-27 PROCEDURE — 83735 ASSAY OF MAGNESIUM: CPT

## 2019-08-27 PROCEDURE — 74011000258 HC RX REV CODE- 258: Performed by: EMERGENCY MEDICINE

## 2019-08-27 PROCEDURE — 87040 BLOOD CULTURE FOR BACTERIA: CPT

## 2019-08-27 PROCEDURE — 74011250637 HC RX REV CODE- 250/637: Performed by: HOSPITALIST

## 2019-08-27 PROCEDURE — 96367 TX/PROPH/DG ADDL SEQ IV INF: CPT

## 2019-08-27 PROCEDURE — 83690 ASSAY OF LIPASE: CPT

## 2019-08-27 PROCEDURE — 74011250637 HC RX REV CODE- 250/637: Performed by: INTERNAL MEDICINE

## 2019-08-27 PROCEDURE — 83605 ASSAY OF LACTIC ACID: CPT

## 2019-08-27 PROCEDURE — 87086 URINE CULTURE/COLONY COUNT: CPT

## 2019-08-27 PROCEDURE — 96376 TX/PRO/DX INJ SAME DRUG ADON: CPT

## 2019-08-27 PROCEDURE — 99285 EMERGENCY DEPT VISIT HI MDM: CPT

## 2019-08-27 PROCEDURE — 74011250636 HC RX REV CODE- 250/636: Performed by: EMERGENCY MEDICINE

## 2019-08-27 PROCEDURE — 36415 COLL VENOUS BLD VENIPUNCTURE: CPT

## 2019-08-27 PROCEDURE — 85025 COMPLETE CBC W/AUTO DIFF WBC: CPT

## 2019-08-27 PROCEDURE — 77030020095 HC NDL HUBR BARD -A

## 2019-08-27 PROCEDURE — 96365 THER/PROPH/DIAG IV INF INIT: CPT

## 2019-08-27 PROCEDURE — 74011000250 HC RX REV CODE- 250: Performed by: INTERNAL MEDICINE

## 2019-08-27 PROCEDURE — 82550 ASSAY OF CK (CPK): CPT

## 2019-08-27 PROCEDURE — 74176 CT ABD & PELVIS W/O CONTRAST: CPT

## 2019-08-27 PROCEDURE — 84132 ASSAY OF SERUM POTASSIUM: CPT

## 2019-08-27 PROCEDURE — 97165 OT EVAL LOW COMPLEX 30 MIN: CPT

## 2019-08-27 PROCEDURE — 71045 X-RAY EXAM CHEST 1 VIEW: CPT

## 2019-08-27 PROCEDURE — 84484 ASSAY OF TROPONIN QUANT: CPT

## 2019-08-27 PROCEDURE — 96375 TX/PRO/DX INJ NEW DRUG ADDON: CPT

## 2019-08-27 PROCEDURE — 74011250637 HC RX REV CODE- 250/637: Performed by: EMERGENCY MEDICINE

## 2019-08-27 PROCEDURE — 81001 URINALYSIS AUTO W/SCOPE: CPT

## 2019-08-27 PROCEDURE — 70450 CT HEAD/BRAIN W/O DYE: CPT

## 2019-08-27 PROCEDURE — 74011250636 HC RX REV CODE- 250/636

## 2019-08-27 PROCEDURE — 65660000000 HC RM CCU STEPDOWN

## 2019-08-27 PROCEDURE — 94640 AIRWAY INHALATION TREATMENT: CPT

## 2019-08-27 PROCEDURE — 74011250636 HC RX REV CODE- 250/636: Performed by: INTERNAL MEDICINE

## 2019-08-27 PROCEDURE — 93005 ELECTROCARDIOGRAM TRACING: CPT

## 2019-08-27 RX ORDER — POTASSIUM CHLORIDE 7.45 MG/ML
10 INJECTION INTRAVENOUS
Status: DISCONTINUED | OUTPATIENT
Start: 2019-08-27 | End: 2019-08-27

## 2019-08-27 RX ORDER — POTASSIUM CHLORIDE 20 MEQ/1
40 TABLET, EXTENDED RELEASE ORAL
Status: COMPLETED | OUTPATIENT
Start: 2019-08-27 | End: 2019-08-27

## 2019-08-27 RX ORDER — BUDESONIDE AND FORMOTEROL FUMARATE DIHYDRATE 160; 4.5 UG/1; UG/1
2 AEROSOL RESPIRATORY (INHALATION)
Status: DISCONTINUED | OUTPATIENT
Start: 2019-08-27 | End: 2019-08-28 | Stop reason: HOSPADM

## 2019-08-27 RX ORDER — MONTELUKAST SODIUM 10 MG/1
10 TABLET ORAL DAILY
Status: DISCONTINUED | OUTPATIENT
Start: 2019-08-27 | End: 2019-08-28 | Stop reason: HOSPADM

## 2019-08-27 RX ORDER — MAGNESIUM SULFATE HEPTAHYDRATE 40 MG/ML
2 INJECTION, SOLUTION INTRAVENOUS ONCE
Status: COMPLETED | OUTPATIENT
Start: 2019-08-27 | End: 2019-08-27

## 2019-08-27 RX ORDER — DOCUSATE SODIUM 100 MG/1
100 CAPSULE, LIQUID FILLED ORAL
Status: DISCONTINUED | OUTPATIENT
Start: 2019-08-27 | End: 2019-08-28 | Stop reason: HOSPADM

## 2019-08-27 RX ORDER — ONDANSETRON 2 MG/ML
4 INJECTION INTRAMUSCULAR; INTRAVENOUS
Status: DISCONTINUED | OUTPATIENT
Start: 2019-08-27 | End: 2019-08-28 | Stop reason: HOSPADM

## 2019-08-27 RX ORDER — FUROSEMIDE 20 MG/1
40 TABLET ORAL DAILY
Status: DISCONTINUED | OUTPATIENT
Start: 2019-08-27 | End: 2019-08-27

## 2019-08-27 RX ORDER — OXYCODONE AND ACETAMINOPHEN 5; 325 MG/1; MG/1
1 TABLET ORAL
Status: DISCONTINUED | OUTPATIENT
Start: 2019-08-27 | End: 2019-08-28 | Stop reason: HOSPADM

## 2019-08-27 RX ORDER — DIGOXIN 125 MCG
0.12 TABLET ORAL DAILY
Status: DISCONTINUED | OUTPATIENT
Start: 2019-08-27 | End: 2019-08-28 | Stop reason: HOSPADM

## 2019-08-27 RX ORDER — MAGNESIUM SULFATE HEPTAHYDRATE 500 MG/ML
2 INJECTION, SOLUTION INTRAMUSCULAR; INTRAVENOUS
Status: DISCONTINUED | OUTPATIENT
Start: 2019-08-27 | End: 2019-08-27

## 2019-08-27 RX ORDER — DULOXETIN HYDROCHLORIDE 30 MG/1
30 CAPSULE, DELAYED RELEASE ORAL DAILY
Status: DISCONTINUED | OUTPATIENT
Start: 2019-08-27 | End: 2019-08-28 | Stop reason: HOSPADM

## 2019-08-27 RX ORDER — IPRATROPIUM BROMIDE AND ALBUTEROL SULFATE 2.5; .5 MG/3ML; MG/3ML
3 SOLUTION RESPIRATORY (INHALATION)
Status: DISCONTINUED | OUTPATIENT
Start: 2019-08-27 | End: 2019-08-28 | Stop reason: HOSPADM

## 2019-08-27 RX ORDER — ACETAMINOPHEN 325 MG/1
650 TABLET ORAL
Status: DISCONTINUED | OUTPATIENT
Start: 2019-08-27 | End: 2019-08-28 | Stop reason: HOSPADM

## 2019-08-27 RX ORDER — FUROSEMIDE 20 MG/1
20 TABLET ORAL DAILY
Status: DISCONTINUED | OUTPATIENT
Start: 2019-08-28 | End: 2019-08-28 | Stop reason: HOSPADM

## 2019-08-27 RX ORDER — MAGNESIUM SULFATE HEPTAHYDRATE 40 MG/ML
INJECTION, SOLUTION INTRAVENOUS
Status: COMPLETED
Start: 2019-08-27 | End: 2019-08-27

## 2019-08-27 RX ORDER — CEFTRIAXONE 1 G/1
1 INJECTION, POWDER, FOR SOLUTION INTRAMUSCULAR; INTRAVENOUS
Status: DISCONTINUED | OUTPATIENT
Start: 2019-08-27 | End: 2019-08-27

## 2019-08-27 RX ORDER — GUAIFENESIN 100 MG/5ML
324 LIQUID (ML) ORAL
Status: COMPLETED | OUTPATIENT
Start: 2019-08-27 | End: 2019-08-27

## 2019-08-27 RX ORDER — BENZONATATE 100 MG/1
100 CAPSULE ORAL
Status: DISCONTINUED | OUTPATIENT
Start: 2019-08-27 | End: 2019-08-28 | Stop reason: HOSPADM

## 2019-08-27 RX ORDER — IPRATROPIUM BROMIDE AND ALBUTEROL SULFATE 2.5; .5 MG/3ML; MG/3ML
3 SOLUTION RESPIRATORY (INHALATION)
Status: DISCONTINUED | OUTPATIENT
Start: 2019-08-27 | End: 2019-08-27

## 2019-08-27 RX ORDER — CARVEDILOL 12.5 MG/1
12.5 TABLET ORAL 2 TIMES DAILY WITH MEALS
Status: DISCONTINUED | OUTPATIENT
Start: 2019-08-27 | End: 2019-08-28

## 2019-08-27 RX ORDER — SPIRONOLACTONE 25 MG/1
25 TABLET ORAL DAILY
Status: DISCONTINUED | OUTPATIENT
Start: 2019-08-27 | End: 2019-08-28 | Stop reason: HOSPADM

## 2019-08-27 RX ORDER — NALOXONE HYDROCHLORIDE 0.4 MG/ML
0.4 INJECTION, SOLUTION INTRAMUSCULAR; INTRAVENOUS; SUBCUTANEOUS AS NEEDED
Status: DISCONTINUED | OUTPATIENT
Start: 2019-08-27 | End: 2019-08-28 | Stop reason: HOSPADM

## 2019-08-27 RX ADMIN — SACUBITRIL AND VALSARTAN 1 TABLET: 49; 51 TABLET, FILM COATED ORAL at 21:38

## 2019-08-27 RX ADMIN — DIGOXIN 0.12 MG: 250 TABLET ORAL at 09:00

## 2019-08-27 RX ADMIN — BUDESONIDE AND FORMOTEROL FUMARATE DIHYDRATE 2 PUFF: 160; 4.5 AEROSOL RESPIRATORY (INHALATION) at 19:39

## 2019-08-27 RX ADMIN — POTASSIUM CHLORIDE: 2 INJECTION, SOLUTION, CONCENTRATE INTRAVENOUS at 05:00

## 2019-08-27 RX ADMIN — MONTELUKAST 10 MG: 10 TABLET, FILM COATED ORAL at 09:00

## 2019-08-27 RX ADMIN — POTASSIUM CHLORIDE: 2 INJECTION, SOLUTION, CONCENTRATE INTRAVENOUS at 03:49

## 2019-08-27 RX ADMIN — BUDESONIDE AND FORMOTEROL FUMARATE DIHYDRATE 2 PUFF: 160; 4.5 AEROSOL RESPIRATORY (INHALATION) at 08:00

## 2019-08-27 RX ADMIN — ASPIRIN 81 MG 324 MG: 81 TABLET ORAL at 03:06

## 2019-08-27 RX ADMIN — CARVEDILOL 12.5 MG: 12.5 TABLET, FILM COATED ORAL at 08:00

## 2019-08-27 RX ADMIN — MAGNESIUM SULFATE HEPTAHYDRATE 2 G: 40 INJECTION, SOLUTION INTRAVENOUS at 03:06

## 2019-08-27 RX ADMIN — POTASSIUM CHLORIDE 40 MEQ: 1500 TABLET, EXTENDED RELEASE ORAL at 08:57

## 2019-08-27 RX ADMIN — APIXABAN 5 MG: 5 TABLET, FILM COATED ORAL at 21:38

## 2019-08-27 RX ADMIN — TIOTROPIUM BROMIDE 18 MCG: 18 CAPSULE ORAL; RESPIRATORY (INHALATION) at 09:00

## 2019-08-27 RX ADMIN — DULOXETINE HYDROCHLORIDE 30 MG: 30 CAPSULE, DELAYED RELEASE ORAL at 09:00

## 2019-08-27 RX ADMIN — CARVEDILOL 12.5 MG: 12.5 TABLET, FILM COATED ORAL at 17:47

## 2019-08-27 RX ADMIN — APIXABAN 5 MG: 5 TABLET, FILM COATED ORAL at 09:00

## 2019-08-27 RX ADMIN — ACETAMINOPHEN 650 MG: 325 TABLET ORAL at 21:48

## 2019-08-27 RX ADMIN — SPIRONOLACTONE 25 MG: 25 TABLET ORAL at 21:38

## 2019-08-27 RX ADMIN — CEFTRIAXONE 2 G: 2 INJECTION, POWDER, FOR SOLUTION INTRAMUSCULAR; INTRAVENOUS at 06:22

## 2019-08-27 RX ADMIN — POTASSIUM CHLORIDE 40 MEQ: 20 TABLET, EXTENDED RELEASE ORAL at 17:47

## 2019-08-27 RX ADMIN — SACUBITRIL AND VALSARTAN 1 TABLET: 49; 51 TABLET, FILM COATED ORAL at 09:00

## 2019-08-27 NOTE — PROGRESS NOTES
OCCUPATIONAL THERAPY EVALUATION/DISCHARGE    Patient: Que Altman (33 y.o. female)  Date: 8/27/2019  Primary Diagnosis: CAD (coronary artery disease) [I25.10]  UTI (urinary tract infection) [N39.0]        Precautions:   (Standard )  PLOF: Pt was modified independent with basic self care tasks and used a QC/rollator for functional mobility PTA. ASSESSMENT AND RECOMMENDATIONS:  Based on the objective data described below, the patient is able to perform basic self care tasks without assistance while seated. Supervision to modified independence given for functional standing and transfers. Will defer to PT for mobility training if needed. Patient has a supportive spouse at home to assist her prn and all needed DME (shower chair, QC, rollator) for home safety. She notes SOB with daily activities and educated on energy conservation techniques as well as pursed lip breathing. Patient verbalized/demonstrated understanding. Skilled occupational therapy is not indicated at this time. Discharge Recommendations: None  Further Equipment Recommendations for Discharge: N/A      SUBJECTIVE:   Patient stated I get tired when I walk a long way.     OBJECTIVE DATA SUMMARY:     Past Medical History:   Diagnosis Date    Abnormal nuclear cardiac imaging test 06/11/2010    Lg fixed anterior, septal, apical, inferoseptal defect sugg RCA & LAD disease or cardiomyopathy. EF 20%. Global hykn. Nondiagnostic EKG. Acute bronchitis 10/15/2018    Persistent cough and wheezing in spite of prednisone and antibiotic.   Will refer to pulmonary    Adenocarcinoma of breast; locally advanced invasive ductal adenocarcinoma of left breast     Asthma     Automatic implantable cardiac defibrillator in situ     Automatic implantable cardiac defibrillator in situ     Breast cancer (HCC)     Cancer ([facility] [address]     breast cancer left    Chemotherapy convalescence or palliative care 2012    Chronic combined systolic and diastolic heart failure ([facility] [address]     Remains symptomatic, nyha class 3 ef improving. Congestive heart failure, unspecified     chronic class ll    Echocardiogram 09/27/2010    EF 30%. Global hykn. Mild MR. Mild TR. GERD (gastroesophageal reflux disease)     Hyperlipidemia     Hypertension     Mitral valve disorders(424.0) 1/15/2014    mild to moderate mr     Mitral valve disorders(424.0) 1/15/2014    mild to moderate mr     MVP (mitral valve prolapse)     Nonischemic cardiomyopathy (HCC)     EF 20-30% despite medical therapy    Nonspecific abnormal electrocardiogram (ECG) (EKG)     Osteopenia     Other primary cardiomyopathies     Pacemaker 10/27/10    s/p implantation, without complication    Poisoning by other and unspecified agents primarily affecting the cardiovascular system(972.9)     Ef slightly better to 45%, STABLE back on chemo. Radiation therapy     Radiation therapy complication 5973    S/P cardiac catheterization 07/08/10    Patent coronary arteries. LVEDP 25.  EF 25%. Global hykn. Moderate MR.  RA 10.  RV 40/10. PA 40/20.  20.  CO/CI 4.5/2.45 (Larry).     Shortness of breath     Syncope and collapse     Thyroid disease     goiter     Past Surgical History:   Procedure Laterality Date    CARDIAC SURG PROCEDURE UNLIST      Difibrillator; BI V ICD    HX MASTECTOMY  09/28/11    modified radical mastectomy and axillary dissection    HX ORTHOPAEDIC      HX OTHER SURGICAL      surgery to remove part of liver    HX PACEMAKER  10/27/10    s/p Medtronic biventricular AICD, without complication    HX RADICAL MASTECTOMY      IR INSERT TUNL CVC W PORT OVER 5 YEARS  1/16/2019    NEUROLOGICAL PROCEDURE UNLISTED      Cervical fusion     Barriers to Learning/Limitations: None  Compensate with: visual, verbal, tactile, kinesthetic cues/model    Home Situation:   Home Situation  Home Environment: Private residence  # Steps to Enter: 4  Rails to Enter: Yes  Hand Rails : Bilateral  One/Two Story Residence: One story  Living Alone: No  Support Systems: Spouse/Significant Other/Partner  Patient Expects to be Discharged to[de-identified] Private residence  Current DME Used/Available at Home: jaspreet Valdivia, [address] [facility] chair, Walker, rollator  Tub or Shower Type: Tub/Shower combination(with seat)  ? Right hand dominant   ? Left hand dominant    Cognitive/Behavioral Status:  Neurologic State: Alert  Orientation Level: Oriented X4  Cognition: Appropriate decision making; Follows commands  Safety/Judgement: Awareness of environment;Home safety    Skin: Intact on UEs  Edema: None noted in UEs    Vision/Perceptual:    Acuity: Within Defined Limits      Coordination: BUE  Fine Motor Skills-Upper: Left Intact; Right Intact    Gross Motor Skills-Upper: Left Intact; Right Intact    Balance:  Sitting: Intact  Standing: Intact    Strength: BUE  Strength: Within functional limits    Tone & Sensation: BUE  Tone: Normal  Sensation: Intact    Range of Motion: BUE  AROM: Within functional limits    Functional Mobility and Transfers for ADLs:  Bed Mobility:  Supine to Sit: Modified independent  Sit to Supine: Modified independent    Transfers:  Sit to Stand: Modified independent  Stand to Sit: Modified independent   Toilet Transfer : Modified independent    ADL Assessment:  Feeding: Independent    Oral Facial Hygiene/Grooming: Independent    Bathing: Modified independent    Upper Body Dressing: Modified independent    Lower Body Dressing: Modified independent    Toileting: Modified independent    Pain:  Pain level pre-treatment: 0/10   Pain level post-treatment: 0/10   Pain Intervention(s): NA  Response to intervention: NA    Activity Tolerance:   Good  Please refer to the flowsheet for vital signs taken during this treatment. After treatment:   ?  Patient left in no apparent distress sitting up in chair  ? Patient left in no apparent distress in bed  ? Call bell left within reach  ? Nursing notified  ? Caregiver () present  ?   Bed alarm activated    COMMUNICATION/EDUCATION:   ?      Role of Occupational Therapy in the acute care setting  ? Home safety education was provided and the patient/caregiver indicated understanding. ? Patient/family have participated as able and agree with findings and recommendations. ?      Patient is unable to participate in plan of care at this time. Thank you for this referral.  Phani Danielle, MS OTR/L   Time Calculation: 16 mins      Eval Complexity: History: LOW Complexity : Brief history review ; Examination: LOW Complexity : 1-3 performance deficits relating to physical, cognitive , or psychosocial skils that result in activity limitations and / or participation restrictions ;    Decision Making:LOW Complexity : No comorbidities that affect functional and no verbal or physical assistance needed to complete eval tasks

## 2019-08-27 NOTE — ED PROVIDER NOTES
EMERGENCY DEPARTMENT HISTORY AND PHYSICAL EXAM    1:01 AM  Date: 8/27/2019  Patient Name: Jessica Brenner    History of Presenting Illness     Chief Complaint   Patient presents with    Fatigue    Nausea        History Provided By: Patient    HPI: Jessica Brenner is a 78 y.o. female with history of metastatic breast cancer on chemotherapy. As well as as multiple other medical problems as below. Patient is presenting after 2 episodes of confusion. Per the family she was asleep and her  noticed her having abnormal movements of her arms and noisy breathing and he was trying to wake her up, patient woke up confused and was not aware was happening. Denies headache, vision changes, nausea or vomiting. No history of weakness, tingling or numbness. No chest pain or shortness of breath. No prior similar episodes. Last chemo was 5 days ago. No history of brain mets. Patient was started on a new medication 2 weeks ago and since then she has been having symptoms of not feeling well and tingling. She is also on Cymbalta. Location:  Severity:  Timing/course: Onset/Duration:     PCP: Adan Allen MD    Past History     Past Medical History:  Past Medical History:   Diagnosis Date    Abnormal nuclear cardiac imaging test 06/11/2010    Lg fixed anterior, septal, apical, inferoseptal defect sugg RCA & LAD disease or cardiomyopathy. EF 20%. Global hykn. Nondiagnostic EKG.  Acute bronchitis 10/15/2018    Persistent cough and wheezing in spite of prednisone and antibiotic.   Will refer to pulmonary    Adenocarcinoma of breast; locally advanced invasive ductal adenocarcinoma of left breast     Asthma     Automatic implantable cardiac defibrillator in situ     Automatic implantable cardiac defibrillator in situ     Breast cancer ([facility] [address]     Cancer ([facility] [address]     breast cancer left    Chemotherapy convalescence or palliative care 2012    Chronic combined systolic and diastolic heart failure ([address]     Remains symptomatic, nyha class 3 ef improving.  Congestive heart failure, unspecified     chronic class ll    Echocardiogram 09/27/2010    EF 30%. Global hykn. Mild MR. Mild TR.  GERD (gastroesophageal reflux disease)     Hyperlipidemia     Hypertension     Mitral valve disorders(424.0) 1/15/2014    mild to moderate mr     Mitral valve disorders(424.0) 1/15/2014    mild to moderate mr     MVP (mitral valve prolapse)     Nonischemic cardiomyopathy (HCC)     EF 20-30% despite medical therapy    Nonspecific abnormal electrocardiogram (ECG) (EKG)     Osteopenia     Other primary cardiomyopathies     Pacemaker 10/27/10    s/p implantation, without complication    Poisoning by other and unspecified agents primarily affecting the cardiovascular system(972.9)     Ef slightly better to 45%, STABLE back on chemo.  Radiation therapy     Radiation therapy complication 1239    S/P cardiac catheterization 07/08/10    Patent coronary arteries. LVEDP 25.  EF 25%. Global hykn. Moderate MR.  RA 10.  RV 40/10. PA 40/20.  20.  CO/CI 4.5/2.45 (Larry).     Shortness of breath     Syncope and collapse     Thyroid disease     goiter       Past Surgical History:  Past Surgical History:   Procedure Laterality Date    CARDIAC SURG PROCEDURE UNLIST      Difibrillator; BI V ICD    HX MASTECTOMY  09/28/11    modified radical mastectomy and axillary dissection    HX ORTHOPAEDIC      HX OTHER SURGICAL      surgery to remove part of liver    HX PACEMAKER  10/27/10    s/p Medtronic biventricular AICD, without complication    HX RADICAL MASTECTOMY      IR INSERT TUNL CVC W PORT OVER 5 YEARS  1/16/2019    NEUROLOGICAL PROCEDURE UNLISTED      Cervical fusion       Family History:  Family History   Problem Relation Age of Onset    Hypertension Mother     High Cholesterol Mother     Heart Disease Father         paemaker implant at 80       Social History:  Social History     Tobacco Use    Smoking status: Never Smoker    Smokeless tobacco: Never Used   Substance Use Topics    Alcohol use: No    Drug use: No       Allergies: Allergies   Allergen Reactions    Adhesive Other (comments)     blisters       Review of Systems   Review of Systems   Neurological: Positive for syncope. Psychiatric/Behavioral: Positive for confusion. All other systems reviewed and are negative. Physical Exam     Patient Vitals for the past 12 hrs:   Temp Pulse Resp BP SpO2   08/27/19 0052 98.4 °F (36.9 °C) 90 12 128/47 98 %   08/27/19 0051    128/47        Physical Exam   Constitutional: She is oriented to person, place, and time. She appears well-developed and well-nourished. HENT:   Head: Normocephalic and atraumatic. Eyes: Conjunctivae and EOM are normal.   Neck: Normal range of motion. Neck supple. Cardiovascular: Normal rate and normal heart sounds. Pulmonary/Chest: Effort normal and breath sounds normal.   Abdominal: Soft. She exhibits no distension. There is no tenderness. Musculoskeletal: Normal range of motion. She exhibits no edema or deformity. Neurological: She is alert and oriented to person, place, and time. No cranial nerve deficit. She exhibits normal muscle tone. Coordination normal.   Skin: Skin is warm and dry. Psychiatric: She has a normal mood and affect. Her behavior is normal. Judgment and thought content normal.       Diagnostic Study Results     Labs -  No results found for this or any previous visit (from the past 12 hour(s)). Radiologic Studies -   No results found. Medical Decision Making     ED Course: Progress Notes, Reevaluation, and Consults:    1:01 AM Initial assessment performed. The patients presenting problems have been discussed, and they/their family are in agreement with the care plan formulated and outlined with them. I have encouraged them to ask questions as they arise throughout their visit. Repeat troponin still getting elevated.   Will consult cardiology and admit to the hospitalist.  Of note the patient never had chest pain at any point and still does not have any chest pain. This is possibly related to heart failure. We will admit her to the hospitalist and consult cardiology. Provider Notes (Medical Decision Making): 79-year-old female presenting with episodes of abnormal movements and confusion while asleep. She is well-appearing on exam and have an. Could potentially be a side effect of her medication versus new onset seizure versus intracranial pathologies including mets. She was also treated recently for UTI so it could still be persistent UTI. Will get screening labs for septic work-up as well as a head CT. If her work-up is negative I am comfortable discharging the patient with an outpatient neurological evaluation as a possible no onset seizure. However this is more likely to be related to a medication side effect. Procedures:     Critical Care Time:     Vital Signs-Reviewed the patient's vital signs. Reviewed pt's pulse ox reading. EKG: Interpreted by the EP. Time Interpreted:    Rate:    Rhythm: V paced rhythm 95   Interpretation occasional PVCs:   Comparison: No significant interval changes    Records Reviewed: Nursing Notes and Old Medical Records (Time of Review: 1:01 AM)  -I am the first provider and I know that she for this patient.  -I reviewed the vital signs, available nursing notes, past medical history, past surgical history, family history and social history. Current Outpatient Medications   Medication Sig Dispense Refill    sacubitril-valsartan (ENTRESTO) 49 mg/51 mg tablet Take 1 Tab by mouth two (2) times a day. 180 Tab 3    nitrofurantoin, macrocrystal-monohydrate, (MACROBID) 100 mg capsule Take 100 mg by mouth two (2) times a day.  umeclidinium (INCRUSE ELLIPTA) 62.5 mcg/actuation inhaler Take 1 Puff by inhalation daily. 3 Inhaler 3    digoxin (LANOXIN) 0.125 mg tablet Take 1 Tab by mouth daily. 90 Tab 3    furosemide (LASIX) 40 mg tablet 1 tab po daily 90 Tab 3    mometasone (NASONEX) 50 mcg/actuation nasal spray 2 Sprays by Both Nostrils route daily as needed. Indications: inflammation of the nose due to an allergy      carvedilol (COREG) 12.5 mg tablet Take 1 Tab by mouth two (2) times daily (with meals). 60 Tab 0    apixaban (ELIQUIS) 5 mg tablet 2 tabs po BID for 6 days then 1 tab po BID until discontinued 60 Tab 0    budesonide-formoterol (SYMBICORT) 160-4.5 mcg/actuation HFAA Take 2 Puffs by inhalation two (2) times a day. 1 Inhaler 0    naloxone (NARCAN) 0.4 mg/mL injection 1 mL by IntraVENous route as needed for Other (Give if breaths per minute  less than 10, may repeat dose in 15 min and contact MD). 1 mL 0    albuterol-ipratropium (DUO-NEB) 2.5 mg-0.5 mg/3 ml nebu 3 mL by Nebulization route every six (6) hours as needed (wheezing or sob). File under Medicare Part B, ICD codes J44.9, C78.01 120 Nebule 3    DULoxetine (CYMBALTA) 30 mg capsule Take 30 mg by mouth daily.  multivitamin (ONE A DAY) tablet Take 1 Tab by mouth daily.  b complex vitamins (B COMPLEX 1) tablet Take 1 Tab by mouth daily.  cyanocobalamin 1,000 mcg tablet Take 3,000 mcg by mouth daily.  plant stanol eliezer (CHOLEST OFF PO) Take 1 Tab by mouth daily.  benzonatate (TESSALON PERLES) 100 mg capsule Take 100 mg by mouth three (3) times daily as needed for Cough.  Biotin 2,500 mcg cap Take 1 Cap by mouth daily.  melatonin 10 mg tab Take 1 Tab by mouth nightly.  montelukast (SINGULAIR) 10 mg tablet Take 1 Tab by mouth daily. 90 Tab 3    cholecalciferol, vitamin D3, 2,000 unit tab Take 1 Tab by mouth daily.  raloxifene (EVISTA) 60 mg tablet Take 60 mg by mouth daily. Clinical Impression     Clinical Impression: No diagnosis found. Disposition: Admit        This note was dictated utilizing voice recognition software which may lead to typographical errors.   I apologize in advance if the situation occurs. If questions arise please do not hesitate to contact me or call our department.     Alejandrina Guillermo MD  1:01 AM

## 2019-08-27 NOTE — PROGRESS NOTES
Pt admitted this am, seen for follow up   Pt feels lot better, no confusion    at bedside, feels she is back to baseline   Hx 2 UTI this month, no flank pain   Labs, chart and vitals noted   Oncology in put noted   Will get CT A/P to r/o pyleo or stone due to recurrent infection  D/w pt and  in detail   D/w RN

## 2019-08-27 NOTE — ED TRIAGE NOTES
Pt has hx of metastatic breast cancer,  says she was making gurgling noises and appeared to pass out twice tonight while in bed, pt became alert immediately after both events, pt began vomiting in ambulance and received 4 mg zofran IV, pt currently alert and oriented, pt states she felt light headed prior to both events. Ptis receiving chemotherapy, has mediport and pacemaker.

## 2019-08-27 NOTE — CDMP QUERY
Patient admitted with UTI, noted to have AMS. If possible, please document in progress notes and d/c summary if you are evaluating and/or treating any of the following:     Metabolic encephalopathy   Toxic encephalopathy         Toxic metabolic encephalopathy   Other -Please specify   Unable to Determine    The medical record reflects the following:    Risk Factors: UTI; new medications within the last 2 weeks    Clinical Indicators: \"AMS\" per medical note; \"acute encephalopathy\" per Oncology note    Treatment: Labs; monitoring;  Abx    Thank you,  Adrian Chakraborty RN/CCDS  [phone]

## 2019-08-27 NOTE — H&P
History & Physical    Patient: Newton Carreno MRN: [id number]  CSN: [id number]    YOB: 1950  Age: 78 y.o. Sex: female      DOA: 8/27/2019    Chief Complaint:   Chief Complaint   Patient presents with    Fatigue    Nausea          HPI:     Newton Carreno is a 78 y.o.  female who has remote HX of breast cancer with recently dx with 2 ry lung cancer on chemotherapy. Pt was in ER yesterday with a CC of SOB and was Dx with acute and subacute PEs and currently on Eliquis   Pt also has HX of CHF with 30% EF but repeat Echo few months ago after optimizing meds with cardiology showed Improvement of her EF to 51-55% but then shortly after Chemotherapy she received another echo with EF of 26%    Pt presented to ER with AMS/Lethargy and generalized weakness. In ER, Pt was found to have UTI, dehydrated and mild elevation of Troponin but denied CP. Pt was also found to have multiple electrolytes deficiencies including K and Mg   Pt was mildly hydrated and started on Abx . Troponin series showed mild up trending. Pt in general is starting to feel better and will be admitted mainly for elevated troponin and AMS /metabolic encephalopathy 2 ry to UTI and electrolytes imbalance   States that she is scheduled to receive her Chemotherapy this AM      Past Medical History:   Diagnosis Date    Abnormal nuclear cardiac imaging test 06/11/2010    Lg fixed anterior, septal, apical, inferoseptal defect sugg RCA & LAD disease or cardiomyopathy. EF 20%. Global hykn. Nondiagnostic EKG.  Acute bronchitis 10/15/2018    Persistent cough and wheezing in spite of prednisone and antibiotic.   Will refer to pulmonary    Adenocarcinoma of breast; locally advanced invasive ductal adenocarcinoma of left breast     Asthma     Automatic implantable cardiac defibrillator in situ     Automatic implantable cardiac defibrillator in situ     [facility] [address]     [facility]     breast cancer left    Chemotherapy convalescence or palliative care 2012    Chronic combined systolic and diastolic heart failure (HCC)     Remains symptomatic, nyha class 3 ef improving.  Congestive heart failure, unspecified     chronic class ll    Echocardiogram 09/27/2010    EF 30%. Global hykn. Mild MR. Mild TR.  GERD (gastroesophageal reflux disease)     Hyperlipidemia     Hypertension     Mitral valve disorders(424.0) 1/15/2014    mild to moderate mr     Mitral valve disorders(424.0) 1/15/2014    mild to moderate mr     MVP (mitral valve prolapse)     Nonischemic cardiomyopathy (HCC)     EF 20-30% despite medical therapy    Nonspecific abnormal electrocardiogram (ECG) (EKG)     Osteopenia     Other primary cardiomyopathies     Pacemaker 10/27/10    s/p implantation, without complication    Poisoning by other and unspecified agents primarily affecting the cardiovascular system(972.9)     Ef slightly better to 45%, STABLE back on chemo.  Radiation therapy     Radiation therapy complication 4848    S/P cardiac catheterization 07/08/10    Patent coronary arteries. LVEDP 25.  EF 25%. Global hykn. Moderate MR.  RA 10.  RV 40/10. PA 40/20.  20.  CO/CI 4.5/2.45 (Larry).     Shortness of breath     Syncope and collapse     Thyroid disease     goiter       Past Surgical History:   Procedure Laterality Date    CARDIAC SURG PROCEDURE UNLIST      Difibrillator; BI V ICD    HX MASTECTOMY  09/28/11    modified radical mastectomy and axillary dissection    HX ORTHOPAEDIC      HX OTHER SURGICAL      surgery to remove part of liver    HX PACEMAKER  10/27/10    s/p [employer] biventricular AICD, without complication    HX RADICAL MASTECTOMY      IR INSERT TUNL CVC W PORT OVER 5 YEARS  1/16/2019    NEUROLOGICAL PROCEDURE UNLISTED      Cervical fusion       Family History   Problem Relation Age of Onset    Hypertension Mother     High Cholesterol Mother     Heart Disease Father         paemaker implant at 80 Social History     Socioeconomic History    Marital status:      Spouse name: Not on file    Number of children: Not on file    Years of education: Not on file    Highest education level: Not on file   Tobacco Use    Smoking status: Never Smoker    Smokeless tobacco: Never Used   Substance and Sexual Activity    Alcohol use: No    Drug use: No       Prior to Admission medications    Medication Sig Start Date End Date Taking? Authorizing Provider   tiotropium (SPIRIVA WITH HANDIHALER) 18 mcg inhalation capsule Take 1 Cap by inhalation daily. Yes Provider, Historical   sacubitril-valsartan (ENTRESTO) 49 mg/51 mg tablet Take 1 Tab by mouth two (2) times a day. 8/12/19  Yes oPp Estes NP   digoxin (LANOXIN) 0.125 mg tablet Take 1 Tab by mouth daily. 5/8/19  Yes Keya Morrison MD   furosemide (LASIX) 40 mg tablet 1 tab po daily 5/8/19  Yes Keya Morrison MD   mometasone (NASONEX) 50 mcg/actuation nasal spray 2 Sprays by Both Nostrils route daily as needed. Indications: inflammation of the nose due to an allergy   Yes Provider, Historical   carvedilol (COREG) 12.5 mg tablet Take 1 Tab by mouth two (2) times daily (with meals). 5/6/19  Yes Christina Salguero MD   apixaban [facility]) 5 mg tablet 2 tabs po BID for 6 days then 1 tab po BID until discontinued 5/6/19  Yes Christina Salguero MD   budesonide-formoterol [facility]) 160-4.5 mcg/actuation HFAA Take 2 Puffs by inhalation two (2) times a day. 5/6/19  Yes Christina Salguero MD   albuterol-ipratropium (DUO-NEB) 2.5 mg-0.5 mg/3 ml nebu 3 mL by Nebulization route every six (6) hours as needed (wheezing or sob). File under Medicare Part B, ICD codes J44.9, C78.01 5/2/19  Yes Andrez HINSON NP   DULoxetine (CYMBALTA) 30 mg capsule Take 30 mg by mouth daily. Yes Provider, Historical   multivitamin (ONE A DAY) tablet Take 1 Tab by mouth daily. Yes Provider, Historical   b complex vitamins (B COMPLEX 1) tablet Take 1 Tab by mouth daily.    Yes Provider, Historical   plant stanol eliezer (CHOLEST OFF PO) Take 1 Tab by mouth daily. Yes Provider, Historical   benzonatate (TESSALON PERLES) 100 mg capsule Take 100 mg by mouth three (3) times daily as needed for Cough. Yes Provider, Historical   Biotin 2,500 mcg cap Take 1 Cap by mouth daily. Yes Provider, Historical   melatonin 10 mg tab Take 1 Tab by mouth nightly. Yes Provider, Historical   montelukast (SINGULAIR) 10 mg tablet Take 1 Tab by mouth daily. 7/6/18  Yes Nancy Henderson MD   cholecalciferol, vitamin D3, 2,000 unit tab Take 1 Tab by mouth daily. Yes Provider, Historical   raloxifene (EVISTA) 60 mg tablet Take 60 mg by mouth daily. Yes Provider, Historical   naloxone (NARCAN) 0.4 mg/mL injection 1 mL by IntraVENous route as needed for Other (Give if breaths per minute  less than 10, may repeat dose in 15 min and contact MD). 5/6/19   Kaylie Lilly MD       Allergies   Allergen Reactions    Adhesive Other (comments)     blisters         Review of Systems  GENERAL: Patient alert, awake and oriented times 3 at this time. Was confused earlier , able to communicate full sentences and not in distress. HEENT: No change in vision, no earache, tinnitus, sore throat or sinus congestion. NECK: No pain or stiffness. PULMONARY: No shortness of breath, cough or wheeze. Cardiovascular: no pnd / orthopnea, no CP  GASTROINTESTINAL: No abdominal pain, nausea, vomiting or diarrhea, melena or bright red blood per rectum. GENITOURINARY: No urinary frequency, urgency, hesitancy or dysuria. MUSCULOSKELETAL: + joint / muscle pain, no back pain, no recent trauma. DERMATOLOGIC: No rash, no itching, no lesions. ENDOCRINE: No polyuria, polydipsia, no heat or cold intolerance. No recent change in weight. HEMATOLOGICAL: No anemia or easy bruising or bleeding. NEUROLOGIC: No headache, seizures, numbness, tingling + weakness.        Physical Exam:     Physical Exam:  Visit Vitals  /65 (BP 1 Location: Right arm, BP Patient Position: At rest)   Pulse 98   Temp 98.5 °F (36.9 °C)   Resp 18   Ht 5' 5\" (1.651 m)   Wt 79.8 kg (176 lb)   SpO2 96%   Breastfeeding? No   BMI 29.29 kg/m²      O2 Device: Room air    Temp (24hrs), Av.6 °F (37 °C), Min:98.4 °F (36.9 °C), Max:98.9 °F (37.2 °C)    No intake/output data recorded.  07 - 1900  In: 770 [P.O.:720; I.V.:50]  Out: 325 [Urine:325]    General:  Alert, cooperative, was in distress, appears stated age. Head: Normocephalic, without obvious abnormality, atraumatic. Eyes:  Conjunctivae/corneas clear. PERRL, EOMs intact. Nose: Nares normal. No drainage or sinus tenderness. Neck: Supple, symmetrical, trachea midline, no adenopathy, thyroid: no enlargement, no carotid bruit and no JVD. Lungs:    Decrease BS B/L    Heart:  Regular rate and rhythm, S1, S2 normal.     Abdomen: Soft, non-tender. Bowel sounds normal.    Extremities: Extremities normal, atraumatic, no cyanosis or edema. Pulses: 2+ and symmetric all extremities. Skin:  No rashes or lesions   Neurologic: AAOx3, No focal motor or sensory deficit. Labs Reviewed:    Lab results reviewed. For significant abnormal values and values requiring intervention, see assessment and plan.   CXR and EKG    Procedures/imaging: see electronic medical records for all procedures/Xrays and details which were not copied into this note but were reviewed prior to creation of Plan      Assessment/Plan     Principal Problem:    Acute encephalopathy (2019)    Active Problems:    Malignant neoplasm metastatic to lung ([facility] [address] (2019)      UTI (urinary tract infection) (2019)      CAD (coronary artery disease) (2019)      Elevated troponin (2019)      Weakness generalized (2019)      Chronic anticoagulation (2019)      History of pulmonary embolism (2019)      Hypokalemia (2019)      Hypomagnesemia (2019)       Pt is being admitted for acute metabolic encephalopathy 2 ry to UTI and electrolytes deficiencies   Elevated troponin w/out CP r/o ACS  Generalized weakness   Lung cancer on Chemotherapy   H/o CAD and PE   CHF >> compensated >> on entresto   COPD > no exacerbation   H/o A-fib on digoxin and rate controlled on digoxin and BB     Continue Ceftriaxone   Mild hydration and monitor for overload   Continue Eliquis   Cardiology and oncology consults  Home meds for CHF and A-fib are continued       DVT/GI Prophylaxis: Eliquis     Plan of care is discussed in details with Patient/Family at bedside and agreed upon    Nick Mckeon MD  8/27/2019 5:59 AM

## 2019-08-27 NOTE — ROUTINE PROCESS
TRANSFER - OUT REPORT:    Verbal report given to Leopold Chary on [employer]  being transferred to [facility] for routine progression of care       Report consisted of patients Situation, Background, Assessment and   Recommendations(SBAR). Information from the following report(s) SBAR was reviewed with the receiving nurse. Lines:   Venous Access Device [employer] SMART PORT CNL#[id number] 01/16/19 Upper chest (subclavicular area), left (Active)       Peripheral IV 08/27/19 Right Hand (Active)   Site Assessment Clean, dry, & intact 8/27/2019  2:31 AM   Phlebitis Assessment 0 8/27/2019  2:31 AM   Infiltration Assessment 0 8/27/2019  2:31 AM   Dressing Status Clean, dry, & intact 8/27/2019  2:31 AM        Opportunity for questions and clarification was provided.       Patient transported with:   [employer]

## 2019-08-27 NOTE — PROGRESS NOTES
Reason for Admission:  CAD (coronary artery disease) [I25.10]  UTI (urinary tract infection) [N39.0]                 RRAT Score:    31            Plan for utilizing home health:    PENDING                     Likelihood of Readmission:   Moderate                         Do you (patient/family) have any concerns for transition/discharge?  no    Transition of Care Plan:       Initial assessment completed with patient. Cognitive status of patient: oriented to time, place, person and situation. Face sheet information confirmed:  yes. The patient designates Amy Thomas to participate in her discharge plan and to receive any needed information. This patient lives in a single family home with patient and spouse. Patient is able to navigate steps as needed. Prior to hospitalization, patient was considered to be independent with ADLs/IADLS : yes . If not independent,  patient needs assist with : NONE    Patient has a current ACP document on file: NO    The patient and  will be available to transport patient home upon discharge. The patient already has none reported, and NONE medical equipment available in the home. Patient is not currently active with home health. If active, agency name is NONE. Patient has not stayed in a skilled nursing facility or rehab. Was  stay within last 60 days : no. This patient is on dialysis :no      List of available  agencies were provided and reviewed with the patient prior to discharge. Freedom of choice signed: no, for . Currently, the discharge plan is Home. The patient states that she can obtain her medications from the pharmacy, and take her medications as directed. Patient's current insurance is MEDICARE      Care Management Interventions  PCP Verified by CM: Yes(DR. Ruslan Abrams)  Mode of Transport at Discharge:  Other (see comment)([facility] WILL TRANSPORT THIS PT HOME)  Transition of Care Consult (CM Consult): Discharge Planning  Physical Therapy Consult: Yes  Occupational Therapy Consult: Yes  Current Support Network: Lives with Spouse, Own Home  Confirm Follow Up Transport: Family  Plan discussed with Pt/Family/Caregiver: Yes(THIS PT WILL RETURN HOME WITH FAMILY SUPPORT)  Discharge Location  Discharge Placement: Home with family assistance        [address]

## 2019-08-27 NOTE — PROGRESS NOTES
Admitted for AMS 2 ry to UTI  Mildly elevated troponin   Hx of lung Cancer on chemo, COPD, A-fib on digoxin and BB   Oncology is aware   Cardiology assigned   On eliquis for Hx of PE  Improving   Holding lasix   CHF on entresto

## 2019-08-27 NOTE — CONSULTS
[facility] - Consult Note    Date of  Admission: 8/27/2019 12:31 AM     Primary Care Physician:  Emelina Schafer MD     Plan:       1. Chronic Systolic CHF-stage C NYHA class III- compensated. Monitor for s/s fluid overload   2. Nonischemic cardiomyopathy (EF 26-30%)-likely from Herceptin use. Continue coreg. Patient started on Entresto on 7/26/19   3. Status post ICD-normal function last interrogated in 5/2019.   4. Hx of recent Acute PE- on Eliquis BID.   5. Hx of recent Acute DVT   6. Hypertension- stable. Continue low dose coreg and Entresto. 7. Metastatic breast cancer now back on chemotherapy. Lung metastasis likely cause for shortness of breath which is better. 8. Moderate mitral regurgitation likely from non ischemic cardiomyopathy  9. Acute encephalopathy- had two episodes of confusion with agitation    10. Hypokalemia- was replete in the ED  11. Hx of recurrent UTI- UA today Bacteria Unable to quantitate.      Stable cardiac hemodynamics at present with compensated cardiomyopathy. Add lasix and aldactone. Will continue the medical treatment. Will follow from cardiac standpoint along with you. D/w pt & family      07/12/19   ECHO ADULT FOLLOW-UP OR LIMITED 07/13/2019 7/13/2019    Narrative · Left Ventricle: Normal cavity size. Mild concentric hypertrophy. Severe   systolic dysfunction. Estimated left ventricular ejection fraction is 21 -   25%. Abnormal left ventricular wall motion. Inconclusive left ventricular   diastolic function. · Left Atrium: Severely dilated left atrium. · Mitral Valve: Mitral valve thickening. Mitral annular calcification. Moderate mitral valve regurgitation. · Tricuspid Valve: Mild tricuspid valve regurgitation is present. Pulmonary arterial systolic pressure is 99.8 mmHg. Mild pulmonary   hypertension.   · IVC/Hepatic Veins: Moderately elevated central venous pressure (10-15   mmHg); IVC diameter is larger than 21 mm and collapses more than 50% with   respiration. · Pericardium: Trivial-to-small pericardial effusion. Signed by: Sybil Adkins MD               XR Results (most recent):  Results from Hospital Encounter encounter on 08/27/19   XR CHEST PORT    Narrative PORTABLE CHEST RADIOGRAPH     CPT CODE: 06778     INDICATION: Altered mental status. COMPARISON: 5/3/2019. FINDINGS:    There is a left chest port which appears to extend into the SVC however tip is  obscured. There is a right-sided pacemaker with leads along the right atrium,  right ventricle and coronary sinus. Additional EKG leads overlie the chest. The  cardiomediastinal silhouette is mildly enlarged, unchanged. The pulmonary  vascular markings are unremarkable. There is no focal consolidation, pleural  effusion or pneumothorax. Cervical fusion hardware is visualized. Osseous  structures are grossly intact.          Impression IMPRESSION:    No acute cardiopulmonary process            Assessment:     Hospital Problems  Date Reviewed: 6/25/2019          Codes Class Noted POA    UTI (urinary tract infection) ICD-10-CM: N39.0  ICD-9-CM: 599.0  8/27/2019 Unknown        CAD (coronary artery disease) ICD-10-CM: I25.10  ICD-9-CM: 414.00  8/27/2019 Unknown        Elevated troponin ICD-10-CM: R74.8  ICD-9-CM: 790.6  8/27/2019 Unknown        Weakness generalized ICD-10-CM: R53.1  ICD-9-CM: 780.79  8/27/2019 Unknown        Chronic anticoagulation ICD-10-CM: Z79.01  ICD-9-CM: V58.61  8/27/2019 Unknown        History of pulmonary embolism ICD-10-CM: Z86.711  ICD-9-CM: V12.55  8/27/2019 Unknown        Hypokalemia ICD-10-CM: E87.6  ICD-9-CM: 276.8  8/27/2019 Unknown        Hypomagnesemia ICD-10-CM: E83.42  ICD-9-CM: 275.2  8/27/2019 Unknown        * (Principal) Acute encephalopathy ICD-10-CM: G93.40  ICD-9-CM: 348.30  8/27/2019 Unknown        Malignant neoplasm metastatic to lung [facility]) ICD-10-CM: C78.00  ICD-9-CM: 197.0  2/7/2019 Yes                   History of Present Illness: This is a 78 y.o. female admitted for CAD (coronary artery disease) [I25.10] UTI (urinary tract infection) [N39.0]. Ron Terri is a 78 y.o. female with history of metastatic breast cancer on chemotherapy. As well as as multiple other medical problems as below. Patient is presenting after 2 episodes of confusion with agitation. Per patient's  she was asleep and her  noticed her having abnormal movements of her arms and noisy breathing and he was trying to wake her up, patient woke up confused and was not aware was happening. Patient Denies headache, vision changes. She reports poor appetite nausea and one episode of vomiting this am. No chest pain or shortness of breath. No prior similar episodes. Last chemo was 5 days ago. No history of brain mets. Patient was started on a new medication 2 weeks ago and since then she has been having symptoms of not feeling well and tingling. She is also on Cymbalta. Past Medical History:     Past Medical History:   Diagnosis Date    Abnormal nuclear cardiac imaging test 06/11/2010    Lg fixed anterior, septal, apical, inferoseptal defect sugg RCA & LAD disease or cardiomyopathy. EF 20%. Global hykn. Nondiagnostic EKG.  Acute bronchitis 10/15/2018    Persistent cough and wheezing in spite of prednisone and antibiotic. Will refer to pulmonary    Adenocarcinoma of breast; locally advanced invasive ductal adenocarcinoma of left breast     Asthma     Automatic implantable cardiac defibrillator in situ     Automatic implantable cardiac defibrillator in situ     [facility]     Cancer ([facility]     breast cancer left    Chemotherapy convalescence or palliative care 2012    Chronic combined systolic and diastolic heart failure (HCC)     Remains symptomatic, nyha class 3 ef improving.  Congestive heart failure, unspecified     chronic class ll    Echocardiogram 09/27/2010    EF 30%. Global hykn. Mild MR. Mild TR.     GERD (gastroesophageal reflux disease)     Hyperlipidemia     Hypertension     Mitral valve disorders(424.0) 1/15/2014    mild to moderate mr     Mitral valve disorders(424.0) 1/15/2014    mild to moderate mr     MVP (mitral valve prolapse)     Nonischemic cardiomyopathy (HCC)     EF 20-30% despite medical therapy    Nonspecific abnormal electrocardiogram (ECG) (EKG)     Osteopenia     Other primary cardiomyopathies     Pacemaker 10/27/10    s/p implantation, without complication    Poisoning by other and unspecified agents primarily affecting the cardiovascular system(972.9)     Ef slightly better to 45%, STABLE back on chemo.  Radiation therapy     Radiation therapy complication 5818    S/P cardiac catheterization 07/08/10    Patent coronary arteries. LVEDP 25.  EF 25%. Global hykn. Moderate MR.  RA 10.  RV 40/10. PA 40/20.  20.  CO/CI 4.5/2.45 (Larry).  Shortness of breath     Syncope and collapse     Thyroid disease     goiter         Social History:     Social History     Socioeconomic History    Marital status:      Spouse name: Not on file    Number of children: Not on file    Years of education: Not on file    Highest education level: Not on file   Tobacco Use    Smoking status: Never Smoker    Smokeless tobacco: Never Used   Substance and Sexual Activity    Alcohol use: No    Drug use: No        Family History:     Family History   Problem Relation Age of Onset    Hypertension Mother     High Cholesterol Mother     Heart Disease Father         paemaker implant at 80        Medications:      Allergies   Allergen Reactions    Adhesive Other (comments)     blisters        Current Facility-Administered Medications   Medication Dose Route Frequency    apixaban (ELIQUIS) tablet 5 mg  5 mg Oral Q12H    benzonatate (TESSALON) capsule 100 mg  100 mg Oral TID PRN    budesonide-formoterol (SYMBICORT) 160-4.5 mcg/actuation HFA inhaler 2 Puff  2 Puff Inhalation BID RT    carvedilol (COREG) tablet 12.5 mg  12.5 mg Oral BID WITH MEALS    digoxin (LANOXIN) tablet 0.125 mg  0.125 mg Oral DAILY    DULoxetine (CYMBALTA) capsule 30 mg  30 mg Oral DAILY    montelukast (SINGULAIR) tablet 10 mg  10 mg Oral DAILY    sacubitril-valsartan (ENTRESTO) 49-51 mg tablet 1 Tab  1 Tab Oral BID    tiotropium (SPIRIVA) inhalation capsule 18 mcg  1 Cap Inhalation QAM RT    acetaminophen (TYLENOL) tablet 650 mg  650 mg Oral Q6H PRN    oxyCODONE-acetaminophen (PERCOCET) 5-325 mg per tablet 1 Tab  1 Tab Oral Q6H PRN    naloxone (NARCAN) injection 0.4 mg  0.4 mg IntraVENous PRN    ondansetron (ZOFRAN) injection 4 mg  4 mg IntraVENous Q6H PRN    docusate sodium (COLACE) capsule 100 mg  100 mg Oral BID PRN    albuterol-ipratropium (DUO-NEB) 2.5 MG-0.5 MG/3 ML  3 mL Nebulization Q6H PRN    [START ON 8/28/2019] cefTRIAXone (ROCEPHIN) 2 g in sterile water (preservative free) 20 mL IV syringe  2 g IntraVENous Q24H     Current Outpatient Medications   Medication Sig    sacubitril-valsartan (ENTRESTO) 49 mg/51 mg tablet Take 1 Tab by mouth two (2) times a day.  nitrofurantoin, macrocrystal-monohydrate, (MACROBID) 100 mg capsule Take 100 mg by mouth two (2) times a day.  umeclidinium (INCRUSE ELLIPTA) 62.5 mcg/actuation inhaler Take 1 Puff by inhalation daily.  digoxin (LANOXIN) 0.125 mg tablet Take 1 Tab by mouth daily.  furosemide (LASIX) 40 mg tablet 1 tab po daily    mometasone (NASONEX) 50 mcg/actuation nasal spray 2 Sprays by Both Nostrils route daily as needed. Indications: inflammation of the nose due to an allergy    carvedilol (COREG) 12.5 mg tablet Take 1 Tab by mouth two (2) times daily (with meals).  apixaban (ELIQUIS) 5 mg tablet 2 tabs po BID for 6 days then 1 tab po BID until discontinued    budesonide-formoterol (SYMBICORT) 160-4.5 mcg/actuation HFAA Take 2 Puffs by inhalation two (2) times a day.     naloxone (NARCAN) 0.4 mg/mL injection 1 mL by IntraVENous route as needed for Other (Give if breaths per minute  less than 10, may repeat dose in 15 min and contact MD).  albuterol-ipratropium (DUO-NEB) 2.5 mg-0.5 mg/3 ml nebu 3 mL by Nebulization route every six (6) hours as needed (wheezing or sob). File under Medicare Part B, ICD codes J44.9, C78.01    DULoxetine (CYMBALTA) 30 mg capsule Take 30 mg by mouth daily.  multivitamin (ONE A DAY) tablet Take 1 Tab by mouth daily.  b complex vitamins (B COMPLEX 1) tablet Take 1 Tab by mouth daily.  cyanocobalamin 1,000 mcg tablet Take 3,000 mcg by mouth daily.  plant stanol eliezer (CHOLEST OFF PO) Take 1 Tab by mouth daily.  benzonatate (TESSALON PERLES) 100 mg capsule Take 100 mg by mouth three (3) times daily as needed for Cough.  Biotin 2,500 mcg cap Take 1 Cap by mouth daily.  melatonin 10 mg tab Take 1 Tab by mouth nightly.  montelukast (SINGULAIR) 10 mg tablet Take 1 Tab by mouth daily.  cholecalciferol, vitamin D3, 2,000 unit tab Take 1 Tab by mouth daily.  raloxifene (EVISTA) 60 mg tablet Take 60 mg by mouth daily. Review Of Systems:       Constitutional: No fever, no chills, no weight loss, no night sweats   HEENT: No epistaxis, no nasal drainage, no difficulty in swallowing, no redness in eyes  Respiratory:  dyspnea on exertion  Cardiovascular: dyspnea, palpitations. Gastrointestinal: nausea, vomiting. Genitourinary: No urinary symptoms or hematuria  Integument/breast: No ulcers or rashes  Musculoskeletal: no muscle pain, no weakness  Neurological: No focal weakness, no seizures, no headaches  Behvioral/Psych: confusion.         Physical Exam:     Visit Vitals  /56   Pulse 91   Temp 98.9 °F (37.2 °C)   Resp 18   Ht 5' 5\" (1.651 m)   Wt 79.8 kg (176 lb)   SpO2 96%   BMI 29.29 kg/m²     BP Readings from Last 3 Encounters:   08/27/19 133/56   07/26/19 122/58   07/12/19 153/90     Pulse Readings from Last 3 Encounters:   08/27/19 91   07/26/19 88   06/25/19 (!) 108     Wt Readings from Last 3 Encounters:   08/27/19 79.8 kg (176 lb)   07/26/19 79.8 kg (176 lb)   07/12/19 78.9 kg (174 lb)       General:  alert, cooperative, no distress, appears stated age, mildly obese, moderately obese  Skin: Warm and dry, acyanotic, normal color. Head: Normocephalic, atraumatic. Eyes: Sclerae anicteric, conjunctivae without injection. Neck:  nontender, no nuchal rigidity, no masses, no stridor, no carotid bruit, no JVD  Lungs:  clear to auscultation bilaterally. Heart:  regular rate and rhythm, S1, S2 normal, no click, no rub  Abdomen:  abdomen is soft without significant tenderness, masses, organomegaly or guarding  Extremities:  extremities normal, atraumatic, no cyanosis or edema. Peripheral pulses   Neurological: grossly intact. No focal abnormalities, moves all extremities well. Psychiatric Affect: The patient is awake, alert and oriented x3. Moncho Pittsar is interactive and appropriate. Data Review:     Recent Results (from the past 48 hour(s))   CBC WITH AUTOMATED DIFF    Collection Time: 08/27/19  1:37 AM   Result Value Ref Range    WBC 7.2 4.6 - 13.2 K/uL    RBC 3.01 (L) 4.20 - 5.30 M/uL    HGB 9.3 (L) 12.0 - 16.0 g/dL    HCT 28.4 (L) 35.0 - 45.0 %    MCV 94.4 74.0 - 97.0 FL    MCH 30.9 24.0 - 34.0 PG    MCHC 32.7 31.0 - 37.0 g/dL    RDW 16.9 (H) 11.6 - 14.5 %    PLATELET 477 164 - 984 K/uL    MPV 9.6 9.2 - 11.8 FL    NEUTROPHILS 74 42 - 75 %    BAND NEUTROPHILS 3 0 - 5 %    LYMPHOCYTES 13 (L) 20 - 51 %    MONOCYTES 6 2 - 9 %    EOSINOPHILS 1 0 - 5 %    BASOPHILS 0 0 - 3 %    METAMYELOCYTES 3 (H) 0 %    NRBC 2.0 (H) 0  WBC    ABS. NEUTROPHILS 5.5 1.8 - 8.0 K/UL    ABS. LYMPHOCYTES 0.9 0.8 - 3.5 K/UL    ABS. MONOCYTES 0.4 0 - 1.0 K/UL    ABS. EOSINOPHILS 0.1 0.0 - 0.4 K/UL    ABS.  BASOPHILS 0.0 0.0 - 0.06 K/UL    DF MANUAL      PLATELET COMMENTS ADEQUATE PLATELETS      RBC COMMENTS ANISOCYTOSIS  1+        RBC COMMENTS POLYCHROMASIA  1+       METABOLIC PANEL, COMPREHENSIVE    Collection Time: 08/27/19  1:37 AM   Result Value Ref Range    Sodium 141 136 - 145 mmol/L    Potassium 2.9 (LL) 3.5 - 5.5 mmol/L    Chloride 104 100 - 111 mmol/L    CO2 29 21 - 32 mmol/L    Anion gap 8 3.0 - 18 mmol/L    Glucose 144 (H) 74 - 99 mg/dL    BUN 11 7.0 - 18 MG/DL    Creatinine 0.91 0.6 - 1.3 MG/DL    BUN/Creatinine ratio 12 12 - 20      GFR est AA >60 >60 ml/min/1.73m2    GFR est non-AA >60 >60 ml/min/1.73m2    Calcium 8.5 8.5 - 10.1 MG/DL    Bilirubin, total 0.3 0.2 - 1.0 MG/DL    ALT (SGPT) 163 (H) 13 - 56 U/L    AST (SGOT) 292 (H) 10 - 38 U/L    Alk.  phosphatase 115 45 - 117 U/L    Protein, total 5.8 (L) 6.4 - 8.2 g/dL    Albumin 2.5 (L) 3.4 - 5.0 g/dL    Globulin 3.3 2.0 - 4.0 g/dL    A-G Ratio 0.8 0.8 - 1.7     TROPONIN I    Collection Time: 08/27/19  1:37 AM   Result Value Ref Range    Troponin-I, QT 0.09 (H) 0.0 - 0.045 NG/ML   LIPASE    Collection Time: 08/27/19  1:37 AM   Result Value Ref Range    Lipase 253 73 - 393 U/L   MAGNESIUM    Collection Time: 08/27/19  1:37 AM   Result Value Ref Range    Magnesium 1.4 (L) 1.6 - 2.6 mg/dL   POC LACTIC ACID    Collection Time: 08/27/19  1:37 AM   Result Value Ref Range    Lactic Acid (POC) 1.98 0.40 - 2.00 mmol/L   CULTURE, BLOOD    Collection Time: 08/27/19  1:40 AM   Result Value Ref Range    Special Requests: NO SPECIAL REQUESTS      Culture result: NO GROWTH AFTER 4 HOURS     CULTURE, BLOOD    Collection Time: 08/27/19  2:00 AM   Result Value Ref Range    Special Requests: NO SPECIAL REQUESTS      Culture result: NO GROWTH AFTER 3 HOURS     EKG, 12 LEAD, INITIAL    Collection Time: 08/27/19  2:58 AM   Result Value Ref Range    Ventricular Rate 95 BPM    Atrial Rate 95 BPM    P-R Interval 154 ms    QRS Duration 130 ms    Q-T Interval 378 ms    QTC Calculation (Bezet) 475 ms    Calculated P Axis 29 degrees    Calculated R Axis -2 degrees    Calculated T Axis 37 degrees    Diagnosis       Atrial-sensed ventricular-paced rhythm with occasional premature ventricular   complexes  Abnormal ECG  When compared with ECG of 03-MAY-2019 15:32,  premature ventricular complexes are now present  Vent. rate has decreased BY  24 BPM     URINALYSIS W/ RFLX MICROSCOPIC    Collection Time: 08/27/19  3:14 AM   Result Value Ref Range    Color DARK YELLOW      Appearance TURBID      Specific gravity 1.025 1.005 - 1.030      pH (UA) 5.5 5.0 - 8.0      Protein TRACE (A) NEG mg/dL    Glucose NEGATIVE  NEG mg/dL    Ketone 15 (A) NEG mg/dL    Bilirubin NEGATIVE  NEG      Blood SMALL (A) NEG      Urobilinogen 1.0 0.2 - 1.0 EU/dL    Nitrites NEGATIVE  NEG      Leukocyte Esterase LARGE (A) NEG     URINE MICROSCOPIC ONLY    Collection Time: 08/27/19  3:14 AM   Result Value Ref Range    WBC 36 to 50 0 - 5 /hpf    RBC 0 to 3 0 - 5 /hpf    Epithelial cells 2+ 0 - 5 /lpf    Bacteria (A) NEG /hpf     UNABLE TO QUANTITATE MICROSCOPIC ELEMENTS DUE TO EXCESSIVE AMORPHOUS    Amorphous Crystals 3+ (A) NEG    CA Oxalate crystals 1+ (A) NEG   TROPONIN I    Collection Time: 08/27/19  5:16 AM   Result Value Ref Range    Troponin-I, QT 0.13 (H) 0.0 - 0.045 NG/ML         Intake/Output Summary (Last 24 hours) at 8/27/2019 0806  Last data filed at 8/27/2019 0406  Gross per 24 hour   Intake 50 ml   Output    Net 50 ml       Cardiographics:       EKG Results     Procedure 720 Value Units Date/Time    EKG, 12 LEAD, INITIAL [[id number]] Collected:  08/27/19 0258    Order Status:  Completed Updated:  08/27/19 0410     Ventricular Rate 95 BPM      Atrial Rate 95 BPM      P-R Interval 154 ms      QRS Duration 130 ms      Q-T Interval 378 ms      QTC Calculation (Bezet) 475 ms      Calculated P Axis 29 degrees      Calculated R Axis -2 degrees      Calculated T Axis 37 degrees      Diagnosis --     Atrial-sensed ventricular-paced rhythm with occasional premature ventricular   complexes  Abnormal ECG  When compared with ECG of 03-MAY-2019 15:32,  premature ventricular complexes are now present  Vent.  rate has decreased BY  24 BPM          07/12/19   ECHO ADULT FOLLOW-UP OR LIMITED 07/13/2019 7/13/2019    Narrative · Left Ventricle: Normal cavity size. Mild concentric hypertrophy. Severe   systolic dysfunction. Estimated left ventricular ejection fraction is 21 -   25%. Abnormal left ventricular wall motion. Inconclusive left ventricular   diastolic function. · Left Atrium: Severely dilated left atrium. · Mitral Valve: Mitral valve thickening. Mitral annular calcification. Moderate mitral valve regurgitation. · Tricuspid Valve: Mild tricuspid valve regurgitation is present. Pulmonary arterial systolic pressure is 77.8 mmHg. Mild pulmonary   hypertension. · IVC/Hepatic Veins: Moderately elevated central venous pressure (10-15   mmHg); IVC diameter is larger than 21 mm and collapses more than 50% with   respiration. · Pericardium: Trivial-to-small pericardial effusion. Signed by: Jennifer Recinos MD         Signed By: Glenn Wilson NP-BIANCA Phone [phone]    August 27, 2019      I have independently evaluated and examined the patient. All relevant labs and testing data's are reviewed. Care plan discussed and updated after review.   Jose Armando Balderas MD

## 2019-08-27 NOTE — PROGRESS NOTES
conducted an initial consultation and Spiritual Assessment for Gwen Bell, who is a 78 y.o.,female. Patients Primary Language is: [place]. According to the patients EMR Sikhism Affiliation is: Baptist. The reason the Patient came to the hospital is:   Patient Active Problem List    Diagnosis Date Noted    UTI (urinary tract infection) 08/27/2019    CAD (coronary artery disease) 08/27/2019    Elevated troponin 08/27/2019    Weakness generalized 08/27/2019    Chronic anticoagulation 08/27/2019    History of pulmonary embolism 08/27/2019    Hypokalemia 08/27/2019    Hypomagnesemia 08/27/2019    Acute encephalopathy 08/27/2019    Dilated cardiomyopathy ([address] 05/03/2019    Acute exacerbation of CHF (congestive heart failure) ([address] 05/03/2019    Breast cancer ([address] 05/03/2019    Pulmonary embolism (HCC) 05/03/2019    Chronic bronchitis ([address] 05/03/2019    Hypoxia 05/03/2019    Lung metastases ([address] 05/03/2019    Seasonal allergic rhinitis due to pollen 04/12/2019    Chronic asthmatic bronchitis ([address] 02/07/2019    Malignant neoplasm metastatic to lung ([address] 02/07/2019    Acute bronchitis 10/15/2018    Abnormal PFT 07/10/2017    Mitral valve disease 01/15/2014    Poisoning by other and unspecified agents primarily affecting the cardiovascular system(972.9)     Syncope and collapse     Other primary cardiomyopathies     Shortness of breath     Nonspecific abnormal electrocardiogram (ECG) (EKG)     Automatic implantable cardioverter-defibrillator in situ     Adenocarcinoma of breast; locally advanced invasive ductal adenocarcinoma of left breast     Cancer ([address] 10/27/2011    Congestive heart failure ([address]     Hypertension     MVP (mitral valve prolapse)     Nonischemic cardiomyopathy ([address]         The  provided the following Interventions:  Initiated a relationship of care and support. Provided information about Spiritual Care Services.   Offered prayer and assurance of continued prayers on patient's behalf. Chart reviewed. The following outcomes were achieved:  Patient expressed gratitude for the 's visit. Assessment:  Patient did not indicate any spiritual or Sabianist issues which require Spiritual Care Services interventions at this time. Patient does not have any Sabianist/cultural needs that will affect patients preferences in health care. Plan:  Chaplains will continue to follow and will provide pastoral care on an as needed or requested basis.  recommends bedside caregivers page  on duty if patient shows signs of acute spiritual or emotional distress.     [address] [place] Department  [phone]

## 2019-08-27 NOTE — ROUTINE PROCESS
TRANSFER - IN REPORT:    Verbal report received from Khushboo Anders on Ron Cooler  being received from [facility] ED for routine progression of care      Report consisted of patients Situation, Background, Assessment and   Recommendations(SBAR). Information from the following report(s) SBAR, Kardex, ED Summary, Procedure Summary, Intake/Output, MAR and Recent Results was reviewed with the receiving nurse. Opportunity for questions and clarification was provided. Assessment completed upon patients arrival to unit and care assumed.        Primary Nurse Marya Munoz RN and Antonio Argueta RN performed a dual skin assessment on this patient No impairment noted  Salvador score is 21

## 2019-08-27 NOTE — PROGRESS NOTES
Phone: [phone]     Hematology / Oncology Progress Note  [facility]      Patient: Lisa Thomas   MRN: [id number]         CSN: [id number]    YOB: 1950      Admit Date: 2019    Assessment:     Principal Problem:    Acute encephalopathy (2019)    Active Problems:    Malignant neoplasm metastatic to lung ([address] (2019)      UTI (urinary tract infection) (2019)      CAD (coronary artery disease) (2019)      Elevated troponin (2019)      Weakness generalized (2019)      Chronic anticoagulation (2019)      History of pulmonary embolism (2019)      Hypokalemia (2019)      Hypomagnesemia (2019)    h/o left breast ca, locally advanced   Relapsed ER neg , her 2 positive breast ca,lung mets, Dec 2018, on gemzar  Drug induced cardiomyopathy, sec to anti her 2 ana drugs  taxol induced neuropathy   rt leg DVT, PE 2019  Recurrent UTI   Hypokalemia  confusion    Plan:     Urine c/s   Antibiotics  Chemo on hold  Supplement K  Ct ritika Cheek MD  [facility] [phone]      Subjective:     Alert coherent    Objective:     Visit Vitals  /56   Pulse 91   Temp 98.9 °F (37.2 °C)   Resp 18   Ht 5' 5\" (1.651 m)   Wt 79.8 kg (176 lb)   SpO2 96%   BMI 29.29 kg/m²             Temp (24hrs), Av.7 °F (37.1 °C), Min:98.4 °F (36.9 °C), Max:98.9 °F (37.2 °C)        Intake/Output Summary (Last 24 hours) at 2019 0816  Last data filed at 2019 0406  Gross per 24 hour   Intake 50 ml   Output    Net 50 ml     Review of Systems:   Constitutional: negative for fevers, chills, sweats and positive for  fatigue  Eyes: negative for visual disturbance, redness and icterus  Ears, Nose, Mouth, Throat, and Face: negative for tinnitus, epistaxis, sore mouth and hoarseness  Respiratory: negative for cough, sputum, hemoptysis, pleurisy/chest pain or wheezing  Cardiovascular: negative for chest pain, chest pressure/discomfort, palpitations, irregular heart beats, syncope, paroxysmal nocturnal dyspnea  Gastrointestinal: negative for reflux symptoms, nausea, vomiting, change in bowel habits, melena, diarrhea, constipation and abdominal pain  Genitourinary:negative for dysuria, nocturia, urinary incontinence, hesitancy and hematuria  Integument: negative for rash, skin lesion(s) and pruritus  Hematologic/Lymphatic: negative for easy bruising, bleeding and lymphadenopathy  Musculoskeletal:negative for myalgias, arthralgias and bone pain  Neurological: negative for headaches, dizziness, seizures, paresthesia and weakness    Physical Exam:  Constitutional: Alert, oriented, not in distress  Eyes: PERRLA, anicteric, no redness  Ears, nose, mouth, throat, and face: no palpable Lymph nodes, no mucositis, no thrush. Respiratory: symmetrical expansion, no rales, no rhonchi, no wheezing. Cardiovascular: S1S2 .portt site clear  Gastrointestinal: soft, benign, non tender, no HSM, normal bowel sounds, no mass. Integument: no rash, no petechiae, no ecchymosis. Musculoskeletal: no edema, no cyanosis, no clubbing. Neurological: intact, cranial nerves, no focal motor or sensory deficits. Labs:  Recent Results (from the past 24 hour(s))   CBC WITH AUTOMATED DIFF    Collection Time: 08/27/19  1:37 AM   Result Value Ref Range    WBC 7.2 4.6 - 13.2 K/uL    RBC 3.01 (L) 4.20 - 5.30 M/uL    HGB 9.3 (L) 12.0 - 16.0 g/dL    HCT 28.4 (L) 35.0 - 45.0 %    MCV 94.4 74.0 - 97.0 FL    MCH 30.9 24.0 - 34.0 PG    MCHC 32.7 31.0 - 37.0 g/dL    RDW 16.9 (H) 11.6 - 14.5 %    PLATELET 735 834 - 266 K/uL    MPV 9.6 9.2 - 11.8 FL    NEUTROPHILS 74 42 - 75 %    BAND NEUTROPHILS 3 0 - 5 %    LYMPHOCYTES 13 (L) 20 - 51 %    MONOCYTES 6 2 - 9 %    EOSINOPHILS 1 0 - 5 %    BASOPHILS 0 0 - 3 %    METAMYELOCYTES 3 (H) 0 %    NRBC 2.0 (H) 0  WBC    ABS. NEUTROPHILS 5.5 1.8 - 8.0 K/UL    ABS. LYMPHOCYTES 0.9 0.8 - 3.5 K/UL    ABS.  MONOCYTES 0.4 0 - 1.0 K/UL    ABS. EOSINOPHILS 0.1 0.0 - 0.4 K/UL    ABS. BASOPHILS 0.0 0.0 - 0.06 K/UL    DF MANUAL      PLATELET COMMENTS ADEQUATE PLATELETS      RBC COMMENTS ANISOCYTOSIS  1+        RBC COMMENTS POLYCHROMASIA  1+       METABOLIC PANEL, COMPREHENSIVE    Collection Time: 08/27/19  1:37 AM   Result Value Ref Range    Sodium 141 136 - 145 mmol/L    Potassium 2.9 (LL) 3.5 - 5.5 mmol/L    Chloride 104 100 - 111 mmol/L    CO2 29 21 - 32 mmol/L    Anion gap 8 3.0 - 18 mmol/L    Glucose 144 (H) 74 - 99 mg/dL    BUN 11 7.0 - 18 MG/DL    Creatinine 0.91 0.6 - 1.3 MG/DL    BUN/Creatinine ratio 12 12 - 20      GFR est AA >60 >60 ml/min/1.73m2    GFR est non-AA >60 >60 ml/min/1.73m2    Calcium 8.5 8.5 - 10.1 MG/DL    Bilirubin, total 0.3 0.2 - 1.0 MG/DL    ALT (SGPT) 163 (H) 13 - 56 U/L    AST (SGOT) 292 (H) 10 - 38 U/L    Alk.  phosphatase 115 45 - 117 U/L    Protein, total 5.8 (L) 6.4 - 8.2 g/dL    Albumin 2.5 (L) 3.4 - 5.0 g/dL    Globulin 3.3 2.0 - 4.0 g/dL    A-G Ratio 0.8 0.8 - 1.7     TROPONIN I    Collection Time: 08/27/19  1:37 AM   Result Value Ref Range    Troponin-I, QT 0.09 (H) 0.0 - 0.045 NG/ML   LIPASE    Collection Time: 08/27/19  1:37 AM   Result Value Ref Range    Lipase 253 73 - 393 U/L   MAGNESIUM    Collection Time: 08/27/19  1:37 AM   Result Value Ref Range    Magnesium 1.4 (L) 1.6 - 2.6 mg/dL   POC LACTIC ACID    Collection Time: 08/27/19  1:37 AM   Result Value Ref Range    Lactic Acid (POC) 1.98 0.40 - 2.00 mmol/L   CULTURE, BLOOD    Collection Time: 08/27/19  1:40 AM   Result Value Ref Range    Special Requests: NO SPECIAL REQUESTS      Culture result: NO GROWTH AFTER 4 HOURS     CULTURE, BLOOD    Collection Time: 08/27/19  2:00 AM   Result Value Ref Range    Special Requests: NO SPECIAL REQUESTS      Culture result: NO GROWTH AFTER 3 HOURS     EKG, 12 LEAD, INITIAL    Collection Time: 08/27/19  2:58 AM   Result Value Ref Range    Ventricular Rate 95 BPM    Atrial Rate 95 BPM    P-R Interval 154 ms QRS Duration 130 ms    Q-T Interval 378 ms    QTC Calculation (Bezet) 475 ms    Calculated P Axis 29 degrees    Calculated R Axis -2 degrees    Calculated T Axis 37 degrees    Diagnosis       Atrial-sensed ventricular-paced rhythm with occasional premature ventricular   complexes  Abnormal ECG  When compared with ECG of 03-MAY-2019 15:32,  premature ventricular complexes are now present  Vent.  rate has decreased BY  24 BPM     URINALYSIS W/ RFLX MICROSCOPIC    Collection Time: 08/27/19  3:14 AM   Result Value Ref Range    Color DARK YELLOW      Appearance TURBID      Specific gravity 1.025 1.005 - 1.030      pH (UA) 5.5 5.0 - 8.0      Protein TRACE (A) NEG mg/dL    Glucose NEGATIVE  NEG mg/dL    Ketone 15 (A) NEG mg/dL    Bilirubin NEGATIVE  NEG      Blood SMALL (A) NEG      Urobilinogen 1.0 0.2 - 1.0 EU/dL    Nitrites NEGATIVE  NEG      Leukocyte Esterase LARGE (A) NEG     URINE MICROSCOPIC ONLY    Collection Time: 08/27/19  3:14 AM   Result Value Ref Range    WBC 36 to 50 0 - 5 /hpf    RBC 0 to 3 0 - 5 /hpf    Epithelial cells 2+ 0 - 5 /lpf    Bacteria (A) NEG /hpf     UNABLE TO QUANTITATE MICROSCOPIC ELEMENTS DUE TO EXCESSIVE AMORPHOUS    Amorphous Crystals 3+ (A) NEG    CA Oxalate crystals 1+ (A) NEG   TROPONIN I    Collection Time: 08/27/19  5:16 AM   Result Value Ref Range    Troponin-I, QT 0.13 (H) 0.0 - 0.045 NG/ML

## 2019-08-28 ENCOUNTER — APPOINTMENT (OUTPATIENT)
Dept: NON INVASIVE DIAGNOSTICS | Age: 69
DRG: 689 | End: 2019-08-28
Attending: NURSE PRACTITIONER
Payer: MEDICARE

## 2019-08-28 VITALS
HEART RATE: 98 BPM | WEIGHT: 184 LBS | SYSTOLIC BLOOD PRESSURE: 143 MMHG | RESPIRATION RATE: 19 BRPM | TEMPERATURE: 98.3 F | BODY MASS INDEX: 30.66 KG/M2 | HEIGHT: 65 IN | DIASTOLIC BLOOD PRESSURE: 77 MMHG | OXYGEN SATURATION: 97 %

## 2019-08-28 LAB
ANION GAP SERPL CALC-SCNC: 7 MMOL/L (ref 3–18)
BACTERIA SPEC CULT: NORMAL
BASOPHILS # BLD: 0 K/UL (ref 0–0.06)
BASOPHILS NFR BLD: 0 % (ref 0–3)
BUN SERPL-MCNC: 9 MG/DL (ref 7–18)
BUN/CREAT SERPL: 10 (ref 12–20)
CALCIUM SERPL-MCNC: 8.5 MG/DL (ref 8.5–10.1)
CHLORIDE SERPL-SCNC: 106 MMOL/L (ref 100–111)
CO2 SERPL-SCNC: 28 MMOL/L (ref 21–32)
CREAT SERPL-MCNC: 0.86 MG/DL (ref 0.6–1.3)
DIFFERENTIAL METHOD BLD: ABNORMAL
EOSINOPHIL # BLD: 0.1 K/UL (ref 0–0.4)
EOSINOPHIL NFR BLD: 1 % (ref 0–5)
ERYTHROCYTE [DISTWIDTH] IN BLOOD BY AUTOMATED COUNT: 17.6 % (ref 11.6–14.5)
GLUCOSE SERPL-MCNC: 127 MG/DL (ref 74–99)
HCT VFR BLD AUTO: 28.5 % (ref 35–45)
HGB BLD-MCNC: 9.1 G/DL (ref 12–16)
LYMPHOCYTES # BLD: 1.2 K/UL (ref 0.8–3.5)
LYMPHOCYTES NFR BLD: 16 % (ref 20–51)
MAGNESIUM SERPL-MCNC: 1.8 MG/DL (ref 1.6–2.6)
MCH RBC QN AUTO: 30.8 PG (ref 24–34)
MCHC RBC AUTO-ENTMCNC: 31.9 G/DL (ref 31–37)
MCV RBC AUTO: 96.6 FL (ref 74–97)
METAMYELOCYTES NFR BLD MANUAL: 3 %
MONOCYTES # BLD: 0.4 K/UL (ref 0–1)
MONOCYTES NFR BLD: 5 % (ref 2–9)
MYELOCYTES NFR BLD MANUAL: 2 %
NEUTS BAND NFR BLD MANUAL: 4 % (ref 0–5)
NEUTS SEG # BLD: 5.3 K/UL (ref 1.8–8)
NEUTS SEG NFR BLD: 69 % (ref 42–75)
NRBC BLD-RTO: 2 PER 100 WBC
PHOSPHATE SERPL-MCNC: 2.2 MG/DL (ref 2.5–4.9)
PLATELET # BLD AUTO: 169 K/UL (ref 135–420)
PLATELET COMMENTS,PCOM: ABNORMAL
PMV BLD AUTO: 10.2 FL (ref 9.2–11.8)
POTASSIUM SERPL-SCNC: 3.9 MMOL/L (ref 3.5–5.5)
RBC # BLD AUTO: 2.95 M/UL (ref 4.2–5.3)
RBC MORPH BLD: ABNORMAL
RBC MORPH BLD: ABNORMAL
SERVICE CMNT-IMP: NORMAL
SODIUM SERPL-SCNC: 141 MMOL/L (ref 136–145)
WBC # BLD AUTO: 7.3 K/UL (ref 4.6–13.2)

## 2019-08-28 PROCEDURE — 83735 ASSAY OF MAGNESIUM: CPT

## 2019-08-28 PROCEDURE — 74011250637 HC RX REV CODE- 250/637: Performed by: INTERNAL MEDICINE

## 2019-08-28 PROCEDURE — 74011000250 HC RX REV CODE- 250: Performed by: INTERNAL MEDICINE

## 2019-08-28 PROCEDURE — 51798 US URINE CAPACITY MEASURE: CPT

## 2019-08-28 PROCEDURE — 74011250636 HC RX REV CODE- 250/636: Performed by: HOSPITALIST

## 2019-08-28 PROCEDURE — 97161 PT EVAL LOW COMPLEX 20 MIN: CPT

## 2019-08-28 PROCEDURE — 94640 AIRWAY INHALATION TREATMENT: CPT

## 2019-08-28 PROCEDURE — 84100 ASSAY OF PHOSPHORUS: CPT

## 2019-08-28 PROCEDURE — 36415 COLL VENOUS BLD VENIPUNCTURE: CPT

## 2019-08-28 PROCEDURE — 74011250637 HC RX REV CODE- 250/637: Performed by: HOSPITALIST

## 2019-08-28 PROCEDURE — 80048 BASIC METABOLIC PNL TOTAL CA: CPT

## 2019-08-28 PROCEDURE — 74011250636 HC RX REV CODE- 250/636: Performed by: INTERNAL MEDICINE

## 2019-08-28 PROCEDURE — 93308 TTE F-UP OR LMTD: CPT

## 2019-08-28 PROCEDURE — 85025 COMPLETE CBC W/AUTO DIFF WBC: CPT

## 2019-08-28 PROCEDURE — 97116 GAIT TRAINING THERAPY: CPT

## 2019-08-28 RX ORDER — CARVEDILOL 12.5 MG/1
12.5 TABLET ORAL ONCE
Status: COMPLETED | OUTPATIENT
Start: 2019-08-28 | End: 2019-08-28

## 2019-08-28 RX ORDER — FUROSEMIDE 20 MG/1
20 TABLET ORAL DAILY
Qty: 30 TAB | Refills: 0 | Status: SHIPPED | OUTPATIENT
Start: 2019-08-28

## 2019-08-28 RX ORDER — HEPARIN 100 UNIT/ML
300 SYRINGE INTRAVENOUS AS NEEDED
Status: DISCONTINUED | OUTPATIENT
Start: 2019-08-28 | End: 2019-08-28 | Stop reason: HOSPADM

## 2019-08-28 RX ORDER — CEFPODOXIME PROXETIL 200 MG/1
200 TABLET, FILM COATED ORAL 2 TIMES DAILY
Qty: 10 TAB | Refills: 0 | Status: SHIPPED | OUTPATIENT
Start: 2019-08-28 | End: 2019-09-02

## 2019-08-28 RX ORDER — SODIUM,POTASSIUM PHOSPHATES 280-250MG
2 POWDER IN PACKET (EA) ORAL 2 TIMES DAILY
Qty: 4 PACKET | Refills: 0 | Status: SHIPPED | OUTPATIENT
Start: 2019-08-28 | End: 2019-08-29

## 2019-08-28 RX ORDER — SPIRONOLACTONE 25 MG/1
25 TABLET ORAL DAILY
Qty: 30 TAB | Refills: 0 | Status: SHIPPED | OUTPATIENT
Start: 2019-08-29 | End: 2019-09-09 | Stop reason: SDUPTHER

## 2019-08-28 RX ORDER — SODIUM,POTASSIUM PHOSPHATES 280-250MG
2 POWDER IN PACKET (EA) ORAL 2 TIMES DAILY
Status: DISCONTINUED | OUTPATIENT
Start: 2019-08-28 | End: 2019-08-28 | Stop reason: HOSPADM

## 2019-08-28 RX ORDER — CARVEDILOL 25 MG/1
25 TABLET ORAL 2 TIMES DAILY WITH MEALS
Qty: 60 TAB | Refills: 0 | Status: ON HOLD | OUTPATIENT
Start: 2019-08-28 | End: 2019-09-26 | Stop reason: SDUPTHER

## 2019-08-28 RX ORDER — CARVEDILOL 25 MG/1
25 TABLET ORAL 2 TIMES DAILY WITH MEALS
Status: DISCONTINUED | OUTPATIENT
Start: 2019-08-28 | End: 2019-08-28 | Stop reason: HOSPADM

## 2019-08-28 RX ADMIN — TIOTROPIUM BROMIDE 18 MCG: 18 CAPSULE ORAL; RESPIRATORY (INHALATION) at 09:00

## 2019-08-28 RX ADMIN — APIXABAN 5 MG: 5 TABLET, FILM COATED ORAL at 08:40

## 2019-08-28 RX ADMIN — POTASSIUM & SODIUM PHOSPHATES POWDER PACK 280-160-250 MG 2 PACKET: 280-160-250 PACK at 11:05

## 2019-08-28 RX ADMIN — DIGOXIN 0.12 MG: 250 TABLET ORAL at 08:40

## 2019-08-28 RX ADMIN — CARVEDILOL 12.5 MG: 12.5 TABLET, FILM COATED ORAL at 12:28

## 2019-08-28 RX ADMIN — SACUBITRIL AND VALSARTAN 1 TABLET: 49; 51 TABLET, FILM COATED ORAL at 08:40

## 2019-08-28 RX ADMIN — CEFTRIAXONE 2 G: 2 INJECTION, POWDER, FOR SOLUTION INTRAMUSCULAR; INTRAVENOUS at 06:55

## 2019-08-28 RX ADMIN — POTASSIUM & SODIUM PHOSPHATES POWDER PACK 280-160-250 MG 2 PACKET: 280-160-250 PACK at 17:58

## 2019-08-28 RX ADMIN — CARVEDILOL 25 MG: 25 TABLET, FILM COATED ORAL at 17:58

## 2019-08-28 RX ADMIN — CARVEDILOL 12.5 MG: 12.5 TABLET, FILM COATED ORAL at 08:40

## 2019-08-28 RX ADMIN — SACUBITRIL AND VALSARTAN 1 TABLET: 49; 51 TABLET, FILM COATED ORAL at 17:58

## 2019-08-28 RX ADMIN — HEPARIN 300 UNITS: 100 SYRINGE at 17:00

## 2019-08-28 RX ADMIN — DULOXETINE HYDROCHLORIDE 30 MG: 30 CAPSULE, DELAYED RELEASE ORAL at 08:41

## 2019-08-28 RX ADMIN — MONTELUKAST 10 MG: 10 TABLET, FILM COATED ORAL at 08:40

## 2019-08-28 RX ADMIN — FUROSEMIDE 20 MG: 20 TABLET ORAL at 08:43

## 2019-08-28 RX ADMIN — IPRATROPIUM BROMIDE AND ALBUTEROL SULFATE 3 ML: .5; 3 SOLUTION RESPIRATORY (INHALATION) at 06:59

## 2019-08-28 RX ADMIN — SPIRONOLACTONE 25 MG: 25 TABLET ORAL at 08:41

## 2019-08-28 RX ADMIN — BUDESONIDE AND FORMOTEROL FUMARATE DIHYDRATE 2 PUFF: 160; 4.5 AEROSOL RESPIRATORY (INHALATION) at 07:42

## 2019-08-28 NOTE — CONSULTS
[address], [address]                                                      Phone: [phone]  Urology Consult Note             1. Coronary artery disease involving native heart without angina pectoris, unspecified vessel or lesion type    2. Urinary tract infection without hematuria, site unspecified          Assessment & Plan   Assessment  Recurrent UTI - CT neg for stones/ hydro  Recent Dx with acute and subacute PEs. Currently on Eliquis   Metastatic breast cancer on chemotherapy (currently being held per heme/onc)  Elevated Troponin  Microscopic Hematuria  HX of CHF with 26% EF   Hx of Lung Cancer  HX of bladder diverticula per patient    Plan:  Ucx and bcx pending abx per primary currently on Rocephin  CT reviewed- no hydro or stones  Will Check PVR's  Will need CT Urogram as outpatient to further evaluate recurrent UTI's and microscopic hematuria  Recommend probiotic and cranberry supplement for UTI prevention  Follow Up arranged: YES Message sent to Holland Henderson and Rachel Mayo to arrange f/up with Dr. Milagro Lopez in 4-6 weeks for cysto with CT Urogram prior. Following      [facility]  [facility]  Pager # [phone]    Available M-F 7:30- 5pm .  After 5pm and weekends please call answering service at [phone]         Chief Complaint   Patient presents with    Fatigue    Nausea         HISTORY OF PRESENT ILLNESS:    Que Almtan is a 78 y.o. female who is seen in consultation as referred by Kavon Em  for Recurrent UTI. Patient has been seen by a urologist and is not currently receiving urological care. Urological history is significant for recurrent UTI's, Bladder Diverticula, and gross hematuria. Seen by Urologist over 50 years ago. HPI: Patient originally presented to the ER on 8/27/19 with c/o 2 episodes of confusion.  Labs were significant for WBC normal, Hgb normal. Creat normal, UA concerning for infection. Her trop was also elevated She was recently tx for a UTI. CT was performed and showed No post-obstructive changes in the kidneys and ureters. She was admitted for elevated troponin and AMS /metabolic encephalopathy 2/2 UTI and electrolytes imbalance. Patient is currently afebrile and Tachy otherwise VSS. Patient reports feeling better this am. Denies any FC does have intermittent NV ? 2/2 chemo. She reports mild discomfort in her back no renal colic. Reports symptom of UTI- dysuria. Says she is asymptomatic at this time. Denies frequency, urgency, or incontinence. Reports 3 UTI's this year. Just finished a course of Augmentin. Location  system  Duration months   Associated signs/symptoms as above  Modifying factors as above  Severity moderate        No flowsheet data found. Past Medical History:   Diagnosis Date    Abnormal nuclear cardiac imaging test 06/11/2010    Lg fixed anterior, septal, apical, inferoseptal defect sugg RCA & LAD disease or cardiomyopathy. EF 20%. Global hykn. Nondiagnostic EKG.  Acute bronchitis 10/15/2018    Persistent cough and wheezing in spite of prednisone and antibiotic. Will refer to pulmonary    Adenocarcinoma of breast; locally advanced invasive ductal adenocarcinoma of left breast     Asthma     Automatic implantable cardiac defibrillator in situ     Automatic implantable cardiac defibrillator in situ     [facility]     Cancer ([facility]     breast cancer left    Chemotherapy convalescence or palliative care 2012    Chronic combined systolic and diastolic heart failure (HCC)     Remains symptomatic, nyha class 3 ef improving.  Congestive heart failure, unspecified     chronic class ll    Echocardiogram 09/27/2010    EF 30%. Global hykn. Mild MR. Mild TR.     GERD (gastroesophageal reflux disease)     Hyperlipidemia     Hypertension     Mitral valve disorders(424.0) 1/15/2014    mild to moderate mr     Mitral valve disorders(424.0) 1/15/2014    mild to moderate mr     MVP (mitral valve prolapse)     Nonischemic cardiomyopathy (HCC)     EF 20-30% despite medical therapy    Nonspecific abnormal electrocardiogram (ECG) (EKG)     Osteopenia     Other primary cardiomyopathies     Pacemaker 10/27/10    s/p implantation, without complication    Poisoning by other and unspecified agents primarily affecting the cardiovascular system(972.9)     Ef slightly better to 45%, STABLE back on chemo.  Radiation therapy     Radiation therapy complication 5713    S/P cardiac catheterization 07/08/10    Patent coronary arteries. LVEDP 25.  EF 25%. Global hykn. Moderate MR.  RA 10.  RV 40/10. PA 40/20.  20.  CO/CI 4.5/2.45 (Larry).     Shortness of breath     Syncope and collapse     Thyroid disease     goiter       Past Surgical History:   Procedure Laterality Date    CARDIAC SURG PROCEDURE UNLIST      Difibrillator; BI V ICD    HX MASTECTOMY  09/28/11    modified radical mastectomy and axillary dissection    HX ORTHOPAEDIC      HX OTHER SURGICAL      surgery to remove part of liver    HX PACEMAKER  10/27/10    s/p [employer] biventricular AICD, without complication    HX RADICAL MASTECTOMY      IR INSERT TUNL CVC W PORT OVER 5 YEARS  1/16/2019    NEUROLOGICAL PROCEDURE UNLISTED      Cervical fusion       Social History     Tobacco Use    Smoking status: Never Smoker    Smokeless tobacco: Never Used   Substance Use Topics    Alcohol use: No    Drug use: No       Allergies   Allergen Reactions    Adhesive Other (comments)     blisters       Family History   Problem Relation Age of Onset    Hypertension Mother     High Cholesterol Mother     Heart Disease Father         paemaker implant at 80       Current Facility-Administered Medications   Medication Dose Route Frequency Provider Last Rate Last Dose    potassium, sodium phosphates (NEUTRA-PHOS) packet 2 Packet  2 Packet Oral BID Abbey Moore MD        apixaban (ELIQUIS) tablet 5 mg  5 mg Oral Q12H Emelina Lopez MD   5 mg at 08/28/19 0840    benzonatate (TESSALON) capsule 100 mg  100 mg Oral TID PRN Emelina Lopez MD        budesonide-formoterol (SYMBICORT) 160-4.5 mcg/actuation HFA inhaler 2 Puff  2 Puff Inhalation BID RT Emelina Lopez MD   2 Puff at 08/28/19 0742    carvedilol (COREG) tablet 12.5 mg  12.5 mg Oral BID WITH MEALS Emelina Lopez MD   12.5 mg at 08/28/19 0840    digoxin (LANOXIN) tablet 0.125 mg  0.125 mg Oral DAILY Emelina Lopez MD   0.125 mg at 08/28/19 0840    DULoxetine (CYMBALTA) capsule 30 mg  30 mg Oral DAILY Emelina Lopez MD   30 mg at 08/28/19 0841    montelukast (SINGULAIR) tablet 10 mg  10 mg Oral DAILY Emelina Lopez MD   10 mg at 08/28/19 0840    sacubitril-valsartan (ENTRESTO) 49-51 mg tablet 1 Tab  1 Tab Oral BID Emelina Lopez MD   1 Tab at 08/28/19 0840    tiotropium (SPIRIVA) inhalation capsule 18 mcg  1 Cap Inhalation QAM RT Emelina Lopez MD   18 mcg at 08/28/19 0900    acetaminophen (TYLENOL) tablet 650 mg  650 mg Oral Q6H PRN Emelina Lopez MD   650 mg at 08/27/19 2148    oxyCODONE-acetaminophen (PERCOCET) 5-325 mg per tablet 1 Tab  1 Tab Oral Q6H PRN Emelina Lopez MD        naloxone [facility]) injection 0.4 mg  0.4 mg IntraVENous PRN Emelina Lopez MD        ondansetron [facility]) injection 4 mg  4 mg IntraVENous Q6H PRN Emelina Lopez MD        docusate sodium (COLACE) capsule 100 mg  100 mg Oral BID PRN Emelina Lopez MD        albuterol-ipratropium (DUO-NEB) 2.5 MG-0.5 MG/3 ML  3 mL Nebulization Q6H PRN Emelina MD Jessica   3 mL at 08/28/19 0659    cefTRIAXone (ROCEPHIN) 2 g in sterile water (preservative free) 20 mL IV syringe  2 g IntraVENous Q24H Abbey Moore MD   2 g at 08/28/19 4540    spironolactone (ALDACTONE) tablet 25 mg  25 mg Oral DAILY Alex Bull, Debbie Castellon MD   25 mg at 08/28/19 0841    furosemide (LASIX) tablet 20 mg  20 mg Oral DAILY Miguel Ángel Mendez MD   20 mg at 08/28/19 2252       Review of Systems  ROS is:      Pertinent items are noted in the History of Present Illness. PHYSICAL EXAMINATION:   Visit Vitals  /77 (BP 1 Location: Right arm, BP Patient Position: At rest)   Pulse (!) 102 Comment: will notified the nurse   Temp 97.5 °F (36.4 °C)   Resp 18   Ht 5' 5\" (1.651 m)   Wt 184 lb 3.2 oz (83.6 kg)   SpO2 97%   Breastfeeding? No   BMI 30.65 kg/m²       Constitutional: Well developed, well nourished elderly female. No acute distress. HEENT: Normocephalic, Atraumatic  CV:  Tachy  Pulm: No respiratory distress or difficulties breathing   GI:  No abdominal masses or tenderness. :  No CVA tenderness   Skin: No evidence of jaundice. Normal color  Neuro/Psych:  Alert and oriented. Affect appropriate. Lymphatic:  No inguinal lymphadenopathy  MSK: FROM       REVIEW OF LABS AND IMAGING:      CT abd/ pelvis w/o contrast 8/27/19  IMPRESSION:     1. No post-obstructive changes in the kidneys and ureters. Cannot confidently  assess for pyelonephritis without IV contrast.  No significant interval change  in the extent of perinephric fat stranding around both kidneys. Probably  suggestive of absence of significant inflammatory changes in the kidneys. Incidental angiomyolipoma in the left kidney.     2.  Stable lung nodules compared to recent prior studies.     3.  Large gallstone.     4. Chronic right perirectal enlarged node. No hypermetabolic activity on  recent PET-CT.               Labs: Results:   Chemistry    Recent Labs     08/28/19  0359 08/27/19  1117 08/27/19  0137   *  --  144*     --  141   K 3.9 3.2* 2.9*     --  104   CO2 28  --  29   BUN 9  --  11   CREA 0.86  --  0.91   CA 8.5  --  8.5   AGAP 7  --  8   BUCR 10*  --  12   AP  --   --  115   TP  --   --  5.8*   ALB  --   --  2.5*   GLOB  --   --  3.3 AGRAT  --   --  0.8      CBC w/Diff Recent Labs     08/28/19  0359 08/27/19  0137   WBC 7.3 7.2   RBC 2.95* 3.01*   HGB 9.1* 9.3*   HCT 28.5* 28.4*    151   GRANS 69 74   LYMPH 16* 13*   EOS 1 1      Cultures Recent Labs     08/27/19  0200 08/27/19  0140   CULT NO GROWTH 1 DAY NO GROWTH 1 DAY     All Micro Results     Procedure Component Value Units Date/Time    CULTURE, BLOOD [[id number]] Collected:  08/27/19 0140    Order Status:  Completed Specimen:  Blood Updated:  08/28/19 0729     Special Requests: NO SPECIAL REQUESTS        Culture result: NO GROWTH 1 DAY       CULTURE, BLOOD [[id number]] Collected:  08/27/19 0200    Order Status:  Completed Specimen:  Blood Updated:  08/28/19 0729     Special Requests: NO SPECIAL REQUESTS        Culture result: NO GROWTH 1 DAY       CULTURE, URINE [[id number]] Collected:  08/27/19 0314    Order Status:  Completed Specimen:  Urine from Clean catch Updated:  08/27/19 0407            Urinalysis Color   Date Value Ref Range Status   08/27/2019 DARK YELLOW   Final     Appearance   Date Value Ref Range Status   08/27/2019 TURBID   Final     Specific gravity   Date Value Ref Range Status   08/27/2019 1.025 1.005 - 1.030   Final     pH (UA)   Date Value Ref Range Status   08/27/2019 5.5 5.0 - 8.0   Final     Protein   Date Value Ref Range Status   08/27/2019 TRACE (A) NEG mg/dL Final     Ketone   Date Value Ref Range Status   08/27/2019 15 (A) NEG mg/dL Final     Bilirubin   Date Value Ref Range Status   08/27/2019 NEGATIVE  NEG   Final     Blood   Date Value Ref Range Status   08/27/2019 SMALL (A) NEG   Final     Urobilinogen   Date Value Ref Range Status   08/27/2019 1.0 0.2 - 1.0 EU/dL Final     Nitrites   Date Value Ref Range Status   08/27/2019 NEGATIVE  NEG   Final     Leukocyte Esterase   Date Value Ref Range Status   08/27/2019 LARGE (A) NEG   Final     Potassium   Date Value Ref Range Status   08/28/2019 3.9 3.5 - 5.5 mmol/L Final     Creatinine   Date Value Ref Range Status   08/28/2019 0.86 0.6 - 1.3 MG/DL Final     BUN   Date Value Ref Range Status   08/28/2019 9 7.0 - 18 MG/DL Final      PSA No results for input(s): PSA in the last 72 hours.    Coagulation Lab Results   Component Value Date/Time    Prothrombin time 16.0 (H) 05/03/2019 03:15 PM    Prothrombin time 13.0 01/16/2019 07:34 AM    INR 1.3 (H) 05/03/2019 03:15 PM    INR 1.0 01/16/2019 07:34 AM    INR (POC) 1.0 01/16/2019 07:49 AM    aPTT 29.5 05/03/2019 03:15 PM    aPTT 26.2 01/16/2019 07:34 AM

## 2019-08-28 NOTE — PROGRESS NOTES
[facility]      CARDIOLOGY PROGRESS NOTE  RECS:  1. Chronic Systolic CHF-stage C NYHA class III- compensated. Monitor for s/s fluid overload. Tolerating Aldactone well. Watch for fluid overload on lower dose of Lasix. Watch potassium magnesium closely. Discussed with patient and . 2. Nonischemic cardiomyopathy (EF 26-30%)-likely from Herceptin use. Increase coreg and watch blood pressure closely. Patient started on Entresto on 7/26/19   3. Status post ICD-normal function last interrogated in 5/2019.   4. Hx of recent Acute PE- on Eliquis BID.   5. Hx of recent Acute DVT   6. Hypertension- stable. Continue low dose coreg and Entresto. 7. Metastatic breast cancer now back on chemotherapy. Lung metastasis likely cause for shortness of breath which is better. 8. Moderate mitral regurgitation likely from non ischemic cardiomyopathy  9. Acute encephalopathy- had two episodes of confusion with agitation    10. Hypokalemia- was replete in the ED  11. Hx of recurrent UTI- UA today Bacteria Unable to quantitate. If patient tolerates increased dose of Coreg without dropping her blood pressure, she can be discharged from cardiac standpoint. If she is discharged, please get a BMP and magnesium level on Friday morning. I have instructed her to call our office around noontime to get the results.     EKG Results     Procedure 720 Value Units Date/Time    EKG, 12 LEAD, INITIAL [[id number]] Collected:  08/27/19 0258    Order Status:  Completed Updated:  08/27/19 1449     Ventricular Rate 95 BPM      Atrial Rate 95 BPM      P-R Interval 154 ms      QRS Duration 130 ms      Q-T Interval 378 ms      QTC Calculation (Bezet) 475 ms      Calculated P Axis 29 degrees      Calculated R Axis -2 degrees      Calculated T Axis 37 degrees      Diagnosis --     Atrial-sensed ventricular-paced rhythm with occasional premature ventricular   complexes  Abnormal ECG  When compared with ECG of 03-MAY-2019 15:32,  premature ventricular complexes are now present  Vent. rate has decreased BY  24 BPM  Confirmed by Gustavo Andrea MD, Lakshmi Stoddard ([id number]) on 8/27/2019 2:49:10 PM          XR Results (most recent):  Results from Hospital Encounter encounter on 08/27/19   XR CHEST PORT    Narrative PORTABLE CHEST RADIOGRAPH     CPT CODE: 21477     INDICATION: Altered mental status. COMPARISON: 5/3/2019. FINDINGS:    There is a left chest port which appears to extend into the SVC however tip is  obscured. There is a right-sided pacemaker with leads along the right atrium,  right ventricle and coronary sinus. Additional EKG leads overlie the chest. The  cardiomediastinal silhouette is mildly enlarged, unchanged. The pulmonary  vascular markings are unremarkable. There is no focal consolidation, pleural  effusion or pneumothorax. Cervical fusion hardware is visualized. Osseous  structures are grossly intact. Impression IMPRESSION:    No acute cardiopulmonary process       07/12/19   ECHO ADULT FOLLOW-UP OR LIMITED 07/13/2019 7/13/2019    Narrative · Left Ventricle: Normal cavity size. Mild concentric hypertrophy. Severe   systolic dysfunction. Estimated left ventricular ejection fraction is 21 -   25%. Abnormal left ventricular wall motion. Inconclusive left ventricular   diastolic function. · Left Atrium: Severely dilated left atrium. · Mitral Valve: Mitral valve thickening. Mitral annular calcification. Moderate mitral valve regurgitation. · Tricuspid Valve: Mild tricuspid valve regurgitation is present. Pulmonary arterial systolic pressure is 04.5 mmHg. Mild pulmonary   hypertension. · IVC/Hepatic Veins: Moderately elevated central venous pressure (10-15   mmHg); IVC diameter is larger than 21 mm and collapses more than 50% with   respiration. · Pericardium: Trivial-to-small pericardial effusion.         Signed by: Rajiv Barroso MD                 ASSESSMENT:  Hospital Problems  Date Reviewed: 6/25/2019 Codes Class Noted POA    UTI (urinary tract infection) ICD-10-CM: N39.0  ICD-9-CM: 599.0  8/27/2019 Unknown        CAD (coronary artery disease) ICD-10-CM: I25.10  ICD-9-CM: 414.00  8/27/2019 Unknown        Elevated troponin ICD-10-CM: R74.8  ICD-9-CM: 790.6  8/27/2019 Unknown        Weakness generalized ICD-10-CM: R53.1  ICD-9-CM: 780.79  8/27/2019 Unknown        Chronic anticoagulation ICD-10-CM: Z79.01  ICD-9-CM: V58.61  8/27/2019 Unknown        History of pulmonary embolism ICD-10-CM: Z86.711  ICD-9-CM: V12.55  8/27/2019 Unknown        Hypokalemia ICD-10-CM: E87.6  ICD-9-CM: 276.8  8/27/2019 Unknown        Hypomagnesemia ICD-10-CM: E83.42  ICD-9-CM: 275.2  8/27/2019 Unknown        * (Principal) Acute encephalopathy ICD-10-CM: G93.40  ICD-9-CM: 348.30  8/27/2019 Unknown        Malignant neoplasm metastatic to lung [facility]) ICD-10-CM: C78.00  ICD-9-CM: 197.0  2/7/2019 Yes                SUBJECTIVE:  No CP  No SOB  No dizziness or confusion    OBJECTIVE:    VS:   Visit Vitals  /77 (BP 1 Location: Right arm, BP Patient Position: At rest)   Pulse (!) 110   Temp 97.5 °F (36.4 °C)   Resp 18   Ht 5' 5\" (1.651 m)   Wt 83.6 kg (184 lb 3.2 oz)   SpO2 97%   Breastfeeding? No   BMI 30.65 kg/m²         Intake/Output Summary (Last 24 hours) at 8/28/2019 1110  Last data filed at 8/27/2019 1703  Gross per 24 hour   Intake 720 ml   Output 325 ml   Net 395 ml     TELE: A sensed V paced    General: alert and in no apparent distress  HENT: Normocephalic, atraumatic. Normal external eye.   Neck :  no bruit, no JVD  Cardiac:  regular rate and rhythm, S1, S2 normal, no murmur, click, rub or gallop  Chest/Lungs:chest clear, no wheezing, rales, normal symmetric air entry  Abdomen: Soft, nontender, no masses  Extremities:  No c/c/edema, peripheral pulses present      Labs: Results:       Chemistry Recent Labs     08/28/19  0359 08/27/19  1117 08/27/19  0137   *  --  144*     --  141   K 3.9 3.2* 2.9*     -- 104   CO2 28  --  29   BUN 9  --  11   CREA 0.86  --  0.91   CA 8.5  --  8.5   MG 1.8 2.0 1.4*   PHOS 2.2*  --   --    AGAP 7  --  8   BUCR 10*  --  12   AP  --   --  115   TP  --   --  5.8*   ALB  --   --  2.5*   GLOB  --   --  3.3   AGRAT  --   --  0.8      CBC w/Diff Recent Labs     08/28/19  0359 08/27/19  0137   WBC 7.3 7.2   RBC 2.95* 3.01*   HGB 9.1* 9.3*   HCT 28.5* 28.4*    151   GRANS 69 74   LYMPH 16* 13*   EOS 1 1      Cardiac Enzymes Recent Labs     08/27/19  1941 08/27/19  1117   CPK 63 34   CKND1 CALCULATION NOT PERFORMED WHEN RESULT IS BELOW LINEAR LIMIT CALCULATION NOT PERFORMED WHEN RESULT IS BELOW LINEAR LIMIT      Coagulation No results for input(s): PTP, INR, APTT in the last 72 hours. No lab exists for component: INREXT    Lipid Panel Lab Results   Component Value Date/Time    Cholesterol, total 266 (H) 04/17/2019 11:38 AM    HDL Cholesterol 60 04/17/2019 11:38 AM    LDL, calculated 189.2 (H) 04/17/2019 11:38 AM    VLDL, calculated 16.8 04/17/2019 11:38 AM    Triglyceride 84 04/17/2019 11:38 AM    CHOL/HDL Ratio 4.4 04/17/2019 11:38 AM      BNP No results for input(s): BNPP in the last 72 hours.    Liver Enzymes Recent Labs     08/27/19  0137   TP 5.8*   ALB 2.5*      SGOT 292*      Digoxin    Thyroid Studies Lab Results   Component Value Date/Time    TSH 0.10 (L) 04/17/2019 11:38 AM              Patrick Walker MD   Pager # [phone]

## 2019-08-28 NOTE — PROGRESS NOTES
Discharge order noted for today. Pt has been accepted to [facility] agency. Met with patient and FAMILY MEMBERS  Are SOMEWHAT agreeable to the transition plan today. Transport has been arranged through [address]. Patient's discharge summary and home health  orders have been forwarded to [facility] home health  agency via  [address] [place] [address]. Updated bedside RN, LIAN,  to the transition plan.   Discharge information has been documented on the AVS.       [facility]

## 2019-08-28 NOTE — DISCHARGE INSTRUCTIONS
Discharge Instructions    Patient: Jamar Hodges MRN: [id number]  CSN: [id number]    YOB: 1950  Age: 78 y.o. Sex: female    DOA: 8/27/2019 LOS:  LOS: 1 day   Discharge Date:      DIET:  Cardiac Diet    ACTIVITY: Activity as tolerated  Home health care for Skilled care for CHF tele monitoring, Hypertension and medication management, PT/OT consult      ADDITIONAL INFORMATION: If you experience any of the following symptoms but not limited to Fever, chills, nausea, vomiting, diarrhea, change in mentation, falling, bleeding, shortness of breath, chest pain, please call your primary care physician or return to the emergency room if you cannot get hold of your doctor:     FOLLOW UP CARE:  Dr. Ashley Salinas MD in 5-7 days. Please call and set up an appointment.   Dr. Blayne Rodriguez, urology in 2 weeks      Karie Corona MD  8/28/2019 4:54 PM

## 2019-08-28 NOTE — PHYSICIAN ADVISORY
Letter of admission status determination     Latesha Costa   Age: 78 y.o. MRN: [id number]  [facility]:  [id number]    Date of hospitalization: 8/27/2019    I have reviewed this case as it involves a Medicare patient admitted as inpatient that has not been hospitalized for at least two midnights and has a discharge order placed. The available documentation of patient's condition and plan of care at the time of admission does not support medically necessary hospitalization for two or more midnights. Therefore, I recommend changing the hospitalization status to Outpatient. The final decision regarding the patient's hospitalization status depends on the attending physician's judgment.       Dinesh Nance MD, ANNI, [address] [facility], [address] [place] [address]  [phone]

## 2019-08-28 NOTE — DISCHARGE SUMMARY
Discharge Summary    Patient: Lauro Panda MRN: [id number]  CSN: [id number]    YOB: 1950  Age: 78 y.o. Sex: female    DOA: 8/27/2019 LOS:  LOS: 1 day   Discharge Date:      Disposition: Home with [facility] AT [facility]    Admission Diagnoses: CAD (coronary artery disease) [I25.10]  UTI (urinary tract infection) [N39.0]    Discharge Diagnoses:  PLEASE SEE DICTATION. Discharge Condition: Stable    PHYSICAL EXAM  Visit Vitals  /77   Pulse 98   Temp 98.3 °F (36.8 °C)   Resp 19   Ht 5' 5\" (1.651 m)   Wt 83.5 kg (184 lb)   SpO2 97%   Breastfeeding? No   BMI 30.62 kg/m²       General: Alert, cooperative, no acute distress    Lungs:  CTA Bilaterally. No Wheezing/Rales. Heart:  Regular rate and Rhythm. Abdomen: Soft, Non distended, Non tender. + Bowel sounds. Extremities: No edema/ cyanosis/ clubbing  Psych:   Good insight. Not anxious or agitated. Neurologic:  AA oriented X 3. Moves all extremities. Hospital Course: Please see dictation. code # [id number]      Current Discharge Medication List      START taking these medications    Details   spironolactone (ALDACTONE) 25 mg tablet Take 1 Tab by mouth daily. Qty: 30 Tab, Refills: 0      potassium, sodium phosphates (NEUTRA-PHOS) 280-160-250 mg packet Take 2 Packets by mouth two (2) times a day for 1 day. Qty: 4 Packet, Refills: 0      cefpodoxime (VANTIN) 200 mg tablet Take 1 Tab by mouth two (2) times a day for 5 days. Qty: 10 Tab, Refills: 0      OTHER BMP on Friday 8/30/19  Dx: CHF  Fax results to Dr. Haroon Aguiar: 1 Each, Refills: 0         CONTINUE these medications which have CHANGED    Details   carvedilol (COREG) 25 mg tablet Take 1 Tab by mouth two (2) times daily (with meals). Qty: 60 Tab, Refills: 0      furosemide (LASIX) 20 mg tablet Take 1 Tab by mouth daily. Qty: 30 Tab, Refills: 0      apixaban (ELIQUIS) 5 mg tablet Take 1 Tab by mouth two (2) times a day.   Qty: 60 Tab, Refills: 0         CONTINUE these medications which have NOT CHANGED    Details   tiotropium (SPIRIVA WITH HANDIHALER) 18 mcg inhalation capsule Take 1 Cap by inhalation daily. sacubitril-valsartan (ENTRESTO) 49 mg/51 mg tablet Take 1 Tab by mouth two (2) times a day. Qty: 180 Tab, Refills: 3      digoxin (LANOXIN) 0.125 mg tablet Take 1 Tab by mouth daily. Qty: 90 Tab, Refills: 3      mometasone (NASONEX) 50 mcg/actuation nasal spray 2 Sprays by Both Nostrils route daily as needed. Indications: inflammation of the nose due to an allergy      budesonide-formoterol (SYMBICORT) 160-4.5 mcg/actuation HFAA Take 2 Puffs by inhalation two (2) times a day. Qty: 1 Inhaler, Refills: 0      albuterol-ipratropium (DUO-NEB) 2.5 mg-0.5 mg/3 ml nebu 3 mL by Nebulization route every six (6) hours as needed (wheezing or sob). File under Medicare Part B, ICD codes J44.9, C78.01  Qty: 120 Nebule, Refills: 3      DULoxetine (CYMBALTA) 30 mg capsule Take 30 mg by mouth daily. multivitamin (ONE A DAY) tablet Take 1 Tab by mouth daily. b complex vitamins (B COMPLEX 1) tablet Take 1 Tab by mouth daily. plant stanol eliezer (CHOLEST OFF PO) Take 1 Tab by mouth daily. benzonatate (TESSALON PERLES) 100 mg capsule Take 100 mg by mouth three (3) times daily as needed for Cough. Biotin 2,500 mcg cap Take 1 Cap by mouth daily. melatonin 10 mg tab Take 1 Tab by mouth nightly. montelukast (SINGULAIR) 10 mg tablet Take 1 Tab by mouth daily. Qty: 90 Tab, Refills: 3      cholecalciferol, vitamin D3, 2,000 unit tab Take 1 Tab by mouth daily. raloxifene (EVISTA) 60 mg tablet Take 60 mg by mouth daily. Associated Diagnoses: Malignant neoplasm of breast (female), unspecified site      naloxone (NARCAN) 0.4 mg/mL injection 1 mL by IntraVENous route as needed for Other (Give if breaths per minute  less than 10, may repeat dose in 15 min and contact MD).   Qty: 1 mL, Refills: 0             · It is important that you take the medication exactly as they are prescribed. · Keep your medication in the bottles provided by the pharmacist and keep a list of the medication names, dosages, and times to be taken in your wallet. · Do not take other medications without consulting your doctor. IET:  Cardiac Diet    ACTIVITY: Activity as tolerated  Home health care for Skilled care for CHF tele monitoring, Hypertension and medication management, PT/OT consult      ADDITIONAL INFORMATION: If you experience any of the following symptoms but not limited to Fever, chills, nausea, vomiting, diarrhea, change in mentation, falling, bleeding, shortness of breath, chest pain, please call your primary care physician or return to the emergency room if you cannot get hold of your doctor:     FOLLOW UP CARE:  Dr. Aliya Jacome MD in 5-7 days. Please call and set up an appointment.   Dr. Enedelia Dykes, urology in 2 weeks    Minutes spent on discharge: 40 minutes spent coordinating this discharge (review instructions/follow-up, prescriptions, preparing report for sign off)    Debbie Paulson MD  8/28/2019 4:57 PM

## 2019-08-28 NOTE — PROGRESS NOTES
PHYSICAL THERAPY EVALUATION AND DISCHARGE    Patient: Bernard Burroughs (29 y.o. female)  Date: 8/28/2019  Primary Diagnosis: CAD (coronary artery disease) [I25.10]  UTI (urinary tract infection) [N39.0]  Precautions: None  PLOF: Patient lives with  in 1 story home with 4STE with HR. Patient reports she was walking with rolling walker outside the home and [facility] for mobility inside home for past 3 weeks due to weakness. Prior to this, patient has was independent with walking. Patient has SC in step over tub. ASSESSMENT :  Patient is 66yo F admitted to hospital for UTI and presents today alert and agreeable to therapy and was supine in bed upon arrival. Patient transferred to sitting EOB for objective assessment and demos intact and equal BLE strength. Patient stood and ambulated 200ft no AD with one path deviation after apprx 100ft. Patient then transferred to sitting for therapist to adjust walker. Patient performed additional 200ft with RW with improved gait efficiency and no path deviations. Patient then ambulated to bathroom 40ft in room no AD with no LoB or gait disturbances. Patient tolerated activity well and was left resting with call bell by her side, sitting in locked recliner at conclusion of session. Patient tolerated activity well and was educated on recommendation for RW outside for longer distances. Patient educated not to get up without assist and encouraged to ambulated several times per day with nursing staff. Patient acknowledged understanding and denied further need for assist. Patient agreeable to D/C from PT at this level of care. Patient does not require further skilled intervention at this level of care. PLAN :  Recommendations and Planned Interventions:   No formal PT needs identified at this time.   Discharge Recommendations: Outpatient  Further Equipment Recommendations for Discharge: rolling walker     SUBJECTIVE:   Patient stated I feel a little better today.    OBJECTIVE DATA SUMMARY:     Past Medical History:   Diagnosis Date    Abnormal nuclear cardiac imaging test 06/11/2010    Lg fixed anterior, septal, apical, inferoseptal defect sugg RCA & LAD disease or cardiomyopathy. EF 20%. Global hykn. Nondiagnostic EKG. Acute bronchitis 10/15/2018    Persistent cough and wheezing in spite of prednisone and antibiotic. Will refer to pulmonary    Adenocarcinoma of breast; locally advanced invasive ductal adenocarcinoma of left breast     Asthma     Automatic implantable cardiac defibrillator in situ     Automatic implantable cardiac defibrillator in situ     Breast cancer (HCC)     Cancer ([facility] [address]     breast cancer left    Chemotherapy convalescence or palliative care 2012    Chronic combined systolic and diastolic heart failure (HCC)     Remains symptomatic, nyha class 3 ef improving. Congestive heart failure, unspecified     chronic class ll    Echocardiogram 09/27/2010    EF 30%. Global hykn. Mild MR. Mild TR. GERD (gastroesophageal reflux disease)     Hyperlipidemia     Hypertension     Mitral valve disorders(424.0) 1/15/2014    mild to moderate mr     Mitral valve disorders(424.0) 1/15/2014    mild to moderate mr     MVP (mitral valve prolapse)     Nonischemic cardiomyopathy (HCC)     EF 20-30% despite medical therapy    Nonspecific abnormal electrocardiogram (ECG) (EKG)     Osteopenia     Other primary cardiomyopathies     Pacemaker 10/27/10    s/p implantation, without complication    Poisoning by other and unspecified agents primarily affecting the cardiovascular system(972.9)     Ef slightly better to 45%, STABLE back on chemo. Radiation therapy     Radiation therapy complication 2962    S/P cardiac catheterization 07/08/10    Patent coronary arteries. LVEDP 25.  EF 25%. Global hykn. Moderate MR.  RA 10.  RV 40/10. PA 40/20.  20.  CO/CI 4.5/2.45 (Larry).     Shortness of breath     Syncope and collapse     Thyroid disease     goiter     Past Surgical History:   Procedure Laterality Date    CARDIAC SURG PROCEDURE UNLIST      Difibrillator; BI V ICD    HX MASTECTOMY  09/28/11    modified radical mastectomy and axillary dissection    HX ORTHOPAEDIC      HX OTHER SURGICAL      surgery to remove part of liver    HX PACEMAKER  10/27/10    s/p Medtronic biventricular AICD, without complication    HX RADICAL MASTECTOMY      IR INSERT TUNL CVC W PORT OVER 5 YEARS  1/16/2019    NEUROLOGICAL PROCEDURE UNLISTED      Cervical fusion     Barriers to Learning/Limitations: None  Compensate with: N/A  Home Situation:   Home Situation  Home Environment: Private residence  # Steps to Enter: 4  Rails to Enter: Yes  Hand Rails : Bilateral  One/Two Story Residence: One story  Living Alone: No  Support Systems: Spouse/Significant Other/Partner  Patient Expects to be Discharged to[de-identified] Private residence  Current DME Used/Available at Home: [facility], [place], [facility], Shower chair, Walker, rollator  Tub or Shower Type: Tub/Shower combination(with seat)  Critical Behavior:  Neurologic State: Alert  Orientation Level: Oriented X4  Cognition: Appropriate decision making   Strength:    Strength: Within functional limits(BLE)   Tone & Sensation:   Tone: Normal(BLE)   Sensation: Intact(BLE)   Range Of Motion:  AROM: Within functional limits(BLE)   Functional Mobility:  Bed Mobility:   Supine to Sit: Modified independent  Sit to Supine: Modified independent   Transfers:  Sit to Stand: Modified independent  Stand to Sit: Modified independent    Balance:   Sitting: Intact  Standing: Intact  Ambulation/Gait Training:  Distance (ft): 400 Feet (ft)(200ft x 2)  Assistive Device: Walker, rolling(250ft no AD, 250ft RW)  Ambulation - Level of Assistance: Supervision   Base of Support: Widened   Speed/Nathalie: Slow  Step Length: Left shortened;Right shortened   Interventions: Verbal cues; Visual/Demos  Pain:  Pain level pre-treatment: 0/10   Pain level post-treatment: 0/10    Activity Tolerance:   Patient tolerated activity well and was left resting with call bell by her side; denied dizziness, chest pain, or SOB. Please refer to the flowsheet for vital signs taken during this treatment. After treatment:   ?         Patient left in no apparent distress sitting up in chair  ? Patient left in no apparent distress in bed  ? Call bell left within reach  ? Nursing notified  ? Caregiver present  ? Bed alarm activated  ? SCDs applied    COMMUNICATION/EDUCATION:   ?         Role of Physical Therapy in the acute care setting. ?         Fall prevention education was provided and the patient/caregiver indicated understanding. ? Patient/family have participated as able in goal setting and plan of care. ?         Patient/family agree to work toward stated goals and plan of care. ?         Patient understands intent and goals of therapy, but is neutral about his/her participation. ? Patient is unable to participate in goal setting/plan of care: ongoing with therapy staff. ?         Other:     Thank you for this referral.  Katelyn Rey, PT   Time Calculation: 23 mins      Eval Complexity: History: MEDIUM  Complexity : 1-2 comorbidities / personal factors will impact the outcome/ POC Exam:LOW Complexity : 1-2 Standardized tests and measures addressing body structure, function, activity limitation and / or participation in recreation  Presentation: LOW Complexity : Stable, uncomplicated  Clinical Decision Making:Low Complexity    Overall Complexity:LOW

## 2019-08-28 NOTE — PROGRESS NOTES
Pt's mediport was accesed earlier, flush four times, no blood return, Dr. Steven Chase was notified at 46, no orders received.

## 2019-08-29 ENCOUNTER — HOME HEALTH ADMISSION (OUTPATIENT)
Dept: HOME HEALTH SERVICES | Facility: HOME HEALTH | Age: 69
End: 2019-08-29

## 2019-08-29 ENCOUNTER — TELEPHONE (OUTPATIENT)
Dept: CARDIOLOGY CLINIC | Age: 69
End: 2019-08-29

## 2019-08-29 NOTE — TELEPHONE ENCOUNTER
She should still get BMP on Friday morning as we have given her Aldactone. If she is not taking any potassium since discharge, do not take it today and we will see what the level is tomorrow.

## 2019-08-29 NOTE — HOME CARE
Discharge noted yesterday. Referral received after hours. Received home health referral for [facility] for SN, PT, OT and telehealth. Order processed and emailed to central office.   Nestor Dakin, [facility] Liaison

## 2019-08-29 NOTE — TELEPHONE ENCOUNTER
Per ,  Patient has called to multiple pharmacy's and can not find Potassium, sodium phosphate. Wants to know if still needs BMP/mg level?

## 2019-08-30 ENCOUNTER — HOSPITAL ENCOUNTER (OUTPATIENT)
Dept: LAB | Age: 69
Discharge: HOME | End: 2019-08-30
Payer: MEDICARE

## 2019-08-30 LAB
ANION GAP SERPL CALC-SCNC: 7 MMOL/L (ref 3–18)
BUN SERPL-MCNC: 7 MG/DL (ref 7–18)
BUN/CREAT SERPL: 8 (ref 12–20)
CALCIUM SERPL-MCNC: 8.4 MG/DL (ref 8.5–10.1)
CHLORIDE SERPL-SCNC: 107 MMOL/L (ref 100–111)
CO2 SERPL-SCNC: 27 MMOL/L (ref 21–32)
CREAT SERPL-MCNC: 0.88 MG/DL (ref 0.6–1.3)
GLUCOSE SERPL-MCNC: 125 MG/DL (ref 74–99)
POTASSIUM SERPL-SCNC: 4.3 MMOL/L (ref 3.5–5.5)
SODIUM SERPL-SCNC: 141 MMOL/L (ref 136–145)

## 2019-08-30 PROCEDURE — 80048 BASIC METABOLIC PNL TOTAL CA: CPT

## 2019-08-30 PROCEDURE — 36415 COLL VENOUS BLD VENIPUNCTURE: CPT

## 2019-08-31 ENCOUNTER — HOME CARE VISIT (OUTPATIENT)
Dept: HOME HEALTH SERVICES | Facility: HOME HEALTH | Age: 69
End: 2019-08-31

## 2019-09-02 LAB
BACTERIA SPEC CULT: NORMAL
BACTERIA SPEC CULT: NORMAL
SERVICE CMNT-IMP: NORMAL
SERVICE CMNT-IMP: NORMAL

## 2019-09-03 ENCOUNTER — TELEPHONE (OUTPATIENT)
Dept: CARDIOLOGY CLINIC | Age: 69
End: 2019-09-03

## 2019-09-03 DIAGNOSIS — I50.23 ACUTE ON CHRONIC SYSTOLIC CONGESTIVE HEART FAILURE (HCC): Primary | ICD-10-CM

## 2019-09-03 RX ORDER — METOLAZONE 5 MG/1
5 TABLET ORAL DAILY
Qty: 30 TAB | Refills: 1 | Status: SHIPPED | OUTPATIENT
Start: 2019-09-03 | End: 2019-09-26

## 2019-09-03 NOTE — TELEPHONE ENCOUNTER
Called and spoke with patient per Dr. Jelena Reyes , called  In metolazone 5 mg a day for CHF, if symptoms persist please go to ER. BMP in 1 week. She voices understanding and acceptance of this advice and will call back if any further questions or concerns.

## 2019-09-03 NOTE — TELEPHONE ENCOUNTER
I have called in metolazone 5 mg a day or CHF. If symptoms persist please come to emergency room. BMP in 1 week.   Prescription called into [facility]  pharmacy

## 2019-09-09 ENCOUNTER — HOSPITAL ENCOUNTER (OUTPATIENT)
Dept: LAB | Age: 69
Discharge: HOME | End: 2019-09-09
Payer: MEDICARE

## 2019-09-09 ENCOUNTER — TELEPHONE (OUTPATIENT)
Dept: CARDIOLOGY CLINIC | Age: 69
End: 2019-09-09

## 2019-09-09 ENCOUNTER — CLINICAL SUPPORT (OUTPATIENT)
Dept: CARDIOLOGY CLINIC | Age: 69
End: 2019-09-09

## 2019-09-09 DIAGNOSIS — I50.23 ACUTE ON CHRONIC SYSTOLIC CONGESTIVE HEART FAILURE (HCC): Primary | ICD-10-CM

## 2019-09-09 DIAGNOSIS — I42.8 NONISCHEMIC CARDIOMYOPATHY (HCC): Primary | ICD-10-CM

## 2019-09-09 DIAGNOSIS — I42.8 NONISCHEMIC CARDIOMYOPATHY (HCC): ICD-10-CM

## 2019-09-09 DIAGNOSIS — Z95.810 AUTOMATIC IMPLANTABLE CARDIOVERTER-DEFIBRILLATOR IN SITU: ICD-10-CM

## 2019-09-09 DIAGNOSIS — I50.23 ACUTE ON CHRONIC SYSTOLIC CONGESTIVE HEART FAILURE (HCC): ICD-10-CM

## 2019-09-09 LAB
ANION GAP SERPL CALC-SCNC: 9 MMOL/L (ref 3–18)
BUN SERPL-MCNC: 37 MG/DL (ref 7–18)
BUN/CREAT SERPL: 21 (ref 12–20)
CALCIUM SERPL-MCNC: 9.9 MG/DL (ref 8.5–10.1)
CHLORIDE SERPL-SCNC: 99 MMOL/L (ref 100–111)
CO2 SERPL-SCNC: 28 MMOL/L (ref 21–32)
CREAT SERPL-MCNC: 1.8 MG/DL (ref 0.6–1.3)
DIGOXIN SERPL-MCNC: 0.8 NG/ML (ref 0.9–2)
GLUCOSE SERPL-MCNC: 123 MG/DL (ref 74–99)
MAGNESIUM SERPL-MCNC: 2.3 MG/DL (ref 1.6–2.6)
POTASSIUM SERPL-SCNC: 4.4 MMOL/L (ref 3.5–5.5)
SODIUM SERPL-SCNC: 136 MMOL/L (ref 136–145)

## 2019-09-09 PROCEDURE — 36415 COLL VENOUS BLD VENIPUNCTURE: CPT

## 2019-09-09 PROCEDURE — 83735 ASSAY OF MAGNESIUM: CPT

## 2019-09-09 PROCEDURE — 80048 BASIC METABOLIC PNL TOTAL CA: CPT

## 2019-09-09 PROCEDURE — 80162 ASSAY OF DIGOXIN TOTAL: CPT

## 2019-09-09 RX ORDER — DIGOXIN 125 MCG
0.12 TABLET ORAL DAILY
Qty: 90 TAB | Refills: 3 | Status: SHIPPED | OUTPATIENT
Start: 2019-09-09 | End: 2020-05-07 | Stop reason: SDUPTHER

## 2019-09-09 RX ORDER — SPIRONOLACTONE 25 MG/1
25 TABLET ORAL DAILY
Qty: 90 TAB | Refills: 1 | Status: SHIPPED | OUTPATIENT
Start: 2019-09-09 | End: 2019-09-30 | Stop reason: SDUPTHER

## 2019-09-09 NOTE — PROGRESS NOTES
ICD check due to alarm    Noted that she had 3 V. fib episodes on August 26 and 7 when she was admitted to the hospital for altered mental status. OptiVol was elevated from 8/24/2019 to 9/7/2019 and is now again normal.  BiV ICD is working normally now. We will get a BMP dig and magnesium levels. Since Aldactone was started while she was in the hospital, Lasix was reduced. She can take extra Lasix as needed for edema/shortness of breath.

## 2019-09-09 NOTE — TELEPHONE ENCOUNTER
Metolazone was started on 9/3/19 patient feeling better wants to know if she needs to keep taking ever day?

## 2019-09-09 NOTE — TELEPHONE ENCOUNTER
PT started metolazone on 9/3/19 and feeling a lot better, wants to know if she needs to keep taking?

## 2019-09-10 NOTE — TELEPHONE ENCOUNTER
Called and left voicemail on patient phone per Dr. Mann Alexis, Renal function is worse. Hold metolazone. BMP in 1 week. Use additional metolazone as needed only. If any questions to please call office.

## 2019-09-18 ENCOUNTER — HOSPITAL ENCOUNTER (INPATIENT)
Age: 69
LOS: 8 days | Discharge: HOME HEALTH | DRG: 871 | End: 2019-09-26
Attending: EMERGENCY MEDICINE | Admitting: EMERGENCY MEDICINE
Payer: MEDICARE

## 2019-09-18 ENCOUNTER — APPOINTMENT (OUTPATIENT)
Dept: ULTRASOUND IMAGING | Age: 69
DRG: 871 | End: 2019-09-18
Attending: PHYSICIAN ASSISTANT
Payer: MEDICARE

## 2019-09-18 ENCOUNTER — APPOINTMENT (OUTPATIENT)
Dept: GENERAL RADIOLOGY | Age: 69
DRG: 871 | End: 2019-09-18
Attending: PHYSICIAN ASSISTANT
Payer: MEDICARE

## 2019-09-18 ENCOUNTER — APPOINTMENT (OUTPATIENT)
Dept: CT IMAGING | Age: 69
DRG: 871 | End: 2019-09-18
Attending: PHYSICIAN ASSISTANT
Payer: MEDICARE

## 2019-09-18 DIAGNOSIS — R10.32 ABDOMINAL PAIN, LLQ (LEFT LOWER QUADRANT): ICD-10-CM

## 2019-09-18 DIAGNOSIS — K81.9 CHOLECYSTITIS: Primary | ICD-10-CM

## 2019-09-18 LAB
ALBUMIN SERPL-MCNC: 3.6 G/DL (ref 3.4–5)
ALBUMIN/GLOB SERPL: 1.1 {RATIO} (ref 0.8–1.7)
ALP SERPL-CCNC: 84 U/L (ref 45–117)
ALT SERPL-CCNC: 33 U/L (ref 13–56)
ANION GAP SERPL CALC-SCNC: 8 MMOL/L (ref 3–18)
APPEARANCE UR: CLEAR
AST SERPL-CCNC: 42 U/L (ref 10–38)
BASOPHILS # BLD: 0 K/UL (ref 0–0.1)
BASOPHILS NFR BLD: 0 % (ref 0–2)
BILIRUB SERPL-MCNC: 0.3 MG/DL (ref 0.2–1)
BILIRUB UR QL: NEGATIVE
BUN SERPL-MCNC: 14 MG/DL (ref 7–18)
BUN/CREAT SERPL: 13 (ref 12–20)
CALCIUM SERPL-MCNC: 9.1 MG/DL (ref 8.5–10.1)
CHLORIDE SERPL-SCNC: 109 MMOL/L (ref 100–111)
CO2 SERPL-SCNC: 26 MMOL/L (ref 21–32)
COLOR UR: YELLOW
CREAT SERPL-MCNC: 1.07 MG/DL (ref 0.6–1.3)
DIFFERENTIAL METHOD BLD: ABNORMAL
EOSINOPHIL # BLD: 0.2 K/UL (ref 0–0.4)
EOSINOPHIL NFR BLD: 1 % (ref 0–5)
ERYTHROCYTE [DISTWIDTH] IN BLOOD BY AUTOMATED COUNT: 15.7 % (ref 11.6–14.5)
GLOBULIN SER CALC-MCNC: 3.3 G/DL (ref 2–4)
GLUCOSE SERPL-MCNC: 159 MG/DL (ref 74–99)
GLUCOSE UR STRIP.AUTO-MCNC: NEGATIVE MG/DL
HCT VFR BLD AUTO: 34.1 % (ref 35–45)
HGB BLD-MCNC: 11.2 G/DL (ref 12–16)
HGB UR QL STRIP: NEGATIVE
KETONES UR QL STRIP.AUTO: NEGATIVE MG/DL
LEUKOCYTE ESTERASE UR QL STRIP.AUTO: NEGATIVE
LIPASE SERPL-CCNC: 188 U/L (ref 73–393)
LYMPHOCYTES # BLD: 1 K/UL (ref 0.9–3.6)
LYMPHOCYTES NFR BLD: 9 % (ref 21–52)
MCH RBC QN AUTO: 31.8 PG (ref 24–34)
MCHC RBC AUTO-ENTMCNC: 32.8 G/DL (ref 31–37)
MCV RBC AUTO: 96.9 FL (ref 74–97)
MONOCYTES # BLD: 0.7 K/UL (ref 0.05–1.2)
MONOCYTES NFR BLD: 6 % (ref 3–10)
NEUTS SEG # BLD: 9.9 K/UL (ref 1.8–8)
NEUTS SEG NFR BLD: 84 % (ref 40–73)
NITRITE UR QL STRIP.AUTO: NEGATIVE
PH UR STRIP: 5 [PH] (ref 5–8)
PLATELET # BLD AUTO: 205 K/UL (ref 135–420)
PMV BLD AUTO: 10.7 FL (ref 9.2–11.8)
POTASSIUM SERPL-SCNC: 3.4 MMOL/L (ref 3.5–5.5)
PROT SERPL-MCNC: 6.9 G/DL (ref 6.4–8.2)
PROT UR STRIP-MCNC: NEGATIVE MG/DL
RBC # BLD AUTO: 3.52 M/UL (ref 4.2–5.3)
SODIUM SERPL-SCNC: 143 MMOL/L (ref 136–145)
SP GR UR REFRACTOMETRY: 1.01 (ref 1–1.03)
TROPONIN I SERPL-MCNC: <0.02 NG/ML (ref 0–0.04)
TROPONIN I SERPL-MCNC: <0.02 NG/ML (ref 0–0.04)
UROBILINOGEN UR QL STRIP.AUTO: 0.2 EU/DL (ref 0.2–1)
WBC # BLD AUTO: 11.8 K/UL (ref 4.6–13.2)

## 2019-09-18 PROCEDURE — 81003 URINALYSIS AUTO W/O SCOPE: CPT

## 2019-09-18 PROCEDURE — 83690 ASSAY OF LIPASE: CPT

## 2019-09-18 PROCEDURE — 74011250636 HC RX REV CODE- 250/636: Performed by: EMERGENCY MEDICINE

## 2019-09-18 PROCEDURE — 74011250636 HC RX REV CODE- 250/636: Performed by: HOSPITALIST

## 2019-09-18 PROCEDURE — 74177 CT ABD & PELVIS W/CONTRAST: CPT

## 2019-09-18 PROCEDURE — 96375 TX/PRO/DX INJ NEW DRUG ADDON: CPT

## 2019-09-18 PROCEDURE — 96376 TX/PRO/DX INJ SAME DRUG ADON: CPT

## 2019-09-18 PROCEDURE — 85025 COMPLETE CBC W/AUTO DIFF WBC: CPT

## 2019-09-18 PROCEDURE — 71046 X-RAY EXAM CHEST 2 VIEWS: CPT

## 2019-09-18 PROCEDURE — 74011636320 HC RX REV CODE- 636/320: Performed by: EMERGENCY MEDICINE

## 2019-09-18 PROCEDURE — 99284 EMERGENCY DEPT VISIT MOD MDM: CPT

## 2019-09-18 PROCEDURE — 65270000029 HC RM PRIVATE

## 2019-09-18 PROCEDURE — 96365 THER/PROPH/DIAG IV INF INIT: CPT

## 2019-09-18 PROCEDURE — 74011000258 HC RX REV CODE- 258: Performed by: PHYSICIAN ASSISTANT

## 2019-09-18 PROCEDURE — 93005 ELECTROCARDIOGRAM TRACING: CPT

## 2019-09-18 PROCEDURE — 80053 COMPREHEN METABOLIC PANEL: CPT

## 2019-09-18 PROCEDURE — 76705 ECHO EXAM OF ABDOMEN: CPT

## 2019-09-18 PROCEDURE — 74011250636 HC RX REV CODE- 250/636: Performed by: PHYSICIAN ASSISTANT

## 2019-09-18 PROCEDURE — 84484 ASSAY OF TROPONIN QUANT: CPT

## 2019-09-18 RX ORDER — FENTANYL CITRATE 50 UG/ML
75 INJECTION, SOLUTION INTRAMUSCULAR; INTRAVENOUS ONCE
Status: COMPLETED | OUTPATIENT
Start: 2019-09-19 | End: 2019-09-18

## 2019-09-18 RX ORDER — MORPHINE SULFATE 2 MG/ML
2 INJECTION, SOLUTION INTRAMUSCULAR; INTRAVENOUS
Status: COMPLETED | OUTPATIENT
Start: 2019-09-18 | End: 2019-09-18

## 2019-09-18 RX ORDER — ONDANSETRON 2 MG/ML
4 INJECTION INTRAMUSCULAR; INTRAVENOUS
Status: COMPLETED | OUTPATIENT
Start: 2019-09-18 | End: 2019-09-18

## 2019-09-18 RX ORDER — FENTANYL CITRATE 50 UG/ML
75 INJECTION, SOLUTION INTRAMUSCULAR; INTRAVENOUS
Status: COMPLETED | OUTPATIENT
Start: 2019-09-18 | End: 2019-09-18

## 2019-09-18 RX ADMIN — SODIUM CHLORIDE 1.5 G: 900 INJECTION INTRAVENOUS at 19:28

## 2019-09-18 RX ADMIN — FENTANYL CITRATE 75 MCG: 50 INJECTION INTRAMUSCULAR; INTRAVENOUS at 16:07

## 2019-09-18 RX ADMIN — FENTANYL CITRATE 75 MCG: 50 INJECTION INTRAMUSCULAR; INTRAVENOUS at 19:40

## 2019-09-18 RX ADMIN — MORPHINE SULFATE 2 MG: 2 INJECTION, SOLUTION INTRAMUSCULAR; INTRAVENOUS at 13:36

## 2019-09-18 RX ADMIN — MORPHINE SULFATE 2 MG: 2 INJECTION, SOLUTION INTRAMUSCULAR; INTRAVENOUS at 12:58

## 2019-09-18 RX ADMIN — ONDANSETRON 4 MG: 2 INJECTION INTRAMUSCULAR; INTRAVENOUS at 13:02

## 2019-09-18 RX ADMIN — MORPHINE SULFATE 2 MG: 2 INJECTION, SOLUTION INTRAMUSCULAR; INTRAVENOUS at 15:28

## 2019-09-18 RX ADMIN — ONDANSETRON 4 MG: 2 INJECTION INTRAMUSCULAR; INTRAVENOUS at 12:21

## 2019-09-18 RX ADMIN — IOPAMIDOL 80 ML: 612 INJECTION, SOLUTION INTRAVENOUS at 23:53

## 2019-09-18 RX ADMIN — FENTANYL CITRATE 75 MCG: 50 INJECTION, SOLUTION INTRAMUSCULAR; INTRAVENOUS at 23:17

## 2019-09-18 NOTE — ED TRIAGE NOTES
The patient presents for evaluation of epigastric abdominal pain and vomiting that began this morning.

## 2019-09-18 NOTE — PROGRESS NOTES
HPI: Rico Rooney is a 78 y.o. female presenting with chief complain of abdominal pain. 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 Pain is severe, sharp and more right sided. She denies nausea/vomiting. She has no prior history of abdominal pain. She denies dark urine or eugenio colored stools. Consulted because ultrasoud raises concern for cholecystitis. Past Medical History:   Diagnosis Date    Abnormal nuclear cardiac imaging test 06/11/2010    Lg fixed anterior, septal, apical, inferoseptal defect sugg RCA & LAD disease or cardiomyopathy. EF 20%. Global hykn. Nondiagnostic EKG.  Acute bronchitis 10/15/2018    Persistent cough and wheezing in spite of prednisone and antibiotic. Will refer to pulmonary    Adenocarcinoma of breast; locally advanced invasive ductal adenocarcinoma of left breast     Asthma     Automatic implantable cardiac defibrillator in situ     Automatic implantable cardiac defibrillator in situ     Breast cancer ([facility] [address]     Cancer ([facility] [address]     breast cancer left    Chemotherapy convalescence or palliative care 2012    Chronic combined systolic and diastolic heart failure (HCC)     Remains symptomatic, nyha class 3 ef improving.  Congestive heart failure, unspecified     chronic class ll    Echocardiogram 09/27/2010    EF 30%. Global hykn. Mild MR. Mild TR.     GERD (gastroesophageal reflux disease)     Hyperlipidemia     Hypertension     Mitral valve disorders(424.0) 1/15/2014    mild to moderate mr     Mitral valve disorders(424.0) 1/15/2014    mild to moderate mr     MVP (mitral valve prolapse)     Nonischemic cardiomyopathy (HCC)     EF 20-30% despite medical therapy    Nonspecific abnormal electrocardiogram (ECG) (EKG)     Osteopenia     Other primary cardiomyopathies     Pacemaker 10/27/10    s/p implantation, without complication    Poisoning by other and unspecified agents primarily affecting the cardiovascular system(972.9)     Ef slightly better to 45%, STABLE back on chemo.  Radiation therapy     Radiation therapy complication 1055    S/P cardiac catheterization 07/08/10    Patent coronary arteries. LVEDP 25.  EF 25%. Global hykn. Moderate MR.  RA 10.  RV 40/10. PA 40/20.  20.  CO/CI 4.5/2.45 (Larry).  Shortness of breath     Syncope and collapse     Thyroid disease     goiter       Past Surgical History:   Procedure Laterality Date    CARDIAC SURG PROCEDURE UNLIST      Difibrillator; BI V ICD    HX MASTECTOMY  09/28/11    modified radical mastectomy and axillary dissection    HX ORTHOPAEDIC      HX OTHER SURGICAL      surgery to remove part of liver    HX PACEMAKER  10/27/10    s/p [employer]onic biventricular AICD, without complication    HX RADICAL MASTECTOMY      IR INSERT TUNL CVC W PORT OVER 5 YEARS  1/16/2019    NEUROLOGICAL PROCEDURE UNLISTED      Cervical fusion       Family History   Problem Relation Age of Onset    Hypertension Mother     High Cholesterol Mother     Heart Disease Father         paemaker implant at 80       Social History     Socioeconomic History    Marital status:      Spouse name: Not on file    Number of children: Not on file    Years of education: Not on file    Highest education level: Not on file   Tobacco Use    Smoking status: Never Smoker    Smokeless tobacco: Never Used   Substance and Sexual Activity    Alcohol use: No    Drug use: No       Review of Systems - aside from above all others negative      Allergies   Allergen Reactions    Adhesive Other (comments)     blisters       Vitals:    09/18/19 1150   BP: 130/66   Pulse: 81   Resp: 14   Temp: 97.5 °F (36.4 °C)   SpO2: 98%   Weight: 77.1 kg (170 lb)   Height: 5' 5\" (1.651 m)       Physical Exam   Constitutional: She appears well-developed and well-nourished. HENT:   Head: Normocephalic and atraumatic.    Eyes: Conjunctivae and EOM are normal.   Abdominal: Soft. She exhibits no distension. There is tenderness (RUQ, RLQ and LLQ, moderate). Musculoskeletal: Normal range of motion. Lymphadenopathy:     She has no cervical adenopathy. Right: No inguinal adenopathy present. Left: No inguinal adenopathy present. Neurological: No sensory deficit. Skin: Skin is warm and dry. No rash noted. Psychiatric: She has a normal mood and affect. Her speech is normal.     CMP:   Lab Results   Component Value Date/Time     09/18/2019 12:04 PM    K 3.4 (L) 09/18/2019 12:04 PM     09/18/2019 12:04 PM    CO2 26 09/18/2019 12:04 PM    AGAP 8 09/18/2019 12:04 PM     (H) 09/18/2019 12:04 PM    BUN 14 09/18/2019 12:04 PM    CREA 1.07 09/18/2019 12:04 PM    GFRAA >60 09/18/2019 12:04 PM    GFRNA 51 (L) 09/18/2019 12:04 PM    CA 9.1 09/18/2019 12:04 PM    ALB 3.6 09/18/2019 12:04 PM    TP 6.9 09/18/2019 12:04 PM    GLOB 3.3 09/18/2019 12:04 PM    AGRAT 1.1 09/18/2019 12:04 PM    SGOT 42 (H) 09/18/2019 12:04 PM    ALT 33 09/18/2019 12:04 PM     CBC:   Lab Results   Component Value Date/Time    WBC 11.8 09/18/2019 12:04 PM    HGB 11.2 (L) 09/18/2019 12:04 PM    HCT 34.1 (L) 09/18/2019 12:04 PM     09/18/2019 12:04 PM     U/S showing 3-7 mm gb wall, possible fluid in wall, possible cholecystitis    Assessment / Plan    Abdominal pain, possibly cholecystitis, in setting of stage 4 breast cancer and cardiomyopathy  Recommend CT imaging   If continued concern for cholecystitis, recommend HIDA to confirm and IR drainage given multiple comorbidities  IV abx, NPO, IVF for now  Will follow    The diagnoses and plan were discussed with the patient. All questions answered. Plan of care agreed to by all concerned.

## 2019-09-18 NOTE — ED PROVIDER NOTES
EMERGENCY DEPARTMENT HISTORY AND PHYSICAL EXAM    1:29 PM      Date: 9/18/2019  Patient Name: Maria De Jesus Gaspar    History of Presenting Illness     Chief Complaint   Patient presents with    Vomiting    Abdominal Pain         History Provided By: Patient    Additional History (Context): Maria De Jesus Gaspar is a 78 y.o. female with history of metastatic breast cancer, bilateral lung cancer secondary to the [facility], cardiomyopathy secondary to chemotherapy, with most recent LVEF of 26 to 30%with a biventricular ICD in place and notation of a chronic trivial pericardial effusion, PAF, anticoagulated on apixaban, presents to the emergency department with about 9 hours of chest discomfort that radiates to her sternum, continuous, not relieved with position changes, not made worse with exertion. Feels mildly short of breath. The discomfort is different from what she typically experiences with her cancer symptoms. She is also nauseated with some vomiting and hot flashes. However she denies any diaphoresis. No known coronary artery disease. She denies any history of exertional chest discomfort that is relieved with rest.    PCP: Aliya Jacome MD    Current Facility-Administered Medications   Medication Dose Route Frequency Provider Last Rate Last Dose    morphine injection 2 mg  2 mg IntraVENous NOW CINTHIA Romero         Current Outpatient Medications   Medication Sig Dispense Refill    digoxin (LANOXIN) 0.125 mg tablet Take 1 Tab by mouth daily. 90 Tab 3    spironolactone (ALDACTONE) 25 mg tablet Take 1 Tab by mouth daily. 90 Tab 1    metOLazone (ZAROXOLYN) 5 mg tablet Take 1 Tab by mouth daily. 30 Tab 1    carvedilol (COREG) 25 mg tablet Take 1 Tab by mouth two (2) times daily (with meals). 60 Tab 0    furosemide (LASIX) 20 mg tablet Take 1 Tab by mouth daily.  30 Tab 0    OTHER BMP on Friday 8/30/19  Dx: CHF  Fax results to Dr. Andriy Obrien 1 Each 0    apixaban (ELIQUIS) 5 mg tablet Take 1 Tab by mouth two (2) times a day. 60 Tab 0    tiotropium (SPIRIVA WITH HANDIHALER) 18 mcg inhalation capsule Take 1 Cap by inhalation daily.  sacubitril-valsartan (ENTRESTO) 49 mg/51 mg tablet Take 1 Tab by mouth two (2) times a day. 180 Tab 3    mometasone (NASONEX) 50 mcg/actuation nasal spray 2 Sprays by Both Nostrils route daily as needed. Indications: inflammation of the nose due to an allergy      budesonide-formoterol (SYMBICORT) 160-4.5 mcg/actuation HFAA Take 2 Puffs by inhalation two (2) times a day. 1 Inhaler 0    naloxone (NARCAN) 0.4 mg/mL injection 1 mL by IntraVENous route as needed for Other (Give if breaths per minute  less than 10, may repeat dose in 15 min and contact MD). 1 mL 0    albuterol-ipratropium (DUO-NEB) 2.5 mg-0.5 mg/3 ml nebu 3 mL by Nebulization route every six (6) hours as needed (wheezing or sob). File under Medicare Part B, ICD codes J44.9, C78.01 120 Nebule 3    DULoxetine (CYMBALTA) 30 mg capsule Take 30 mg by mouth daily.  multivitamin (ONE A DAY) tablet Take 1 Tab by mouth daily.  b complex vitamins (B COMPLEX 1) tablet Take 1 Tab by mouth daily.  plant stanol eliezer (CHOLEST OFF PO) Take 1 Tab by mouth daily.  benzonatate (TESSALON PERLES) 100 mg capsule Take 100 mg by mouth three (3) times daily as needed for Cough.  Biotin 2,500 mcg cap Take 1 Cap by mouth daily.  melatonin 10 mg tab Take 1 Tab by mouth nightly.  montelukast (SINGULAIR) 10 mg tablet Take 1 Tab by mouth daily. 90 Tab 3    cholecalciferol, vitamin D3, 2,000 unit tab Take 1 Tab by mouth daily.  raloxifene (EVISTA) 60 mg tablet Take 60 mg by mouth daily. Past History     Past Medical History:  Past Medical History:   Diagnosis Date    Abnormal nuclear cardiac imaging test 06/11/2010    Lg fixed anterior, septal, apical, inferoseptal defect sugg RCA & LAD disease or cardiomyopathy. EF 20%. Global hykn. Nondiagnostic EKG.     Acute bronchitis 10/15/2018 Persistent cough and wheezing in spite of prednisone and antibiotic. Will refer to pulmonary    Adenocarcinoma of breast; locally advanced invasive ductal adenocarcinoma of left breast     Asthma     Automatic implantable cardiac defibrillator in situ     Automatic implantable cardiac defibrillator in situ     Breast cancer ([facility] [address]     Cancer ([facility] [address]     breast cancer left    Chemotherapy convalescence or palliative care 2012    Chronic combined systolic and diastolic heart failure (HCC)     Remains symptomatic, nyha class 3 ef improving.  Congestive heart failure, unspecified     chronic class ll    Echocardiogram 09/27/2010    EF 30%. Global hykn. Mild MR. Mild TR.  GERD (gastroesophageal reflux disease)     Hyperlipidemia     Hypertension     Mitral valve disorders(424.0) 1/15/2014    mild to moderate mr     Mitral valve disorders(424.0) 1/15/2014    mild to moderate mr     MVP (mitral valve prolapse)     Nonischemic cardiomyopathy (HCC)     EF 20-30% despite medical therapy    Nonspecific abnormal electrocardiogram (ECG) (EKG)     Osteopenia     Other primary cardiomyopathies     Pacemaker 10/27/10    s/p implantation, without complication    Poisoning by other and unspecified agents primarily affecting the cardiovascular system(972.9)     Ef slightly better to 45%, STABLE back on chemo.  Radiation therapy     Radiation therapy complication 4206    S/P cardiac catheterization 07/08/10    Patent coronary arteries. LVEDP 25.  EF 25%. Global hykn. Moderate MR.  RA 10.  RV 40/10. PA 40/20.  20.  CO/CI 4.5/2.45 (Larry).     Shortness of breath     Syncope and collapse     Thyroid disease     goiter       Past Surgical History:  Past Surgical History:   Procedure Laterality Date    CARDIAC SURG PROCEDURE UNLIST      Difibrillator; BI V ICD    HX MASTECTOMY  09/28/11    modified radical mastectomy and axillary dissection    HX ORTHOPAEDIC      HX OTHER SURGICAL      surgery to remove part of liver    HX PACEMAKER  10/27/10    s/p [employer]tronic biventricular AICD, without complication    HX RADICAL MASTECTOMY      IR INSERT TUNL CVC W PORT OVER 5 YEARS  1/16/2019    NEUROLOGICAL PROCEDURE UNLISTED      Cervical fusion       Family History:  Family History   Problem Relation Age of Onset    Hypertension Mother     High Cholesterol Mother     Heart Disease Father         paemaker implant at 80       Social History:  Social History     Tobacco Use    Smoking status: Never Smoker    Smokeless tobacco: Never Used   Substance Use Topics    Alcohol use: No    Drug use: No       Allergies: Allergies   Allergen Reactions    Adhesive Other (comments)     blisters         Review of Systems       Review of Systems   Constitutional: Negative for activity change, appetite change, chills, diaphoresis, fatigue, fever and unexpected weight change. HENT: Negative for congestion, ear pain, facial swelling, hearing loss, nosebleeds, postnasal drip, rhinorrhea, sinus pressure, sinus pain, sore throat and trouble swallowing. Eyes: Negative for photophobia, pain, discharge, redness and visual disturbance. Respiratory: Negative for cough, choking, chest tightness, shortness of breath and wheezing. Cardiovascular: Positive for chest pain. Negative for palpitations and leg swelling. Gastrointestinal: Positive for abdominal pain. Negative for blood in stool, constipation, diarrhea, nausea and vomiting. Endocrine: Negative for cold intolerance and heat intolerance. Genitourinary: Negative for difficulty urinating, dyspareunia, dysuria, flank pain, frequency, hematuria and urgency. Musculoskeletal: Negative for arthralgias, back pain, gait problem, joint swelling, myalgias, neck pain and neck stiffness. Skin: Negative for color change and rash. Neurological: Negative for dizziness, seizures, syncope, facial asymmetry, speech difficulty, weakness, light-headedness and headaches. Hematological: Negative for adenopathy. Psychiatric/Behavioral: Negative for agitation, behavioral problems, confusion, hallucinations, self-injury and suicidal ideas. All other systems reviewed and are negative. Physical Exam     Visit Vitals  /66 (BP 1 Location: Left arm, BP Patient Position: At rest)   Pulse 81   Temp 97.5 °F (36.4 °C)   Resp 14   Ht 5' 5\" (1.651 m)   Wt 77.1 kg (170 lb)   SpO2 98%   BMI 28.29 kg/m²         Physical Exam   Constitutional: She is oriented to person, place, and time. She appears well-developed and well-nourished. HENT:   Head: Normocephalic and atraumatic. Right Ear: External ear normal.   Left Ear: External ear normal.   Nose: Nose normal.   Mouth/Throat: Oropharynx is clear and moist. No oropharyngeal exudate. Eyes: Pupils are equal, round, and reactive to light. Conjunctivae and EOM are normal. Right eye exhibits no discharge. Left eye exhibits no discharge. No scleral icterus. Neck: Normal range of motion. Neck supple. No JVD present. No tracheal deviation present. No thyromegaly present. Cardiovascular: Normal rate, regular rhythm, normal heart sounds and intact distal pulses. Exam reveals no gallop and no friction rub. No murmur heard. Pulmonary/Chest: Effort normal and breath sounds normal. No stridor. No respiratory distress. She has no wheezes. She has no rales. She exhibits no tenderness. Abdominal: Soft. Bowel sounds are normal. She exhibits no distension and no mass. There is tenderness. There is no rebound and no guarding. Mild tenderness to palpation bilateral upper quadrants, without rebound or guarding noted   Musculoskeletal: Normal range of motion. She exhibits no edema, tenderness or deformity. Lymphadenopathy:     She has no cervical adenopathy. Neurological: She is alert and oriented to person, place, and time. Coordination normal.   Skin: Skin is warm and dry. No rash noted.    Psychiatric: She has a normal mood and affect. Her behavior is normal. Judgment and thought content normal.         Diagnostic Study Results     Labs -  Recent Results (from the past 12 hour(s))   EKG, 12 LEAD, INITIAL    Collection Time: 09/18/19 11:58 AM   Result Value Ref Range    Ventricular Rate 72 BPM    Atrial Rate 72 BPM    P-R Interval 150 ms    QRS Duration 136 ms    Q-T Interval 410 ms    QTC Calculation (Bezet) 448 ms    Calculated P Axis 32 degrees    Calculated R Axis -14 degrees    Calculated T Axis 51 degrees    Diagnosis       Atrial-sensed ventricular-paced rhythm  Abnormal ECG  When compared with ECG of 27-AUG-2019 02:58,  premature ventricular complexes are no longer present  Vent. rate has decreased BY  23 BPM     CBC WITH AUTOMATED DIFF    Collection Time: 09/18/19 12:04 PM   Result Value Ref Range    WBC 11.8 4.6 - 13.2 K/uL    RBC 3.52 (L) 4.20 - 5.30 M/uL    HGB 11.2 (L) 12.0 - 16.0 g/dL    HCT 34.1 (L) 35.0 - 45.0 %    MCV 96.9 74.0 - 97.0 FL    MCH 31.8 24.0 - 34.0 PG    MCHC 32.8 31.0 - 37.0 g/dL    RDW 15.7 (H) 11.6 - 14.5 %    PLATELET 300 233 - 820 K/uL    MPV 10.7 9.2 - 11.8 FL    NEUTROPHILS 84 (H) 40 - 73 %    LYMPHOCYTES 9 (L) 21 - 52 %    MONOCYTES 6 3 - 10 %    EOSINOPHILS 1 0 - 5 %    BASOPHILS 0 0 - 2 %    ABS. NEUTROPHILS 9.9 (H) 1.8 - 8.0 K/UL    ABS. LYMPHOCYTES 1.0 0.9 - 3.6 K/UL    ABS. MONOCYTES 0.7 0.05 - 1.2 K/UL    ABS. EOSINOPHILS 0.2 0.0 - 0.4 K/UL    ABS.  BASOPHILS 0.0 0.0 - 0.1 K/UL    DF AUTOMATED     METABOLIC PANEL, COMPREHENSIVE    Collection Time: 09/18/19 12:04 PM   Result Value Ref Range    Sodium 143 136 - 145 mmol/L    Potassium 3.4 (L) 3.5 - 5.5 mmol/L    Chloride 109 100 - 111 mmol/L    CO2 26 21 - 32 mmol/L    Anion gap 8 3.0 - 18 mmol/L    Glucose 159 (H) 74 - 99 mg/dL    BUN 14 7.0 - 18 MG/DL    Creatinine 1.07 0.6 - 1.3 MG/DL    BUN/Creatinine ratio 13 12 - 20      GFR est AA >60 >60 ml/min/1.73m2    GFR est non-AA 51 (L) >60 ml/min/1.73m2    Calcium 9.1 8.5 - 10.1 MG/DL    Bilirubin, total 0.3 0.2 - 1.0 MG/DL    ALT (SGPT) 33 13 - 56 U/L    AST (SGOT) 42 (H) 10 - 38 U/L    Alk. phosphatase 84 45 - 117 U/L    Protein, total 6.9 6.4 - 8.2 g/dL    Albumin 3.6 3.4 - 5.0 g/dL    Globulin 3.3 2.0 - 4.0 g/dL    A-G Ratio 1.1 0.8 - 1.7     LIPASE    Collection Time: 09/18/19 12:04 PM   Result Value Ref Range    Lipase 188 73 - 393 U/L   TROPONIN I    Collection Time: 09/18/19 12:04 PM   Result Value Ref Range    Troponin-I, QT <0.02 0.0 - 0.045 NG/ML       Radiologic Studies -   XR CHEST PA LAT    (Results Pending)         Medical Decision Making   I am the first provider for this patient. I reviewed the vital signs, available nursing notes, past medical history, past surgical history, family history and social history. Vital Signs-Reviewed the patient's vital signs. Records Reviewed: Nursing Notes and Old Medical Records (Time of Review: 1:29 PM)    ED Course: Progress Notes, Reevaluation, and Consults: Patient was given several rounds of IV morphine which only minimally reduced her discomfort. Reevaluation of her abdomen now reveals exquisite right upper quadrant discomfort with positive Sosa sign and rebound tenderness. .  Right upper quadrant ultrasound demonstrates evidence of an acute cholecystitis. Unasyn was administered, with gentle hydration, surgical service was contacted, and the patient was seen by Dr. Li Salgado who recommended admission to the hospitalist service. Hospitalist service was contacted and Dr. Annamaria Vallecillo has except the patient  1:29 PM  Reviewed results with patient. Discussed need for close outpatient follow-up. Discussed strict return precautions, including     Provider Notes (Medical Decision Making): 54-year-old female with metastatic breast cancer and lung involvement who presents with acute abdominal discomfort with findings consistent with a cholecystitis. Cardiac disease has been ruled out with negative troponins x2.   Surgical service has been contacted and patient is to be admitted      Diagnosis     Clinical Impression: No diagnosis found. Disposition: home     Follow-up Information    None          Patient's Medications   Start Taking    No medications on file   Continue Taking    ALBUTEROL-IPRATROPIUM (DUO-NEB) 2.5 MG-0.5 MG/3 ML NEBU    3 mL by Nebulization route every six (6) hours as needed (wheezing or sob). File under Medicare Part B, ICD codes J44.9, C78.01    APIXABAN (ELIQUIS) 5 MG TABLET    Take 1 Tab by mouth two (2) times a day. B COMPLEX VITAMINS (B COMPLEX 1) TABLET    Take 1 Tab by mouth daily. BENZONATATE (TESSALON PERLES) 100 MG CAPSULE    Take 100 mg by mouth three (3) times daily as needed for Cough. BIOTIN 2,500 MCG CAP    Take 1 Cap by mouth daily. BUDESONIDE-FORMOTEROL (SYMBICORT) 160-4.5 MCG/ACTUATION HFAA    Take 2 Puffs by inhalation two (2) times a day. CARVEDILOL (COREG) 25 MG TABLET    Take 1 Tab by mouth two (2) times daily (with meals). CHOLECALCIFEROL, VITAMIN D3, 2,000 UNIT TAB    Take 1 Tab by mouth daily. DIGOXIN (LANOXIN) 0.125 MG TABLET    Take 1 Tab by mouth daily. DULOXETINE (CYMBALTA) 30 MG CAPSULE    Take 30 mg by mouth daily. FUROSEMIDE (LASIX) 20 MG TABLET    Take 1 Tab by mouth daily. MELATONIN 10 MG TAB    Take 1 Tab by mouth nightly. METOLAZONE (ZAROXOLYN) 5 MG TABLET    Take 1 Tab by mouth daily. MOMETASONE (NASONEX) 50 MCG/ACTUATION NASAL SPRAY    2 Sprays by Both Nostrils route daily as needed. Indications: inflammation of the nose due to an allergy    MONTELUKAST (SINGULAIR) 10 MG TABLET    Take 1 Tab by mouth daily. MULTIVITAMIN (ONE A DAY) TABLET    Take 1 Tab by mouth daily. NALOXONE (NARCAN) 0.4 MG/ML INJECTION    1 mL by IntraVENous route as needed for Other (Give if breaths per minute  less than 10, may repeat dose in 15 min and contact MD).     OTHER    BMP on Friday 8/30/19  Dx: CHF  Fax results to Dr. Amie Reid (CHOLEST OFF PO)    Take 1 Tab by mouth daily. RALOXIFENE (EVISTA) 60 MG TABLET    Take 60 mg by mouth daily. SACUBITRIL-VALSARTAN (ENTRESTO) 49 MG/51 MG TABLET    Take 1 Tab by mouth two (2) times a day. SPIRONOLACTONE (ALDACTONE) 25 MG TABLET    Take 1 Tab by mouth daily. TIOTROPIUM (SPIRIVA WITH HANDIHALER) 18 MCG INHALATION CAPSULE    Take 1 Cap by inhalation daily. These Medications have changed    No medications on file   Stop Taking    No medications on file       Dictation disclaimer:  Please note that this dictation was completed with [employer], the [employer] voice recognition software. Quite often unanticipated grammatical, syntax, homophones, and other interpretive errors are inadvertently transcribed by the computer software. Please disregard these errors. Please excuse any errors that have escaped final proofreading.

## 2019-09-19 ENCOUNTER — APPOINTMENT (OUTPATIENT)
Dept: NUCLEAR MEDICINE | Age: 69
DRG: 871 | End: 2019-09-19
Attending: HOSPITALIST
Payer: MEDICARE

## 2019-09-19 LAB
ALBUMIN SERPL-MCNC: 2.7 G/DL (ref 3.4–5)
ALBUMIN/GLOB SERPL: 0.8 {RATIO} (ref 0.8–1.7)
ALP SERPL-CCNC: 146 U/L (ref 45–117)
ALT SERPL-CCNC: <6 U/L (ref 13–56)
ANION GAP SERPL CALC-SCNC: 15 MMOL/L (ref 3–18)
APTT PPP: 27.5 SEC (ref 23–36.4)
APTT PPP: 62.6 SEC (ref 23–36.4)
AST SERPL-CCNC: 17 U/L (ref 10–38)
ATRIAL RATE: 72 BPM
BASOPHILS # BLD: 0 K/UL (ref 0–0.1)
BASOPHILS NFR BLD: 0 % (ref 0–2)
BILIRUB SERPL-MCNC: 0.5 MG/DL (ref 0.2–1)
BUN SERPL-MCNC: 20 MG/DL (ref 7–18)
BUN/CREAT SERPL: 11 (ref 12–20)
CALCIUM SERPL-MCNC: 8.5 MG/DL (ref 8.5–10.1)
CALCULATED P AXIS, ECG09: 32 DEGREES
CALCULATED R AXIS, ECG10: -14 DEGREES
CALCULATED T AXIS, ECG11: 51 DEGREES
CHLORIDE SERPL-SCNC: 106 MMOL/L (ref 100–111)
CO2 SERPL-SCNC: 21 MMOL/L (ref 21–32)
CREAT SERPL-MCNC: 1.87 MG/DL (ref 0.6–1.3)
DIAGNOSIS, 93000: NORMAL
DIFFERENTIAL METHOD BLD: ABNORMAL
EOSINOPHIL # BLD: 0.1 K/UL (ref 0–0.4)
EOSINOPHIL NFR BLD: 1 % (ref 0–5)
ERYTHROCYTE [DISTWIDTH] IN BLOOD BY AUTOMATED COUNT: 16.5 % (ref 11.6–14.5)
GLOBULIN SER CALC-MCNC: 3.3 G/DL (ref 2–4)
GLUCOSE SERPL-MCNC: 150 MG/DL (ref 74–99)
HCT VFR BLD AUTO: 32.5 % (ref 35–45)
HGB BLD-MCNC: 10.4 G/DL (ref 12–16)
LYMPHOCYTES # BLD: 0.2 K/UL (ref 0.9–3.6)
LYMPHOCYTES NFR BLD: 2 % (ref 21–52)
MCH RBC QN AUTO: 31.1 PG (ref 24–34)
MCHC RBC AUTO-ENTMCNC: 32 G/DL (ref 31–37)
MCV RBC AUTO: 97.3 FL (ref 74–97)
MONOCYTES # BLD: 0.1 K/UL (ref 0.05–1.2)
MONOCYTES NFR BLD: 0 % (ref 3–10)
NEUTS SEG # BLD: 12.2 K/UL (ref 1.8–8)
NEUTS SEG NFR BLD: 97 % (ref 40–73)
P-R INTERVAL, ECG05: 150 MS
PLATELET # BLD AUTO: 142 K/UL (ref 135–420)
PMV BLD AUTO: 10.7 FL (ref 9.2–11.8)
POTASSIUM SERPL-SCNC: 3 MMOL/L (ref 3.5–5.5)
PROT SERPL-MCNC: 6 G/DL (ref 6.4–8.2)
Q-T INTERVAL, ECG07: 410 MS
QRS DURATION, ECG06: 136 MS
QTC CALCULATION (BEZET), ECG08: 448 MS
RBC # BLD AUTO: 3.34 M/UL (ref 4.2–5.3)
SODIUM SERPL-SCNC: 142 MMOL/L (ref 136–145)
VENTRICULAR RATE, ECG03: 72 BPM
WBC # BLD AUTO: 12.5 K/UL (ref 4.6–13.2)

## 2019-09-19 PROCEDURE — 74011250636 HC RX REV CODE- 250/636: Performed by: HOSPITALIST

## 2019-09-19 PROCEDURE — 85730 THROMBOPLASTIN TIME PARTIAL: CPT

## 2019-09-19 PROCEDURE — 94760 N-INVAS EAR/PLS OXIMETRY 1: CPT

## 2019-09-19 PROCEDURE — 80053 COMPREHEN METABOLIC PANEL: CPT

## 2019-09-19 PROCEDURE — 36415 COLL VENOUS BLD VENIPUNCTURE: CPT

## 2019-09-19 PROCEDURE — 65270000029 HC RM PRIVATE

## 2019-09-19 PROCEDURE — 74011250636 HC RX REV CODE- 250/636: Performed by: FAMILY MEDICINE

## 2019-09-19 PROCEDURE — 74011000258 HC RX REV CODE- 258: Performed by: FAMILY MEDICINE

## 2019-09-19 PROCEDURE — 74011250637 HC RX REV CODE- 250/637: Performed by: INTERNAL MEDICINE

## 2019-09-19 PROCEDURE — A9537 TC99M MEBROFENIN: HCPCS

## 2019-09-19 PROCEDURE — 85025 COMPLETE CBC W/AUTO DIFF WBC: CPT

## 2019-09-19 PROCEDURE — 74011250636 HC RX REV CODE- 250/636: Performed by: RADIOLOGY

## 2019-09-19 PROCEDURE — 74011250637 HC RX REV CODE- 250/637: Performed by: FAMILY MEDICINE

## 2019-09-19 RX ORDER — CIPROFLOXACIN 2 MG/ML
200 INJECTION, SOLUTION INTRAVENOUS EVERY 12 HOURS
Status: DISCONTINUED | OUTPATIENT
Start: 2019-09-19 | End: 2019-09-20

## 2019-09-19 RX ORDER — ACETAMINOPHEN 650 MG/1
650 SUPPOSITORY RECTAL
Status: DISCONTINUED | OUTPATIENT
Start: 2019-09-19 | End: 2019-09-26 | Stop reason: HOSPADM

## 2019-09-19 RX ORDER — SODIUM CHLORIDE, SODIUM LACTATE, POTASSIUM CHLORIDE, CALCIUM CHLORIDE 600; 310; 30; 20 MG/100ML; MG/100ML; MG/100ML; MG/100ML
75 INJECTION, SOLUTION INTRAVENOUS CONTINUOUS
Status: DISCONTINUED | OUTPATIENT
Start: 2019-09-19 | End: 2019-09-25

## 2019-09-19 RX ORDER — METRONIDAZOLE 500 MG/100ML
500 INJECTION, SOLUTION INTRAVENOUS EVERY 12 HOURS
Status: DISCONTINUED | OUTPATIENT
Start: 2019-09-19 | End: 2019-09-20

## 2019-09-19 RX ORDER — METRONIDAZOLE 500 MG/1
500 TABLET ORAL EVERY 12 HOURS
Status: DISCONTINUED | OUTPATIENT
Start: 2019-09-19 | End: 2019-09-19

## 2019-09-19 RX ORDER — HEPARIN SODIUM 10000 [USP'U]/100ML
18-36 INJECTION, SOLUTION INTRAVENOUS
Status: DISCONTINUED | OUTPATIENT
Start: 2019-09-19 | End: 2019-09-26

## 2019-09-19 RX ORDER — ONDANSETRON 2 MG/ML
4 INJECTION INTRAMUSCULAR; INTRAVENOUS
Status: DISCONTINUED | OUTPATIENT
Start: 2019-09-19 | End: 2019-09-26 | Stop reason: HOSPADM

## 2019-09-19 RX ORDER — METRONIDAZOLE 500 MG/1
500 TABLET ORAL EVERY 8 HOURS
Status: DISCONTINUED | OUTPATIENT
Start: 2019-09-19 | End: 2019-09-19

## 2019-09-19 RX ORDER — ACETAMINOPHEN 325 MG/1
650 TABLET ORAL
Status: DISCONTINUED | OUTPATIENT
Start: 2019-09-19 | End: 2019-09-19

## 2019-09-19 RX ORDER — FENTANYL CITRATE 50 UG/ML
75 INJECTION, SOLUTION INTRAMUSCULAR; INTRAVENOUS
Status: DISCONTINUED | OUTPATIENT
Start: 2019-09-19 | End: 2019-09-26 | Stop reason: HOSPADM

## 2019-09-19 RX ORDER — NALOXONE HYDROCHLORIDE 0.4 MG/ML
0.4 INJECTION, SOLUTION INTRAMUSCULAR; INTRAVENOUS; SUBCUTANEOUS
Status: DISCONTINUED | OUTPATIENT
Start: 2019-09-19 | End: 2019-09-26 | Stop reason: HOSPADM

## 2019-09-19 RX ORDER — DIPHENHYDRAMINE HYDROCHLORIDE 50 MG/ML
25 INJECTION, SOLUTION INTRAMUSCULAR; INTRAVENOUS
Status: DISCONTINUED | OUTPATIENT
Start: 2019-09-19 | End: 2019-09-19

## 2019-09-19 RX ORDER — DIGOXIN 0.25 MG/ML
125 INJECTION INTRAMUSCULAR; INTRAVENOUS DAILY
Status: DISCONTINUED | OUTPATIENT
Start: 2019-09-19 | End: 2019-09-20

## 2019-09-19 RX ORDER — MORPHINE SULFATE 2 MG/ML
2 INJECTION, SOLUTION INTRAMUSCULAR; INTRAVENOUS ONCE
Status: COMPLETED | OUTPATIENT
Start: 2019-09-19 | End: 2019-09-19

## 2019-09-19 RX ORDER — MONTELUKAST SODIUM 10 MG/1
TABLET ORAL
Qty: 90 TAB | Refills: 3 | Status: SHIPPED | OUTPATIENT
Start: 2019-09-19 | End: 2019-09-26

## 2019-09-19 RX ORDER — DIPHENHYDRAMINE HYDROCHLORIDE 50 MG/ML
25 INJECTION, SOLUTION INTRAMUSCULAR; INTRAVENOUS
Status: DISCONTINUED | OUTPATIENT
Start: 2019-09-19 | End: 2019-09-26 | Stop reason: HOSPADM

## 2019-09-19 RX ADMIN — SODIUM CHLORIDE, SODIUM LACTATE, POTASSIUM CHLORIDE, AND CALCIUM CHLORIDE 75 ML/HR: 600; 310; 30; 20 INJECTION, SOLUTION INTRAVENOUS at 05:48

## 2019-09-19 RX ADMIN — METRONIDAZOLE 500 MG: 500 TABLET ORAL at 10:33

## 2019-09-19 RX ADMIN — MORPHINE SULFATE 2 MG: 2 INJECTION, SOLUTION INTRAMUSCULAR; INTRAVENOUS at 15:00

## 2019-09-19 RX ADMIN — CIPROFLOXACIN 200 MG: 2 INJECTION, SOLUTION INTRAVENOUS at 21:20

## 2019-09-19 RX ADMIN — ACETAMINOPHEN 650 MG: 325 TABLET ORAL at 10:32

## 2019-09-19 RX ADMIN — CIPROFLOXACIN 200 MG: 2 INJECTION, SOLUTION INTRAVENOUS at 10:33

## 2019-09-19 RX ADMIN — ONDANSETRON 4 MG: 2 INJECTION INTRAMUSCULAR; INTRAVENOUS at 08:16

## 2019-09-19 RX ADMIN — HEPARIN SODIUM 18 UNITS/KG/HR: 10000 INJECTION, SOLUTION INTRAVENOUS at 15:47

## 2019-09-19 RX ADMIN — METRONIDAZOLE 500 MG: 500 INJECTION, SOLUTION INTRAVENOUS at 15:53

## 2019-09-19 RX ADMIN — PROMETHAZINE HYDROCHLORIDE 12.5 MG: 25 INJECTION INTRAMUSCULAR; INTRAVENOUS at 11:00

## 2019-09-19 NOTE — ROUTINE PROCESS
Patient is alert and oriented times three with no signs or symptoms of distress. Currently resting in the bed in stable condition

## 2019-09-19 NOTE — ROUTINE PROCESS
Patient had orders for digoxin. Patient has no telemetry order. Consulted Pharmacist for policy. Pharmacist state patient needed telemetry. Notified Dr. Radha Todd. Hold Digoxin Per Dr. Radha Todd for now . Digoxin not given. Also new order for telemetry.

## 2019-09-19 NOTE — H&P
Hospitalist Admission Note    NAME: Jean-Paul Garcia   :  1950   MRN:  [id number]     Date/Time of admission:  2019 10:46 AM    Patient PCP: Elizabeth Montanez MD  ________________________________________________________________________    My assessment of this patient's clinical condition and my plan of care is as follows. Assessment / Plan:  1. Acute cholecystitis  2. Metastatic breast cancer  3. Chronic systolic CHF  4. Cardiomyopathy, EF 26-30%  5. HTN  6. DVT/PE on NOAC (Eliquis)    1. Patient admitted to medical bed. 2. NPO, IVF. 3. IV cipro/flagyl. 4. Surgery evaluation completed, recs noted. HIDA scan ordered but not yet done. Further surgical recs pending results. 5. Resume previously prescribed home meds as soon as able. Will initiate IV heparin per protocol as patient was on Eliquis PTA for DVT/PE treatment. Can transition back to NOAC once it is known that any potential invasive procedures are completed. 6. Further recommendations pending results of pending testing and response to current treatment. 7. Follow. Subjective:   CHIEF COMPLAINT: Chest/abdominal pain    HISTORY OF PRESENT ILLNESS:     Jean-Paul Garcia is a 78 y.o.  female who presented to the ED yesterday for evaluation of abdominal pain. There was some miscommunication between the admitting staff last night and patient is not being formally seen by the hospitalist service until now. She has a history significant for metastatic breast cancer with lung involvement as well as cardiomyopathy secondary to chemotherapy. Patient is sleepy from receiving dose of antiemetic recently and most of history is obtained from  who is at bedside. He reports that patient developed acute onset of chest/upper-right sided abdominal pain that began several hours prior to presentation to the ED late yesterday morning. Laboratory studies were relatively unremarkable.   CT of abdomen/pelvis, however, showed evidence for acute cholecystitis  Surgery evaluation was called by ED physician and patient was referred for hospital admission for further evaluation/treatment. Past Medical History:   Diagnosis Date    Abnormal nuclear cardiac imaging test 06/11/2010    Lg fixed anterior, septal, apical, inferoseptal defect sugg RCA & LAD disease or cardiomyopathy. EF 20%. Global hykn. Nondiagnostic EKG.  Acute bronchitis 10/15/2018    Persistent cough and wheezing in spite of prednisone and antibiotic. Will refer to pulmonary    Adenocarcinoma of breast; locally advanced invasive ductal adenocarcinoma of left breast     Asthma     Automatic implantable cardiac defibrillator in situ     Automatic implantable cardiac defibrillator in situ     Breast cancer ([facility]     Cancer ([facility]     breast cancer left    Chemotherapy convalescence or palliative care 2012    Chronic combined systolic and diastolic heart failure (HCC)     Remains symptomatic, nyha class 3 ef improving.  Congestive heart failure, unspecified     chronic class ll    Echocardiogram 09/27/2010    EF 30%. Global hykn. Mild MR. Mild TR.  GERD (gastroesophageal reflux disease)     Hyperlipidemia     Hypertension     Mitral valve disorders(424.0) 1/15/2014    mild to moderate mr     Mitral valve disorders(424.0) 1/15/2014    mild to moderate mr     MVP (mitral valve prolapse)     Nonischemic cardiomyopathy (HCC)     EF 20-30% despite medical therapy    Nonspecific abnormal electrocardiogram (ECG) (EKG)     Osteopenia     Other primary cardiomyopathies     Pacemaker 10/27/10    s/p implantation, without complication    Poisoning by other and unspecified agents primarily affecting the cardiovascular system(972.9)     Ef slightly better to 45%, STABLE back on chemo.  Radiation therapy     Radiation therapy complication 3269    S/P cardiac catheterization 07/08/10    Patent coronary arteries. LVEDP 25.  EF 25%. Global hykn. Moderate MR.  RA 10.  RV 40/10. PA 40/20.  20.  CO/CI 4.5/2.45 (Larry).  Shortness of breath     Syncope and collapse     Thyroid disease     goiter        Past Surgical History:   Procedure Laterality Date    CARDIAC SURG PROCEDURE UNLIST      Difibrillator; BI V ICD    HX MASTECTOMY  09/28/11    modified radical mastectomy and axillary dissection    HX ORTHOPAEDIC      HX OTHER SURGICAL      surgery to remove part of liver    HX PACEMAKER  10/27/10    s/p [employer] biventricular AICD, without complication    HX RADICAL MASTECTOMY      IR INSERT TUNL CVC W PORT OVER 5 YEARS  1/16/2019    NEUROLOGICAL PROCEDURE UNLISTED      Cervical fusion       Social History     Tobacco Use    Smoking status: Never Smoker    Smokeless tobacco: Never Used   Substance Use Topics    Alcohol use: No        Family History   Problem Relation Age of Onset    Hypertension Mother     High Cholesterol Mother     Heart Disease Father         paemaker implant at 80     Allergies   Allergen Reactions    Adhesive Other (comments)     blisters        Prior to Admission medications    Medication Sig Start Date End Date Taking? Authorizing Provider   digoxin (LANOXIN) 0.125 mg tablet Take 1 Tab by mouth daily. 9/9/19  Yes Keya Morrison MD   spironolactone (ALDACTONE) 25 mg tablet Take 1 Tab by mouth daily. 9/9/19  Yes Keya Morrison MD   carvedilol (COREG) 25 mg tablet Take 1 Tab by mouth two (2) times daily (with meals). 8/28/19  Yes Sabrina Landeros MD   furosemide (LASIX) 20 mg tablet Take 1 Tab by mouth daily. 8/28/19  Yes Sabrina Landeros MD   OTHER BMP on Friday 8/30/19  Dx: CHF  Fax results to Dr. Aguero Para 8/28/19  Yes Sabrina Landeros MD   apixaban (ELIQUIS) 5 mg tablet Take 1 Tab by mouth two (2) times a day. 8/28/19  Yes Sabrina Landeros MD   tiotropium (SPIRIVA WITH HANDIHALER) 18 mcg inhalation capsule Take 1 Cap by inhalation daily.    Yes Provider, Historical sacubitril-valsartan (ENTRESTO) 49 mg/51 mg tablet Take 1 Tab by mouth two (2) times a day. 8/12/19  Yes Pop Estes NP   mometasone (NASONEX) 50 mcg/actuation nasal spray 2 Sprays by Both Nostrils route daily as needed. Indications: inflammation of the nose due to an allergy   Yes Provider, Historical   budesonide-formoterol (SYMBICORT) 160-4.5 mcg/actuation HFAA Take 2 Puffs by inhalation two (2) times a day. 5/6/19  Yes Cristin Shelton MD   albuterol-ipratropium (DUO-NEB) 2.5 mg-0.5 mg/3 ml nebu 3 mL by Nebulization route every six (6) hours as needed (wheezing or sob). File under Medicare Part B, ICD codes J44.9, C78.01 5/2/19  Yes Myles HINSON NP   DULoxetine (CYMBALTA) 30 mg capsule Take 30 mg by mouth daily. Yes Provider, Historical   multivitamin (ONE A DAY) tablet Take 1 Tab by mouth daily. Yes Provider, Historical   plant stanol eliezer (CHOLEST OFF PO) Take 1 Tab by mouth daily. Yes Provider, Historical   benzonatate (TESSALON PERLES) 100 mg capsule Take 100 mg by mouth three (3) times daily as needed for Cough. Yes Provider, Historical   Biotin 2,500 mcg cap Take 1 Cap by mouth daily. Yes Provider, Historical   melatonin 10 mg tab Take 1 Tab by mouth nightly. Yes Provider, Historical   montelukast (SINGULAIR) 10 mg tablet Take 1 Tab by mouth daily. 7/6/18  Yes Anuj Perry MD   raloxifene (EVISTA) 60 mg tablet Take 60 mg by mouth daily. Yes Provider, Historical   metOLazone (ZAROXOLYN) 5 mg tablet Take 1 Tab by mouth daily. 9/3/19   Brisa Lee MD   naloxone [facility]) 0.4 mg/mL injection 1 mL by IntraVENous route as needed for Other (Give if breaths per minute  less than 10, may repeat dose in 15 min and contact MD). 5/6/19   Cristin Shelton MD   b complex vitamins (B COMPLEX 1) tablet Take 1 Tab by mouth daily. Provider, Historical   cholecalciferol, vitamin D3, 2,000 unit tab Take 1 Tab by mouth daily.     Provider, Historical       REVIEW OF SYSTEMS:     Total of 12 systems reviewed as follows:       POSITIVE= bolded text  Negative = text not underlined  General:  fever, chills, sweats, generalized weakness, weight loss/gain,      loss of appetite   Eyes:    blurred vision, eye pain, loss of vision, double vision  ENT:    rhinorrhea, pharyngitis   Respiratory:   cough, sputum production, SOB, ALMANZA, wheezing, pleuritic pain   Cardiology:   chest pain, palpitations, orthopnea, PND, edema, syncope   Gastrointestinal:  abdominal pain , N/V, diarrhea, dysphagia, constipation, bleeding   Genitourinary:  frequency, urgency, dysuria, hematuria, incontinence   Muskuloskeletal :  arthralgia, myalgia, back pain  Hematology:  easy bruising, nose or gum bleeding, lymphadenopathy   Dermatological: rash, ulceration, pruritis, color change / jaundice  Endocrine:   hot flashes or polydipsia   Neurological:  headache, dizziness, confusion, focal weakness, paresthesia,     Speech difficulties, memory loss, gait difficulty  Psychological: Feelings of anxiety, depression, agitation    Objective:   VITALS:    Visit Vitals  /65 (BP 1 Location: Right arm, BP Patient Position: At rest)   Pulse (!) 110   Temp (!) 102.7 °F (39.3 °C)   Resp 16   Ht 5' 5\" (1.651 m)   Wt 77.1 kg (170 lb)   SpO2 90%   Breastfeeding? No   BMI 28.29 kg/m²       PHYSICAL EXAM:    General:    Nontoxic but ill-appearing. HEENT: Head NCAT. Sclerae anicteric, EOMI. Neck:  Supple, trachea midline. Lungs:   Clear, no wheezes. Effort nonlabored. Chest wall:  No tenderness  No Accessory muscle use. Heart:   Regular, mildly tachycardic. Abdomen:   Soft but + RUQ TTP. No rebound. Extremities: Warm, no edema or ischemia. Skin:     Not pale.   Not Jaundiced  No rashes   Psych:  Mood normal.  Neurologic: Awake and alert, motor nonfocal.    _______________________________________________________________________  Care Plan discussed with:    Comments   Patient X    Family      RN X    Care Manager Consultant:      _______________________________________________________________________  Expected  Disposition:   Home    HH/PT/OT/RN X   SNF/LTC X   CLAIRE    ________________________________________________________________________      Tests/Procedures:   CT abdomen/pelvis:  IMPRESSION:     Findings consistent with acute cholecystitis, as suggested on recent sonogram.  -Mild common bile duct dilatation. Correlate clinically for cholestasis.     Hepatic steatosis.     Cystic-appearing structure in the pancreas uncinate process. -MRI/MRCP may be useful to evaluate biliary tree and this lesion.     Fluid in the distal esophagus. Correlate clinically for esophagitis or reflux.     Enlarged right perirectal node again noted. A few sigmoid diverticula. Probable  fibroid uterus. XR:  IMPRESSION  Impression:  1. No acute cardiopulmonary process. LAB DATA REVIEWED:    Recent Results (from the past 24 hour(s))   EKG, 12 LEAD, INITIAL    Collection Time: 09/18/19 11:58 AM   Result Value Ref Range    Ventricular Rate 72 BPM    Atrial Rate 72 BPM    P-R Interval 150 ms    QRS Duration 136 ms    Q-T Interval 410 ms    QTC Calculation (Bezet) 448 ms    Calculated P Axis 32 degrees    Calculated R Axis -14 degrees    Calculated T Axis 51 degrees    Diagnosis       Atrial-sensed ventricular-paced rhythm  Abnormal ECG  When compared with ECG of 27-AUG-2019 02:58,  premature ventricular complexes are no longer present  Vent.  rate has decreased BY  23 BPM  Confirmed by Enrique Barton ([id number]) on 9/19/2019 8:52:37 AM     CBC WITH AUTOMATED DIFF    Collection Time: 09/18/19 12:04 PM   Result Value Ref Range    WBC 11.8 4.6 - 13.2 K/uL    RBC 3.52 (L) 4.20 - 5.30 M/uL    HGB 11.2 (L) 12.0 - 16.0 g/dL    HCT 34.1 (L) 35.0 - 45.0 %    MCV 96.9 74.0 - 97.0 FL    MCH 31.8 24.0 - 34.0 PG    MCHC 32.8 31.0 - 37.0 g/dL    RDW 15.7 (H) 11.6 - 14.5 %    PLATELET 999 983 - 478 K/uL    MPV 10.7 9.2 - 11.8 FL    NEUTROPHILS 84 (H) 40 - 73 %    LYMPHOCYTES 9 (L) 21 - 52 %    MONOCYTES 6 3 - 10 %    EOSINOPHILS 1 0 - 5 %    BASOPHILS 0 0 - 2 %    ABS. NEUTROPHILS 9.9 (H) 1.8 - 8.0 K/UL    ABS. LYMPHOCYTES 1.0 0.9 - 3.6 K/UL    ABS. MONOCYTES 0.7 0.05 - 1.2 K/UL    ABS. EOSINOPHILS 0.2 0.0 - 0.4 K/UL    ABS. BASOPHILS 0.0 0.0 - 0.1 K/UL    DF AUTOMATED     METABOLIC PANEL, COMPREHENSIVE    Collection Time: 09/18/19 12:04 PM   Result Value Ref Range    Sodium 143 136 - 145 mmol/L    Potassium 3.4 (L) 3.5 - 5.5 mmol/L    Chloride 109 100 - 111 mmol/L    CO2 26 21 - 32 mmol/L    Anion gap 8 3.0 - 18 mmol/L    Glucose 159 (H) 74 - 99 mg/dL    BUN 14 7.0 - 18 MG/DL    Creatinine 1.07 0.6 - 1.3 MG/DL    BUN/Creatinine ratio 13 12 - 20      GFR est AA >60 >60 ml/min/1.73m2    GFR est non-AA 51 (L) >60 ml/min/1.73m2    Calcium 9.1 8.5 - 10.1 MG/DL    Bilirubin, total 0.3 0.2 - 1.0 MG/DL    ALT (SGPT) 33 13 - 56 U/L    AST (SGOT) 42 (H) 10 - 38 U/L    Alk.  phosphatase 84 45 - 117 U/L    Protein, total 6.9 6.4 - 8.2 g/dL    Albumin 3.6 3.4 - 5.0 g/dL    Globulin 3.3 2.0 - 4.0 g/dL    A-G Ratio 1.1 0.8 - 1.7     LIPASE    Collection Time: 09/18/19 12:04 PM   Result Value Ref Range    Lipase 188 73 - 393 U/L   TROPONIN I    Collection Time: 09/18/19 12:04 PM   Result Value Ref Range    Troponin-I, QT <0.02 0.0 - 0.045 NG/ML   URINALYSIS W/ RFLX MICROSCOPIC    Collection Time: 09/18/19  1:25 PM   Result Value Ref Range    Color YELLOW      Appearance CLEAR      Specific gravity 1.011 1.005 - 1.030      pH (UA) 5.0 5.0 - 8.0      Protein NEGATIVE  NEG mg/dL    Glucose NEGATIVE  NEG mg/dL    Ketone NEGATIVE  NEG mg/dL    Bilirubin NEGATIVE  NEG      Blood NEGATIVE  NEG      Urobilinogen 0.2 0.2 - 1.0 EU/dL    Nitrites NEGATIVE  NEG      Leukocyte Esterase NEGATIVE  NEG     TROPONIN I    Collection Time: 09/18/19  5:29 PM   Result Value Ref Range    Troponin-I, QT <0.02 0.0 - 0.045 NG/ML       Nuvia Albrecht MD

## 2019-09-19 NOTE — ROUTINE PROCESS
TRANSFER - IN REPORT: 
 
Verbal report received from [place] (name) on Arie Drew  being received from ED(unit) for routine progression of care Report consisted of patients Situation, Background, Assessment and  
Recommendations(SBAR). Information from the following report(s) SBAR was reviewed with the receiving nurse. Opportunity for questions and clarification was provided. Assessment completed upon patients arrival to unit and care assumed.

## 2019-09-19 NOTE — ROUTINE PROCESS
Bedside and Verbal shift change report given by Hilda Zavaleta RN (offgoing nurse) to Julisa Alston RN (oncoming nurse). Report included the following information SBAR, Kardex and MAR.

## 2019-09-19 NOTE — ROUTINE PROCESS
Patient is alert and oriented times three with no signs or symptoms of distress other than abdominal pain. No nausea or vomiting. Abdomen is distended yet soft

## 2019-09-19 NOTE — ROUTINE PROCESS
TRANSFER - OUT REPORT: 
 
Verbal report given to Ingrid Kahn RN on Kyung Petersent  being transferred to [address] [place] [address] for routine progression of care Report consisted of patients Situation, Background, Assessment and  
Recommendations(SBAR). Information from the following report(s) SBAR, Kardex and MAR was reviewed with the receiving nurse. Lines:  
Peripheral IV 09/18/19 Right Hand (Active) Opportunity for questions and clarification was provided. Patient transported with: 
 Registered Nurse

## 2019-09-19 NOTE — PROGRESS NOTES
Surgery  Patient initially seen by my partner  I have reviewed the patient's chart, and imaging studies as well as examined the patient. She has cholecystitis by CT scanning with chikis-cholecystic we would and thickened gallbladder wall. Her gallbladder contains a large stone. Her ultrasound has similar results and her HIDA scan is non-visualizing. She has multiple medical issues including an ejection fraction of 25% metastatic breast cancer, cardiomyopathy, DVT PE history and heart failure. Would recommend antibiotics, pain control and percutaneous drainage if necessary. Allowing with you.

## 2019-09-19 NOTE — ROUTINE PROCESS
.End of Shift Note Bedside and verbal shift change report given to [address] [place] [address] (On coming nurse) by [facility] (Off going nurse). Report included the following information:Procedure Summary, Intake/Output, MAR, Recent Results, Med Rec Status, and SBAR  (Situation, Background, Assessment and Recommendations) SBAR Recommendations: none Issues for Provider to address none Activity This Shift 
 
  [] Resting in bed 
 [x] TDWB [] Dangled   
 [] Chair Ambulated to 
   [x] Bathroom [] BSC [] Refused Ambulated In 
  [x] Room 
  [x] Hallway 
  [] Bedrest ordered Bladder Scan Voiding Status [] Yes 
[x] Void [x] No 
[] Gao [] N/A 
[] I&O Cath Blood Sugars Managed [] Yes [x] No [] N/A Bowel Movement Passing Gas [] Yes 
[x] Yes [x] No 
[] No   
CHG Bath Done Before Surgery After Surgery  
[x] Yes 
[x] Yes  
[] No 
[] No  
[] N/A 
[] N/A Drain Removed [] Yes [] No [x] N/A Dressing Checked Dressing Changed [] Yes 
[] Yes [] No 
[] No [x] N/A [x] N/A Nausea/Vomiting [] Yes [x] No   
Ice Packs Changed [] Yes [] No [x] N/A Incentive Spirometer  [] Yes [x] No Volume SCD Pumps On Bilaterally Ankle Pumping  [] Yes 
[] Yes [] Yes 
[x] No [x] N/A 
[] N/A Telemetry Monitoring [] Yes [x] No Rhythm nsr Up to Chair for Meals [x] Yes [] No [] N/A

## 2019-09-19 NOTE — NURSE NAVIGATOR
This Nurse Navigator accessed the patient's chart to do readmission chart review and assess need as a part of continuation of care.

## 2019-09-19 NOTE — PROGRESS NOTES
Metronidazole 500mg IV q12h was therapeutically interchanged for metronidazole 500mg q8h per the P&T Committee approved Therapeutic Interchanges Policy.  
 
Bibi Farser, [facility], Pharmacist 
9/19/2019 9:23 AM

## 2019-09-19 NOTE — PROGRESS NOTES
Problem: Falls - Risk of  Goal: *Absence of Falls  Description  Document [facility] Fall Risk and appropriate interventions in the flowsheet. Outcome: Progressing Towards Goal  Note:   Fall Risk Interventions:  Mobility Interventions: Assess mobility with egress test, Communicate number of staff needed for ambulation/transfer, OT consult for ADLs, Patient to call before getting OOB, PT Consult for mobility concerns         Medication Interventions: Assess postural VS orthostatic hypotension, Evaluate medications/consider consulting pharmacy, Patient to call before getting OOB, Teach patient to arise slowly                   Problem: Falls - Risk of  Goal: *Absence of Falls  Description  Document [facility] Fall Risk and appropriate interventions in the flowsheet.   Outcome: Progressing Towards Goal  Note:   Fall Risk Interventions:  Mobility Interventions: Assess mobility with egress test, Communicate number of staff needed for ambulation/transfer, OT consult for ADLs, Patient to call before getting OOB, PT Consult for mobility concerns         Medication Interventions: Assess postural VS orthostatic hypotension, Evaluate medications/consider consulting pharmacy, Patient to call before getting OOB, Teach patient to arise slowly                   Problem: Patient Education: Go to Patient Education Activity  Goal: Patient/Family Education  Outcome: Progressing Towards Goal     Problem: Patient Education: Go to Patient Education Activity  Goal: Patient/Family Education  Outcome: Progressing Towards Goal     Problem: Pain  Goal: *Control of Pain  Outcome: Progressing Towards Goal  Goal: *PALLIATIVE CARE:  Alleviation of Pain  Outcome: Progressing Towards Goal     Problem: Patient Education: Go to Patient Education Activity  Goal: Patient/Family Education  Outcome: Progressing Towards Goal     Problem: Nausea/Vomiting (Adult)  Goal: *Absence of nausea/vomiting  Outcome: Progressing Towards Goal  Goal: *Palliation of nausea/vomiting (Palliative Care)  Outcome: Progressing Towards Goal     Problem: Patient Education: Go to Patient Education Activity  Goal: Patient/Family Education  Outcome: Progressing Towards Goal

## 2019-09-19 NOTE — PROGRESS NOTES
Patient temp 102.7 and heart rate is 110. Mew score is 4. Notified Dr. Bereket Blackwell of patient mew score. New order for tylenol 650 mg prn for fever every four hours. 10:15 Patient wasn't able to take the tylenol due to nausea. Patient already had zofran. Notified Dr. Everton Bowles. New order for IV phenergan 12.5 mg. Meds given (see MAR). Patient state she felt better. Patient was able to take the tylenol.

## 2019-09-19 NOTE — PROGRESS NOTES
conducted an initial consultation and Spiritual Assessment for Lei Dao, who is a 78 y.o.,female. Patient's Primary Language is: [place]. According to the patient's EMR Christian Affiliation is: Mu-ism. The reason the Patient came to the hospital is:   Patient Active Problem List    Diagnosis Date Noted    Cholecystitis 09/18/2019    UTI (urinary tract infection) 08/27/2019    CAD (coronary artery disease) 08/27/2019    Elevated troponin 08/27/2019    Weakness generalized 08/27/2019    Chronic anticoagulation 08/27/2019    History of pulmonary embolism 08/27/2019    Hypokalemia 08/27/2019    Hypomagnesemia 08/27/2019    Acute encephalopathy 08/27/2019    Dilated cardiomyopathy ([address] 05/03/2019    Acute exacerbation of CHF (congestive heart failure) ([address] 05/03/2019    Breast cancer ([address] 05/03/2019    Pulmonary embolism (HCC) 05/03/2019    Chronic bronchitis ([address] 05/03/2019    Hypoxia 05/03/2019    Lung metastases ([address] 05/03/2019    Seasonal allergic rhinitis due to pollen 04/12/2019    Chronic asthmatic bronchitis ([address] 02/07/2019    Malignant neoplasm metastatic to lung ([address] 02/07/2019    Acute bronchitis 10/15/2018    Abnormal PFT 07/10/2017    Mitral valve disease 01/15/2014    Poisoning by other and unspecified agents primarily affecting the cardiovascular system(972.9)     Syncope and collapse     Other primary cardiomyopathies     Shortness of breath     Nonspecific abnormal electrocardiogram (ECG) (EKG)     Automatic implantable cardioverter-defibrillator in situ     Adenocarcinoma of breast; locally advanced invasive ductal adenocarcinoma of left breast     Cancer ([address] 10/27/2011    Congestive heart failure ([address]     Hypertension     MVP (mitral valve prolapse)     Nonischemic cardiomyopathy ([address]         The  provided the following Interventions:  Initiated a relationship of care and support.    Explored issues of ellen, belief, spirituality and Religion/ritual needs while hospitalized. Listened empathically. Provided information about Spiritual Care Services. Met with family. Offered prayer and assurance of continued prayers on patient's behalf. Chart reviewed. The following outcomes where achieved:  Patient shared limited information about both their medical narrative and spiritual journey/beliefs.  confirmed Patient's Spiritism Affiliation. Patient expressed gratitude for 's visit. Assessment:  Patient does not have any Religion/cultural needs that will affect patient's preferences in health care. There are no spiritual or Religion issues which require intervention at this time. Plan:  Chaplains will continue to follow and will provide pastoral care on an as needed/requested basis.  recommends bedside caregivers page  on duty if patient shows signs of acute spiritual or emotional distress.     [id number] [address]   [phone]

## 2019-09-20 ENCOUNTER — APPOINTMENT (OUTPATIENT)
Dept: GENERAL RADIOLOGY | Age: 69
DRG: 871 | End: 2019-09-20
Attending: STUDENT IN AN ORGANIZED HEALTH CARE EDUCATION/TRAINING PROGRAM
Payer: MEDICARE

## 2019-09-20 ENCOUNTER — APPOINTMENT (OUTPATIENT)
Dept: NON INVASIVE DIAGNOSTICS | Age: 69
DRG: 871 | End: 2019-09-20
Attending: INTERNAL MEDICINE
Payer: MEDICARE

## 2019-09-20 ENCOUNTER — APPOINTMENT (OUTPATIENT)
Dept: INTERVENTIONAL RADIOLOGY/VASCULAR | Age: 69
DRG: 871 | End: 2019-09-20
Attending: RADIOLOGY
Payer: MEDICARE

## 2019-09-20 LAB
ALBUMIN SERPL-MCNC: 2.3 G/DL (ref 3.4–5)
ALBUMIN/GLOB SERPL: 1 {RATIO} (ref 0.8–1.7)
ALP SERPL-CCNC: 106 U/L (ref 45–117)
ALT SERPL-CCNC: 23 U/L (ref 13–56)
ANION GAP SERPL CALC-SCNC: 12 MMOL/L (ref 3–18)
APTT PPP: 151.9 SEC (ref 23–36.4)
APTT PPP: 36.4 SEC (ref 23–36.4)
AST SERPL-CCNC: 20 U/L (ref 10–38)
BASOPHILS # BLD: 0 K/UL (ref 0–0.06)
BASOPHILS # BLD: 0 K/UL (ref 0–0.06)
BASOPHILS NFR BLD: 0 % (ref 0–3)
BASOPHILS NFR BLD: 0 % (ref 0–3)
BILIRUB SERPL-MCNC: 0.5 MG/DL (ref 0.2–1)
BUN SERPL-MCNC: 35 MG/DL (ref 7–18)
BUN/CREAT SERPL: 12 (ref 12–20)
CALCIUM SERPL-MCNC: 7.2 MG/DL (ref 8.5–10.1)
CHLORIDE SERPL-SCNC: 102 MMOL/L (ref 100–111)
CK MB CFR SERPL CALC: NORMAL % (ref 0–4)
CK MB SERPL-MCNC: <1 NG/ML (ref 5–25)
CK SERPL-CCNC: 99 U/L (ref 26–192)
CO2 SERPL-SCNC: 24 MMOL/L (ref 21–32)
CREAT SERPL-MCNC: 3.02 MG/DL (ref 0.6–1.3)
DIFFERENTIAL METHOD BLD: ABNORMAL
DIFFERENTIAL METHOD BLD: ABNORMAL
EOSINOPHIL # BLD: 0 K/UL (ref 0–0.4)
EOSINOPHIL # BLD: 0.2 K/UL (ref 0–0.4)
EOSINOPHIL NFR BLD: 0 % (ref 0–5)
EOSINOPHIL NFR BLD: 1 % (ref 0–5)
ERYTHROCYTE [DISTWIDTH] IN BLOOD BY AUTOMATED COUNT: 16.7 % (ref 11.6–14.5)
ERYTHROCYTE [DISTWIDTH] IN BLOOD BY AUTOMATED COUNT: 16.8 % (ref 11.6–14.5)
GLOBULIN SER CALC-MCNC: 2.4 G/DL (ref 2–4)
GLUCOSE BLD STRIP.AUTO-MCNC: 146 MG/DL (ref 70–110)
GLUCOSE BLD STRIP.AUTO-MCNC: 165 MG/DL (ref 70–110)
GLUCOSE SERPL-MCNC: 136 MG/DL (ref 74–99)
HCT VFR BLD AUTO: 26 % (ref 35–45)
HCT VFR BLD AUTO: 28.5 % (ref 35–45)
HGB BLD-MCNC: 8.5 G/DL (ref 12–16)
HGB BLD-MCNC: 9.2 G/DL (ref 12–16)
LYMPHOCYTES # BLD: 0.4 K/UL (ref 0.8–3.5)
LYMPHOCYTES # BLD: 0.8 K/UL (ref 0.8–3.5)
LYMPHOCYTES NFR BLD: 2 % (ref 20–51)
LYMPHOCYTES NFR BLD: 3 % (ref 20–51)
MAGNESIUM SERPL-MCNC: 1 MG/DL (ref 1.6–2.6)
MCH RBC QN AUTO: 31.2 PG (ref 24–34)
MCH RBC QN AUTO: 31.6 PG (ref 24–34)
MCHC RBC AUTO-ENTMCNC: 32.3 G/DL (ref 31–37)
MCHC RBC AUTO-ENTMCNC: 32.7 G/DL (ref 31–37)
MCV RBC AUTO: 96.6 FL (ref 74–97)
MCV RBC AUTO: 96.7 FL (ref 74–97)
MONOCYTES # BLD: 0.2 K/UL (ref 0–1)
MONOCYTES # BLD: 0.3 K/UL (ref 0–1)
MONOCYTES NFR BLD: 1 % (ref 2–9)
MONOCYTES NFR BLD: 1 % (ref 2–9)
NEUTS BAND NFR BLD MANUAL: 10 % (ref 0–5)
NEUTS BAND NFR BLD MANUAL: 24 % (ref 0–5)
NEUTS SEG # BLD: 17.7 K/UL (ref 1.8–8)
NEUTS SEG # BLD: 26.4 K/UL (ref 1.8–8)
NEUTS SEG NFR BLD: 72 % (ref 42–75)
NEUTS SEG NFR BLD: 86 % (ref 42–75)
PHOSPHATE SERPL-MCNC: 2.6 MG/DL (ref 2.5–4.9)
PLATELET # BLD AUTO: 117 K/UL (ref 135–420)
PLATELET # BLD AUTO: 118 K/UL (ref 135–420)
PLATELET COMMENTS,PCOM: ABNORMAL
PLATELET COMMENTS,PCOM: ABNORMAL
PMV BLD AUTO: 10.6 FL (ref 9.2–11.8)
PMV BLD AUTO: 10.7 FL (ref 9.2–11.8)
POTASSIUM SERPL-SCNC: 3.1 MMOL/L (ref 3.5–5.5)
PROT SERPL-MCNC: 4.7 G/DL (ref 6.4–8.2)
RBC # BLD AUTO: 2.69 M/UL (ref 4.2–5.3)
RBC # BLD AUTO: 2.95 M/UL (ref 4.2–5.3)
RBC MORPH BLD: ABNORMAL
SODIUM SERPL-SCNC: 138 MMOL/L (ref 136–145)
TROPONIN I SERPL-MCNC: <0.02 NG/ML (ref 0–0.04)
WBC # BLD AUTO: 18.5 K/UL (ref 4.6–13.2)
WBC # BLD AUTO: 27.5 K/UL (ref 4.6–13.2)
WBC MORPH BLD: ABNORMAL

## 2019-09-20 PROCEDURE — 77030025702 HC BG URIN DRN MRTM -A

## 2019-09-20 PROCEDURE — 36415 COLL VENOUS BLD VENIPUNCTURE: CPT

## 2019-09-20 PROCEDURE — 74011000258 HC RX REV CODE- 258: Performed by: PHYSICIAN ASSISTANT

## 2019-09-20 PROCEDURE — 76942 ECHO GUIDE FOR BIOPSY: CPT

## 2019-09-20 PROCEDURE — 87186 SC STD MICRODIL/AGAR DIL: CPT

## 2019-09-20 PROCEDURE — 94762 N-INVAS EAR/PLS OXIMTRY CONT: CPT

## 2019-09-20 PROCEDURE — 85730 THROMBOPLASTIN TIME PARTIAL: CPT

## 2019-09-20 PROCEDURE — 71045 X-RAY EXAM CHEST 1 VIEW: CPT

## 2019-09-20 PROCEDURE — 74011250636 HC RX REV CODE- 250/636: Performed by: HOSPITALIST

## 2019-09-20 PROCEDURE — 74011250636 HC RX REV CODE- 250/636

## 2019-09-20 PROCEDURE — 65610000006 HC RM INTENSIVE CARE

## 2019-09-20 PROCEDURE — 74011250637 HC RX REV CODE- 250/637: Performed by: FAMILY MEDICINE

## 2019-09-20 PROCEDURE — 74011000250 HC RX REV CODE- 250: Performed by: PHYSICIAN ASSISTANT

## 2019-09-20 PROCEDURE — 85025 COMPLETE CBC W/AUTO DIFF WBC: CPT

## 2019-09-20 PROCEDURE — 74011250636 HC RX REV CODE- 250/636: Performed by: FAMILY MEDICINE

## 2019-09-20 PROCEDURE — 74011250637 HC RX REV CODE- 250/637: Performed by: PHYSICIAN ASSISTANT

## 2019-09-20 PROCEDURE — 77030038269 HC DRN EXT URIN PURWCK BARD -A

## 2019-09-20 PROCEDURE — 93005 ELECTROCARDIOGRAM TRACING: CPT

## 2019-09-20 PROCEDURE — 74011250636 HC RX REV CODE- 250/636: Performed by: PHYSICIAN ASSISTANT

## 2019-09-20 PROCEDURE — P9045 ALBUMIN (HUMAN), 5%, 250 ML: HCPCS | Performed by: PHYSICIAN ASSISTANT

## 2019-09-20 PROCEDURE — 0F9430Z DRAINAGE OF GALLBLADDER WITH DRAINAGE DEVICE, PERCUTANEOUS APPROACH: ICD-10-PCS | Performed by: RADIOLOGY

## 2019-09-20 PROCEDURE — 74011636320 HC RX REV CODE- 636/320: Performed by: RADIOLOGY

## 2019-09-20 PROCEDURE — 87040 BLOOD CULTURE FOR BACTERIA: CPT

## 2019-09-20 PROCEDURE — 77030005513 HC CATH URETH FOL11 MDII -B

## 2019-09-20 PROCEDURE — 93308 TTE F-UP OR LMTD: CPT

## 2019-09-20 PROCEDURE — 74011250636 HC RX REV CODE- 250/636: Performed by: RADIOLOGY

## 2019-09-20 PROCEDURE — 87077 CULTURE AEROBIC IDENTIFY: CPT

## 2019-09-20 PROCEDURE — 84100 ASSAY OF PHOSPHORUS: CPT

## 2019-09-20 PROCEDURE — 80053 COMPREHEN METABOLIC PANEL: CPT

## 2019-09-20 PROCEDURE — 77030018729 HC ELECTRD DEFIB PAD CARD -B

## 2019-09-20 PROCEDURE — 74011250636 HC RX REV CODE- 250/636: Performed by: STUDENT IN AN ORGANIZED HEALTH CARE EDUCATION/TRAINING PROGRAM

## 2019-09-20 PROCEDURE — 87070 CULTURE OTHR SPECIMN AEROBIC: CPT

## 2019-09-20 PROCEDURE — 83735 ASSAY OF MAGNESIUM: CPT

## 2019-09-20 PROCEDURE — 77030037878 HC DRSG MEPILEX >48IN BORD MOLN -B

## 2019-09-20 PROCEDURE — 82962 GLUCOSE BLOOD TEST: CPT

## 2019-09-20 PROCEDURE — 82550 ASSAY OF CK (CPK): CPT

## 2019-09-20 RX ORDER — FENTANYL CITRATE 50 UG/ML
12.5-5 INJECTION, SOLUTION INTRAMUSCULAR; INTRAVENOUS
Status: DISCONTINUED | OUTPATIENT
Start: 2019-09-20 | End: 2019-09-20 | Stop reason: HOSPADM

## 2019-09-20 RX ORDER — ALBUMIN HUMAN 50 G/1000ML
25 SOLUTION INTRAVENOUS ONCE
Status: COMPLETED | OUTPATIENT
Start: 2019-09-20 | End: 2019-09-20

## 2019-09-20 RX ORDER — FENTANYL CITRATE 50 UG/ML
12.5-5 INJECTION, SOLUTION INTRAMUSCULAR; INTRAVENOUS
Status: DISCONTINUED | OUTPATIENT
Start: 2019-09-20 | End: 2019-09-20 | Stop reason: ALTCHOICE

## 2019-09-20 RX ORDER — SODIUM CHLORIDE 9 MG/ML
250 INJECTION, SOLUTION INTRAVENOUS ONCE
Status: COMPLETED | OUTPATIENT
Start: 2019-09-20 | End: 2019-09-20

## 2019-09-20 RX ORDER — NOREPINEPHRINE BITARTRATE/D5W 8 MG/250ML
.5-16 PLASTIC BAG, INJECTION (ML) INTRAVENOUS
Status: DISCONTINUED | OUTPATIENT
Start: 2019-09-20 | End: 2019-09-21

## 2019-09-20 RX ORDER — LIDOCAINE HYDROCHLORIDE 10 MG/ML
30 INJECTION, SOLUTION EPIDURAL; INFILTRATION; INTRACAUDAL; PERINEURAL ONCE
Status: ACTIVE | OUTPATIENT
Start: 2019-09-20 | End: 2019-09-21

## 2019-09-20 RX ORDER — POTASSIUM CHLORIDE 20 MEQ/1
20 TABLET, EXTENDED RELEASE ORAL
Status: COMPLETED | OUTPATIENT
Start: 2019-09-20 | End: 2019-09-20

## 2019-09-20 RX ORDER — MIDAZOLAM HYDROCHLORIDE 1 MG/ML
1 INJECTION, SOLUTION INTRAMUSCULAR; INTRAVENOUS
Status: DISCONTINUED | OUTPATIENT
Start: 2019-09-20 | End: 2019-09-20 | Stop reason: ALTCHOICE

## 2019-09-20 RX ORDER — LIDOCAINE HYDROCHLORIDE 10 MG/ML
INJECTION, SOLUTION EPIDURAL; INFILTRATION; INTRACAUDAL; PERINEURAL
Status: DISPENSED
Start: 2019-09-20 | End: 2019-09-20

## 2019-09-20 RX ORDER — HEPARIN SODIUM 1000 [USP'U]/ML
INJECTION, SOLUTION INTRAVENOUS; SUBCUTANEOUS
Status: COMPLETED
Start: 2019-09-20 | End: 2019-09-20

## 2019-09-20 RX ORDER — VANCOMYCIN/0.9 % SOD CHLORIDE 1.5G/250ML
1500 PLASTIC BAG, INJECTION (ML) INTRAVENOUS ONCE
Status: COMPLETED | OUTPATIENT
Start: 2019-09-20 | End: 2019-09-21

## 2019-09-20 RX ORDER — MIDAZOLAM HYDROCHLORIDE 1 MG/ML
1 INJECTION, SOLUTION INTRAMUSCULAR; INTRAVENOUS
Status: DISCONTINUED | OUTPATIENT
Start: 2019-09-20 | End: 2019-09-20 | Stop reason: HOSPADM

## 2019-09-20 RX ORDER — MORPHINE SULFATE 2 MG/ML
2 INJECTION, SOLUTION INTRAMUSCULAR; INTRAVENOUS ONCE
Status: COMPLETED | OUTPATIENT
Start: 2019-09-20 | End: 2019-09-20

## 2019-09-20 RX ORDER — CIPROFLOXACIN 2 MG/ML
200 INJECTION, SOLUTION INTRAVENOUS
Status: DISCONTINUED | OUTPATIENT
Start: 2019-09-21 | End: 2019-09-20

## 2019-09-20 RX ADMIN — SODIUM CHLORIDE, SODIUM LACTATE, POTASSIUM CHLORIDE, AND CALCIUM CHLORIDE 75 ML/HR: 600; 310; 30; 20 INJECTION, SOLUTION INTRAVENOUS at 20:23

## 2019-09-20 RX ADMIN — SODIUM CHLORIDE 250 ML: 900 INJECTION, SOLUTION INTRAVENOUS at 10:00

## 2019-09-20 RX ADMIN — ALBUMIN (HUMAN) 25 G: 12.5 INJECTION, SOLUTION INTRAVENOUS at 11:00

## 2019-09-20 RX ADMIN — CIPROFLOXACIN 200 MG: 2 INJECTION, SOLUTION INTRAVENOUS at 09:21

## 2019-09-20 RX ADMIN — MIDAZOLAM HYDROCHLORIDE 1 MG: 1 INJECTION, SOLUTION INTRAMUSCULAR; INTRAVENOUS at 12:30

## 2019-09-20 RX ADMIN — VANCOMYCIN HYDROCHLORIDE 1500 MG: 10 INJECTION, POWDER, LYOPHILIZED, FOR SOLUTION INTRAVENOUS at 20:33

## 2019-09-20 RX ADMIN — ACETAMINOPHEN 650 MG: 650 SUPPOSITORY RECTAL at 07:49

## 2019-09-20 RX ADMIN — HEPARIN SODIUM 18 UNITS/KG/HR: 10000 INJECTION, SOLUTION INTRAVENOUS at 21:50

## 2019-09-20 RX ADMIN — METRONIDAZOLE 500 MG: 500 INJECTION, SOLUTION INTRAVENOUS at 01:06

## 2019-09-20 RX ADMIN — PIPERACILLIN SODIUM,TAZOBACTAM SODIUM 2.25 G: 2; .25 INJECTION, POWDER, FOR SOLUTION INTRAVENOUS at 11:00

## 2019-09-20 RX ADMIN — Medication 5 MCG/MIN: at 11:00

## 2019-09-20 RX ADMIN — SODIUM CHLORIDE, SODIUM LACTATE, POTASSIUM CHLORIDE, AND CALCIUM CHLORIDE 75 ML/HR: 600; 310; 30; 20 INJECTION, SOLUTION INTRAVENOUS at 05:18

## 2019-09-20 RX ADMIN — HEPARIN SODIUM 10000 UNITS: 1000 INJECTION, SOLUTION INTRAVENOUS; SUBCUTANEOUS at 01:00

## 2019-09-20 RX ADMIN — FENTANYL CITRATE 50 MCG: 50 INJECTION, SOLUTION INTRAMUSCULAR; INTRAVENOUS at 12:30

## 2019-09-20 RX ADMIN — FAMOTIDINE 20 MG: 10 INJECTION, SOLUTION INTRAVENOUS at 21:16

## 2019-09-20 RX ADMIN — POTASSIUM CHLORIDE 20 MEQ: 1500 TABLET, EXTENDED RELEASE ORAL at 20:30

## 2019-09-20 RX ADMIN — MORPHINE SULFATE 2 MG: 2 INJECTION, SOLUTION INTRAMUSCULAR; INTRAVENOUS at 06:15

## 2019-09-20 RX ADMIN — ONDANSETRON 4 MG: 2 INJECTION INTRAMUSCULAR; INTRAVENOUS at 06:13

## 2019-09-20 RX ADMIN — IOHEXOL 50 ML: 300 INJECTION, SOLUTION INTRAVENOUS at 12:40

## 2019-09-20 NOTE — CONSULTS
[facility]  Pulmonary, Critical Care, and Sleep Medicine    Name: Asha Maria MRN: [id number]   : 1950 Hospital: [address] [place] [address]   Date: 2019        Critical Care History & Physical      IMPRESSION:   · Shock, likely distributive/ septic secondary to acute cholecystitis  · Severe sepsis secondary to acute cholecystitis  · Acute kidney injury  · Chronic systolic HF -compensated  · Non-sustained Vtach, AICD fired 3x on   · Non-ischemic cardiomyopathy, EF 26-30%, likely from herceptin  · Hx recent PE and DVT -on eliquis at home  · Metastatic breast ca, on chemotherapy      Patient Active Problem List   Diagnosis Code    Congestive heart failure ([facility] [address] I50.9    Hypertension I10    MVP (mitral valve prolapse) I34.1    Nonischemic cardiomyopathy (HCC) I42.8    Cancer (HCC) C80.1    Adenocarcinoma of breast; locally advanced invasive ductal adenocarcinoma of left breast C50.919    Poisoning by other and unspecified agents primarily affecting the cardiovascular system(972.9) T46.904A    Syncope and collapse R55    Other primary cardiomyopathies I42.8    Shortness of breath R06.02    Nonspecific abnormal electrocardiogram (ECG) (EKG) R94.31    Automatic implantable cardioverter-defibrillator in situ Z95.810    Mitral valve disease I05.9    Abnormal PFT R94.2    Acute bronchitis J20.9    Chronic asthmatic bronchitis (HCC) J44.9    Malignant neoplasm metastatic to lung (HCC) C78.00    Seasonal allergic rhinitis due to pollen J30.1    Dilated cardiomyopathy (HCC) I42.0    Acute exacerbation of CHF (congestive heart failure) (HCC) I50.9    Breast cancer (HCC) C50.919    Pulmonary embolism (HCC) I26.99    Chronic bronchitis (HCC) J42    Hypoxia R09.02    Lung metastases (HCC) C78.00    UTI (urinary tract infection) N39.0    CAD (coronary artery disease) I25.10    Elevated troponin R74.8    Weakness generalized R53.1    Chronic anticoagulation Z79.01  History of pulmonary embolism Z86.711    Hypokalemia E87.6    Hypomagnesemia E83.42    Acute encephalopathy G93.40    Cholecystitis K81.9        RECOMMENDATIONS:   · Neuro: stable, no acute concerns  · Resp: stable on NC  · I/D: follow blood cultures. Continue vancomycin; change ciprofloxacin to zosyn. Monitor temp curve, WBC, signs of worsening sepsis. For IR drainage of GB/ acute kd today  · Hem/Onc:  Stable H/H, platelets. On heparin gtt per PE protocol. Dr. Tucker Walls (Oncology) informed of patient's admission  · CVS: bolus albumin and continue maintenance IVF hydration - monitor for signs of fluid overload. May start vasopressor support with levophed, titrate to SBP at least 90 mmHg. Cardiology following  · Metabolic: replace K today with 20 mEq. Trend rest of electrolytes and replace cautiously given OJSUE  · Renal: monitor I/O as best as possible. Difficulty with insertion of tafoya catheter - post-void bladder scan and repeat bladder in scan in 8 hours --if pt continues to have urinary retention, may need Urology consult  · Endocrine: frequent glucose checks. Avoid hypoglycemia  · GI: NPO except with meds. Strict aspiration precautions  · Musc/Skin: stable, no acute concerns  · Mediport care  · Code Status: Full code     Best practice:  · Sepsis Bundle per Hospital Protocol  · Stress ulcer prophylaxis. Pepcid. · Need for Lines, tafoya assessed. Subjective/History: This patient has been seen and evaluated at the request of Dr. Mila Ribera for hypotension. 09/20/19    Patient is a 78 y.o. female with medical hx significant for metastatic breast ca on chemo, chronic systolic HF, non-ischemic cardiomyopathy, PE/DVT on eliquis, HTN, presented to the ED for RUQ abdominal pain with radiation to the LUQ; + associated mild nausea, no vomiting. Patient was admitted to the floor. Hepatobiliary scan done more suspicious for acute cholecystitis.  RRT was called this morning for hypotension, tachycardia and decreased mentation. Patient received 250 mL NS IVF bolus and transferred to ICU. Pt still has abdominal pain however no nausea or vomiting. Past Medical History:   Diagnosis Date    Abnormal nuclear cardiac imaging test 06/11/2010    Lg fixed anterior, septal, apical, inferoseptal defect sugg RCA & LAD disease or cardiomyopathy. EF 20%. Global hykn. Nondiagnostic EKG.  Acute bronchitis 10/15/2018    Persistent cough and wheezing in spite of prednisone and antibiotic. Will refer to pulmonary    Adenocarcinoma of breast; locally advanced invasive ductal adenocarcinoma of left breast     Asthma     Automatic implantable cardiac defibrillator in situ     Automatic implantable cardiac defibrillator in situ     Breast cancer ([facility] [address]     Cancer ([facility] [address]     breast cancer left    Chemotherapy convalescence or palliative care 2012    Chronic combined systolic and diastolic heart failure (HCC)     Remains symptomatic, nyha class 3 ef improving.  Congestive heart failure, unspecified     chronic class ll    Echocardiogram 09/27/2010    EF 30%. Global hykn. Mild MR. Mild TR.  GERD (gastroesophageal reflux disease)     Hyperlipidemia     Hypertension     Mitral valve disorders(424.0) 1/15/2014    mild to moderate mr     Mitral valve disorders(424.0) 1/15/2014    mild to moderate mr     MVP (mitral valve prolapse)     Nonischemic cardiomyopathy (HCC)     EF 20-30% despite medical therapy    Nonspecific abnormal electrocardiogram (ECG) (EKG)     Osteopenia     Other primary cardiomyopathies     Pacemaker 10/27/10    s/p implantation, without complication    Poisoning by other and unspecified agents primarily affecting the cardiovascular system(972.9)     Ef slightly better to 45%, STABLE back on chemo.  Radiation therapy     Radiation therapy complication 3305    S/P cardiac catheterization 07/08/10    Patent coronary arteries. LVEDP 25.  EF 25%. Global hykn.   Moderate MR.  RA 10.  RV 40/10. PA 40/20.  20.  CO/CI 4.5/2.45 (Larry).  Shortness of breath     Syncope and collapse     Thyroid disease     goiter        Past Surgical History:   Procedure Laterality Date    CARDIAC SURG PROCEDURE UNLIST      Difibrillator; BI V ICD    HX MASTECTOMY  09/28/11    modified radical mastectomy and axillary dissection    HX ORTHOPAEDIC      HX OTHER SURGICAL      surgery to remove part of liver    HX PACEMAKER  10/27/10    s/p [employer] biventricular AICD, without complication    HX RADICAL MASTECTOMY      IR CHOLECYSTOSTOMY PERCUTANEOUS  9/20/2019    IR INSERT TUNL CVC W PORT OVER 5 YEARS  1/16/2019    NEUROLOGICAL PROCEDURE UNLISTED      Cervical fusion        Prior to Admission medications    Medication Sig Start Date End Date Taking? Authorizing Provider   digoxin (LANOXIN) 0.125 mg tablet Take 1 Tab by mouth daily. 9/9/19  Yes Epifanio Dacosta MD   spironolactone (ALDACTONE) 25 mg tablet Take 1 Tab by mouth daily. 9/9/19  Yes Epifanio Dacosta MD   carvedilol (COREG) 25 mg tablet Take 1 Tab by mouth two (2) times daily (with meals). 8/28/19  Yes Elizabeth Montanez MD   furosemide (LASIX) 20 mg tablet Take 1 Tab by mouth daily. 8/28/19  Yes Elizabeth Montanez MD   OTHER BMP on Friday 8/30/19  Dx: CHF  Fax results to Dr. Cindi Linares 8/28/19  Yes Elizabeth Montanez MD   apixaban (ELIQUIS) 5 mg tablet Take 1 Tab by mouth two (2) times a day. 8/28/19  Yes Elizabeth Montanez MD   tiotropium (SPIRIVA WITH HANDIHALER) 18 mcg inhalation capsule Take 1 Cap by inhalation daily. Yes Provider, Historical   sacubitril-valsartan (ENTRESTO) 49 mg/51 mg tablet Take 1 Tab by mouth two (2) times a day. 8/12/19  Yes Pop Estes NP   mometasone (NASONEX) 50 mcg/actuation nasal spray 2 Sprays by Both Nostrils route daily as needed.  Indications: inflammation of the nose due to an allergy   Yes Provider, Historical   budesonide-formoterol (SYMBICORT) 160-4.5 mcg/actuation HFAA Take 2 Puffs by inhalation two (2) times a day. 5/6/19  Yes Neo Giraldo MD   albuterol-ipratropium (DUO-NEB) 2.5 mg-0.5 mg/3 ml nebu 3 mL by Nebulization route every six (6) hours as needed (wheezing or sob). File under Medicare Part B, ICD codes J44.9, C78.01 5/2/19  Yes Clemetine Vicenta A, NP   DULoxetine (CYMBALTA) 30 mg capsule Take 30 mg by mouth daily. Yes Provider, Historical   multivitamin (ONE A DAY) tablet Take 1 Tab by mouth daily. Yes Provider, Historical   plant stanol eliezer (CHOLEST OFF PO) Take 1 Tab by mouth daily. Yes Provider, Historical   benzonatate (TESSALON PERLES) 100 mg capsule Take 100 mg by mouth three (3) times daily as needed for Cough. Yes Provider, Historical   Biotin 2,500 mcg cap Take 1 Cap by mouth daily. Yes Provider, Historical   melatonin 10 mg tab Take 1 Tab by mouth nightly. Yes Provider, Historical   montelukast (SINGULAIR) 10 mg tablet Take 1 Tab by mouth daily. 7/6/18  Yes Giulia Noyola MD   raloxifene (EVISTA) 60 mg tablet Take 60 mg by mouth daily. Yes Provider, Historical   montelukast (SINGULAIR) 10 mg tablet TAKE 1 TABLET DAILY 9/19/19   Flo Cho MD   metOLazone (ZAROXOLYN) 5 mg tablet Take 1 Tab by mouth daily. 9/3/19   Chen Stone MD   naloxone [facility]) 0.4 mg/mL injection 1 mL by IntraVENous route as needed for Other (Give if breaths per minute  less than 10, may repeat dose in 15 min and contact MD). 5/6/19   Neo Giraldo MD   b complex vitamins (B COMPLEX 1) tablet Take 1 Tab by mouth daily. Provider, Historical   cholecalciferol, vitamin D3, 2,000 unit tab Take 1 Tab by mouth daily.     Provider, Historical       Current Facility-Administered Medications   Medication Dose Route Frequency    piperacillin-tazobactam (ZOSYN) 2.25 g in 0.9% sodium chloride (MBP/ADV) 50 mL MBP  2.25 g IntraVENous Q6H    NOREPINephrine (LEVOPHED) 8 mg in 5% dextrose 250mL infusion  0.5-16 mcg/min IntraVENous TITRATE    lidocaine (PF) (XYLOCAINE) 10 mg/mL (1 %) injection        lidocaine (PF) (XYLOCAINE) 10 mg/mL (1 %) injection 30 mL  30 mL SubCUTAneous ONCE    vancomycin (VANCOCIN) 1500 mg in  ml infusion  1,500 mg IntraVENous ONCE    VANCOMYCIN INFORMATION NOTE   Other CONTINUOUS    [START ON 2019] Vancomycin Lab Information  1 Each Other ONCE    lactated Ringers infusion  75 mL/hr IntraVENous CONTINUOUS    heparin 25,000 units in D5W 250 ml infusion  18-36 Units/kg/hr IntraVENous TITRATE       Allergies   Allergen Reactions    Adhesive Other (comments)     blisters        Social History     Tobacco Use    Smoking status: Never Smoker    Smokeless tobacco: Never Used   Substance Use Topics    Alcohol use: No        Family History   Problem Relation Age of Onset    Hypertension Mother     High Cholesterol Mother     Heart Disease Father         paemaker implant at 80          Objective:   Vital Signs:    Visit Vitals  /49   Pulse 95   Temp 98.4 °F (36.9 °C)   Resp 13   Ht 5' 5\" (1.651 m)   Wt 77.1 kg (170 lb)   SpO2 99%   Breastfeeding? No   BMI 28.29 kg/m²       O2 Device: Nasal cannula   O2 Flow Rate (L/min): 2 l/min   Temp (24hrs), Av.2 °F (37.3 °C), Min:97.8 °F (36.6 °C), Max:104 °F (40 °C)       Intake/Output:   Last shift:      No intake/output data recorded. Last 3 shifts:  1901 -  0700  In: 56 [P.O.:240; I.V.:650]  Out: 1500 [Urine:1500]    Intake/Output Summary (Last 24 hours) at 2019 1819  Last data filed at 2019 0505  Gross per 24 hour   Intake 840 ml   Output 500 ml   Net 340 ml       Physical Exam:     General:  Alert, cooperative, in moderate discomfort   Head:  Normocephalic, without obvious abnormality, atraumatic. Eyes:  Pink palpebral conjunctivae, anicteric sclerae; EOMs intact   Nose: Nares normal. No drainage    Throat: Lips, mucosa, and tongue mildly dry   Neck: Supple, symmetrical, trachea midline, no adenopathy, no carotid bruit and no JVD.    Lungs:   Symmetrical chest rise; good AE bilat; CTAB; no wheezes/rhonchi/rales noted. Heart:  RRR, S1, S2 present   Abdomen:   Soft, + direct tenderness to RUQ. Bowel sounds normal.    Extremities: Extremities normal, atraumatic, no cyanosis or edema. Pulses: 2+ and symmetric all extremities. Skin: Skin color, texture, turgor normal. No rashes or lesions. + Left chest mediport   Neurologic: Grossly nonfocal       Data:     Recent Results (from the past 24 hour(s))   PTT    Collection Time: 09/19/19  9:40 PM   Result Value Ref Range    aPTT 62.6 (H) 23.0 - 36.4 SEC   PTT    Collection Time: 09/20/19  4:54 AM   Result Value Ref Range    aPTT 151.9 (HH) 23.0 - 36.4 SEC   CBC WITH AUTOMATED DIFF    Collection Time: 09/20/19  4:54 AM   Result Value Ref Range    WBC 27.5 (H) 4.6 - 13.2 K/uL    RBC 2.69 (L) 4.20 - 5.30 M/uL    HGB 8.5 (L) 12.0 - 16.0 g/dL    HCT 26.0 (L) 35.0 - 45.0 %    MCV 96.7 74.0 - 97.0 FL    MCH 31.6 24.0 - 34.0 PG    MCHC 32.7 31.0 - 37.0 g/dL    RDW 16.8 (H) 11.6 - 14.5 %    PLATELET 247 (L) 840 - 420 K/uL    MPV 10.6 9.2 - 11.8 FL    NEUTROPHILS 72 42 - 75 %    BAND NEUTROPHILS 24 (H) 0 - 5 %    LYMPHOCYTES 3 (L) 20 - 51 %    MONOCYTES 1 (L) 2 - 9 %    EOSINOPHILS 0 0 - 5 %    BASOPHILS 0 0 - 3 %    ABS. NEUTROPHILS 26.4 (H) 1.8 - 8.0 K/UL    ABS. LYMPHOCYTES 0.8 0.8 - 3.5 K/UL    ABS. MONOCYTES 0.3 0 - 1.0 K/UL    ABS. EOSINOPHILS 0.0 0.0 - 0.4 K/UL    ABS.  BASOPHILS 0.0 0.0 - 0.06 K/UL    DF MANUAL      PLATELET COMMENTS DECREASED PLATELETS      RBC COMMENTS ANISOCYTOSIS  1+       GLUCOSE, POC    Collection Time: 09/20/19  9:39 AM   Result Value Ref Range    Glucose (POC) 146 (H) 70 - 110 mg/dL   CBC WITH AUTOMATED DIFF    Collection Time: 09/20/19  9:48 AM   Result Value Ref Range    WBC 18.5 (H) 4.6 - 13.2 K/uL    RBC 2.95 (L) 4.20 - 5.30 M/uL    HGB 9.2 (L) 12.0 - 16.0 g/dL    HCT 28.5 (L) 35.0 - 45.0 %    MCV 96.6 74.0 - 97.0 FL    MCH 31.2 24.0 - 34.0 PG    MCHC 32.3 31.0 - 37.0 g/dL    RDW 16.7 (H) 11.6 - 14.5 %    PLATELET 091 (L) 194 - 420 K/uL    MPV 10.7 9.2 - 11.8 FL    NEUTROPHILS 86 (H) 42 - 75 %    BAND NEUTROPHILS 10 (H) 0 - 5 %    LYMPHOCYTES 2 (L) 20 - 51 %    MONOCYTES 1 (L) 2 - 9 %    EOSINOPHILS 1 0 - 5 %    BASOPHILS 0 0 - 3 %    ABS. NEUTROPHILS 17.7 (H) 1.8 - 8.0 K/UL    ABS. LYMPHOCYTES 0.4 (L) 0.8 - 3.5 K/UL    ABS. MONOCYTES 0.2 0 - 1.0 K/UL    ABS. EOSINOPHILS 0.2 0.0 - 0.4 K/UL    ABS. BASOPHILS 0.0 0.0 - 0.06 K/UL    DF MANUAL      PLATELET COMMENTS DECREASED PLATELETS      RBC COMMENTS ANISOCYTOSIS  1+        RBC COMMENTS HYPOCHROMIA  2+        WBC COMMENTS HYPERSEGMENTED POLYS     METABOLIC PANEL, COMPREHENSIVE    Collection Time: 09/20/19  9:48 AM   Result Value Ref Range    Sodium 138 136 - 145 mmol/L    Potassium 3.1 (L) 3.5 - 5.5 mmol/L    Chloride 102 100 - 111 mmol/L    CO2 24 21 - 32 mmol/L    Anion gap 12 3.0 - 18 mmol/L    Glucose 136 (H) 74 - 99 mg/dL    BUN 35 (H) 7.0 - 18 MG/DL    Creatinine 3.02 (H) 0.6 - 1.3 MG/DL    BUN/Creatinine ratio 12 12 - 20      GFR est AA 19 (L) >60 ml/min/1.73m2    GFR est non-AA 15 (L) >60 ml/min/1.73m2    Calcium 7.2 (L) 8.5 - 10.1 MG/DL    Bilirubin, total 0.5 0.2 - 1.0 MG/DL    ALT (SGPT) 23 13 - 56 U/L    AST (SGOT) 20 10 - 38 U/L    Alk.  phosphatase 106 45 - 117 U/L    Protein, total 4.7 (L) 6.4 - 8.2 g/dL    Albumin 2.3 (L) 3.4 - 5.0 g/dL    Globulin 2.4 2.0 - 4.0 g/dL    A-G Ratio 1.0 0.8 - 1.7     CARDIAC PANEL,(CK, CKMB & TROPONIN)    Collection Time: 09/20/19  9:48 AM   Result Value Ref Range    CK 99 26 - 192 U/L    CK - MB <1.0 <3.6 ng/ml    CK-MB Index  0.0 - 4.0 %     CALCULATION NOT PERFORMED WHEN RESULT IS BELOW LINEAR LIMIT    Troponin-I, QT <0.02 0.0 - 0.045 NG/ML   PTT    Collection Time: 09/20/19 10:30 AM   Result Value Ref Range    aPTT 36.4 23.0 - 36.4 SEC   CULTURE, BODY FLUID W GRAM STAIN    Collection Time: 09/20/19 12:40 PM   Result Value Ref Range    Special Requests: NO SPECIAL REQUESTS      GRAM STAIN FEW WBC'S      GRAM STAIN RARE GRAM POSITIVE COCCI IN CHAINS      GRAM STAIN RARE GRAM NEGATIVE RODS      GRAM STAIN RARE GRAM POSITIVE RODS      [address] STAIN       NOTIFIED JACOB ANNE [address] [facility] AT 6798 BY KDA 9/20/19    Culture result: PENDING    GLUCOSE, POC    Collection Time: 09/20/19  5:50 PM   Result Value Ref Range    Glucose (POC) 165 (H) 70 - 110 mg/dL           No results for input(s): FIO2I, IFO2, HCO3I, IHCO3, HCOPOC, PCO2I, PCOPOC, IPHI, PHI, PHPOC, PO2I, PO2POC in the last 72 hours. No lab exists for component: IPOC2    Telemetry:AFIB    Imaging:  I have personally reviewed the patients radiographs and have reviewed the reports:    CXR [date]:   CXR Results  (Last 48 hours)               09/20/19 0958  XR CHEST PORT Final result    Impression:  IMPRESSION:       1. Pacemaker and left IJ infusion port identified, unchanged in interval.  No   pneumothorax. 2.  Atelectatic changes bilaterally. Subtle developing infiltrate cannot be   excluded for the left mid lung zone and right upper lung zone. Narrative:  EXAM:  Chest Portable. INDICATION:  Chest pain        COMPARISON:  09/18/19        TECHNIQUE:  Portable AP chest study       FINDINGS:        - ICD hub in the right upper torso region with 3 intact leads through the left   subclavian approach. - Left IJ approach single-chamber infusion port in place.   - Both lungs are hypoinflated. - Bandlike densities are noted in the retrocardiac region and in the right upper   lung zone medial aspect. Most likely related to subsegmental atelectatic   changes vs. scarring. Subtle streaky densities in the left mid lung zone. Developing infiltrate cannot be excluded for the right upper lung zone and in   the left mid lung zone. - No costophrenic angle blunting or pneumothorax. - No significant cardiac silhouette enlargement.                  CT HEAD/CHEST/ABD/PELVIS [date]:   CT Results  (Last 48 hours)               09/19/19 0014  CT ABD PELV W CONT Final result    Impression:  IMPRESSION:       Findings consistent with acute cholecystitis, as suggested on recent sonogram.   -Mild common bile duct dilatation. Correlate clinically for cholestasis. Hepatic steatosis. Cystic-appearing structure in the pancreas uncinate process. -MRI/MRCP may be useful to evaluate biliary tree and this lesion. Fluid in the distal esophagus. Correlate clinically for esophagitis or reflux. Enlarged right perirectal node again noted. A few sigmoid diverticula. Probable   fibroid uterus. Narrative:  EXAMINATION: CT abdomen/pelvis with IV contrast       INDICATION: Abdominal pain, abnormal ultrasound       COMPARISON: CT 8/27/2019       TECHNIQUE: CT of the abdomen and pelvis performed following 80 cc IV Isovue-300   with multiplanar reformations. All CT scans at this facility are performed using   dose optimization technique as appropriate to a performed exam, to include   automated exposure control, adjustment of the mA and/or kV according to patient   size (including appropriate matching first site specific examinations), or use   of iterative reconstruction technique. FINDINGS:       Lower thorax: Bibasilar scattered streaky densities, probably atelectasis. Fluid   in the distal esophagus noted. Cardiac leads noted. Hepatobiliary: Liver low attenuation relative to spleen. No suspicious hepatic   parenchymal lesions. Gallbladder prominent in size with notable wall thickening,   large calculus at the neck, and surrounding stranding. Common bile duct up to 10   mm caliber. Pancreas: 1.4 cm pancreas uncinate process cyst.       Spleen: Normal.       Adrenal glands: Normal.       Genitourinary: Right kidney normal. Left kidney probable subcentimeter   angiomyolipoma posteriorly. Bladder collapsed. Uterus fundus is slightly bulbous   in appearance with a probable small exophytic fibroid at the posterior fundus.        Gastrointestinal: Stomach unremarkable. Small bowel loops nondilated. The colon   is nondilated. Minimal sigmoid diverticulosis. Appendix not clearly visualized   but no suspicious secondary findings. Mesentery/vessels/nodes: No free air. No free fluid. Mild burden of   atherosclerotic calcifications. Vessels unremarkable. Enlarged right perirectal   node. No retroperitoneal adenopathy by size criteria. Miscellaneous: Superficial soft tissues unremarkable. Bones: No acute osseous findings. Total of  60   min critical care time spent at bedside during the course of care providing evaluation,management and care decisions and ordering appropriate treatment related to critical care problems exclusive of procedures. The reason for providing this level of medical care for this critically ill patient was due a critical illness that impaired one or more vital organ systems such that there was a high probability of imminent or life threatening deterioration in the patients condition. This care involved high complexity decision making to assess, manipulate, and support vital system functions, to treat this degree vital organ system failure and to prevent further life threatening deterioration of the patients condition.     Deb Malik PA-C

## 2019-09-20 NOTE — PROCEDURES
RADIOLOGY POST PROCEDURE NOTE     September 20, 2019       12:49 PM     Preoperative Diagnosis:   Cholecystitis/sepsis    Postoperative Diagnosis:  Same. :  Sally Parish    Assistant:  None. Type of Anesthesia: moderate sedation    Procedure/Description:  GB drainage    Findings:   Cloudy bile. Estimated blood Loss:  Minimal    Specimen Removed:   Yes. Culture. Blood transfusions:  None. Implants:  None. Complications: None    Condition: Stable    Discharge Plan:  continue present therapy     Hold heparin for at least 8 hours.     Yoan Rubi MD

## 2019-09-20 NOTE — PROGRESS NOTES
[facility]  Progress Note    Patient: Ana Rosa Alcantara Age: 78 y.o. : 1950 MR#: [id number] SSN: [id number]  Date: 2019     Subjective/24-hour events:     Rapid response called this AM due to elevated HRs and downtrending BPs. Patient has been noted to have developed initial issues with hypotension overnight at around 2230. AM labs showed worsened leukocytosis and fever curve has also trended upward overnight. BPs have failed to respond adequately to small fluid bolus just recently administered - had been on LR at 75mL/hr previously. Have concern for volume overload due to history of CHF/CMO. She is a little sleepy but easily arousable and mentating well. Denies chest pain, SOB, palpitations, worsening abdominal pain and N/V acutely. Assessment:   Acute cholecystitis   Sepsis, secondary to above  Metastatic breast cancer   Chronic systolic CHF  Cardiomyopathy with AICD  HTN by hx, with current hypotension secondary to sepsis     Plan:  Upgrade status to ICU. Have discussed with ICU attending via phone and will assume care acutely. Have placed order for phenylephrine which is being prepared for administration now that she has transferred to ICU. Continue antibiotic therapy - management going forward per ICU. Percutaneous gallbladder drain per IR. Heparin currently on hold for procedure. Cardiology evaluation requested. Assistance of consultants appreciated. Will continue to follow. Case discussed with:  [x]Patient  [x]Family  [x]Nursing  []Case Management  DVT Prophylaxis:  []Lovenox  []Hep SQ  []SCDs  []Coumadin   [x]On Heparin gtt    Objective:   VS:   Visit Vitals  BP (!) 77/40   Pulse (!) 104   Temp 99 °F (37.2 °C)   Resp 22   Ht 5' 5\" (1.651 m)   Wt 77.1 kg (170 lb)   SpO2 95%   Breastfeeding?  No   BMI 28.29 kg/m²      Tmax/24hrs: Temp (24hrs), Av.3 °F (37.4 °C), Min:97.8 °F (36.6 °C), Max:104 °F (40 °C)      Intake/Output Summary (Last 24 hours) at 9/20/2019 1029  Last data filed at 9/20/2019 0505  Gross per 24 hour   Intake 840 ml   Output 500 ml   Net 340 ml       General:  Ill-appearing but comfortable - in NAD. Cardiovascular:  S1, S2. Tachy. Paced rhythm on EKG. Pulmonary:  Clear, no wheezes. Effort nonlabored. GI:  Abdomen soft, + RUQ TTP. No guarding/rebound. Extremities:  Warm, no ischemia or edema. Neuro:  Awake and alert, moves extremities spontaneously. Labs:    Recent Results (from the past 24 hour(s))   METABOLIC PANEL, COMPREHENSIVE    Collection Time: 09/19/19 11:25 AM   Result Value Ref Range    Sodium 142 136 - 145 mmol/L    Potassium 3.0 (L) 3.5 - 5.5 mmol/L    Chloride 106 100 - 111 mmol/L    CO2 21 21 - 32 mmol/L    Anion gap 15 3.0 - 18 mmol/L    Glucose 150 (H) 74 - 99 mg/dL    BUN 20 (H) 7.0 - 18 MG/DL    Creatinine 1.87 (H) 0.6 - 1.3 MG/DL    BUN/Creatinine ratio 11 (L) 12 - 20      GFR est AA 32 (L) >60 ml/min/1.73m2    GFR est non-AA 27 (L) >60 ml/min/1.73m2    Calcium 8.5 8.5 - 10.1 MG/DL    Bilirubin, total 0.5 0.2 - 1.0 MG/DL    ALT (SGPT) <6 (L) 13 - 56 U/L    AST (SGOT) 17 10 - 38 U/L    Alk. phosphatase 146 (H) 45 - 117 U/L    Protein, total 6.0 (L) 6.4 - 8.2 g/dL    Albumin 2.7 (L) 3.4 - 5.0 g/dL    Globulin 3.3 2.0 - 4.0 g/dL    A-G Ratio 0.8 0.8 - 1.7     CBC WITH AUTOMATED DIFF    Collection Time: 09/19/19 11:25 AM   Result Value Ref Range    WBC 12.5 4.6 - 13.2 K/uL    RBC 3.34 (L) 4.20 - 5.30 M/uL    HGB 10.4 (L) 12.0 - 16.0 g/dL    HCT 32.5 (L) 35.0 - 45.0 %    MCV 97.3 (H) 74.0 - 97.0 FL    MCH 31.1 24.0 - 34.0 PG    MCHC 32.0 31.0 - 37.0 g/dL    RDW 16.5 (H) 11.6 - 14.5 %    PLATELET 156 243 - 519 K/uL    MPV 10.7 9.2 - 11.8 FL    NEUTROPHILS 97 (H) 40 - 73 %    LYMPHOCYTES 2 (L) 21 - 52 %    MONOCYTES 0 (L) 3 - 10 %    EOSINOPHILS 1 0 - 5 %    BASOPHILS 0 0 - 2 %    ABS. NEUTROPHILS 12.2 (H) 1.8 - 8.0 K/UL    ABS. LYMPHOCYTES 0.2 (L) 0.9 - 3.6 K/UL    ABS.  MONOCYTES 0.1 0.05 - 1.2 K/UL    ABS. EOSINOPHILS 0.1 0.0 - 0.4 K/UL    ABS. BASOPHILS 0.0 0.0 - 0.1 K/UL    DF AUTOMATED     PTT    Collection Time: 09/19/19 11:25 AM   Result Value Ref Range    aPTT 27.5 23.0 - 36.4 SEC   PTT    Collection Time: 09/19/19  9:40 PM   Result Value Ref Range    aPTT 62.6 (H) 23.0 - 36.4 SEC   PTT    Collection Time: 09/20/19  4:54 AM   Result Value Ref Range    aPTT 151.9 (HH) 23.0 - 36.4 SEC   CBC WITH AUTOMATED DIFF    Collection Time: 09/20/19  4:54 AM   Result Value Ref Range    WBC 27.5 (H) 4.6 - 13.2 K/uL    RBC 2.69 (L) 4.20 - 5.30 M/uL    HGB 8.5 (L) 12.0 - 16.0 g/dL    HCT 26.0 (L) 35.0 - 45.0 %    MCV 96.7 74.0 - 97.0 FL    MCH 31.6 24.0 - 34.0 PG    MCHC 32.7 31.0 - 37.0 g/dL    RDW 16.8 (H) 11.6 - 14.5 %    PLATELET 843 (L) 421 - 420 K/uL    MPV 10.6 9.2 - 11.8 FL    NEUTROPHILS 72 42 - 75 %    BAND NEUTROPHILS 24 (H) 0 - 5 %    LYMPHOCYTES 3 (L) 20 - 51 %    MONOCYTES 1 (L) 2 - 9 %    EOSINOPHILS 0 0 - 5 %    BASOPHILS 0 0 - 3 %    ABS. NEUTROPHILS 26.4 (H) 1.8 - 8.0 K/UL    ABS. LYMPHOCYTES 0.8 0.8 - 3.5 K/UL    ABS. MONOCYTES 0.3 0 - 1.0 K/UL    ABS. EOSINOPHILS 0.0 0.0 - 0.4 K/UL    ABS. BASOPHILS 0.0 0.0 - 0.06 K/UL    DF MANUAL      PLATELET COMMENTS DECREASED PLATELETS      RBC COMMENTS ANISOCYTOSIS  1+       GLUCOSE, POC    Collection Time: 09/20/19  9:39 AM   Result Value Ref Range    Glucose (POC) 146 (H) 70 - 110 mg/dL   CBC WITH AUTOMATED DIFF    Collection Time: 09/20/19  9:48 AM   Result Value Ref Range    WBC 18.5 (H) 4.6 - 13.2 K/uL    RBC 2.95 (L) 4.20 - 5.30 M/uL    HGB 9.2 (L) 12.0 - 16.0 g/dL    HCT 28.5 (L) 35.0 - 45.0 %    MCV 96.6 74.0 - 97.0 FL    MCH 31.2 24.0 - 34.0 PG    MCHC 32.3 31.0 - 37.0 g/dL    RDW 16.7 (H) 11.6 - 14.5 %    PLATELET 433 (L) 026 - 420 K/uL    MPV 10.7 9.2 - 11.8 FL    NEUTROPHILS PENDING %    LYMPHOCYTES PENDING %    MONOCYTES PENDING %    EOSINOPHILS PENDING %    BASOPHILS PENDING %    ABS. NEUTROPHILS PENDING K/UL    ABS.  LYMPHOCYTES PENDING K/UL ABS. MONOCYTES PENDING K/UL    ABS. EOSINOPHILS PENDING K/UL    ABS.  BASOPHILS PENDING K/UL    DF PENDING        Signed By: Nuvia Albrecht MD     September 20, 2019

## 2019-09-20 NOTE — PROGRESS NOTES
Preprocedure Assessment      Today 9/20/2019     Indication/Symptoms:   Lobo Denis is a 78 y. o.with acute cholecystitis and sepsis. For GB drainage. The H & P and/or progress notes and any available imaging were reviewed. The risks, indications and possible alternatives to the procedure, including doing nothing, were discussed and informed consent was obtained. Physical Exam:      Heart:   Tachy   Lungs:   CTA, no wheezes, rhonchi or rales. The patient is an appropriate candidate to undergo the planned procedure and sedation.     Jeffry Marte MD

## 2019-09-20 NOTE — CONSULTS
[facility] - Consult Note    Date of  Admission: 9/18/2019 11:51 AM     Primary Care Physician:  Elian Rivas MD     Plan:         1. Septic shock- requiring vasopressor support- patient SBP in the 120's. On antibiotics being followed by [facility] and medical team   2. Sinus tachycardia?-resolved-  will interrogate AICD- d/c digoxin iv   3. Chronic Systolic CHF-stage C NYHA class III- appears compensated. No clinical evidence of fluid overload. Will monitor closely   4. Non Sustained Vtach- AICD fired 3 times on  8/19      5. Nonischemic cardiomyopathy (EF 26-30%)-likely from Herceptin use. will resume Coreg when bp stabilizes  6. Hx of recent Acute PE and DVT- patient was on Eliquis at home. Now on heparin drip. 7. Metastatic breast cancer now back on chemotherapy. Lung metastasis likely cause for shortness of breath which is better. 8. Hypokalemia- replace keep K+ >4.0 - check mag levels   9. JOSUE- likely in the setting-#1  10. Cholecystitis- s/p percutaneous drain 9/20/19- surgery reccommended conservative treatment- antibiotics, pain medications. Not a candidate for surgery     Patient currently being managed for septic shock. LV function improved since last echo. Monitor for fluid overload. Continue supportive care. Will resume coreg once shock improves. 08/27/19   ECHO ADULT FOLLOW-UP OR LIMITED 08/28/2019 8/28/2019    Narrative · Left Ventricle: Normal wall thickness. Mildly dilated left ventricle. Severe systolic dysfunction. Estimated left ventricular ejection fraction   is 26 - 30%. Biplane method used to measure ejection fraction. Left   ventricular global hypokinesis. · Pericardium: Trivial pericardial effusion. Signed by: Raiza Vital MD               XR Results (most recent):  Results from Hospital Encounter encounter on 09/18/19   XR CHEST PORT    Narrative EXAM:  Chest Portable.     INDICATION:  Chest pain     COMPARISON:  09/18/19     TECHNIQUE:  Portable AP chest study    FINDINGS:      - ICD hub in the right upper torso region with 3 intact leads through the left  subclavian approach. - Left IJ approach single-chamber infusion port in place.  - Both lungs are hypoinflated. - Bandlike densities are noted in the retrocardiac region and in the right upper  lung zone medial aspect. Most likely related to subsegmental atelectatic  changes vs. scarring. Subtle streaky densities in the left mid lung zone. Developing infiltrate cannot be excluded for the right upper lung zone and in  the left mid lung zone. - No costophrenic angle blunting or pneumothorax. - No significant cardiac silhouette enlargement. Impression IMPRESSION:    1. Pacemaker and left IJ infusion port identified, unchanged in interval.  No  pneumothorax. 2.  Atelectatic changes bilaterally. Subtle developing infiltrate cannot be  excluded for the left mid lung zone and right upper lung zone. Assessment:     Hospital Problems  Date Reviewed: 6/25/2019          Codes Class Noted POA    Cholecystitis ICD-10-CM: K81.9  ICD-9-CM: 575.10  9/18/2019 Unknown                   History of Present Illness: This is a 78 y.o. female admitted for Cholecystitis [K81.9]. Patient knonw to our service with PMHx of CMO, s/p AICD, metastatic breast cancer, PE, DVT, CHF. Admitted on 9/18 with N/V and chest/upper-right sided abdominal pain . The patient's CT of abdomen/pelvis  showed evidence for acute cholecystitis. Today Rapid response was called this AM due to elevated HRs and downtrending BPs. Patient has been noted to have developed initial issues with hypotension overnight at around 2230. AM labs showed worsened leukocytosis and fever curve has also trended upward overnight. BPs have failed to respond adequately to small fluid bolus just recently administered - had been on LR at 75mL/hr previously. Have concern for volume overload due to history of CHF/CMO.   Patient noted to have HR in the 130's and SBP in the 80's. The patient has been transfer to the ICU for continuation of care. currently on vasopressor support. SBP in the 120's HR improved. She feels better. Denies  chest pain, SOB, palpitations. Abdominal pain and distension is slowly improving         Past Medical History:     Past Medical History:   Diagnosis Date    Abnormal nuclear cardiac imaging test 06/11/2010    Lg fixed anterior, septal, apical, inferoseptal defect sugg RCA & LAD disease or cardiomyopathy. EF 20%. Global hykn. Nondiagnostic EKG.  Acute bronchitis 10/15/2018    Persistent cough and wheezing in spite of prednisone and antibiotic. Will refer to pulmonary    Adenocarcinoma of breast; locally advanced invasive ductal adenocarcinoma of left breast     Asthma     Automatic implantable cardiac defibrillator in situ     Automatic implantable cardiac defibrillator in situ     [facility] [address]     Cancer ([facility] [address]     breast cancer left    Chemotherapy convalescence or palliative care 2012    Chronic combined systolic and diastolic heart failure (HCC)     Remains symptomatic, nyha class 3 ef improving.  Congestive heart failure, unspecified     chronic class ll    Echocardiogram 09/27/2010    EF 30%. Global hykn. Mild MR. Mild TR.  GERD (gastroesophageal reflux disease)     Hyperlipidemia     Hypertension     Mitral valve disorders(424.0) 1/15/2014    mild to moderate mr     Mitral valve disorders(424.0) 1/15/2014    mild to moderate mr     MVP (mitral valve prolapse)     Nonischemic cardiomyopathy (HCC)     EF 20-30% despite medical therapy    Nonspecific abnormal electrocardiogram (ECG) (EKG)     Osteopenia     Other primary cardiomyopathies     Pacemaker 10/27/10    s/p implantation, without complication    Poisoning by other and unspecified agents primarily affecting the cardiovascular system(972.9)     Ef slightly better to 45%, STABLE back on chemo.     Radiation therapy     Radiation therapy complication 9590    S/P cardiac catheterization 07/08/10    Patent coronary arteries. LVEDP 25.  EF 25%. Global hykn. Moderate MR.  RA 10.  RV 40/10. PA 40/20.  20.  CO/CI 4.5/2.45 (Larry).  Shortness of breath     Syncope and collapse     Thyroid disease     goiter         Social History:     Social History     Socioeconomic History    Marital status:      Spouse name: Not on file    Number of children: Not on file    Years of education: Not on file    Highest education level: Not on file   Tobacco Use    Smoking status: Never Smoker    Smokeless tobacco: Never Used   Substance and Sexual Activity    Alcohol use: No    Drug use: No        Family History:     Family History   Problem Relation Age of Onset    Hypertension Mother     High Cholesterol Mother     Heart Disease Father         paemaker implant at 80        Medications:      Allergies   Allergen Reactions    Adhesive Other (comments)     blisters        Current Facility-Administered Medications   Medication Dose Route Frequency    lidocaine (PF) (XYLOCAINE) 10 mg/mL (1 %) injection        PHENYLephrine (JUVE-SYNEPHRINE) 30 mg in 0.9% sodium chloride 250 mL infusion   mcg/min IntraVENous TITRATE    piperacillin-tazobactam (ZOSYN) 2.25 g in 0.9% sodium chloride (MBP/ADV) 50 mL MBP  2.25 g IntraVENous Q6H    NOREPINephrine (LEVOPHED) 8 mg in 5% dextrose 250mL infusion  0.5-16 mcg/min IntraVENous TITRATE    lactated Ringers infusion  75 mL/hr IntraVENous CONTINUOUS    digoxin (LANOXIN) injection 125 mcg  125 mcg IntraVENous DAILY    fentaNYL citrate (PF) injection 75 mcg  75 mcg IntraVENous Q4H PRN    naloxone (NARCAN) injection 0.4 mg  0.4 mg IntraVENous EVERY 2 MINUTES AS NEEDED    ondansetron (ZOFRAN) injection 4 mg  4 mg IntraVENous Q4H PRN    diphenhydrAMINE (BENADRYL) injection 25 mg  25 mg IntraVENous Q4H PRN    acetaminophen (TYLENOL) suppository 650 mg  650 mg Rectal Q4H PRN    heparin 25,000 units in D5W 250 ml infusion  18-36 Units/kg/hr IntraVENous TITRATE        Review Of Systems:         Constitutional: No fever, no chills, no weight loss, no night sweats   HEENT: No epistaxis, no nasal drainage, no difficulty in swallowing, no redness in eyes  Respiratory:dyspnea on exertion  Cardiovascular: dyspnea  Gastrointestinal:  abd pain, nausea and  vomiting  Integument/breast: No ulcers or rashes  Musculoskeletal: no muscle pain, no weakness  Neurological: No focal weakness, no seizures, no headaches  Behvioral/Psych: No anxiety, no depression       Physical Exam:     Visit Vitals  BP (!) 77/40   Pulse (!) 104   Temp 99 °F (37.2 °C)   Resp 22   Ht 5' 5\" (1.651 m)   Wt 77.1 kg (170 lb)   SpO2 95%   Breastfeeding? No   BMI 28.29 kg/m²     BP Readings from Last 3 Encounters:   09/20/19 (!) 77/40   08/28/19 143/77   07/26/19 122/58     Pulse Readings from Last 3 Encounters:   09/20/19 (!) 104   08/28/19 98   07/26/19 88     Wt Readings from Last 3 Encounters:   09/18/19 77.1 kg (170 lb)   08/28/19 83.5 kg (184 lb)   07/26/19 79.8 kg (176 lb)       General:  alert, cooperative, no distress, appears stated age, mildly obese  Skin: Warm and dry, acyanotic, normal color. Head: Normocephalic, atraumatic. Eyes: Sclerae anicteric, conjunctivae without injection. Neck:  nontender, no nuchal rigidity, no masses, no stridor, no carotid bruit, no JVD  Lungs:  clear to auscultation bilaterally,  Heart:  regular rate and rhythm, S1, S2 normal, no click, no rub  Abdomen:  abdomen is soft distended with mild  tenderness, masses, organomegaly or guarding  Extremities:  extremities normal, atraumatic, no cyanosis or edema. Neurological: grossly intact. No focal abnormalities, moves all extremities well. Psychiatric Affect: The patient is awake, alert and oriented x3. Domitila Bathe is interactive and appropriate.    Data Review:     Recent Results (from the past 48 hour(s))   EKG, 12 LEAD, INITIAL    Collection Time: 09/18/19 11:58 AM   Result Value Ref Range    Ventricular Rate 72 BPM    Atrial Rate 72 BPM    P-R Interval 150 ms    QRS Duration 136 ms    Q-T Interval 410 ms    QTC Calculation (Bezet) 448 ms    Calculated P Axis 32 degrees    Calculated R Axis -14 degrees    Calculated T Axis 51 degrees    Diagnosis       Atrial-sensed ventricular-paced rhythm  Abnormal ECG  When compared with ECG of 27-AUG-2019 02:58,  premature ventricular complexes are no longer present  Vent. rate has decreased BY  23 BPM  Confirmed by [facility] ([id number]) on 9/19/2019 8:52:37 AM     CBC WITH AUTOMATED DIFF    Collection Time: 09/18/19 12:04 PM   Result Value Ref Range    WBC 11.8 4.6 - 13.2 K/uL    RBC 3.52 (L) 4.20 - 5.30 M/uL    HGB 11.2 (L) 12.0 - 16.0 g/dL    HCT 34.1 (L) 35.0 - 45.0 %    MCV 96.9 74.0 - 97.0 FL    MCH 31.8 24.0 - 34.0 PG    MCHC 32.8 31.0 - 37.0 g/dL    RDW 15.7 (H) 11.6 - 14.5 %    PLATELET 653 684 - 291 K/uL    MPV 10.7 9.2 - 11.8 FL    NEUTROPHILS 84 (H) 40 - 73 %    LYMPHOCYTES 9 (L) 21 - 52 %    MONOCYTES 6 3 - 10 %    EOSINOPHILS 1 0 - 5 %    BASOPHILS 0 0 - 2 %    ABS. NEUTROPHILS 9.9 (H) 1.8 - 8.0 K/UL    ABS. LYMPHOCYTES 1.0 0.9 - 3.6 K/UL    ABS. MONOCYTES 0.7 0.05 - 1.2 K/UL    ABS. EOSINOPHILS 0.2 0.0 - 0.4 K/UL    ABS. BASOPHILS 0.0 0.0 - 0.1 K/UL    DF AUTOMATED     METABOLIC PANEL, COMPREHENSIVE    Collection Time: 09/18/19 12:04 PM   Result Value Ref Range    Sodium 143 136 - 145 mmol/L    Potassium 3.4 (L) 3.5 - 5.5 mmol/L    Chloride 109 100 - 111 mmol/L    CO2 26 21 - 32 mmol/L    Anion gap 8 3.0 - 18 mmol/L    Glucose 159 (H) 74 - 99 mg/dL    BUN 14 7.0 - 18 MG/DL    Creatinine 1.07 0.6 - 1.3 MG/DL    BUN/Creatinine ratio 13 12 - 20      GFR est AA >60 >60 ml/min/1.73m2    GFR est non-AA 51 (L) >60 ml/min/1.73m2    Calcium 9.1 8.5 - 10.1 MG/DL    Bilirubin, total 0.3 0.2 - 1.0 MG/DL    ALT (SGPT) 33 13 - 56 U/L    AST (SGOT) 42 (H) 10 - 38 U/L    Alk.  phosphatase 84 45 - 117 U/L Protein, total 6.9 6.4 - 8.2 g/dL    Albumin 3.6 3.4 - 5.0 g/dL    Globulin 3.3 2.0 - 4.0 g/dL    A-G Ratio 1.1 0.8 - 1.7     LIPASE    Collection Time: 09/18/19 12:04 PM   Result Value Ref Range    Lipase 188 73 - 393 U/L   TROPONIN I    Collection Time: 09/18/19 12:04 PM   Result Value Ref Range    Troponin-I, QT <0.02 0.0 - 0.045 NG/ML   URINALYSIS W/ RFLX MICROSCOPIC    Collection Time: 09/18/19  1:25 PM   Result Value Ref Range    Color YELLOW      Appearance CLEAR      Specific gravity 1.011 1.005 - 1.030      pH (UA) 5.0 5.0 - 8.0      Protein NEGATIVE  NEG mg/dL    Glucose NEGATIVE  NEG mg/dL    Ketone NEGATIVE  NEG mg/dL    Bilirubin NEGATIVE  NEG      Blood NEGATIVE  NEG      Urobilinogen 0.2 0.2 - 1.0 EU/dL    Nitrites NEGATIVE  NEG      Leukocyte Esterase NEGATIVE  NEG     TROPONIN I    Collection Time: 09/18/19  5:29 PM   Result Value Ref Range    Troponin-I, QT <0.02 0.0 - 5.840 NG/ML   METABOLIC PANEL, COMPREHENSIVE    Collection Time: 09/19/19 11:25 AM   Result Value Ref Range    Sodium 142 136 - 145 mmol/L    Potassium 3.0 (L) 3.5 - 5.5 mmol/L    Chloride 106 100 - 111 mmol/L    CO2 21 21 - 32 mmol/L    Anion gap 15 3.0 - 18 mmol/L    Glucose 150 (H) 74 - 99 mg/dL    BUN 20 (H) 7.0 - 18 MG/DL    Creatinine 1.87 (H) 0.6 - 1.3 MG/DL    BUN/Creatinine ratio 11 (L) 12 - 20      GFR est AA 32 (L) >60 ml/min/1.73m2    GFR est non-AA 27 (L) >60 ml/min/1.73m2    Calcium 8.5 8.5 - 10.1 MG/DL    Bilirubin, total 0.5 0.2 - 1.0 MG/DL    ALT (SGPT) <6 (L) 13 - 56 U/L    AST (SGOT) 17 10 - 38 U/L    Alk.  phosphatase 146 (H) 45 - 117 U/L    Protein, total 6.0 (L) 6.4 - 8.2 g/dL    Albumin 2.7 (L) 3.4 - 5.0 g/dL    Globulin 3.3 2.0 - 4.0 g/dL    A-G Ratio 0.8 0.8 - 1.7     CBC WITH AUTOMATED DIFF    Collection Time: 09/19/19 11:25 AM   Result Value Ref Range    WBC 12.5 4.6 - 13.2 K/uL    RBC 3.34 (L) 4.20 - 5.30 M/uL    HGB 10.4 (L) 12.0 - 16.0 g/dL    HCT 32.5 (L) 35.0 - 45.0 %    MCV 97.3 (H) 74.0 - 97.0 FL MCH 31.1 24.0 - 34.0 PG    MCHC 32.0 31.0 - 37.0 g/dL    RDW 16.5 (H) 11.6 - 14.5 %    PLATELET 219 271 - 874 K/uL    MPV 10.7 9.2 - 11.8 FL    NEUTROPHILS 97 (H) 40 - 73 %    LYMPHOCYTES 2 (L) 21 - 52 %    MONOCYTES 0 (L) 3 - 10 %    EOSINOPHILS 1 0 - 5 %    BASOPHILS 0 0 - 2 %    ABS. NEUTROPHILS 12.2 (H) 1.8 - 8.0 K/UL    ABS. LYMPHOCYTES 0.2 (L) 0.9 - 3.6 K/UL    ABS. MONOCYTES 0.1 0.05 - 1.2 K/UL    ABS. EOSINOPHILS 0.1 0.0 - 0.4 K/UL    ABS. BASOPHILS 0.0 0.0 - 0.1 K/UL    DF AUTOMATED     PTT    Collection Time: 09/19/19 11:25 AM   Result Value Ref Range    aPTT 27.5 23.0 - 36.4 SEC   PTT    Collection Time: 09/19/19  9:40 PM   Result Value Ref Range    aPTT 62.6 (H) 23.0 - 36.4 SEC   PTT    Collection Time: 09/20/19  4:54 AM   Result Value Ref Range    aPTT 151.9 (HH) 23.0 - 36.4 SEC   CBC WITH AUTOMATED DIFF    Collection Time: 09/20/19  4:54 AM   Result Value Ref Range    WBC 27.5 (H) 4.6 - 13.2 K/uL    RBC 2.69 (L) 4.20 - 5.30 M/uL    HGB 8.5 (L) 12.0 - 16.0 g/dL    HCT 26.0 (L) 35.0 - 45.0 %    MCV 96.7 74.0 - 97.0 FL    MCH 31.6 24.0 - 34.0 PG    MCHC 32.7 31.0 - 37.0 g/dL    RDW 16.8 (H) 11.6 - 14.5 %    PLATELET 470 (L) 685 - 420 K/uL    MPV 10.6 9.2 - 11.8 FL    NEUTROPHILS 72 42 - 75 %    BAND NEUTROPHILS 24 (H) 0 - 5 %    LYMPHOCYTES 3 (L) 20 - 51 %    MONOCYTES 1 (L) 2 - 9 %    EOSINOPHILS 0 0 - 5 %    BASOPHILS 0 0 - 3 %    ABS. NEUTROPHILS 26.4 (H) 1.8 - 8.0 K/UL    ABS. LYMPHOCYTES 0.8 0.8 - 3.5 K/UL    ABS. MONOCYTES 0.3 0 - 1.0 K/UL    ABS. EOSINOPHILS 0.0 0.0 - 0.4 K/UL    ABS.  BASOPHILS 0.0 0.0 - 0.06 K/UL    DF MANUAL      PLATELET COMMENTS DECREASED PLATELETS      RBC COMMENTS ANISOCYTOSIS  1+       GLUCOSE, POC    Collection Time: 09/20/19  9:39 AM   Result Value Ref Range    Glucose (POC) 146 (H) 70 - 110 mg/dL   CBC WITH AUTOMATED DIFF    Collection Time: 09/20/19  9:48 AM   Result Value Ref Range    WBC 18.5 (H) 4.6 - 13.2 K/uL    RBC 2.95 (L) 4.20 - 5.30 M/uL    HGB 9.2 (L) 12.0 - 16.0 g/dL    HCT 28.5 (L) 35.0 - 45.0 %    MCV 96.6 74.0 - 97.0 FL    MCH 31.2 24.0 - 34.0 PG    MCHC 32.3 31.0 - 37.0 g/dL    RDW 16.7 (H) 11.6 - 14.5 %    PLATELET 107 (L) 102 - 420 K/uL    MPV 10.7 9.2 - 11.8 FL    NEUTROPHILS 86 (H) 42 - 75 %    BAND NEUTROPHILS 10 (H) 0 - 5 %    LYMPHOCYTES 2 (L) 20 - 51 %    MONOCYTES 1 (L) 2 - 9 %    EOSINOPHILS 1 0 - 5 %    BASOPHILS 0 0 - 3 %    ABS. NEUTROPHILS 17.7 (H) 1.8 - 8.0 K/UL    ABS. LYMPHOCYTES 0.4 (L) 0.8 - 3.5 K/UL    ABS. MONOCYTES 0.2 0 - 1.0 K/UL    ABS. EOSINOPHILS 0.2 0.0 - 0.4 K/UL    ABS. BASOPHILS 0.0 0.0 - 0.06 K/UL    DF MANUAL      PLATELET COMMENTS DECREASED PLATELETS      RBC COMMENTS ANISOCYTOSIS  1+        RBC COMMENTS HYPOCHROMIA  2+        WBC COMMENTS HYPERSEGMENTED POLYS     METABOLIC PANEL, COMPREHENSIVE    Collection Time: 09/20/19  9:48 AM   Result Value Ref Range    Sodium 138 136 - 145 mmol/L    Potassium 3.1 (L) 3.5 - 5.5 mmol/L    Chloride 102 100 - 111 mmol/L    CO2 24 21 - 32 mmol/L    Anion gap 12 3.0 - 18 mmol/L    Glucose 136 (H) 74 - 99 mg/dL    BUN 35 (H) 7.0 - 18 MG/DL    Creatinine 3.02 (H) 0.6 - 1.3 MG/DL    BUN/Creatinine ratio 12 12 - 20      GFR est AA 19 (L) >60 ml/min/1.73m2    GFR est non-AA 15 (L) >60 ml/min/1.73m2    Calcium 7.2 (L) 8.5 - 10.1 MG/DL    Bilirubin, total 0.5 0.2 - 1.0 MG/DL    ALT (SGPT) 23 13 - 56 U/L    AST (SGOT) 20 10 - 38 U/L    Alk.  phosphatase 106 45 - 117 U/L    Protein, total 4.7 (L) 6.4 - 8.2 g/dL    Albumin 2.3 (L) 3.4 - 5.0 g/dL    Globulin 2.4 2.0 - 4.0 g/dL    A-G Ratio 1.0 0.8 - 1.7     CARDIAC PANEL,(CK, CKMB & TROPONIN)    Collection Time: 09/20/19  9:48 AM   Result Value Ref Range    CK 99 26 - 192 U/L    CK - MB <1.0 <3.6 ng/ml    CK-MB Index  0.0 - 4.0 %     CALCULATION NOT PERFORMED WHEN RESULT IS BELOW LINEAR LIMIT    Troponin-I, QT <0.02 0.0 - 0.045 NG/ML   PTT    Collection Time: 09/20/19 10:30 AM   Result Value Ref Range    aPTT 36.4 23.0 - 36.4 SEC         Intake/Output Summary (Last 24 hours) at 9/20/2019 1154  Last data filed at 9/20/2019 0505  Gross per 24 hour   Intake 840 ml   Output 500 ml   Net 340 ml       Cardiographics:       EKG Results     Procedure 720 Value Units Date/Time    EKG, 12 LEAD, INITIAL [[id number]] Collected:  09/18/19 1158    Order Status:  Completed Updated:  09/19/19 0852     Ventricular Rate 72 BPM      Atrial Rate 72 BPM      P-R Interval 150 ms      QRS Duration 136 ms      Q-T Interval 410 ms      QTC Calculation (Bezet) 448 ms      Calculated P Axis 32 degrees      Calculated R Axis -14 degrees      Calculated T Axis 51 degrees      Diagnosis --     Atrial-sensed ventricular-paced rhythm  Abnormal ECG  When compared with ECG of 27-AUG-2019 02:58,  premature ventricular complexes are no longer present  Vent. rate has decreased BY  23 BPM  Confirmed by Tiaralia Nissen ([id number]) on 9/19/2019 8:52:37 AM          08/27/19   ECHO ADULT FOLLOW-UP OR LIMITED 08/28/2019 8/28/2019    Narrative · Left Ventricle: Normal wall thickness. Mildly dilated left ventricle. Severe systolic dysfunction. Estimated left ventricular ejection fraction   is 26 - 30%. Biplane method used to measure ejection fraction. Left   ventricular global hypokinesis. · Pericardium: Trivial pericardial effusion. Signed by: Gretchen Montez MD         Signed By: Evangelina MCDONOUGH Phone [phone] supervised    September 20, 2019      I have independently evaluated and examined the patient. All relevant labs and testing data's are reviewed. Care plan discussed and updated after review.     Eleno Runner MD

## 2019-09-20 NOTE — PROGRESS NOTES
Problem: Falls - Risk of  Goal: *Absence of Falls  Description  Document Nevatucker Yudy Fall Risk and appropriate interventions in the flowsheet. Outcome: Progressing Towards Goal  Note:   Fall Risk Interventions:  Mobility Interventions: Patient to call before getting OOB         Medication Interventions: Patient to call before getting OOB                   Problem: Patient Education: Go to Patient Education Activity  Goal: Patient/Family Education  Outcome: Progressing Towards Goal     Problem: Pain  Goal: *Control of Pain  Outcome: Progressing Towards Goal  Goal: *PALLIATIVE CARE:  Alleviation of Pain  Outcome: Progressing Towards Goal     Problem: Patient Education: Go to Patient Education Activity  Goal: Patient/Family Education  Outcome: Progressing Towards Goal     Problem: Falls - Risk of  Goal: *Absence of Falls  Description  Document Nevadwainroyce ReddyYudy Fall Risk and appropriate interventions in the flowsheet.   Outcome: Progressing Towards Goal  Note:   Fall Risk Interventions:  Mobility Interventions: Patient to call before getting OOB         Medication Interventions: Patient to call before getting OOB                   Problem: Patient Education: Go to Patient Education Activity  Goal: Patient/Family Education  Outcome: Progressing Towards Goal     Problem: Nausea/Vomiting (Adult)  Goal: *Absence of nausea/vomiting  Outcome: Progressing Towards Goal  Goal: *Palliation of nausea/vomiting (Palliative Care)  Outcome: Progressing Towards Goal     Problem: Patient Education: Go to Patient Education Activity  Goal: Patient/Family Education  Outcome: Progressing Towards Goal

## 2019-09-20 NOTE — PROGRESS NOTES
Surgery  Feels poorly  Tm 104 VSS  Soft but tender abd   WBC 27k  Will move to perc drain today, will d/c with IR  Stop heprin  Cholecystitis in pt with multiple comorbidities, metastatic breast ca and CHF wit 25% EF  Perc drain Gallbladder

## 2019-09-20 NOTE — PROGRESS NOTES
Rapid Response Note  [facility]    Patient: Johnny Bauman 78 y.o. female  [id number]  1950    Admit Date: 9/18/2019   Admission Diagnosis: Cholecystitis [K81.9]    RAPID RESPONSE     Rapid response called for deteriorating vitals. MEWS score 6. Rapid called 930 due to hemodynamic instability. Patient awake and responsive, noted to be having chills through the day with dull abdominal pain across torso. Patient report given to team was thatpatient was septic with BP 80/40 manual with temp 100.3 s/p tylenol while 104 F prior. Patient was satting at 94% on 2L NC. Was not currently on pressors, had been yesterday with Digoxin due to CHF which had been held. X ray portable chest showed no consolidation or fluid with noted cardiomegaly and pacer. Patient tachycardic in 150s despite being on pacemaker. EKG ordered showing paced sinus tachycardia with possible delta wave in lead 1. ECHO ordered and pending, cardiac panel ordered. Patient started on bolus 250mL bolus with good PIV access; BP held mostly stable in upper 88A/THZ 96E systolic. Talked to Dr. Jeyson Hoover about patient, plan was agreed to transfer to ICU for pressor support. Rapid ended around 1010. Labs reviewed: wbc 27.5 (24 bands) up from 12.5 yesterday. Hb8.5. Medications Reviewed: Metronidazole and cipro. Digoxin for CHF. LR 75ml/hr. Morphine 2mg given at 6am. On SQH. PMH  Admitted for Cholecystitis in pt with multiple comorbidities, metastatic breast ca and CHF wit 25% EF  Perc drain Gallbladder    Orders  EKG  Echo  Blood cultures x2  Chest X Ray  Cardiac Panel  CMP  IV Fluid bolus 250mL NS  Lactate      Review of Systems   Constitutional: Positive for chills, diaphoresis, fever and malaise/fatigue. Respiratory: Negative for cough and shortness of breath. Cardiovascular: Negative for chest pain, palpitations and leg swelling. Gastrointestinal: Positive for abdominal pain. Negative for nausea and vomiting. OBJECTIVE   BP  71/37 (80/40 on manual) ->250NS bolus on re-check 68/41. Lungs clear following this. A second 250ml bolus given. Temp @8am 104 tylenol given -> @Time rapid was called 100.3 . Ice packs placed at axillas. Re-check 10:05 down to 99.3. HR-111  RR 97% 2L NC      Visit Vitals  BP (!) 71/39   Pulse (!) 116   Temp 100.3 °F (37.9 °C)   Resp 24   Ht 5' 5\" (1.651 m)   Wt 77.1 kg (170 lb)   SpO2 94%   Breastfeeding? No   BMI 28.29 kg/m²   Blood glucose    Physical Exam   Constitutional: She appears distressed. HENT:   Head: Normocephalic and atraumatic. Cardiovascular: Normal heart sounds and intact distal pulses. Exam reveals no gallop and no friction rub. No murmur heard. Tachycardic   Pulmonary/Chest: Effort normal and breath sounds normal. No stridor. No respiratory distress. She has no wheezes. She has no rales. Abdominal: Soft. Bowel sounds are normal. She exhibits no distension. Skin: She is diaphoretic. There is pallor. Medications administered:  250ml fluid bolus        ASSESSMENT, PLAN & DISPOSITION   Leonetta Castleman is a 78y.o. year old female admitted for Cholecystitis [K81.9]. Rapid response called for hemodynamically instability. Patient condition currently: guarded. Disposition: ICU    Attending Dr. Beata Tenorio notified of rapid response. In agreement with plan. Primary team resuming care.      Harsh Stubbs MD, PGY-1  [facility] Medicine    09/20/19 12:06 AM

## 2019-09-20 NOTE — PROGRESS NOTES
responded to Rapid Response for  Kyung Rocha, who is a 78 y.o.,female,  Prayed with family member (son). The  provided the following Interventions:  Provided crisis spiritual care and support. Offered prayers on behalf for the patient. Chart reviewed. The following outcomes were achieved:      Assessment:  There are no further spiritual or Voodoo issues which require intervention at this time. Plan:  Chaplains will continue to follow and will provide spiritual care as needed.  recommends bedside caregivers page  on duty if patient or family shows signs of acute spiritual or emotional distress.      [id number] [address]   [phone]

## 2019-09-20 NOTE — ROUTINE PROCESS
Telemetry called and said patient heart rate was 150 and patient went from NSR to Afib. Called night hospitalist and told hospitalist patient heart rate was 134 when I checked it. Was told to call the morning hospitalist in 15 mins. Retrieved a full vitals sign  In order to call hospitalist. Patient temp is 104, Heart rate 134. Mew score is 6. Notified Dr. Toma Guadalupe. Gave patient suppository tylenol suppository 650 mg. Dr. Ana M Rogers made aware. Orders to transfer patient to stepdown. Nursing supervisor made aware.

## 2019-09-20 NOTE — ROUTINE PROCESS
Bedside and Verbal shift change report given to Mercy Galvan (oncoming nurse) by Pan Moura RN (offgoing nurse).  Report included the following information SBAR, Kardex and [facility]

## 2019-09-20 NOTE — ROUTINE PROCESS
Vitals retaken. Patient temp is 100.3 temp, heart rate 116, resp 24, blood pressure is 71/39 and mew score is 6. Rapid response called on patient. Manger aware. Dr. Gianna Childress.

## 2019-09-20 NOTE — ROUTINE PROCESS
Bedside and Verbal shift change report given to [address] [place] [address] (oncoming nurse) by Angela Berger RN (offgoing nurse). Report included the following information SBAR, Kardex, Procedure Summary, Intake/Output, MAR and Recent Results.

## 2019-09-20 NOTE — CONSULTS
Consult Note    Patient: Chely Reddy               Sex: female          DOA: 9/18/2019         YOB: 1950      Age:  78 y.o.        LOS:  LOS: 2 days              HPI:     Chely Reddy is a 78 y.o. female who has been seen in evaluation of cholecystitis at the request of Dr. Haley Cohen. Pt presented with RUQ abdominal pain, fever, and nausea/vomiting. Workup consistent with acute cholecystitis, pt admitted for further evaluation and treatment. Currently, pt is sleepy. Denies headache, dizziness, chest pain/palpitations, SOB, worsening abdominal pain. Past Medical History:   Diagnosis Date    Abnormal nuclear cardiac imaging test 06/11/2010    Lg fixed anterior, septal, apical, inferoseptal defect sugg RCA & LAD disease or cardiomyopathy. EF 20%. Global hykn. Nondiagnostic EKG.  Acute bronchitis 10/15/2018    Persistent cough and wheezing in spite of prednisone and antibiotic. Will refer to pulmonary    Adenocarcinoma of breast; locally advanced invasive ductal adenocarcinoma of left breast     Asthma     Automatic implantable cardiac defibrillator in situ     Automatic implantable cardiac defibrillator in situ     [facility]     Cancer ([facility]     breast cancer left    Chemotherapy convalescence or palliative care 2012    Chronic combined systolic and diastolic heart failure (HCC)     Remains symptomatic, nyha class 3 ef improving.  Congestive heart failure, unspecified     chronic class ll    Echocardiogram 09/27/2010    EF 30%. Global hykn. Mild MR. Mild TR.     GERD (gastroesophageal reflux disease)     Hyperlipidemia     Hypertension     Mitral valve disorders(424.0) 1/15/2014    mild to moderate mr     Mitral valve disorders(424.0) 1/15/2014    mild to moderate mr     MVP (mitral valve prolapse)     Nonischemic cardiomyopathy (HCC)     EF 20-30% despite medical therapy    Nonspecific abnormal electrocardiogram (ECG) (EKG)     Osteopenia     Other primary cardiomyopathies     Pacemaker 10/27/10    s/p implantation, without complication    Poisoning by other and unspecified agents primarily affecting the cardiovascular system(972.9)     Ef slightly better to 45%, STABLE back on chemo.  Radiation therapy     Radiation therapy complication 4777    S/P cardiac catheterization 07/08/10    Patent coronary arteries. LVEDP 25.  EF 25%. Global hykn. Moderate MR.  RA 10.  RV 40/10. PA 40/20.  20.  CO/CI 4.5/2.45 (Larry).  Shortness of breath     Syncope and collapse     Thyroid disease     goiter       Past Surgical History:   Procedure Laterality Date    CARDIAC SURG PROCEDURE UNLIST      Difibrillator; BI V ICD    HX MASTECTOMY  09/28/11    modified radical mastectomy and axillary dissection    HX ORTHOPAEDIC      HX OTHER SURGICAL      surgery to remove part of liver    HX PACEMAKER  10/27/10    s/p [employer] biventricular AICD, without complication    HX RADICAL MASTECTOMY      IR INSERT TUNL CVC W PORT OVER 5 YEARS  1/16/2019    NEUROLOGICAL PROCEDURE UNLISTED      Cervical fusion       Family History   Problem Relation Age of Onset    Hypertension Mother     High Cholesterol Mother     Heart Disease Father         paemaker implant at 80       Social History     Socioeconomic History    Marital status:      Spouse name: Not on file    Number of children: Not on file    Years of education: Not on file    Highest education level: Not on file   Tobacco Use    Smoking status: Never Smoker    Smokeless tobacco: Never Used   Substance and Sexual Activity    Alcohol use: No    Drug use: No       Prior to Admission medications    Medication Sig Start Date End Date Taking? Authorizing Provider   digoxin (LANOXIN) 0.125 mg tablet Take 1 Tab by mouth daily. 9/9/19  Yes Sanchez Holliday MD   spironolactone (ALDACTONE) 25 mg tablet Take 1 Tab by mouth daily.  9/9/19  Yes Sanchez Holliday MD   carvedilol (COREG) 25 mg tablet Take 1 Tab by mouth two (2) times daily (with meals). 8/28/19  Yes Pranav Vega MD   furosemide (LASIX) 20 mg tablet Take 1 Tab by mouth daily. 8/28/19  Yes Pranav Vega MD   OTHER BMP on Friday 8/30/19  Dx: CHF  Fax results to Dr. Maik Post 8/28/19  Yes Pranav Vega MD   apixaban (ELIQUIS) 5 mg tablet Take 1 Tab by mouth two (2) times a day. 8/28/19  Yes Pranav Vega MD   tiotropium (SPIRIVA WITH HANDIHALER) 18 mcg inhalation capsule Take 1 Cap by inhalation daily. Yes Provider, Historical   sacubitril-valsartan (ENTRESTO) 49 mg/51 mg tablet Take 1 Tab by mouth two (2) times a day. 8/12/19  Yes Pop Estes NP   mometasone (NASONEX) 50 mcg/actuation nasal spray 2 Sprays by Both Nostrils route daily as needed. Indications: inflammation of the nose due to an allergy   Yes Provider, Historical   budesonide-formoterol (SYMBICORT) 160-4.5 mcg/actuation HFAA Take 2 Puffs by inhalation two (2) times a day. 5/6/19  Yes Marzena Burdick MD   albuterol-ipratropium (DUO-NEB) 2.5 mg-0.5 mg/3 ml nebu 3 mL by Nebulization route every six (6) hours as needed (wheezing or sob). File under Medicare Part B, ICD codes J44.9, C78.01 5/2/19  Yes Magalezehra HINSON NP   DULoxetine (CYMBALTA) 30 mg capsule Take 30 mg by mouth daily. Yes Provider, Historical   multivitamin (ONE A DAY) tablet Take 1 Tab by mouth daily. Yes Provider, Historical   plant stanol eliezer (CHOLEST OFF PO) Take 1 Tab by mouth daily. Yes Provider, Historical   benzonatate (TESSALON PERLES) 100 mg capsule Take 100 mg by mouth three (3) times daily as needed for Cough. Yes Provider, Historical   Biotin 2,500 mcg cap Take 1 Cap by mouth daily. Yes Provider, Historical   melatonin 10 mg tab Take 1 Tab by mouth nightly. Yes Provider, Historical   montelukast (SINGULAIR) 10 mg tablet Take 1 Tab by mouth daily. 7/6/18  Yes Ade Smith MD   raloxifene (EVISTA) 60 mg tablet Take 60 mg by mouth daily. Yes Provider, Historical   montelukast (SINGULAIR) 10 mg tablet TAKE 1 TABLET DAILY 9/19/19   Migel Rodriguez MD   metOLazone (ZAROXOLYN) 5 mg tablet Take 1 Tab by mouth daily. 9/3/19   Annamarie Keith MD   naloxone [facility]) 0.4 mg/mL injection 1 mL by IntraVENous route as needed for Other (Give if breaths per minute  less than 10, may repeat dose in 15 min and contact MD). 5/6/19   Lawanna Cabot, MD   b complex vitamins (B COMPLEX 1) tablet Take 1 Tab by mouth daily. Provider, Historical   cholecalciferol, vitamin D3, 2,000 unit tab Take 1 Tab by mouth daily. Provider, Historical       Allergies   Allergen Reactions    Adhesive Other (comments)     blisters       Review of Systems  Pertinent items are noted in the History of Present Illness. Physical Exam:      Visit Vitals  /49 (BP 1 Location: Right arm)   Pulse (!) 134   Temp (!) 104 °F (40 °C)   Resp 16   Ht 5' 5\" (1.651 m)   Wt 77.1 kg (170 lb)   SpO2 95%   Breastfeeding?  No   BMI 28.29 kg/m²       Physical Exam:  Constitutional: NAD, alert and oriented  Respiratory: CTAB, normal effort  Cardiovascular: tachycardic   Gastrointestinal: ND, +RUQ TTP  Extremities: well perfused, no edema    Labs Reviewed:  CMP:   Lab Results   Component Value Date/Time     09/19/2019 11:25 AM    K 3.0 (L) 09/19/2019 11:25 AM     09/19/2019 11:25 AM    CO2 21 09/19/2019 11:25 AM    AGAP 15 09/19/2019 11:25 AM     (H) 09/19/2019 11:25 AM    BUN 20 (H) 09/19/2019 11:25 AM    CREA 1.87 (H) 09/19/2019 11:25 AM    GFRAA 32 (L) 09/19/2019 11:25 AM    GFRNA 27 (L) 09/19/2019 11:25 AM    CA 8.5 09/19/2019 11:25 AM    ALB 2.7 (L) 09/19/2019 11:25 AM    TP 6.0 (L) 09/19/2019 11:25 AM    GLOB 3.3 09/19/2019 11:25 AM    AGRAT 0.8 09/19/2019 11:25 AM    SGOT 17 09/19/2019 11:25 AM    ALT <6 (L) 09/19/2019 11:25 AM     CBC:   Lab Results   Component Value Date/Time    WBC 27.5 (H) 09/20/2019 04:54 AM    HGB 8.5 (L) 09/20/2019 04:54 AM    HCT 26.0 (L) 09/20/2019 04:54 AM     (L) 09/20/2019 04:54 AM     COAGS:   Lab Results   Component Value Date/Time    APTT 151.9 (HH) 09/20/2019 04:54 AM       Assessment   Active Problems:    Cholecystitis (9/18/2019)        Charis Oden is a 78 y.o. female with a history of metastatic breast cancer, CHF, CMO, HTN, DVT/PE presenting with new onset RUQ abdominal pain and associated fever with nausea/vomiting. Imaging (US 9/18, CT 9/19, NM 9/19) suggestive of acute cholecystitis. Plan   Case and images reviewed by Dr. Sunil Salazar. Discussions held between Dr. Sunil Salazar and Dr. Emelina Hills regarding management options and they include percutaneous cholecystostomy tube placement, cholecystectomy by General Surgery, and continued IV antibiotics without intervention. Given that pt is poor surgical candidate and remains febrile with rising white count, will plan for image-guided cholecystostomy tube placement with moderate sedation as IR schedule allows. Patient to remain NPO with blood thinning medications held. Consent to be obtained prior to procedure.      Lanette Maldonado PA-C  Interventional Radiology

## 2019-09-20 NOTE — ROUTINE PROCESS
2100 patient sitting in the recliner, back in bed. Patient denies pain, no N/V. Heparin drip infusing well. 
2300 BP=73/42, patient denies chest pain, slightly SOB,oxygen 2 liter applied. BP rechecked manually KW=0179, also rechecked in the right leg BP=98/56, will call MD.  
2334 Dr Manuel Hankins notified about Hypotension, no new order only to monitor patient and rechecked BP in 1 hour. 0030 BP=85/45, patient resting quietly ,denies chest pain, denies dizziness, no respiratory distress noted.

## 2019-09-21 LAB
ANION GAP SERPL CALC-SCNC: 8 MMOL/L (ref 3–18)
ANION GAP SERPL CALC-SCNC: 8 MMOL/L (ref 3–18)
APTT PPP: 86.9 SEC (ref 23–36.4)
BASOPHILS # BLD: 0 K/UL (ref 0–0.1)
BASOPHILS NFR BLD: 0 % (ref 0–2)
BUN SERPL-MCNC: 20 MG/DL (ref 7–18)
BUN SERPL-MCNC: 23 MG/DL (ref 7–18)
BUN/CREAT SERPL: 18 (ref 12–20)
BUN/CREAT SERPL: 18 (ref 12–20)
CALCIUM SERPL-MCNC: 7.6 MG/DL (ref 8.5–10.1)
CALCIUM SERPL-MCNC: 7.6 MG/DL (ref 8.5–10.1)
CHLORIDE SERPL-SCNC: 105 MMOL/L (ref 100–111)
CHLORIDE SERPL-SCNC: 106 MMOL/L (ref 100–111)
CO2 SERPL-SCNC: 27 MMOL/L (ref 21–32)
CO2 SERPL-SCNC: 28 MMOL/L (ref 21–32)
CREAT SERPL-MCNC: 1.12 MG/DL (ref 0.6–1.3)
CREAT SERPL-MCNC: 1.25 MG/DL (ref 0.6–1.3)
DIFFERENTIAL METHOD BLD: ABNORMAL
EOSINOPHIL # BLD: 0.5 K/UL (ref 0–0.4)
EOSINOPHIL NFR BLD: 4 % (ref 0–5)
ERYTHROCYTE [DISTWIDTH] IN BLOOD BY AUTOMATED COUNT: 16.7 % (ref 11.6–14.5)
GLUCOSE BLD STRIP.AUTO-MCNC: 122 MG/DL (ref 70–110)
GLUCOSE BLD STRIP.AUTO-MCNC: 128 MG/DL (ref 70–110)
GLUCOSE BLD STRIP.AUTO-MCNC: 133 MG/DL (ref 70–110)
GLUCOSE SERPL-MCNC: 123 MG/DL (ref 74–99)
GLUCOSE SERPL-MCNC: 155 MG/DL (ref 74–99)
HCT VFR BLD AUTO: 27.6 % (ref 35–45)
HGB BLD-MCNC: 8.9 G/DL (ref 12–16)
LYMPHOCYTES # BLD: 0.7 K/UL (ref 0.9–3.6)
LYMPHOCYTES NFR BLD: 5 % (ref 21–52)
MAGNESIUM SERPL-MCNC: 1.4 MG/DL (ref 1.6–2.6)
MAGNESIUM SERPL-MCNC: 2.2 MG/DL (ref 1.6–2.6)
MCH RBC QN AUTO: 31.1 PG (ref 24–34)
MCHC RBC AUTO-ENTMCNC: 32.2 G/DL (ref 31–37)
MCV RBC AUTO: 96.5 FL (ref 74–97)
MONOCYTES # BLD: 0.6 K/UL (ref 0.05–1.2)
MONOCYTES NFR BLD: 5 % (ref 3–10)
NEUTS SEG # BLD: 10.7 K/UL (ref 1.8–8)
NEUTS SEG NFR BLD: 86 % (ref 40–73)
PHOSPHATE SERPL-MCNC: 1.8 MG/DL (ref 2.5–4.9)
PHOSPHATE SERPL-MCNC: 2.3 MG/DL (ref 2.5–4.9)
PLATELET # BLD AUTO: 120 K/UL (ref 135–420)
PMV BLD AUTO: 10.6 FL (ref 9.2–11.8)
POTASSIUM SERPL-SCNC: 3.1 MMOL/L (ref 3.5–5.5)
POTASSIUM SERPL-SCNC: 3.6 MMOL/L (ref 3.5–5.5)
RBC # BLD AUTO: 2.86 M/UL (ref 4.2–5.3)
SODIUM SERPL-SCNC: 141 MMOL/L (ref 136–145)
SODIUM SERPL-SCNC: 141 MMOL/L (ref 136–145)
VANCOMYCIN SERPL-MCNC: 5.4 UG/ML (ref 5–40)
WBC # BLD AUTO: 12.4 K/UL (ref 4.6–13.2)

## 2019-09-21 PROCEDURE — 84100 ASSAY OF PHOSPHORUS: CPT

## 2019-09-21 PROCEDURE — 80048 BASIC METABOLIC PNL TOTAL CA: CPT

## 2019-09-21 PROCEDURE — 74011250637 HC RX REV CODE- 250/637: Performed by: PHYSICIAN ASSISTANT

## 2019-09-21 PROCEDURE — 80202 ASSAY OF VANCOMYCIN: CPT

## 2019-09-21 PROCEDURE — 74011250636 HC RX REV CODE- 250/636: Performed by: FAMILY MEDICINE

## 2019-09-21 PROCEDURE — 74011000258 HC RX REV CODE- 258: Performed by: PHYSICIAN ASSISTANT

## 2019-09-21 PROCEDURE — 74011250636 HC RX REV CODE- 250/636: Performed by: HOSPITALIST

## 2019-09-21 PROCEDURE — 74011000250 HC RX REV CODE- 250: Performed by: NURSE PRACTITIONER

## 2019-09-21 PROCEDURE — 74011250637 HC RX REV CODE- 250/637: Performed by: NURSE PRACTITIONER

## 2019-09-21 PROCEDURE — 65270000029 HC RM PRIVATE

## 2019-09-21 PROCEDURE — 94762 N-INVAS EAR/PLS OXIMTRY CONT: CPT

## 2019-09-21 PROCEDURE — 74011250636 HC RX REV CODE- 250/636: Performed by: PHYSICIAN ASSISTANT

## 2019-09-21 PROCEDURE — 74011250636 HC RX REV CODE- 250/636: Performed by: NURSE PRACTITIONER

## 2019-09-21 PROCEDURE — 93005 ELECTROCARDIOGRAM TRACING: CPT

## 2019-09-21 PROCEDURE — 85025 COMPLETE CBC W/AUTO DIFF WBC: CPT

## 2019-09-21 PROCEDURE — 83735 ASSAY OF MAGNESIUM: CPT

## 2019-09-21 PROCEDURE — 82962 GLUCOSE BLOOD TEST: CPT

## 2019-09-21 PROCEDURE — 77010033678 HC OXYGEN DAILY

## 2019-09-21 PROCEDURE — 36415 COLL VENOUS BLD VENIPUNCTURE: CPT

## 2019-09-21 PROCEDURE — 85730 THROMBOPLASTIN TIME PARTIAL: CPT

## 2019-09-21 PROCEDURE — 74011000250 HC RX REV CODE- 250: Performed by: PHYSICIAN ASSISTANT

## 2019-09-21 RX ORDER — POTASSIUM CHLORIDE 20 MEQ/1
20 TABLET, EXTENDED RELEASE ORAL
Status: COMPLETED | OUTPATIENT
Start: 2019-09-21 | End: 2019-09-21

## 2019-09-21 RX ORDER — VANCOMYCIN HYDROCHLORIDE
1250
Status: DISCONTINUED | OUTPATIENT
Start: 2019-09-21 | End: 2019-09-23

## 2019-09-21 RX ADMIN — MAGNESIUM SULFATE HEPTAHYDRATE 3 G: 500 INJECTION, SOLUTION INTRAMUSCULAR; INTRAVENOUS at 09:04

## 2019-09-21 RX ADMIN — POTASSIUM CHLORIDE 20 MEQ: 1500 TABLET, EXTENDED RELEASE ORAL at 06:07

## 2019-09-21 RX ADMIN — FENTANYL CITRATE 75 MCG: 50 INJECTION, SOLUTION INTRAMUSCULAR; INTRAVENOUS at 00:22

## 2019-09-21 RX ADMIN — PIPERACILLIN SODIUM AND TAZOBACTAM SODIUM 3.38 G: 3; .375 INJECTION, POWDER, LYOPHILIZED, FOR SOLUTION INTRAVENOUS at 18:04

## 2019-09-21 RX ADMIN — VANCOMYCIN HYDROCHLORIDE 1250 MG: 10 INJECTION, POWDER, LYOPHILIZED, FOR SOLUTION INTRAVENOUS at 21:54

## 2019-09-21 RX ADMIN — PIPERACILLIN SODIUM,TAZOBACTAM SODIUM 2.25 G: 2; .25 INJECTION, POWDER, FOR SOLUTION INTRAVENOUS at 00:11

## 2019-09-21 RX ADMIN — PIPERACILLIN SODIUM,TAZOBACTAM SODIUM 2.25 G: 2; .25 INJECTION, POWDER, FOR SOLUTION INTRAVENOUS at 06:07

## 2019-09-21 RX ADMIN — FENTANYL CITRATE 75 MCG: 50 INJECTION, SOLUTION INTRAMUSCULAR; INTRAVENOUS at 21:58

## 2019-09-21 RX ADMIN — ONDANSETRON 4 MG: 2 INJECTION INTRAMUSCULAR; INTRAVENOUS at 18:04

## 2019-09-21 RX ADMIN — FENTANYL CITRATE 75 MCG: 50 INJECTION, SOLUTION INTRAMUSCULAR; INTRAVENOUS at 14:24

## 2019-09-21 RX ADMIN — ONDANSETRON 4 MG: 2 INJECTION INTRAMUSCULAR; INTRAVENOUS at 14:34

## 2019-09-21 RX ADMIN — FENTANYL CITRATE 75 MCG: 50 INJECTION, SOLUTION INTRAMUSCULAR; INTRAVENOUS at 04:50

## 2019-09-21 RX ADMIN — SODIUM CHLORIDE, SODIUM LACTATE, POTASSIUM CHLORIDE, AND CALCIUM CHLORIDE 75 ML/HR: 600; 310; 30; 20 INJECTION, SOLUTION INTRAVENOUS at 13:24

## 2019-09-21 RX ADMIN — HEPARIN SODIUM 18 UNITS/KG/HR: 10000 INJECTION, SOLUTION INTRAVENOUS at 15:59

## 2019-09-21 RX ADMIN — SODIUM CHLORIDE: 900 INJECTION, SOLUTION INTRAVENOUS at 09:00

## 2019-09-21 RX ADMIN — SODIUM CHLORIDE: 900 INJECTION, SOLUTION INTRAVENOUS at 23:40

## 2019-09-21 RX ADMIN — FENTANYL CITRATE 75 MCG: 50 INJECTION, SOLUTION INTRAMUSCULAR; INTRAVENOUS at 09:01

## 2019-09-21 RX ADMIN — PIPERACILLIN SODIUM AND TAZOBACTAM SODIUM 3.38 G: 3; .375 INJECTION, POWDER, LYOPHILIZED, FOR SOLUTION INTRAVENOUS at 11:39

## 2019-09-21 RX ADMIN — POTASSIUM CHLORIDE 20 MEQ: 1500 TABLET, EXTENDED RELEASE ORAL at 21:38

## 2019-09-21 NOTE — PROGRESS NOTES
Mervin Shin M.D. FACS  PROGRESS NOTE    Name: Arie Drew MRN: [id number]   : 1950 [facility]   Date: 2019 Admission Date: 2019 11:51 AM     Hospital Day: 4     Subjective:  Pt without acute overnight events. Objective:  Vitals:    19 0400 19 0450 19 0500 19 0600   BP: 144/57  119/48 123/51   Pulse: 86  81 83   Resp:    Temp:  97.7 °F (36.5 °C)     SpO2: 98%  92% 95%   Weight:       Height:         Date 19 0700 - 19 0659 19 07 - 19 0659   Shift 6739-3196 8229-8835 24 Hour Total 7613-5743 7761-6371 24 Hour Total   INTAKE   I.V.(mL/kg/hr)  1101.6(1.2) 1101.6(0.6)        Heparin Volume  109.7 109.7        Phenylephrine Volume  0 0        Levophed Volume  216.8 216.8        Volume (lactated Ringers infusion)  675 675        Volume (piperacillin-tazobactam (ZOSYN) 2.25 g in 0.9% sodium chloride (MBP/ADV) 50 mL MBP)  100 100        Volume (vancomycin (VANCOCIN) 1500 mg in  ml infusion)  0 0      Shift Total(mL/kg)  1101.6(14.3) 1101.6(14.3)      OUTPUT   Urine(mL/kg/hr)  1450(1.6) 1450(0.8)        Urine Voided  1450 1450        Urine Occurrence(s)  0 x 0 x      Emesis/NG output  0 0        Emesis  0 0        Emesis Occurrence(s)  0 x 0 x      Drains 200 200 400        Output (ml) (Biliary Drain 19 Abdomen;Right) 200 200 400      Other  0 0        Other Output  0 0      Stool  0 0        Stool Occurrence(s)  1 x 1 x        Stool  0 0      Blood  0 0        Quantitative Blood Loss  0 0        Blood  0 0      Shift Total(mL/kg) 200(2.6) 1650(21.4) 1850(24)      NET -200 -548. 5 -748. 5      Weight (kg) 77.1 77.1 77.1 77.1 77.1 77.1         Physical Exam:    General: Awake and alert, oriented x4, no apparent distress   Abdomen: abdomen is soft with left upper quadrant pain and some discomfort at the cholecystostomy tube site in the right upper quadrant.   Bilious drainage in the cholecystostomy collection bag. No masses, organomegaly or guarding    Labs:  Recent Results (from the past 24 hour(s))   CULTURE, BODY FLUID W GRAM STAIN    Collection Time: 09/20/19 12:40 PM   Result Value Ref Range    Special Requests: NO SPECIAL REQUESTS      GRAM STAIN FEW WBC'S      GRAM STAIN RARE [address] IN CHAINS      GRAM STAIN RARE GRAM NEGATIVE RODS      GRAM STAIN RARE GRAM POSITIVE RODS      [address] STAIN       NOTIFIED RN [facility] AT [facility] BY KDA 9/20/19    Culture result: PENDING    GLUCOSE, POC    Collection Time: 09/20/19  5:50 PM   Result Value Ref Range    Glucose (POC) 165 (H) 70 - 110 mg/dL   GLUCOSE, POC    Collection Time: 09/21/19  2:11 AM   Result Value Ref Range    Glucose (POC) 128 (H) 70 - 110 mg/dL   PTT    Collection Time: 09/21/19  3:13 AM   Result Value Ref Range    aPTT 86.9 (H) 23.0 - 33.0 SEC   METABOLIC PANEL, BASIC    Collection Time: 09/21/19  3:13 AM   Result Value Ref Range    Sodium 141 136 - 145 mmol/L    Potassium 3.1 (L) 3.5 - 5.5 mmol/L    Chloride 105 100 - 111 mmol/L    CO2 28 21 - 32 mmol/L    Anion gap 8 3.0 - 18 mmol/L    Glucose 123 (H) 74 - 99 mg/dL    BUN 23 (H) 7.0 - 18 MG/DL    Creatinine 1.25 0.6 - 1.3 MG/DL    BUN/Creatinine ratio 18 12 - 20      GFR est AA 51 (L) >60 ml/min/1.73m2    GFR est non-AA 42 (L) >60 ml/min/1.73m2    Calcium 7.6 (L) 8.5 - 10.1 MG/DL   MAGNESIUM    Collection Time: 09/21/19  3:13 AM   Result Value Ref Range    Magnesium 1.4 (L) 1.6 - 2.6 mg/dL   PHOSPHORUS    Collection Time: 09/21/19  3:13 AM   Result Value Ref Range    Phosphorus 1.8 (L) 2.5 - 4.9 MG/DL     All Micro Results     Procedure Component Value Units Date/Time    CULTURE, BLOOD [[id number]] Collected:  09/20/19 0948    Order Status:  Completed Specimen:  Blood Updated:  09/21/19 0719     Special Requests: NO SPECIAL REQUESTS        Culture result: NO GROWTH AFTER 20 HOURS       CULTURE, BODY FLUID W Annamaria Mix [[id number]] Collected:  09/20/19 1240    Order Status:  Completed Specimen:  Abdominal Fluid Updated:  09/20/19 1647     Special Requests: NO SPECIAL REQUESTS        GRAM STAIN FEW WBC'S               RARE GRAM POSITIVE COCCI IN CHAINS            RARE GRAM NEGATIVE RODS         RARE GRAM POSITIVE RODS         NOTIFIED JACOB ANNE [address] CCU AT [facility]. BY KDA 9/20/19     Culture result: PENDING          Current Medications:  Current Facility-Administered Medications   Medication Dose Route Frequency Provider Last Rate Last Dose    potassium phosphate 15 mmol in 0.9% sodium chloride 250 mL infusion   IntraVENous ONCE Megan Acevedo PA-C        magnesium sulfate 3 g in 0.9% sodium chloride 100 mL IVPB  3 g IntraVENous ONCE Megan Acevedo PA-C 33.3 mL/hr at 09/21/19 0904 3 g at 09/21/19 0904    piperacillin-tazobactam (ZOSYN) 3.375 g in 0.9% sodium chloride (MBP/ADV) 100 mL MBP  3.375 g IntraVENous Q6H Spring, JanuaryTen ERNST PA-C        VANCOMYCIN INFORMATION NOTE   Other CONTINUOUS Spring, January-Julita ERNST PA-C        Vancomycin Lab Information  1 Each Other ONCE Spring JanuaryTen ERNST PA-C        famotidine (PF) (PEPCID) 20 mg in sodium chloride 0.9% 10 mL injection  20 mg IntraVENous DAILY Spring, JanuaryTen ERNST PA-C   20 mg at 09/20/19 2116    lactated Ringers infusion  75 mL/hr IntraVENous CONTINUOUS Lubertha Star A, DO 75 mL/hr at 09/20/19 2023 75 mL/hr at 09/20/19 2023    fentaNYL citrate (PF) injection 75 mcg  75 mcg IntraVENous Q4H PRN Lubertha Star A, DO   75 mcg at 09/21/19 0901    naloxone (NARCAN) injection 0.4 mg  0.4 mg IntraVENous EVERY 2 MINUTES AS NEEDED Lubertha Star A, DO        [facility]ron [facility]) injection 4 mg  4 mg IntraVENous Q4H PRN Lubertha Star A, DO   4 mg at 09/20/19 4653    diphenhydrAMINE (BENADRYL) injection 25 mg  25 mg IntraVENous Q4H PRN Lubertha Star A, DO        acetaminophen (TYLENOL) suppository 650 mg  650 mg Rectal Q4H PRN Roslyn Reynolds MD   650 mg at 09/20/19 0749    heparin 25,000 units in D5W 250 ml infusion  18-36 Units/kg/hr IntraVENous TITRATE Wanda Cummings MD 13.9 mL/hr at 09/21/19 0744 18 Units/kg/hr at 09/21/19 0744       Chart and notes reviewed. Data reviewed. I have evaluated and examined the patient. IMPRESSION:   · Pt with acute cholecystitis and heart failure day 1 from cholecystostomy tube placement.        PLAN:/DISCUSION:   · Cholecystostomy tube care as written  · Continue Vanc and Zosyn until cultures return        Billie Go MD

## 2019-09-21 NOTE — PROGRESS NOTES
Received pt in bed with eyes closed. Pt easily aroused. Pain level  5 but okay just want to sleep  Call bell within reach. Bed low and locked. Will continue to monitor    Bedside and Verbal shift change report given to [address] Naga Urrutia (oncoming nurse) by Celestino Moore RN   (offgoing nurse). Report included the following information SBAR, Kardex, Intake/Output and MAR.

## 2019-09-21 NOTE — PROGRESS NOTES
[address] [place] [address] [facility]  Pulmonary, Critical Care, and Sleep Medicine    Name: Eliceo Ramirez MRN: [id number]   : 1950 Hospital: [facility]   Date: 2019        Critical Care Progress Note    Subjective/History:   Patient is a 78 y.o. female with medical hx significant for metastatic breast ca on chemo, chronic systolic HF, non-ischemic cardiomyopathy, PE/DVT on eliquis, HTN, presented to the ED for RUQ abdominal pain with radiation to the LUQ; + associated mild nausea, no vomiting. Patient was admitted to the floor. Hepatobiliary scan done more suspicious for acute cholecystitis. RRT was called this morning for hypotension, tachycardia and decreased mentation. Patient received 250 mL NS IVF bolus and transferred to ICU. Pt still has abdominal pain however no nausea or vomiting.    19  Patient is awake alert and oriented. Follows commands appropriately, family is at bedside. Patient is complain of abdominal pain, generalized worsens upon palpation, more localized at drain site. Hemodynamics now stable - wean off pressors. May start diet as tolerated. Possible transfer out ICU today. Past Medical History:   Diagnosis Date    Abnormal nuclear cardiac imaging test 2010    Lg fixed anterior, septal, apical, inferoseptal defect sugg RCA & LAD disease or cardiomyopathy. EF 20%. Global hykn. Nondiagnostic EKG.  Acute bronchitis 10/15/2018    Persistent cough and wheezing in spite of prednisone and antibiotic.   Will refer to pulmonary    Adenocarcinoma of breast; locally advanced invasive ductal adenocarcinoma of left breast     Asthma     Automatic implantable cardiac defibrillator in situ     Automatic implantable cardiac defibrillator in situ     [facility]     Cancer ([facility]     breast cancer left    Chemotherapy convalescence or palliative care     Chronic combined systolic and diastolic heart failure (HCC)     Remains symptomatic, nyha class 3 ef improving.  Congestive heart failure, unspecified     chronic class ll    Echocardiogram 09/27/2010    EF 30%. Global hykn. Mild MR. Mild TR.  GERD (gastroesophageal reflux disease)     Hyperlipidemia     Hypertension     Mitral valve disorders(424.0) 1/15/2014    mild to moderate mr     Mitral valve disorders(424.0) 1/15/2014    mild to moderate mr     MVP (mitral valve prolapse)     Nonischemic cardiomyopathy (HCC)     EF 20-30% despite medical therapy    Nonspecific abnormal electrocardiogram (ECG) (EKG)     Osteopenia     Other primary cardiomyopathies     Pacemaker 10/27/10    s/p implantation, without complication    Poisoning by other and unspecified agents primarily affecting the cardiovascular system(972.9)     Ef slightly better to 45%, STABLE back on chemo.  Radiation therapy     Radiation therapy complication 2918    S/P cardiac catheterization 07/08/10    Patent coronary arteries. LVEDP 25.  EF 25%. Global hykn. Moderate MR.  RA 10.  RV 40/10. PA 40/20.  20.  CO/CI 4.5/2.45 (Larry).  Shortness of breath     Syncope and collapse     Thyroid disease     goiter        Past Surgical History:   Procedure Laterality Date    CARDIAC SURG PROCEDURE UNLIST      Difibrillator; BI V ICD    HX MASTECTOMY  09/28/11    modified radical mastectomy and axillary dissection    HX ORTHOPAEDIC      HX OTHER SURGICAL      surgery to remove part of liver    HX PACEMAKER  10/27/10    s/p [employer] biventricular AICD, without complication    HX RADICAL MASTECTOMY      IR CHOLECYSTOSTOMY PERCUTANEOUS  9/20/2019    IR INSERT TUNL CVC W PORT OVER 5 YEARS  1/16/2019    NEUROLOGICAL PROCEDURE UNLISTED      Cervical fusion        Prior to Admission medications    Medication Sig Start Date End Date Taking? Authorizing Provider   digoxin (LANOXIN) 0.125 mg tablet Take 1 Tab by mouth daily.  9/9/19  Yes Jareth Antonio MD   spironolactone (ALDACTONE) 25 mg tablet Take 1 Tab by mouth daily. 9/9/19  Yes Misa Hector MD   carvedilol (COREG) 25 mg tablet Take 1 Tab by mouth two (2) times daily (with meals). 8/28/19  Yes Hannah Mondragon MD   furosemide (LASIX) 20 mg tablet Take 1 Tab by mouth daily. 8/28/19  Yes Hannah Mondragon MD   OTHER BMP on Friday 8/30/19  Dx: CHF  Fax results to Dr. Carlin Cintron 8/28/19  Yes Hannah Mondragon MD   apixaban (ELIQUIS) 5 mg tablet Take 1 Tab by mouth two (2) times a day. 8/28/19  Yes Hannah Mondragon MD   tiotropium (SPIRIVA WITH HANDIHALER) 18 mcg inhalation capsule Take 1 Cap by inhalation daily. Yes Provider, Historical   sacubitril-valsartan (ENTRESTO) 49 mg/51 mg tablet Take 1 Tab by mouth two (2) times a day. 8/12/19  Yes Pop Estes NP   mometasone (NASONEX) 50 mcg/actuation nasal spray 2 Sprays by Both Nostrils route daily as needed. Indications: inflammation of the nose due to an allergy   Yes Provider, Historical   budesonide-formoterol (SYMBICORT) 160-4.5 mcg/actuation HFAA Take 2 Puffs by inhalation two (2) times a day. 5/6/19  Yes Loida Camacho MD   albuterol-ipratropium (DUO-NEB) 2.5 mg-0.5 mg/3 ml nebu 3 mL by Nebulization route every six (6) hours as needed (wheezing or sob). File under Medicare Part B, ICD codes J44.9, C78.01 5/2/19  Yes Cee Muthailey HINSON, NP   DULoxetine (CYMBALTA) 30 mg capsule Take 30 mg by mouth daily. Yes Provider, Historical   multivitamin (ONE A DAY) tablet Take 1 Tab by mouth daily. Yes Provider, Historical   plant stanol eliezer (CHOLEST OFF PO) Take 1 Tab by mouth daily. Yes Provider, Historical   benzonatate (TESSALON PERLES) 100 mg capsule Take 100 mg by mouth three (3) times daily as needed for Cough. Yes Provider, Historical   Biotin 2,500 mcg cap Take 1 Cap by mouth daily. Yes Provider, Historical   melatonin 10 mg tab Take 1 Tab by mouth nightly. Yes Provider, Historical   montelukast (SINGULAIR) 10 mg tablet Take 1 Tab by mouth daily.  7/6/18 Yes Dayanara Posada MD   raloxifene (EVISTA) 60 mg tablet Take 60 mg by mouth daily. Yes Provider, Historical   montelukast (SINGULAIR) 10 mg tablet TAKE 1 TABLET DAILY 9/19/19   Consuelo García MD   metOLazone (ZAROXOLYN) 5 mg tablet Take 1 Tab by mouth daily. 9/3/19   Eliu Healy MD   naloxone [facility]) 0.4 mg/mL injection 1 mL by IntraVENous route as needed for Other (Give if breaths per minute  less than 10, may repeat dose in 15 min and contact MD). 5/6/19   Rafa Rhodes MD   b complex vitamins (B COMPLEX 1) tablet Take 1 Tab by mouth daily. Provider, Historical   cholecalciferol, vitamin D3, 2,000 unit tab Take 1 Tab by mouth daily. Provider, Historical       Current Facility-Administered Medications   Medication Dose Route Frequency    potassium phosphate 15 mmol in 0.9% sodium chloride 250 mL infusion   IntraVENous ONCE    piperacillin-tazobactam (ZOSYN) 3.375 g in 0.9% sodium chloride (MBP/ADV) 100 mL MBP  3.375 g IntraVENous Q6H    VANCOMYCIN INFORMATION NOTE   Other CONTINUOUS    Vancomycin Lab Information  1 Each Other ONCE    famotidine (PF) (PEPCID) 20 mg in sodium chloride 0.9% 10 mL injection  20 mg IntraVENous DAILY    lactated Ringers infusion  75 mL/hr IntraVENous CONTINUOUS    heparin 25,000 units in D5W 250 ml infusion  18-36 Units/kg/hr IntraVENous TITRATE       Allergies   Allergen Reactions    Adhesive Other (comments)     blisters        Social History     Tobacco Use    Smoking status: Never Smoker    Smokeless tobacco: Never Used   Substance Use Topics    Alcohol use: No        Family History   Problem Relation Age of Onset    Hypertension Mother     High Cholesterol Mother     Heart Disease Father         paemaker implant at 80          Objective:   Vital Signs:    Visit Vitals  /49   Pulse 81   Temp 98.5 °F (36.9 °C)   Resp 24   Ht 5' 5\" (1.651 m)   Wt 77.1 kg (170 lb)   SpO2 99%   Breastfeeding?  No   BMI 28.29 kg/m²       O2 Device: Nasal cannula O2 Flow Rate (L/min): 2 l/min   Temp (24hrs), Av.3 °F (36.8 °C), Min:97.7 °F (36.5 °C), Max:98.7 °F (37.1 °C)       Intake/Output:   Last shift:       07 - 1900  In: 455.6 [I.V.:455.6]  Out: -   Last 3 shifts: 1901 -  0700  In: 2030.5 [P.O.:240; I.V.:1790.5]  Out: 2350 [Urine:1950; Drains:400]    Intake/Output Summary (Last 24 hours) at 2019 1233  Last data filed at 2019 1100  Gross per 24 hour   Intake 1646.05 ml   Output 1850 ml   Net -203.95 ml       Physical Exam:   General:  Alert, cooperative, in moderate discomfort, no acute respiratory distress. Head:  Normocephalic, without obvious abnormality, atraumatic. Eyes:  Pink palpebral conjunctivae, anicteric sclerae; EOMs intact   Nose: Nares normal. No drainage    Throat: Lips, mucosa, and tongue mildly dry   Neck: Supple, symmetrical, trachea midline, no adenopathy, no carotid bruit and no JVD. Lungs:   Symmetrical chest rise; good AE bilat; CTAB; no wheezes/rhonchi/rales noted. Unlabored. Heart:  RRR, S1, S2 present   Abdomen:   Soft, + direct tenderness to RUQ. Bowel sounds normal. Biliary drain to upper right abdomen, clear green drainage with small amount of sediment. Extremities: Extremities normal, atraumatic, no cyanosis or edema. Skin: Skin color, texture, turgor normal. No rashes or lesions. + Left chest mediport   Neurologic: Grossly non-focal, moves all extremities and follows commands.         Data:     Recent Results (from the past 24 hour(s))   CULTURE, BODY FLUID W GRAM STAIN    Collection Time: 19 12:40 PM   Result Value Ref Range    Special Requests: NO SPECIAL REQUESTS      GRAM STAIN FEW WBC'S      GRAM STAIN RARE GRAM POSITIVE COCCI IN CHAINS      GRAM STAIN RARE GRAM NEGATIVE RODS      GRAM STAIN RARE GRAM POSITIVE RODS       W [address] STAIN       NOTIFIED RN [facility] CCU AT 2423 BY KDA 19    Culture result: FEW GRAM NEGATIVE RODS (A)      Culture result: FEW POSSIBLE [address] NEGATIVE CELESTINO (A)      Culture result: FEW STREPTOCOCCI, ALPHA HEMOLYTIC (A)     GLUCOSE, POC    Collection Time: 09/20/19  5:50 PM   Result Value Ref Range    Glucose (POC) 165 (H) 70 - 110 mg/dL   GLUCOSE, POC    Collection Time: 09/21/19  2:11 AM   Result Value Ref Range    Glucose (POC) 128 (H) 70 - 110 mg/dL   PTT    Collection Time: 09/21/19  3:13 AM   Result Value Ref Range    aPTT 86.9 (H) 23.0 - 60.2 SEC   METABOLIC PANEL, BASIC    Collection Time: 09/21/19  3:13 AM   Result Value Ref Range    Sodium 141 136 - 145 mmol/L    Potassium 3.1 (L) 3.5 - 5.5 mmol/L    Chloride 105 100 - 111 mmol/L    CO2 28 21 - 32 mmol/L    Anion gap 8 3.0 - 18 mmol/L    Glucose 123 (H) 74 - 99 mg/dL    BUN 23 (H) 7.0 - 18 MG/DL    Creatinine 1.25 0.6 - 1.3 MG/DL    BUN/Creatinine ratio 18 12 - 20      GFR est AA 51 (L) >60 ml/min/1.73m2    GFR est non-AA 42 (L) >60 ml/min/1.73m2    Calcium 7.6 (L) 8.5 - 10.1 MG/DL   MAGNESIUM    Collection Time: 09/21/19  3:13 AM   Result Value Ref Range    Magnesium 1.4 (L) 1.6 - 2.6 mg/dL   PHOSPHORUS    Collection Time: 09/21/19  3:13 AM   Result Value Ref Range    Phosphorus 1.8 (L) 2.5 - 4.9 MG/DL   GLUCOSE, POC    Collection Time: 09/21/19 12:06 PM   Result Value Ref Range    Glucose (POC) 122 (H) 70 - 110 mg/dL           No results for input(s): FIO2I, IFO2, HCO3I, IHCO3, HCOPOC, PCO2I, PCOPOC, IPHI, PHI, PHPOC, PO2I, PO2POC in the last 72 hours. No lab exists for component: IPOC2    Telemetry:AFIB    Imaging:  I have personally reviewed the patients radiographs and have reviewed the reports:    CXR [date]:   CXR Results  (Last 48 hours)               09/20/19 0958  XR CHEST PORT Final result    Impression:  IMPRESSION:       1. Pacemaker and left IJ infusion port identified, unchanged in interval.  No   pneumothorax. 2.  Atelectatic changes bilaterally. Subtle developing infiltrate cannot be   excluded for the left mid lung zone and right upper lung zone.                Narrative: EXAM:  Chest Portable. INDICATION:  Chest pain        COMPARISON:  09/18/19        TECHNIQUE:  Portable AP chest study       FINDINGS:        - ICD hub in the right upper torso region with 3 intact leads through the left   subclavian approach. - Left IJ approach single-chamber infusion port in place.   - Both lungs are hypoinflated. - Bandlike densities are noted in the retrocardiac region and in the right upper   lung zone medial aspect. Most likely related to subsegmental atelectatic   changes vs. scarring. Subtle streaky densities in the left mid lung zone. Developing infiltrate cannot be excluded for the right upper lung zone and in   the left mid lung zone. - No costophrenic angle blunting or pneumothorax. - No significant cardiac silhouette enlargement. CT HEAD/CHEST/ABD/PELVIS [date]:   CT Results  (Last 48 hours)    None          IMPRESSION:   · Shock, likely distributive/ septic secondary to acute cholecystitis - resolved, off pressors. · Severe sepsis secondary to acute cholecystitis  · Acute kidney injury - resolved. · Electrolyte abnormalities - replacing.    · Chronic systolic HF - compensated  · Non-sustained Vtach, AICD fired 3 x on 8/19  · Non-ischemic cardiomyopathy, EF 26-30%, likely from herceptin  · Hx recent PE and DVT -on eliquis at home  · Metastatic breast ca, on chemotherapy      Patient Active Problem List   Diagnosis Code    Congestive heart failure (HCC) I50.9    Hypertension I10    MVP (mitral valve prolapse) I34.1    Nonischemic cardiomyopathy (HCC) I42.8    Cancer (HCC) C80.1    Adenocarcinoma of breast; locally advanced invasive ductal adenocarcinoma of left breast C50.919    Poisoning by other and unspecified agents primarily affecting the cardiovascular system(972.9) T46.904A    Syncope and collapse R55    Other primary cardiomyopathies I42.8    Shortness of breath R06.02    Nonspecific abnormal electrocardiogram (ECG) (EKG) R94.31    Automatic implantable cardioverter-defibrillator in situ Z95.810    Mitral valve disease I05.9    Abnormal PFT R94.2    Acute bronchitis J20.9    Chronic asthmatic bronchitis (HCC) J44.9    Malignant neoplasm metastatic to lung (HCC) C78.00    Seasonal allergic rhinitis due to pollen J30.1    Dilated cardiomyopathy (HCC) I42.0    Acute exacerbation of CHF (congestive heart failure) (HCC) I50.9    Breast cancer (HCC) C50.919    Pulmonary embolism (HCC) I26.99    Chronic bronchitis (HCC) J42    Hypoxia R09.02    Lung metastases (HCC) C78.00    UTI (urinary tract infection) N39.0    CAD (coronary artery disease) I25.10    Elevated troponin R74.8    Weakness generalized R53.1    Chronic anticoagulation Z79.01    History of pulmonary embolism Z86.711    Hypokalemia E87.6    Hypomagnesemia E83.42    Acute encephalopathy G93.40    Cholecystitis K81.9        RECOMMENDATIONS:   · Neuro: stable, no acute concerns  · Resp: stable, tolerating RA, PRN NC for goal sats greater than 92%  · I/D: Follow blood cultures. Continue vancomycin and zosyn. Monitor temp curve, WBC, and forsigns of worsening sepsis. For IR drainage of GB/ acute kd today  · Hem/Onc: Stable H/H, platelets. On heparin gtt for Afib per protocol. · CVS: Now normotensive, off pressors. Continue to monitor. Cardiology following  · Metabolic:Trend rest of electrolytes and replace PRN cautiously given recent JOSUE  · Renal: monitor I/O, some problem with retention yesterday however patient is currently voiding, if has further urinary retention issues may benifit from a renal consult. · Endocrine: Glycemic control  PRN with SSI  · GI: Biliary drain to gravity drain, placed by IR. Gen surgery is following. Possible surgical intervention with patient clinical improvement poss next week. May start diet today.    · Musc/Skin: stable, no acute concerns  · Mediport IV care  · Code Status: Full code     Best practice:  · Sepsis Bundle per PEARL NUNEZ - NELLY Protocol  · Stress ulcer prophylaxis. Pepcid. · Need for Lines, tafoya assessed.            Félix Stewart [facility]     Pulmonary, Critical Care Medicine  [facility]

## 2019-09-21 NOTE — ROUTINE PROCESS
0700:  Assumed care of pt at this time. Assessment as charted. 0800:   and son at bedside. Updated on patient status and plan for day. Questions answered. Pt ambulated to [facility] with one person assist.   
 
1900: Bedside and Verbal shift change report given to Getachew  (oncoming nurse) by Sanaz Trinidad RN 
 (offgoing nurse). Report included the following information ED Summary, Procedure Summary, Intake/Output, MAR and Recent Results.

## 2019-09-21 NOTE — PROGRESS NOTES
[facility], [address] [facility], [place] [address] [address] [facility]  Fax: [phone]      CARDIOLOGY PROGRESS NOTE  RECS:  1. Septic shock- requiring vasopressor support- patient SBP in the 120's. On antibiotics being followed by [facility] and medical team   2. Sinus tachycardia?-resolved-  will interrogate AICD- d/c digoxin iv   3. Chronic Systolic CHF-stage C NYHA class III- Neg 534 cc. Consider low dose  IV BB. Diurese IV, PRN. 4. Non Sustained Vtach- AICD fired 3 times on  8/19      5. Nonischemic cardiomyopathy (EF 26-30%)-likely from Herceptin use. will resume Coreg when bp stabilizes  6. Hx of recent Acute PE and DVT- patient was on Eliquis at home. Now on heparin drip. 7. Metastatic breast cancer now back on chemotherapy.  Lung metastasis likely cause for shortness of breath which is better.   8. Hypokalemia- s/p Potassium 20 meq X 1. (k 3.1)  9. Hypomagnesemia - s/p magnesium 3 gram IV. (mg 1.4). 10. JOSUE- likely in the setting-#1  11. Cholecystitis- s/p percutaneous drain 9/20/19- surgery reccommended conservative treatment- antibiotics, pain medications. Not a candidate for surgery         Total critical care time: 15 minutes.              08/27/19   ECHO ADULT FOLLOW-UP OR LIMITED 08/28/2019 8/28/2019     Narrative · Left Ventricle: Normal wall thickness. Mildly dilated left ventricle. Severe systolic dysfunction. Estimated left ventricular ejection fraction   is 26 - 30%. Biplane method used to measure ejection fraction. Left   ventricular global hypokinesis. · Pericardium: Trivial pericardial effusion.          Signed by: Tavo Scherer MD         ASSESSMENT:  Hospital Problems  Date Reviewed: 6/25/2019          Codes Class Noted POA    Cholecystitis ICD-10-CM: K81.9  ICD-9-CM: 575.10  9/18/2019 Unknown                SUBJECTIVE:  No c/o dyspnea.     OBJECTIVE:    VS:   Visit Vitals  /49   Pulse 81   Temp 98.5 °F (36.9 °C) Resp 24   Ht 5' 5\" (1.651 m)   Wt 77.1 kg (170 lb)   SpO2 99%   Breastfeeding? No   BMI 28.29 kg/m²         Intake/Output Summary (Last 24 hours) at 9/21/2019 1446  Last data filed at 9/21/2019 1300  Gross per 24 hour   Intake 1925.85 ml   Output 1850 ml   Net 75.85 ml     TELE: normal sinus rhythm    General: No acute distress  HENT: Normocephalic, atraumatic. Neck :  Supple  Cardiac:  Normal S1/S2  Chest/Lungs:Clear to auscultation  Abdomen:Tender to palpation. Extremities:  No edema      Labs: Results:       Chemistry Recent Labs     09/21/19  0313 09/20/19  0948 09/19/19  1125   * 136* 150*    138 142   K 3.1* 3.1* 3.0*    102 106   CO2 28 24 21   BUN 23* 35* 20*   CREA 1.25 3.02* 1.87*   CA 7.6* 7.2* 8.5   MG 1.4* 1.0*  --    PHOS 1.8* 2.6  --    AGAP 8 12 15   BUCR 18 12 11*   AP  --  106 146*   TP  --  4.7* 6.0*   ALB  --  2.3* 2.7*   GLOB  --  2.4 3.3   AGRAT  --  1.0 0.8      CBC w/Diff Recent Labs     09/21/19  1322 09/20/19  0948 09/20/19  0454   WBC 12.4 18.5* 27.5*   RBC 2.86* 2.95* 2.69*   HGB 8.9* 9.2* 8.5*   HCT 27.6* 28.5* 26.0*   * 117* 118*   GRANS 86* 86* 72   LYMPH 5* 2* 3*   EOS 4 1 0      Cardiac Enzymes Recent Labs     09/20/19  0948   CPK 99   CKND1 CALCULATION NOT PERFORMED WHEN RESULT IS BELOW LINEAR LIMIT      Coagulation Recent Labs     09/21/19  0313 09/20/19  1030   APTT 86.9* 36.4       Lipid Panel Lab Results   Component Value Date/Time    Cholesterol, total 266 (H) 04/17/2019 11:38 AM    HDL Cholesterol 60 04/17/2019 11:38 AM    LDL, calculated 189.2 (H) 04/17/2019 11:38 AM    VLDL, calculated 16.8 04/17/2019 11:38 AM    Triglyceride 84 04/17/2019 11:38 AM    CHOL/HDL Ratio 4.4 04/17/2019 11:38 AM      BNP No results for input(s): BNPP in the last 72 hours.    Liver Enzymes Recent Labs     09/20/19  0948   TP 4.7*   ALB 2.3*      SGOT 20      Digoxin    Thyroid Studies Lab Results   Component Value Date/Time    TSH 0.10 (L) 04/17/2019 11:38 AM Priti Flowers MD   Pager # [phone]

## 2019-09-22 LAB
ANION GAP SERPL CALC-SCNC: 7 MMOL/L (ref 3–18)
APTT PPP: 82.8 SEC (ref 23–36.4)
ATRIAL RATE: 115 BPM
ATRIAL RATE: 79 BPM
ATRIAL RATE: 87 BPM
BASOPHILS # BLD: 0 K/UL (ref 0–0.1)
BASOPHILS NFR BLD: 0 % (ref 0–2)
BUN SERPL-MCNC: 13 MG/DL (ref 7–18)
BUN/CREAT SERPL: 13 (ref 12–20)
CALCIUM SERPL-MCNC: 7.7 MG/DL (ref 8.5–10.1)
CALCULATED P AXIS, ECG09: 30 DEGREES
CALCULATED P AXIS, ECG09: 31 DEGREES
CALCULATED P AXIS, ECG09: 37 DEGREES
CALCULATED R AXIS, ECG10: -18 DEGREES
CALCULATED R AXIS, ECG10: -25 DEGREES
CALCULATED R AXIS, ECG10: -67 DEGREES
CALCULATED T AXIS, ECG11: 47 DEGREES
CALCULATED T AXIS, ECG11: 54 DEGREES
CALCULATED T AXIS, ECG11: 85 DEGREES
CHLORIDE SERPL-SCNC: 105 MMOL/L (ref 100–111)
CO2 SERPL-SCNC: 29 MMOL/L (ref 21–32)
CREAT SERPL-MCNC: 0.97 MG/DL (ref 0.6–1.3)
DIAGNOSIS, 93000: NORMAL
DIFFERENTIAL METHOD BLD: ABNORMAL
EOSINOPHIL # BLD: 0.4 K/UL (ref 0–0.4)
EOSINOPHIL NFR BLD: 4 % (ref 0–5)
ERYTHROCYTE [DISTWIDTH] IN BLOOD BY AUTOMATED COUNT: 16.5 % (ref 11.6–14.5)
GLUCOSE BLD STRIP.AUTO-MCNC: 129 MG/DL (ref 70–110)
GLUCOSE SERPL-MCNC: 114 MG/DL (ref 74–99)
HCT VFR BLD AUTO: 27.7 % (ref 35–45)
HGB BLD-MCNC: 9 G/DL (ref 12–16)
INR PPP: 1 (ref 0.8–1.2)
LYMPHOCYTES # BLD: 1 K/UL (ref 0.9–3.6)
LYMPHOCYTES NFR BLD: 11 % (ref 21–52)
MAGNESIUM SERPL-MCNC: 2.1 MG/DL (ref 1.6–2.6)
MCH RBC QN AUTO: 31.3 PG (ref 24–34)
MCHC RBC AUTO-ENTMCNC: 32.5 G/DL (ref 31–37)
MCV RBC AUTO: 96.2 FL (ref 74–97)
MONOCYTES # BLD: 0.8 K/UL (ref 0.05–1.2)
MONOCYTES NFR BLD: 8 % (ref 3–10)
NEUTS SEG # BLD: 6.9 K/UL (ref 1.8–8)
NEUTS SEG NFR BLD: 77 % (ref 40–73)
P-R INTERVAL, ECG05: 136 MS
P-R INTERVAL, ECG05: 148 MS
P-R INTERVAL, ECG05: 160 MS
PHOSPHATE SERPL-MCNC: 1.8 MG/DL (ref 2.5–4.9)
PLATELET # BLD AUTO: 134 K/UL (ref 135–420)
PMV BLD AUTO: 10.5 FL (ref 9.2–11.8)
POTASSIUM SERPL-SCNC: 3.1 MMOL/L (ref 3.5–5.5)
POTASSIUM SERPL-SCNC: 3.2 MMOL/L (ref 3.5–5.5)
PROTHROMBIN TIME: 13.1 SEC (ref 11.5–15.2)
Q-T INTERVAL, ECG07: 360 MS
Q-T INTERVAL, ECG07: 400 MS
Q-T INTERVAL, ECG07: 406 MS
QRS DURATION, ECG06: 124 MS
QRS DURATION, ECG06: 136 MS
QRS DURATION, ECG06: 140 MS
QTC CALCULATION (BEZET), ECG08: 465 MS
QTC CALCULATION (BEZET), ECG08: 481 MS
QTC CALCULATION (BEZET), ECG08: 498 MS
RBC # BLD AUTO: 2.88 M/UL (ref 4.2–5.3)
SODIUM SERPL-SCNC: 141 MMOL/L (ref 136–145)
VENTRICULAR RATE, ECG03: 115 BPM
VENTRICULAR RATE, ECG03: 79 BPM
VENTRICULAR RATE, ECG03: 87 BPM
WBC # BLD AUTO: 9.1 K/UL (ref 4.6–13.2)

## 2019-09-22 PROCEDURE — 94762 N-INVAS EAR/PLS OXIMTRY CONT: CPT

## 2019-09-22 PROCEDURE — 74011250636 HC RX REV CODE- 250/636: Performed by: FAMILY MEDICINE

## 2019-09-22 PROCEDURE — 74011250636 HC RX REV CODE- 250/636

## 2019-09-22 PROCEDURE — 85025 COMPLETE CBC W/AUTO DIFF WBC: CPT

## 2019-09-22 PROCEDURE — 84100 ASSAY OF PHOSPHORUS: CPT

## 2019-09-22 PROCEDURE — 93005 ELECTROCARDIOGRAM TRACING: CPT

## 2019-09-22 PROCEDURE — 74011000258 HC RX REV CODE- 258: Performed by: PHYSICIAN ASSISTANT

## 2019-09-22 PROCEDURE — 74011250636 HC RX REV CODE- 250/636: Performed by: INTERNAL MEDICINE

## 2019-09-22 PROCEDURE — 85730 THROMBOPLASTIN TIME PARTIAL: CPT

## 2019-09-22 PROCEDURE — 74011250636 HC RX REV CODE- 250/636: Performed by: HOSPITALIST

## 2019-09-22 PROCEDURE — 65270000029 HC RM PRIVATE

## 2019-09-22 PROCEDURE — 36592 COLLECT BLOOD FROM PICC: CPT

## 2019-09-22 PROCEDURE — 74011250636 HC RX REV CODE- 250/636: Performed by: PHYSICIAN ASSISTANT

## 2019-09-22 PROCEDURE — 85610 PROTHROMBIN TIME: CPT

## 2019-09-22 PROCEDURE — 83735 ASSAY OF MAGNESIUM: CPT

## 2019-09-22 PROCEDURE — 84132 ASSAY OF SERUM POTASSIUM: CPT

## 2019-09-22 PROCEDURE — 82962 GLUCOSE BLOOD TEST: CPT

## 2019-09-22 RX ORDER — MORPHINE SULFATE 2 MG/ML
1 INJECTION, SOLUTION INTRAMUSCULAR; INTRAVENOUS ONCE
Status: COMPLETED | OUTPATIENT
Start: 2019-09-22 | End: 2019-09-22

## 2019-09-22 RX ORDER — FUROSEMIDE 10 MG/ML
20 INJECTION INTRAMUSCULAR; INTRAVENOUS 2 TIMES DAILY
Status: COMPLETED | OUTPATIENT
Start: 2019-09-22 | End: 2019-09-23

## 2019-09-22 RX ORDER — MORPHINE SULFATE 2 MG/ML
INJECTION, SOLUTION INTRAMUSCULAR; INTRAVENOUS
Status: COMPLETED
Start: 2019-09-22 | End: 2019-09-22

## 2019-09-22 RX ADMIN — FAMOTIDINE 20 MG: 10 INJECTION, SOLUTION INTRAVENOUS at 09:15

## 2019-09-22 RX ADMIN — HEPARIN SODIUM 18 UNITS/KG/HR: 10000 INJECTION, SOLUTION INTRAVENOUS at 11:38

## 2019-09-22 RX ADMIN — ONDANSETRON 4 MG: 2 INJECTION INTRAMUSCULAR; INTRAVENOUS at 21:29

## 2019-09-22 RX ADMIN — FUROSEMIDE 20 MG: 10 INJECTION, SOLUTION INTRAMUSCULAR; INTRAVENOUS at 21:43

## 2019-09-22 RX ADMIN — MORPHINE SULFATE 1 MG: 2 INJECTION, SOLUTION INTRAMUSCULAR; INTRAVENOUS at 06:21

## 2019-09-22 RX ADMIN — ONDANSETRON 4 MG: 2 INJECTION INTRAMUSCULAR; INTRAVENOUS at 16:57

## 2019-09-22 RX ADMIN — SODIUM CHLORIDE, SODIUM LACTATE, POTASSIUM CHLORIDE, AND CALCIUM CHLORIDE 75 ML/HR: 600; 310; 30; 20 INJECTION, SOLUTION INTRAVENOUS at 02:23

## 2019-09-22 RX ADMIN — FUROSEMIDE 20 MG: 10 INJECTION, SOLUTION INTRAMUSCULAR; INTRAVENOUS at 14:33

## 2019-09-22 RX ADMIN — VANCOMYCIN HYDROCHLORIDE 1250 MG: 10 INJECTION, POWDER, LYOPHILIZED, FOR SOLUTION INTRAVENOUS at 14:43

## 2019-09-22 RX ADMIN — PIPERACILLIN SODIUM AND TAZOBACTAM SODIUM 3.38 G: 3; .375 INJECTION, POWDER, LYOPHILIZED, FOR SOLUTION INTRAVENOUS at 03:16

## 2019-09-22 RX ADMIN — PIPERACILLIN SODIUM AND TAZOBACTAM SODIUM 3.38 G: 3; .375 INJECTION, POWDER, LYOPHILIZED, FOR SOLUTION INTRAVENOUS at 09:15

## 2019-09-22 RX ADMIN — PIPERACILLIN SODIUM AND TAZOBACTAM SODIUM 3.38 G: 3; .375 INJECTION, POWDER, LYOPHILIZED, FOR SOLUTION INTRAVENOUS at 14:33

## 2019-09-22 RX ADMIN — PIPERACILLIN SODIUM AND TAZOBACTAM SODIUM 3.38 G: 3; .375 INJECTION, POWDER, LYOPHILIZED, FOR SOLUTION INTRAVENOUS at 21:44

## 2019-09-22 RX ADMIN — ONDANSETRON 4 MG: 2 INJECTION INTRAMUSCULAR; INTRAVENOUS at 09:15

## 2019-09-22 NOTE — PROGRESS NOTES
[facility] Hospitalist Group  Progress Note    Patient: Charo Ramírez Age: 78 y.o. : 1950 MR#: [id number] SSN: [id number]  Date: 2019     Subjective/24-hour events:     Stable and off pressor support. Has had some N/V today, however. Remains afebrile    Assessment:   Acute cholecystitis s/p percutaneous drainage/cholecystostomy tube placement   Sepsis with septic shock secondary to above  Acute kidney injury, improved  Hypomagnesemia and hyponatremia  Metastatic breast cancer   Chronic systolic CHF  Cardiomyopathy with AICD  HTN by hx, with current hypotension secondary to sepsis     Plan:  ID eval in AM for recommendations on treatment regimen going forward. Drain management per surgery. IV heparin to continue, transition back to NOAC once no need for further interventions. Cardiac regimen to be resumed when OK with cardiology. IV lasix ordered today for diuresis. Monitor lytes/replace as necessary. PT/OT, mobilize. Transfer to floor when bed available. Follow. Case discussed with:  [x]Patient  [x]Family  [x]Nursing  []Case Management  DVT Prophylaxis:  []Lovenox  []Hep SQ  []SCDs  []Coumadin   [x]On Heparin gtt    Objective:   VS:   Visit Vitals  /64   Pulse 93   Temp 97.8 °F (36.6 °C)   Resp 20   Ht 5' 5\" (1.651 m)   Wt 86.9 kg (191 lb 9.3 oz)   SpO2 98%   Breastfeeding? No   BMI 31.88 kg/m²      Tmax/24hrs: Temp (24hrs), Av.2 °F (36.8 °C), Min:97.8 °F (36.6 °C), Max:98.8 °F (37.1 °C)      Intake/Output Summary (Last 24 hours) at 2019 1526  Last data filed at 2019 0915  Gross per 24 hour   Intake 2656.3 ml   Output 1070 ml   Net 1586.3 ml       General:  In NAD. Appears much more vigorous today. Cardiovascular:  S1, S2. Rate WNL. Pulmonary:  Clear, no wheezes. Effort nonlabored. GI:  Abdomen soft, + mild RUQ TTP. No guarding/rebound. Extremities:  Warm, no ischemia or edema.   Neuro:  Awake and alert, motor grossly nonfocal.    Labs:    Recent Results (from the past 24 hour(s))   VANCOMYCIN, RANDOM    Collection Time: 09/21/19  6:11 PM   Result Value Ref Range    Vancomycin, random 5.4 5.0 - 40.0 UG/ML   GLUCOSE, POC    Collection Time: 09/21/19  9:36 PM   Result Value Ref Range    Glucose (POC) 133 (H) 70 - 110 mg/dL   PROTHROMBIN TIME + INR    Collection Time: 09/22/19  4:31 AM   Result Value Ref Range    Prothrombin time 13.1 11.5 - 15.2 sec    INR 1.0 0.8 - 1.2     METABOLIC PANEL, BASIC    Collection Time: 09/22/19  4:31 AM   Result Value Ref Range    Sodium 141 136 - 145 mmol/L    Potassium 3.2 (L) 3.5 - 5.5 mmol/L    Chloride 105 100 - 111 mmol/L    CO2 29 21 - 32 mmol/L    Anion gap 7 3.0 - 18 mmol/L    Glucose 114 (H) 74 - 99 mg/dL    BUN 13 7.0 - 18 MG/DL    Creatinine 0.97 0.6 - 1.3 MG/DL    BUN/Creatinine ratio 13 12 - 20      GFR est AA >60 >60 ml/min/1.73m2    GFR est non-AA 57 (L) >60 ml/min/1.73m2    Calcium 7.7 (L) 8.5 - 10.1 MG/DL   MAGNESIUM    Collection Time: 09/22/19  4:31 AM   Result Value Ref Range    Magnesium 2.1 1.6 - 2.6 mg/dL   PHOSPHORUS    Collection Time: 09/22/19  4:31 AM   Result Value Ref Range    Phosphorus 1.8 (L) 2.5 - 4.9 MG/DL   PTT    Collection Time: 09/22/19  4:31 AM   Result Value Ref Range    aPTT 82.8 (H) 23.0 - 36.4 SEC   CBC WITH AUTOMATED DIFF    Collection Time: 09/22/19  4:31 AM   Result Value Ref Range    WBC 9.1 4.6 - 13.2 K/uL    RBC 2.88 (L) 4.20 - 5.30 M/uL    HGB 9.0 (L) 12.0 - 16.0 g/dL    HCT 27.7 (L) 35.0 - 45.0 %    MCV 96.2 74.0 - 97.0 FL    MCH 31.3 24.0 - 34.0 PG    MCHC 32.5 31.0 - 37.0 g/dL    RDW 16.5 (H) 11.6 - 14.5 %    PLATELET 570 (L) 485 - 420 K/uL    MPV 10.5 9.2 - 11.8 FL    NEUTROPHILS 77 (H) 40 - 73 %    LYMPHOCYTES 11 (L) 21 - 52 %    MONOCYTES 8 3 - 10 %    EOSINOPHILS 4 0 - 5 %    BASOPHILS 0 0 - 2 %    ABS. NEUTROPHILS 6.9 1.8 - 8.0 K/UL    ABS. LYMPHOCYTES 1.0 0.9 - 3.6 K/UL    ABS. MONOCYTES 0.8 0.05 - 1.2 K/UL    ABS.  EOSINOPHILS 0.4 0.0 - 0.4 K/UL    ABS.  BASOPHILS 0.0 0.0 - 0.1 K/UL    DF AUTOMATED     EKG, 12 LEAD, SUBSEQUENT    Collection Time: 09/22/19  6:08 AM   Result Value Ref Range    Ventricular Rate 87 BPM    Atrial Rate 87 BPM    P-R Interval 148 ms    QRS Duration 140 ms    Q-T Interval 400 ms    QTC Calculation (Bezet) 481 ms    Calculated P Axis 30 degrees    Calculated R Axis -25 degrees    Calculated T Axis 54 degrees    Diagnosis       Electronic ventricular pacemaker    Confirmed by Jerry Martínez ([phone]) on 9/22/2019 12:40:18 PM         Signed By: Tiffany Biggs MD     September 22, 2019

## 2019-09-22 NOTE — PROGRESS NOTES
Problem: Falls - Risk of  Goal: *Absence of Falls  Description  Document [facility] Fall Risk and appropriate interventions in the flowsheet. Outcome: Progressing Towards Goal  Note:   Fall Risk Interventions:  Mobility Interventions: Assess mobility with egress test, Bed/chair exit alarm, Communicate number of staff needed for ambulation/transfer, OT consult for ADLs, Patient to call before getting OOB, PT Consult for mobility concerns, PT Consult for assist device competence, Strengthening exercises (ROM-active/passive), Utilize walker, cane, or other assistive device         Medication Interventions: Assess postural VS orthostatic hypotension, Bed/chair exit alarm, Evaluate medications/consider consulting pharmacy, Patient to call before getting OOB, Teach patient to arise slowly    Elimination Interventions: Bed/chair exit alarm, Call light in reach, Elevated toilet seat, Patient to call for help with toileting needs, Stay With Me (per policy), Toilet paper/wipes in reach, Toileting schedule/hourly rounds              Problem: Patient Education: Go to Patient Education Activity  Goal: Patient/Family Education  Outcome: Progressing Towards Goal     Problem: Pain  Goal: *Control of Pain  Outcome: Progressing Towards Goal  Goal: *PALLIATIVE CARE:  Alleviation of Pain  Outcome: Progressing Towards Goal     Problem: Patient Education: Go to Patient Education Activity  Goal: Patient/Family Education  Outcome: Progressing Towards Goal     Problem: Falls - Risk of  Goal: *Absence of Falls  Description  Document [facility] Fall Risk and appropriate interventions in the flowsheet.   Outcome: Progressing Towards Goal  Note:   Fall Risk Interventions:  Mobility Interventions: Assess mobility with egress test, Bed/chair exit alarm, Communicate number of staff needed for ambulation/transfer, OT consult for ADLs, Patient to call before getting OOB, PT Consult for mobility concerns, PT Consult for assist device competence, Strengthening exercises (ROM-active/passive), Utilize walker, cane, or other assistive device         Medication Interventions: Assess postural VS orthostatic hypotension, Bed/chair exit alarm, Evaluate medications/consider consulting pharmacy, Patient to call before getting OOB, Teach patient to arise slowly    Elimination Interventions: Bed/chair exit alarm, Call light in reach, Elevated toilet seat, Patient to call for help with toileting needs, Stay With Me (per policy), Toilet paper/wipes in reach, Toileting schedule/hourly rounds              Problem: Patient Education: Go to Patient Education Activity  Goal: Patient/Family Education  Outcome: Progressing Towards Goal     Problem: Nausea/Vomiting (Adult)  Goal: *Absence of nausea/vomiting  Outcome: Progressing Towards Goal  Goal: *Palliation of nausea/vomiting (Palliative Care)  Outcome: Progressing Towards Goal     Problem: Patient Education: Go to Patient Education Activity  Goal: Patient/Family Education  Outcome: Progressing Towards Goal

## 2019-09-22 NOTE — PROGRESS NOTES
Mervin Monsalve M.D. FACS  PROGRESS NOTE    Name: Kyung Rocha MRN: [id number]   : 1950 [facility]   Date: 2019 Admission Date: 2019 11:51 AM     Hospital Day: 5     Subjective:  Pt without acute overnight events  Objective:  Vitals:    19 0000 19 0214 19 0800 19 0805   BP: 138/53  141/69    Pulse:   82    Resp:   13    Temp:  98.8 °F (37.1 °C) 97.8 °F (36.6 °C)    SpO2:   94%    Weight:    86.9 kg (191 lb 9.3 oz)   Height:         Date 19 0700 - 19 0659 19 07 - 19 0659   Shift 8040-4222 2468-2295 24 Hour Total 5370-8244 3205-2220 24 Hour Total   INTAKE   P.O. 400 150 550        P. O. 400 150 550      I. V.(mL/kg/hr) 1343.8(1.5) 1925.5(2.1) 3269.3(1.8) 114.1  114.1     I.V.  250 250        Heparin Volume 166.8 180.5 347.3 14.1  14.1     Volume (lactated Ringers infusion)         Volume (potassium phosphate 15 mmol in 0.9% sodium chloride 250 mL infusion) 252  252        Volume (potassium phosphate 8 mmol in 0.9% sodium chloride 250 mL infusion)  250 250        Volume (magnesium sulfate 3 g in 0.9% sodium chloride 100 mL IVPB) 100  100        Volume (piperacillin-tazobactam (ZOSYN) 3.375 g in 0.9% sodium chloride (MBP/ADV) 100 mL MBP)  100 100 100  100     Volume (vancomycin (VANCOCIN) 1250 mg in  ml infusion)  250 250      Shift Total(mL/kg) 1743. 8(22.6) 2075. 5(26.9) 3819. 3(49.5) 114.1(1.3)  114.1(1.3)   OUTPUT   Urine(mL/kg/hr) 500(0.5) 300(0.3) 800(0.4) 150  150     Urine Voided 500 300 800 150  150     Urine Occurrence(s) 850 x 2 x 852 x 1 x  1 x   Emesis/NG output    200  200     Emesis    200  200     Emesis Occurrence(s)  250 x 250 x 1 x  1 x   Drains 120 100 220        Output (ml) (Biliary Drain 19 Abdomen;Right) 120 100 220      Stool           Stool Occurrence(s) 1 x  1 x 1 x  1 x   Shift Total(mL/kg) 620(8) 400(5.2) 1020(13.2) 350(4)  350(4)   NET 1123.8 1675.5 2799.3 -235.9  -235.9 Weight (kg) 77.1 77.1 77.1 86.9 86.9 86.9         Physical Exam:    General: A&A, NAD   Abdomen: abdomen is soft with LUQ and RUQ tenderness. Mary drain site is clean. Light yellow/green bilious drainage. No masses, organomegaly or guarding    Labs:  Recent Results (from the past 24 hour(s))   GLUCOSE, POC    Collection Time: 09/21/19 12:06 PM   Result Value Ref Range    Glucose (POC) 122 (H) 70 - 110 mg/dL   CBC WITH AUTOMATED DIFF    Collection Time: 09/21/19  1:22 PM   Result Value Ref Range    WBC 12.4 4.6 - 13.2 K/uL    RBC 2.86 (L) 4.20 - 5.30 M/uL    HGB 8.9 (L) 12.0 - 16.0 g/dL    HCT 27.6 (L) 35.0 - 45.0 %    MCV 96.5 74.0 - 97.0 FL    MCH 31.1 24.0 - 34.0 PG    MCHC 32.2 31.0 - 37.0 g/dL    RDW 16.7 (H) 11.6 - 14.5 %    PLATELET 350 (L) 623 - 420 K/uL    MPV 10.6 9.2 - 11.8 FL    NEUTROPHILS 86 (H) 40 - 73 %    LYMPHOCYTES 5 (L) 21 - 52 %    MONOCYTES 5 3 - 10 %    EOSINOPHILS 4 0 - 5 %    BASOPHILS 0 0 - 2 %    ABS. NEUTROPHILS 10.7 (H) 1.8 - 8.0 K/UL    ABS. LYMPHOCYTES 0.7 (L) 0.9 - 3.6 K/UL    ABS. MONOCYTES 0.6 0.05 - 1.2 K/UL    ABS. EOSINOPHILS 0.5 (H) 0.0 - 0.4 K/UL    ABS.  BASOPHILS 0.0 0.0 - 0.1 K/UL    DF AUTOMATED     METABOLIC PANEL, BASIC    Collection Time: 09/21/19  1:22 PM   Result Value Ref Range    Sodium 141 136 - 145 mmol/L    Potassium 3.6 3.5 - 5.5 mmol/L    Chloride 106 100 - 111 mmol/L    CO2 27 21 - 32 mmol/L    Anion gap 8 3.0 - 18 mmol/L    Glucose 155 (H) 74 - 99 mg/dL    BUN 20 (H) 7.0 - 18 MG/DL    Creatinine 1.12 0.6 - 1.3 MG/DL    BUN/Creatinine ratio 18 12 - 20      GFR est AA 58 (L) >60 ml/min/1.73m2    GFR est non-AA 48 (L) >60 ml/min/1.73m2    Calcium 7.6 (L) 8.5 - 10.1 MG/DL   MAGNESIUM    Collection Time: 09/21/19  1:22 PM   Result Value Ref Range    Magnesium 2.2 1.6 - 2.6 mg/dL   PHOSPHORUS    Collection Time: 09/21/19  1:22 PM   Result Value Ref Range    Phosphorus 2.3 (L) 2.5 - 4.9 MG/DL   VANCOMYCIN, RANDOM    Collection Time: 09/21/19  6:11 PM   Result Value Ref Range    Vancomycin, random 5.4 5.0 - 40.0 UG/ML   GLUCOSE, POC    Collection Time: 09/21/19  9:36 PM   Result Value Ref Range    Glucose (POC) 133 (H) 70 - 110 mg/dL   PROTHROMBIN TIME + INR    Collection Time: 09/22/19  4:31 AM   Result Value Ref Range    Prothrombin time 13.1 11.5 - 15.2 sec    INR 1.0 0.8 - 1.2     METABOLIC PANEL, BASIC    Collection Time: 09/22/19  4:31 AM   Result Value Ref Range    Sodium 141 136 - 145 mmol/L    Potassium 3.2 (L) 3.5 - 5.5 mmol/L    Chloride 105 100 - 111 mmol/L    CO2 29 21 - 32 mmol/L    Anion gap 7 3.0 - 18 mmol/L    Glucose 114 (H) 74 - 99 mg/dL    BUN 13 7.0 - 18 MG/DL    Creatinine 0.97 0.6 - 1.3 MG/DL    BUN/Creatinine ratio 13 12 - 20      GFR est AA >60 >60 ml/min/1.73m2    GFR est non-AA 57 (L) >60 ml/min/1.73m2    Calcium 7.7 (L) 8.5 - 10.1 MG/DL   MAGNESIUM    Collection Time: 09/22/19  4:31 AM   Result Value Ref Range    Magnesium 2.1 1.6 - 2.6 mg/dL   PHOSPHORUS    Collection Time: 09/22/19  4:31 AM   Result Value Ref Range    Phosphorus 1.8 (L) 2.5 - 4.9 MG/DL   PTT    Collection Time: 09/22/19  4:31 AM   Result Value Ref Range    aPTT 82.8 (H) 23.0 - 36.4 SEC   CBC WITH AUTOMATED DIFF    Collection Time: 09/22/19  4:31 AM   Result Value Ref Range    WBC 9.1 4.6 - 13.2 K/uL    RBC 2.88 (L) 4.20 - 5.30 M/uL    HGB 9.0 (L) 12.0 - 16.0 g/dL    HCT 27.7 (L) 35.0 - 45.0 %    MCV 96.2 74.0 - 97.0 FL    MCH 31.3 24.0 - 34.0 PG    MCHC 32.5 31.0 - 37.0 g/dL    RDW 16.5 (H) 11.6 - 14.5 %    PLATELET 899 (L) 587 - 420 K/uL    MPV 10.5 9.2 - 11.8 FL    NEUTROPHILS 77 (H) 40 - 73 %    LYMPHOCYTES 11 (L) 21 - 52 %    MONOCYTES 8 3 - 10 %    EOSINOPHILS 4 0 - 5 %    BASOPHILS 0 0 - 2 %    ABS. NEUTROPHILS 6.9 1.8 - 8.0 K/UL    ABS. LYMPHOCYTES 1.0 0.9 - 3.6 K/UL    ABS. MONOCYTES 0.8 0.05 - 1.2 K/UL    ABS. EOSINOPHILS 0.4 0.0 - 0.4 K/UL    ABS.  BASOPHILS 0.0 0.0 - 0.1 K/UL    DF AUTOMATED     EKG, 12 LEAD, SUBSEQUENT    Collection Time: 09/22/19  6:08 AM   Result Value Ref Range    Ventricular Rate 87 BPM    Atrial Rate 87 BPM    P-R Interval 148 ms    QRS Duration 140 ms    Q-T Interval 400 ms    QTC Calculation (Bezet) 481 ms    Calculated P Axis 30 degrees    Calculated R Axis -25 degrees    Calculated T Axis 54 degrees    Diagnosis Electronic ventricular pacemaker      All Micro Results     Procedure Component Value Units Date/Time    CULTURE, BLOOD [[id number]] Collected:  09/20/19 0948    Order Status:  Completed Specimen:  Blood Updated:  09/22/19 0618     Special Requests: NO SPECIAL REQUESTS        Culture result: NO GROWTH 2 DAYS       CULTURE, BODY FLUID Adolm Deal STAIN [[id number]]  (Abnormal) Collected:  09/20/19 1240    Order Status:  Completed Specimen:  Bile Updated:  09/21/19 1104     Special Requests: NO SPECIAL REQUESTS        GRAM STAIN FEW WBC'S               RARE GRAM POSITIVE COCCI IN CHAINS            RARE GRAM NEGATIVE RODS         RARE GRAM POSITIVE RODS         NOTIFIED RN [facility] CCU AT 5986 BY KDA 9/20/19     Culture result: FEW GRAM NEGATIVE RODS               FEW POSSIBLE 2ND GRAM NEGATIVE CELESTINO                  FEW STREPTOCOCCI, ALPHA HEMOLYTIC                Current Medications:  Current Facility-Administered Medications   Medication Dose Route Frequency Provider Last Rate Last Dose    [START ON 9/23/2019] . Vancomycin TROUGH DUE @ 0830 9/23  1 Each Other Once per day on Mon Wyatt, Lyndee Sandifer, MD        piperacillin-tazobactam (ZOSYN) 3.375 g in 0.9% sodium chloride (MBP/ADV) 100 mL MBP  3.375 g IntraVENous Q6H Ogoy, January-Julita ERNST PA-C 200 mL/hr at 09/22/19 0915 3.375 g at 09/22/19 0915    vancomycin (VANCOCIN) 1250 mg in  ml infusion  1,250 mg IntraVENous Q18H Lynnette Holguin .7 mL/hr at 09/21/19 2154 1,250 mg at 09/21/19 2154    famotidine (PF) (PEPCID) 20 mg in sodium chloride 0.9% 10 mL injection  20 mg IntraVENous DAILY Jaleeloy, JanuaryTen ERNST PA-C   20 mg at 09/22/19 0915    lactated Ringers infusion  75 mL/hr IntraVENous CONTINUOUS Armand Quan DO   Stopped at 09/22/19 5767    fentaNYL citrate (PF) injection 75 mcg  75 mcg IntraVENous Q4H PRN Armand Jim, DO   75 mcg at 09/21/19 2158    naloxone (NARCAN) injection 0.4 mg  0.4 mg IntraVENous EVERY 2 MINUTES AS NEEDED Gerda Horse A, DO        ondansetron [facility]) injection 4 mg  4 mg IntraVENous Q4H PRN Gerda HINSON, DO   4 mg at 09/22/19 0915    diphenhydrAMINE (BENADRYL) injection 25 mg  25 mg IntraVENous Q4H PRN Armand Quan, DO        acetaminophen (TYLENOL) suppository 650 mg  650 mg Rectal Q4H PRN Veronica Padgett MD   650 mg at 09/20/19 0749    heparin 25,000 units in D5W 250 ml infusion  18-36 Units/kg/hr IntraVENous TITRATE Veronica Padgett MD 13.9 mL/hr at 09/22/19 1138 18 Units/kg/hr at 09/22/19 1138       Chart and notes reviewed. Data reviewed. I have evaluated and examined the patient. IMPRESSION:   · Pt is POD 2 from cholecystostomy tube.        PLAN:/DISCUSION:   · Cholecystomy tube care as written  · Continue zosyn and vanc  · Will follow        Kd Odonnell MD

## 2019-09-22 NOTE — ROUTINE PROCESS
Received report from JACOB Simmons. Pt AAOx3, NAD, breathing non labored, on room air, HOB up. IV site clean, dry and intact. IVF going per order. heparin drip going per ordered protocol-rate verification done. Bed at the lowest level on lock position, call bell w/i reach. Pt w/ under right breast tightness which woke her up and SOB lungs clear on auscultation, O2 sat 100% per monitor, RR-19, BP-114/86. Assisted W/ the [facility]; JW=184. refered pt to MD-ordered Morphine-given w/c gave relief to the pt a few minutes later. Informed MD that IVF was stopped. Bedside and Verbal shift change report given to JACOB Knapp (oncoming nurse) by me (offgoing nurse). Report included the following information SBAR, Kardex, Intake/Output, MAR, Recent Results and Cardiac Rhythm Paced.

## 2019-09-22 NOTE — ROUTINE PROCESS
Bedside and Verbal shift change report given to Nagi Casillas (oncoming nurse) by Jonelle Otero RN (offgoing nurse). Report included the following information SBAR, Kardex, Intake/Output, MAR, Recent Results and Cardiac Rhythm . Sri Sharma

## 2019-09-22 NOTE — PROGRESS NOTES
[facility], [address] [place] [address] [address] [facility]  Fax: [phone]      CARDIOLOGY PROGRESS NOTE  RECS:  1. Septic shock- requiring vasopressor support- patient SBP in the 120's. On antibiotics being followed by [facility] and medical team   2. Sinus tachycardia?-resolved-  will interrogate AICD- d/c digoxin iv   3. Chronic Systolic CHF-stage C NYHA class III-  Pos 1.2 Liters. Furosemide 20 mg IV q12 X 3 doses. 4. Non Sustained Vtach- AICD fired 3 times on  8/19      5. Nonischemic cardiomyopathy (EF 26-30%)-likely from Herceptin use. will resume Coreg when bp stabilizes  6. Hx of recent Acute PE and DVT- patient was on Eliquis at home. Now on heparin drip.   7. Metastatic breast cancer now back on chemotherapy.  Lung metastasis likely cause for shortness of breath which is better.   8. Hypokalemia- s/p Potassium 20 meq X 1. (k 3.1)  9. JOSUE- likely in the setting-#1. Improved. 10. Cholecystitis- s/p percutaneous drain 9/20/19- surgery reccommended conservative treatment- antibiotics, pain medications. Not a candidate for surgery                   08/27/19   ECHO ADULT FOLLOW-UP OR LIMITED 08/28/2019 8/28/2019     Narrative · Left Ventricle: Normal wall thickness. Mildly dilated left ventricle. Severe systolic dysfunction. Estimated left ventricular ejection fraction   is 26 - 30%. Biplane method used to measure ejection fraction. Left   ventricular global hypokinesis. · Pericardium: Trivial pericardial effusion.          Signed by: Gil Ye MD         ASSESSMENT:  Hospital Problems  Date Reviewed: 6/25/2019          Codes Class Noted POA    Cholecystitis ICD-10-CM: K81.9  ICD-9-CM: 575.10  9/18/2019 Unknown                SUBJECTIVE:  C/o dyspnea, tightness and cough. IVF discontinued. EKG Ventricular paced. Sinus rhythm. OBJECTIVE:    VS:   Visit Vitals  /69 (BP 1 Location: Right arm, BP Patient Position:  At rest)   Pulse 82   Temp 97.8 °F (36.6 °C)   Resp 13   Ht 5' 5\" (1.651 m)   Wt 86.9 kg (191 lb 9.3 oz)   SpO2 94%   Breastfeeding? No   BMI 31.88 kg/m²         Intake/Output Summary (Last 24 hours) at 9/22/2019 1213  Last data filed at 9/22/2019 0915  Gross per 24 hour   Intake 3123 ml   Output 1370 ml   Net 1753 ml     TELE: Ventricular paced. SR.     General: No acute distress  HENT: Normocephalic, atraumatic. Neck :  Jugular venous distention. Cardiac:  Normal S1/S2  Chest/Lungs:Decreased base. Abdomen: Soft  Extremities:  No edema      Labs: Results:       Chemistry Recent Labs     09/22/19 0431 09/21/19 1322 09/21/19  0313 09/20/19  0948   * 155* 123* 136*    141 141 138   K 3.2* 3.6 3.1* 3.1*    106 105 102   CO2 29 27 28 24   BUN 13 20* 23* 35*   CREA 0.97 1.12 1.25 3.02*   CA 7.7* 7.6* 7.6* 7.2*   MG 2.1 2.2 1.4* 1.0*   PHOS 1.8* 2.3* 1.8* 2.6   AGAP 7 8 8 12   BUCR 13 18 18 12   AP  --   --   --  106   TP  --   --   --  4.7*   ALB  --   --   --  2.3*   GLOB  --   --   --  2.4   AGRAT  --   --   --  1.0      CBC w/Diff Recent Labs     09/22/19 0431 09/21/19  1322 09/20/19  0948   WBC 9.1 12.4 18.5*   RBC 2.88* 2.86* 2.95*   HGB 9.0* 8.9* 9.2*   HCT 27.7* 27.6* 28.5*   * 120* 117*   GRANS 77* 86* 86*   LYMPH 11* 5* 2*   EOS 4 4 1      Cardiac Enzymes Recent Labs     09/20/19  0948   CPK 99   CKND1 CALCULATION NOT PERFORMED WHEN RESULT IS BELOW LINEAR LIMIT      Coagulation Recent Labs     09/22/19 0431 09/21/19  0313   PTP 13.1  --    INR 1.0  --    APTT 82.8* 86.9*       Lipid Panel Lab Results   Component Value Date/Time    Cholesterol, total 266 (H) 04/17/2019 11:38 AM    HDL Cholesterol 60 04/17/2019 11:38 AM    LDL, calculated 189.2 (H) 04/17/2019 11:38 AM    VLDL, calculated 16.8 04/17/2019 11:38 AM    Triglyceride 84 04/17/2019 11:38 AM    CHOL/HDL Ratio 4.4 04/17/2019 11:38 AM      BNP No results for input(s): BNPP in the last 72 hours.    Liver Enzymes Recent Labs 09/20/19  0948   TP 4.7*   ALB 2.3*      SGOT 20      Digoxin    Thyroid Studies Lab Results   Component Value Date/Time    TSH 0.10 (L) 04/17/2019 11:38 AM              Tony Craven MD   Pager # [phone]

## 2019-09-23 LAB
ALBUMIN SERPL-MCNC: 2.7 G/DL (ref 3.4–5)
ALBUMIN/GLOB SERPL: 1 {RATIO} (ref 0.8–1.7)
ALP SERPL-CCNC: 111 U/L (ref 45–117)
ALT SERPL-CCNC: 29 U/L (ref 13–56)
ANION GAP SERPL CALC-SCNC: 5 MMOL/L (ref 3–18)
APTT PPP: 83.5 SEC (ref 23–36.4)
AST SERPL-CCNC: 32 U/L (ref 10–38)
BASOPHILS # BLD: 0.1 K/UL (ref 0–0.06)
BASOPHILS NFR BLD: 1 % (ref 0–3)
BILIRUB DIRECT SERPL-MCNC: 0.2 MG/DL (ref 0–0.2)
BILIRUB SERPL-MCNC: 0.4 MG/DL (ref 0.2–1)
BUN SERPL-MCNC: 7 MG/DL (ref 7–18)
BUN/CREAT SERPL: 6 (ref 12–20)
CALCIUM SERPL-MCNC: 8.1 MG/DL (ref 8.5–10.1)
CHLORIDE SERPL-SCNC: 105 MMOL/L (ref 100–111)
CO2 SERPL-SCNC: 31 MMOL/L (ref 21–32)
CREAT SERPL-MCNC: 1.1 MG/DL (ref 0.6–1.3)
DATE LAST DOSE: NORMAL
DIFFERENTIAL METHOD BLD: ABNORMAL
EOSINOPHIL # BLD: 0.4 K/UL (ref 0–0.4)
EOSINOPHIL NFR BLD: 5 % (ref 0–5)
ERYTHROCYTE [DISTWIDTH] IN BLOOD BY AUTOMATED COUNT: 16.3 % (ref 11.6–14.5)
GLOBULIN SER CALC-MCNC: 2.6 G/DL (ref 2–4)
GLUCOSE SERPL-MCNC: 112 MG/DL (ref 74–99)
HCT VFR BLD AUTO: 28.6 % (ref 35–45)
HGB BLD-MCNC: 9.4 G/DL (ref 12–16)
INR PPP: 1 (ref 0.8–1.2)
LYMPHOCYTES # BLD: 1.2 K/UL (ref 0.8–3.5)
LYMPHOCYTES NFR BLD: 15 % (ref 20–51)
MAGNESIUM SERPL-MCNC: 1.5 MG/DL (ref 1.6–2.6)
MCH RBC QN AUTO: 31.2 PG (ref 24–34)
MCHC RBC AUTO-ENTMCNC: 32.9 G/DL (ref 31–37)
MCV RBC AUTO: 95 FL (ref 74–97)
MONOCYTES # BLD: 0.4 K/UL (ref 0–1)
MONOCYTES NFR BLD: 5 % (ref 2–9)
NEUTS BAND NFR BLD MANUAL: 4 % (ref 0–5)
NEUTS SEG # BLD: 5.7 K/UL (ref 1.8–8)
NEUTS SEG NFR BLD: 70 % (ref 42–75)
PHOSPHATE SERPL-MCNC: 2.2 MG/DL (ref 2.5–4.9)
PLATELET # BLD AUTO: 159 K/UL (ref 135–420)
PLATELET COMMENTS,PCOM: ABNORMAL
PMV BLD AUTO: 10.3 FL (ref 9.2–11.8)
POTASSIUM SERPL-SCNC: 3.1 MMOL/L (ref 3.5–5.5)
POTASSIUM SERPL-SCNC: 3.2 MMOL/L (ref 3.5–5.5)
PROT SERPL-MCNC: 5.3 G/DL (ref 6.4–8.2)
PROTHROMBIN TIME: 12.4 SEC (ref 11.5–15.2)
RBC # BLD AUTO: 3.01 M/UL (ref 4.2–5.3)
RBC MORPH BLD: ABNORMAL
RBC MORPH BLD: ABNORMAL
REPORTED DOSE,DOSE: NORMAL UNITS
REPORTED DOSE/TIME,TMG: 1500
SODIUM SERPL-SCNC: 141 MMOL/L (ref 136–145)
VANCOMYCIN TROUGH SERPL-MCNC: 14.2 UG/ML (ref 10–20)
WBC # BLD AUTO: 7.8 K/UL (ref 4.6–13.2)

## 2019-09-23 PROCEDURE — 83735 ASSAY OF MAGNESIUM: CPT

## 2019-09-23 PROCEDURE — 85610 PROTHROMBIN TIME: CPT

## 2019-09-23 PROCEDURE — 84132 ASSAY OF SERUM POTASSIUM: CPT

## 2019-09-23 PROCEDURE — 74011000250 HC RX REV CODE- 250: Performed by: PHYSICIAN ASSISTANT

## 2019-09-23 PROCEDURE — 74011250636 HC RX REV CODE- 250/636: Performed by: INTERNAL MEDICINE

## 2019-09-23 PROCEDURE — 74011250636 HC RX REV CODE- 250/636: Performed by: NURSE PRACTITIONER

## 2019-09-23 PROCEDURE — 94762 N-INVAS EAR/PLS OXIMTRY CONT: CPT

## 2019-09-23 PROCEDURE — 74011000258 HC RX REV CODE- 258: Performed by: PHYSICIAN ASSISTANT

## 2019-09-23 PROCEDURE — 74011250636 HC RX REV CODE- 250/636: Performed by: FAMILY MEDICINE

## 2019-09-23 PROCEDURE — 74011000250 HC RX REV CODE- 250: Performed by: INTERNAL MEDICINE

## 2019-09-23 PROCEDURE — 85025 COMPLETE CBC W/AUTO DIFF WBC: CPT

## 2019-09-23 PROCEDURE — 65270000029 HC RM PRIVATE

## 2019-09-23 PROCEDURE — 80076 HEPATIC FUNCTION PANEL: CPT

## 2019-09-23 PROCEDURE — 74011250636 HC RX REV CODE- 250/636: Performed by: PHYSICIAN ASSISTANT

## 2019-09-23 PROCEDURE — 85730 THROMBOPLASTIN TIME PARTIAL: CPT

## 2019-09-23 PROCEDURE — 74011250636 HC RX REV CODE- 250/636: Performed by: HOSPITALIST

## 2019-09-23 PROCEDURE — 80202 ASSAY OF VANCOMYCIN: CPT

## 2019-09-23 PROCEDURE — 36591 DRAW BLOOD OFF VENOUS DEVICE: CPT

## 2019-09-23 PROCEDURE — 74011000258 HC RX REV CODE- 258: Performed by: NURSE PRACTITIONER

## 2019-09-23 PROCEDURE — 74011250637 HC RX REV CODE- 250/637: Performed by: NURSE PRACTITIONER

## 2019-09-23 PROCEDURE — 84100 ASSAY OF PHOSPHORUS: CPT

## 2019-09-23 PROCEDURE — 74011000258 HC RX REV CODE- 258: Performed by: INTERNAL MEDICINE

## 2019-09-23 PROCEDURE — 77010033678 HC OXYGEN DAILY

## 2019-09-23 RX ORDER — CARVEDILOL 6.25 MG/1
6.25 TABLET ORAL EVERY 12 HOURS
Status: DISCONTINUED | OUTPATIENT
Start: 2019-09-23 | End: 2019-09-24

## 2019-09-23 RX ORDER — METRONIDAZOLE 500 MG/100ML
500 INJECTION, SOLUTION INTRAVENOUS EVERY 12 HOURS
Status: DISCONTINUED | OUTPATIENT
Start: 2019-09-23 | End: 2019-09-25 | Stop reason: ALTCHOICE

## 2019-09-23 RX ORDER — POTASSIUM CHLORIDE 20 MEQ/1
40 TABLET, EXTENDED RELEASE ORAL
Status: COMPLETED | OUTPATIENT
Start: 2019-09-23 | End: 2019-09-23

## 2019-09-23 RX ORDER — VANCOMYCIN/0.9 % SOD CHLORIDE 1.5G/250ML
1500 PLASTIC BAG, INJECTION (ML) INTRAVENOUS
Status: DISCONTINUED | OUTPATIENT
Start: 2019-09-24 | End: 2019-09-23

## 2019-09-23 RX ORDER — FUROSEMIDE 20 MG/1
20 TABLET ORAL DAILY
Status: DISCONTINUED | OUTPATIENT
Start: 2019-09-24 | End: 2019-09-26 | Stop reason: HOSPADM

## 2019-09-23 RX ORDER — POTASSIUM CHLORIDE 20 MEQ/1
40 TABLET, EXTENDED RELEASE ORAL
Status: DISCONTINUED | OUTPATIENT
Start: 2019-09-23 | End: 2019-09-23

## 2019-09-23 RX ORDER — POTASSIUM CHLORIDE 20 MEQ/1
20 TABLET, EXTENDED RELEASE ORAL
Status: DISCONTINUED | OUTPATIENT
Start: 2019-09-23 | End: 2019-09-23

## 2019-09-23 RX ADMIN — CEFTRIAXONE 2 G: 2 INJECTION, POWDER, FOR SOLUTION INTRAMUSCULAR; INTRAVENOUS at 13:59

## 2019-09-23 RX ADMIN — POTASSIUM CHLORIDE: 2 INJECTION, SOLUTION, CONCENTRATE INTRAVENOUS at 01:23

## 2019-09-23 RX ADMIN — POTASSIUM CHLORIDE 40 MEQ: 20 TABLET, EXTENDED RELEASE ORAL at 14:09

## 2019-09-23 RX ADMIN — FUROSEMIDE 20 MG: 10 INJECTION, SOLUTION INTRAMUSCULAR; INTRAVENOUS at 08:46

## 2019-09-23 RX ADMIN — POTASSIUM CHLORIDE: 2 INJECTION, SOLUTION, CONCENTRATE INTRAVENOUS at 02:19

## 2019-09-23 RX ADMIN — MAGNESIUM SULFATE HEPTAHYDRATE 3 G: 500 INJECTION, SOLUTION INTRAMUSCULAR; INTRAVENOUS at 16:13

## 2019-09-23 RX ADMIN — CARVEDILOL 6.25 MG: 6.25 TABLET, FILM COATED ORAL at 13:59

## 2019-09-23 RX ADMIN — PIPERACILLIN SODIUM AND TAZOBACTAM SODIUM 3.38 G: 3; .375 INJECTION, POWDER, LYOPHILIZED, FOR SOLUTION INTRAVENOUS at 08:48

## 2019-09-23 RX ADMIN — HEPARIN SODIUM 18 UNITS/KG/HR: 10000 INJECTION, SOLUTION INTRAVENOUS at 06:13

## 2019-09-23 RX ADMIN — POTASSIUM CHLORIDE: 2 INJECTION, SOLUTION, CONCENTRATE INTRAVENOUS at 00:20

## 2019-09-23 RX ADMIN — ONDANSETRON 4 MG: 2 INJECTION INTRAMUSCULAR; INTRAVENOUS at 13:59

## 2019-09-23 RX ADMIN — PIPERACILLIN SODIUM AND TAZOBACTAM SODIUM 3.38 G: 3; .375 INJECTION, POWDER, LYOPHILIZED, FOR SOLUTION INTRAVENOUS at 03:41

## 2019-09-23 RX ADMIN — VANCOMYCIN HYDROCHLORIDE 1250 MG: 10 INJECTION, POWDER, LYOPHILIZED, FOR SOLUTION INTRAVENOUS at 10:24

## 2019-09-23 RX ADMIN — FAMOTIDINE 20 MG: 10 INJECTION, SOLUTION INTRAVENOUS at 08:46

## 2019-09-23 RX ADMIN — CARVEDILOL 6.25 MG: 6.25 TABLET, FILM COATED ORAL at 20:41

## 2019-09-23 RX ADMIN — METRONIDAZOLE 500 MG: 500 INJECTION, SOLUTION INTRAVENOUS at 13:59

## 2019-09-23 NOTE — PROGRESS NOTES
Surgery  Much improved over the weekend after drainage. Good spirits, off pressors  Afebrile vital signs stable systolic blood pressure 026N to 140s, heart rate remains in the  range  Soft abdomen  Drain in place  Tolerating p.o. Impression cholecystitis with severe sepsis. Status post gallbladder drainage.   Relapsing metastatic breast cancer with drug-induced heart failure

## 2019-09-23 NOTE — PROGRESS NOTES
Pt. [address] to stomach with vomiting episode while attempting to take oral medication. Dr. Noam Crews paged and notified.

## 2019-09-23 NOTE — PROGRESS NOTES
Problem: Falls - Risk of  Goal: *Absence of Falls  Description  Document Glenn Mj Fall Risk and appropriate interventions in the flowsheet. Outcome: Progressing Towards Goal  Note:   Fall Risk Interventions:  Mobility Interventions: Assess mobility with egress test, Patient to call before getting OOB, Strengthening exercises (ROM-active/passive)         Medication Interventions: Assess postural VS orthostatic hypotension, Evaluate medications/consider consulting pharmacy, Patient to call before getting OOB, Teach patient to arise slowly    Elimination Interventions: Bed/chair exit alarm, Call light in reach, Elevated toilet seat, Patient to call for help with toileting needs, Stay With Me (per policy), Toilet paper/wipes in reach, Toileting schedule/hourly rounds              Problem: Patient Education: Go to Patient Education Activity  Goal: Patient/Family Education  Outcome: Progressing Towards Goal     Problem: Pain  Goal: *Control of Pain  Outcome: Progressing Towards Goal  Goal: *PALLIATIVE CARE:  Alleviation of Pain  Outcome: Progressing Towards Goal     Problem: Patient Education: Go to Patient Education Activity  Goal: Patient/Family Education  Outcome: Progressing Towards Goal     Problem: Falls - Risk of  Goal: *Absence of Falls  Description  Document Glenn Mj Fall Risk and appropriate interventions in the flowsheet.   Outcome: Progressing Towards Goal  Note:   Fall Risk Interventions:  Mobility Interventions: Assess mobility with egress test, Patient to call before getting OOB, Strengthening exercises (ROM-active/passive)         Medication Interventions: Assess postural VS orthostatic hypotension, Evaluate medications/consider consulting pharmacy, Patient to call before getting OOB, Teach patient to arise slowly    Elimination Interventions: Bed/chair exit alarm, Call light in reach, Elevated toilet seat, Patient to call for help with toileting needs, Stay With Me (per policy), Toilet paper/wipes in reach, Toileting schedule/hourly rounds              Problem: Patient Education: Go to Patient Education Activity  Goal: Patient/Family Education  Outcome: Progressing Towards Goal     Problem: Nausea/Vomiting (Adult)  Goal: *Absence of nausea/vomiting  Outcome: Progressing Towards Goal  Goal: *Palliation of nausea/vomiting (Palliative Care)  Outcome: Progressing Towards Goal     Problem: Patient Education: Go to Patient Education Activity  Goal: Patient/Family Education  Outcome: Progressing Towards Goal

## 2019-09-23 NOTE — PROGRESS NOTES
1910: Bedside and Verbal shift change report given to JACOB Whitfield (oncoming nurse) by Sebastian Newton RN (offgoing nurse). Report included the following information SBAR, MAR, Recent Results and Med Rec Status.

## 2019-09-23 NOTE — CONSULTS
Infectious Disease Consultation Note    Requested by: dr. Cherri Munguia    Reason: sepsis, cholecystitis    Current abx Prior abx   Vancomycin since 9/20/19  Pip/tazo since 9/20 Amp/sulbactam 9/18  Ciprofloxacin, metronidazole 9/19-9/20     Lines:       Assessment :    78year old lady with h/o metastatic breast cancer with lung involvement as well as cardiomyopathy secondary to chemotherapy - last chemotherapy about 2 weeks ago admitted to [facility] on 9/18/19 with abdominal pain. CT scan 9/19/19- Gallbladder prominent in size with notable wall thickening, large calculus at the neck, and surrounding stranding. Common bile duct up to 10 mm caliber. Clinical picture c/w septic shock (POA) due to acute cholecystitis s/p cholecystostomy drain placement on 9/20/19. Bile fluid cultures 9/20- klebsiella (2 morphotypes - one with pip/tazo GT:16,both resistant to ampicillin), streptococcus sanguinis     Patient is clinically improving. Klebsiella has high GT against pip/tazo - risk of developing resistance. Hence, will alter abx  Surgical f/u appreciated. No plans for cholecystectomy at this time. Since patient has calculus at gallbladder neck; there is risk of recurrent cholecystitis once cholecystostomy tube removed. Also, dilated CBD - will likely benefit from GI input regarding further management to prevent recurrent cholecystitis/sepsis. Acute renal failure: likely due to sepsis - improving    Recommendations:    1. D/c pip/tazo, vancomycin. Start ceftriaxone, metronidazole  2. Consider GI evaluation for dilated CBD  3. F/u surgery recommendations regarding duration of cholecystostomy tube and timing of cholecystectomy  4. Monitor cbc, clinically    Advance Care planning: full code: discussed  with patient/surrogate decision maker: Vangie Burgess: [phone]      Thank you for consultation request. Above plan was discussed in details with patient, family and dr wheeler/.  Please call me if any further questions or concerns. Will continue to participate in the care of this patient. HPI:    78year old lady with h/o metastatic breast cancer with lung involvement as well as cardiomyopathy secondary to chemotherapy - last chemotherapy about 2 weeks ago admitted to [facility] on 9/18/19 with abdominal pain. Patient was recently diagnosed with relapsed breast cancer, lung mets, her 2 ana positive , on second line gemzar. She has  Drug induced/ trastuzumab induced cardiomyopathy. She was relatively well up until 9/18. She woke on 9/18/19 with severe abdominal pain and nausea. She also had chills. She came to ed on 9/18 with these complaints. US suggestive of acute cholecystitis and this was confirmed by CT and HIDA. She was noted to be hypotensive admitted to ICU. Evaluated by surgeon. She underwent cholecystostomy drain placement by IR on 9/20/19 due to being poor surgical candidate. She has seen significant improvement in her pain, and tolerating diet. I have been consulted for opinion on further management. Bile fluid cultures 9/20- klebsiella (2 morphotypes - one with pip/tazo GT:16,both resistant to ampicillin), streptococcus sanguinis     Patient denies prior h/o cholecystitis. Has not had abdominal pain with her prior cycles of chemotherapy. Patient denies headaches, visual disturbances, sore throat, runny nose, earaches, cp, sob, chills, cough, diarrhea, burning micturition, pain or weakness in extremities. He denies back pain/flank pain. He denies recent sick contacts. No h/o recent travel. No known h/o MRSA colonization or infection in the past.        Past Medical History:   Diagnosis Date    Abnormal nuclear cardiac imaging test 06/11/2010    Lg fixed anterior, septal, apical, inferoseptal defect sugg RCA & LAD disease or cardiomyopathy. EF 20%. Global hykn. Nondiagnostic EKG.  Acute bronchitis 10/15/2018    Persistent cough and wheezing in spite of prednisone and antibiotic.   Will refer to pulmonary    Adenocarcinoma of breast; locally advanced invasive ductal adenocarcinoma of left breast     Asthma     Automatic implantable cardiac defibrillator in situ     Automatic implantable cardiac defibrillator in situ     Breast cancer (HCC)     Cancer ([facility] [address]     breast cancer left    Chemotherapy convalescence or palliative care 2012    Chronic combined systolic and diastolic heart failure (HCC)     Remains symptomatic, nyha class 3 ef improving.  Congestive heart failure, unspecified     chronic class ll    Echocardiogram 09/27/2010    EF 30%. Global hykn. Mild MR. Mild TR.  GERD (gastroesophageal reflux disease)     Hyperlipidemia     Hypertension     Mitral valve disorders(424.0) 1/15/2014    mild to moderate mr     Mitral valve disorders(424.0) 1/15/2014    mild to moderate mr     MVP (mitral valve prolapse)     Nonischemic cardiomyopathy (HCC)     EF 20-30% despite medical therapy    Nonspecific abnormal electrocardiogram (ECG) (EKG)     Osteopenia     Other primary cardiomyopathies     Pacemaker 10/27/10    s/p implantation, without complication    Poisoning by other and unspecified agents primarily affecting the cardiovascular system(972.9)     Ef slightly better to 45%, STABLE back on chemo.  Radiation therapy     Radiation therapy complication 1854    S/P cardiac catheterization 07/08/10    Patent coronary arteries. LVEDP 25.  EF 25%. Global hykn. Moderate MR.  RA 10.  RV 40/10. PA 40/20.  20.  CO/CI 4.5/2.45 (Larry).     Shortness of breath     Syncope and collapse     Thyroid disease     goiter       Past Surgical History:   Procedure Laterality Date    CARDIAC SURG PROCEDURE UNLIST      Difibrillator; BI V ICD    HX MASTECTOMY  09/28/11    modified radical mastectomy and axillary dissection    HX ORTHOPAEDIC      HX OTHER SURGICAL      surgery to remove part of liver    HX PACEMAKER  10/27/10    s/p [employer] biventricular AICD, without complication    HX RADICAL MASTECTOMY      IR CHOLECYSTOSTOMY PERCUTANEOUS  9/20/2019    IR INSERT TUNL CVC W PORT OVER 5 YEARS  1/16/2019    NEUROLOGICAL PROCEDURE UNLISTED      Cervical fusion       home Medication List    Details   !! montelukast (SINGULAIR) 10 mg tablet TAKE 1 TABLET DAILY  Qty: 90 Tab, Refills: 3       Details   digoxin (LANOXIN) 0.125 mg tablet Take 1 Tab by mouth daily. Qty: 90 Tab, Refills: 3      spironolactone (ALDACTONE) 25 mg tablet Take 1 Tab by mouth daily. Qty: 90 Tab, Refills: 1      carvedilol (COREG) 25 mg tablet Take 1 Tab by mouth two (2) times daily (with meals). Qty: 60 Tab, Refills: 0      furosemide (LASIX) 20 mg tablet Take 1 Tab by mouth daily. Qty: 30 Tab, Refills: 0      OTHER BMP on Friday 8/30/19  Dx: CHF  Fax results to Dr. Radhika Morales: 1 Each, Refills: 0      apixaban (ELIQUIS) 5 mg tablet Take 1 Tab by mouth two (2) times a day. Qty: 60 Tab, Refills: 0      tiotropium (SPIRIVA WITH HANDIHALER) 18 mcg inhalation capsule Take 1 Cap by inhalation daily. sacubitril-valsartan (ENTRESTO) 49 mg/51 mg tablet Take 1 Tab by mouth two (2) times a day. Qty: 180 Tab, Refills: 3      mometasone (NASONEX) 50 mcg/actuation nasal spray 2 Sprays by Both Nostrils route daily as needed. Indications: inflammation of the nose due to an allergy      budesonide-formoterol (SYMBICORT) 160-4.5 mcg/actuation HFAA Take 2 Puffs by inhalation two (2) times a day. Qty: 1 Inhaler, Refills: 0      albuterol-ipratropium (DUO-NEB) 2.5 mg-0.5 mg/3 ml nebu 3 mL by Nebulization route every six (6) hours as needed (wheezing or sob). File under Medicare Part B, ICD codes J44.9, C78.01  Qty: 120 Nebule, Refills: 3      DULoxetine (CYMBALTA) 30 mg capsule Take 30 mg by mouth daily. multivitamin (ONE A DAY) tablet Take 1 Tab by mouth daily. plant stanol eliezer (CHOLEST OFF PO) Take 1 Tab by mouth daily.       benzonatate (TESSALON PERLES) 100 mg capsule Take 100 mg by mouth three (3) times daily as needed for Cough. Biotin 2,500 mcg cap Take 1 Cap by mouth daily. melatonin 10 mg tab Take 1 Tab by mouth nightly. !! montelukast (SINGULAIR) 10 mg tablet Take 1 Tab by mouth daily. Qty: 90 Tab, Refills: 3      raloxifene (EVISTA) 60 mg tablet Take 60 mg by mouth daily. Associated Diagnoses: Malignant neoplasm of breast (female), unspecified site      metOLazone (ZAROXOLYN) 5 mg tablet Take 1 Tab by mouth daily. Qty: 30 Tab, Refills: 1      naloxone (NARCAN) 0.4 mg/mL injection 1 mL by IntraVENous route as needed for Other (Give if breaths per minute  less than 10, may repeat dose in 15 min and contact MD). Qty: 1 mL, Refills: 0      b complex vitamins (B COMPLEX 1) tablet Take 1 Tab by mouth daily. cholecalciferol, vitamin D3, 2,000 unit tab Take 1 Tab by mouth daily. !! - Potential duplicate medications found. Please discuss with provider.           Current Facility-Administered Medications   Medication Dose Route Frequency    [START ON 9/24/2019] vancomycin (VANCOCIN) 1500 mg in  ml infusion  1,500 mg IntraVENous Q18H    piperacillin-tazobactam (ZOSYN) 3.375 g in 0.9% sodium chloride (MBP/ADV) 100 mL MBP  3.375 g IntraVENous Q6H    famotidine (PF) (PEPCID) 20 mg in sodium chloride 0.9% 10 mL injection  20 mg IntraVENous DAILY    lactated Ringers infusion  75 mL/hr IntraVENous CONTINUOUS    fentaNYL citrate (PF) injection 75 mcg  75 mcg IntraVENous Q4H PRN    naloxone (NARCAN) injection 0.4 mg  0.4 mg IntraVENous EVERY 2 MINUTES AS NEEDED    ondansetron (ZOFRAN) injection 4 mg  4 mg IntraVENous Q4H PRN    diphenhydrAMINE (BENADRYL) injection 25 mg  25 mg IntraVENous Q4H PRN    acetaminophen (TYLENOL) suppository 650 mg  650 mg Rectal Q4H PRN    heparin 25,000 units in D5W 250 ml infusion  18-36 Units/kg/hr IntraVENous TITRATE       Allergies: Adhesive    Family History   Problem Relation Age of Onset    Hypertension Mother     High Cholesterol Mother     Heart Disease Father         paemaker implant at 80     Social History     Socioeconomic History    Marital status:      Spouse name: Not on file    Number of children: Not on file    Years of education: Not on file    Highest education level: Not on file   Occupational History    Not on file   Social Needs    Financial resource strain: Not on file    Food insecurity:     Worry: Not on file     Inability: Not on file    Transportation needs:     Medical: Not on file     Non-medical: Not on file   Tobacco Use    Smoking status: Never Smoker    Smokeless tobacco: Never Used   Substance and Sexual Activity    Alcohol use: No    Drug use: No    Sexual activity: Not on file   Lifestyle    Physical activity:     Days per week: Not on file     Minutes per session: Not on file    Stress: Not on file   Relationships    Social connections:     Talks on phone: Not on file     Gets together: Not on file     Attends [employer]ism service: Not on file     Active member of club or organization: Not on file     Attends meetings of clubs or organizations: Not on file     Relationship status: Not on file    Intimate partner violence:     Fear of current or ex partner: Not on file     Emotionally abused: Not on file     Physically abused: Not on file     Forced sexual activity: Not on file   Other Topics Concern    Not on file   Social History Narrative    Not on file     Social History     Tobacco Use   Smoking Status Never Smoker   Smokeless Tobacco Never Used        Temp (24hrs), Av.2 °F (36.8 °C), Min:97.8 °F (36.6 °C), Max:98.7 °F (37.1 °C)    Visit Vitals  /71 (BP 1 Location: Right arm, BP Patient Position: At rest;Head of bed elevated (Comment degrees)) Comment (BP Patient Position): 30   Pulse (!) 101   Temp 97.9 °F (36.6 °C)   Resp 22   Ht 5' 5\" (1.651 m)   Wt 86.9 kg (191 lb 9.3 oz)   SpO2 95%   Breastfeeding? No   BMI 31.88 kg/m²       ROS: 12 point ROS obtained in details.  Pertinent positives as mentioned in HPI,   otherwise negative    Physical Exam:    General:  Alert, cooperative, in moderate discomfort   Head:  Normocephalic, without obvious abnormality, atraumatic. Eyes:  Pink palpebral conjunctivae, anicteric sclerae; EOMs intact   Nose: Nares normal. No drainage    Throat: Lips, mucosa, and tongue mildly dry   Neck: Supple, symmetrical, trachea midline, no adenopathy, no carotid bruit and no JVD. Lungs:   Symmetrical chest rise; good AE bilat; CTAB; no wheezes/rhonchi/rales noted. Heart:  RRR, S1, S2 present   Abdomen:   Soft, cholecystostomy drain RUQ. No tenderness. Bowel sounds normal.    Extremities: Extremities normal, atraumatic, no cyanosis or edema. Pulses: 2+ and symmetric all extremities. Skin: Skin color, texture, turgor normal. No rashes or lesions. + Left chest mediport   Neurologic: Grossly nonfocal             Labs: Results:   Chemistry Recent Labs     09/23/19  0613 09/22/19  2120 09/22/19  0431 09/21/19  1322   *  --  114* 155*     --  141 141   K 3.1* 3.1* 3.2* 3.6     --  105 106   CO2 31  --  29 27   BUN 7  --  13 20*   CREA 1.10  --  0.97 1.12   CA 8.1*  --  7.7* 7.6*   AGAP 5  --  7 8   BUCR 6*  --  13 18      CBC w/Diff Recent Labs     09/23/19  0613 09/22/19  0431 09/21/19  1322   WBC 7.8 9.1 12.4   RBC 3.01* 2.88* 2.86*   HGB 9.4* 9.0* 8.9*   HCT 28.6* 27.7* 27.6*    134* 120*   GRANS 70 77* 86*   LYMPH 15* 11* 5*   EOS 5 4 4      Microbiology No results for input(s): CULT in the last 72 hours.        RADIOLOGY:    All available imaging studies/reports in [facility] care for this admission were reviewed    Dr. Cammy Schaefer, Infectious Disease Specialist  [phone]  September 23, 2019  12:42 PM

## 2019-09-23 NOTE — ROUTINE PROCESS
Received report from [facility]. Pt AAOx3, NAD, breathing non labored, on room air, HOB up. Mediport  site clean, dry and intact. Heparin drip going per order. Bed at the lowest level on lock position, call bell w/i reach. Bedside and Verbal shift change report given to JACOB Diaz (oncoming nurse) by me (offgoing nurse). Report included the following information SBAR, Kardex, Procedure Summary, Intake/Output, MAR, Recent Results and Cardiac Rhythm Paced.

## 2019-09-23 NOTE — PROGRESS NOTES
[facility]   Progress Note. CARDIOLOGY PROGRESS NOTE  RECS:      1. Septic shock- resolved   2. Sinus tachycardia -resolved- interrogated AICD 9/21/19 normal function - no arrhthymias   3. Chronic Systolic CHF-stage C NYHA class III- good diuresis s/p  Furosemide 20 mg IV q12 X 3 doses. Will resume lasix 20 mg po.   4. Non Sustained Vtach- AICD was fired 3 times on  8/19.      5. Nonischemic cardiomyopathy (EF 26-30%)-likely from Herceptin use. recent echo showed EF% 36-40%- will resume Coreg and titrate as BP tolerates it. Will consider to resume Entresto if BP remains stable   6. Hx of recent Acute PE and DVT- patient was on Eliquis at home. Now on heparin drip.   7. Metastatic breast cancer now back on chemotherapy.  Lung metastasis likely cause for shortness of breath which is better.   8. Hypokalemia- will add 40 meq K+ now. 9. Hypomagnesemia- added mag 3 gr. Now. 10. JOSUE- resolved   11. Cholecystitis- s/p percutaneous drain 9/20/19- surgery reccommended conservative treatment- antibiotics, pain medications. Not a candidate for surgery                   08/27/19   ECHO ADULT FOLLOW-UP OR LIMITED 08/28/2019 8/28/2019     Narrative · Left Ventricle: Normal wall thickness. Mildly dilated left ventricle. Severe systolic dysfunction. Estimated left ventricular ejection fraction   is 26 - 30%. Biplane method used to measure ejection fraction. Left   ventricular global hypokinesis.   · Pericardium: Trivial pericardial effusion.          Signed by: Chelsy Garcia MD         ASSESSMENT:  Hospital Problems  Date Reviewed: 6/25/2019          Codes Class Noted POA    Cholecystitis ICD-10-CM: K81.9  ICD-9-CM: 575.10  9/18/2019 Unknown                SUBJECTIVE:  C/o dyspnea and cough that  Is improving    OBJECTIVE:    VS:   Visit Vitals  /71 (BP 1 Location: Right arm, BP Patient Position: At rest;Head of bed elevated (Comment degrees))   Pulse (!) 101   Temp 97.9 °F (36.6 °C) Resp 22   Ht 5' 5\" (1.651 m)   Wt 86.9 kg (191 lb 9.3 oz)   SpO2 95%   Breastfeeding? No   BMI 31.88 kg/m²         Intake/Output Summary (Last 24 hours) at 9/23/2019 1309  Last data filed at 9/23/2019 1200  Gross per 24 hour   Intake 1759.7 ml   Output 3840 ml   Net -2080.3 ml     TELE: Ventricular paced. SR.     General: No acute distress  HENT: Normocephalic, atraumatic. Neck :  Jugular venous distention. Cardiac:  Normal S1/S2  Chest/Lungs:Decreased base. Abdomen: Soft   Extremities:  No edema noted. Labs: Results:       Chemistry Recent Labs     09/23/19 0613 09/22/19 2120 09/22/19  0431 09/21/19  1322   *  --  114* 155*     --  141 141   K 3.1* 3.1* 3.2* 3.6     --  105 106   CO2 31  --  29 27   BUN 7  --  13 20*   CREA 1.10  --  0.97 1.12   CA 8.1*  --  7.7* 7.6*   MG 1.5*  --  2.1 2.2   PHOS 2.2*  --  1.8* 2.3*   AGAP 5  --  7 8   BUCR 6*  --  13 18      CBC w/Diff Recent Labs     09/23/19 0613 09/22/19 0431 09/21/19  1322   WBC 7.8 9.1 12.4   RBC 3.01* 2.88* 2.86*   HGB 9.4* 9.0* 8.9*   HCT 28.6* 27.7* 27.6*    134* 120*   GRANS 70 77* 86*   LYMPH 15* 11* 5*   EOS 5 4 4      Cardiac Enzymes No results for input(s): CPK, CKND1, ZEESHAN in the last 72 hours. No lab exists for component: CKRMB, TROIP   Coagulation Recent Labs     09/23/19 0613 09/22/19 0431   PTP 12.4 13.1   INR 1.0 1.0   APTT 83.5* 82.8*       Lipid Panel Lab Results   Component Value Date/Time    Cholesterol, total 266 (H) 04/17/2019 11:38 AM    HDL Cholesterol 60 04/17/2019 11:38 AM    LDL, calculated 189.2 (H) 04/17/2019 11:38 AM    VLDL, calculated 16.8 04/17/2019 11:38 AM    Triglyceride 84 04/17/2019 11:38 AM    CHOL/HDL Ratio 4.4 04/17/2019 11:38 AM      BNP No results for input(s): BNPP in the last 72 hours. Liver Enzymes No results for input(s): TP, ALB, TBIL, AP, SGOT, GPT in the last 72 hours.     No lab exists for component: DBIL   Digoxin    Thyroid Studies Lab Results   Component Value Date/Time    TSH 0.10 (L) 04/17/2019 11:38 AM              Disha Dailey, NP-C supervised    I have independently evaluated and examined the patient. All relevant labs and testing data's are reviewed. Care plan discussed and updated after review.     Yosef Henning MD

## 2019-09-23 NOTE — PROGRESS NOTES
Bedside and Verbal shift change report given to Monty Cotter RN and [place], RN (oncoming nurse) by Alba Setrn RN (offgoing nurse). Report included the following information SBAR, ED Summary, Procedure Summary, Intake/Output, MAR and Recent Results.

## 2019-09-23 NOTE — CONSULTS
[email or web address]  [phone]    GASTROENTEROLOGY CONSULT      Impression:   1. Acute cholecystitis w/ sepsis  - s/p cholecystostomy tube 9/20/19  2. Cholelithiasis - calculus in gallbladder neck. No choledocholithiasis identified. 3. Relapsed metastatic breast cancer - on chemotherapy, last treatment ~3 weeks ago, currently held due to sepsis. 4. Drug induced cardiomyopathy - s/p AICD - unable to have MRI  - AICD fired 3x on 8/19/19  - Echo 9/20/19: EF 36-40%  5. Hx PE on anticoagulation      Plan:     1. Check LFTs - last checked on 9/20/19. 2. Continue medical management per primary team  3. ERCP not recommended - no obstructive jaundice and ERCP would increase infection risk. Chief Complaint: cholecystitis      HPI:  Brisa Benitez is a 78 y.o. female who I am being asked to see in consultation for an opinion regarding cholecystitis. She woke on 9/18/19 with severe abdominal pain and nausea. US suggestive of acute cholecystitis and this was confirmed by CT and HIDA. She underwent cholecystostomy drain placement by IR due to being poor surgical candidate. She has seen significant improvement in her pain, and tolerating diet. GI was consulted for opinion on further management. PMH:   Past Medical History:   Diagnosis Date    Abnormal nuclear cardiac imaging test 06/11/2010    Lg fixed anterior, septal, apical, inferoseptal defect sugg RCA & LAD disease or cardiomyopathy. EF 20%. Global hykn. Nondiagnostic EKG.  Acute bronchitis 10/15/2018    Persistent cough and wheezing in spite of prednisone and antibiotic.   Will refer to pulmonary    Adenocarcinoma of breast; locally advanced invasive ductal adenocarcinoma of left breast     Asthma     Automatic implantable cardiac defibrillator in situ     Automatic implantable cardiac defibrillator in situ     [facility] ([facility]     Cancer ([facility]     breast cancer left    Chemotherapy convalescence or palliative care 2012    Chronic combined systolic and diastolic heart failure (HCC)     Remains symptomatic, nyha class 3 ef improving.  Congestive heart failure, unspecified     chronic class ll    Echocardiogram 09/27/2010    EF 30%. Global hykn. Mild MR. Mild TR.  GERD (gastroesophageal reflux disease)     Hyperlipidemia     Hypertension     Mitral valve disorders(424.0) 1/15/2014    mild to moderate mr     Mitral valve disorders(424.0) 1/15/2014    mild to moderate mr     MVP (mitral valve prolapse)     Nonischemic cardiomyopathy (HCC)     EF 20-30% despite medical therapy    Nonspecific abnormal electrocardiogram (ECG) (EKG)     Osteopenia     Other primary cardiomyopathies     Pacemaker 10/27/10    s/p implantation, without complication    Poisoning by other and unspecified agents primarily affecting the cardiovascular system(972.9)     Ef slightly better to 45%, STABLE back on chemo.  Radiation therapy     Radiation therapy complication 9690    S/P cardiac catheterization 07/08/10    Patent coronary arteries. LVEDP 25.  EF 25%. Global hykn. Moderate MR.  RA 10.  RV 40/10. PA 40/20.  20.  CO/CI 4.5/2.45 (Larry).     Shortness of breath     Syncope and collapse     Thyroid disease     goiter       PSH:   Past Surgical History:   Procedure Laterality Date    CARDIAC SURG PROCEDURE UNLIST      Difibrillator; BI V ICD    HX MASTECTOMY  09/28/11    modified radical mastectomy and axillary dissection    HX ORTHOPAEDIC      HX OTHER SURGICAL      surgery to remove part of liver    HX PACEMAKER  10/27/10    s/p [employer] biventricular AICD, without complication    HX RADICAL MASTECTOMY      IR CHOLECYSTOSTOMY PERCUTANEOUS  9/20/2019    IR INSERT TUNL CVC W PORT OVER 5 YEARS  1/16/2019    NEUROLOGICAL PROCEDURE UNLISTED      Cervical fusion       Social HX:   Social History     Socioeconomic History    Marital status:      Spouse name: Not on file    Number of children: Not on file    Years of education: Not on file    Highest education level: Not on file   Occupational History    Not on file   Social Needs    Financial resource strain: Not on file    Food insecurity:     Worry: Not on file     Inability: Not on file    Transportation needs:     Medical: Not on file     Non-medical: Not on file   Tobacco Use    Smoking status: Never Smoker    Smokeless tobacco: Never Used   Substance and Sexual Activity    Alcohol use: No    Drug use: No    Sexual activity: Not on file   Lifestyle    Physical activity:     Days per week: Not on file     Minutes per session: Not on file    Stress: Not on file   Relationships    Social connections:     Talks on phone: Not on file     Gets together: Not on file     Attends [employer] service: Not on file     Active member of club or organization: Not on file     Attends meetings of clubs or organizations: Not on file     Relationship status: Not on file    Intimate partner violence:     Fear of current or ex partner: Not on file     Emotionally abused: Not on file     Physically abused: Not on file     Forced sexual activity: Not on file   Other Topics Concern    Not on file   Social History Narrative    Not on file       FHX:   Family History   Problem Relation Age of Onset    Hypertension Mother     High Cholesterol Mother     Heart Disease Father         paemaker implant at 80       Allergy:   Allergies   Allergen Reactions    Adhesive Other (comments)     blisters       Patient Active Problem List   Diagnosis Code    Congestive heart failure ([facility] I50.9    Hypertension I10    MVP (mitral valve prolapse) I34.1    Nonischemic cardiomyopathy ([facility] I42.8    [facility] C80.1    Adenocarcinoma of breast; locally advanced invasive ductal adenocarcinoma of left breast C50.919    Poisoning by other and unspecified agents primarily affecting the cardiovascular system(972.9) T46.904A    Syncope and collapse R55    Other primary cardiomyopathies I42.8    Shortness of breath R06.02    Nonspecific abnormal electrocardiogram (ECG) (EKG) R94.31    Automatic implantable cardioverter-defibrillator in situ Z95.810    Mitral valve disease I05.9    Abnormal PFT R94.2    Acute bronchitis J20.9    Chronic asthmatic bronchitis (HCC) J44.9    Malignant neoplasm metastatic to lung (HCC) C78.00    Seasonal allergic rhinitis due to pollen J30.1    Dilated cardiomyopathy (HCC) I42.0    Acute exacerbation of CHF (congestive heart failure) (HCC) I50.9    Breast cancer (HCC) C50.919    Pulmonary embolism (HCC) I26.99    Chronic bronchitis (HCC) J42    Hypoxia R09.02    Lung metastases (HCC) C78.00    UTI (urinary tract infection) N39.0    CAD (coronary artery disease) I25.10    Elevated troponin R74.8    Weakness generalized R53.1    Chronic anticoagulation Z79.01    History of pulmonary embolism Z86.711    Hypokalemia E87.6    Hypomagnesemia E83.42    Acute encephalopathy G93.40    Cholecystitis K81.9       Home Medications:     Medications Prior to Admission   Medication Sig    digoxin (LANOXIN) 0.125 mg tablet Take 1 Tab by mouth daily.  spironolactone (ALDACTONE) 25 mg tablet Take 1 Tab by mouth daily.  carvedilol (COREG) 25 mg tablet Take 1 Tab by mouth two (2) times daily (with meals).  furosemide (LASIX) 20 mg tablet Take 1 Tab by mouth daily.  OTHER BMP on Friday 8/30/19  Dx: CHF  Fax results to Dr. Blanca Fang apixaban (ELIQUIS) 5 mg tablet Take 1 Tab by mouth two (2) times a day.  tiotropium (SPIRIVA WITH HANDIHALER) 18 mcg inhalation capsule Take 1 Cap by inhalation daily.  sacubitril-valsartan (ENTRESTO) 49 mg/51 mg tablet Take 1 Tab by mouth two (2) times a day.  mometasone (NASONEX) 50 mcg/actuation nasal spray 2 Sprays by Both Nostrils route daily as needed. Indications: inflammation of the nose due to an allergy    budesonide-formoterol (SYMBICORT) 160-4.5 mcg/actuation HFAA Take 2 Puffs by inhalation two (2) times a day.     albuterol-ipratropium (DUO-NEB) 2.5 mg-0.5 mg/3 ml nebu 3 mL by Nebulization route every six (6) hours as needed (wheezing or sob). File under Medicare Part B, ICD codes J44.9, C78.01    DULoxetine (CYMBALTA) 30 mg capsule Take 30 mg by mouth daily.  multivitamin (ONE A DAY) tablet Take 1 Tab by mouth daily.  plant stanol eliezer (CHOLEST OFF PO) Take 1 Tab by mouth daily.  benzonatate (TESSALON PERLES) 100 mg capsule Take 100 mg by mouth three (3) times daily as needed for Cough.  Biotin 2,500 mcg cap Take 1 Cap by mouth daily.  melatonin 10 mg tab Take 1 Tab by mouth nightly.  montelukast (SINGULAIR) 10 mg tablet Take 1 Tab by mouth daily.  raloxifene (EVISTA) 60 mg tablet Take 60 mg by mouth daily.  metOLazone (ZAROXOLYN) 5 mg tablet Take 1 Tab by mouth daily.  naloxone (NARCAN) 0.4 mg/mL injection 1 mL by IntraVENous route as needed for Other (Give if breaths per minute  less than 10, may repeat dose in 15 min and contact MD).  b complex vitamins (B COMPLEX 1) tablet Take 1 Tab by mouth daily.  cholecalciferol, vitamin D3, 2,000 unit tab Take 1 Tab by mouth daily. Review of Systems:     Constitutional: No fevers, chills, weight loss, fatigue. Skin: No rashes, pruritis, jaundice, ulcerations, erythema. HENT: No headaches, nosebleeds, sinus pressure, rhinorrhea, sore throat. Eyes: No visual changes, blurred vision, eye pain, photophobia, jaundice. Cardiovascular: No chest pain, heart palpitations. Respiratory: No cough, SOB, wheezing, chest discomfort, orthopnea. Gastrointestinal: No abdominal pain   Genitourinary: No dysuria, bleeding, discharge, pyuria. Musculoskeletal: No weakness, arthralgias, wasting. Endo: No sweats. Heme: No bruising, easy bleeding. Allergies: As noted. Neurological: Cranial nerves intact. Alert and oriented. Gait not assessed. Psychiatric:  No anxiety, depression, hallucinations.           Visit Vitals  /71 (BP 1 Location: Right arm, BP Patient Position: At rest;Head of bed elevated (Comment degrees)) Comment (BP Patient Position): 30   Pulse (!) 101   Temp 97.9 °F (36.6 °C)   Resp 22   Ht 5' 5\" (1.651 m)   Wt 86.9 kg (191 lb 9.3 oz)   SpO2 95%   Breastfeeding? No   BMI 31.88 kg/m²       Physical Assessment:     constitutional: well developed, well nourished, normal habitus, no deformities, in no acute distress. skin: no rashes, ulcers, icterus or other lesions  eyes: normal conjunctivae and lids; no jaundice pupils: normal  HEENT: normocephalic, atraumatic  neck: supple, normal ROM   respiratory: normal chest excursion; no intercostal retraction or accessory muscle use; clear to ascultation bilaterally   cardiovascular: regular rate and rhythm, no murmur, rub or gallop, AICD present  abdominal: normal bowel sounds, soft, mild RUQ tenderness, no guarding or rebound. Cholecystostomy tube present w/ less than 50cc bilious fluid (reports recently emptied)  rectal: hemoccult/guaiac: not performed. extremities: no significant deformity or contracture, no edema. Gait not assessed   neurologic: cranial nerves: II-XII normal.   psychiatric: judgement/insight: within normal limits. memory: within normal limits for recent and remote events. mood and affect: no evidence of depression, anxiety or agitation. orientation: oriented to time, space and person. Basic Metabolic Profile   Recent Labs     09/23/19 0613      K 3.1*      CO2 31   BUN 7   *   CA 8.1*   MG 1.5*   PHOS 2.2*         CBC w/Diff    Recent Labs     09/23/19 0613   WBC 7.8   RBC 3.01*   HGB 9.4*   HCT 28.6*   MCV 95.0   MCH 31.2   MCHC 32.9   RDW 16.3*       Recent Labs     09/23/19 0613   GRANS 70   LYMPH 15*   EOS 5        Hepatic Function   No results for input(s): ALB, TP, TBILI, GPT, SGOT, AP, AML, LPSE in the last 72 hours.     No lab exists for component: DBILI     Coags   Recent Labs     09/23/19 0613 09/22/19  0431   PTP 12.4 13.1 INR 1.0 1.0   APTT 83.5* 82.8*           [place], [place].   09/23/19, 12:47 PM   [facility], [address] [place] [address]  Cell: [phone]  [email or web address]

## 2019-09-23 NOTE — PROGRESS NOTES
Phone: [phone]     Hematology / Oncology Progress Note  [facility]      Patient: Conchis Daley   MRN: [id number]         CSN: [id number]    YOB: 1950      Admit Date: 2019    Assessment:     Active Problems:    Cholecystitis (2019)      Septic shock, ac cholecystitis  Calculus in gall bladder neck  Relapsed breast cancer, lung mets, her 2 ana positive , on second line gemzar  Drug induced/ trastuzumab/ cardiomyopathy  Ac PE, DVT UE on anticoagulation  Anemia secondary to chemo  Peripheral neuropathy sec to taxane      Plan: On iv vanco and zosyn , s/p cholecystostomy tube  High risk for cholecystectomy per surgery   High Risk of relaspe of infection/ac cholecystitis  Chemo on hold  Iv heparin, will transition to eliquis  ID consult, GI evaluation  D/w pt, ICU team, surgery      Wander Moise MD  [facility] [phone]      Subjective:     Awake alert, no fever  Eating breakfast      Objective:     Visit Vitals  /73   Pulse 93   Temp 98.5 °F (36.9 °C)   Resp 23   Ht 5' 5\" (1.651 m)   Wt 86.9 kg (191 lb 9.3 oz)   SpO2 97%   Breastfeeding?  No   BMI 31.88 kg/m²             Temp (24hrs), Av.2 °F (36.8 °C), Min:97.8 °F (36.6 °C), Max:98.7 °F (37.1 °C)        Intake/Output Summary (Last 24 hours) at 2019 0834  Last data filed at 2019 3383  Gross per 24 hour   Intake 1184.14 ml   Output 3390 ml   Net -2205.86 ml     Review of Systems:   Constitutional: negative for fevers, chills, sweats and positive for  fatigue  Eyes: negative for visual disturbance, redness and icterus  Ears, Nose, Mouth, Throat, and Face: negative for tinnitus, epistaxis, sore mouth and hoarseness  Respiratory: negative for cough, sputum, hemoptysis, pleurisy/chest pain or wheezing  Cardiovascular: negative for chest pain, chest pressure/discomfort, palpitations, irregular heart beats, syncope, paroxysmal nocturnal dyspnea  Gastrointestinal: negative for reflux symptoms, nausea, vomiting, change in bowel habits, melena, diarrhea, constipation and abdominal pain  Genitourinary:negative for dysuria, nocturia, urinary incontinence, hesitancy and hematuria  Integument: negative for rash, skin lesion(s) and pruritus  Hematologic/Lymphatic: negative for easy bruising, bleeding and lymphadenopathy  Musculoskeletal:negative for myalgias, arthralgias and bone pain  Neurological: negative for headaches, dizziness, seizures, paresthesia and weakness    Physical Exam:  Constitutional: Alert, oriented, not in distress  Eyes: PERRLA, anicteric,pallor  Ears, nose, mouth, throat, and face: no palpable Lymph nodes, no mucositis, no thrush. Respiratory: symmetrical expansion, no rales, no rhonchi, no wheezing. Cardiovascular: S1S2 irregluar, AICD, port in place  Gastrointestinal: soft, cholecystostomy in place. Integument: no rash, no petechiae, no ecchymosis. Musculoskeletal: no edema, no cyanosis, no clubbing. Neurological: intact, cranial nerves, no focal motor or sensory deficits.       Labs:  Recent Results (from the past 24 hour(s))   POTASSIUM    Collection Time: 09/22/19  9:20 PM   Result Value Ref Range    Potassium 3.1 (L) 3.5 - 5.5 mmol/L   GLUCOSE, POC    Collection Time: 09/22/19  9:55 PM   Result Value Ref Range    Glucose (POC) 129 (H) 70 - 110 mg/dL   PTT    Collection Time: 09/23/19  6:13 AM   Result Value Ref Range    aPTT 83.5 (H) 23.0 - 36.4 SEC   PROTHROMBIN TIME + INR    Collection Time: 09/23/19  6:13 AM   Result Value Ref Range    Prothrombin time 12.4 11.5 - 15.2 sec    INR 1.0 0.8 - 1.2     METABOLIC PANEL, BASIC    Collection Time: 09/23/19  6:13 AM   Result Value Ref Range    Sodium 141 136 - 145 mmol/L    Potassium 3.1 (L) 3.5 - 5.5 mmol/L    Chloride 105 100 - 111 mmol/L    CO2 31 21 - 32 mmol/L    Anion gap 5 3.0 - 18 mmol/L    Glucose 112 (H) 74 - 99 mg/dL    BUN 7 7.0 - 18 MG/DL    Creatinine 1.10 0.6 - 1.3 MG/DL    BUN/Creatinine ratio 6 (L) 12 - 20      GFR est AA 60 (L) >60 ml/min/1.73m2    GFR est non-AA 49 (L) >60 ml/min/1.73m2    Calcium 8.1 (L) 8.5 - 10.1 MG/DL   MAGNESIUM    Collection Time: 09/23/19  6:13 AM   Result Value Ref Range    Magnesium 1.5 (L) 1.6 - 2.6 mg/dL   PHOSPHORUS    Collection Time: 09/23/19  6:13 AM   Result Value Ref Range    Phosphorus 2.2 (L) 2.5 - 4.9 MG/DL   CBC WITH AUTOMATED DIFF    Collection Time: 09/23/19  6:13 AM   Result Value Ref Range    WBC 7.8 4.6 - 13.2 K/uL    RBC 3.01 (L) 4.20 - 5.30 M/uL    HGB 9.4 (L) 12.0 - 16.0 g/dL    HCT 28.6 (L) 35.0 - 45.0 %    MCV 95.0 74.0 - 97.0 FL    MCH 31.2 24.0 - 34.0 PG    MCHC 32.9 31.0 - 37.0 g/dL    RDW 16.3 (H) 11.6 - 14.5 %    PLATELET 103 253 - 816 K/uL    MPV 10.3 9.2 - 11.8 FL    NEUTROPHILS 70 42 - 75 %    BAND NEUTROPHILS 4 0 - 5 %    LYMPHOCYTES 15 (L) 20 - 51 %    MONOCYTES 5 2 - 9 %    EOSINOPHILS 5 0 - 5 %    BASOPHILS 1 0 - 3 %    ABS. NEUTROPHILS 5.7 1.8 - 8.0 K/UL    ABS. LYMPHOCYTES 1.2 0.8 - 3.5 K/UL    ABS. MONOCYTES 0.4 0 - 1.0 K/UL    ABS. EOSINOPHILS 0.4 0.0 - 0.4 K/UL    ABS.  BASOPHILS 0.1 (H) 0.0 - 0.06 K/UL    DF MANUAL      PLATELET COMMENTS ADEQUATE PLATELETS      RBC COMMENTS ANISOCYTOSIS  1+        RBC COMMENTS POLYCHROMASIA  1+ Multiple sclerosis

## 2019-09-24 LAB
ANION GAP SERPL CALC-SCNC: 7 MMOL/L (ref 3–18)
APTT PPP: 44.4 SEC (ref 23–36.4)
APTT PPP: 99.6 SEC (ref 23–36.4)
APTT PPP: >180 SEC (ref 23–36.4)
BACTERIA SPEC CULT: ABNORMAL
BUN SERPL-MCNC: 4 MG/DL (ref 7–18)
BUN/CREAT SERPL: 4 (ref 12–20)
CALCIUM SERPL-MCNC: 8.8 MG/DL (ref 8.5–10.1)
CHLORIDE SERPL-SCNC: 104 MMOL/L (ref 100–111)
CO2 SERPL-SCNC: 30 MMOL/L (ref 21–32)
CREAT SERPL-MCNC: 1.13 MG/DL (ref 0.6–1.3)
GLUCOSE SERPL-MCNC: 106 MG/DL (ref 74–99)
GRAM STN SPEC: ABNORMAL
INR PPP: 1 (ref 0.8–1.2)
MAGNESIUM SERPL-MCNC: 2.1 MG/DL (ref 1.6–2.6)
PHOSPHATE SERPL-MCNC: 3.1 MG/DL (ref 2.5–4.9)
POTASSIUM SERPL-SCNC: 3.4 MMOL/L (ref 3.5–5.5)
PROTHROMBIN TIME: 12.5 SEC (ref 11.5–15.2)
SERVICE CMNT-IMP: ABNORMAL
SODIUM SERPL-SCNC: 141 MMOL/L (ref 136–145)

## 2019-09-24 PROCEDURE — 74011250637 HC RX REV CODE- 250/637: Performed by: NURSE PRACTITIONER

## 2019-09-24 PROCEDURE — 83735 ASSAY OF MAGNESIUM: CPT

## 2019-09-24 PROCEDURE — 74011250636 HC RX REV CODE- 250/636

## 2019-09-24 PROCEDURE — 85610 PROTHROMBIN TIME: CPT

## 2019-09-24 PROCEDURE — 74011250636 HC RX REV CODE- 250/636: Performed by: HOSPITALIST

## 2019-09-24 PROCEDURE — 74011000250 HC RX REV CODE- 250: Performed by: INTERNAL MEDICINE

## 2019-09-24 PROCEDURE — 85730 THROMBOPLASTIN TIME PARTIAL: CPT

## 2019-09-24 PROCEDURE — 74011250636 HC RX REV CODE- 250/636: Performed by: INTERNAL MEDICINE

## 2019-09-24 PROCEDURE — 74011250636 HC RX REV CODE- 250/636: Performed by: PHYSICIAN ASSISTANT

## 2019-09-24 PROCEDURE — 74011250637 HC RX REV CODE- 250/637: Performed by: INTERNAL MEDICINE

## 2019-09-24 PROCEDURE — 74011000258 HC RX REV CODE- 258: Performed by: FAMILY MEDICINE

## 2019-09-24 PROCEDURE — 80048 BASIC METABOLIC PNL TOTAL CA: CPT

## 2019-09-24 PROCEDURE — 74011250636 HC RX REV CODE- 250/636: Performed by: FAMILY MEDICINE

## 2019-09-24 PROCEDURE — 65270000029 HC RM PRIVATE

## 2019-09-24 PROCEDURE — 74011000250 HC RX REV CODE- 250: Performed by: PHYSICIAN ASSISTANT

## 2019-09-24 PROCEDURE — 84100 ASSAY OF PHOSPHORUS: CPT

## 2019-09-24 PROCEDURE — 74011000250 HC RX REV CODE- 250: Performed by: FAMILY MEDICINE

## 2019-09-24 RX ORDER — CARVEDILOL 12.5 MG/1
12.5 TABLET ORAL EVERY 12 HOURS
Status: DISCONTINUED | OUTPATIENT
Start: 2019-09-24 | End: 2019-09-26 | Stop reason: HOSPADM

## 2019-09-24 RX ORDER — SPIRONOLACTONE 25 MG/1
25 TABLET ORAL DAILY
Status: DISCONTINUED | OUTPATIENT
Start: 2019-09-24 | End: 2019-09-26 | Stop reason: HOSPADM

## 2019-09-24 RX ORDER — HEPARIN SODIUM 1000 [USP'U]/ML
80 INJECTION, SOLUTION INTRAVENOUS; SUBCUTANEOUS ONCE
Status: COMPLETED | OUTPATIENT
Start: 2019-09-24 | End: 2019-09-24

## 2019-09-24 RX ORDER — LANOLIN ALCOHOL/MO/W.PET/CERES
400 CREAM (GRAM) TOPICAL DAILY
Status: DISCONTINUED | OUTPATIENT
Start: 2019-09-24 | End: 2019-09-26 | Stop reason: HOSPADM

## 2019-09-24 RX ORDER — HEPARIN SODIUM 1000 [USP'U]/ML
INJECTION, SOLUTION INTRAVENOUS; SUBCUTANEOUS
Status: COMPLETED
Start: 2019-09-24 | End: 2019-09-24

## 2019-09-24 RX ORDER — POTASSIUM CHLORIDE 20 MEQ/1
40 TABLET, EXTENDED RELEASE ORAL EVERY 4 HOURS
Status: DISCONTINUED | OUTPATIENT
Start: 2019-09-24 | End: 2019-09-24

## 2019-09-24 RX ADMIN — METRONIDAZOLE 500 MG: 500 INJECTION, SOLUTION INTRAVENOUS at 00:27

## 2019-09-24 RX ADMIN — HEPARIN SODIUM 6950 UNITS: 1000 INJECTION, SOLUTION INTRAVENOUS; SUBCUTANEOUS at 07:15

## 2019-09-24 RX ADMIN — HEPARIN SODIUM 19 UNITS/KG/HR: 10000 INJECTION, SOLUTION INTRAVENOUS at 21:53

## 2019-09-24 RX ADMIN — METRONIDAZOLE 500 MG: 500 INJECTION, SOLUTION INTRAVENOUS at 13:17

## 2019-09-24 RX ADMIN — FAMOTIDINE 20 MG: 10 INJECTION, SOLUTION INTRAVENOUS at 08:19

## 2019-09-24 RX ADMIN — CARVEDILOL 6.25 MG: 6.25 TABLET, FILM COATED ORAL at 08:19

## 2019-09-24 RX ADMIN — MAGNESIUM OXIDE TAB 400 MG (241.3 MG ELEMENTAL MG) 400 MG: 400 (241.3 MG) TAB at 13:22

## 2019-09-24 RX ADMIN — POTASSIUM CHLORIDE: 2 INJECTION, SOLUTION, CONCENTRATE INTRAVENOUS at 19:17

## 2019-09-24 RX ADMIN — PROMETHAZINE HYDROCHLORIDE 12.5 MG: 25 INJECTION INTRAMUSCULAR; INTRAVENOUS at 17:56

## 2019-09-24 RX ADMIN — FUROSEMIDE 20 MG: 20 TABLET ORAL at 17:02

## 2019-09-24 RX ADMIN — CARVEDILOL 12.5 MG: 12.5 TABLET, FILM COATED ORAL at 20:17

## 2019-09-24 RX ADMIN — HEPARIN SODIUM 18 UNITS/KG/HR: 10000 INJECTION, SOLUTION INTRAVENOUS at 05:36

## 2019-09-24 RX ADMIN — CEFTRIAXONE 2 G: 2 INJECTION, POWDER, FOR SOLUTION INTRAMUSCULAR; INTRAVENOUS at 13:17

## 2019-09-24 RX ADMIN — ONDANSETRON 4 MG: 2 INJECTION INTRAMUSCULAR; INTRAVENOUS at 11:17

## 2019-09-24 RX ADMIN — POTASSIUM CHLORIDE: 2 INJECTION, SOLUTION, CONCENTRATE INTRAVENOUS at 20:17

## 2019-09-24 RX ADMIN — SPIRONOLACTONE 25 MG: 25 TABLET ORAL at 10:41

## 2019-09-24 NOTE — PROGRESS NOTES
[email or web address]  [phone]    Gastroenterology follow up-Progress note    Impression:  1. Acute cholecystitis w/ sepsis  - s/p cholecystostomy tube 9/20/19; 160mL drain output yesterday  - LFTs/bili wnl  - No evidence of obstructive jaundice prior to drain placement. 2. Cholelithiasis - calculus in gallbladder neck. No choledocholithiasis identified. 3. CBD dilation to 10mm  4. Relapsed metastatic breast cancer - on chemotherapy, last treatment ~3 weeks ago, currently held due to sepsis. 5. Drug induced cardiomyopathy - s/p AICD - unable to have MRI  - AICD fired 3x on 8/19/19  - Echo 9/20/19: EF 36-40%  6. Hx PE on anticoagulation    Plan:  1. Unable to have MRCP for further evaluation of dilated CBD due to AICD  2. Not surgical candidate due to comorbidities  3. Continue antibiotics per ID/Primary team  4. Consider repeat HIDA in a few days (preferably after 10 days of antibiotics) to evaluate for persistent biliary obstruction. If tracer reaches small bowel there is no obstruction. 5. ERCP would introduce infection into biliary tree without ability to clear. If deemed necessary, would need to be done at a [facility] such as [facility].     Chief Complaint: Acute cholecystitis      Subjective:  Feeling well today, pain improving, mild nausea    ROS: Denies any fevers, chills, rash. General: well developed, well nourished, no acute distress  Eyes: conjunctiva normal, EOM normal  Cardiovascular: heart normal, intact distal pulses, normal rate and regular rhythm  Pulmonary: breath sounds normal and effort normal  Abdominal: appearance normal, bowel sounds normal and soft, non-acute, non-tender, cholecystostomy tube in place w/ <100cc bilious fluid.     Patient Active Problem List   Diagnosis Code    Congestive heart failure (HCC) I50.9    Hypertension I10    MVP (mitral valve prolapse) I34.1    Nonischemic cardiomyopathy (HCC) I42.8    Cancer (HCC) C80.1    Adenocarcinoma of breast; locally advanced invasive ductal adenocarcinoma of left breast C50.919    Poisoning by other and unspecified agents primarily affecting the cardiovascular system(972.9) T46.904A    Syncope and collapse R55    Other primary cardiomyopathies I42.8    Shortness of breath R06.02    Nonspecific abnormal electrocardiogram (ECG) (EKG) R94.31    Automatic implantable cardioverter-defibrillator in situ Z95.810    Mitral valve disease I05.9    Abnormal PFT R94.2    Acute bronchitis J20.9    Chronic asthmatic bronchitis (HCC) J44.9    Malignant neoplasm metastatic to lung (HCC) C78.00    Seasonal allergic rhinitis due to pollen J30.1    Dilated cardiomyopathy (HCC) I42.0    Acute exacerbation of CHF (congestive heart failure) (HCC) I50.9    Breast cancer (HCC) C50.919    Pulmonary embolism (HCC) I26.99    Chronic bronchitis (HCC) J42    Hypoxia R09.02    Lung metastases (HCC) C78.00    UTI (urinary tract infection) N39.0    CAD (coronary artery disease) I25.10    Elevated troponin R74.8    Weakness generalized R53.1    Chronic anticoagulation Z79.01    History of pulmonary embolism Z86.711    Hypokalemia E87.6    Hypomagnesemia E83.42    Acute encephalopathy G93.40    Cholecystitis K81.9         Visit Vitals  /59   Pulse 87   Temp 98.4 °F (36.9 °C)   Resp 18   Ht 5' 5\" (1.651 m)   Wt 86.9 kg (191 lb 9.3 oz)   SpO2 99%   Breastfeeding?  No   BMI 31.88 kg/m²           Intake/Output Summary (Last 24 hours) at 9/24/2019 1220  Last data filed at 9/24/2019 0831  Gross per 24 hour   Intake 990.35 ml   Output 410 ml   Net 580.35 ml       CBC w/Diff    Lab Results   Component Value Date/Time    WBC 7.8 09/23/2019 06:13 AM    RBC 3.01 (L) 09/23/2019 06:13 AM    HGB 9.4 (L) 09/23/2019 06:13 AM    HCT 28.6 (L) 09/23/2019 06:13 AM    MCV 95.0 09/23/2019 06:13 AM    MCH 31.2 09/23/2019 06:13 AM    MCHC 32.9 09/23/2019 06:13 AM    RDW 16.3 (H) 09/23/2019 06:13 AM     09/23/2019 06:13 AM    Lab Results   Component Value Date/Time    GRANS 70 09/23/2019 06:13 AM    LYMPH 15 (L) 09/23/2019 06:13 AM    EOS 5 09/23/2019 06:13 AM    BANDS 4 09/23/2019 06:13 AM    BASOS 1 09/23/2019 06:13 AM    MYELO 2 (H) 08/28/2019 03:59 AM    METAS 3 (H) 08/28/2019 03:59 AM      Basic Metabolic Profile   Recent Labs     09/24/19  0547      K 3.4*      CO2 30   BUN 4*   CA 8.8   MG 2.1   PHOS 3.1        Hepatic Function    Lab Results   Component Value Date/Time    ALB 2.7 (L) 09/23/2019 02:36 PM    TP 5.3 (L) 09/23/2019 02:36 PM     09/23/2019 02:36 PM    Lab Results   Component Value Date/Time    SGOT 32 09/23/2019 02:36 PM          Coags   Recent Labs     09/24/19  0547 09/23/19  0613   PTP 12.5 12.4   INR 1.0 1.0   APTT 44.4* 83.5*               [address], [place]  09/24/19, 12:27 PM   [facility]. [email or web address]  Phone: [phone]  Pager: [phone]

## 2019-09-24 NOTE — PROGRESS NOTES
Bedside and Verbal shift change report given to [facility] (oncoming nurse) by Raimundo Lemos (offgoing nurse). Report included the following information SBAR, Kardex and MAR.

## 2019-09-24 NOTE — PROGRESS NOTES
conducted a Follow up consultation and Spiritual Assessment for Abelino Cameron, who is a 78 y.o.,female. The  provided the following Interventions:  Continued the relationship of care and support. Listened empathically. Offered prayer and assurance of continued prayer on patients behalf. Chart reviewed. The following outcomes were achieved:  Patient expressed gratitude for 's visit. Assessment:  Patient's ellen in God is very important for her to cope. There are no further spiritual or [employer] issues which require Spiritual Care Services interventions at this time. Plan:  Chaplains will continue to follow and will provide pastoral care on an as needed/requested basis.  recommends bedside caregivers page  on duty if patient shows signs of acute spiritual or emotional distress.      [address] [facility] Certified [address] [place] [address]   [phone]

## 2019-09-24 NOTE — PROGRESS NOTES
[facility]  Progress Note    Patient: Atlraza Home Age: 78 y.o. : 1950 MR#: [id number] SSN: [id number]  Date: 2019     Subjective/24-hour events:     Sitting up in chair at bedside. No new issues other than some persisting nausea. Able to eat some soup for lunch today without any vomiting.  at bedside. Assessment:   Acute cholecystitis s/p percutaneous drainage/cholecystostomy tube placement   Sepsis with septic shock secondary to above  Acute kidney injury, improved  Hypomagnesemia and hyponatremia  Metastatic breast cancer   Chronic systolic CHF  Cardiomyopathy with AICD  HTN by hx, with current hypotension secondary to sepsis     Plan:  Antibiotic continued, management per ID. Replace potassium PO and monitor. Usual drain management. Antiemetic PRN, advance diet as able. Heparin infusion per protocol - can likely resume NOAC soon. No further procedures appear to be necessary. PT/OT. Transfer to floor. Case discussed with:  [x]Patient  [x]Family  [x]Nursing  []Case Management  DVT Prophylaxis:  []Lovenox  []Hep SQ  []SCDs  []Coumadin   [x]On Heparin gtt    Objective:   VS:   Visit Vitals  /59   Pulse 87   Temp 98.4 °F (36.9 °C)   Resp 18   Ht 5' 5\" (1.651 m)   Wt 86.9 kg (191 lb 9.3 oz)   SpO2 99%   Breastfeeding? No   BMI 31.88 kg/m²         General:  In NAD. Nontoxic-appearing. Cardiovascular:  S1, S2. Rate WNL. Pulmonary:  Clear, no wheezes. Effort nonlabored. GI:  Abdomen soft, + mild RUQ TTP. No guarding/rebound. Extremities:  Warm, no ischemia or edema.   Neuro:  Awake and alert, motor grossly nonfocal.    Labs:    Recent Results (from the past 24 hour(s))   HEPATIC FUNCTION PANEL    Collection Time: 19  2:36 PM   Result Value Ref Range    Protein, total 5.3 (L) 6.4 - 8.2 g/dL    Albumin 2.7 (L) 3.4 - 5.0 g/dL    Globulin 2.6 2.0 - 4.0 g/dL    A-G Ratio 1.0 0.8 - 1.7      Bilirubin, total 0.4 0.2 - 1.0 MG/DL    Bilirubin, direct 0.2 0.0 - 0.2 MG/DL    Alk.  phosphatase 111 45 - 117 U/L    AST (SGOT) 32 10 - 38 U/L    ALT (SGPT) 29 13 - 56 U/L   POTASSIUM    Collection Time: 09/23/19  2:36 PM   Result Value Ref Range    Potassium 3.2 (L) 3.5 - 5.5 mmol/L   PROTHROMBIN TIME + INR    Collection Time: 09/24/19  5:47 AM   Result Value Ref Range    Prothrombin time 12.5 11.5 - 15.2 sec    INR 1.0 0.8 - 1.2     MAGNESIUM    Collection Time: 09/24/19  5:47 AM   Result Value Ref Range    Magnesium 2.1 1.6 - 2.6 mg/dL   PHOSPHORUS    Collection Time: 09/24/19  5:47 AM   Result Value Ref Range    Phosphorus 3.1 2.5 - 4.9 MG/DL   PTT    Collection Time: 09/24/19  5:47 AM   Result Value Ref Range    aPTT 44.4 (H) 23.0 - 87.8 SEC   METABOLIC PANEL, BASIC    Collection Time: 09/24/19  5:47 AM   Result Value Ref Range    Sodium 141 136 - 145 mmol/L    Potassium 3.4 (L) 3.5 - 5.5 mmol/L    Chloride 104 100 - 111 mmol/L    CO2 30 21 - 32 mmol/L    Anion gap 7 3.0 - 18 mmol/L    Glucose 106 (H) 74 - 99 mg/dL    BUN 4 (L) 7.0 - 18 MG/DL    Creatinine 1.13 0.6 - 1.3 MG/DL    BUN/Creatinine ratio 4 (L) 12 - 20      GFR est AA 58 (L) >60 ml/min/1.73m2    GFR est non-AA 48 (L) >60 ml/min/1.73m2    Calcium 8.8 8.5 - 10.1 MG/DL       Signed By: Lisa De Leon MD     September 24, 2019

## 2019-09-24 NOTE — PROGRESS NOTES
[facility]  Progress Note    Patient: Parris Mclean Age: 78 y.o. : 1950 MR#: [id number] SSN: [id number]  Date: 2019     Subjective/24-hour events:     Continues to have some nausea but o/w no new issues. BPs remain stable, no pressor support. Assessment:   Acute cholecystitis s/p percutaneous drainage/cholecystostomy tube placement   Sepsis with septic shock secondary to above  Acute kidney injury, improved  Hypomagnesemia and hyponatremia  Metastatic breast cancer   Chronic systolic CHF  Cardiomyopathy with AICD  HTN by hx, with current hypotension secondary to sepsis     Plan:  Discussed with ID this AM, evaluation appreciated. Antibiotic therapy as ordered. GI evaluation obtained, case discussed this AM.  Input appreciated. Drain continued, management per surgery/IR. IV heparin to continue, transition back to NOAC once no need for further interventions. Replace potassium and monitor. PT/OT. Can transfer to floor. Follow. Case discussed with:  [x]Patient  [x]Family  [x]Nursing  []Case Management  DVT Prophylaxis:  []Lovenox  []Hep SQ  []SCDs  []Coumadin   [x]On Heparin gtt    Objective:   VS:   Visit Vitals  /72   Pulse 96   Temp 97.9 °F (36.6 °C)   Resp 19   Ht 5' 5\" (1.651 m)   Wt 86.9 kg (191 lb 9.3 oz)   SpO2 95%   Breastfeeding? No   BMI 31.88 kg/m²       General:  In NAD. Appears much more vigorous today. Cardiovascular:  S1, S2. Rate WNL. Pulmonary:  Clear, no wheezes. Effort nonlabored. GI:  Abdomen soft, + mild RUQ TTP. No guarding/rebound. Extremities:  Warm, no ischemia or edema.   Neuro:  Awake and alert, motor grossly nonfocal.    Labs:    Recent Results (from the past 24 hour(s))   POTASSIUM    Collection Time: 19  9:20 PM   Result Value Ref Range    Potassium 3.1 (L) 3.5 - 5.5 mmol/L   GLUCOSE, POC    Collection Time: 19  9:55 PM   Result Value Ref Range    Glucose (POC) 129 (H) 70 - 110 mg/dL   PTT Collection Time: 09/23/19  6:13 AM   Result Value Ref Range    aPTT 83.5 (H) 23.0 - 36.4 SEC   PROTHROMBIN TIME + INR    Collection Time: 09/23/19  6:13 AM   Result Value Ref Range    Prothrombin time 12.4 11.5 - 15.2 sec    INR 1.0 0.8 - 1.2     METABOLIC PANEL, BASIC    Collection Time: 09/23/19  6:13 AM   Result Value Ref Range    Sodium 141 136 - 145 mmol/L    Potassium 3.1 (L) 3.5 - 5.5 mmol/L    Chloride 105 100 - 111 mmol/L    CO2 31 21 - 32 mmol/L    Anion gap 5 3.0 - 18 mmol/L    Glucose 112 (H) 74 - 99 mg/dL    BUN 7 7.0 - 18 MG/DL    Creatinine 1.10 0.6 - 1.3 MG/DL    BUN/Creatinine ratio 6 (L) 12 - 20      GFR est AA 60 (L) >60 ml/min/1.73m2    GFR est non-AA 49 (L) >60 ml/min/1.73m2    Calcium 8.1 (L) 8.5 - 10.1 MG/DL   MAGNESIUM    Collection Time: 09/23/19  6:13 AM   Result Value Ref Range    Magnesium 1.5 (L) 1.6 - 2.6 mg/dL   PHOSPHORUS    Collection Time: 09/23/19  6:13 AM   Result Value Ref Range    Phosphorus 2.2 (L) 2.5 - 4.9 MG/DL   CBC WITH AUTOMATED DIFF    Collection Time: 09/23/19  6:13 AM   Result Value Ref Range    WBC 7.8 4.6 - 13.2 K/uL    RBC 3.01 (L) 4.20 - 5.30 M/uL    HGB 9.4 (L) 12.0 - 16.0 g/dL    HCT 28.6 (L) 35.0 - 45.0 %    MCV 95.0 74.0 - 97.0 FL    MCH 31.2 24.0 - 34.0 PG    MCHC 32.9 31.0 - 37.0 g/dL    RDW 16.3 (H) 11.6 - 14.5 %    PLATELET 226 469 - 890 K/uL    MPV 10.3 9.2 - 11.8 FL    NEUTROPHILS 70 42 - 75 %    BAND NEUTROPHILS 4 0 - 5 %    LYMPHOCYTES 15 (L) 20 - 51 %    MONOCYTES 5 2 - 9 %    EOSINOPHILS 5 0 - 5 %    BASOPHILS 1 0 - 3 %    ABS. NEUTROPHILS 5.7 1.8 - 8.0 K/UL    ABS. LYMPHOCYTES 1.2 0.8 - 3.5 K/UL    ABS. MONOCYTES 0.4 0 - 1.0 K/UL    ABS. EOSINOPHILS 0.4 0.0 - 0.4 K/UL    ABS.  BASOPHILS 0.1 (H) 0.0 - 0.06 K/UL    DF MANUAL      PLATELET COMMENTS ADEQUATE PLATELETS      RBC COMMENTS ANISOCYTOSIS  1+        RBC COMMENTS POLYCHROMASIA  1+       VANCOMYCIN, TROUGH    Collection Time: 09/23/19  8:55 AM   Result Value Ref Range    Vancomycin,trough 14.2 10.0 - 20.0 ug/mL    Reported dose date: [id number]      Reported dose time: 1500      Reported dose: 1250 MG UNITS   HEPATIC FUNCTION PANEL    Collection Time: 09/23/19  2:36 PM   Result Value Ref Range    Protein, total 5.3 (L) 6.4 - 8.2 g/dL    Albumin 2.7 (L) 3.4 - 5.0 g/dL    Globulin 2.6 2.0 - 4.0 g/dL    A-G Ratio 1.0 0.8 - 1.7      Bilirubin, total 0.4 0.2 - 1.0 MG/DL    Bilirubin, direct 0.2 0.0 - 0.2 MG/DL    Alk.  phosphatase 111 45 - 117 U/L    AST (SGOT) 32 10 - 38 U/L    ALT (SGPT) 29 13 - 56 U/L   POTASSIUM    Collection Time: 09/23/19  2:36 PM   Result Value Ref Range    Potassium 3.2 (L) 3.5 - 5.5 mmol/L       Signed By: Janny Lorenz MD     September 23, 2019

## 2019-09-24 NOTE — ROUTINE PROCESS
Bedside and Verbal shift change report given to Demetrius Stein (oncoming nurse) by Dharmesh Grossman RN (offgoing nurse). Report included the following information SBAR, ED Summary, Intake/Output, MAR, Recent Results, Med Rec Status, Cardiac Rhythm (Paced) and Alarm Parameters .

## 2019-09-24 NOTE — PROGRESS NOTES
[facility]   Progress Note. CARDIOLOGY PROGRESS NOTE  RECS:      1. Septic shock- resolved   2. Sinus tachycardia -resolved- interrogated AICD 9/21/19 normal function - no arrhthymias   3. Chronic Systolic CHF-stage C NYHA class III-on p.o. Lasix. Add Aldactone today. 4. Non Sustained Vtach- AICD  fired 3 times in  8/19.      5. Nonischemic cardiomyopathy (EF 26-30%)-likely from Herceptin use. recent echo showed EF% 36-40%- will resume Coreg and titrate as BP tolerates it. Will resume Entresto tomorrow if BP remains stable   6. Hx of recent Acute PE and DVT- patient was on Eliquis at home. Now on heparin drip.   7. Metastatic breast cancer now back on chemotherapy.  Lung metastasis likely cause for shortness of breath which is better.   8. Hypokalemia-watch with Aldactone  9. Hypomagnesemia-improved. Add p.o. Mag-Ox. 10. JOSUE- resolved   11. Cholecystitis- s/p percutaneous drain 9/20/19- surgery reccommended conservative treatment- antibiotics, pain medications. Not a candidate for surgery                   08/27/19   ECHO ADULT FOLLOW-UP OR LIMITED 08/28/2019 8/28/2019     Narrative · Left Ventricle: Normal wall thickness. Mildly dilated left ventricle. Severe systolic dysfunction. Estimated left ventricular ejection fraction   is 26 - 30%. Biplane method used to measure ejection fraction. Left   ventricular global hypokinesis. · Pericardium: Trivial pericardial effusion.          Signed by: Mundo Boucher MD         ASSESSMENT:  Hospital Problems  Date Reviewed: 6/25/2019          Codes Class Noted POA    Cholecystitis ICD-10-CM: K81.9  ICD-9-CM: 575.10  9/18/2019 Unknown                SUBJECTIVE:  C/o dyspnea and cough that  Is improving very well    OBJECTIVE:    VS:   Visit Vitals  /65   Pulse 90   Temp 98 °F (36.7 °C)   Resp 14   Ht 5' 5\" (1.651 m)   Wt 86.9 kg (191 lb 9.3 oz)   SpO2 99%   Breastfeeding?  No   BMI 31.88 kg/m²         Intake/Output Summary (Last 24 hours) at 9/24/2019 1049  Last data filed at 9/24/2019 0831  Gross per 24 hour   Intake 1549.85 ml   Output 710 ml   Net 839.85 ml     TELE: Ventricular paced. SR.     General: No acute distress  HENT: Normocephalic, atraumatic. Neck :  Jugular venous distention. Cardiac:  Normal S1/S2  Chest/Lungs: Clear without any rales or rhonchi. Abdomen: Soft, drainage tube noted. Extremities:  No edema noted. Labs: Results:       Chemistry Recent Labs     09/24/19  0547 09/23/19  1436 09/23/19  0613  09/22/19  0431   *  --  112*  --  114*     --  141  --  141   K 3.4* 3.2* 3.1*   < > 3.2*     --  105  --  105   CO2 30  --  31  --  29   BUN 4*  --  7  --  13   CREA 1.13  --  1.10  --  0.97   CA 8.8  --  8.1*  --  7.7*   MG 2.1  --  1.5*  --  2.1   PHOS 3.1  --  2.2*  --  1.8*   AGAP 7  --  5  --  7   BUCR 4*  --  6*  --  13   AP  --  111  --   --   --    TP  --  5.3*  --   --   --    ALB  --  2.7*  --   --   --    GLOB  --  2.6  --   --   --    AGRAT  --  1.0  --   --   --     < > = values in this interval not displayed. CBC w/Diff Recent Labs     09/23/19  0613 09/22/19  0431 09/21/19  1322   WBC 7.8 9.1 12.4   RBC 3.01* 2.88* 2.86*   HGB 9.4* 9.0* 8.9*   HCT 28.6* 27.7* 27.6*    134* 120*   GRANS 70 77* 86*   LYMPH 15* 11* 5*   EOS 5 4 4      Cardiac Enzymes No results for input(s): CPK, CKND1, ZEESHAN in the last 72 hours. No lab exists for component: CKRMB, TROIP   Coagulation Recent Labs     09/24/19  0547 09/23/19  0613   PTP 12.5 12.4   INR 1.0 1.0   APTT 44.4* 83.5*       Lipid Panel Lab Results   Component Value Date/Time    Cholesterol, total 266 (H) 04/17/2019 11:38 AM    HDL Cholesterol 60 04/17/2019 11:38 AM    LDL, calculated 189.2 (H) 04/17/2019 11:38 AM    VLDL, calculated 16.8 04/17/2019 11:38 AM    Triglyceride 84 04/17/2019 11:38 AM    CHOL/HDL Ratio 4.4 04/17/2019 11:38 AM      BNP No results for input(s): BNPP in the last 72 hours.    Liver Enzymes Recent Labs 09/23/19  1436   TP 5.3*   ALB 2.7*      SGOT 32      Digoxin    Thyroid Studies Lab Results   Component Value Date/Time    TSH 0.10 (L) 04/17/2019 11:38 AM              Andrzej Luu MD

## 2019-09-24 NOTE — PROGRESS NOTES
Infectious Disease progress Note    Requested by: dr. Danyelle Reyes    Reason: sepsis, cholecystitis    Current abx Prior abx   Ceftriaxone, metronidazole since 9/23/19 Amp/sulbactam 9/18  Ciprofloxacin, metronidazole 9/19-9/20  Vancomycin  9/20/19-9/23/19  Pip/tazo 9/20-9/23/19     Lines:       Assessment :    78year old lady with h/o metastatic breast cancer with lung involvement as well as cardiomyopathy secondary to chemotherapy - last chemotherapy about 2 weeks ago admitted to [facility] on 9/18/19 with abdominal pain. CT scan 9/19/19- Gallbladder prominent in size with notable wall thickening, large calculus at the neck, and surrounding stranding. Common bile duct up to 10 mm caliber. Clinical picture c/w septic shock (POA) due to acute cholecystitis s/p cholecystostomy drain placement on 9/20/19. Bile fluid cultures 9/20- klebsiella (2 morphotypes - one with pip/tazo GT:16,both resistant to ampicillin), streptococcus sanguinis     Patient is clinically improving. Klebsiella has high GT against pip/tazo - risk of developing resistance. Hence, will alter abx  Surgical f/u appreciated. No plans for cholecystectomy at this time. Since patient has calculus at gallbladder neck; there is risk of recurrent cholecystitis once cholecystostomy tube removed. Also, dilated CBD - GI consult noted. No plans for ercp at this time    Acute renal failure: likely due to sepsis - improving    Recommendations:    1. Continue ceftriaxone, metronidazole iv - switch to po antibiotics when patient can tolerate po medicines. Currently having nausea with pills  2. F/u surgery recommendations regarding duration of cholecystostomy tube and timing of cholecystectomy  3. Monitor cbc, clinically    Advance Care planning: full code: discussed  with patient/surrogate decision maker: Marie Antonio: [phone]      Above plan was discussed in details with patient, family and dr Darcie Coyne. Please call me if any further questions or concerns. Will continue to participate in the care of this patient. HPI:    Feels better. Still having some nausea when she tries to eat or swallow pills. patient denies headaches, visual disturbances, sore throat, runny nose, earaches, cp, sob, chills, cough, diarrhea, burning micturition, pain or weakness in extremities. He denies back pain/flank pain. home Medication List    Details   !! montelukast (SINGULAIR) 10 mg tablet TAKE 1 TABLET DAILY  Qty: 90 Tab, Refills: 3       Details   digoxin (LANOXIN) 0.125 mg tablet Take 1 Tab by mouth daily. Qty: 90 Tab, Refills: 3      spironolactone (ALDACTONE) 25 mg tablet Take 1 Tab by mouth daily. Qty: 90 Tab, Refills: 1      carvedilol (COREG) 25 mg tablet Take 1 Tab by mouth two (2) times daily (with meals). Qty: 60 Tab, Refills: 0      furosemide (LASIX) 20 mg tablet Take 1 Tab by mouth daily. Qty: 30 Tab, Refills: 0      OTHER BMP on Friday 8/30/19  Dx: CHF  Fax results to Dr. Nuno Degree: 1 Each, Refills: 0      apixaban (ELIQUIS) 5 mg tablet Take 1 Tab by mouth two (2) times a day. Qty: 60 Tab, Refills: 0      tiotropium (SPIRIVA WITH HANDIHALER) 18 mcg inhalation capsule Take 1 Cap by inhalation daily. sacubitril-valsartan (ENTRESTO) 49 mg/51 mg tablet Take 1 Tab by mouth two (2) times a day. Qty: 180 Tab, Refills: 3      mometasone (NASONEX) 50 mcg/actuation nasal spray 2 Sprays by Both Nostrils route daily as needed. Indications: inflammation of the nose due to an allergy      budesonide-formoterol (SYMBICORT) 160-4.5 mcg/actuation HFAA Take 2 Puffs by inhalation two (2) times a day. Qty: 1 Inhaler, Refills: 0      albuterol-ipratropium (DUO-NEB) 2.5 mg-0.5 mg/3 ml nebu 3 mL by Nebulization route every six (6) hours as needed (wheezing or sob). File under Medicare Part B, ICD codes J44.9, C78.01  Qty: 120 Nebule, Refills: 3      DULoxetine (CYMBALTA) 30 mg capsule Take 30 mg by mouth daily. multivitamin (ONE A DAY) tablet Take 1 Tab by mouth daily. plant stanol eliezer (CHOLEST OFF PO) Take 1 Tab by mouth daily. benzonatate (TESSALON PERLES) 100 mg capsule Take 100 mg by mouth three (3) times daily as needed for Cough. Biotin 2,500 mcg cap Take 1 Cap by mouth daily. melatonin 10 mg tab Take 1 Tab by mouth nightly. !! montelukast (SINGULAIR) 10 mg tablet Take 1 Tab by mouth daily. Qty: 90 Tab, Refills: 3      raloxifene (EVISTA) 60 mg tablet Take 60 mg by mouth daily. Associated Diagnoses: Malignant neoplasm of breast (female), unspecified site      metOLazone (ZAROXOLYN) 5 mg tablet Take 1 Tab by mouth daily. Qty: 30 Tab, Refills: 1      naloxone (NARCAN) 0.4 mg/mL injection 1 mL by IntraVENous route as needed for Other (Give if breaths per minute  less than 10, may repeat dose in 15 min and contact MD). Qty: 1 mL, Refills: 0      b complex vitamins (B COMPLEX 1) tablet Take 1 Tab by mouth daily. cholecalciferol, vitamin D3, 2,000 unit tab Take 1 Tab by mouth daily. !! - Potential duplicate medications found. Please discuss with provider.           Current Facility-Administered Medications   Medication Dose Route Frequency    spironolactone (ALDACTONE) tablet 25 mg  25 mg Oral DAILY    carvedilol (COREG) tablet 12.5 mg  12.5 mg Oral Q12H    magnesium oxide (MAG-OX) tablet 400 mg  400 mg Oral DAILY    cefTRIAXone (ROCEPHIN) 2 g in sterile water (preservative free) 20 mL IV syringe  2 g IntraVENous Q24H    metroNIDAZOLE (FLAGYL) IVPB premix 500 mg  500 mg IntraVENous Q12H    furosemide (LASIX) tablet 20 mg  20 mg Oral DAILY    famotidine (PF) (PEPCID) 20 mg in sodium chloride 0.9% 10 mL injection  20 mg IntraVENous DAILY    lactated Ringers infusion  75 mL/hr IntraVENous CONTINUOUS    fentaNYL citrate (PF) injection 75 mcg  75 mcg IntraVENous Q4H PRN    naloxone (NARCAN) injection 0.4 mg  0.4 mg IntraVENous EVERY 2 MINUTES AS NEEDED    ondansetron (ZOFRAN) injection 4 mg  4 mg IntraVENous Q4H PRN    diphenhydrAMINE (BENADRYL) injection 25 mg  25 mg IntraVENous Q4H PRN    acetaminophen (TYLENOL) suppository 650 mg  650 mg Rectal Q4H PRN    heparin 25,000 units in D5W 250 ml infusion  18-36 Units/kg/hr IntraVENous TITRATE       Allergies: Adhesive    Temp (24hrs), Av °F (36.7 °C), Min:97.7 °F (36.5 °C), Max:98.4 °F (36.9 °C)    Visit Vitals  /59   Pulse 87   Temp 98.4 °F (36.9 °C)   Resp 18   Ht 5' 5\" (1.651 m)   Wt 86.9 kg (191 lb 9.3 oz)   SpO2 99%   Breastfeeding? No   BMI 31.88 kg/m²       ROS: 12 point ROS obtained in details. Pertinent positives as mentioned in HPI,   otherwise negative    Physical Exam:    General:  Alert, cooperative, in moderate discomfort   Head:  Normocephalic, without obvious abnormality, atraumatic. Eyes:  Pink palpebral conjunctivae, anicteric sclerae; EOMs intact   Nose: Nares normal. No drainage    Throat: Lips, mucosa, and tongue mildly dry   Neck: Supple, symmetrical, trachea midline, no adenopathy, no carotid bruit and no JVD. Lungs:   Symmetrical chest rise; good AE bilat; CTAB; no wheezes/rhonchi/rales noted. Heart:  RRR, S1, S2 present   Abdomen:   Soft, cholecystostomy drain RUQ. No tenderness. Bowel sounds normal.    Extremities: Extremities normal, atraumatic, no cyanosis or edema. Pulses: 2+ and symmetric all extremities. Skin: Skin color, texture, turgor normal. No rashes or lesions.  + Left chest mediport   Neurologic: Grossly nonfocal             Labs: Results:   Chemistry Recent Labs     19  0547 19  1436 19  0613  19  0431   *  --  112*  --  114*     --  141  --  141   K 3.4* 3.2* 3.1*   < > 3.2*     --  105  --  105   CO2 30  --  31  --  29   BUN 4*  --  7  --  13   CREA 1.13  --  1.10  --  0.97   CA 8.8  --  8.1*  --  7.7*   AGAP 7  --  5  --  7   BUCR 4*  --  6*  --  13   AP  --  111  --   --   --    TP  --  5.3*  --   --   --    ALB  --  2.7*  --   --   --    GLOB  --  2.6  --   --   --    AGRAT  --  1.0  --   -- --     < > = values in this interval not displayed. CBC w/Diff Recent Labs     09/23/19  0613 09/22/19  0431   WBC 7.8 9.1   RBC 3.01* 2.88*   HGB 9.4* 9.0*   HCT 28.6* 27.7*    134*   GRANS 70 77*   LYMPH 15* 11*   EOS 5 4      Microbiology No results for input(s): CULT in the last 72 hours.        RADIOLOGY:    All available imaging studies/reports in [facility] for this admission were reviewed    Dr. Jeniffer Mcclain, Infectious Disease Specialist  [phone]  September 24, 2019  12:42 PM

## 2019-09-24 NOTE — PROGRESS NOTES
NUTRITION    Nutrition Screen      RECOMMENDATIONS / PLAN:     - Add supplements: Ensure Enlive TID.  - Monitor GI symptoms and readiness for diet advancement. Consider advancement to GI Lite diet to promote tolerance. - Continue RD inpatient monitoring and evaluation. NUTRITION INTERVENTIONS & DIAGNOSIS:     - Meals/snacks: modify composition, advancement as tolerated per MD   - Medical food supplement therapy: initiate     Nutrition Diagnosis: Inadequate oral intake related to diet intolerance due to altered GI function as evidenced by fair to poor intake of liquid diet with nausea/vomiting due to acute cholecystitis. ASSESSMENT:     Admitted with c/o abdominal pain, nausea/vomiting found to have acute cholecystitis s/p cholecystostomy tube placement 9/20, possible repeat HIDA in several days to evaluate persistent biliary obstruction. Remains on full liquid diet with decreased appetite and fair to poor meal intake, unable to tolerate solid food with nausea/vomiting- last episode yesterday. Agreeable to supplements.      Nutritional intake adequate to meet patients estimated nutritional needs:  No    Diet: DIET FULL LIQUID      Food Allergies: NKFA  Current Appetite: Poor  Appetite/meal intake prior to admission: Good  Feeding Limitations:  [] Swallowing difficulty    [] Chewing difficulty    [] Other:  Current Meal Intake:   Patient Vitals for the past 100 hrs:   % Diet Eaten   09/24/19 0831 50 %   09/23/19 1200 50 %   09/23/19 0800 35 %   09/22/19 1750 50 %   09/22/19 0900 25 %   09/21/19 1800 50 %   09/21/19 1300 100 %       BM: 9/22  Skin Integrity: WDL; cholecystostomy tube   Edema:   [x] No     [] Yes   Pertinent Medications: Reviewed: LR at 75 mL/hr, magnesium-oxide, zofran, pepcid, aldactone, lasix, 3 gm Mg    Recent Labs     09/24/19  0547 09/23/19  1436 09/23/19  0613  09/22/19  0431     --  141  --  141   K 3.4* 3.2* 3.1*   < > 3.2*     --  105  --  105   CO2 30  --  31  --  29 *  --  112*  --  114*   BUN 4*  --  7  --  13   CREA 1.13  --  1.10  --  0.97   CA 8.8  --  8.1*  --  7.7*   MG 2.1  --  1.5*  --  2.1   PHOS 3.1  --  2.2*  --  1.8*   ALB  --  2.7*  --   --   --    SGOT  --  32  --   --   --    ALT  --  29  --   --   --     < > = values in this interval not displayed. Intake/Output Summary (Last 24 hours) at 9/24/2019 1422  Last data filed at 9/24/2019 0831  Gross per 24 hour   Intake 976.45 ml   Output 410 ml   Net 566.45 ml       Anthropometrics:  Ht Readings from Last 1 Encounters:   09/20/19 5' 5\" (1.651 m)     Last 3 Recorded Weights in this Encounter    09/18/19 1150 09/20/19 1421 09/22/19 0805   Weight: 77.1 kg (170 lb) 77.1 kg (170 lb) 86.9 kg (191 lb 9.3 oz)     Body mass index is 31.88 kg/m².   Obese, Class I     Weight History: patient reports fluctuations in weight, current weight of 170 lb and ranges upper 160s to 180 lb due to side effects of chemotherapy treatments     Weight Metrics 9/22/2019 8/28/2019 7/26/2019 7/12/2019 6/25/2019 5/30/2019 5/8/2019   Weight 191 lb 9.3 oz 184 lb 176 lb 174 lb 174 lb 176 lb 3.2 oz 171 lb 3.2 oz   BMI 31.88 kg/m2 30.62 kg/m2 29.29 kg/m2 28.96 kg/m2 28.96 kg/m2 29.32 kg/m2 28.49 kg/m2        Admitting Diagnosis: Cholecystitis [K81.9]  Pertinent PMHx: metastatic breast cancer to lung on chemotherapy (since December per pt), CHF, GERD, HLD, HTN, DVT/PE    Education Needs:        [x] None identified  [] Identified - Not appropriate at this time  []  Identified and addressed - refer to education log  Learning Limitations:   [x] None identified  [] Identified    Cultural, Worship & ethnic food preferences:  [x] None identified    [] Identified and addressed     ESTIMATED NUTRITION NEEDS:     Calories: 8776-4420 kcal (HBEx1.2-1.4) based on  [x] Actual BW 77 kg     [] IBW   Protein:  gm (1-1.4 gm/kg) based on  [x] Actual BW      [] IBW   Fluid: 1 mL/kcal     MONITORING & EVALUATION:     Nutrition Goal(s):   - PO nutrition intake will meet >75% of patient estimated nutritional needs within the next 7 days. Outcome: New/Initial goal       Monitoring:   [x] Food and beverage intake   [x] Diet order   [x] Nutrition-focused physical findings   [x] Treatment/therapy   [] Weight   [] Enteral nutrition intake        Previous Recommendations (for follow-up assessments only):    Not Applicable     Discharge Planning: No nutritional discharge needs at this time.    [x] Participated in care planning, discharge planning, & interdisciplinary rounds as appropriate      [place], [address]    Pager: [phone]

## 2019-09-25 LAB
ANION GAP SERPL CALC-SCNC: 7 MMOL/L (ref 3–18)
APTT PPP: 100.1 SEC (ref 23–36.4)
APTT PPP: 118.2 SEC (ref 23–36.4)
APTT PPP: 124.6 SEC (ref 23–36.4)
BUN SERPL-MCNC: 4 MG/DL (ref 7–18)
BUN/CREAT SERPL: 4 (ref 12–20)
CALCIUM SERPL-MCNC: 8.1 MG/DL (ref 8.5–10.1)
CHLORIDE SERPL-SCNC: 106 MMOL/L (ref 100–111)
CO2 SERPL-SCNC: 30 MMOL/L (ref 21–32)
CREAT SERPL-MCNC: 1.02 MG/DL (ref 0.6–1.3)
GLUCOSE SERPL-MCNC: 86 MG/DL (ref 74–99)
INR PPP: 1 (ref 0.8–1.2)
MAGNESIUM SERPL-MCNC: 1.7 MG/DL (ref 1.6–2.6)
PHOSPHATE SERPL-MCNC: 3.6 MG/DL (ref 2.5–4.9)
POTASSIUM SERPL-SCNC: 3.1 MMOL/L (ref 3.5–5.5)
PROTHROMBIN TIME: 13.2 SEC (ref 11.5–15.2)
SODIUM SERPL-SCNC: 143 MMOL/L (ref 136–145)

## 2019-09-25 PROCEDURE — 74011250637 HC RX REV CODE- 250/637: Performed by: INTERNAL MEDICINE

## 2019-09-25 PROCEDURE — 74011250636 HC RX REV CODE- 250/636: Performed by: FAMILY MEDICINE

## 2019-09-25 PROCEDURE — 74011250637 HC RX REV CODE- 250/637: Performed by: FAMILY MEDICINE

## 2019-09-25 PROCEDURE — 74011250637 HC RX REV CODE- 250/637: Performed by: NURSE PRACTITIONER

## 2019-09-25 PROCEDURE — 74011000250 HC RX REV CODE- 250: Performed by: INTERNAL MEDICINE

## 2019-09-25 PROCEDURE — 85610 PROTHROMBIN TIME: CPT

## 2019-09-25 PROCEDURE — 94762 N-INVAS EAR/PLS OXIMTRY CONT: CPT

## 2019-09-25 PROCEDURE — 85730 THROMBOPLASTIN TIME PARTIAL: CPT

## 2019-09-25 PROCEDURE — 74011000250 HC RX REV CODE- 250: Performed by: PHYSICIAN ASSISTANT

## 2019-09-25 PROCEDURE — 80048 BASIC METABOLIC PNL TOTAL CA: CPT

## 2019-09-25 PROCEDURE — 74011250636 HC RX REV CODE- 250/636: Performed by: PHYSICIAN ASSISTANT

## 2019-09-25 PROCEDURE — 74011250636 HC RX REV CODE- 250/636: Performed by: INTERNAL MEDICINE

## 2019-09-25 PROCEDURE — 65270000029 HC RM PRIVATE

## 2019-09-25 PROCEDURE — 83735 ASSAY OF MAGNESIUM: CPT

## 2019-09-25 PROCEDURE — 84100 ASSAY OF PHOSPHORUS: CPT

## 2019-09-25 RX ORDER — DIGOXIN 125 MCG
0.12 TABLET ORAL DAILY
Status: DISCONTINUED | OUTPATIENT
Start: 2019-09-25 | End: 2019-09-26 | Stop reason: HOSPADM

## 2019-09-25 RX ORDER — POTASSIUM CHLORIDE 1.5 G/1.77G
40 POWDER, FOR SOLUTION ORAL
Status: COMPLETED | OUTPATIENT
Start: 2019-09-25 | End: 2019-09-25

## 2019-09-25 RX ORDER — AMOXICILLIN AND CLAVULANATE POTASSIUM 500; 125 MG/1; MG/1
1 TABLET, FILM COATED ORAL 2 TIMES DAILY WITH MEALS
Status: DISCONTINUED | OUTPATIENT
Start: 2019-09-25 | End: 2019-09-26 | Stop reason: HOSPADM

## 2019-09-25 RX ORDER — CIPROFLOXACIN 500 MG/1
500 TABLET ORAL EVERY 12 HOURS
Status: DISCONTINUED | OUTPATIENT
Start: 2019-09-25 | End: 2019-09-26 | Stop reason: HOSPADM

## 2019-09-25 RX ADMIN — METRONIDAZOLE 500 MG: 500 INJECTION, SOLUTION INTRAVENOUS at 13:20

## 2019-09-25 RX ADMIN — CARVEDILOL 12.5 MG: 12.5 TABLET, FILM COATED ORAL at 20:08

## 2019-09-25 RX ADMIN — MAGNESIUM OXIDE TAB 400 MG (241.3 MG ELEMENTAL MG) 400 MG: 400 (241.3 MG) TAB at 09:05

## 2019-09-25 RX ADMIN — SPIRONOLACTONE 25 MG: 25 TABLET ORAL at 09:04

## 2019-09-25 RX ADMIN — CEFTRIAXONE 2 G: 2 INJECTION, POWDER, FOR SOLUTION INTRAMUSCULAR; INTRAVENOUS at 13:19

## 2019-09-25 RX ADMIN — FAMOTIDINE 20 MG: 10 INJECTION, SOLUTION INTRAVENOUS at 09:04

## 2019-09-25 RX ADMIN — CIPROFLOXACIN HYDROCHLORIDE 500 MG: 500 TABLET, FILM COATED ORAL at 21:47

## 2019-09-25 RX ADMIN — HEPARIN SODIUM 15 UNITS/KG/HR: 10000 INJECTION, SOLUTION INTRAVENOUS at 19:51

## 2019-09-25 RX ADMIN — POTASSIUM CHLORIDE 40 MEQ: 1.5 POWDER, FOR SOLUTION ORAL at 09:04

## 2019-09-25 RX ADMIN — DIGOXIN 0.12 MG: 125 TABLET ORAL at 13:20

## 2019-09-25 RX ADMIN — FUROSEMIDE 20 MG: 20 TABLET ORAL at 09:04

## 2019-09-25 RX ADMIN — POTASSIUM CHLORIDE 40 MEQ: 1.5 POWDER, FOR SOLUTION ORAL at 13:19

## 2019-09-25 RX ADMIN — POTASSIUM CHLORIDE 40 MEQ: 1.5 POWDER, FOR SOLUTION ORAL at 18:13

## 2019-09-25 RX ADMIN — CARVEDILOL 12.5 MG: 12.5 TABLET, FILM COATED ORAL at 09:05

## 2019-09-25 RX ADMIN — METRONIDAZOLE 500 MG: 500 INJECTION, SOLUTION INTRAVENOUS at 00:58

## 2019-09-25 RX ADMIN — SACUBITRIL AND VALSARTAN 1 TABLET: 49; 51 TABLET, FILM COATED ORAL at 18:14

## 2019-09-25 NOTE — PROGRESS NOTES
[facility] Hospitalist Group  Progress Note    Patient: Kyung Rocha Age: 78 y.o. : 1950 MR#: [id number] SSN: [id number]  Date: 2019     Subjective/24-hour events:     Continues to feel better overall. No N/V currently, remains afebrile.  at bedside. Assessment:   Acute cholecystitis s/p percutaneous drainage/cholecystostomy tube placement   Sepsis with septic shock secondary to above  Acute kidney injury, improved  Hypomagnesemia and hyponatremia  Metastatic breast cancer   Chronic systolic CHF  Cardiomyopathy with AICD  HTN by hx, with current hypotension secondary to sepsis     Plan:  Continue antibiotics per ID. Replace potassium and monitor. Prefers powdered formulation. Drain per surgery. Diet being advanced. Antiemetic as necessary. Heparin drip as ordered. PT/OT. Best supportive care as ordered. Remains stable for transfer to floor. Case discussed with:  [x]Patient  [x]Family  [x]Nursing  []Case Management  DVT Prophylaxis:  []Lovenox  []Hep SQ  []SCDs  []Coumadin   [x]On Heparin gtt    Objective:   VS:   Visit Vitals  /66   Pulse 91   Temp 98.5 °F (36.9 °C)   Resp 19   Ht 5' 5\" (1.651 m)   Wt 86.9 kg (191 lb 9.3 oz)   SpO2 100%   Breastfeeding? No   BMI 31.88 kg/m²         General:  In NAD. Nontoxic-appearing. Cardiovascular:  S1, S2. Rate WNL. Pulmonary:  Clear, no wheezes. Effort nonlabored. GI:  Abdomen soft, + mild RUQ TTP. No guarding/rebound. Extremities:  Warm, no ischemia or edema.   Neuro:  Awake and alert, motor grossly nonfocal.    Labs:    Recent Results (from the past 24 hour(s))   PTT    Collection Time: 19  9:05 PM   Result Value Ref Range    aPTT 99.6 (H) 23.0 - 36.4 SEC   PROTHROMBIN TIME + INR    Collection Time: 19  3:29 AM   Result Value Ref Range    Prothrombin time 13.2 11.5 - 15.2 sec    INR 1.0 0.8 - 1.2     MAGNESIUM    Collection Time: 19  3:29 AM   Result Value Ref Range Magnesium 1.7 1.6 - 2.6 mg/dL   PHOSPHORUS    Collection Time: 09/25/19  3:29 AM   Result Value Ref Range    Phosphorus 3.6 2.5 - 4.9 MG/DL   PTT    Collection Time: 09/25/19  3:29 AM   Result Value Ref Range    aPTT 100.1 (H) 23.0 - 07.5 SEC   METABOLIC PANEL, BASIC    Collection Time: 09/25/19  3:29 AM   Result Value Ref Range    Sodium 143 136 - 145 mmol/L    Potassium 3.1 (L) 3.5 - 5.5 mmol/L    Chloride 106 100 - 111 mmol/L    CO2 30 21 - 32 mmol/L    Anion gap 7 3.0 - 18 mmol/L    Glucose 86 74 - 99 mg/dL    BUN 4 (L) 7.0 - 18 MG/DL    Creatinine 1.02 0.6 - 1.3 MG/DL    BUN/Creatinine ratio 4 (L) 12 - 20      GFR est AA >60 >60 ml/min/1.73m2    GFR est non-AA 54 (L) >60 ml/min/1.73m2    Calcium 8.1 (L) 8.5 - 10.1 MG/DL   PTT    Collection Time: 09/25/19 10:30 AM   Result Value Ref Range    aPTT 118.2 (H) 23.0 - 36.4 SEC       Signed By: Mary Ellen Mead MD     September 25, 2019

## 2019-09-25 NOTE — ROUTINE PROCESS
Bedside and Verbal shift change report given to [address] [place] [address] (oncoming nurse) by Glory Demarco RN 
 (offgoing nurse). Report included the following information SBAR, MAR and Cardiac Rhythm . Sri Sharma

## 2019-09-25 NOTE — PROGRESS NOTES
Infectious Disease progress Note    Requested by: dr. Gerri Pallas    Reason: sepsis, cholecystitis    Current abx Prior abx   Ceftriaxone, metronidazole since 9/23/19 Amp/sulbactam 9/18  Ciprofloxacin, metronidazole 9/19-9/20  Vancomycin  9/20/19-9/23/19  Pip/tazo 9/20-9/23/19     Lines:       Assessment :    78year old lady with h/o metastatic breast cancer with lung involvement as well as cardiomyopathy secondary to chemotherapy - last chemotherapy about 2 weeks ago admitted to [facility] on 9/18/19 with abdominal pain. CT scan 9/19/19- Gallbladder prominent in size with notable wall thickening, large calculus at the neck, and surrounding stranding. Common bile duct up to 10 mm caliber. Clinical picture c/w septic shock (POA) due to acute cholecystitis s/p cholecystostomy drain placement on 9/20/19. Bile fluid cultures 9/20- klebsiella (2 morphotypes - one with pip/tazo GT:16,both resistant to ampicillin), streptococcus sanguinis     Patient is clinically improving. Klebsiella has high GT against pip/tazo - risk of developing resistance. Surgical f/u appreciated. No plans for cholecystectomy at this time. Since patient has calculus at gallbladder neck; there is risk of recurrent cholecystitis once cholecystostomy tube removed. Also, dilated CBD - GI consult noted. No plans for ercp at this time    Acute renal failure: likely due to sepsis - improving    Recommendations:    1. discontinue ceftriaxone, metronidazole. Start po ciprofloxacin, augmentin till 10/4/19  2. F/u surgery recommendations regarding duration of cholecystostomy tube and timing of cholecystectomy  3. D/c planning per primary team    Advance Care planning: full code: discussed  with patient/surrogate decision maker: Glenn Avery: [phone]      Above plan was discussed in details with patient, family and dr Dayami Aguilera. Please call me if any further questions or concerns.  Will continue to participate in the care of this patient. HPI:    Feels better. Able to swallow pills. Improved nausea. patient denies headaches, visual disturbances, sore throat, runny nose, earaches, cp, sob, chills, cough, diarrhea, burning micturition, pain or weakness in extremities. He denies back pain/flank pain. home Medication List    Details   !! montelukast (SINGULAIR) 10 mg tablet TAKE 1 TABLET DAILY  Qty: 90 Tab, Refills: 3       Details   digoxin (LANOXIN) 0.125 mg tablet Take 1 Tab by mouth daily. Qty: 90 Tab, Refills: 3      spironolactone (ALDACTONE) 25 mg tablet Take 1 Tab by mouth daily. Qty: 90 Tab, Refills: 1      carvedilol (COREG) 25 mg tablet Take 1 Tab by mouth two (2) times daily (with meals). Qty: 60 Tab, Refills: 0      furosemide (LASIX) 20 mg tablet Take 1 Tab by mouth daily. Qty: 30 Tab, Refills: 0      OTHER BMP on Friday 8/30/19  Dx: CHF  Fax results to Dr. Cindy Christine: 1 Each, Refills: 0      apixaban (ELIQUIS) 5 mg tablet Take 1 Tab by mouth two (2) times a day. Qty: 60 Tab, Refills: 0      tiotropium (SPIRIVA WITH HANDIHALER) 18 mcg inhalation capsule Take 1 Cap by inhalation daily. sacubitril-valsartan (ENTRESTO) 49 mg/51 mg tablet Take 1 Tab by mouth two (2) times a day. Qty: 180 Tab, Refills: 3      mometasone (NASONEX) 50 mcg/actuation nasal spray 2 Sprays by Both Nostrils route daily as needed. Indications: inflammation of the nose due to an allergy      budesonide-formoterol (SYMBICORT) 160-4.5 mcg/actuation HFAA Take 2 Puffs by inhalation two (2) times a day. Qty: 1 Inhaler, Refills: 0      albuterol-ipratropium (DUO-NEB) 2.5 mg-0.5 mg/3 ml nebu 3 mL by Nebulization route every six (6) hours as needed (wheezing or sob). File under Medicare Part B, ICD codes J44.9, C78.01  Qty: 120 Nebule, Refills: 3      DULoxetine (CYMBALTA) 30 mg capsule Take 30 mg by mouth daily. multivitamin (ONE A DAY) tablet Take 1 Tab by mouth daily. plant stanol eliezer (CHOLEST OFF PO) Take 1 Tab by mouth daily. benzonatate (TESSALON PERLES) 100 mg capsule Take 100 mg by mouth three (3) times daily as needed for Cough. Biotin 2,500 mcg cap Take 1 Cap by mouth daily. melatonin 10 mg tab Take 1 Tab by mouth nightly. !! montelukast (SINGULAIR) 10 mg tablet Take 1 Tab by mouth daily. Qty: 90 Tab, Refills: 3      raloxifene (EVISTA) 60 mg tablet Take 60 mg by mouth daily. Associated Diagnoses: Malignant neoplasm of breast (female), unspecified site      metOLazone (ZAROXOLYN) 5 mg tablet Take 1 Tab by mouth daily. Qty: 30 Tab, Refills: 1      naloxone (NARCAN) 0.4 mg/mL injection 1 mL by IntraVENous route as needed for Other (Give if breaths per minute  less than 10, may repeat dose in 15 min and contact MD). Qty: 1 mL, Refills: 0      b complex vitamins (B COMPLEX 1) tablet Take 1 Tab by mouth daily. cholecalciferol, vitamin D3, 2,000 unit tab Take 1 Tab by mouth daily. !! - Potential duplicate medications found. Please discuss with provider.           Current Facility-Administered Medications   Medication Dose Route Frequency    potassium chloride (KLOR-CON) packet for solution 40 mEq  40 mEq Oral TID WITH MEALS    digoxin (LANOXIN) tablet 0.125 mg  0.125 mg Oral DAILY    sacubitril-valsartan (ENTRESTO) 49-51 mg tablet 1 Tab  1 Tab Oral BID    ciprofloxacin HCl (CIPRO) tablet 500 mg  500 mg Oral Q12H    amoxicillin-clavulanate (AUGMENTIN) 500-125 mg per tablet 1 Tab  1 Tab Oral BID WITH MEALS    spironolactone (ALDACTONE) tablet 25 mg  25 mg Oral DAILY    carvedilol (COREG) tablet 12.5 mg  12.5 mg Oral Q12H    magnesium oxide (MAG-OX) tablet 400 mg  400 mg Oral DAILY    promethazine (PHENERGAN) 12.5 mg in 0.9% sodium chloride 50 mL IVPB  12.5 mg IntraVENous Q4H PRN    furosemide (LASIX) tablet 20 mg  20 mg Oral DAILY    famotidine (PF) (PEPCID) 20 mg in sodium chloride 0.9% 10 mL injection  20 mg IntraVENous DAILY    lactated Ringers infusion  75 mL/hr IntraVENous CONTINUOUS    fentaNYL citrate (PF) injection 75 mcg  75 mcg IntraVENous Q4H PRN    naloxone (NARCAN) injection 0.4 mg  0.4 mg IntraVENous EVERY 2 MINUTES AS NEEDED    ondansetron (ZOFRAN) injection 4 mg  4 mg IntraVENous Q4H PRN    diphenhydrAMINE (BENADRYL) injection 25 mg  25 mg IntraVENous Q4H PRN    acetaminophen (TYLENOL) suppository 650 mg  650 mg Rectal Q4H PRN    heparin 25,000 units in D5W 250 ml infusion  18-36 Units/kg/hr IntraVENous TITRATE       Allergies: Adhesive    Temp (24hrs), Av.3 °F (36.8 °C), Min:97.8 °F (36.6 °C), Max:98.5 °F (36.9 °C)    Visit Vitals  /66   Pulse 91   Temp 98.5 °F (36.9 °C)   Resp 19   Ht 5' 5\" (1.651 m)   Wt 86.9 kg (191 lb 9.3 oz)   SpO2 100%   Breastfeeding? No   BMI 31.88 kg/m²       ROS: 12 point ROS obtained in details. Pertinent positives as mentioned in HPI,   otherwise negative    Physical Exam:    General:  Alert, cooperative, in moderate discomfort   Head:  Normocephalic, without obvious abnormality, atraumatic. Eyes:  Pink palpebral conjunctivae, anicteric sclerae; EOMs intact   Nose: Nares normal. No drainage    Throat: Lips, mucosa, and tongue mildly dry   Neck: Supple, symmetrical, trachea midline, no adenopathy, no carotid bruit and no JVD. Lungs:   Symmetrical chest rise; good AE bilat; CTAB; no wheezes/rhonchi/rales noted. Heart:  RRR, S1, S2 present   Abdomen:   Soft, cholecystostomy drain RUQ. No tenderness. Bowel sounds normal.    Extremities: Extremities normal, atraumatic, no cyanosis or edema. Pulses: 2+ and symmetric all extremities. Skin: Skin color, texture, turgor normal. No rashes or lesions.  + Left chest mediport   Neurologic: Grossly nonfocal             Labs: Results:   Chemistry Recent Labs     19  0329 19  0547 19  1436 19  0613   GLU 86 106*  --  112*    141  --  141   K 3.1* 3.4* 3.2* 3.1*    104  --  105   CO2 30 30  --  31   BUN 4* 4*  --  7   CREA 1.02 1.13  --  1.10   CA 8.1* 8.8  --  8.1* AGAP 7 7  --  5   BUCR 4* 4*  --  6*   AP  --   --  111  --    TP  --   --  5.3*  --    ALB  --   --  2.7*  --    GLOB  --   --  2.6  --    AGRAT  --   --  1.0  --       CBC w/Diff Recent Labs     09/23/19  0613   WBC 7.8   RBC 3.01*   HGB 9.4*   HCT 28.6*      GRANS 70   LYMPH 15*   EOS 5      Microbiology No results for input(s): CULT in the last 72 hours.        RADIOLOGY:    All available imaging studies/reports in [facility] for this admission were reviewed    Dr. Aline Alvarenga, Infectious Disease Specialist  [phone]  September 25, 2019  12:42 PM

## 2019-09-25 NOTE — PROGRESS NOTES
[facility]   Progress Note. CARDIOLOGY PROGRESS NOTE  RECS:      1. Septic shock- resolved   2. Sinus tachycardia -resolved- interrogated AICD 9/21/19 normal function - no arrhthymias   3. Chronic Systolic CHF-stage C NYHA class III-on p.o. Lasix. Tolerating Aldactone since yesterday. Add Entresto as she takes at home. 4. Non Sustained Vtach- AICD  fired 3 times in  8/19.      5. Nonischemic cardiomyopathy (EF 26-30%)-likely from Herceptin use. recent echo showed EF% 36-40%-   6. Hx of recent Acute PE and DVT- patient was on Eliquis at home. Now on heparin drip.   7. Metastatic breast cancer now back on chemotherapy.  Lung metastasis likely cause for shortness of breath which is better.   8. Hypokalemia-watch with Aldactone  9. Hypomagnesemia-improved. Add p.o. Mag-Ox. 10. JOSUE- resolved   11. Cholecystitis- s/p percutaneous drain 9/20/19- surgery reccommended conservative treatment- antibiotics, pain medications. Not a candidate for surgery      Ambulate please unless restricted by other specialties. Okay for stepdown/telemetry.             08/27/19   ECHO ADULT FOLLOW-UP OR LIMITED 08/28/2019 8/28/2019     Narrative · Left Ventricle: Normal wall thickness. Mildly dilated left ventricle. Severe systolic dysfunction. Estimated left ventricular ejection fraction   is 26 - 30%. Biplane method used to measure ejection fraction. Left   ventricular global hypokinesis. · Pericardium: Trivial pericardial effusion.          Signed by: Maria G Hahn MD         ASSESSMENT:  Hospital Problems  Date Reviewed: 6/25/2019          Codes Class Noted POA    Cholecystitis ICD-10-CM: K81.9  ICD-9-CM: 575.10  9/18/2019 Unknown                SUBJECTIVE:  C/o dyspnea and cough that  Is improving very well    OBJECTIVE:    VS:   Visit Vitals  /66   Pulse 91   Temp 98.5 °F (36.9 °C)   Resp 19   Ht 5' 5\" (1.651 m)   Wt 86.9 kg (191 lb 9.3 oz)   SpO2 100%   Breastfeeding?  No   BMI 31.88 kg/m² Intake/Output Summary (Last 24 hours) at 9/25/2019 1438  Last data filed at 9/25/2019 0400  Gross per 24 hour   Intake 1203.83 ml   Output 2470 ml   Net -1266.17 ml     TELE: Ventricular paced. SR.     General: No acute distress  HENT: Normocephalic, atraumatic. Neck :  Jugular venous distention. Cardiac:  Normal S1/S2  Chest/Lungs: Clear without any rales or rhonchi. Abdomen: Soft, drainage tube noted. Extremities:  No edema noted. Labs: Results:       Chemistry Recent Labs     09/25/19  0329 09/24/19  0547 09/23/19  1436 09/23/19  0613   GLU 86 106*  --  112*    141  --  141   K 3.1* 3.4* 3.2* 3.1*    104  --  105   CO2 30 30  --  31   BUN 4* 4*  --  7   CREA 1.02 1.13  --  1.10   CA 8.1* 8.8  --  8.1*   MG 1.7 2.1  --  1.5*   PHOS 3.6 3.1  --  2.2*   AGAP 7 7  --  5   BUCR 4* 4*  --  6*   AP  --   --  111  --    TP  --   --  5.3*  --    ALB  --   --  2.7*  --    GLOB  --   --  2.6  --    AGRAT  --   --  1.0  --       CBC w/Diff Recent Labs     09/23/19  0613   WBC 7.8   RBC 3.01*   HGB 9.4*   HCT 28.6*      GRANS 70   LYMPH 15*   EOS 5      Cardiac Enzymes No results for input(s): CPK, CKND1, ZEESHAN in the last 72 hours. No lab exists for component: CKRMB, TROIP   Coagulation Recent Labs     09/25/19  1030 09/25/19 0329 09/24/19  0547   PTP  --  13.2  --  12.5   INR  --  1.0  --  1.0   APTT 118.2* 100.1*   < > 44.4*    < > = values in this interval not displayed. Lipid Panel Lab Results   Component Value Date/Time    Cholesterol, total 266 (H) 04/17/2019 11:38 AM    HDL Cholesterol 60 04/17/2019 11:38 AM    LDL, calculated 189.2 (H) 04/17/2019 11:38 AM    VLDL, calculated 16.8 04/17/2019 11:38 AM    Triglyceride 84 04/17/2019 11:38 AM    CHOL/HDL Ratio 4.4 04/17/2019 11:38 AM      BNP No results for input(s): BNPP in the last 72 hours.    Liver Enzymes Recent Labs     09/23/19  1436   TP 5.3*   ALB 2.7*      SGOT 32      Digoxin    Thyroid Studies Lab Results Component Value Date/Time    TSH 0.10 (L) 04/17/2019 11:38 AM              Samira Hampton MD

## 2019-09-25 NOTE — PROGRESS NOTES
Surgery  Looks good, good spirits. Tolerating p.o. Well. Afebrile vital signs stable. Drain continues to drain. 170 cc per 24 hours bilious fluid.   Clinically much improved, approaching baseline  Advance diet to soft per patient's request

## 2019-09-26 VITALS
OXYGEN SATURATION: 98 % | SYSTOLIC BLOOD PRESSURE: 145 MMHG | DIASTOLIC BLOOD PRESSURE: 79 MMHG | HEIGHT: 65 IN | HEART RATE: 91 BPM | RESPIRATION RATE: 18 BRPM | TEMPERATURE: 98.3 F | BODY MASS INDEX: 31.99 KG/M2 | WEIGHT: 192.02 LBS

## 2019-09-26 LAB
ANION GAP SERPL CALC-SCNC: 7 MMOL/L (ref 3–18)
APTT PPP: 91.4 SEC (ref 23–36.4)
BACTERIA SPEC CULT: NORMAL
BUN SERPL-MCNC: 8 MG/DL (ref 7–18)
BUN/CREAT SERPL: 7 (ref 12–20)
CALCIUM SERPL-MCNC: 8.5 MG/DL (ref 8.5–10.1)
CHLORIDE SERPL-SCNC: 106 MMOL/L (ref 100–111)
CO2 SERPL-SCNC: 28 MMOL/L (ref 21–32)
CREAT SERPL-MCNC: 1.11 MG/DL (ref 0.6–1.3)
GLUCOSE BLD STRIP.AUTO-MCNC: 117 MG/DL (ref 70–110)
GLUCOSE BLD STRIP.AUTO-MCNC: 140 MG/DL (ref 70–110)
GLUCOSE SERPL-MCNC: 115 MG/DL (ref 74–99)
INR PPP: 1 (ref 0.8–1.2)
MAGNESIUM SERPL-MCNC: 1.8 MG/DL (ref 1.6–2.6)
PHOSPHATE SERPL-MCNC: 3.4 MG/DL (ref 2.5–4.9)
POTASSIUM SERPL-SCNC: 3.7 MMOL/L (ref 3.5–5.5)
PROTHROMBIN TIME: 12.9 SEC (ref 11.5–15.2)
SERVICE CMNT-IMP: NORMAL
SODIUM SERPL-SCNC: 141 MMOL/L (ref 136–145)

## 2019-09-26 PROCEDURE — 80048 BASIC METABOLIC PNL TOTAL CA: CPT

## 2019-09-26 PROCEDURE — 83735 ASSAY OF MAGNESIUM: CPT

## 2019-09-26 PROCEDURE — 74011250637 HC RX REV CODE- 250/637: Performed by: NURSE PRACTITIONER

## 2019-09-26 PROCEDURE — 36591 DRAW BLOOD OFF VENOUS DEVICE: CPT

## 2019-09-26 PROCEDURE — 85610 PROTHROMBIN TIME: CPT

## 2019-09-26 PROCEDURE — 82962 GLUCOSE BLOOD TEST: CPT

## 2019-09-26 PROCEDURE — 74011250637 HC RX REV CODE- 250/637: Performed by: INTERNAL MEDICINE

## 2019-09-26 PROCEDURE — 74011000250 HC RX REV CODE- 250: Performed by: PHYSICIAN ASSISTANT

## 2019-09-26 PROCEDURE — 85730 THROMBOPLASTIN TIME PARTIAL: CPT

## 2019-09-26 PROCEDURE — 74011250636 HC RX REV CODE- 250/636: Performed by: PHYSICIAN ASSISTANT

## 2019-09-26 PROCEDURE — 84100 ASSAY OF PHOSPHORUS: CPT

## 2019-09-26 RX ORDER — FAMOTIDINE 20 MG/1
20 TABLET, FILM COATED ORAL 2 TIMES DAILY
Status: DISCONTINUED | OUTPATIENT
Start: 2019-09-26 | End: 2019-09-26 | Stop reason: HOSPADM

## 2019-09-26 RX ORDER — CIPROFLOXACIN 500 MG/1
500 TABLET ORAL EVERY 12 HOURS
Qty: 16 TAB | Refills: 0 | Status: SHIPPED | OUTPATIENT
Start: 2019-09-26 | End: 2019-10-04

## 2019-09-26 RX ORDER — CARVEDILOL 12.5 MG/1
25 TABLET ORAL 2 TIMES DAILY WITH MEALS
Qty: 60 TAB | Refills: 1 | Status: SHIPPED | OUTPATIENT
Start: 2019-09-26 | End: 2019-10-09

## 2019-09-26 RX ORDER — AMOXICILLIN AND CLAVULANATE POTASSIUM 500; 125 MG/1; MG/1
1 TABLET, FILM COATED ORAL 2 TIMES DAILY WITH MEALS
Qty: 16 TAB | Refills: 0 | Status: SHIPPED | OUTPATIENT
Start: 2019-09-26 | End: 2019-10-04

## 2019-09-26 RX ORDER — LANOLIN ALCOHOL/MO/W.PET/CERES
400 CREAM (GRAM) TOPICAL DAILY
Qty: 30 TAB | Refills: 0 | Status: ON HOLD | OUTPATIENT
Start: 2019-09-26 | End: 2019-10-30

## 2019-09-26 RX ADMIN — Medication 1 CAPSULE: at 08:17

## 2019-09-26 RX ADMIN — CIPROFLOXACIN HYDROCHLORIDE 500 MG: 500 TABLET, FILM COATED ORAL at 08:17

## 2019-09-26 RX ADMIN — CARVEDILOL 12.5 MG: 12.5 TABLET, FILM COATED ORAL at 08:16

## 2019-09-26 RX ADMIN — MAGNESIUM OXIDE TAB 400 MG (241.3 MG ELEMENTAL MG) 400 MG: 400 (241.3 MG) TAB at 11:43

## 2019-09-26 RX ADMIN — FAMOTIDINE 20 MG: 10 INJECTION, SOLUTION INTRAVENOUS at 08:17

## 2019-09-26 RX ADMIN — AMOXICILLIN AND CLAVULANATE POTASSIUM 1 TABLET: 500; 125 TABLET, FILM COATED ORAL at 08:15

## 2019-09-26 RX ADMIN — SPIRONOLACTONE 25 MG: 25 TABLET ORAL at 09:00

## 2019-09-26 RX ADMIN — FAMOTIDINE 20 MG: 20 TABLET, FILM COATED ORAL at 11:43

## 2019-09-26 RX ADMIN — DIGOXIN 0.12 MG: 125 TABLET ORAL at 08:17

## 2019-09-26 RX ADMIN — APIXABAN 2.5 MG: 2.5 TABLET, FILM COATED ORAL at 11:43

## 2019-09-26 RX ADMIN — FUROSEMIDE 20 MG: 20 TABLET ORAL at 08:15

## 2019-09-26 RX ADMIN — SACUBITRIL AND VALSARTAN 1 TABLET: 49; 51 TABLET, FILM COATED ORAL at 08:16

## 2019-09-26 NOTE — PROGRESS NOTES
Infectious Disease progress Note    Requested by: dr. Lexie Lenz    Reason: sepsis, cholecystitis    Current abx Prior abx     Ciprofloxacin, amoxicillin/clavulanate since 9/25/19 Amp/sulbactam 9/18  Ciprofloxacin, metronidazole 9/19-9/20  Vancomycin  9/20/19-9/23/19  Pip/tazo 9/20-9/23/19  Ceftriaxone, metronidazole  9/23/19-9/25/19     Lines:       Assessment :    78year old lady with h/o metastatic breast cancer with lung involvement as well as cardiomyopathy secondary to chemotherapy - last chemotherapy about 2 weeks ago admitted to [facility] on 9/18/19 with abdominal pain. CT scan 9/19/19- Gallbladder prominent in size with notable wall thickening, large calculus at the neck, and surrounding stranding. Common bile duct up to 10 mm caliber. Clinical picture c/w septic shock (POA) due to acute cholecystitis s/p cholecystostomy drain placement on 9/20/19. Bile fluid cultures 9/20- klebsiella (2 morphotypes - one with pip/tazo GT:16,both resistant to ampicillin), streptococcus sanguinis     Patient is clinically improving. Klebsiella has high GT against pip/tazo - risk of developing resistance. Surgical f/u appreciated. No plans for cholecystectomy at this time. Since patient has calculus at gallbladder neck; there is risk of recurrent cholecystitis once cholecystostomy tube removed. Also, dilated CBD - GI consult noted. No plans for ercp at this time    Acute renal failure: likely due to sepsis - improving    Recommendations:    1. cont po ciprofloxacin, augmentin till 10/4/19  2. F/u surgery recommendations regarding duration of cholecystostomy tube and timing of cholecystectomy  3. D/c planning per primary team    Advance Care planning: full code: discussed  with patient/surrogate decision maker: Cleotha Frankel: [phone]      Above plan was discussed in details with patient, family and dr Yvon Rm. Please call me if any further questions or concerns.  Will continue to participate in the care of this patient. HPI:    Feels better. Able to swallow pills. Improved nausea. patient denies headaches, visual disturbances, sore throat, runny nose, earaches, cp, sob, chills, cough, diarrhea, burning micturition, pain or weakness in extremities. He denies back pain/flank pain. home Medication List    Details   !! montelukast (SINGULAIR) 10 mg tablet TAKE 1 TABLET DAILY  Qty: 90 Tab, Refills: 3       Details   digoxin (LANOXIN) 0.125 mg tablet Take 1 Tab by mouth daily. Qty: 90 Tab, Refills: 3      spironolactone (ALDACTONE) 25 mg tablet Take 1 Tab by mouth daily. Qty: 90 Tab, Refills: 1      carvedilol (COREG) 25 mg tablet Take 1 Tab by mouth two (2) times daily (with meals). Qty: 60 Tab, Refills: 0      furosemide (LASIX) 20 mg tablet Take 1 Tab by mouth daily. Qty: 30 Tab, Refills: 0      OTHER BMP on Friday 8/30/19  Dx: CHF  Fax results to Dr. Js Mojica: 1 Each, Refills: 0      apixaban (ELIQUIS) 5 mg tablet Take 1 Tab by mouth two (2) times a day. Qty: 60 Tab, Refills: 0      tiotropium (SPIRIVA WITH HANDIHALER) 18 mcg inhalation capsule Take 1 Cap by inhalation daily. sacubitril-valsartan (ENTRESTO) 49 mg/51 mg tablet Take 1 Tab by mouth two (2) times a day. Qty: 180 Tab, Refills: 3      mometasone (NASONEX) 50 mcg/actuation nasal spray 2 Sprays by Both Nostrils route daily as needed. Indications: inflammation of the nose due to an allergy      budesonide-formoterol (SYMBICORT) 160-4.5 mcg/actuation HFAA Take 2 Puffs by inhalation two (2) times a day. Qty: 1 Inhaler, Refills: 0      albuterol-ipratropium (DUO-NEB) 2.5 mg-0.5 mg/3 ml nebu 3 mL by Nebulization route every six (6) hours as needed (wheezing or sob). File under Medicare Part B, ICD codes J44.9, C78.01  Qty: 120 Nebule, Refills: 3      DULoxetine (CYMBALTA) 30 mg capsule Take 30 mg by mouth daily. multivitamin (ONE A DAY) tablet Take 1 Tab by mouth daily. plant stanol eliezer (CHOLEST OFF PO) Take 1 Tab by mouth daily. benzonatate (TESSALON PERLES) 100 mg capsule Take 100 mg by mouth three (3) times daily as needed for Cough. Biotin 2,500 mcg cap Take 1 Cap by mouth daily. melatonin 10 mg tab Take 1 Tab by mouth nightly. !! montelukast (SINGULAIR) 10 mg tablet Take 1 Tab by mouth daily. Qty: 90 Tab, Refills: 3      raloxifene (EVISTA) 60 mg tablet Take 60 mg by mouth daily. Associated Diagnoses: Malignant neoplasm of breast (female), unspecified site      metOLazone (ZAROXOLYN) 5 mg tablet Take 1 Tab by mouth daily. Qty: 30 Tab, Refills: 1      naloxone (NARCAN) 0.4 mg/mL injection 1 mL by IntraVENous route as needed for Other (Give if breaths per minute  less than 10, may repeat dose in 15 min and contact MD). Qty: 1 mL, Refills: 0      b complex vitamins (B COMPLEX 1) tablet Take 1 Tab by mouth daily. cholecalciferol, vitamin D3, 2,000 unit tab Take 1 Tab by mouth daily. !! - Potential duplicate medications found. Please discuss with provider.           Current Facility-Administered Medications   Medication Dose Route Frequency    digoxin (LANOXIN) tablet 0.125 mg  0.125 mg Oral DAILY    sacubitril-valsartan (ENTRESTO) 49-51 mg tablet 1 Tab  1 Tab Oral BID    ciprofloxacin HCl (CIPRO) tablet 500 mg  500 mg Oral Q12H    amoxicillin-clavulanate (AUGMENTIN) 500-125 mg per tablet 1 Tab  1 Tab Oral BID WITH MEALS    lactobacillus sp. 50 billion cpu (BIO-K PLUS) capsule 1 Cap  1 Cap Oral DAILY    spironolactone (ALDACTONE) tablet 25 mg  25 mg Oral DAILY    carvedilol (COREG) tablet 12.5 mg  12.5 mg Oral Q12H    magnesium oxide (MAG-OX) tablet 400 mg  400 mg Oral DAILY    promethazine (PHENERGAN) 12.5 mg in 0.9% sodium chloride 50 mL IVPB  12.5 mg IntraVENous Q4H PRN    furosemide (LASIX) tablet 20 mg  20 mg Oral DAILY    famotidine (PF) (PEPCID) 20 mg in sodium chloride 0.9% 10 mL injection  20 mg IntraVENous DAILY    fentaNYL citrate (PF) injection 75 mcg  75 mcg IntraVENous Q4H PRN    naloxone (NARCAN) injection 0.4 mg  0.4 mg IntraVENous EVERY 2 MINUTES AS NEEDED    ondansetron (ZOFRAN) injection 4 mg  4 mg IntraVENous Q4H PRN    diphenhydrAMINE (BENADRYL) injection 25 mg  25 mg IntraVENous Q4H PRN    acetaminophen (TYLENOL) suppository 650 mg  650 mg Rectal Q4H PRN    heparin 25,000 units in D5W 250 ml infusion  18-36 Units/kg/hr IntraVENous TITRATE       Allergies: Adhesive    Temp (24hrs), Av.7 °F (37.1 °C), Min:98.4 °F (36.9 °C), Max:98.8 °F (37.1 °C)    Visit Vitals  /79   Pulse 91   Temp 98.4 °F (36.9 °C)   Resp 18   Ht 5' 5\" (1.651 m)   Wt 87.1 kg (192 lb 0.3 oz)   SpO2 96%   Breastfeeding? No   BMI 31.95 kg/m²       ROS: 12 point ROS obtained in details. Pertinent positives as mentioned in HPI,   otherwise negative    Physical Exam:    General:  Alert, cooperative, in moderate discomfort   Head:  Normocephalic, without obvious abnormality, atraumatic. Eyes:  Pink palpebral conjunctivae, anicteric sclerae; EOMs intact   Nose: Nares normal. No drainage    Throat: Lips, mucosa, and tongue mildly dry   Neck: Supple, symmetrical, trachea midline, no adenopathy, no carotid bruit and no JVD. Lungs:   Symmetrical chest rise; good AE bilat; CTAB; no wheezes/rhonchi/rales noted. Heart:  RRR, S1, S2 present   Abdomen:   Soft, cholecystostomy drain RUQ. No tenderness. Bowel sounds normal.    Extremities: Extremities normal, atraumatic, no cyanosis or edema. Pulses: 2+ and symmetric all extremities. Skin: Skin color, texture, turgor normal. No rashes or lesions.  + Left chest mediport   Neurologic: Grossly nonfocal             Labs: Results:   Chemistry Recent Labs     19  0350 19  0329 19  0547 19  1436   * 86 106*  --     143 141  --    K 3.7 3.1* 3.4* 3.2*    106 104  --    CO2 28 30 30  --    BUN 8 4* 4*  --    CREA 1.11 1.02 1.13  --    CA 8.5 8.1* 8.8  --    AGAP 7 7 7  --    BUCR 7* 4* 4*  --    AP  --   --   --  111   TP  -- --   --  5.3*   ALB  --   --   --  2.7*   GLOB  --   --   --  2.6   AGRAT  --   --   --  1.0      CBC w/Diff No results for input(s): WBC, RBC, HGB, HCT, PLT, GRANS, LYMPH, EOS, HGBEXT, HCTEXT, PLTEXT, HGBEXT, HCTEXT, PLTEXT in the last 72 hours. Microbiology No results for input(s): CULT in the last 72 hours.        RADIOLOGY:    All available imaging studies/reports in [facility] for this admission were reviewed    Dr. Jeri Holloway, Infectious Disease Specialist  [phone]  September 26, 2019  12:42 PM

## 2019-09-26 NOTE — DISCHARGE SUMMARY
Discharge Summary    Patient: Matthew Range               Sex: female          DOA: 9/18/2019         YOB: 1950      Age:  78 y.o.        LOS:  LOS: 8 days                Admit Date: 9/18/2019    Discharge Date: 9/26/2019    Primary care physician: April Venegas MD    Discharge Diagnoses:      1)  Septic shock, resolved   2)  Acute cholecystitis, s/p cholecystostomy drain placement (9/20/2019)       - biliary fluid cultures with Klebsiella and streptococcus sanguinis   3)  Large calculus of gallbladder neck with stranding   4)  Acute kidney injury, on CKD3, resolved   5)  Hypomagnesemia, hyponatremia, resolved   6)  Sinus tachycardia, resolved     Discharge Condition: Good  Disposition: home with home health  Code Status: full code     Follow up for Primary Care Physician:  1) she is continue on oral antibiotics until 10/4/2019, please monitor her clinical status  2) she needs follow up with surgery for cholecystectomy planning  3) she remains on cholecystostomy, please monitor   4) she is to follow up with Dr Jo Wallace, her cardiologist in 1-2 weeks   5) she needs to follow up with Dr. Tania Sinha, her oncologist for further care      Hospital Course:   78 y.o female with HTN, chronic systolic heart failure, non-ischemic cardiomyopathy s/p AICD, DVT/PE on Eliquis, metastatic breast cancer on chemotherapy, presented with abdominal pain and right-sided chest pain. Lab workup in in ER was unremarkable. CT abd/pelv showed evidence for acute cholecystitis with large stone. HIDA was non-visualizing. Surgery evaluated with recommendation for antibiotics, pain control and percutaneous drainage. She was on IV cipro/flagyl on admission. On 9/20/2019, RRT was called for worsened fever and hypotension. She was transitioned to ICU due to sepsis shock. IV antibiotics were changed. IR was consulted for GB drainage. She required pressor support for sepsis shock. She tolerated IV hydration and her JOSUE improved. She was stabilized and was better the next day. Surgery continue to recommended conservative measure, continue with cholecystostomy and antibiotics. GI was consulted for ERCP but she was not a candidate. Infectious disease specialist was consulted for antibiotic selection. Bile fluid cultures - klebsiella (2 morphotypes - one with pip/tazo GT:16,both resistant to ampicillin), streptococcus sanguinis      Patient is clinically improving. Klebsiella has high GT against pip/tazo - risk of developing resistance. Surgery with no plans for cholecystectomy at this time. Since patient has calculus at gallbladder neck; there is risk of recurrent cholecystitis once cholecystostomy tube removed. Last 24 Hours: she remains stable and no further complaint. She was resumed of her home medications. No fever. No stomach pain. She wants to have second opinion on her surgery as she transition home. ROS: no fever/chills, no headache, no dizziness, no facial pain, no sinus congestion,   No swallowing pain, No chest pain, no palpitation, no shortness of breath, no abd pain,  No diarrhea, no urinary complaint, no leg pain or swelling    VS:   Visit Vitals  /79   Pulse 91   Temp 98.4 °F (36.9 °C)   Resp 18   Ht 5' 5\" (1.651 m)   Wt 87.1 kg (192 lb 0.3 oz)   SpO2 96%   Breastfeeding?  No   BMI 31.95 kg/m²      Tmax/24hrs: Temp (24hrs), Av.7 °F (37.1 °C), Min:98.4 °F (36.9 °C), Max:98.8 °F (37.1 °C)      Intake/Output Summary (Last 24 hours) at 2019 1117  Last data filed at 2019 0700  Gross per 24 hour   Intake 597.53 ml   Output 3515 ml   Net -2917.47 ml       Tele: sinus  General:  Cooperative, Not in acute distress, speaks in full sentence while in bed  HEENT: PERRL, EOMI, supple neck, no JVD, dry oral mucosa  Cardiovascular: S1S2 regular, no rub/gallop   Pulmonary: air entry bilaterally, no wheezing, ++ crackle  GI:  Soft, non tender, non distended, +bs, no guarding   Cholecystostomy tube with bile output  Extremities:  No pedal edema, +distal pulses appreciated   Neuro: AOx3, moving all extremities, no gross deficit. Consults:   Surgery: Dr. Dena Cramer  Cardiology: Dr. Noreen Marroquin  ID: Dr. Sunny Ramirez   GI: Dr. Phil Valladares: Dr. Chuy Granados:   CT Results (most recent):  Results from Hospital Encounter encounter on 09/18/19   CT ABD PELV W CONT    Narrative EXAMINATION: CT abdomen/pelvis with IV contrast    INDICATION: Abdominal pain, abnormal ultrasound    COMPARISON: CT 8/27/2019    TECHNIQUE: CT of the abdomen and pelvis performed following 80 cc IV Isovue-300  with multiplanar reformations. All CT scans at this facility are performed using  dose optimization technique as appropriate to a performed exam, to include  automated exposure control, adjustment of the mA and/or kV according to patient  size (including appropriate matching first site specific examinations), or use  of iterative reconstruction technique. FINDINGS:    Lower thorax: Bibasilar scattered streaky densities, probably atelectasis. Fluid  in the distal esophagus noted. Cardiac leads noted. Hepatobiliary: Liver low attenuation relative to spleen. No suspicious hepatic  parenchymal lesions. Gallbladder prominent in size with notable wall thickening,  large calculus at the neck, and surrounding stranding. Common bile duct up to 10  mm caliber. Pancreas: 1.4 cm pancreas uncinate process cyst.    Spleen: Normal.    Adrenal glands: Normal.    Genitourinary: Right kidney normal. Left kidney probable subcentimeter  angiomyolipoma posteriorly. Bladder collapsed. Uterus fundus is slightly bulbous  in appearance with a probable small exophytic fibroid at the posterior fundus. Gastrointestinal: Stomach unremarkable. Small bowel loops nondilated. The colon  is nondilated. Minimal sigmoid diverticulosis. Appendix not clearly visualized  but no suspicious secondary findings. Mesentery/vessels/nodes: No free air. No free fluid. Mild burden of  atherosclerotic calcifications. Vessels unremarkable. Enlarged right perirectal  node. No retroperitoneal adenopathy by size criteria. Miscellaneous: Superficial soft tissues unremarkable. Bones: No acute osseous findings. Impression IMPRESSION:    Findings consistent with acute cholecystitis, as suggested on recent sonogram.  -Mild common bile duct dilatation. Correlate clinically for cholestasis. Hepatic steatosis. Cystic-appearing structure in the pancreas uncinate process. -MRI/MRCP may be useful to evaluate biliary tree and this lesion. Fluid in the distal esophagus. Correlate clinically for esophagitis or reflux. Enlarged right perirectal node again noted. A few sigmoid diverticula. Probable  fibroid uterus. US Results (most recent):  Results from [place] encounter on 09/18/19   US ABD LTD    Narrative EXAM: ABDOMEN ULTRASOUND LIMITED    CLINICAL HISTORY/INDICATION: abd/chest pain , epigastric pain x1 day    COMPARISON: None. TECHNIQUE: Selected images from limited abdominal ultrasound provided for  interpretation. FINDINGS:      Portions of the abdominal aorta are visualized and are unremarkable. The head and body of the pancreas are grossly normal in appearance. There is no  ductal dilatation. The tail is not well visualized due to bowel gas. The liver is 19 cm with mildly increased homogeneous echotexture without mass in  the visualized portions. There is no biliary ductal dilatation. The main  portal vein is patent with proper directional flow. The gallbladder is mildly  distended. There is circumferential gallbladder wall thickening measuring 3 to 7  mm. There may be fluid within the wall. The common bile duct measures  approximately 0.5 cm in greatest diameter. The right kidney measures 10.2 x 4 x 4 cm  is without mass or evidence for  obstruction. No renal calculi are seen. No free fluid is identified. Impression IMPRESSION:    Abnormal gallbladder suspicious for acute cholecystitis. Limited visualization of the pancreas. Report provided to the emergency department at 1758 hrs. Lab/Data Review:  Labs: Results:       Chemistry Recent Labs     09/26/19  0350 09/25/19  0329 09/24/19  0547 09/23/19  1436   * 86 106*  --     143 141  --    K 3.7 3.1* 3.4* 3.2*    106 104  --    CO2 28 30 30  --    BUN 8 4* 4*  --    CREA 1.11 1.02 1.13  --    CA 8.5 8.1* 8.8  --    AGAP 7 7 7  --    BUCR 7* 4* 4*  --    AP  --   --   --  111   TP  --   --   --  5.3*   ALB  --   --   --  2.7*   GLOB  --   --   --  2.6   AGRAT  --   --   --  1.0      CBC w/Diff No results for input(s): WBC, RBC, HGB, HCT, PLT, GRANS, LYMPH, EOS, HGBEXT, HCTEXT, PLTEXT in the last 72 hours. Coagulation Recent Labs     09/26/19 0350 09/25/19  1820  09/25/19 0329   PTP 12.9  --   --  13.2   INR 1.0  --   --  1.0   APTT 91.4* 124.6*   < > 100.1*    < > = values in this interval not displayed. Iron/Ferritin No results for input(s): IRON in the last 72 hours. No lab exists for component: TIBCCALC   BNP No results for input(s): BNPP in the last 72 hours. Cardiac Enzymes No results for input(s): CPK, CKND1, ZEESHAN in the last 72 hours. No lab exists for component: CKRMB, TROIP   Liver Enzymes Recent Labs     09/23/19  1436   TP 5.3*   ALB 2.7*      SGOT 32      Thyroid Studies No results for input(s): T4, T3U, TSH, TSHEXT in the last 72 hours.     No lab exists for component: T3RU       All Micro Results     Procedure Component Value Units Date/Time    CULTURE, BLOOD [[id number]] Collected:  09/20/19 0948    Order Status:  Completed Specimen:  Blood Updated:  09/26/19 0821     Special Requests: NO SPECIAL REQUESTS        Culture result: NO GROWTH 6 DAYS       CULTURE, BODY FLUID Maria Del Rosario  STAIN [[id number]]  (Abnormal)  (Susceptibility) Collected:  09/20/19 1240    Order Status:  Completed Specimen:  Bile Updated: 09/24/19 1413     Special Requests: NO SPECIAL REQUESTS        GRAM STAIN FEW WBC'S               RARE GRAM POSITIVE COCCI IN CHAINS            RARE GRAM NEGATIVE RODS         RARE GRAM POSITIVE RODS         NOTIFIED RN [facility] AT [phone] BY KDA 9/20/19     Culture result: FEW KLEBSIELLA PNEUMONIAE               FEW KLEBSIELLA PNEUMONIAE 2ND MORPHOTYPE                  FEW STREPTOCOCCUS SANGUINIS                  FEW PROPIONIBACTERIUM SPECIES                      Medications at discharge  including reasons for change and indications for new ones:   Current Discharge Medication List      START taking these medications    Details   magnesium oxide (MAG-OX) 400 mg tablet Take 1 Tab by mouth daily. Qty: 30 Tab, Refills: 0      amoxicillin-clavulanate (AUGMENTIN) 500-125 mg per tablet Take 1 Tab by mouth two (2) times daily (with meals) for 8 days. Qty: 16 Tab, Refills: 0      ciprofloxacin HCl (CIPRO) 500 mg tablet Take 1 Tab by mouth every twelve (12) hours for 8 days. Qty: 16 Tab, Refills: 0      lactobacillus sp. 50 billion cpu (BIO-K PLUS) 50 billion cell -375 mg cap capsule Take 1 Cap by mouth daily for 7 days. Qty: 9 Cap, Refills: 0         CONTINUE these medications which have CHANGED    Details   apixaban (ELIQUIS) 2.5 mg tablet Take 1 Tab by mouth two (2) times a day. Qty: 60 Tab, Refills: 2      carvedilol (COREG) 12.5 mg tablet Take 2 Tabs by mouth two (2) times daily (with meals). Qty: 60 Tab, Refills: 1         CONTINUE these medications which have NOT CHANGED    Details   digoxin (LANOXIN) 0.125 mg tablet Take 1 Tab by mouth daily. Qty: 90 Tab, Refills: 3      spironolactone (ALDACTONE) 25 mg tablet Take 1 Tab by mouth daily. Qty: 90 Tab, Refills: 1      furosemide (LASIX) 20 mg tablet Take 1 Tab by mouth daily. Qty: 30 Tab, Refills: 0      tiotropium (SPIRIVA WITH HANDIHALER) 18 mcg inhalation capsule Take 1 Cap by inhalation daily.       sacubitril-valsartan (ENTRESTO) 49 mg/51 mg tablet Take 1 Tab by mouth two (2) times a day. Qty: 180 Tab, Refills: 3      budesonide-formoterol (SYMBICORT) 160-4.5 mcg/actuation HFAA Take 2 Puffs by inhalation two (2) times a day. Qty: 1 Inhaler, Refills: 0      albuterol-ipratropium (DUO-NEB) 2.5 mg-0.5 mg/3 ml nebu 3 mL by Nebulization route every six (6) hours as needed (wheezing or sob). File under Medicare Part B, ICD codes J44.9, C78.01  Qty: 120 Nebule, Refills: 3      DULoxetine (CYMBALTA) 30 mg capsule Take 30 mg by mouth daily. multivitamin (ONE A DAY) tablet Take 1 Tab by mouth daily. plant stanol eliezer (CHOLEST OFF PO) Take 1 Tab by mouth daily. Biotin 2,500 mcg cap Take 1 Cap by mouth daily. melatonin 10 mg tab Take 1 Tab by mouth nightly. montelukast (SINGULAIR) 10 mg tablet Take 1 Tab by mouth daily. Qty: 90 Tab, Refills: 3      raloxifene (EVISTA) 60 mg tablet Take 60 mg by mouth daily. Associated Diagnoses: Malignant neoplasm of breast (female), unspecified site      naloxone (NARCAN) 0.4 mg/mL injection 1 mL by IntraVENous route as needed for Other (Give if breaths per minute  less than 10, may repeat dose in 15 min and contact MD).   Qty: 1 mL, Refills: 0         STOP taking these medications       OTHER Comments:   Reason for Stopping:         mometasone (NASONEX) 50 mcg/actuation nasal spray Comments:   Reason for Stopping:         benzonatate (TESSALON PERLES) 100 mg capsule Comments:   Reason for Stopping:         metOLazone (ZAROXOLYN) 5 mg tablet Comments:   Reason for Stopping:                       Pending laboratory work and tests: none    Activity: Activity as tolerated    Diet: Cardiac Diet, Diabetic Diet and Low fat, Low cholesterol    Wound Care: Keep wound clean and dry        Amadeo Benites MD  9/26/2019  11:17 AM

## 2019-09-26 NOTE — ROUTINE PROCESS
1950:  Rec'd Chely Reddy  from EVELYNE Norris RN. SBAR reviewed patient-side with two-nurse check-offs done of meds, dressings, wounds, LDA's & revelant assessment findings including neuro status, vent settings, rhythm & pulses, diet. Bed in lowest position with noted SR up, wheels locked and exit alarm on. Tonight:  Bright. Walks, denies distress. No major events. Lab was phoned for PTT result, still waiting.   
 
0735:  Verbal Patient-side shift change report given to ROSALINO Mitchell, (oncoming nurse) by Mio Marinelli RN 
(offgoing nurse). Report included the following information SBAR, Kardex, ED Summary, Procedure Summary, Intake/Output, MAR, Recent Results and Med Rec Status. Included:  Intro, hx, two-RN eval of relevant assessment findings, LDAs, skin, diagnostics and infusions.

## 2019-09-26 NOTE — PROGRESS NOTES
NUTRITION    Nutrition Screen      RECOMMENDATIONS / PLAN:     - Continue current nutrition interventions. - Monitor and encourage po/supplement intake. - Continue RD inpatient monitoring and evaluation. NUTRITION INTERVENTIONS & DIAGNOSIS:     - Meals/snacks: modify composition  - Medical food supplement therapy: Ensure Enlive, TID    Nutrition Diagnosis: Predicted inadequate energy intake related to recent diet intolerance due to altered GI function as evidenced by fair intake with recent episodes of nausea/vomiting due to acute cholecystitis. ASSESSMENT:     9/26: Diet advanced per pt request and tolerating well with overall fair intake of meals/supplements. Electrolytes WNL  9/24: Admitted with c/o abdominal pain, nausea/vomiting found to have acute cholecystitis s/p cholecystostomy tube placement 9/20, possible repeat HIDA in several days to evaluate persistent biliary obstruction. Remains on full liquid diet with decreased appetite and fair to poor meal intake, unable to tolerate solid food with nausea/vomiting- last episode yesterday. Agreeable to supplements.      Nutritional intake adequate to meet patients estimated nutritional needs:  Unable to determine at this time    Diet: DIET NUTRITIONAL SUPPLEMENTS Breakfast, Dinner, Lunch; ENSURE ENLIVE  DIET DENTAL SOFT (SOFT SOLID)      Food Allergies: NKFA  Current Appetite: Fair  Appetite/meal intake prior to admission: Good  Feeding Limitations:  [] Swallowing difficulty    [] Chewing difficulty    [] Other:  Current Meal Intake:   Patient Vitals for the past 100 hrs:   % Diet Eaten   09/24/19 1756 50 %   09/24/19 0831 50 %   09/23/19 1200 50 %   09/23/19 0800 35 %   09/22/19 1750 50 %   09/22/19 0900 25 %       BM: 9/25- soft  Skin Integrity: WDL; cholecystostomy tube   Edema:   [x] No     [] Yes   Pertinent Medications: Reviewed: pepcid, lasix, lactobacillus, magnesium oxide, zofran, phenergan prn, aldactone    Recent Labs     09/26/19  0350 09/25/19  0329 09/24/19  0547 09/23/19  1436    143 141  --    K 3.7 3.1* 3.4* 3.2*    106 104  --    CO2 28 30 30  --    * 86 106*  --    BUN 8 4* 4*  --    CREA 1.11 1.02 1.13  --    CA 8.5 8.1* 8.8  --    MG 1.8 1.7 2.1  --    PHOS 3.4 3.6 3.1  --    ALB  --   --   --  2.7*   SGOT  --   --   --  32   ALT  --   --   --  29       Intake/Output Summary (Last 24 hours) at 9/26/2019 1015  Last data filed at 9/26/2019 0700  Gross per 24 hour   Intake 597.53 ml   Output 3515 ml   Net -2917.47 ml       Anthropometrics:  Ht Readings from Last 1 Encounters:   09/20/19 5' 5\" (1.651 m)     Last 3 Recorded Weights in this Encounter    09/22/19 0805 09/24/19 1554 09/26/19 0600   Weight: 86.9 kg (191 lb 9.3 oz) 86.9 kg (191 lb 9.3 oz) 87.1 kg (192 lb 0.3 oz)     Body mass index is 31.95 kg/m².   Obese, Class I     Weight History: patient reports fluctuations in weight, current weight of 170 lb and ranges upper 160s to 180 lb due to side effects of chemotherapy treatments     Weight Metrics 9/26/2019 8/28/2019 7/26/2019 7/12/2019 6/25/2019 5/30/2019 5/8/2019   Weight 192 lb 0.3 oz 184 lb 176 lb 174 lb 174 lb 176 lb 3.2 oz 171 lb 3.2 oz   BMI 31.95 kg/m2 30.62 kg/m2 29.29 kg/m2 28.96 kg/m2 28.96 kg/m2 29.32 kg/m2 28.49 kg/m2        Admitting Diagnosis: Cholecystitis [K81.9]  Pertinent PMHx: metastatic breast cancer to lung on chemotherapy (since December per pt), CHF, GERD, HLD, HTN, DVT/PE    Education Needs:        [x] None identified  [] Identified - Not appropriate at this time  []  Identified and addressed - refer to education log  Learning Limitations:   [x] None identified  [] Identified    Cultural, Anglican & ethnic food preferences:  [x] None identified    [] Identified and addressed     ESTIMATED NUTRITION NEEDS:     Calories: 8232-8717 kcal (HBEx1.2-1.4) based on  [x] Actual BW 77 kg     [] IBW   Protein:  gm (1-1.4 gm/kg) based on  [x] Actual BW      [] IBW   Fluid: 1 mL/kcal     MONITORING & EVALUATION:     Nutrition Goal(s):   - PO nutrition intake will meet >75% of patient estimated nutritional needs within the next 7 days. Outcome: Progressing towards goal        Monitoring:   [x] Food and beverage intake   [x] Diet order   [x] Nutrition-focused physical findings   [x] Treatment/therapy   [] Weight   [] Enteral nutrition intake        Previous Recommendations (for follow-up assessments only): Implemented      Discharge Planning: No nutritional discharge needs at this time.    [x] Participated in care planning, discharge planning, & interdisciplinary rounds as appropriate      Corky Tobar RD  Pager: [phone]

## 2019-09-26 NOTE — DISCHARGE INSTRUCTIONS
Discharge Instructions    Patient: Rocky Patino MRN: [id number]  [facility]: [id number]    YOB: 1950  Age: 78 y.o. Sex: female    DOA: 9/18/2019 LOS:  LOS: 8 days   Discharge Date:      ACUTE DIAGNOSES:    1)  Septic shock, resolved   2)  Acute cholecystitis, s/p cholecystostomy drain placement (9/20/2019)       - biliary fluid cultures with Klebsiella and streptococcus sanguinis   3)  Large calculus of gallbladder neck with stranding   4)  Acute kidney injury, on CKD3, resolved   5)  Hypomagnesemia, hyponatremia, resolved   6)  Sinus tachycardia, resolved         DISCHARGE MEDICATIONS:         · It is important that you take the medication exactly as they are prescribed. · Keep your medication in the bottles provided by the pharmacist and keep a list of the medication names, dosages, and times to be taken in your wallet. · Do not take other medications without consulting your doctor. DIET:  Cardiac Diet and Low fat, Low cholesterol    ACTIVITY: Activity as tolerated,   Continue with nursing skill care for your chronic illness. ADDITIONAL INFORMATION: If you experience any of the following symptoms then please call your primary care physician or return to the emergency room if you cannot get hold of your doctor: Fever, chills, nausea, vomiting, diarrhea, change in mentation, falling, bleeding, shortness of breath. FOLLOW UP CARE:  Dr. Abe Olvera MD  you are to call and set up an appointment to see them in 7 days. Follow-up with Dr. Romario Viera, oncologist within 4-7 days. Follow up with Dr. Alyx Butts, cardiologist, within 2 weeks. Follow up with Dr Paulette Andino, surgery, within 2 weeks         Information obtained by :  I understand that if any problems occur once I am at home I am to contact my physician. I understand and acknowledge receipt of the instructions indicated above. Physician's or R.N.'s Signature                                                                  Date/Time                                                                                                                                              Patient or Representative Signature                                                          Date/Time    Lina Krabbe, MD  9/26/2019  11:05 AM

## 2019-09-26 NOTE — PROGRESS NOTES
[facility]   Progress Note. CARDIOLOGY PROGRESS NOTE  RECS:      1. Septic shock- resolved   2. Sinus tachycardia -resolved- interrogated AICD 9/21/19 normal function - no arrhthymias   3. Chronic Systolic CHF-stage C NYHA class III-on p.o. Lasix. Tolerating Aldactone since yesterday. Continue  Entresto as she takes at home. 4. Non Sustained Vtach- AICD  fired 3 times in  8/19.      5. Nonischemic cardiomyopathy (EF 26-30%)-likely from Herceptin use. recent echo showed EF% 36-40%  6. Hx of recent Acute PE and DVT- patient was on Eliquis  7. Metastatic breast cancer now back on chemotherapy.  Lung metastasis likely cause for shortness of breath which is better.   8. Hypokalemia-watch with Aldactone  9. Hypomagnesemia-improved. 10. JOSUE- resolved   11. Cholecystitis- s/p percutaneous drain 9/20/19- surgery reccommended conservative treatment- antibiotics, pain medications. Not a candidate for surgery      Follow in office with Dr. Radha aHger 1-2 weeks             08/27/19   ECHO ADULT FOLLOW-UP OR LIMITED 08/28/2019 8/28/2019     Narrative · Left Ventricle: Normal wall thickness. Mildly dilated left ventricle. Severe systolic dysfunction. Estimated left ventricular ejection fraction   is 26 - 30%. Biplane method used to measure ejection fraction. Left   ventricular global hypokinesis. · Pericardium: Trivial pericardial effusion.          Signed by: Socorro Beavers MD         ASSESSMENT:  Hospital Problems  Date Reviewed: 6/25/2019          Codes Class Noted POA    Cholecystitis ICD-10-CM: K81.9  ICD-9-CM: 575.10  9/18/2019 Unknown                SUBJECTIVE:  C/o dyspnea and cough that  Is improving very well    OBJECTIVE:    VS:   Visit Vitals  /79   Pulse 91   Temp 98.4 °F (36.9 °C)   Resp 18   Ht 5' 5\" (1.651 m)   Wt 87.1 kg (192 lb 0.3 oz)   SpO2 98%   Breastfeeding?  No   BMI 31.95 kg/m²         Intake/Output Summary (Last 24 hours) at 9/26/2019 1212  Last data filed at 9/26/2019 0700  Gross per 24 hour   Intake 597.53 ml   Output 3215 ml   Net -2617.47 ml     TELE: Ventricular paced. SR.     General: No acute distress  HENT: Normocephalic, atraumatic. Neck :  Jugular venous distention. Cardiac:  Normal S1/S2  Chest/Lungs: Clear without any rales or rhonchi. Abdomen: Soft, drainage tube noted. Extremities:  No edema noted. Labs: Results:       Chemistry Recent Labs     09/26/19 0350 09/25/19 0329 09/24/19  0547 09/23/19  1436   * 86 106*  --     143 141  --    K 3.7 3.1* 3.4* 3.2*    106 104  --    CO2 28 30 30  --    BUN 8 4* 4*  --    CREA 1.11 1.02 1.13  --    CA 8.5 8.1* 8.8  --    MG 1.8 1.7 2.1  --    PHOS 3.4 3.6 3.1  --    AGAP 7 7 7  --    BUCR 7* 4* 4*  --    AP  --   --   --  111   TP  --   --   --  5.3*   ALB  --   --   --  2.7*   GLOB  --   --   --  2.6   AGRAT  --   --   --  1.0      CBC w/Diff No results for input(s): WBC, RBC, HGB, HCT, PLT, GRANS, LYMPH, EOS, HGBEXT, HCTEXT, PLTEXT, HGBEXT, HCTEXT, PLTEXT in the last 72 hours. Cardiac Enzymes No results for input(s): CPK, CKND1, ZEESHAN in the last 72 hours. No lab exists for component: CKRMB, TROIP   Coagulation Recent Labs     09/26/19 0350 09/25/19  1820  09/25/19 0329   PTP 12.9  --   --  13.2   INR 1.0  --   --  1.0   APTT 91.4* 124.6*   < > 100.1*    < > = values in this interval not displayed. Lipid Panel Lab Results   Component Value Date/Time    Cholesterol, total 266 (H) 04/17/2019 11:38 AM    HDL Cholesterol 60 04/17/2019 11:38 AM    LDL, calculated 189.2 (H) 04/17/2019 11:38 AM    VLDL, calculated 16.8 04/17/2019 11:38 AM    Triglyceride 84 04/17/2019 11:38 AM    CHOL/HDL Ratio 4.4 04/17/2019 11:38 AM      BNP No results for input(s): BNPP in the last 72 hours.    Liver Enzymes Recent Labs     09/23/19  1436   TP 5.3*   ALB 2.7*      SGOT 32      Digoxin    Thyroid Studies Lab Results   Component Value Date/Time    TSH 0.10 (L) 04/17/2019 11:38 AM Rajesh Demarco NP-C

## 2019-09-30 RX ORDER — SPIRONOLACTONE 25 MG/1
25 TABLET ORAL DAILY
Qty: 90 TAB | Refills: 1 | Status: SHIPPED | OUTPATIENT
Start: 2019-09-30 | End: 2019-10-09

## 2019-10-02 ENCOUNTER — TELEPHONE (OUTPATIENT)
Dept: CARDIOLOGY CLINIC | Age: 69
End: 2019-10-02

## 2019-10-02 ENCOUNTER — HOME HEALTH ADMISSION (OUTPATIENT)
Dept: HOME HEALTH SERVICES | Facility: HOME HEALTH | Age: 69
End: 2019-10-02
Payer: MEDICARE

## 2019-10-03 ENCOUNTER — HOME CARE VISIT (OUTPATIENT)
Dept: HOME HEALTH SERVICES | Facility: HOME HEALTH | Age: 69
End: 2019-10-03

## 2019-10-03 PROCEDURE — A4927 NON-STERILE GLOVES: HCPCS

## 2019-10-03 PROCEDURE — A4333 URINARY CATH ANCHOR DEVICE: HCPCS

## 2019-10-03 PROCEDURE — A4223 INFUSION SUPPLIES W/O PUMP: HCPCS

## 2019-10-09 ENCOUNTER — OFFICE VISIT (OUTPATIENT)
Dept: CARDIOLOGY CLINIC | Age: 69
End: 2019-10-09

## 2019-10-09 ENCOUNTER — OFFICE VISIT (OUTPATIENT)
Dept: SURGERY | Age: 69
End: 2019-10-09

## 2019-10-09 VITALS
BODY MASS INDEX: 31.95 KG/M2 | HEIGHT: 65 IN | RESPIRATION RATE: 16 BRPM | HEART RATE: 77 BPM | SYSTOLIC BLOOD PRESSURE: 91 MMHG | DIASTOLIC BLOOD PRESSURE: 56 MMHG

## 2019-10-09 VITALS
HEART RATE: 76 BPM | DIASTOLIC BLOOD PRESSURE: 42 MMHG | WEIGHT: 164 LBS | HEIGHT: 65 IN | SYSTOLIC BLOOD PRESSURE: 99 MMHG | BODY MASS INDEX: 27.32 KG/M2

## 2019-10-09 DIAGNOSIS — I50.32 CHRONIC DIASTOLIC CONGESTIVE HEART FAILURE (HCC): Primary | ICD-10-CM

## 2019-10-09 DIAGNOSIS — Z95.810 AUTOMATIC IMPLANTABLE CARDIOVERTER-DEFIBRILLATOR IN SITU: ICD-10-CM

## 2019-10-09 DIAGNOSIS — I10 ESSENTIAL HYPERTENSION: ICD-10-CM

## 2019-10-09 DIAGNOSIS — I42.8 NONISCHEMIC CARDIOMYOPATHY (HCC): ICD-10-CM

## 2019-10-09 DIAGNOSIS — K81.9 CHOLECYSTITIS: Primary | ICD-10-CM

## 2019-10-09 DIAGNOSIS — I49.01 VENTRICULAR FIBRILLATION (HCC): ICD-10-CM

## 2019-10-09 RX ORDER — CARVEDILOL 25 MG/1
25 TABLET ORAL 2 TIMES DAILY WITH MEALS
Qty: 180 TAB | Refills: 3
Start: 2019-10-09 | End: 2019-10-09 | Stop reason: SDUPTHER

## 2019-10-09 RX ORDER — CARVEDILOL 25 MG/1
25 TABLET ORAL 2 TIMES DAILY WITH MEALS
Qty: 180 TAB | Refills: 3 | Status: SHIPPED | OUTPATIENT
Start: 2019-10-09 | End: 2020-09-17 | Stop reason: SDUPTHER

## 2019-10-09 RX ORDER — SPIRONOLACTONE 50 MG/1
50 TABLET, FILM COATED ORAL DAILY
Qty: 90 TAB | Refills: 3 | Status: SHIPPED | OUTPATIENT
Start: 2019-10-09 | End: 2019-12-23 | Stop reason: SDUPTHER

## 2019-10-09 NOTE — PROGRESS NOTES
Progress Note    Patient: Rocky Doyle  MRN: [id number]  SSN: [id number]   YOB: 1950  Age: 78 y.o. Sex: female     Chief Complaint   Patient presents with    Follow-up     cholecystitis       HPI    Ms. Kelly Kimbrough is a 56-year-old lady I saw in the hospital severe heart failure low ejection fraction and metastatic breast cancer who presented with cholecystitis. She received a percutaneous drain and improved dramatically. She is back today for follow-up. Her drain looks good and is in good position draining clear green bile. She is tolerating regular diet moving her bowels but she has significant diarrhea. There is some question as to whether her gallbladder can be removed and that she has poor ejection fraction is right related to chemotherapy and chronic heart failure. We will discuss this with Dr. Lj Aguilar her oncologist.  For now keep the drainage go and we will see her back in 3 weeks. Past Medical History:   Diagnosis Date    Abnormal nuclear cardiac imaging test 06/11/2010    Lg fixed anterior, septal, apical, inferoseptal defect sugg RCA & LAD disease or cardiomyopathy. EF 20%. Global hykn. Nondiagnostic EKG.  Acute bronchitis 10/15/2018    Persistent cough and wheezing in spite of prednisone and antibiotic. 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  Congestive heart failure, unspecified     chronic class ll    Echocardiogram 09/27/2010    EF 30%. Global hykn. Mild MR. Mild TR.     GERD (gastroesophageal reflux disease)     Hyperlipidemia  Hypertension     Mitral valve disorders(424.0) 1/15/2014    mild to moderate mr     Mitral valve disorders(424.0) 1/15/2014    mild to moderate mr     MVP (mitral valve prolapse)     Nonischemic cardiomyopathy (HCC)     EF 20-30% despite medical therapy    Nonspecific abnormal electrocardiogram (ECG) (EKG)     Osteopenia     Other primary cardiomyopathies     Pacemaker 10/27/10    s/p implantation, without complication    Poisoning by other and unspecified agents primarily affecting the cardiovascular system(972.9)     Ef slightly better to 45%, STABLE back on chemo.  Radiation therapy     Radiation therapy complication 4942    S/P cardiac catheterization 07/08/10    Patent coronary arteries. LVEDP 25.  EF 25%. Global hykn. Moderate MR.  RA 10.  RV 40/10. PA 40/20.  20.  CO/CI 4.5/2.45 (Larry).  Shortness of breath     Syncope and collapse     Thyroid disease     goiter     Past Surgical History:   Procedure Laterality Date    CARDIAC SURG PROCEDURE UNLIST      Difibrillator; BI V ICD    HX MASTECTOMY  09/28/11    modified radical mastectomy and axillary dissection    HX ORTHOPAEDIC      HX OTHER SURGICAL      surgery to remove part of liver    HX PACEMAKER  10/27/10    s/p [employer] biventricular AICD, without complication    HX RADICAL MASTECTOMY      IR CHOLECYSTOSTOMY PERCUTANEOUS  9/20/2019    IR INSERT TUNL CVC W PORT OVER 5 YEARS  1/16/2019    NEUROLOGICAL PROCEDURE UNLISTED      Cervical fusion     Allergies   Allergen Reactions    Adhesive Other (comments)     blisters     Current Outpatient Medications   Medication Sig Dispense Refill    apixaban (ELIQUIS) 2.5 mg tablet Take 1 Tab by mouth two (2) times a day. 60 Tab 2    magnesium oxide (MAG-OX) 400 mg tablet Take 1 Tab by mouth daily. 30 Tab 0    digoxin (LANOXIN) 0.125 mg tablet Take 1 Tab by mouth daily. 90 Tab 3    furosemide (LASIX) 20 mg tablet Take 1 Tab by mouth daily.  30 Tab 0    tiotropium (SPIRIVA WITH HANDIHALER) 18 mcg inhalation capsule Take 1 Cap by inhalation daily.  sacubitril-valsartan (ENTRESTO) 49 mg/51 mg tablet Take 1 Tab by mouth two (2) times a day. 180 Tab 3    budesonide-formoterol (SYMBICORT) 160-4.5 mcg/actuation HFAA Take 2 Puffs by inhalation two (2) times a day. 1 Inhaler 0    albuterol-ipratropium (DUO-NEB) 2.5 mg-0.5 mg/3 ml nebu 3 mL by Nebulization route every six (6) hours as needed (wheezing or sob). File under Medicare Part B, ICD codes J44.9, C78.01 120 Nebule 3    DULoxetine (CYMBALTA) 30 mg capsule Take 30 mg by mouth daily.  multivitamin (ONE A DAY) tablet Take 1 Tab by mouth daily.  plant stanol eliezer (CHOLEST OFF PO) Take 1 Tab by mouth daily.  Biotin 2,500 mcg cap Take 1 Cap by mouth daily.  melatonin 10 mg tab Take 1 Tab by mouth nightly.  montelukast (SINGULAIR) 10 mg tablet Take 1 Tab by mouth daily. 90 Tab 3    raloxifene (EVISTA) 60 mg tablet Take 60 mg by mouth daily.  spironolactone (ALDACTONE) 50 mg tablet Take 1 Tab by mouth daily. 90 Tab 3    carvedilol (COREG) 25 mg tablet Take 1 Tab by mouth two (2) times daily (with meals). 180 Tab 3    naloxone (NARCAN) 0.4 mg/mL injection 1 mL by IntraVENous route as needed for Other (Give if breaths per minute  less than 10, may repeat dose in 15 min and contact MD).  1 mL 0     Social History     Socioeconomic History    Marital status:      Spouse name: Not on file    Number of children: Not on file    Years of education: Not on file    Highest education level: Not on file   Occupational History    Not on file   Social Needs    Financial resource strain: Not on file    Food insecurity:     Worry: Not on file     Inability: Not on file    Transportation needs:     Medical: Not on file     Non-medical: Not on file   Tobacco Use    Smoking status: Never Smoker    Smokeless tobacco: Never Used   Substance and Sexual Activity    Alcohol use: No    Drug use: No    Sexual activity: Not on file   Lifestyle    Physical activity:     Days per week: Not on file     Minutes per session: Not on file    Stress: Not on file   Relationships    Social connections:     Talks on phone: Not on file     Gets together: Not on file     Attends Orthodoxy service: Not on file     Active member of club or organization: Not on file     Attends meetings of clubs or organizations: Not on file     Relationship status: Not on file    Intimate partner violence:     Fear of current or ex partner: Not on file     Emotionally abused: Not on file     Physically abused: Not on file     Forced sexual activity: Not on file   Other Topics Concern    Not on file   Social History Narrative    Not on file     Family History   Problem Relation Age of Onset    Hypertension Mother     High Cholesterol Mother     Heart Disease Father         paemaker implant at 80         Review of systems:  Patient denies any reflux, emesis, abdominal pain, change in bowel habits, hematochezia, melena, fever, weight loss, fatigue chills, dermatitis, abnormal moles, change in vision, vertigo, epistaxis, dysphagia, hoarseness, chest pain, palpitations, hypertension, edema, cough, shortness of breath, wheezing, hemoptysis, snoring, hematuria, diabetes, thyroid disease, anemia, bruising, history of blood transfusion, dizziness, headache, or fainting.     Physical Examination    Well developed well nourished female in no apparent distress  Visit Vitals  BP 91/56   Pulse 77   Resp 16   Ht 5' 5\" (1.651 m)   Wt (P) 87.1 kg (192 lb)   BMI (P) 31.95 kg/m²      Head: normocephalic, atraumatic  Mouth: Clear, no overt lesions, oral mucosa pink and moist  Neck: supple, no masses, no adenopathy or carotid bruits, trachea midline  Resp: clear to auscultation bilaterally, no wheeze, rhonchi or rales, excursions normal and symmetrical  Cardio: Regular rate and rhythm, no murmurs, clicks, gallops or rubs, no edema or varicosities  Abdomen: soft, nontender, nondistended, normoactive bowel sounds, no hernias, no hepatosplenomegaly,   Back: Deferred  Extremeties: warm, well-perfused, no tenderness or swelling, normal gait/station  Neuro: sensation and strength grossly intact and symmetrical  Psych: alert and oriented to person, place and time  Breast exam deferred    IMPRESSION  History of cholecystitis with percutaneous drainage    PLAN  No orders of the defined types were placed in this encounter.     Follow-up in 2 to 3 weeks for drain removal  Velma Marquez MD

## 2019-10-09 NOTE — PROGRESS NOTES
HISTORY OF PRESENT ILLNESS  Russell Aden is a 78 y.o. female. Patient with cmp,chf.post  icd   On follow up patient denies any chest pains, palpitation or other significant symptoms. Patient is here for follow up of diagnostic tests. Results will be discussed. He feels extremely fatigued tired and weak. Recently reported decrease in renal function. Patient seen for transition of care visit. Discharge date 5/6/2019  Nurse encounter 5/6/2019  Physician encounter 5/8/2019. Admitted with acute CHF, acute PE, DVT. Patient had worsening cardiomyopathy. Symptoms are slowly improving significant improvement of shortness of breath. Orthopnea significantly better. No edema. Still feels tired on minimal activity exertion    CHF   The history is provided by the patient. This is a recurrent problem. The problem occurs constantly. The problem has been gradually improving. Associated symptoms include shortness of breath. Pertinent negatives include no chest pain. The symptoms are aggravated by exertion. The symptoms are relieved by rest.   Cardiomyopathy   The history is provided by the patient. This is a chronic problem. The problem has been resolved. Associated symptoms include shortness of breath. Pertinent negatives include no chest pain. Hypertension   The history is provided by the patient. This is a chronic problem. The problem occurs constantly. The problem has not changed since onset. Associated symptoms include shortness of breath. Pertinent negatives include no chest pain. Valvular Heart Disease   The history is provided by the patient. This is a chronic problem. The problem occurs constantly. The problem has not changed since onset. Associated symptoms include shortness of breath. Pertinent negatives include no chest pain. Review of Systems   Constitutional: Negative for chills and fever. HENT: Negative for nosebleeds. Eyes: Negative for blurred vision and double vision.    Respiratory: Positive for shortness of breath. Negative for cough, hemoptysis, sputum production and wheezing. Cardiovascular: Negative for chest pain, palpitations, orthopnea, claudication, leg swelling and PND. Gastrointestinal: Negative for heartburn, nausea and vomiting. Musculoskeletal: Negative for myalgias. Skin: Negative for rash. Neurological: Negative for dizziness and weakness. Endo/Heme/Allergies: Does not bruise/bleed easily. Family History   Problem Relation Age of Onset    Hypertension Mother     High Cholesterol Mother     Heart Disease Father         paemaker implant at 80       Past Medical History:   Diagnosis Date    Abnormal nuclear cardiac imaging test 06/11/2010    Lg fixed anterior, septal, apical, inferoseptal defect sugg RCA & LAD disease or cardiomyopathy. EF 20%. Global hykn. Nondiagnostic EKG.  Acute bronchitis 10/15/2018    Persistent cough and wheezing in spite of prednisone and antibiotic. Will refer to pulmonary    Adenocarcinoma of breast; locally advanced invasive ductal adenocarcinoma of left breast     Asthma     Automatic implantable cardiac defibrillator in situ     Automatic implantable cardiac defibrillator in situ     [facility] [address]     Cancer ([facility] [address]     breast cancer left    Chemotherapy convalescence or palliative care 2012    Chronic combined systolic and diastolic heart failure (HCC)     Remains symptomatic, nyha class 3 ef improving.  Congestive heart failure, unspecified     chronic class ll    Echocardiogram 09/27/2010    EF 30%. Global hykn. Mild MR. Mild TR.     GERD (gastroesophageal reflux disease)     Hyperlipidemia     Hypertension     Mitral valve disorders(424.0) 1/15/2014    mild to moderate mr     Mitral valve disorders(424.0) 1/15/2014    mild to moderate mr     MVP (mitral valve prolapse)     Nonischemic cardiomyopathy (HCC)     EF 20-30% despite medical therapy    Nonspecific abnormal electrocardiogram (ECG) (EKG)  Osteopenia     Other primary cardiomyopathies     Pacemaker 10/27/10    s/p implantation, without complication    Poisoning by other and unspecified agents primarily affecting the cardiovascular system(972.9)     Ef slightly better to 45%, STABLE back on chemo.  Radiation therapy     Radiation therapy complication 7909    S/P cardiac catheterization 07/08/10    Patent coronary arteries. LVEDP 25.  EF 25%. Global hykn. Moderate MR.  RA 10.  RV 40/10. PA 40/20.  20.  CO/CI 4.5/2.45 (Larry).  Shortness of breath     Syncope and collapse     Thyroid disease     goiter       Past Surgical History:   Procedure Laterality Date    CARDIAC SURG PROCEDURE UNLIST      Difibrillator; BI V ICD    HX MASTECTOMY  09/28/11    modified radical mastectomy and axillary dissection    HX ORTHOPAEDIC      HX OTHER SURGICAL      surgery to remove part of liver    HX PACEMAKER  10/27/10    s/p [employer] biventricular AICD, without complication    HX RADICAL MASTECTOMY      IR CHOLECYSTOSTOMY PERCUTANEOUS  9/20/2019    IR INSERT TUNL CVC W PORT OVER 5 YEARS  1/16/2019    NEUROLOGICAL PROCEDURE UNLISTED      Cervical fusion       Social History     Tobacco Use    Smoking status: Never Smoker    Smokeless tobacco: Never Used   Substance Use Topics    Alcohol use: No       Allergies   Allergen Reactions    Adhesive Other (comments)     blisters       Current Outpatient Medications   Medication Sig    spironolactone (ALDACTONE) 50 mg tablet Take 1 Tab by mouth daily.  carvedilol (COREG) 25 mg tablet Take 1 Tab by mouth two (2) times daily (with meals).  apixaban (ELIQUIS) 2.5 mg tablet Take 1 Tab by mouth two (2) times a day.  magnesium oxide (MAG-OX) 400 mg tablet Take 1 Tab by mouth daily.  digoxin (LANOXIN) 0.125 mg tablet Take 1 Tab by mouth daily.  furosemide (LASIX) 20 mg tablet Take 1 Tab by mouth daily.     tiotropium (SPIRIVA WITH HANDIHALER) 18 mcg inhalation capsule Take 1 Cap by inhalation daily.  sacubitril-valsartan (ENTRESTO) 49 mg/51 mg tablet Take 1 Tab by mouth two (2) times a day.  budesonide-formoterol (SYMBICORT) 160-4.5 mcg/actuation HFAA Take 2 Puffs by inhalation two (2) times a day.  albuterol-ipratropium (DUO-NEB) 2.5 mg-0.5 mg/3 ml nebu 3 mL by Nebulization route every six (6) hours as needed (wheezing or sob). File under Medicare Part B, ICD codes J44.9, C78.01    DULoxetine (CYMBALTA) 30 mg capsule Take 30 mg by mouth daily.  montelukast (SINGULAIR) 10 mg tablet Take 1 Tab by mouth daily.  raloxifene (EVISTA) 60 mg tablet Take 60 mg by mouth daily.  naloxone (NARCAN) 0.4 mg/mL injection 1 mL by IntraVENous route as needed for Other (Give if breaths per minute  less than 10, may repeat dose in 15 min and contact MD).  multivitamin (ONE A DAY) tablet Take 1 Tab by mouth daily.  plant stanol eliezer (CHOLEST OFF PO) Take 1 Tab by mouth daily.  Biotin 2,500 mcg cap Take 1 Cap by mouth daily.  melatonin 10 mg tab Take 1 Tab by mouth nightly. No current facility-administered medications for this visit. Visit Vitals  BP 99/42   Pulse 76   Ht 5' 5\" (1.651 m)   Wt 74.4 kg (164 lb)   BMI 27.29 kg/m²         Physical Exam   Constitutional: She is oriented to person, place, and time. She appears well-developed and well-nourished. HENT:   Head: Normocephalic and atraumatic. Eyes: Conjunctivae are normal.   Neck: Neck supple. No JVD present. No tracheal deviation present. No thyromegaly present. Cardiovascular: Normal rate and regular rhythm. PMI is not displaced. Exam reveals no gallop, no S3 and no decreased pulses. Murmur heard. High-pitched blowing holosystolic murmur is present with a grade of 2/6 at the apex. Pulmonary/Chest: No respiratory distress. She has wheezes. She has no rales. She exhibits no tenderness. Abdominal: Soft. There is no tenderness. Musculoskeletal: She exhibits no edema.    Neurological: She is alert and oriented to person, place, and time. Skin: Skin is warm. Psychiatric: She has a normal mood and affect. Ms. Erasto Boyce has a reminder for a \"due or due soon\" health maintenance. I have asked that she contact her primary care provider for follow-up on this health maintenance. No flowsheet data found. SUMMARY:echo:6/2014  Left ventricle: The ventricle was mildly dilated. Systolic function was  normal. Ejection fraction was estimated in the range of 50 % to 55 %. There were no regional wall motion abnormalities. Doppler parameters were  consistent with abnormal left ventricular relaxation (grade 1 diastolic  dysfunction). Left atrium: The atrium was mildly dilated. Mitral valve: There was systolic bowing of the posterior leaflet, but  without diagnostic evidence for prolapse. There was mild to moderate  regurgitation. SUMMARY:echo:12/2014  Left ventricle: Systolic function was at the lower limits of normal.  Ejection fraction was estimated to be 53 %. There were no regional wall  motion abnormalities. Doppler parameters were consistent with abnormal  left ventricular relaxation (grade 1 diastolic dysfunction). Right ventricle: A pacing wire was present. Mitral valve: There was systolic bowing of the posterior leaflet, but  without diagnostic evidence for prolapse. There was mild regurgitation. Tricuspid valve: Pulmonary artery systolic pressure: 22 mmHg. 12/2015:echo    SUMMARY:  Left ventricle: Systolic function was normal. Ejection fraction was  estimated in the range of 50 % to 55 %. There was mild diffuse  hypokinesis. Doppler parameters were consistent with abnormal left  ventricular relaxation (grade 1 diastolic dysfunction). Mitral valve: There was systolic bowing of the anterior and posterior  leaflets, but without diagnostic evidence for prolapse. There was mild  regurgitation. Tricuspid valve: There was mild regurgitation.  Tricuspid regurgitation  peak velocity: 2.3 m/sec. Pulmonary artery systolic pressure: 25 mmHg. pft-6/2017  Maximal Mid Expiratory Flow rate is reduced to 43 % predicted  Forced Expiratory Volume in one second is reduced to 69 % predicted  FEV 1% is reduced     Volumes: All Volumes are reduced except for RV    Flow Volume Loop:  Nonspecific obstructive pattern in Flow Volume Loop    Bronchodilator:  No significant improvement with bronchodilator therapy    Diffusion:  Abnormal Diffusion Capacity reduced to 62 % predicted  DLCO normalizes to alveolar ventilation    Impression:  Moderate obstructive defect, Predominately small airways, Mild restrictive ventilatory defect, Reduced diffusion capacity indicating a decrease in alveolar surface area for gas exchange  I Have personally reviewed recent relevant labs available and discussed with patient    Interpretation Summary -6/2018  · Calculated left ventricular ejection fraction is 55%. Left ventricular mild concentric hypertrophy observed. Mild (grade 1) left ventricular diastolic dysfunction. · Left atrial cavity size is mildly dilated. · There was systolic bowing of the anterior and posterior leaflets, but without diagnostic evidence for prolapse. Mild mitral valve regurgitation. · Mild tricuspid valve regurgitation is present. Pulmonary arterial systolic pressure is 18 mmHg. · Mild pulmonic valve regurgitation is present. · Small pericardial effusion. Interpretation Summary 12/2018    · Left ventricular low normal systolic function. Estimated left ventricular ejection fraction is 51 - 55%. Visually measured ejection fraction. Normal left ventricular wall motion, no regional wall motion abnormality noted. Moderate (grade 2) left ventricular diastolic dysfunction. · There is no evidence of pulmonary hypertension. · Mild to moderate mitral valve regurgitation. · Left atrial cavity size is mildly dilated.       Interpretation Summary 3/2019       · Normal cavity size, wall thickness and diastolic function. There is low normal systolic function. The estimated ejection fraction is 51 - 55%. No regional wall motion abnormality noted. Global longitudinal strain is -13.00%. Abnormal left ventricular strain. · Normal right ventricular size and function. Pacing wire present  · Mild to moderate mitral valve regurgitation. Mild prolapse of the posterior leaflet within the mitral valve. · Mild pulmonic valve regurgitation is present. · Mild tricuspid valve regurgitation. Pulmonary arterial systolic pressure is 68.5 mmHg. Mild pulmonary hypertension. 5/2019    · Left Ventricle: Mild concentric hypertrophy. Severe global systolic dysfunction. Estimated left ventricular ejection fraction is 26 - 30%. Biplane method used to measure ejection fraction. Left ventricular global hypokinesis. · IVC/Hepatic Veins: Severely elevated central venous pressure (15+ mmHg); IVC diameter is larger than 21 mm and collapses less than 50% with respiration. · Pulmonary Artery: Mild pulmonary hypertension. Pulmonary arterial systolic pressure is 44 mmHg. · Pericardium: Trivial-to-small circumferential pericardial effusion measuring 10 mm. · Mitral Valve: Moderate mitral valve regurgitation. · Right Ventricle: Pacing wire present   Interpretation Summary 5/2019    · Acute occlusive thrombus present in the right peroneal vein. IMPRESSION: 5/2019     1. Positive examination for pulmonary emboli.   - There is likely a combination of acute and subacute PE in the lower lobe  segmental and subsegmental branch vessels. Interpretation Summary 7/2019    · Left Ventricle: Normal cavity size. Mild concentric hypertrophy. Severe systolic dysfunction. Estimated left ventricular ejection fraction is 21 - 25%. Abnormal left ventricular wall motion. Inconclusive left ventricular diastolic function. · Left Atrium: Severely dilated left atrium. · Mitral Valve: Mitral valve thickening. Mitral annular calcification.  Moderate mitral valve regurgitation. · Tricuspid Valve: Mild tricuspid valve regurgitation is present. Pulmonary arterial systolic pressure is 24.5 mmHg. Mild pulmonary hypertension. · IVC/Hepatic Veins: Moderately elevated central venous pressure (10-15 mmHg); IVC diameter is larger than 21 mm and collapses more than 50% with respiration. · Pericardium: Trivial-to-small pericardial effusion. Interpretation Summary 9/2019    · Left Ventricle: Normal cavity size. Upper normal wall thickness. Moderate-to-severe systolic dysfunction. Estimated left ventricular ejection fraction is 36 - 40%. Biplane method used to measure ejection fraction. Left ventricular global hypokinesis. · Pericardium: Trivial pericardial effusion. Assessment         ICD-10-CM ICD-9-CM    1. Chronic diastolic congestive heart failure (HCC) I50.32 428.32      428.0     Considered CHF class III. Improved. Tolerating Entresto. Due to hypokalemia I will increase Aldactone and reduce Lasix to 2-3 times a week   2. Nonischemic cardiomyopathy (HCC) I42.8 425.4     Stable continue treatment   3. Automatic implantable cardioverter-defibrillator in situ Z95.810 V45.02     Episode of ventricular fibrillation in August.  Patient hypokalemia at that time since then has been on Aldactone. 4. Essential hypertension I10 401.9     Stable   5. Ventricular fibrillation ([address] I49.01 427.41     Episode happened in 8/2019 noted on ICD check. Patient was in hospital with altered mentation. She also had hypokalemia at that time. Tounge swelling not related to lisinopril as she had swelling even after stopping lisinopril  7/2018  I will hold Lasix as recent decrease in renal function. No clinical sign of fluid overload. 10/2018  Shortness of breath due to acute bronchitis bilateral wheezing. Will refer back to pulmonary. No sign of fluid overload. Will keep off Lasix  1/2019  Metastatic breast cancer now back on chemotherapy.   Lung metastasis likely cause for shortness of breath which is improving. Low blood pressure will reduce Coreg to 6.25 twice a day. Continue lisinopril. Recheck echo in 2 months on chemotherapy  4/2019  Cardiac status stable. Echo EF remains stable continue Coreg and lisinopril. Check echo in 3 months. Patient remains on chemotherapy    5/2019  Recent admission with increased shortness of breath combination of pulmonary embolism and decompensated systolic heart failure with worsening ejection fraction. Class III CHF. 10 pound weight loss from peak. Continue current therapy. Continue anticoagulation. Follow-up 3 weeks  5/30/2019  Fluid overload stable. Remains weak. Follow-up echo in about a month to reassess LV function. 10/2019  Cardiac status stable. CHF compensated. Shortness of breath and edema improving. Continue Entresto. Aldactone increased to 50 mg a day due to hypokalemia. Lasix will be reduced to 2-3 times a week. Episode of ventricular fibrillation in 8/2019 noticed on ICD check. Patient had hypokalemia during that time will continue to monitor. If patient needs laparoscopic surgery her risk is high about 5 to 10%. If open surgical procedure is required it will carry high risk    Orders Placed This Encounter    spironolactone (ALDACTONE) 50 mg tablet     Sig: Take 1 Tab by mouth daily. Dispense:  90 Tab     Refill:  3    DISCONTD: carvedilol (COREG) 25 mg tablet     Sig: Take 1 Tab by mouth two (2) times daily (with meals). Dispense:  180 Tab     Refill:  3    carvedilol (COREG) 25 mg tablet     Sig: Take 1 Tab by mouth two (2) times daily (with meals). Dispense:  180 Tab     Refill:  3       Follow-up and Dispositions    · Return in about 3 months (around 1/9/2020).

## 2019-10-25 ENCOUNTER — OFFICE VISIT (OUTPATIENT)
Dept: PULMONOLOGY | Age: 69
End: 2019-10-25

## 2019-10-25 VITALS
OXYGEN SATURATION: 96 % | DIASTOLIC BLOOD PRESSURE: 44 MMHG | SYSTOLIC BLOOD PRESSURE: 84 MMHG | RESPIRATION RATE: 20 BRPM | BODY MASS INDEX: 27.49 KG/M2 | HEIGHT: 65 IN | HEART RATE: 84 BPM | WEIGHT: 165 LBS | TEMPERATURE: 98.4 F

## 2019-10-25 DIAGNOSIS — Z01.818 PREOPERATIVE CLEARANCE: Primary | ICD-10-CM

## 2019-10-25 DIAGNOSIS — Z95.810 AUTOMATIC IMPLANTABLE CARDIOVERTER-DEFIBRILLATOR IN SITU: ICD-10-CM

## 2019-10-25 DIAGNOSIS — I42.8 NONISCHEMIC CARDIOMYOPATHY (HCC): ICD-10-CM

## 2019-10-25 DIAGNOSIS — R06.02 SHORTNESS OF BREATH: ICD-10-CM

## 2019-10-25 DIAGNOSIS — C78.01 MALIGNANT NEOPLASM METASTATIC TO RIGHT LUNG (HCC): ICD-10-CM

## 2019-10-25 NOTE — LETTER
10/25/19 Patient: Annamaria Wong YOB: 1950 Date of Visit: 10/25/2019 Elaine Chi MD 
[address] [place], [place] [id number] VIA Facsimile: [phone] Dear Elaine Chi MD, Thank you for referring Ms. Brandon Ernst to [address] [place] [address] for evaluation. My notes for this consultation are attached. If you have questions, please do not hesitate to call me. I look forward to following your patient along with you.  
 
 
Sincerely, 
 
Francia Julian MD

## 2019-10-25 NOTE — PROGRESS NOTES
Reno Serrano presents today for   Chief Complaint   Patient presents with    Other     Pulmonary embolism    Other     Malignant neoplasm metastatic to lung. Is someone accompanying this pt?  Danita Moore. Is the patient using any DME equipment during OV? No    -DME Company NA    Depression Screening:  3 most recent PHQ Screens 10/25/2019   Little interest or pleasure in doing things Not at all   Feeling down, depressed, irritable, or hopeless Not at all   Total Score PHQ 2 0       Learning Assessment:  Learning Assessment 10/9/2019   PRIMARY LEARNER Patient   HIGHEST LEVEL OF EDUCATION - PRIMARY LEARNER  -   BARRIERS PRIMARY LEARNER NONE   PRIMARY LANGUAGE ENGLISH   LEARNER PREFERENCE PRIMARY DEMONSTRATION   ANSWERED BY self     -   RELATIONSHIP SELF       Abuse Screening:  Abuse Screening Questionnaire 4/30/2019   Do you ever feel afraid of your partner? N   Are you in a relationship with someone who physically or mentally threatens you? N   Is it safe for you to go home? Y       Fall Risk  Fall Risk Assessment, last 12 mths 10/25/2019   Able to walk? Yes   Fall in past 12 months? No   Fall with injury? -   Number of falls in past 12 months -   Fall Risk Score -         Coordination of Care:  1. Have you been to the ER, urgent care clinic since your last visit? Hospitalized since your last visit? Yes; Name: [facility] 8/27/19 gallbladder    2. Have you seen or consulted any other health care providers outside of the [address] [place] [address] since your last visit? Include any pap smears or colon screening.  Dr. [facility]

## 2019-10-25 NOTE — PROGRESS NOTES
[facility]  Pulmonary, Critical Care, and Sleep Medicine      Pulmonary Office visit    Name: Gilberto Hodges     : 1950     Date: 10/25/2019        Subjective:   Pre=op Clearance for cholecystectomy    10/25/19   In September she presented with abdominal pain and right-sided chest pain. Lab workup in in ER was unremarkable. CT abd/pelv showed evidence for acute cholecystitis with large stone. HIDA was non-visualizing. Surgery evaluated with recommendation for antibiotics, pain control and percutaneous drainage. She was on IV cipro/flagyl on admission. On 2019, RRT was called for worsened fever and hypotension. She was transitioned to ICU due to sepsis shock. IV antibiotics were changed. IR was consulted for GB drainage. She required pressor support for sepsis shock. She tolerated IV hydration and her JOSUE improved. She was stabilized and was better the next day. Surgery continue to recommended conservative measure, continue with cholecystostomy and antibiotics. GI was consulted for ERCP but she was not a candidate. Infectious disease specialist was consulted for antibiotic selection.      Bile fluid cultures - klebsiella (2 morphotypes - one with pip/tazo GT:16,both resistant to ampicillin), streptococcus sanguinis      Patient is clinically improving. Klebsiella has high GT against pip/tazo - risk of developing resistance. Since patient has calculus at gallbladder neck; there is risk of recurrent cholecystitis once cholecystostomy tube removed.    Surgery planned for 10/28/2019 by Dr. Muriel Foster.  Patient is here for preop clearance    Pulmonary condition includes-metastatic breast CA-endobronchial obstruction with symptoms of shortness of breath and wheezing with of substantially improved after chemotherapy with reexpansion of atelectatic lung and improvement in postobstructive atelectasis.   Also diagnosed with DVT and PE over the summer  In addition she has cardiomyopathy with EF,30% , heart failure associated with chemotherapy. Currently she complains of generalized weakness  Denies any significant cough ,wheezing or chest pain  Denies any mucus  Denies hemoptysis  Denies any recent ER visits    Background history   metastatic breast Ca- recurrence with Lung mets- parenchymal and endobronchial.   She has shown good response to treatment which was recently validated with a PET CT.    HPI:  Patient is a 78 y.o. female has been experiencing SOB for past 1 year. SOB:   Symptoms occur with exertion and at night. Able to walk about about 3-4  Blocks. Cannot climb stairs. Activities of daily living are affected and improves with pacing. Has orthopnea/ paroxysmal nocturnal dyspnea  SOB relieved with pacing and resting. C/o sinus congestion. Denies any significant Cough:  Has some wheezing, no chest pain, fever, chills, night sweats dyspepsia, reflux. Has had AICD placed and replaced. Left mastectomy with chemo- radiation : 5 years back  Weight gain of 50 lbs over few years. Comorbid conditions include- DM, HTN, CHF- nonischemic cardiomyopathy, Breast ca- treated: s/p mastectomy -L and chemo radiation  Non smoker  Occupational exposure-none    Past Medical History:   Diagnosis Date    Abnormal nuclear cardiac imaging test 06/11/2010    Lg fixed anterior, septal, apical, inferoseptal defect sugg RCA & LAD disease or cardiomyopathy. EF 20%. Global hykn. Nondiagnostic EKG.  Acute bronchitis 10/15/2018    Persistent cough and wheezing in spite of prednisone and antibiotic.   Will refer to pulmonary    Adenocarcinoma of breast; locally advanced invasive ductal adenocarcinoma of left breast     Asthma     Automatic implantable cardiac defibrillator in situ     Automatic implantable cardiac defibrillator in situ     [facility] ([facility] [address]     Cancer ([facility] [address]     breast cancer left    Chemotherapy convalescence or palliative care 2012    Chronic combined systolic and diastolic heart failure (HCC)     Remains symptomatic, nyha class 3 ef improving.  Congestive heart failure, unspecified     chronic class ll    Echocardiogram 09/27/2010    EF 30%. Global hykn. Mild MR. Mild TR.  GERD (gastroesophageal reflux disease)     Hyperlipidemia     Hypertension     Mitral valve disorders(424.0) 1/15/2014    mild to moderate mr     Mitral valve disorders(424.0) 1/15/2014    mild to moderate mr     MVP (mitral valve prolapse)     Nonischemic cardiomyopathy (HCC)     EF 20-30% despite medical therapy    Nonspecific abnormal electrocardiogram (ECG) (EKG)     Osteopenia     Other primary cardiomyopathies     Pacemaker 10/27/10    s/p implantation, without complication    Poisoning by other and unspecified agents primarily affecting the cardiovascular system(972.9)     Ef slightly better to 45%, STABLE back on chemo.  Radiation therapy     Radiation therapy complication 0379    S/P cardiac catheterization 07/08/10    Patent coronary arteries. LVEDP 25.  EF 25%. Global hykn. Moderate MR.  RA 10.  RV 40/10. PA 40/20.  20.  CO/CI 4.5/2.45 (Larry).  Shortness of breath     Syncope and collapse     Thyroid disease     goiter     Past Surgical History:   Procedure Laterality Date    CARDIAC SURG PROCEDURE UNLIST      Difibrillator; BI V ICD    HX MASTECTOMY  09/28/11    modified radical mastectomy and axillary dissection    HX ORTHOPAEDIC      HX OTHER SURGICAL      surgery to remove part of liver    HX PACEMAKER  10/27/10    s/p [employer] biventricular AICD, without complication    HX RADICAL MASTECTOMY      IR CHOLECYSTOSTOMY PERCUTANEOUS  9/20/2019    IR INSERT TUNL CVC W PORT OVER 5 YEARS  1/16/2019    NEUROLOGICAL PROCEDURE UNLISTED      Cervical fusion       Allergies   Allergen Reactions    Adhesive Other (comments)     blisters     .   Current Outpatient Medications   Medication Sig Dispense Refill    spironolactone (ALDACTONE) 50 mg tablet Take 1 Tab by mouth daily. 90 Tab 3    carvedilol (COREG) 25 mg tablet Take 1 Tab by mouth two (2) times daily (with meals). 180 Tab 3    apixaban (ELIQUIS) 2.5 mg tablet Take 1 Tab by mouth two (2) times a day. 60 Tab 2    magnesium oxide (MAG-OX) 400 mg tablet Take 1 Tab by mouth daily. 30 Tab 0    digoxin (LANOXIN) 0.125 mg tablet Take 1 Tab by mouth daily. 90 Tab 3    sacubitril-valsartan (ENTRESTO) 49 mg/51 mg tablet Take 1 Tab by mouth two (2) times a day. 180 Tab 3    budesonide-formoterol (SYMBICORT) 160-4.5 mcg/actuation HFAA Take 2 Puffs by inhalation two (2) times a day. 1 Inhaler 0    albuterol-ipratropium (DUO-NEB) 2.5 mg-0.5 mg/3 ml nebu 3 mL by Nebulization route every six (6) hours as needed (wheezing or sob). File under Medicare Part B, ICD codes J44.9, C78.01 120 Nebule 3    DULoxetine (CYMBALTA) 30 mg capsule Take 30 mg by mouth daily.  montelukast (SINGULAIR) 10 mg tablet Take 1 Tab by mouth daily. 90 Tab 3    raloxifene (EVISTA) 60 mg tablet Take 60 mg by mouth daily.  furosemide (LASIX) 20 mg tablet Take 1 Tab by mouth daily. 30 Tab 0    tiotropium (SPIRIVA WITH HANDIHALER) 18 mcg inhalation capsule Take 1 Cap by inhalation daily.  naloxone (NARCAN) 0.4 mg/mL injection 1 mL by IntraVENous route as needed for Other (Give if breaths per minute  less than 10, may repeat dose in 15 min and contact MD). 1 mL 0    multivitamin (ONE A DAY) tablet Take 1 Tab by mouth daily.  plant stanol eliezer (CHOLEST OFF PO) Take 1 Tab by mouth daily.  Biotin 2,500 mcg cap Take 1 Cap by mouth daily.  melatonin 10 mg tab Take 1 Tab by mouth nightly.        Review of Systems:  HEENT: No epistaxis, +nasal drainage, + itching- throat,no difficulty in swallowing, no redness in eyes  Respiratory: as above  Cardiovascular: no chest pain, no palpitations, no chronic leg edema, no syncope  Gastrointestinal: no abd pain, no vomiting, no diarrhea, no bleeding symptoms  Genitourinary: No urinary symptoms or hematuria  Integument/breast: No ulcers or rashes  Musculoskeletal:Neg  Neurological: No focal weakness, no seizures, no headaches  Behvioral/Psych: No anxiety, no depression  Constitutional: No fever, no chills, no weight loss, no night sweats     Objective:     Visit Vitals  BP (!) 84/44 (BP 1 Location: Right arm, BP Patient Position: Sitting)   Pulse 84   Temp 98.4 °F (36.9 °C) (Oral)   Resp 20   Ht 5' 5\" (1.651 m)   Wt 74.8 kg (165 lb)   SpO2 96%   BMI 27.46 kg/m²        Physical Exam:   General: comfortable, no acute distress  HEENT: pupils reactive, sclera anicteric, EOM intact  Neck: No adenopathy or thyroid swelling, no lymphadenopathy or JVD, supple  Chest: multiple scars from previous surgery, surgically absent left breast  CVS: S1S2  RS: Mod AE bilaterally, no tactile fremitus or egophony, no accessory muscle use, faint crackles, no wheezing  Abd: soft, non tender, no hepatosplenomegaly, right upper quadrant drain  Neuro: non focal, awake, alert  Extrm: no leg edema, clubbing or cyanosis  Skin: no rash    Data review:   Pertinent labs: CBC, BMP, LFT's    PFT:    Date FVC FEV1  FEV1/FVC [id number] TLC RV RV/TLC VC DLCO   6/29/2017 75 69 67 43 77 81 nl  62   11/2018  46  35     49                           FLOWS:  Maximal Mid Expiratory Flow rate is reduced to 43 % predicted  Forced Expiratory Volume in one second is reduced to 69 % predicted  FEV 1% is reduced   Volumes:   All Volumes are reduced except for RV  Flow Volume Loop:  Nonspecific obstructive pattern in Flow Volume Loop  Bronchodilator:  No significant improvement with bronchodilator therapy  Diffusion:  Abnormal Diffusion Capacity reduced to 62 % predicted  DLCO normalizes to alveolar ventilation  Impression:  Moderate obstructive defect, Predominately small airways, Mild restrictive ventilatory defect, Reduced diffusion capacity indicating a decrease in alveolar surface area for gas exchange    6 min walk test;walked 330 feet with no O2 desaturation or significant heart rate change. DI- stable  05/03/19   ECHO ADULT FOLLOW-UP OR LIMITED 05/03/2019 5/3/2019    Narrative · Left Ventricle: Mild concentric hypertrophy. Severe global systolic   dysfunction. Estimated left ventricular ejection fraction is 26 - 30%. Biplane method used to measure ejection fraction. Left ventricular global   hypokinesis. · IVC/Hepatic Veins: Severely elevated central venous pressure (15+ mmHg);   IVC diameter is larger than 21 mm and collapses less than 50% with   respiration. · Pulmonary Artery: Mild pulmonary hypertension. Pulmonary arterial   systolic pressure is 44 mmHg. · Pericardium: Trivial-to-small circumferential pericardial effusion   measuring 10 mm. · Mitral Valve: Moderate mitral valve regurgitation. · Right Ventricle: Pacing wire present        Signed by: Angy Ramirez MD     Echo 3/2019:  · Normal cavity size, wall thickness and diastolic function. There is low normal systolic function. The estimated ejection fraction is 51 - 55%. No regional wall motion abnormality noted. Global longitudinal strain is -13.00%. Abnormal left ventricular strain. · Normal right ventricular size and function. Pacing wire present  · Mild to moderate mitral valve regurgitation. Mild prolapse of the posterior leaflet within the mitral valve. · Mild pulmonic valve regurgitation is present. · Mild tricuspid valve regurgitation. Pulmonary arterial systolic pressure is 34.5 mmHg. Mild pulmonary hypertension. Echo: 1/18/2017:  Left ventricle: Systolic function was normal. Ejection fraction was  estimated to be 50 %. There were no regional wall motion abnormalities. Doppler parameters were consistent with abnormal left ventricular  relaxation (grade 1 diastolic dysfunction). Right ventricle: The size was normal. Systolic function was reduced. Left atrium: Size was normal.  Right atrium: Size was normal.  Mitral valve:  There was systolic bowing of the anterior and posterior  leaflets, but without diagnostic evidence for prolapse. There was mild  regurgitation. Aortic valve: The valve was trileaflet. Leaflets exhibited normal  thickness and normal cuspal separation. Tricuspid valve: Pulmonary artery systolic pressure: 18 mmHg. Pulmonic valve: There was mild regurgitation. Imaging:  I have personally reviewed the patients radiographs and have reviewed the reports:  CT Results (most recent):  Results from Hospital Encounter encounter on 09/18/19   CT ABD PELV W CONT    Narrative EXAMINATION: CT abdomen/pelvis with IV contrast    INDICATION: Abdominal pain, abnormal ultrasound    COMPARISON: CT 8/27/2019    TECHNIQUE: CT of the abdomen and pelvis performed following 80 cc IV Isovue-300  with multiplanar reformations. All CT scans at this facility are performed using  dose optimization technique as appropriate to a performed exam, to include  automated exposure control, adjustment of the mA and/or kV according to patient  size (including appropriate matching first site specific examinations), or use  of iterative reconstruction technique. FINDINGS:    Lower thorax: Bibasilar scattered streaky densities, probably atelectasis. Fluid  in the distal esophagus noted. Cardiac leads noted. Hepatobiliary: Liver low attenuation relative to spleen. No suspicious hepatic  parenchymal lesions. Gallbladder prominent in size with notable wall thickening,  large calculus at the neck, and surrounding stranding. Common bile duct up to 10  mm caliber. Pancreas: 1.4 cm pancreas uncinate process cyst.    Spleen: Normal.    Adrenal glands: Normal.    Genitourinary: Right kidney normal. Left kidney probable subcentimeter  angiomyolipoma posteriorly. Bladder collapsed. Uterus fundus is slightly bulbous  in appearance with a probable small exophytic fibroid at the posterior fundus. Gastrointestinal: Stomach unremarkable. Small bowel loops nondilated.  The colon  is nondilated. Minimal sigmoid diverticulosis. Appendix not clearly visualized  but no suspicious secondary findings. Mesentery/vessels/nodes: No free air. No free fluid. Mild burden of  atherosclerotic calcifications. Vessels unremarkable. Enlarged right perirectal  node. No retroperitoneal adenopathy by size criteria. Miscellaneous: Superficial soft tissues unremarkable. Bones: No acute osseous findings. Impression IMPRESSION:    Findings consistent with acute cholecystitis, as suggested on recent sonogram.  -Mild common bile duct dilatation. Correlate clinically for cholestasis. Hepatic steatosis. Cystic-appearing structure in the pancreas uncinate process. -MRI/MRCP may be useful to evaluate biliary tree and this lesion. Fluid in the distal esophagus. Correlate clinically for esophagitis or reflux. Enlarged right perirectal node again noted. A few sigmoid diverticula. Probable  fibroid uterus. PET-CT 3/22/2019:  IMPRESSION:  1. Significant interval treatment response compared 86/80  -Hypermetabolic tumor has resolved  -Some small lung nodules remain, nonspecific (no worsening of lung nodules)  -Stable blastic manubrial metastasis  2. Mild superior endplate compression fracture T11 which is interval new and is  probably benign/osteoporotic.  -May have a few millimeter retropulsed bone but no osseous central spinal  stenosis is evident     Incidental findings:  -Gallstone  -Trace pericardial effusion. Cardiomegaly  -Fibroid uterus    CT Results (most recent):  Results from Hospital Encounter encounter on 09/18/19   CT ABD PELV W CONT    Narrative EXAMINATION: CT abdomen/pelvis with IV contrast    INDICATION: Abdominal pain, abnormal ultrasound    COMPARISON: CT 8/27/2019    TECHNIQUE: CT of the abdomen and pelvis performed following 80 cc IV Isovue-300  with multiplanar reformations.  All CT scans at this facility are performed using  dose optimization technique as appropriate to a performed exam, to include  automated exposure control, adjustment of the mA and/or kV according to patient  size (including appropriate matching first site specific examinations), or use  of iterative reconstruction technique. FINDINGS:    Lower thorax: Bibasilar scattered streaky densities, probably atelectasis. Fluid  in the distal esophagus noted. Cardiac leads noted. Hepatobiliary: Liver low attenuation relative to spleen. No suspicious hepatic  parenchymal lesions. Gallbladder prominent in size with notable wall thickening,  large calculus at the neck, and surrounding stranding. Common bile duct up to 10  mm caliber. Pancreas: 1.4 cm pancreas uncinate process cyst.    Spleen: Normal.    Adrenal glands: Normal.    Genitourinary: Right kidney normal. Left kidney probable subcentimeter  angiomyolipoma posteriorly. Bladder collapsed. Uterus fundus is slightly bulbous  in appearance with a probable small exophytic fibroid at the posterior fundus. Gastrointestinal: Stomach unremarkable. Small bowel loops nondilated. The colon  is nondilated. Minimal sigmoid diverticulosis. Appendix not clearly visualized  but no suspicious secondary findings. Mesentery/vessels/nodes: No free air. No free fluid. Mild burden of  atherosclerotic calcifications. Vessels unremarkable. Enlarged right perirectal  node. No retroperitoneal adenopathy by size criteria. Miscellaneous: Superficial soft tissues unremarkable. Bones: No acute osseous findings. Impression IMPRESSION:    Findings consistent with acute cholecystitis, as suggested on recent sonogram.  -Mild common bile duct dilatation. Correlate clinically for cholestasis. Hepatic steatosis. Cystic-appearing structure in the pancreas uncinate process. -MRI/MRCP may be useful to evaluate biliary tree and this lesion. Fluid in the distal esophagus. Correlate clinically for esophagitis or reflux. Enlarged right perirectal node again noted.  A few sigmoid diverticula. Probable  fibroid uterus. XR Results (most recent):  Results from Hospital Encounter encounter on 09/18/19   XR CHEST PORT    Narrative EXAM:  Chest Portable. INDICATION:  Chest pain     COMPARISON:  09/18/19     TECHNIQUE:  Portable AP chest study    FINDINGS:      - ICD hub in the right upper torso region with 3 intact leads through the left  subclavian approach. - Left IJ approach single-chamber infusion port in place.  - Both lungs are hypoinflated. - Bandlike densities are noted in the retrocardiac region and in the right upper  lung zone medial aspect. Most likely related to subsegmental atelectatic  changes vs. scarring. Subtle streaky densities in the left mid lung zone. Developing infiltrate cannot be excluded for the right upper lung zone and in  the left mid lung zone. - No costophrenic angle blunting or pneumothorax. - No significant cardiac silhouette enlargement. Impression IMPRESSION:    1. Pacemaker and left IJ infusion port identified, unchanged in interval.  No  pneumothorax. 2.  Atelectatic changes bilaterally. Subtle developing infiltrate cannot be  excluded for the left mid lung zone and right upper lung zone. CXR 8/26/2014:    AICD. No change in lead placement. No visualized lead fracture. Lungs appear  unremarkable. Normal size heart. Impression: No acute findings.      Patient Active Problem List   Diagnosis Code    Congestive heart failure (HCC) I50.9    Hypertension I10    MVP (mitral valve prolapse) I34.1    Nonischemic cardiomyopathy (HCC) I42.8    Cancer (HCC) C80.1    Adenocarcinoma of breast; locally advanced invasive ductal adenocarcinoma of left breast C50.919    Poisoning by other and unspecified agents primarily affecting the cardiovascular system(972.9) T46.904A    Syncope and collapse R55    Other primary cardiomyopathies I42.8    Shortness of breath R06.02    Nonspecific abnormal electrocardiogram (ECG) (EKG) R94.31  Automatic implantable cardioverter-defibrillator in situ Z95.810    Mitral valve disease I05.9    Abnormal PFT R94.2    Acute bronchitis J20.9    Chronic asthmatic bronchitis (HCC) J44.9    Malignant neoplasm metastatic to lung (HCC) C78.00    Seasonal allergic rhinitis due to pollen J30.1    Dilated cardiomyopathy (HCC) I42.0    Acute exacerbation of CHF (congestive heart failure) (HCC) I50.9    Breast cancer (HCC) C50.919    Pulmonary embolism (HCC) I26.99    Chronic bronchitis (HCC) J42    Hypoxia R09.02    Lung metastases (HCC) C78.00    UTI (urinary tract infection) N39.0    CAD (coronary artery disease) I25.10    Elevated troponin R79.89    Weakness generalized R53.1    Chronic anticoagulation Z79.01    History of pulmonary embolism Z86.711    Hypokalemia E87.6    Hypomagnesemia E83.42    Acute encephalopathy G93.40    Cholecystitis K81.9    Ventricular fibrillation (HCC) I49.01     IMPRESSION:     · Preop clearance for cholecystectomy with compromised cardiopulmonary function but at optimal state. Patient needs surgery to prevent further sepsis and continue with planned chemotherapy for her metastatic CA to which she has responded well. She is at moderate increased risk of anesthesia related complications as well as postop ventilatory dependence given obstructive airway physiology  · DVT and PE-insetting of metastatic CA. On anticoagulation and will need lifelong  · Metastatic breast cancer to Lung with extensive endobronchial involvement. Clinical response to chemotherapy. Improved PET/CT findings and symptoms of wheezing shortness of breath and cough. · SOB on exertion - Carcinomatosis and  Endobronchial inflammation/obstruction from tumor load.  -Further complicated by cardiomyopathy- improving with adjustments in medications  · Abnormal PFT- combined restrictive and Obstructive component-progression noted -Asthma with reduced DLCO ( corrects with Volume adjustment.) Suspect restrictive component from chest wall deformity. · Pulm HTN-new likely group 2 and 3  · H/o invasive ductal breast Ca  · H/o non ischemic cardiomyopathy- recent worsening LV function EF of 30%  · AICD      RECOMMENDATIONS:     ·   · Proceed with planned surgery with precautions   · Will continue Incruse and Symbicort  · Incentive spirometry  · Continue anticoagulation-Eliquis lifelong-hold perioperatively  · Continue optimizing cardiac medications for cardiomyopathy  · Continue nebulized bronchodilators PRN  · Continue singulair , Nasonex  · GERD precautions  · Preventive vaccinations- recieved  · Monitor response to interventions and make appropriate adjustments  · Will be available in hospital for perioperative management  · Discussed with patient and      Health maintenance screens deferred to Primary care provider.      Patricia Lee MD

## 2019-10-28 ENCOUNTER — DOCUMENTATION ONLY (OUTPATIENT)
Dept: PULMONOLOGY | Age: 69
End: 2019-10-28

## 2019-10-28 NOTE — PROGRESS NOTES
Instructed Patient to stop Eliquis on Tuesday - last dose in am.  Expect a call from admitting for direct admission on 10/30/2019 under hospitalist service- Dr. Gloria Webb will be admitting MD.  Patient's spouse verbalized understanding.

## 2019-10-28 NOTE — PROGRESS NOTES
Surgery scheduled for Friday Nov 1 - Dr. Bhupinder Berrios on Wednesday 10/ 30/2019 am under hospitalist service- Dr. Kaylynn Harris. Once admitted patient will be started on IV Heparin for bridge for surgery.

## 2019-10-29 NOTE — PROGRESS NOTES
Ok patient is set for direct admit on 10/30/19 at 9 AM, cindi huggins have a med-surge bed with tele (just in case).

## 2019-10-30 ENCOUNTER — HOSPITAL ENCOUNTER (INPATIENT)
Age: 69
LOS: 4 days | Discharge: HOME | DRG: 418 | End: 2019-11-03
Attending: FAMILY MEDICINE | Admitting: INTERNAL MEDICINE
Payer: MEDICARE

## 2019-10-30 DIAGNOSIS — K81.9 CHOLECYSTITIS: Primary | ICD-10-CM

## 2019-10-30 PROBLEM — I50.9 ACUTE EXACERBATION OF CHF (CONGESTIVE HEART FAILURE) (HCC): Status: RESOLVED | Noted: 2019-05-03 | Resolved: 2019-10-30

## 2019-10-30 LAB
ALBUMIN SERPL-MCNC: 3 G/DL (ref 3.4–5)
ALBUMIN/GLOB SERPL: 1 {RATIO} (ref 0.8–1.7)
ALP SERPL-CCNC: 80 U/L (ref 45–117)
ALT SERPL-CCNC: 31 U/L (ref 13–56)
ANION GAP SERPL CALC-SCNC: 6 MMOL/L (ref 3–18)
ANION GAP SERPL CALC-SCNC: 6 MMOL/L (ref 3–18)
APTT PPP: 27 SEC (ref 23–36.4)
APTT PPP: 68.3 SEC (ref 23–36.4)
AST SERPL-CCNC: 20 U/L (ref 10–38)
BASOPHILS # BLD: 0 K/UL (ref 0–0.1)
BASOPHILS NFR BLD: 0 % (ref 0–2)
BILIRUB SERPL-MCNC: 0.3 MG/DL (ref 0.2–1)
BUN SERPL-MCNC: 15 MG/DL (ref 7–18)
BUN SERPL-MCNC: 16 MG/DL (ref 7–18)
BUN/CREAT SERPL: 16 (ref 12–20)
BUN/CREAT SERPL: 18 (ref 12–20)
CALCIUM SERPL-MCNC: 8.8 MG/DL (ref 8.5–10.1)
CALCIUM SERPL-MCNC: 8.9 MG/DL (ref 8.5–10.1)
CHLORIDE SERPL-SCNC: 110 MMOL/L (ref 100–111)
CHLORIDE SERPL-SCNC: 110 MMOL/L (ref 100–111)
CO2 SERPL-SCNC: 24 MMOL/L (ref 21–32)
CO2 SERPL-SCNC: 25 MMOL/L (ref 21–32)
CREAT SERPL-MCNC: 0.9 MG/DL (ref 0.6–1.3)
CREAT SERPL-MCNC: 0.92 MG/DL (ref 0.6–1.3)
DIFFERENTIAL METHOD BLD: ABNORMAL
EOSINOPHIL # BLD: 0.5 K/UL (ref 0–0.4)
EOSINOPHIL NFR BLD: 7 % (ref 0–5)
ERYTHROCYTE [DISTWIDTH] IN BLOOD BY AUTOMATED COUNT: 14.6 % (ref 11.6–14.5)
GLOBULIN SER CALC-MCNC: 2.9 G/DL (ref 2–4)
GLUCOSE SERPL-MCNC: 114 MG/DL (ref 74–99)
GLUCOSE SERPL-MCNC: 116 MG/DL (ref 74–99)
HCT VFR BLD AUTO: 33.8 % (ref 35–45)
HGB BLD-MCNC: 11 G/DL (ref 12–16)
LYMPHOCYTES # BLD: 1.2 K/UL (ref 0.9–3.6)
LYMPHOCYTES NFR BLD: 17 % (ref 21–52)
MCH RBC QN AUTO: 31.2 PG (ref 24–34)
MCHC RBC AUTO-ENTMCNC: 32.5 G/DL (ref 31–37)
MCV RBC AUTO: 95.8 FL (ref 74–97)
MONOCYTES # BLD: 0.5 K/UL (ref 0.05–1.2)
MONOCYTES NFR BLD: 7 % (ref 3–10)
NEUTS SEG # BLD: 4.8 K/UL (ref 1.8–8)
NEUTS SEG NFR BLD: 69 % (ref 40–73)
PLATELET # BLD AUTO: 258 K/UL (ref 135–420)
PMV BLD AUTO: 10.2 FL (ref 9.2–11.8)
POTASSIUM SERPL-SCNC: 3.7 MMOL/L (ref 3.5–5.5)
POTASSIUM SERPL-SCNC: 3.9 MMOL/L (ref 3.5–5.5)
PROT SERPL-MCNC: 5.9 G/DL (ref 6.4–8.2)
RBC # BLD AUTO: 3.53 M/UL (ref 4.2–5.3)
SODIUM SERPL-SCNC: 140 MMOL/L (ref 136–145)
SODIUM SERPL-SCNC: 141 MMOL/L (ref 136–145)
WBC # BLD AUTO: 7 K/UL (ref 4.6–13.2)

## 2019-10-30 PROCEDURE — 36415 COLL VENOUS BLD VENIPUNCTURE: CPT

## 2019-10-30 PROCEDURE — 74011250637 HC RX REV CODE- 250/637: Performed by: NURSE PRACTITIONER

## 2019-10-30 PROCEDURE — 80053 COMPREHEN METABOLIC PANEL: CPT

## 2019-10-30 PROCEDURE — 85025 COMPLETE CBC W/AUTO DIFF WBC: CPT

## 2019-10-30 PROCEDURE — 77030020095 HC NDL HUBR BARD -A

## 2019-10-30 PROCEDURE — 65270000029 HC RM PRIVATE

## 2019-10-30 PROCEDURE — 74011250636 HC RX REV CODE- 250/636: Performed by: NURSE PRACTITIONER

## 2019-10-30 PROCEDURE — 74011250637 HC RX REV CODE- 250/637: Performed by: INTERNAL MEDICINE

## 2019-10-30 PROCEDURE — 94640 AIRWAY INHALATION TREATMENT: CPT

## 2019-10-30 PROCEDURE — 93005 ELECTROCARDIOGRAM TRACING: CPT

## 2019-10-30 PROCEDURE — 85730 THROMBOPLASTIN TIME PARTIAL: CPT

## 2019-10-30 RX ORDER — CARVEDILOL 25 MG/1
25 TABLET ORAL 2 TIMES DAILY WITH MEALS
Status: DISCONTINUED | OUTPATIENT
Start: 2019-10-30 | End: 2019-11-03 | Stop reason: HOSPADM

## 2019-10-30 RX ORDER — FUROSEMIDE 20 MG/1
20 TABLET ORAL
Status: DISCONTINUED | OUTPATIENT
Start: 2019-10-30 | End: 2019-10-31

## 2019-10-30 RX ORDER — HEPARIN SODIUM 1000 [USP'U]/ML
3000 INJECTION, SOLUTION INTRAVENOUS; SUBCUTANEOUS ONCE
Status: COMPLETED | OUTPATIENT
Start: 2019-10-31 | End: 2019-10-31

## 2019-10-30 RX ORDER — FAMOTIDINE 20 MG/1
20 TABLET, FILM COATED ORAL 2 TIMES DAILY
Status: DISCONTINUED | OUTPATIENT
Start: 2019-10-30 | End: 2019-11-03 | Stop reason: HOSPADM

## 2019-10-30 RX ORDER — DULOXETIN HYDROCHLORIDE 60 MG/1
60 CAPSULE, DELAYED RELEASE ORAL DAILY
Status: DISCONTINUED | OUTPATIENT
Start: 2019-10-31 | End: 2019-11-03 | Stop reason: HOSPADM

## 2019-10-30 RX ORDER — IPRATROPIUM BROMIDE AND ALBUTEROL SULFATE 2.5; .5 MG/3ML; MG/3ML
3 SOLUTION RESPIRATORY (INHALATION)
Status: DISCONTINUED | OUTPATIENT
Start: 2019-10-30 | End: 2019-11-03 | Stop reason: HOSPADM

## 2019-10-30 RX ORDER — SPIRONOLACTONE 25 MG/1
50 TABLET ORAL DAILY
Status: DISCONTINUED | OUTPATIENT
Start: 2019-10-31 | End: 2019-11-03 | Stop reason: HOSPADM

## 2019-10-30 RX ORDER — DIGOXIN 125 MCG
0.12 TABLET ORAL DAILY
Status: DISCONTINUED | OUTPATIENT
Start: 2019-10-31 | End: 2019-11-03 | Stop reason: HOSPADM

## 2019-10-30 RX ORDER — FUROSEMIDE 20 MG/1
20 TABLET ORAL
Status: DISCONTINUED | OUTPATIENT
Start: 2019-10-30 | End: 2019-10-30 | Stop reason: SDUPTHER

## 2019-10-30 RX ORDER — ONDANSETRON 2 MG/ML
4 INJECTION INTRAMUSCULAR; INTRAVENOUS
Status: DISCONTINUED | OUTPATIENT
Start: 2019-10-30 | End: 2019-11-03 | Stop reason: HOSPADM

## 2019-10-30 RX ORDER — HEPARIN SODIUM 10000 [USP'U]/100ML
18-36 INJECTION, SOLUTION INTRAVENOUS
Status: DISCONTINUED | OUTPATIENT
Start: 2019-10-30 | End: 2019-11-02

## 2019-10-30 RX ORDER — BUDESONIDE AND FORMOTEROL FUMARATE DIHYDRATE 160; 4.5 UG/1; UG/1
2 AEROSOL RESPIRATORY (INHALATION)
Status: DISCONTINUED | OUTPATIENT
Start: 2019-10-30 | End: 2019-11-03 | Stop reason: HOSPADM

## 2019-10-30 RX ADMIN — BUDESONIDE AND FORMOTEROL FUMARATE DIHYDRATE 2 PUFF: 160; 4.5 AEROSOL RESPIRATORY (INHALATION) at 19:48

## 2019-10-30 RX ADMIN — SACUBITRIL AND VALSARTAN 1 TABLET: 49; 51 TABLET, FILM COATED ORAL at 21:26

## 2019-10-30 RX ADMIN — FAMOTIDINE 20 MG: 20 TABLET, FILM COATED ORAL at 18:19

## 2019-10-30 RX ADMIN — CARVEDILOL 25 MG: 25 TABLET, FILM COATED ORAL at 18:19

## 2019-10-30 RX ADMIN — HEPARIN SODIUM 18 UNITS/KG/HR: 10000 INJECTION, SOLUTION INTRAVENOUS at 14:49

## 2019-10-30 NOTE — PROGRESS NOTES
Reason for Admission:   Cholecystitis, unspecified [K81.9]               RRAT Score:    35             Resources/supports as identified by patient/family:     is support system. Top Challenges facing patient (as identified by patient/family and CM): Finances/Medication cost?    No concerns voiced. Transportation     Spouse provides transportation. Support system or lack thereof? Good support system  Living arrangements? Lives with spouse  Self-care/ADLs/Cognition? Independent       Current Advanced Directive/Advance Care Plan:   no                          Plan for utilizing home health: To be determined. Likelihood of readmission:   HIGH    Transition of Care Plan:                    Initial assessment completed with patient. Cognitive status of patient: oriented to time, place, person and situation. Face sheet information confirmed:  yes. The patient designates Arely Jacey, spouse ([phone]) to participate in her discharge plan and to receive any needed information. This patient lives in a single family home with spouse. Patient is able to navigate steps as needed. Prior to hospitalization, patient was considered to be independent with ADLs/IADLS : yes . Patient has a current ACP document on file: no  The patient and spouse will be available to transport patient home upon discharge. The patient  has no medical equipment available in the home. Patient is not currently active with home health. Patient has not stayed in a skilled nursing facility or rehab. This patient is on dialysis :no    Freedom of choice signed: no.     Currently, the discharge plan is  To be determined. CM will continue to monitor for transitional needs. The patient states that she can obtain her medications from the pharmacy, and take her medications as directed.     Patient's current insurance is medicare Part A & B and [employer]      Care Management Interventions  PCP Verified by CM: Yes(per patient, saw pcp last week.)  Mode of Transport at Discharge: Self  Transition of Care Consult (CM Consult): Discharge Planning  MyChart Signup: No  Discharge Durable Medical Equipment: No  Physical Therapy Consult: No  Occupational Therapy Consult: No  Speech Therapy Consult: No  Current Support Network: Lives with Spouse  Confirm Follow Up Transport: Self  Discharge Location  Discharge Placement: (To be determined. )        WU GraceN, RN  Pager # [phone]  Care Manager

## 2019-10-30 NOTE — H&P
Hospitalist Admission History and Physical    NAME:  Lamar Peña   :   1950   MRN:   [id number]     PCP:  Domitila Cantu MD  Date/Time:  10/30/2019 1:01 PM    Subjective:   CHIEF COMPLAINT:  Planned admission for cholecystectomy on 2019 per Dr. Derrick Ritchie:     Ernie Hargrove is a 78 y.o.  female who presents with from home as direct admission for cholecystectomy per Dr. Pilo Ambriz on . Patient denies any issues at home since hospital discharge, including no nausea, vomiting or abdominal pain.  has been providing drain care without difficulty. Patient last took her eliquis on 10/29/2019 in preparation for surgery. Past Medical History:   Diagnosis Date    Abnormal nuclear cardiac imaging test 2010    Lg fixed anterior, septal, apical, inferoseptal defect sugg RCA & LAD disease or cardiomyopathy. EF 20%. Global hykn. Nondiagnostic EKG.  Acute bronchitis 10/15/2018    Persistent cough and wheezing in spite of prednisone and antibiotic. Will refer to pulmonary    Adenocarcinoma of breast; locally advanced invasive ductal adenocarcinoma of left breast     Asthma     Automatic implantable cardiac defibrillator in situ     Automatic implantable cardiac defibrillator in situ     [facility] cancer ([facility] [address]     Cancer ([address]     breast cancer left    Chemotherapy convalescence or palliative care     Chronic combined systolic and diastolic heart failure (HCC)     Remains symptomatic, nyha class 3 ef improving.  Congestive heart failure, unspecified     chronic class ll    Echocardiogram 2010    EF 30%. Global hykn. Mild MR. Mild TR.     GERD (gastroesophageal reflux disease)     Hyperlipidemia     Hypertension     Mitral valve disorders(424.0) 1/15/2014    mild to moderate mr     Mitral valve disorders(424.0) 1/15/2014    mild to moderate mr     MVP (mitral valve prolapse)     Nonischemic cardiomyopathy ([address] EF 20-30% despite medical therapy    Nonspecific abnormal electrocardiogram (ECG) (EKG)     Osteopenia     Other primary cardiomyopathies     Pacemaker 10/27/10    s/p implantation, without complication    Poisoning by other and unspecified agents primarily affecting the cardiovascular system(972.9)     Ef slightly better to 45%, STABLE back on chemo.  Radiation therapy     Radiation therapy complication 4118    S/P cardiac catheterization 07/08/10    Patent coronary arteries. LVEDP 25.  EF 25%. Global hykn. Moderate MR.  RA 10.  RV 40/10. PA 40/20.  20.  CO/CI 4.5/2.45 (Larry).  Shortness of breath     Syncope and collapse     Thyroid disease     goiter      Past Surgical History:   Procedure Laterality Date    CARDIAC SURG PROCEDURE UNLIST      Difibrillator; BI V ICD    HX MASTECTOMY  09/28/11    modified radical mastectomy and axillary dissection    HX ORTHOPAEDIC      HX OTHER SURGICAL      surgery to remove part of liver    HX PACEMAKER  10/27/10    s/p Medtronic biventricular AICD, without complication    HX RADICAL MASTECTOMY      IR CHOLECYSTOSTOMY PERCUTANEOUS  9/20/2019    IR INSERT TUNL CVC W PORT OVER 5 YEARS  1/16/2019    NEUROLOGICAL PROCEDURE UNLISTED      Cervical fusion     Social History     Tobacco Use    Smoking status: Never Smoker    Smokeless tobacco: Never Used   Substance Use Topics    Alcohol use: No        Family History   Problem Relation Age of Onset    Hypertension Mother     High Cholesterol Mother     Heart Disease Father         paemaker implant at 80      Allergies   Allergen Reactions    Adhesive Other (comments)     blisters        Prior to Admission Medications   Prescriptions Last Dose Informant Patient Reported? Taking? Biotin 2,500 mcg cap Not Taking at Unknown time  Yes No   Sig: Take 1 Cap by mouth daily. DULoxetine (CYMBALTA) 60 mg capsule 10/29/2019 at Unknown time  Yes Yes   Sig: Take 60 mg by mouth daily.    albuterol-ipratropium (DUO-NEB) 2.5 mg-0.5 mg/3 ml nebu 9/30/2019 at Unknown time  No Yes   Sig: 3 mL by Nebulization route every six (6) hours as needed (wheezing or sob). File under Medicare Part B, ICD codes J44.9, C78.01   apixaban (ELIQUIS) 2.5 mg tablet 10/29/2019 at Unknown time  No Yes   Sig: Take 1 Tab by mouth two (2) times a day. budesonide-formoterol (SYMBICORT) 160-4.5 mcg/actuation HFAA 10/29/2019 at Unknown time  No Yes   Sig: Take 2 Puffs by inhalation two (2) times a day. carvedilol (COREG) 25 mg tablet 10/30/2019 at Unknown time  No Yes   Sig: Take 1 Tab by mouth two (2) times daily (with meals). digoxin (LANOXIN) 0.125 mg tablet 10/30/2019 at Unknown time  No Yes   Sig: Take 1 Tab by mouth daily. furosemide (LASIX) 20 mg tablet 10/30/2019 at Unknown time  No Yes   Sig: Take 1 Tab by mouth daily. melatonin 10 mg tab Not Taking at Unknown time  Yes No   Sig: Take 1 Tab by mouth nightly. montelukast (SINGULAIR) 10 mg tablet 10/29/2019 at Unknown time  No Yes   Sig: Take 1 Tab by mouth daily. multivitamin (ONE A DAY) tablet Not Taking at Unknown time  Yes No   Sig: Take 1 Tab by mouth daily. plant stanol eliezer (CHOLEST OFF PO) Not Taking at Unknown time  Yes No   Sig: Take 1 Tab by mouth daily. raloxifene (EVISTA) 60 mg tablet 10/30/2019 at Unknown time  Yes Yes   Sig: Take 60 mg by mouth daily. sacubitril-valsartan (ENTRESTO) 49 mg/51 mg tablet 10/30/2019 at Unknown time  No Yes   Sig: Take 1 Tab by mouth two (2) times a day. spironolactone (ALDACTONE) 50 mg tablet 10/30/2019 at Unknown time  No Yes   Sig: Take 1 Tab by mouth daily. tiotropium (SPIRIVA WITH HANDIHALER) 18 mcg inhalation capsule 10/29/2019 at Unknown time  Yes Yes   Sig: Take 1 Cap by inhalation daily.       Facility-Administered Medications: None       REVIEW OF SYSTEMS:     [] Unable to obtain  ROS due to  []mental status change  []sedated   []intubated   [x]Total of 12 systems reviewed as follows:  General:  negative fever, negative chills, negative weight loss  Eyes:   negative visual changes  ENT:   negative sore throat, tongue or lip swelling  Respiratory:  negative cough, negative dyspnea  Cards:  negative for chest pain, palpitations, lower extremity edema  GI:   negative for nausea, vomiting, diarrhea, and abdominal pain  Genitourinary: negative for frequency, dysuria  Integument:  negative for rash and pruritus  Hematologic:  negative for easy bruising and gum/nose bleeding  Musculoskel: negative for myalgias,  back pain and muscle weakness  Neurological:  negative for headaches, dizziness, vertigo  Psych:  negative for feelings of anxiety, depression     Objective:   VITALS:    Visit Vitals  /60 (BP 1 Location: Right arm)   Pulse 67   Temp 97.7 °F (36.5 °C)   Resp 16   Wt 74.8 kg (165 lb)   SpO2 96%   BMI 27.46 kg/m²     Temp (24hrs), Av.7 °F (36.5 °C), Min:97.7 °F (36.5 °C), Max:97.7 °F (36.5 °C)      PHYSICAL EXAM:   General:    Alert, cooperative, no distress, appears stated age. Head:   Normocephalic, without obvious abnormality, atraumatic. Eyes:   Conjunctivae clear, anicteric sclerae. Pupils are equal  Nose:  Nares normal. No drainage or sinus tenderness. Throat:    Lips, mucosa, and tongue normal.   Neck:  Supple, symmetrical  Lungs:   Clear to auscultation bilaterally  Chest wall:  No Accessory muscle use. Heart:   Regular rate and rhythm,  no murmur, rub or gallop. Abdomen:   Soft, non-tender. Not distended. Bowel sounds normal. + cholecystostomy tube with drain in place draining clear brown tinged fluid  Extremities: Extremities normal, atraumatic, No cyanosis. No edema. Skin:     Texture, turgor normal. No rashes or lesions. Not Jaundiced  Psych:  Good insight. Not depressed. Not anxious or agitated. Neurologic: No facial asymmetry. No aphasia or slurred speech. Normal strength, Alert and oriented X 4.      LAB DATA REVIEWED:    No components found for: GLPOC  No results for input(s): NA, K, CL, CO2, BUN, CREA, GLU, PHOS, CA, ALB, WBC, HGB, HCT, PLT, HGBEXT, HCTEXT, PLTEXT in the last 72 hours. IMAGING RESULTS:  None    Assessment/Plan:      Active Problems:    Cholecystitis, unspecified (10/30/2019)       ___________________________________________________  PLAN:    1. Cholecystitis s/p percutaneous drain placement on 09/20/2019 - NPO after MN on 11/01; Message left with Dr. Román Schofield office (spoke with Jaquan Perdomo) to inform of admission / location  2. Recent PE and right peroneal vein DVT- per CTA Chest on 05/2019 and duplex on 05/2019; hold eliquis, heparin drip started; monitor   3. Recurrent breast cancer with lung metastasis - Dr. Carlene Kimball aware of admission, please outreach to Dr. Carlene Kimball if any need to see while inpatient. 4. Chronic Systolic CHF stage III - followed by TOMMY Osborne as OP; consulted and resume home meds (lasix, spirnolactone / entresto)  5. H/o septic shock   6.  Goals of care - patient full code per conversation with patient and wife, encourage ongoing discussion    Risk of deterioration:  []Low    [x]Moderate  []High    Prophylaxis:  []Lovenox  []Coumadin  [x]Hep drip  [x]SCDs  []H2B/PPI    Disposition:  []Home w/ Family   [x] PT,OT,RN   []SNF/LTC   []SAH/Rehab    Discussed Code Status:    [x]Full Code      []DNR     ___________________________________________________    Care Plan discussed with:    [x]Patient   [x]Family    []ED Care Manager  []ED Doc   [x]Specialist : Dr. Carlene Kimball / Dr. Long Fox    Total Time Coordinating Admission:   50   minutes  ___________________________________________________  Admitting Provider: Carlos Camargo NP

## 2019-10-30 NOTE — CONSULTS
[facility]  Pulmonary, Critical Care, and Sleep Medicine    Initial Patient Consult    Name: Shahla Cummings MRN: [id number]   : 1950 Hospital: [address] [place] [address]    Date: 10/30/2019        IMPRESSION:   · S/p Acute calculous cholecystitis with recent admission for septic shock, for planned laparoscopic cholecystectomy  · History of Asthma not in exacerbation  · Metastatic invasive ductal breast cancer  · DVT and PE in the setting of malignancy, on lifetime anticoagulation  · Dyspnea on exertion  · PFT with combined restrictive and obstructive physiology  · Pulmonary HTN likely Grp 2/3  · Non ischemic cardiomyopathy EF 36%  · S/p AICD      RECOMMENDATIONS:   · No pulmonary contraindications to planned surgery  · ARISCAT score 18, low to moderate risk for perioperative pulmonary complications and post op vent dependence  · Start Heparin bridge and resume Eliquis once ok with surgery  · Attention to bronchial hygiene and incentive spirometry  · Continue Symbicort and Incruse  · Prn bronchodilators  · Early mobilization  · Will follow with you. Thank you for consulting      Subjective: This patient has been seen and evaluated at the request of Dr. Cinda Andres  for Pulmonary clearance prior to planned cholecystectomy. Patient is a 78 y.[facility]. [facility] for severe sepsis with septic shock due to acute calculous cholecystitis growing Klebsiella and Strep sanguinis. She has a history of Asthma which has been controlled on Symbicort and Incruse. She denies chest pain, hemoptysis or ongoing fever. No new respiratory symptoms reported. Past Medical History:   Diagnosis Date    Abnormal nuclear cardiac imaging test 2010    Lg fixed anterior, septal, apical, inferoseptal defect sugg RCA & LAD disease or cardiomyopathy. EF 20%. Global hykn. Nondiagnostic EKG.     Acute bronchitis 10/15/2018    Persistent cough and wheezing in spite of prednisone and antibiotic. Will refer to pulmonary    Adenocarcinoma of breast; locally advanced invasive ductal adenocarcinoma of left breast     Asthma     Automatic implantable cardiac defibrillator in situ     Automatic implantable cardiac defibrillator in situ     Breast cancer ([facility] [address]     Cancer ([facility] [address]     breast cancer left    Chemotherapy convalescence or palliative care 2012    Chronic combined systolic and diastolic heart failure (HCC)     Remains symptomatic, nyha class 3 ef improving.  Congestive heart failure, unspecified     chronic class ll    Echocardiogram 09/27/2010    EF 30%. Global hykn. Mild MR. Mild TR.  GERD (gastroesophageal reflux disease)     Hyperlipidemia     Hypertension     Mitral valve disorders(424.0) 1/15/2014    mild to moderate mr     Mitral valve disorders(424.0) 1/15/2014    mild to moderate mr     MVP (mitral valve prolapse)     Nonischemic cardiomyopathy (HCC)     EF 20-30% despite medical therapy    Nonspecific abnormal electrocardiogram (ECG) (EKG)     Osteopenia     Other primary cardiomyopathies     Pacemaker 10/27/10    s/p implantation, without complication    Poisoning by other and unspecified agents primarily affecting the cardiovascular system(972.9)     Ef slightly better to 45%, STABLE back on chemo.  Radiation therapy     Radiation therapy complication 3342    S/P cardiac catheterization 07/08/10    Patent coronary arteries. LVEDP 25.  EF 25%. Global hykn. Moderate MR.  RA 10.  RV 40/10. PA 40/20.  20.  CO/CI 4.5/2.45 (Larry).     Shortness of breath     Syncope and collapse     Thyroid disease     goiter      Past Surgical History:   Procedure Laterality Date    CARDIAC SURG PROCEDURE UNLIST      Difibrillator; BI V ICD    HX MASTECTOMY  09/28/11    modified radical mastectomy and axillary dissection    HX ORTHOPAEDIC      HX OTHER SURGICAL      surgery to remove part of liver    HX PACEMAKER  10/27/10    s/p Medtronic biventricular AICD, without complication    HX RADICAL MASTECTOMY      IR CHOLECYSTOSTOMY PERCUTANEOUS  9/20/2019    IR INSERT TUNL CVC W PORT OVER 5 YEARS  1/16/2019    NEUROLOGICAL PROCEDURE UNLISTED      Cervical fusion      Prior to Admission medications    Medication Sig Start Date End Date Taking? Authorizing Provider   spironolactone (ALDACTONE) 50 mg tablet Take 1 Tab by mouth daily. 10/9/19  Yes Brandy Lamb MD   carvedilol (COREG) 25 mg tablet Take 1 Tab by mouth two (2) times daily (with meals). 10/9/19  Yes Brandy Lamb MD   apixaban (ELIQUIS) 2.5 mg tablet Take 1 Tab by mouth two (2) times a day. 9/26/19  Yes Wing Parra MD   digoxin (LANOXIN) 0.125 mg tablet Take 1 Tab by mouth daily. 9/9/19  Yes Brandy Lamb MD   furosemide (LASIX) 20 mg tablet Take 1 Tab by mouth daily. 8/28/19  Yes Beba Onofre MD   tiotropium (SPIRIVA WITH HANDIHALER) 18 mcg inhalation capsule Take 1 Cap by inhalation daily. Yes Provider, Historical   sacubitril-valsartan (ENTRESTO) 49 mg/51 mg tablet Take 1 Tab by mouth two (2) times a day. 8/12/19  Yes Pop Estes NP   budesonide-formoterol (SYMBICORT) 160-4.5 mcg/actuation HFAA Take 2 Puffs by inhalation two (2) times a day. 5/6/19  Yes Namrata Carrington MD   albuterol-ipratropium (DUO-NEB) 2.5 mg-0.5 mg/3 ml nebu 3 mL by Nebulization route every six (6) hours as needed (wheezing or sob). File under Medicare Part B, ICD codes J44.9, C78.01 5/2/19  Yes Brandie HINSON NP   DULoxetine (CYMBALTA) 60 mg capsule Take 60 mg by mouth daily. Yes Provider, Historical   montelukast (SINGULAIR) 10 mg tablet Take 1 Tab by mouth daily. 7/6/18  Yes Mariposa Hatch MD   raloxifene (EVISTA) 60 mg tablet Take 60 mg by mouth daily. Yes Provider, Historical   multivitamin (ONE A DAY) tablet Take 1 Tab by mouth daily. Provider, Historical   plant stanol eliezer (CHOLEST OFF PO) Take 1 Tab by mouth daily.     Provider, Historical   Biotin 2,500 mcg cap Take 1 Cap by mouth daily. Provider, Historical   melatonin 10 mg tab Take 1 Tab by mouth nightly. Provider, Historical     Allergies   Allergen Reactions    Adhesive Other (comments)     blisters      Social History     Tobacco Use    Smoking status: Never Smoker    Smokeless tobacco: Never Used   Substance Use Topics    Alcohol use: No      Family History   Problem Relation Age of Onset    Hypertension Mother     High Cholesterol Mother     Heart Disease Father         paemaker implant at 80        Current Facility-Administered Medications   Medication Dose Route Frequency    budesonide-formoterol (SYMBICORT) 160-4.5 mcg/actuation HFA inhaler 2 Puff  2 Puff Inhalation BID RT    carvedilol (COREG) tablet 25 mg  25 mg Oral BID WITH MEALS    [START ON 10/31/2019] digoxin (LANOXIN) tablet 0.125 mg  0.125 mg Oral DAILY    [START ON 10/31/2019] DULoxetine (CYMBALTA) capsule 60 mg  60 mg Oral DAILY    [START ON 10/31/2019] spironolactone (ALDACTONE) tablet 50 mg  50 mg Oral DAILY    [START ON 10/31/2019] tiotropium (SPIRIVA) inhalation capsule 18 mcg  1 Cap Inhalation DAILY    heparin 25,000 units in D5W 250 ml infusion  18-36 Units/kg/hr IntraVENous TITRATE       Review of Systems:  Pertinent items are noted in HPI. Objective:   Vital Signs:    Visit Vitals  /60 (BP 1 Location: Right arm)   Pulse 67   Temp 97.7 °F (36.5 °C)   Resp 16   Wt 74.8 kg (165 lb)   SpO2 96%   BMI 27.46 kg/m²               Temp (24hrs), Av.7 °F (36.5 °C), Min:97.7 °F (36.5 °C), Max:97.7 °F (36.5 °C)       Intake/Output:   Last shift:      10/30 07 - 10/30 1900  In: 120 [P.O.:120]  Out: -   Last 3 shifts: No intake/output data recorded. Intake/Output Summary (Last 24 hours) at 10/30/2019 1422  Last data filed at 10/30/2019 1347  Gross per 24 hour   Intake 120 ml   Output    Net 120 ml      Physical Exam:   General:  Alert, cooperative, no distress, appears stated age.    Head:  Normocephalic, without obvious abnormality, atraumatic. Mild alopecia   Eyes:  Conjunctivae/corneas clear. PERRL, EOMs intact. Nose: Nares normal. Mucosa normal. No drainage or sinus tenderness. Throat: Lips, mucosa, and tongue normal. Teeth and gums normal.   Neck: Supple, symmetrical, trachea midline, no adenopathy, thyroid: no enlargment/tenderness/nodules, no carotid bruit and no JVD. Back:   Symmetric    Lungs:   Normal breath sounds, no rales or wheezes   Chest wall:  No tenderness or deformity. Heart:  Regular rate and rhythm, S1, S2 normal, no murmur, click, rub or gallop. Abdomen:   Soft, non-tender. Bowel sounds normal. No masses,  No organomegaly. Extremities: Extremities normal, atraumatic, no cyanosis or edema. Pulses: 2+ and symmetric all extremities. Skin: Skin color, texture, turgor normal. No rashes or lesions   Lymph nodes: Cervical, supraclavicular nodes normal.   Neurologic: Grossly nonfocal     Data review:     Recent Results (from the past 24 hour(s))   CBC WITH AUTOMATED DIFF    Collection Time: 10/30/19  1:11 PM   Result Value Ref Range    WBC 7.0 4.6 - 13.2 K/uL    RBC 3.53 (L) 4.20 - 5.30 M/uL    HGB 11.0 (L) 12.0 - 16.0 g/dL    HCT 33.8 (L) 35.0 - 45.0 %    MCV 95.8 74.0 - 97.0 FL    MCH 31.2 24.0 - 34.0 PG    MCHC 32.5 31.0 - 37.0 g/dL    RDW 14.6 (H) 11.6 - 14.5 %    PLATELET 456 193 - 302 K/uL    MPV 10.2 9.2 - 11.8 FL    NEUTROPHILS 69 40 - 73 %    LYMPHOCYTES 17 (L) 21 - 52 %    MONOCYTES 7 3 - 10 %    EOSINOPHILS 7 (H) 0 - 5 %    BASOPHILS 0 0 - 2 %    ABS. NEUTROPHILS 4.8 1.8 - 8.0 K/UL    ABS. LYMPHOCYTES 1.2 0.9 - 3.6 K/UL    ABS. MONOCYTES 0.5 0.05 - 1.2 K/UL    ABS. EOSINOPHILS 0.5 (H) 0.0 - 0.4 K/UL    ABS.  BASOPHILS 0.0 0.0 - 0.1 K/UL    DF AUTOMATED     PTT    Collection Time: 10/30/19  1:11 PM   Result Value Ref Range    aPTT 27.0 23.0 - 36.4 SEC       Imaging:  I have personally reviewed the patients radiographs and have reviewed the reports:  CT Results (most recent):  Results from Hospital Encounter encounter on 09/18/19   CT ABD PELV W CONT    Narrative EXAMINATION: CT abdomen/pelvis with IV contrast    INDICATION: Abdominal pain, abnormal ultrasound    COMPARISON: CT 8/27/2019    TECHNIQUE: CT of the abdomen and pelvis performed following 80 cc IV Isovue-300  with multiplanar reformations. All CT scans at this facility are performed using  dose optimization technique as appropriate to a performed exam, to include  automated exposure control, adjustment of the mA and/or kV according to patient  size (including appropriate matching first site specific examinations), or use  of iterative reconstruction technique. FINDINGS:    Lower thorax: Bibasilar scattered streaky densities, probably atelectasis. Fluid  in the distal esophagus noted. Cardiac leads noted. Hepatobiliary: Liver low attenuation relative to spleen. No suspicious hepatic  parenchymal lesions. Gallbladder prominent in size with notable wall thickening,  large calculus at the neck, and surrounding stranding. Common bile duct up to 10  mm caliber. Pancreas: 1.4 cm pancreas uncinate process cyst.    Spleen: Normal.    Adrenal glands: Normal.    Genitourinary: Right kidney normal. Left kidney probable subcentimeter  angiomyolipoma posteriorly. Bladder collapsed. Uterus fundus is slightly bulbous  in appearance with a probable small exophytic fibroid at the posterior fundus. Gastrointestinal: Stomach unremarkable. Small bowel loops nondilated. The colon  is nondilated. Minimal sigmoid diverticulosis. Appendix not clearly visualized  but no suspicious secondary findings. Mesentery/vessels/nodes: No free air. No free fluid. Mild burden of  atherosclerotic calcifications. Vessels unremarkable. Enlarged right perirectal  node. No retroperitoneal adenopathy by size criteria. Miscellaneous: Superficial soft tissues unremarkable. Bones: No acute osseous findings.       Impression IMPRESSION:    Findings consistent with acute cholecystitis, as suggested on recent sonogram.  -Mild common bile duct dilatation. Correlate clinically for cholestasis. Hepatic steatosis. Cystic-appearing structure in the pancreas uncinate process. -MRI/MRCP may be useful to evaluate biliary tree and this lesion. Fluid in the distal esophagus. Correlate clinically for esophagitis or reflux. Enlarged right perirectal node again noted. A few sigmoid diverticula. Probable  fibroid uterus.              Ernestina Parekh MD

## 2019-10-30 NOTE — CONSULTS
[facility] - Consult Note    Date of  Admission: 10/30/2019  9:04 AM     Primary Care Physician:  Chris Serna MD     Plan:         1. Chronic Systolic CHF-stage C NYHA class II- III-compensated. Monitor for s/s fluid overload. Continue lasix po 3 times a week. 2. Non Sustained Vtach-  Episode happened in 8/2019 noted on ICD check. AICD was fired x3  She had altered mentation. She also had hypokalemia at that time- will monitor in telemetry. So far no recurrence       3. Nonischemic cardiomyopathy (EF 26-30%)-likely from Herceptin use. recent echo 9/20/19 showed EF% 36-40%-patient on Entresto. Lasix reduced to 3 times a week. She is on aldactone 50 mg daily. Follow bmp. 4. Hx of recent Acute PE and DVT- patient was on Eliquis at home. Now on heparin drip.   5. Metastatic breast cancer now back on chemotherapy.  Lung metastasis likely cause for shortness of breath. 6. Cholecystitis- s/p percutaneous drain 9/20- plans for cholecystectomy on Friday per surgery team    7. Cardiac Clearance- patient with Chronic CHF, CMO and metastatic breast cancer- patient denies active chest or chest pressure. No SOB- patient will be clear from surgery from cardiac stand point-  If patient needs laparoscopic surgery her risk is mild at less than 5% risk of perioperative cardiac events. If open surgical procedure is required it will carry moderate risk. Patient has chronic systolic CHF which is well compensated on present medications. She also has evidence of nonsustained V. tach's status post shocks successfully by ICD about 2 months ago. Continue present medications. Her cardiac risk at present is mild for laparoscopic surgery but will be higher in the range of moderate if open surgery is required.           09/18/19   ECHO ADULT FOLLOW-UP OR LIMITED 09/20/2019 9/20/2019    Narrative · Left Ventricle: Normal cavity size. Upper normal wall thickness. Moderate-to-severe systolic dysfunction. Estimated left ventricular   ejection fraction is 36 - 40%. Biplane method used to measure ejection   fraction. Left ventricular global hypokinesis. · Pericardium: Trivial pericardial effusion. Signed by: Steve Luque MD               XR Results (most recent):  Results from [place] encounter on 09/18/19   XR CHEST PORT    Narrative EXAM:  Chest Portable. INDICATION:  Chest pain     COMPARISON:  09/18/19     TECHNIQUE:  Portable AP chest study    FINDINGS:      - ICD hub in the right upper torso region with 3 intact leads through the left  subclavian approach. - Left IJ approach single-chamber infusion port in place.  - Both lungs are hypoinflated. - Bandlike densities are noted in the retrocardiac region and in the right upper  lung zone medial aspect. Most likely related to subsegmental atelectatic  changes vs. scarring. Subtle streaky densities in the left mid lung zone. Developing infiltrate cannot be excluded for the right upper lung zone and in  the left mid lung zone. - No costophrenic angle blunting or pneumothorax. - No significant cardiac silhouette enlargement. Impression IMPRESSION:    1. Pacemaker and left IJ infusion port identified, unchanged in interval.  No  pneumothorax. 2.  Atelectatic changes bilaterally. Subtle developing infiltrate cannot be  excluded for the left mid lung zone and right upper lung zone. Assessment:     Hospital Problems  Date Reviewed: 10/25/2019          Codes Class Noted POA    Cholecystitis, unspecified ICD-10-CM: K81.9  ICD-9-CM: 575.10  10/30/2019 Unknown                   History of Present Illness: This is a 78 y.o. female admitted for Cholecystitis, unspecified [K81.9]. Patient with PMHx of CMO, s/p AICD, metastatic breast cancer, DVT/PE and CHF. Patient is direct admission for schedule cholecystectomy on 11/01/19.  She was recent admitted with  Severe sepsis and Cholecystitis- she had a percutaneous drain placed on  9/20/19 - she was  managed medically. She was not a candidate for intervention at that time. The patient condition improved. Now she presented to [facility] for schedule intervention. Cardiology was consulted for cardic clearence and advise management CHF, CMO. Patient is resting in bed comfortable patient's  at bedside. No SOB. No chest pain no chest pressure. Reports able to ambulate short distances. She feels tired on  activity exertion. No recent CVA no fever or chills         Past Medical History:     Past Medical History:   Diagnosis Date    Abnormal nuclear cardiac imaging test 06/11/2010    Lg fixed anterior, septal, apical, inferoseptal defect sugg RCA & LAD disease or cardiomyopathy. EF 20%. Global hykn. Nondiagnostic EKG.  Acute bronchitis 10/15/2018    Persistent cough and wheezing in spite of prednisone and antibiotic. Will refer to pulmonary    Adenocarcinoma of breast; locally advanced invasive ductal adenocarcinoma of left breast     Asthma     Automatic implantable cardiac defibrillator in situ     Automatic implantable cardiac defibrillator in situ     [facility] [address]     Cancer ([facility] [address]     breast cancer left    Chemotherapy convalescence or palliative care 2012    Chronic combined systolic and diastolic heart failure (HCC)     Remains symptomatic, nyha class 3 ef improving.  Congestive heart failure, unspecified     chronic class ll    Echocardiogram 09/27/2010    EF 30%. Global hykn. Mild MR. Mild TR.     GERD (gastroesophageal reflux disease)     Hyperlipidemia     Hypertension     Mitral valve disorders(424.0) 1/15/2014    mild to moderate mr     Mitral valve disorders(424.0) 1/15/2014    mild to moderate mr     MVP (mitral valve prolapse)     Nonischemic cardiomyopathy (HCC)     EF 20-30% despite medical therapy    Nonspecific abnormal electrocardiogram (ECG) (EKG)     Osteopenia     Other primary cardiomyopathies     Pacemaker 10/27/10    s/p implantation, without complication    Poisoning by other and unspecified agents primarily affecting the cardiovascular system(972.9)     Ef slightly better to 45%, STABLE back on chemo.  Radiation therapy     Radiation therapy complication 1002    S/P cardiac catheterization 07/08/10    Patent coronary arteries. LVEDP 25.  EF 25%. Global hykn. Moderate MR.  RA 10.  RV 40/10. PA 40/20.  20.  CO/CI 4.5/2.45 (Larry).  Shortness of breath     Syncope and collapse     Thyroid disease     goiter         Social History:     Social History     Socioeconomic History    Marital status:      Spouse name: Not on file    Number of children: Not on file    Years of education: Not on file    Highest education level: Not on file   Tobacco Use    Smoking status: Never Smoker    Smokeless tobacco: Never Used   Substance and Sexual Activity    Alcohol use: No    Drug use: No        Family History:     Family History   Problem Relation Age of Onset    Hypertension Mother     High Cholesterol Mother     Heart Disease Father         paemaker implant at 80        Medications:      Allergies   Allergen Reactions    Adhesive Other (comments)     blisters        Current Facility-Administered Medications   Medication Dose Route Frequency    albuterol-ipratropium (DUO-NEB) 2.5 MG-0.5 MG/3 ML  3 mL Nebulization Q6H PRN    budesonide-formoterol (SYMBICORT) 160-4.5 mcg/actuation HFA inhaler 2 Puff  2 Puff Inhalation BID RT    carvedilol (COREG) tablet 25 mg  25 mg Oral BID WITH MEALS    [START ON 10/31/2019] digoxin (LANOXIN) tablet 0.125 mg  0.125 mg Oral DAILY    [START ON 10/31/2019] DULoxetine (CYMBALTA) capsule 60 mg  60 mg Oral DAILY    [START ON 10/31/2019] spironolactone (ALDACTONE) tablet 50 mg  50 mg Oral DAILY    [START ON 10/31/2019] tiotropium (SPIRIVA) inhalation capsule 18 mcg  1 Cap Inhalation DAILY    ondansetron (ZOFRAN) injection 4 mg  4 mg IntraVENous Q4H PRN    heparin 25,000 units in D5W 250 ml infusion  18-36 Units/kg/hr IntraVENous TITRATE        Review Of Systems:           Constitutional:weakness  HEENT: No epistaxis, no nasal drainage, no difficulty in swallowing, no redness in eyes  Respiratory: , dyspnea on exertion. Cardiovascular:  dyspnea  Gastrointestinal: no abd pain, no vomiting, no diarrhea, no bleeding symptoms  Genitourinary: No urinary symptoms or hematuria  Integument/breast: No ulcers or rashes  Musculoskeletal: no muscle pain, no weakness  Neurological: No focal weakness, no seizures, no headaches  Behvioral/Psych: No anxiety, no depression             Physical Exam:     Visit Vitals  /60 (BP 1 Location: Right arm)   Pulse 67   Temp 97.7 °F (36.5 °C)   Resp 16   Wt 74.8 kg (165 lb)   SpO2 96%   BMI 27.46 kg/m²     BP Readings from Last 3 Encounters:   10/30/19 114/60   10/25/19 (!) 84/44   10/09/19 99/42     Pulse Readings from Last 3 Encounters:   10/30/19 67   10/25/19 84   10/09/19 76     Wt Readings from Last 3 Encounters:   10/30/19 74.8 kg (165 lb)   10/25/19 74.8 kg (165 lb)   10/09/19 74.4 kg (164 lb)       General:  alert, cooperative, no distress, appears stated age  Skin: Warm and dry, acyanotic, normal color. Head: Normocephalic, atraumatic. Eyes: Sclerae anicteric, conjunctivae without injection. Neck:  nontender, no nuchal rigidity, no masses, no stridor, no carotid bruit, no JVD  Lungs:  clear to auscultation bilaterally  Heart:  regular rate and rhythm, S1, S2 normal. No click, rub or gallop  Abdomen:  abdomen is soft without significant tenderness, masses, organomegaly or guarding  Extremities:  extremities normal, atraumatic, no cyanosis or edema. Peripheral pulses present  Neurological: grossly intact. No focal abnormalities, moves all extremities well. Psychiatric Affect: The patient is awake, alert and oriented x3. Ariel Wily is interactive and appropriate.    Data Review:     Recent Results (from the past 48 hour(s))   CBC WITH AUTOMATED DIFF    Collection Time: 10/30/19  1:11 PM   Result Value Ref Range    WBC 7.0 4.6 - 13.2 K/uL    RBC 3.53 (L) 4.20 - 5.30 M/uL    HGB 11.0 (L) 12.0 - 16.0 g/dL    HCT 33.8 (L) 35.0 - 45.0 %    MCV 95.8 74.0 - 97.0 FL    MCH 31.2 24.0 - 34.0 PG    MCHC 32.5 31.0 - 37.0 g/dL    RDW 14.6 (H) 11.6 - 14.5 %    PLATELET 806 085 - 111 K/uL    MPV 10.2 9.2 - 11.8 FL    NEUTROPHILS 69 40 - 73 %    LYMPHOCYTES 17 (L) 21 - 52 %    MONOCYTES 7 3 - 10 %    EOSINOPHILS 7 (H) 0 - 5 %    BASOPHILS 0 0 - 2 %    ABS. NEUTROPHILS 4.8 1.8 - 8.0 K/UL    ABS. LYMPHOCYTES 1.2 0.9 - 3.6 K/UL    ABS. MONOCYTES 0.5 0.05 - 1.2 K/UL    ABS. EOSINOPHILS 0.5 (H) 0.0 - 0.4 K/UL    ABS. BASOPHILS 0.0 0.0 - 0.1 K/UL    DF AUTOMATED     PTT    Collection Time: 10/30/19  1:11 PM   Result Value Ref Range    aPTT 27.0 23.0 - 36.4 SEC         Intake/Output Summary (Last 24 hours) at 10/30/2019 1423  Last data filed at 10/30/2019 1347  Gross per 24 hour   Intake 120 ml   Output    Net 120 ml       Cardiographics:       EKG Results     None        09/18/19   ECHO ADULT FOLLOW-UP OR LIMITED 09/20/2019 9/20/2019    Narrative · Left Ventricle: Normal cavity size. Upper normal wall thickness. Moderate-to-severe systolic dysfunction. Estimated left ventricular   ejection fraction is 36 - 40%. Biplane method used to measure ejection   fraction. Left ventricular global hypokinesis. · Pericardium: Trivial pericardial effusion. Signed by: Madalyn Fraire MD         Signed By: Miriam Gtz NP-BIANCA Phone [phone]    October 30, 2019      I have independently evaluated and examined the patient. All relevant labs and testing data's are reviewed. Care plan discussed and updated after review.   Anshul Mckinnon MD

## 2019-10-30 NOTE — PROGRESS NOTES
Bedside and Verbal shift change report given to Carol Quiles (oncoming nurse) by Sumeet Kaiser (offgoing nurse). Report included the following information SBAR, Kardex, Procedure Summary, MAR and Recent Results.

## 2019-10-31 ENCOUNTER — ANESTHESIA EVENT (OUTPATIENT)
Dept: SURGERY | Age: 69
DRG: 418 | End: 2019-10-31
Payer: MEDICARE

## 2019-10-31 LAB
ANION GAP SERPL CALC-SCNC: 8 MMOL/L (ref 3–18)
APTT PPP: 92.8 SEC (ref 23–36.4)
APTT PPP: >180 SEC (ref 23–36.4)
ATRIAL RATE: 77 BPM
BASOPHILS # BLD: 0 K/UL (ref 0–0.1)
BASOPHILS NFR BLD: 0 % (ref 0–2)
BUN SERPL-MCNC: 14 MG/DL (ref 7–18)
BUN/CREAT SERPL: 16 (ref 12–20)
CALCIUM SERPL-MCNC: 8.7 MG/DL (ref 8.5–10.1)
CALCULATED P AXIS, ECG09: 32 DEGREES
CALCULATED R AXIS, ECG10: -24 DEGREES
CALCULATED T AXIS, ECG11: 68 DEGREES
CHLORIDE SERPL-SCNC: 110 MMOL/L (ref 100–111)
CO2 SERPL-SCNC: 21 MMOL/L (ref 21–32)
CREAT SERPL-MCNC: 0.88 MG/DL (ref 0.6–1.3)
DIAGNOSIS, 93000: NORMAL
DIFFERENTIAL METHOD BLD: ABNORMAL
DIGOXIN SERPL-MCNC: 0.7 NG/ML (ref 0.9–2)
EOSINOPHIL # BLD: 0.6 K/UL (ref 0–0.4)
EOSINOPHIL NFR BLD: 10 % (ref 0–5)
ERYTHROCYTE [DISTWIDTH] IN BLOOD BY AUTOMATED COUNT: 14.6 % (ref 11.6–14.5)
GLUCOSE SERPL-MCNC: 100 MG/DL (ref 74–99)
HCT VFR BLD AUTO: 32.1 % (ref 35–45)
HGB BLD-MCNC: 10.4 G/DL (ref 12–16)
LYMPHOCYTES # BLD: 1.7 K/UL (ref 0.9–3.6)
LYMPHOCYTES NFR BLD: 27 % (ref 21–52)
MCH RBC QN AUTO: 31.1 PG (ref 24–34)
MCHC RBC AUTO-ENTMCNC: 32.4 G/DL (ref 31–37)
MCV RBC AUTO: 96.1 FL (ref 74–97)
MONOCYTES # BLD: 0.5 K/UL (ref 0.05–1.2)
MONOCYTES NFR BLD: 8 % (ref 3–10)
NEUTS SEG # BLD: 3.5 K/UL (ref 1.8–8)
NEUTS SEG NFR BLD: 55 % (ref 40–73)
P-R INTERVAL, ECG05: 148 MS
PLATELET # BLD AUTO: 258 K/UL (ref 135–420)
PMV BLD AUTO: 10.8 FL (ref 9.2–11.8)
POTASSIUM SERPL-SCNC: 3.8 MMOL/L (ref 3.5–5.5)
Q-T INTERVAL, ECG07: 402 MS
QRS DURATION, ECG06: 136 MS
QTC CALCULATION (BEZET), ECG08: 454 MS
RBC # BLD AUTO: 3.34 M/UL (ref 4.2–5.3)
SODIUM SERPL-SCNC: 139 MMOL/L (ref 136–145)
VENTRICULAR RATE, ECG03: 77 BPM
WBC # BLD AUTO: 6.4 K/UL (ref 4.6–13.2)

## 2019-10-31 PROCEDURE — 74011250636 HC RX REV CODE- 250/636: Performed by: INTERNAL MEDICINE

## 2019-10-31 PROCEDURE — 80162 ASSAY OF DIGOXIN TOTAL: CPT

## 2019-10-31 PROCEDURE — 94640 AIRWAY INHALATION TREATMENT: CPT

## 2019-10-31 PROCEDURE — 74011250637 HC RX REV CODE- 250/637: Performed by: NURSE PRACTITIONER

## 2019-10-31 PROCEDURE — 85025 COMPLETE CBC W/AUTO DIFF WBC: CPT

## 2019-10-31 PROCEDURE — 74011250636 HC RX REV CODE- 250/636: Performed by: NURSE PRACTITIONER

## 2019-10-31 PROCEDURE — 65270000029 HC RM PRIVATE

## 2019-10-31 PROCEDURE — 85730 THROMBOPLASTIN TIME PARTIAL: CPT

## 2019-10-31 PROCEDURE — 36415 COLL VENOUS BLD VENIPUNCTURE: CPT

## 2019-10-31 PROCEDURE — 74011250637 HC RX REV CODE- 250/637: Performed by: INTERNAL MEDICINE

## 2019-10-31 PROCEDURE — 80048 BASIC METABOLIC PNL TOTAL CA: CPT

## 2019-10-31 RX ORDER — FUROSEMIDE 20 MG/1
20 TABLET ORAL
Status: DISCONTINUED | OUTPATIENT
Start: 2019-11-01 | End: 2019-11-03 | Stop reason: HOSPADM

## 2019-10-31 RX ADMIN — FAMOTIDINE 20 MG: 20 TABLET, FILM COATED ORAL at 08:52

## 2019-10-31 RX ADMIN — HEPARIN SODIUM 20 UNITS/KG/HR: 10000 INJECTION, SOLUTION INTRAVENOUS at 06:30

## 2019-10-31 RX ADMIN — SACUBITRIL AND VALSARTAN 1 TABLET: 49; 51 TABLET, FILM COATED ORAL at 08:52

## 2019-10-31 RX ADMIN — BUDESONIDE AND FORMOTEROL FUMARATE DIHYDRATE 2 PUFF: 160; 4.5 AEROSOL RESPIRATORY (INHALATION) at 21:30

## 2019-10-31 RX ADMIN — DULOXETINE HYDROCHLORIDE 60 MG: 60 CAPSULE, DELAYED RELEASE ORAL at 09:00

## 2019-10-31 RX ADMIN — SPIRONOLACTONE 50 MG: 25 TABLET ORAL at 08:52

## 2019-10-31 RX ADMIN — CARVEDILOL 25 MG: 25 TABLET, FILM COATED ORAL at 17:26

## 2019-10-31 RX ADMIN — SACUBITRIL AND VALSARTAN 1 TABLET: 49; 51 TABLET, FILM COATED ORAL at 21:03

## 2019-10-31 RX ADMIN — TIOTROPIUM BROMIDE 18 MCG: 18 CAPSULE ORAL; RESPIRATORY (INHALATION) at 08:44

## 2019-10-31 RX ADMIN — BUDESONIDE AND FORMOTEROL FUMARATE DIHYDRATE 2 PUFF: 160; 4.5 AEROSOL RESPIRATORY (INHALATION) at 08:44

## 2019-10-31 RX ADMIN — DIGOXIN 0.12 MG: 125 TABLET ORAL at 08:52

## 2019-10-31 RX ADMIN — FAMOTIDINE 20 MG: 20 TABLET, FILM COATED ORAL at 17:26

## 2019-10-31 RX ADMIN — HEPARIN SODIUM 3000 UNITS: 1000 INJECTION INTRAVENOUS; SUBCUTANEOUS at 00:11

## 2019-10-31 NOTE — ROUTINE PROCESS
Assumed care of pt at 1130. Report received from [facility]. Report included the following information SBAR, Kardex, Intake/Output, MAR and Recent Results.

## 2019-10-31 NOTE — PROGRESS NOTES
GEN  SURG    REASON FOR  CONSULT:  Laparoscopic  Cholecystectomy( LUL),  Poss open  Mary    78 yrs  Old   female  With  Documented  ACUTE CHOLECYSTITIS/  CHOLELITHIASIS , Clinically and  Radiologically. Treated with  Antibiotics and Cholecystostomy drainage by IR. STABLE  SINCE  Hx of  Breast  Cancer  Left  treated  With  Mastectomy, radiation  And  Chemotherapy, no local  Recurrence. Developed lung mets treated  With  Chemotherapy - STABLE, PET SCAN NO PROGRESSION  OF  NODULE. Hx of PE and  Leg clot  ; Hx of non ischemic  Cardiomyopathy/ Fibrillation -  PACEMAKER/ DEFEBRILLATOR;  Ef  36 - 40%    LABS: wnl  ABDOMEN: Flat, soft, no pain/tenderness, drain  Intact, no mass-      PLAN: Proceed with  IV Heparin( Eloquist  Stopped  Last  Tue)              OR( LUL Cholecystectomy  And drain  placement ,  Poss Open  Mary)              Poss risk/Cx  Discussed with  Patient( Bleeding, infection, poss bile duct/ bowel  Injury, obs, cardio- pulmonary  Issues etc).   Patient understood agreed to  Proceed               OR  Friday  3PM

## 2019-10-31 NOTE — PROGRESS NOTES
Problem: Falls - Risk of  Goal: *Absence of Falls  Description  Document Mino Ruiz Fall Risk and appropriate interventions in the flowsheet.   Outcome: Progressing Towards Goal  Note:   Fall Risk Interventions:            Medication Interventions: Evaluate medications/consider consulting pharmacy, Patient to call before getting OOB, Teach patient to arise slowly    Elimination Interventions: Bed/chair exit alarm, Call light in reach, Patient to call for help with toileting needs, Stay With Me (per policy), Toilet paper/wipes in reach, Toileting schedule/hourly rounds              Problem: Pain  Goal: *Control of Pain  Outcome: Progressing Towards Goal

## 2019-10-31 NOTE — PROGRESS NOTES
GEN.  SURG    Seen patient,   By  Bedside   No complaints, riky IV hep  No fever, VS stable   Abdomen: Flat, soft, no changes from yesterday    PLAN: LUL  CHOLECYSTECTOMY AND  DRAINAGE;  Poss OPEN  Mary              NPO  AFTER MIDNIGHT EXCEPT  MEDS

## 2019-10-31 NOTE — ROUTINE PROCESS
Bedside shift change report given to Guevara Gudino (oncoming nurse) by Juana Junior (offgoing nurse). Report included the following information SBAR, Kardex, Intake/Output, MAR and Recent Results.   
;

## 2019-10-31 NOTE — PROGRESS NOTES
[facility] Hospitalist Group  Progress Note    Patient: Leelee John Age: 78 y.o. : 1950 MR#: [id number] SSN: [id number]  Date: 10/31/2019     Subjective:   Patient was examined independently by me, with her  at bedside. No overnight events. Patient denies headache, chest pain, palpitations, shortness of breath, abdominal pain, nausea/vomiting, diarrhea, constipation. Just saw Sejal Machado this morning. She is aware that she is going to surgery  3pm tomorrow. Assessment/Plan:   1. Cholecystitis s/p percutaneous drain placement on 2019  2. Recent PE and right peroneal vein DVT per CTA chest and duplex on 2019, on eliquis at home  3. Recurrent breast cancer with lung metastasis, s/p left mastectomy, s/p chemo and radiation, followed by Dr. Debbie Machado  4. Chronic systolic CHF stage 3. EF 36-40%. No exacerbation    - Cardiology has cleared patient for surgery. Plan is for cholecystectomy (laparoscopic v. Open) tomorrow evening with Dr. Nakia Richard. NPO after midnight. - continue coreg, digoxin, entresto, aldactone, and lasix (3x weekly)  - continue to hold eliquis and heparin drip instead.    - follow up with Dr. Debbie Machado if any recs while inpatient    Additional Notes:      Case discussed with:  [x]Patient  [x]Family  []Nursing  []Case Management  DVT Prophylaxis:  []Lovenox  []Hep SQ  []SCDs  []Coumadin   [x]On Heparin gtt    Objective:   VS:   Visit Vitals  /58 (BP 1 Location: Right arm, BP Patient Position: Sitting)   Pulse 86   Temp 97.9 °F (36.6 °C)   Resp 20   Wt 74.2 kg (163 lb 8 oz)   SpO2 96%   BMI 27.21 kg/m²      Tmax/24hrs: Temp (24hrs), Av.6 °F (36.4 °C), Min:96.4 °F (35.8 °C), Max:98.7 °F (37.1 °C)      Intake/Output Summary (Last 24 hours) at 10/31/2019 1245  Last data filed at 10/31/2019 1242  Gross per 24 hour   Intake 1672 ml   Output 1700 ml   Net -28 ml       General:  Alert, NAD  Cardiovascular:  RRR  Pulmonary:  LSC throughout; respiratory effort WNL  GI:  +BS in all four quadrants, soft, non-tender, scar in upper midline abd  Extremities:  No edema; 2+ dorsalis pedis pulses bilaterally  Neuro: alert and oriented x3. Drain intact in RUQ draining normal bile color      Labs:    Recent Results (from the past 24 hour(s))   CBC WITH AUTOMATED DIFF    Collection Time: 10/30/19  1:11 PM   Result Value Ref Range    WBC 7.0 4.6 - 13.2 K/uL    RBC 3.53 (L) 4.20 - 5.30 M/uL    HGB 11.0 (L) 12.0 - 16.0 g/dL    HCT 33.8 (L) 35.0 - 45.0 %    MCV 95.8 74.0 - 97.0 FL    MCH 31.2 24.0 - 34.0 PG    MCHC 32.5 31.0 - 37.0 g/dL    RDW 14.6 (H) 11.6 - 14.5 %    PLATELET 902 119 - 397 K/uL    MPV 10.2 9.2 - 11.8 FL    NEUTROPHILS 69 40 - 73 %    LYMPHOCYTES 17 (L) 21 - 52 %    MONOCYTES 7 3 - 10 %    EOSINOPHILS 7 (H) 0 - 5 %    BASOPHILS 0 0 - 2 %    ABS. NEUTROPHILS 4.8 1.8 - 8.0 K/UL    ABS. LYMPHOCYTES 1.2 0.9 - 3.6 K/UL    ABS. MONOCYTES 0.5 0.05 - 1.2 K/UL    ABS. EOSINOPHILS 0.5 (H) 0.0 - 0.4 K/UL    ABS. BASOPHILS 0.0 0.0 - 0.1 K/UL    DF AUTOMATED     PTT    Collection Time: 10/30/19  1:11 PM   Result Value Ref Range    aPTT 27.0 23.0 - 36.4 SEC   EKG, 12 LEAD, INITIAL    Collection Time: 10/30/19  3:14 PM   Result Value Ref Range    Ventricular Rate 77 BPM    Atrial Rate 77 BPM    P-R Interval 148 ms    QRS Duration 136 ms    Q-T Interval 402 ms    QTC Calculation (Bezet) 454 ms    Calculated P Axis 32 degrees    Calculated R Axis -24 degrees    Calculated T Axis 68 degrees    Diagnosis       Atrial-sensed ventricular-paced rhythm  When compared with ECG of 22-SEP-2019 06:08,  Vent.  rate has decreased BY  10 BPM  Confirmed by Denis Hawkins ([id number]) on 10/31/2019 8:83:49 AM     METABOLIC PANEL, BASIC    Collection Time: 10/30/19  3:55 PM   Result Value Ref Range    Sodium 140 136 - 145 mmol/L    Potassium 3.7 3.5 - 5.5 mmol/L    Chloride 110 100 - 111 mmol/L    CO2 24 21 - 32 mmol/L    Anion gap 6 3.0 - 18 mmol/L    Glucose 114 (H) 74 - 99 mg/dL    BUN 15 7.0 - 18 MG/DL    Creatinine 0.92 0.6 - 1.3 MG/DL    BUN/Creatinine ratio 16 12 - 20      GFR est AA >60 >60 ml/min/1.73m2    GFR est non-AA >60 >60 ml/min/1.73m2    Calcium 8.9 8.5 - 15.6 MG/DL   METABOLIC PANEL, COMPREHENSIVE    Collection Time: 10/30/19  3:55 PM   Result Value Ref Range    Sodium 141 136 - 145 mmol/L    Potassium 3.9 3.5 - 5.5 mmol/L    Chloride 110 100 - 111 mmol/L    CO2 25 21 - 32 mmol/L    Anion gap 6 3.0 - 18 mmol/L    Glucose 116 (H) 74 - 99 mg/dL    BUN 16 7.0 - 18 MG/DL    Creatinine 0.90 0.6 - 1.3 MG/DL    BUN/Creatinine ratio 18 12 - 20      GFR est AA >60 >60 ml/min/1.73m2    GFR est non-AA >60 >60 ml/min/1.73m2    Calcium 8.8 8.5 - 10.1 MG/DL    Bilirubin, total 0.3 0.2 - 1.0 MG/DL    ALT (SGPT) 31 13 - 56 U/L    AST (SGOT) 20 10 - 38 U/L    Alk. phosphatase 80 45 - 117 U/L    Protein, total 5.9 (L) 6.4 - 8.2 g/dL    Albumin 3.0 (L) 3.4 - 5.0 g/dL    Globulin 2.9 2.0 - 4.0 g/dL    A-G Ratio 1.0 0.8 - 1.7     PTT    Collection Time: 10/30/19  9:50 PM   Result Value Ref Range    aPTT 68.3 (H) 23.0 - 36.4 SEC   PTT    Collection Time: 10/31/19  4:11 AM   Result Value Ref Range    aPTT >180.0 (HH) 23.0 - 36.4 SEC   CBC WITH AUTOMATED DIFF    Collection Time: 10/31/19  4:11 AM   Result Value Ref Range    WBC 6.4 4.6 - 13.2 K/uL    RBC 3.34 (L) 4.20 - 5.30 M/uL    HGB 10.4 (L) 12.0 - 16.0 g/dL    HCT 32.1 (L) 35.0 - 45.0 %    MCV 96.1 74.0 - 97.0 FL    MCH 31.1 24.0 - 34.0 PG    MCHC 32.4 31.0 - 37.0 g/dL    RDW 14.6 (H) 11.6 - 14.5 %    PLATELET 705 441 - 855 K/uL    MPV 10.8 9.2 - 11.8 FL    NEUTROPHILS 55 40 - 73 %    LYMPHOCYTES 27 21 - 52 %    MONOCYTES 8 3 - 10 %    EOSINOPHILS 10 (H) 0 - 5 %    BASOPHILS 0 0 - 2 %    ABS. NEUTROPHILS 3.5 1.8 - 8.0 K/UL    ABS. LYMPHOCYTES 1.7 0.9 - 3.6 K/UL    ABS. MONOCYTES 0.5 0.05 - 1.2 K/UL    ABS. EOSINOPHILS 0.6 (H) 0.0 - 0.4 K/UL    ABS.  BASOPHILS 0.0 0.0 - 0.1 K/UL    DF AUTOMATED     DIGOXIN    Collection Time: 10/31/19  4:11 AM   Result Value Ref Range    Digoxin level 0.7 (L) 0.9 - 2.0 NG/ML   METABOLIC PANEL, BASIC    Collection Time: 10/31/19  4:11 AM   Result Value Ref Range    Sodium 139 136 - 145 mmol/L    Potassium 3.8 3.5 - 5.5 mmol/L    Chloride 110 100 - 111 mmol/L    CO2 21 21 - 32 mmol/L    Anion gap 8 3.0 - 18 mmol/L    Glucose 100 (H) 74 - 99 mg/dL    BUN 14 7.0 - 18 MG/DL    Creatinine 0.88 0.6 - 1.3 MG/DL    BUN/Creatinine ratio 16 12 - 20      GFR est AA >60 >60 ml/min/1.73m2    GFR est non-AA >60 >60 ml/min/1.73m2    Calcium 8.7 8.5 - 10.1 MG/DL       Signed By: CINTHIA Frey     October 31, 2019

## 2019-10-31 NOTE — ROUTINE PROCESS
0730 Verified heparin drip at 17unit/kg/hr. No sign of bleeding. AO4, denies pain, pt is resting on bed. Call bell within reach, bed low and locked. Hob>30.

## 2019-10-31 NOTE — CONSULTS
[address] [facility]    Name:  Morteza Lino  MR#:   [id number]  :  1950  ACCOUNT #:  [de-identified]  DATE OF SERVICE:  10/30/2019    GENERAL SURGERY CONSULT NOTE    REASON FOR CONSULTATION:  Acute cholecystitis, status post antibiotic therapy and cholecystostomy drainage by IR. Plan is for a laparoscopic cholecystectomy, single incision, possible open cholecystectomy. HISTORY OF PRESENT ILLNESS:  This 79-year-old  female was referred to me by Dr. Luh Lamar for acute cholecystitis, treated with antibiotic and gallbladder drainage. The patient has remained asymptomatic since drainage and after completion of antibiotics, the plan now is to remove the gallbladder laparoscopically via single incision or possible open if needed. PAST MEDICAL HISTORY:  The patient has a significant past history starting as left breast cancer, ductal invasive, treated with mastectomy and had radiation and chemotherapy. The patient several years later has developed lung mets, this was treated with chemotherapy and currently so, since then this has developed some cardiac issues causing her to have a pacemaker defibrillator placement. She also has developed pulmonary emboli and was treated by blood-thinning medicine, and currently, the patient is on Eliquis. The latest x-rays prior to gallbladder drainage showed ultrasound of gallbladder wall thickening with possible fluid in the wall, no ductal dilatation. HIDA scan showed cystic duct obstruction with good flow to the small bowel, and CAT scan shows large stone at the neck of the gallbladder. The drainage has been successful and has been draining clear bile. The patient's stool as well is non-acholic suggestive of mild drainage going to the small bowel without any obstruction. The patient denies any fever.   Denies any nausea or vomiting, although at times when she eats food that she does not like, like spaghetti and sometimes fatty food she would develop pain in the abdomen with some nausea feeling and loose stooling. The patient has significant cardiac and pulmonary issues that before surgery could be done, a cardiopulmonary clearance was mandatory. The patient has been seen by cardiac service and has cleared her for surgery with some risk and also has been seen by Pulmonary and also has cleared her. The patient is admitted two days prior to surgery to stop the Eliquis and will be placed on heparin IV. PAST SURGICAL HISTORY:  Shows that the patient had a history of neck surgery with cervical spine issue several years ago and also history of an open exploration for a tumor in the liver several years ago which was secondary to a benign tumor. This surgery that she had many years ago would cause adhesions, especially in the area of the gallbladder and it was discussed with the patient that if ever an open cholecystectomy will be done it is because of adhesions that would probably hinder a successful laparoscopic cholecystectomy which may result to an open if needed. Possible risks and complications also were discussed like bleeding and hence a drainage will be placed which would stay about a week, of course infection and she will get prophylactic antibiotics, possible bile duct or bowel injury, obstruction, etc., cardiovascular issues, etc.  The patient will be on heparin before surgery and will be stopped 4-6 hours prior to surgery and will be resumed 24 hours post surgery. All these things were discussed with the patient, and the patient understood these and agreed to proceed. This was also discussed with the patient on office consult about two weeks ago and  and the patient understood this as well. ALLERGIES:  TO ADHESIVE. NO KNOWN DRUG ALLERGIES. PHYSICAL EXAMINATION:  GENERAL:  She is alert, awake, and oriented. She is ambulatory. HEENT:  Normocephalic, anicteric sclerae noted.   NECK:  Supple although slight diminished in flexion possibly secondary to previous neck surgery, scarred anterior neck, no mass noted. CHEST:  To me is clear anteriorly and posteriorly, right and left. HEART:  Regular rate and rhythm. ABDOMEN:  Soft, no tenderness. There is a long scar in the upper midline of the abdomen up to the naval region from previous liver tumor resection. No masses, no tenderness in the right upper quadrant. Drain is intact in the right upper quadrant draining normal bile color. No hernias noted. SKIN:  Presence of multiple skin keratoses in the abdomen. EXTREMITIES:  She has no motor or sensory deficits. LABORATORY DATA:  All labs within normal limits. IMPRESSION:  1. Acute cholecystitis, treated with antibiotics and drainage, secondary to cholelithiasis. 2.  Status post left mastectomy, treated with radiation and chemotherapy for invasive ductal carcinoma. 3.  Lung metastasis, persists stable based on recent PET scan. 4.  Nonischemic cardiomyopathy with nonsustained ventricular tachycardia, stable on pacer defibrillator and blood pressure medicine. 5.  History of pulmonary embolism and deep vein thrombosis. 6.  History of lung metastasis from breast cancer. PLAN:  As discussed above.       Abraham Law MD      [id number]  D:  10/30/2019 20:48  T:  10/31/2019 2:41  JOB #:  [id number]  CC:  MD Elaine Nogueira MD Dreama Bade, MD Melodye Merry, MD Percilla Flake, MD

## 2019-10-31 NOTE — CDMP QUERY
Patient admitted with acute cholecystitis  noted to have h/o  PE  in May 2019. now on heparin gtt If possible, please document in progress notes and d/c summary if you are evaluating and/or treating any of the following: 
 
 => Acute PE 
 => Acute Recurrent PE 
 => Chronic PE 
 => Other Explanation of clinical findings 
 => Clinically Unable to determine (no explanation for clinical findings) The medical record reflects the following: 
 
   Risk Factors: H/O breast cancer with mets Clinical Indicators: Ct chest  5/2019  showed \"  Positive examination for pulmonary emboli.  
- There is likely a combination of acute and subacute PE in the lower lobe 
segmental and subsegmental branch vessels. Treatment: had been on eliqiis  now on heparin  gtt, ready for surgery Reference: 
Acute PE:  \"Acute\" defines the period of time beginning with the initial diagnosis, up to and including the entire period of time where anticoagulation is instituted (3-12 months) Acute Recurrent PE: \"Recurrent\" incorporates the definition of acute with the diagnosis of recurrent and ends 3-12 months beyond that time. These patients will likely require lifelong anticoagulation therapy. THank you,    Patricio Gomez   RN   CCDS  x [phone]

## 2019-10-31 NOTE — PROGRESS NOTES
Problem: Falls - Risk of  Goal: *Absence of Falls  Description  Document Janna Patches Fall Risk and appropriate interventions in the flowsheet.   Outcome: Progressing Towards Goal  Note:   Fall Risk Interventions:            Medication Interventions: Teach patient to arise slowly    Elimination Interventions: Call light in reach, Patient to call for help with toileting needs, Stay With Me (per policy)

## 2019-10-31 NOTE — ROUTINE PROCESS
Bedside and Verbal shift change report given to Ant Alexander RN (oncoming nurse) by Rubi Olivares RN (offgoing nurse). Report included the following information Kardex, Intake/Output, MAR, Recent Results and Cardiac Rhythm Ventricular pacing tele box# 4.

## 2019-10-31 NOTE — PROGRESS NOTES
Progress Note  Pulmonary, Critical Care, and Sleep Medicine    Name: Clark Littlejohn MRN: [id number]   : 1950 Hospital: [facility]   Date: 10/31/2019        IMPRESSION:   · Acute cholecystitis S/p sepsis and septic shock, S/p percutaneous drainage. Admitted for planned cholecystectomy  · Asthma well controlled and not in exacerbation  · Metastatic breast cancer  · DVT/PE on anticoagulation  · Pulmonary HTN Grp 2/3   · Non ischemic cardiomyopathy EF 36%  · PFT with combined restriction and obstruction      PLAN:   · Procedure planned for tomorrow  · Continue Heparin gtt , resume Eliquis once ok with surgery  · Bronchodilators prn  · Continue Symbicort and Incruse  · Bronchial hygiene and incentive spirometry  · Early mobilization     Subjective/Interval History:   I have reviewed the flowsheet and previous days notes. Reviewed interval history. Discussed management with nursing staff. 10/31/19  Pt denies chest pain, SOB or wheezing. No new respiratory symptoms  No abdominal pain, nausea or vomiting    ROS:Pertinent items are noted in HPI. Orders reviewed including medications. Changes made if indicated. Telemetry monitor reviewed at the bedside. Objective:   Vital Signs:    Visit Vitals  /61 (BP 1 Location: Right arm, BP Patient Position: At rest)   Pulse 74   Temp 98.7 °F (37.1 °C)   Resp 18   Wt 74.2 kg (163 lb 8 oz)   SpO2 94%   BMI 27.21 kg/m²       O2 Device: Room air       Temp (24hrs), Av.9 °F (36.6 °C), Min:96.4 °F (35.8 °C), Max:98.7 °F (37.1 °C)       Intake/Output:   Last shift:      10/31 0701 - 10/31 1900  In: 1150 [P.O.:1150]  Out: 4118 [Urine:1325; Drains:130]  Last 3 shifts: 10/29 1901 - 10/31 0700  In: 358 [P.O.:720;  I.V.:222]  Out: 800 [Urine:600; Drains:200]    Intake/Output Summary (Last 24 hours) at 10/31/2019 1549  Last data filed at 10/31/2019 1541  Gross per 24 hour   Intake 1972 ml   Output 2255 ml   Net -283 ml        Physical Exam:    General:  Alert, cooperative, in no distress, appears stated age. Head:  Normocephalic, without obvious abnormality, atraumatic. Eyes:  ANicteric, PERRL,   Nose: Nares normal. Mucosa normal. No drainage or sinus tenderness. Throat: Lips, mucosa, and tongue normal.  NO Thrush   Neck: Supple, symmetrical, trachea midline, no adenopathy, thyroid: no enlargment/tenderness/nodules    Back:   Symmetric    Lungs:   Bilateral auscultation no rales or wheezes   Chest wall:  No tenderness or deformity. NO intercostal retractions   Heart:  Regular rate and rhythm, S1, S2 normal, no murmur, click, rub or gallop. Abdomen:   Soft, non-tender , Bowel sounds normal. No masses,  No organomegaly. NO paradoxical motion   Extremities: normal, atraumatic, no cyanosis or edema. Pulses: 2+ and symmetric all extremities. Skin: Skin color, texture, turgor normal. No rashes or lesions. NO clubbing   Lymph nodes: Cervical, supraclavicular nodes normal.   Neurologic: Grossly nonfocal      :        Devices:             Drips:    DATA:  Labs:  Recent Labs     10/31/19  0411 10/30/19  1311   WBC 6.4 7.0   HGB 10.4* 11.0*   HCT 32.1* 33.8*    258     Recent Labs     10/31/19  0411 10/30/19  1555    141  140   K 3.8 3.9  3.7    110  110   CO2 21 25  24   * 116*  114*   BUN 14 16  15   CREA 0.88 0.90  0.92   CA 8.7 8.8  8.9   ALB  --  3.0*   SGOT  --  20   ALT  --  31     No results for input(s): PH, PCO2, PO2, HCO3, FIO2 in the last 72 hours. Imaging:  []        I have personally reviewed the patients radiographs and reports  []         []        No change from prior, tubes and lines in adequate position  []        Improved   []        Worsening  High complexity decision making was performed during this consultation and evaluation.      Tita Weber MD

## 2019-10-31 NOTE — CDMP QUERY
Patient admitted with  acute cholecystitis , noted to have  DVT in right peroneal vein in May 2019. If possible, please document in progress notes and d/c summary if you are evaluating and/or treating any of the following: 
 
 
=> Acute DVT 
=> Acute Recurrent DVT 
=> Chronic DVT 
=> Personal History of DVT 
=> Other Explanation of clinical findings 
=> Clinically Undetermined (no explanation for clinical findings) The medical record reflects the following: 
 
   Risk Factors: breast cancer with lung mets Clinical Indicators: duplex scan 5/2019  showed   acute  DVT in right peroneal vein Treatment: pt had been on eliquis  now on heparin gtt in preparation for surgery Reference: 
Acute DVT:  \"Acute\" defines the period of time beginning with the initial diagnosis, up to and including the entire period of time where anticoagulation is instituted (3-12 months) Acute Recurrent DVT: \"Recurrent\" incorporates the definition of acute with the diagnosis of recurrent and ends 3-12 months beyond that time. These patients will likely require lifelong anticoagulation therapy. Chronic DVT:  \"Chronic\" treatment period extending beyond initial 12 months of treatment. Medical condition still exists and is actively being treated. Thank you,   Cayla Baird   RN    CCDS  x [phone]

## 2019-10-31 NOTE — PROGRESS NOTES
[facility]      CARDIOLOGY PROGRESS NOTE  RECS:      1. Chronic Systolic CHF-stage C NYHA class II- III-compensated. Monitor for s/s fluid overload. Continue lasix po 3 times a week. 2. Sustained Vtach-  Episode happened in 8/2019 noted on ICD check. AICD was fired x3  She had altered mentation.  She also had hypokalemia at that time- will monitor in telemetry. So far no recurrence       3. Nonischemic cardiomyopathy (EF 26-30%)-likely from Herceptin use. recent echo 9/20/19 showed EF% 36-40%-patient on Entresto. Lasix reduced to 3 times a week. She is on aldactone 50 mg daily. Follow bmp.       4. Hx of recent Acute PE and DVT- patient was on Eliquis at home. Now on heparin drip.   5. Metastatic breast cancer now back on chemotherapy.  Lung metastasis likely cause for shortness of breath. 6. Cholecystitis- s/p percutaneous drain 9/20- plans for cholecystectomy on Friday per surgery team    7. Cardiac Clearance- patient with Chronic CHF, CMO and metastatic breast cancer- patient denies active chest or chest pressure. No SOB- patient will be clear from surgery from cardiac stand point-  If patient needs laparoscopic surgery her risk is mild at less than 5% risk of perioperative cardiac events. If open surgical procedure is required it will carry moderate risk. Stable waiting for surgery. Continue supportive care    ASSESSMENT:  Hospital Problems  Date Reviewed: 10/25/2019          Codes Class Noted POA    Cholecystitis, unspecified ICD-10-CM: K81.9  ICD-9-CM: 575.10  10/30/2019 Unknown        Systolic CHF, chronic (HCC) ICD-10-CM: I50.22  ICD-9-CM: 428.22, 428.0  Unknown Unknown    Overview Signed 3/6/2013  8:01 PM by Rohit Chavezty     Remains symptomatic, nyha class 3 ef improving.                      SUBJECTIVE:  No CP  No SOB    OBJECTIVE:    VS:   Visit Vitals  /58 (BP 1 Location: Right arm, BP Patient Position: Sitting)   Pulse 86   Temp 97.9 °F (36.6 °C)   Resp 20   Wt 74.2 kg (163 lb 8 oz)   SpO2 96%   BMI 27.21 kg/m²         Intake/Output Summary (Last 24 hours) at 10/31/2019 1142  Last data filed at 10/31/2019 1130  Gross per 24 hour   Intake 1432 ml   Output 1700 ml   Net -268 ml     TELE: normal sinus rhythm    General: alert, well developed, pleasant and in no apparent distress  HENT: Normocephalic, atraumatic. Normal external eye. Neck :  no JVD, JVD difficult to assess due to obesity  Cardiac:  regular rate and rhythm, S1, S2 normal, no click, no rub  Lungs: clear to auscultation bilaterally  Abdomen: Soft, nontender, no masses  Extremities:  No c/c/e, peripheral pulses present      Labs: Results:       Chemistry Recent Labs     10/31/19  0411 10/30/19  1555   * 116*  114*    141  140   K 3.8 3.9  3.7    110  110   CO2 21 25  24   BUN 14 16  15   CREA 0.88 0.90  0.92   CA 8.7 8.8  8.9   AGAP 8 6  6   BUCR 16 18  16   AP  --  80   TP  --  5.9*   ALB  --  3.0*   GLOB  --  2.9   AGRAT  --  1.0      CBC w/Diff Recent Labs     10/31/19  0411 10/30/19  1311   WBC 6.4 7.0   RBC 3.34* 3.53*   HGB 10.4* 11.0*   HCT 32.1* 33.8*    258   GRANS 55 69   LYMPH 27 17*   EOS 10* 7*      Cardiac Enzymes No results for input(s): CPK, CKND1, ZEESHAN in the last 72 hours. No lab exists for component: CKRMB, TROIP   Coagulation Recent Labs     10/31/19  0411 10/30/19  2150   APTT >180.0* 68.3*       Lipid Panel Lab Results   Component Value Date/Time    Cholesterol, total 266 (H) 04/17/2019 11:38 AM    HDL Cholesterol 60 04/17/2019 11:38 AM    LDL, calculated 189.2 (H) 04/17/2019 11:38 AM    VLDL, calculated 16.8 04/17/2019 11:38 AM    Triglyceride 84 04/17/2019 11:38 AM    CHOL/HDL Ratio 4.4 04/17/2019 11:38 AM      BNP No results for input(s): BNPP in the last 72 hours.    Liver Enzymes Recent Labs     10/30/19  1555   TP 5.9*   ALB 3.0*   AP 80   SGOT 20      Thyroid Studies Lab Results   Component Value Date/Time    TSH 0.10 (L) 04/17/2019 11:38 AM [address] NP-C [place]:[phone] supervised    I have independently evaluated and examined the patient. All relevant labs and testing data's are reviewed. Care plan discussed and updated after review.     Tia Santos MD

## 2019-11-01 ENCOUNTER — ANESTHESIA (OUTPATIENT)
Dept: SURGERY | Age: 69
DRG: 418 | End: 2019-11-01
Payer: MEDICARE

## 2019-11-01 LAB
ANION GAP SERPL CALC-SCNC: 7 MMOL/L (ref 3–18)
APTT PPP: 81.7 SEC (ref 23–36.4)
BASOPHILS # BLD: 0 K/UL (ref 0–0.1)
BASOPHILS NFR BLD: 1 % (ref 0–2)
BUN SERPL-MCNC: 14 MG/DL (ref 7–18)
BUN/CREAT SERPL: 14 (ref 12–20)
CALCIUM SERPL-MCNC: 8.5 MG/DL (ref 8.5–10.1)
CHLORIDE SERPL-SCNC: 109 MMOL/L (ref 100–111)
CO2 SERPL-SCNC: 25 MMOL/L (ref 21–32)
CREAT SERPL-MCNC: 1.02 MG/DL (ref 0.6–1.3)
DIFFERENTIAL METHOD BLD: ABNORMAL
EOSINOPHIL # BLD: 0.6 K/UL (ref 0–0.4)
EOSINOPHIL NFR BLD: 10 % (ref 0–5)
ERYTHROCYTE [DISTWIDTH] IN BLOOD BY AUTOMATED COUNT: 14.5 % (ref 11.6–14.5)
GLUCOSE SERPL-MCNC: 114 MG/DL (ref 74–99)
HCT VFR BLD AUTO: 31.5 % (ref 35–45)
HGB BLD-MCNC: 10.4 G/DL (ref 12–16)
LYMPHOCYTES # BLD: 1.3 K/UL (ref 0.9–3.6)
LYMPHOCYTES NFR BLD: 20 % (ref 21–52)
MCH RBC QN AUTO: 31.4 PG (ref 24–34)
MCHC RBC AUTO-ENTMCNC: 33 G/DL (ref 31–37)
MCV RBC AUTO: 95.2 FL (ref 74–97)
MONOCYTES # BLD: 0.6 K/UL (ref 0.05–1.2)
MONOCYTES NFR BLD: 9 % (ref 3–10)
NEUTS SEG # BLD: 4 K/UL (ref 1.8–8)
NEUTS SEG NFR BLD: 60 % (ref 40–73)
PLATELET # BLD AUTO: 260 K/UL (ref 135–420)
PMV BLD AUTO: 10.5 FL (ref 9.2–11.8)
POTASSIUM SERPL-SCNC: 3.8 MMOL/L (ref 3.5–5.5)
RBC # BLD AUTO: 3.31 M/UL (ref 4.2–5.3)
SODIUM SERPL-SCNC: 141 MMOL/L (ref 136–145)
WBC # BLD AUTO: 6.6 K/UL (ref 4.6–13.2)

## 2019-11-01 PROCEDURE — 74011250637 HC RX REV CODE- 250/637: Performed by: NURSE PRACTITIONER

## 2019-11-01 PROCEDURE — 85730 THROMBOPLASTIN TIME PARTIAL: CPT

## 2019-11-01 PROCEDURE — 74011250636 HC RX REV CODE- 250/636: Performed by: NURSE ANESTHETIST, CERTIFIED REGISTERED

## 2019-11-01 PROCEDURE — 76010000132 HC OR TIME 2.5 TO 3 HR: Performed by: SURGERY

## 2019-11-01 PROCEDURE — 94640 AIRWAY INHALATION TREATMENT: CPT

## 2019-11-01 PROCEDURE — 77030020255 HC SOL INJ LR 1000ML BG: Performed by: SURGERY

## 2019-11-01 PROCEDURE — 77030008756 HC TU IRR SUC STRY -B: Performed by: SURGERY

## 2019-11-01 PROCEDURE — 77030002933 HC SUT MCRYL J&J -A: Performed by: SURGERY

## 2019-11-01 PROCEDURE — 77030018804 HC PRT GELPNT SYS AMR -F: Performed by: SURGERY

## 2019-11-01 PROCEDURE — 77030040361 HC SLV COMPR DVT MDII -B: Performed by: SURGERY

## 2019-11-01 PROCEDURE — 74011250637 HC RX REV CODE- 250/637: Performed by: INTERNAL MEDICINE

## 2019-11-01 PROCEDURE — 88304 TISSUE EXAM BY PATHOLOGIST: CPT

## 2019-11-01 PROCEDURE — 77030002967 HC SUT PDS J&J -B: Performed by: SURGERY

## 2019-11-01 PROCEDURE — 77030003578 HC NDL INSUF VERES AMR -B: Performed by: SURGERY

## 2019-11-01 PROCEDURE — 74011250636 HC RX REV CODE- 250/636: Performed by: NURSE PRACTITIONER

## 2019-11-01 PROCEDURE — 0FT44ZZ RESECTION OF GALLBLADDER, PERCUTANEOUS ENDOSCOPIC APPROACH: ICD-10-PCS | Performed by: SURGERY

## 2019-11-01 PROCEDURE — 74011250636 HC RX REV CODE- 250/636: Performed by: SURGERY

## 2019-11-01 PROCEDURE — 77030018352 HC SHR COAG HARM2 J&J -E: Performed by: SURGERY

## 2019-11-01 PROCEDURE — 0WQF4ZZ REPAIR ABDOMINAL WALL, PERCUTANEOUS ENDOSCOPIC APPROACH: ICD-10-PCS | Performed by: SURGERY

## 2019-11-01 PROCEDURE — 36591 DRAW BLOOD OFF VENOUS DEVICE: CPT

## 2019-11-01 PROCEDURE — 80048 BASIC METABOLIC PNL TOTAL CA: CPT

## 2019-11-01 PROCEDURE — 77030012770 HC TRCR OPT FX AMR -B: Performed by: SURGERY

## 2019-11-01 PROCEDURE — 76060000036 HC ANESTHESIA 2.5 TO 3 HR: Performed by: SURGERY

## 2019-11-01 PROCEDURE — 65270000029 HC RM PRIVATE

## 2019-11-01 PROCEDURE — 76210000016 HC OR PH I REC 1 TO 1.5 HR: Performed by: SURGERY

## 2019-11-01 PROCEDURE — 77030018836 HC SOL IRR NACL ICUM -A: Performed by: SURGERY

## 2019-11-01 PROCEDURE — 85025 COMPLETE CBC W/AUTO DIFF WBC: CPT

## 2019-11-01 PROCEDURE — 77030040922 HC BLNKT HYPOTHRM STRY -A: Performed by: SURGERY

## 2019-11-01 PROCEDURE — 77030002986 HC SUT PROL J&J -A: Performed by: SURGERY

## 2019-11-01 PROCEDURE — 77030031139 HC SUT VCRL2 J&J -A: Performed by: SURGERY

## 2019-11-01 PROCEDURE — 74011000250 HC RX REV CODE- 250: Performed by: SURGERY

## 2019-11-01 PROCEDURE — 74011250636 HC RX REV CODE- 250/636

## 2019-11-01 PROCEDURE — 74011000258 HC RX REV CODE- 258: Performed by: SURGERY

## 2019-11-01 PROCEDURE — 74011000250 HC RX REV CODE- 250: Performed by: NURSE ANESTHETIST, CERTIFIED REGISTERED

## 2019-11-01 RX ORDER — HYDROMORPHONE HYDROCHLORIDE 2 MG/ML
0.5 INJECTION, SOLUTION INTRAMUSCULAR; INTRAVENOUS; SUBCUTANEOUS
Status: DISCONTINUED | OUTPATIENT
Start: 2019-11-01 | End: 2019-11-02 | Stop reason: HOSPADM

## 2019-11-01 RX ORDER — EPHEDRINE SULFATE/0.9% NACL/PF 50 MG/5 ML
SYRINGE (ML) INTRAVENOUS AS NEEDED
Status: DISCONTINUED | OUTPATIENT
Start: 2019-11-01 | End: 2019-11-01 | Stop reason: HOSPADM

## 2019-11-01 RX ORDER — KETOROLAC TROMETHAMINE 15 MG/ML
15 INJECTION, SOLUTION INTRAMUSCULAR; INTRAVENOUS
Status: COMPLETED | OUTPATIENT
Start: 2019-11-02 | End: 2019-11-01

## 2019-11-01 RX ORDER — SODIUM CHLORIDE 0.9 % (FLUSH) 0.9 %
5-40 SYRINGE (ML) INJECTION AS NEEDED
Status: DISCONTINUED | OUTPATIENT
Start: 2019-11-01 | End: 2019-11-02 | Stop reason: HOSPADM

## 2019-11-01 RX ORDER — HYDROMORPHONE HYDROCHLORIDE 2 MG/ML
0.5 INJECTION, SOLUTION INTRAMUSCULAR; INTRAVENOUS; SUBCUTANEOUS AS NEEDED
Status: DISCONTINUED | OUTPATIENT
Start: 2019-11-01 | End: 2019-11-01 | Stop reason: SDUPTHER

## 2019-11-01 RX ORDER — SODIUM CHLORIDE 0.9 % (FLUSH) 0.9 %
5-40 SYRINGE (ML) INJECTION EVERY 8 HOURS
Status: DISCONTINUED | OUTPATIENT
Start: 2019-11-02 | End: 2019-11-02 | Stop reason: HOSPADM

## 2019-11-01 RX ORDER — DEXTROSE MONOHYDRATE AND SODIUM CHLORIDE 5; .45 G/100ML; G/100ML
100 INJECTION, SOLUTION INTRAVENOUS CONTINUOUS
Status: DISCONTINUED | OUTPATIENT
Start: 2019-11-02 | End: 2019-11-03 | Stop reason: HOSPADM

## 2019-11-01 RX ORDER — BUPIVACAINE HYDROCHLORIDE 2.5 MG/ML
INJECTION, SOLUTION EPIDURAL; INFILTRATION; INTRACAUDAL AS NEEDED
Status: DISCONTINUED | OUTPATIENT
Start: 2019-11-01 | End: 2019-11-01 | Stop reason: HOSPADM

## 2019-11-01 RX ORDER — MAGNESIUM SULFATE 100 %
4 CRYSTALS MISCELLANEOUS AS NEEDED
Status: DISCONTINUED | OUTPATIENT
Start: 2019-11-01 | End: 2019-11-02 | Stop reason: HOSPADM

## 2019-11-01 RX ORDER — HYDROMORPHONE HYDROCHLORIDE 2 MG/ML
INJECTION, SOLUTION INTRAMUSCULAR; INTRAVENOUS; SUBCUTANEOUS
Status: COMPLETED
Start: 2019-11-01 | End: 2019-11-01

## 2019-11-01 RX ORDER — PROPOFOL 10 MG/ML
INJECTION, EMULSION INTRAVENOUS AS NEEDED
Status: DISCONTINUED | OUTPATIENT
Start: 2019-11-01 | End: 2019-11-01 | Stop reason: HOSPADM

## 2019-11-01 RX ORDER — FENTANYL CITRATE 50 UG/ML
INJECTION, SOLUTION INTRAMUSCULAR; INTRAVENOUS AS NEEDED
Status: DISCONTINUED | OUTPATIENT
Start: 2019-11-01 | End: 2019-11-01 | Stop reason: HOSPADM

## 2019-11-01 RX ORDER — PHENYLEPHRINE HCL IN 0.9% NACL 1 MG/10 ML
SYRINGE (ML) INTRAVENOUS AS NEEDED
Status: DISCONTINUED | OUTPATIENT
Start: 2019-11-01 | End: 2019-11-01 | Stop reason: HOSPADM

## 2019-11-01 RX ORDER — SUCCINYLCHOLINE CHLORIDE 100 MG/5ML
SYRINGE (ML) INTRAVENOUS AS NEEDED
Status: DISCONTINUED | OUTPATIENT
Start: 2019-11-01 | End: 2019-11-01 | Stop reason: HOSPADM

## 2019-11-01 RX ORDER — FENTANYL CITRATE 50 UG/ML
50 INJECTION, SOLUTION INTRAMUSCULAR; INTRAVENOUS AS NEEDED
Status: DISCONTINUED | OUTPATIENT
Start: 2019-11-01 | End: 2019-11-02 | Stop reason: HOSPADM

## 2019-11-01 RX ORDER — ONDANSETRON 2 MG/ML
4 INJECTION INTRAMUSCULAR; INTRAVENOUS ONCE
Status: DISCONTINUED | OUTPATIENT
Start: 2019-11-02 | End: 2019-11-02 | Stop reason: HOSPADM

## 2019-11-01 RX ORDER — NEOSTIGMINE METHYLSULFATE 1 MG/ML
INJECTION, SOLUTION INTRAVENOUS AS NEEDED
Status: DISCONTINUED | OUTPATIENT
Start: 2019-11-01 | End: 2019-11-01 | Stop reason: HOSPADM

## 2019-11-01 RX ORDER — ONDANSETRON 2 MG/ML
INJECTION INTRAMUSCULAR; INTRAVENOUS AS NEEDED
Status: DISCONTINUED | OUTPATIENT
Start: 2019-11-01 | End: 2019-11-01 | Stop reason: HOSPADM

## 2019-11-01 RX ORDER — SODIUM CHLORIDE, SODIUM LACTATE, POTASSIUM CHLORIDE, CALCIUM CHLORIDE 600; 310; 30; 20 MG/100ML; MG/100ML; MG/100ML; MG/100ML
50 INJECTION, SOLUTION INTRAVENOUS CONTINUOUS
Status: DISCONTINUED | OUTPATIENT
Start: 2019-11-01 | End: 2019-11-01 | Stop reason: HOSPADM

## 2019-11-01 RX ORDER — HYDROMORPHONE HYDROCHLORIDE 2 MG/ML
0.5 INJECTION, SOLUTION INTRAMUSCULAR; INTRAVENOUS; SUBCUTANEOUS
Status: DISCONTINUED | OUTPATIENT
Start: 2019-11-01 | End: 2019-11-03 | Stop reason: HOSPADM

## 2019-11-01 RX ORDER — ROCURONIUM BROMIDE 10 MG/ML
INJECTION, SOLUTION INTRAVENOUS AS NEEDED
Status: DISCONTINUED | OUTPATIENT
Start: 2019-11-01 | End: 2019-11-01 | Stop reason: HOSPADM

## 2019-11-01 RX ORDER — INSULIN LISPRO 100 [IU]/ML
INJECTION, SOLUTION INTRAVENOUS; SUBCUTANEOUS
Status: CANCELLED | OUTPATIENT
Start: 2019-11-01

## 2019-11-01 RX ORDER — LIDOCAINE HYDROCHLORIDE 20 MG/ML
INJECTION, SOLUTION EPIDURAL; INFILTRATION; INTRACAUDAL; PERINEURAL AS NEEDED
Status: DISCONTINUED | OUTPATIENT
Start: 2019-11-01 | End: 2019-11-01 | Stop reason: HOSPADM

## 2019-11-01 RX ORDER — FAMOTIDINE 20 MG/1
20 TABLET, FILM COATED ORAL ONCE
Status: DISCONTINUED | OUTPATIENT
Start: 2019-11-01 | End: 2019-11-01 | Stop reason: HOSPADM

## 2019-11-01 RX ORDER — SODIUM CHLORIDE, SODIUM LACTATE, POTASSIUM CHLORIDE, CALCIUM CHLORIDE 600; 310; 30; 20 MG/100ML; MG/100ML; MG/100ML; MG/100ML
50 INJECTION, SOLUTION INTRAVENOUS CONTINUOUS
Status: DISCONTINUED | OUTPATIENT
Start: 2019-11-02 | End: 2019-11-02 | Stop reason: HOSPADM

## 2019-11-01 RX ORDER — DEXTROSE 50 % IN WATER (D50W) INTRAVENOUS SYRINGE
25-50 AS NEEDED
Status: DISCONTINUED | OUTPATIENT
Start: 2019-11-01 | End: 2019-11-02 | Stop reason: HOSPADM

## 2019-11-01 RX ORDER — DEXAMETHASONE SODIUM PHOSPHATE 4 MG/ML
INJECTION, SOLUTION INTRA-ARTICULAR; INTRALESIONAL; INTRAMUSCULAR; INTRAVENOUS; SOFT TISSUE AS NEEDED
Status: DISCONTINUED | OUTPATIENT
Start: 2019-11-01 | End: 2019-11-01 | Stop reason: HOSPADM

## 2019-11-01 RX ORDER — GLYCOPYRROLATE 0.6MG/3ML
SYRINGE (ML) INTRAVENOUS AS NEEDED
Status: DISCONTINUED | OUTPATIENT
Start: 2019-11-01 | End: 2019-11-01 | Stop reason: HOSPADM

## 2019-11-01 RX ORDER — KETOROLAC TROMETHAMINE 15 MG/ML
15 INJECTION, SOLUTION INTRAMUSCULAR; INTRAVENOUS EVERY 6 HOURS
Status: DISCONTINUED | OUTPATIENT
Start: 2019-11-02 | End: 2019-11-03 | Stop reason: HOSPADM

## 2019-11-01 RX ORDER — PIPERACILLIN SODIUM, TAZOBACTAM SODIUM 3; .375 G/15ML; G/15ML
3.38 INJECTION, POWDER, LYOPHILIZED, FOR SOLUTION INTRAVENOUS
Status: DISCONTINUED | OUTPATIENT
Start: 2019-11-02 | End: 2019-11-01

## 2019-11-01 RX ADMIN — BUDESONIDE AND FORMOTEROL FUMARATE DIHYDRATE 2 PUFF: 160; 4.5 AEROSOL RESPIRATORY (INHALATION) at 08:43

## 2019-11-01 RX ADMIN — KETOROLAC TROMETHAMINE 15 MG: 15 INJECTION, SOLUTION INTRAMUSCULAR; INTRAVENOUS at 23:13

## 2019-11-01 RX ADMIN — Medication 10 MG: at 20:55

## 2019-11-01 RX ADMIN — Medication 100 MCG: at 21:19

## 2019-11-01 RX ADMIN — PIPERACILLIN AND TAZOBACTAM 3.38 G: 3; .375 INJECTION, POWDER, LYOPHILIZED, FOR SOLUTION INTRAVENOUS at 14:13

## 2019-11-01 RX ADMIN — ROCURONIUM BROMIDE 10 MG: 10 INJECTION, SOLUTION INTRAVENOUS at 21:00

## 2019-11-01 RX ADMIN — CARVEDILOL 25 MG: 25 TABLET, FILM COATED ORAL at 09:10

## 2019-11-01 RX ADMIN — SODIUM CHLORIDE, SODIUM LACTATE, POTASSIUM CHLORIDE, AND CALCIUM CHLORIDE: 600; 310; 30; 20 INJECTION, SOLUTION INTRAVENOUS at 22:32

## 2019-11-01 RX ADMIN — Medication 10 MG: at 21:20

## 2019-11-01 RX ADMIN — DIGOXIN 0.12 MG: 125 TABLET ORAL at 09:09

## 2019-11-01 RX ADMIN — Medication 15 MG: at 20:28

## 2019-11-01 RX ADMIN — SACUBITRIL AND VALSARTAN 1 TABLET: 49; 51 TABLET, FILM COATED ORAL at 09:10

## 2019-11-01 RX ADMIN — HEPARIN SODIUM 17 UNITS/KG/HR: 10000 INJECTION, SOLUTION INTRAVENOUS at 03:43

## 2019-11-01 RX ADMIN — HYDROMORPHONE HYDROCHLORIDE 0.5 MG: 2 INJECTION INTRAMUSCULAR; INTRAVENOUS; SUBCUTANEOUS at 22:55

## 2019-11-01 RX ADMIN — LIDOCAINE HYDROCHLORIDE 60 MG: 20 INJECTION, SOLUTION EPIDURAL; INFILTRATION; INTRACAUDAL; PERINEURAL at 20:10

## 2019-11-01 RX ADMIN — PROPOFOL 100 MG: 10 INJECTION, EMULSION INTRAVENOUS at 20:10

## 2019-11-01 RX ADMIN — Medication 100 MCG: at 20:23

## 2019-11-01 RX ADMIN — FENTANYL CITRATE 50 MCG: 50 INJECTION INTRAMUSCULAR; INTRAVENOUS at 22:16

## 2019-11-01 RX ADMIN — HYDROMORPHONE HYDROCHLORIDE 0.5 MG: 2 INJECTION INTRAMUSCULAR; INTRAVENOUS; SUBCUTANEOUS at 23:15

## 2019-11-01 RX ADMIN — FENTANYL CITRATE 50 MCG: 50 INJECTION INTRAMUSCULAR; INTRAVENOUS at 20:10

## 2019-11-01 RX ADMIN — Medication 0.4 MG: at 22:15

## 2019-11-01 RX ADMIN — TIOTROPIUM BROMIDE 18 MCG: 18 CAPSULE ORAL; RESPIRATORY (INHALATION) at 08:43

## 2019-11-01 RX ADMIN — FENTANYL CITRATE 50 MCG: 50 INJECTION INTRAMUSCULAR; INTRAVENOUS at 22:05

## 2019-11-01 RX ADMIN — Medication 100 MCG: at 22:30

## 2019-11-01 RX ADMIN — SODIUM CHLORIDE, SODIUM LACTATE, POTASSIUM CHLORIDE, AND CALCIUM CHLORIDE 50 ML/HR: 600; 310; 30; 20 INJECTION, SOLUTION INTRAVENOUS at 06:05

## 2019-11-01 RX ADMIN — ROCURONIUM BROMIDE 25 MG: 10 INJECTION, SOLUTION INTRAVENOUS at 20:17

## 2019-11-01 RX ADMIN — DULOXETINE HYDROCHLORIDE 60 MG: 60 CAPSULE, DELAYED RELEASE ORAL at 09:10

## 2019-11-01 RX ADMIN — Medication 80 MG: at 20:11

## 2019-11-01 RX ADMIN — HYDROMORPHONE HYDROCHLORIDE 0.5 MG: 2 INJECTION INTRAMUSCULAR; INTRAVENOUS; SUBCUTANEOUS at 23:35

## 2019-11-01 RX ADMIN — FENTANYL CITRATE 50 MCG: 50 INJECTION INTRAMUSCULAR; INTRAVENOUS at 20:15

## 2019-11-01 RX ADMIN — DEXAMETHASONE SODIUM PHOSPHATE 4 MG: 4 INJECTION, SOLUTION INTRAMUSCULAR; INTRAVENOUS at 20:22

## 2019-11-01 RX ADMIN — Medication 3 MG: at 22:15

## 2019-11-01 RX ADMIN — Medication 100 MCG: at 21:48

## 2019-11-01 RX ADMIN — FAMOTIDINE 20 MG: 20 TABLET, FILM COATED ORAL at 09:10

## 2019-11-01 RX ADMIN — HYDROMORPHONE HYDROCHLORIDE 0.5 MG: 2 INJECTION INTRAMUSCULAR; INTRAVENOUS; SUBCUTANEOUS at 23:05

## 2019-11-01 RX ADMIN — ONDANSETRON 4 MG: 2 INJECTION INTRAMUSCULAR; INTRAVENOUS at 21:33

## 2019-11-01 RX ADMIN — SPIRONOLACTONE 50 MG: 25 TABLET ORAL at 09:10

## 2019-11-01 NOTE — PROGRESS NOTES
conducted an initial consultation and Spiritual Assessment for Kayode Bass, who is a 78 y.o.,female. Patients Primary Language is: [place]. According to the patients EMR Faith Affiliation is: Roxane. The reason the Patient came to the hospital is:   Patient Active Problem List    Diagnosis Date Noted    Cholecystitis, unspecified 10/30/2019    Ventricular fibrillation ([address] 10/09/2019    Cholecystitis 09/18/2019    UTI (urinary tract infection) 08/27/2019    CAD (coronary artery disease) 08/27/2019    Elevated troponin 08/27/2019    Weakness generalized 08/27/2019    Chronic anticoagulation 08/27/2019    History of pulmonary embolism 08/27/2019    Hypokalemia 08/27/2019    Hypomagnesemia 08/27/2019    Acute encephalopathy 08/27/2019    Dilated cardiomyopathy ([address] 05/03/2019    Breast cancer ([address] 05/03/2019    Pulmonary embolism (HCC) 05/03/2019    Chronic bronchitis ([address] 05/03/2019    Hypoxia 05/03/2019    Lung metastases ([address] 05/03/2019    Seasonal allergic rhinitis due to pollen 04/12/2019    Chronic asthmatic bronchitis ([address] 02/07/2019    Malignant neoplasm metastatic to lung ([address] 02/07/2019    Acute bronchitis 10/15/2018    Abnormal PFT 07/10/2017    Mitral valve disease 01/15/2014    Poisoning by other and unspecified agents primarily affecting the cardiovascular system(972.9)     Syncope and collapse     Systolic CHF, chronic ([address]     Other primary cardiomyopathies     Shortness of breath     Nonspecific abnormal electrocardiogram (ECG) (EKG)     Automatic implantable cardioverter-defibrillator in situ     Adenocarcinoma of breast; locally advanced invasive ductal adenocarcinoma of left breast     Cancer ([address] 10/27/2011    Hypertension     MVP (mitral valve prolapse)     Nonischemic cardiomyopathy ([address]         The  provided the following Interventions:  Initiated a relationship of care and support.    Explored issues of ellen, belief, spirituality and Baptism/ritual needs while hospitalized. Listened empathically. Provided chaplaincy education. Provided information about Spiritual Care Services. Offered prayer and assurance of continued prayers on patient's behalf. Chart reviewed. The following outcomes where achieved:  Patient shared limited information about both their medical narrative and spiritual journey/beliefs. Patient processed feeling about current hospitalization. Patient expressed gratitude for 's visit. Assessment:  Patient does not have any Baptism/cultural needs that will affect patients preferences in health care. There are no spiritual or Baptism issues which require intervention at this time. Plan:  Chaplains will continue to follow and will provide pastoral care on an as needed/requested basis.  recommends bedside caregivers page  on duty if patient shows signs of acute spiritual or emotional distress.     [address] [facility]   [phone]

## 2019-11-01 NOTE — PROGRESS NOTES
0730 Received care of patient from off going [place] [id number]. Patient alert and oriented, resting quietly in bed. Percutaneous drain to R abdomen intact, draining yellow drainage. Patient without complaints at this time. [facility]    1200 Pre-op report given    1339 Patient off unit to OR, pre-op Zosyn sent with patient. 1926 Verbal shift change report given to Kassandra Erickson (oncoming nurse) by Corey Armenta RN (offgoing nurse). Report included the following information SBAR, Procedure Summary, Intake/Output, MAR and Recent Results.

## 2019-11-01 NOTE — ROUTINE PROCESS
Bedside and Verbal shift change report given to Rivera Sandoval RN (oncoming nurse) by Raphael Grove RN (offgoing nurse). Report included the following information SBAR, Kardex, MAR and Recent Results. SITUATION: 
Code Status: Full Code Reason for Admission: Cholecystitis, unspecified [K81.9] Hospital day: 2 Problem List:  
   
Hospital Problems  Date Reviewed: 10/25/2019 Codes Class Noted POA Cholecystitis, unspecified ICD-10-CM: K81.9 ICD-9-CM: 575.10  10/30/2019 Unknown Systolic CHF, chronic (HCC) ICD-10-CM: I50.22 ICD-9-CM: 428.22, 428.0  Unknown Unknown Overview Signed 3/6/2013  8:01 PM by Evon Venegas Remains symptomatic, nyha class 3 ef improving. BACKGROUND: 
 Past Medical History:  
Past Medical History:  
Diagnosis Date  Abnormal nuclear cardiac imaging test 06/11/2010 Lg fixed anterior, septal, apical, inferoseptal defect sugg RCA & LAD disease or cardiomyopathy. EF 20%. Global hykn. Nondiagnostic EKG.  Acute bronchitis 10/15/2018 Persistent cough and wheezing in spite of prednisone and antibiotic. 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   
 Asthma  Automatic implantable cardiac defibrillator in situ  Automatic implantable cardiac defibrillator in situ  Breast cancer ([facility] [address]  Cancer ([facility] [address] breast cancer left  Chemotherapy convalescence or palliative care 2012  Chronic combined systolic and diastolic heart failure ([address] Remains symptomatic, nyha class 3 ef improving.  Congestive heart failure, unspecified   
 chronic class ll  Echocardiogram 09/27/2010 EF 30%. Global hykn. Mild MR. Mild TR.  GERD (gastroesophageal reflux disease)  Hyperlipidemia  Hypertension  Mitral valve disorders(424.0) 1/15/2014  
 mild to moderate mr  Mitral valve disorders(424.0) 1/15/2014  
 mild to moderate mr  MVP (mitral valve prolapse)  Nonischemic cardiomyopathy ([facility] [address] EF 20-30% despite medical therapy  Nonspecific abnormal electrocardiogram (ECG) (EKG)  Osteopenia  Other primary cardiomyopathies  Pacemaker 10/27/10  
 s/p implantation, without complication  Poisoning by other and unspecified agents primarily affecting the cardiovascular system(972.9) Ef slightly better to 45%, STABLE back on chemo.  Radiation therapy  Radiation therapy complication 7271  S/P cardiac catheterization 07/08/10 Patent coronary arteries. LVEDP 25.  EF 25%. Global hykn. Moderate MR.  RA 10.  RV 40/10. PA 40/20.  20.  CO/CI 4.5/2.45 (Larry).  Shortness of breath  Syncope and collapse  Thyroid disease   
 goiter Patient taking anticoagulants yes Patient has a defibrillator: no  
 History of shots YES for example, flu, pneumonia, tetanus Isolation History NO for example, MRSA, CDiff ASSESSMENT: 
Changes in Assessment Throughout Shift: NONE Significant Changes in 24 hours (for example, RR/code, fall) Patient has Central Line: yes Reasons if yes: Irritant/vesicant Patient has Gao Cath: no  
Mobility Issues PT 
IV Patency OR Checklist 
Pending Tests Last Vitals: 
Vitals w/ MEWS Score (last day) Date/Time MEWS Score Pulse Resp Temp BP Level of Consciousness SpO2  
 11/01/19 0341  1  79  18  97.2 °F (36.2 °C)  109/62  Alert  97 % 10/31/19 2130              96 % 10/31/19 2026  1  79  18  98.2 °F (36.8 °C)  113/60  Alert  95 % 10/31/19 1527  1  74  18  98.7 °F (37.1 °C)  116/61  Alert  94 % 10/31/19 1129  1  86  20  97.9 °F (36.6 °C)  109/58  Alert  96 % 10/31/19 0908  1  90  20  98.7 °F (37.1 °C)  112/55  Alert  95 % 10/31/19 0331  1  79  17  97.6 °F (36.4 °C)  121/63  Alert  96 % 10/31/19 0016  2  76  17  98.3 °F (36.8 °C)  95/57  Alert  97 % PAIN Pain Assessment Pain Intensity 1: 0 (11/01/19 9697) Patient Stated Pain Goal: 0 Intervention effective: N/A Time of last intervention: N/A Reassessment Completed: yes Other actions taken for pain: Distraction Last 3 Weights: 
Last 3 Recorded Weights in this Encounter 10/30/19 1258 10/31/19 0331 Weight: 74.8 kg (165 lb) 74.2 kg (163 lb 8 oz) Weight change: INTAKE/OUPUT Current Shift: No intake/output data recorded. Last three shifts: 10/30 1901 - 11/01 0700 In: 2212 [P.O.:1990; I.V.:222] Out: 2511 [Urine:3075; Drains:420] RECOMMENDATIONS AND DISCHARGE PLANNING Patient needs and requests: none Pending tests/procedures: labs Discharge plan for patient: Home Discharge planning Needs or Barriers: none Estimated Discharge Date: 11/04/2019 Posted on Whiteboard in [facility] Room: yes \"HEALS\" SAFETY CHECK A safety check occurred in the patient's room between off going nurse and oncoming nurse listed above. The safety check included the below items: 
 
H High Alert Medications Verify all high alert medication drips (heparin, PCA, etc.) E Equipment Suction is set up for ALL patients (with jasmine) Red plugs utilized for all equipment (IV pumps, etc.) WOWs wiped down at end of shift. Room stocked with oxygen, suction, and other unit-specific supplies A Alarms Bed alarm is set for fall risk patients Ensure chair alarm is in place and activated if patient is up in a chair L Lines Check IV for any infiltration Gao bag is empty if patient has a Gao Tubing and IV bags are labeled [place] Safety Room is clean, patient is clean, and equipment is clean. Hallways are clear from equipment besides carts. Fall bracelet on for fall risk patients Ensure room is clear and free of clutter Suction is set up for ALL patients (with jasmine) Hallways are clear from equipment besides carts. Isolation precautions followed, supplies available outside room, sign posted Tereso Guthrie RN

## 2019-11-01 NOTE — PROGRESS NOTES
Progress Note  Pulmonary, Critical Care, and Sleep Medicine    Name: Chaparrita Steel MRN: [id number]   : 1950 Hospital: [facility]   Date: 2019        IMPRESSION:   · Acute cholecystitis S/p sepsis and septic shock, S/p percutaneous drainage. Admitted for planned cholecystectomy  · Asthma well controlled and not in exacerbation  · Metastatic breast cancer  · DVT/PE on anticoagulation  · Pulmonary HTN Grp 2/3   · Non ischemic cardiomyopathy EF 36%  · PFT with combined restriction and obstruction      PLAN:   · Pt awaiting Lap kd for later today  · Heparin gtt held. Pt to resume Eliquis once ok with surgeon  · Bronchodilators prn  · Continue Symbicort and Incruse  · Bronchial hygiene and incentive spirometry  · Early mobilization     Subjective/Interval History:   I have reviewed the flowsheet and previous days notes. Reviewed interval history. Discussed management with nursing staff. 19  Pt denies chest pain, SOB or wheezing. No new respiratory symptoms  No abdominal pain, nausea or vomiting    ROS:Pertinent items are noted in HPI. Orders reviewed including medications. Changes made if indicated. Telemetry monitor reviewed at the bedside. Objective:   Vital Signs:    Visit Vitals  /58 (BP 1 Location: Right arm, BP Patient Position: At rest)   Pulse 68   Temp 98.5 °F (36.9 °C)   Resp 16   Wt 70.8 kg (156 lb)   SpO2 95%   BMI 25.96 kg/m²       O2 Device: Room air       Temp (24hrs), Av.9 °F (36.6 °C), Min:97 °F (36.1 °C), Max:98.7 °F (37.1 °C)       Intake/Output:   Last shift:      No intake/output data recorded. Last 3 shifts: 10/30 1901 -  0700  In: 2543 [P.O.:1990;  I.V.:553]  Out: 0079 [Urine:3075; Drains:420]    Intake/Output Summary (Last 24 hours) at 2019 1407  Last data filed at 2019 1030  Gross per 24 hour   Intake 1110.96 ml   Output 1595 ml   Net -484.04 ml        Physical Exam:    General:  Alert, cooperative, in no distress, appears stated age. Head:  Normocephalic, without obvious abnormality, atraumatic. Eyes:  ANicteric, PERRL,   Nose: Nares normal. Mucosa normal. No drainage or sinus tenderness. Throat: Lips, mucosa, and tongue normal.  NO Thrush   Neck: Supple, symmetrical, trachea midline, no adenopathy, thyroid: no enlargment/tenderness/nodules    Back:   Symmetric    Lungs:   Bilateral auscultation no rales or wheezes   Chest wall:  No tenderness or deformity. NO intercostal retractions   Heart:  Regular rate and rhythm, S1, S2 normal, no murmur, click, rub or gallop. Abdomen:   Soft, non-tender , Bowel sounds normal. No masses,  No organomegaly. NO paradoxical motion   Extremities: normal, atraumatic, no cyanosis or edema. Pulses: 2+ and symmetric all extremities. Skin: Skin color, texture, turgor normal. No rashes or lesions. NO clubbing   Lymph nodes: Cervical, supraclavicular nodes normal.   Neurologic: Grossly nonfocal      :        Devices:             Drips:    DATA:  Labs:  Recent Labs     11/01/19  0627 10/31/19  0411 10/30/19  1311   WBC 6.6 6.4 7.0   HGB 10.4* 10.4* 11.0*   HCT 31.5* 32.1* 33.8*    258 258     Recent Labs     11/01/19  0627 10/31/19  0411 10/30/19  1555    139 141  140   K 3.8 3.8 3.9  3.7    110 110  110   CO2 25 21 25  24   * 100* 116*  114*   BUN 14 14 16  15   CREA 1.02 0.88 0.90  0.92   CA 8.5 8.7 8.8  8.9   ALB  --   --  3.0*   SGOT  --   --  20   ALT  --   --  31     No results for input(s): PH, PCO2, PO2, HCO3, FIO2 in the last 72 hours. Imaging:  []        I have personally reviewed the patients radiographs and reports  []         []        No change from prior, tubes and lines in adequate position  []        Improved   []        Worsening  High complexity decision making was performed during this consultation and evaluation.      Amarjit Moore MD

## 2019-11-01 NOTE — PROGRESS NOTES
GEN.  SURG    Seen patient, Son and  by bedside  No changes on Hx/PE   No further questions raised by patient/ family   For LUL CHOLECYSTECTOMY  POSS OPEN JUDITH

## 2019-11-01 NOTE — PROGRESS NOTES
Problem: Falls - Risk of  Goal: *Absence of Falls  Description  Document Colton rPuett Fall Risk and appropriate interventions in the flowsheet.   Outcome: Progressing Towards Goal  Note:   Fall Risk Interventions:            Medication Interventions: Patient to call before getting OOB, Teach patient to arise slowly    Elimination Interventions: Bed/chair exit alarm, Call light in reach, Elevated toilet seat, Toilet paper/wipes in reach, Toileting schedule/hourly rounds              Problem: Patient Education: Go to Patient Education Activity  Goal: Patient/Family Education  Outcome: Progressing Towards Goal     Problem: Pain  Goal: *Control of Pain  Outcome: Progressing Towards Goal

## 2019-11-01 NOTE — PROGRESS NOTES
[facility]  Progress Note    Patient: Ankita Santos Age: 78 y.o. : 1950 MR#: [id number] SSN: [id number]  Date: 2019     Subjective:   Patient examined independently by me. No overnight events. Denies headache, chills, chest pain, SOB, abdominal pain, nausea/vomiting. Is NPO for OR later today. Assessment/Plan:   1. Cholecystitis s/p percutaneous drain placement on 2019  2. Recent PE and right peroneal vein DVT per CTA chest and duplex on 2019, on eliquis 2.5 mg BID at home  3. Recurrent breast cancer with lung metastasis, s/p left mastectomy, s/p chemo and radiation, followed by Dr. Leilani Kayser  4. Chronic systolic CHF stage 3. EF 36-40%. No exacerbation    - NPO for cholecystectomy today. Holding heparin drip. Will check with surgeon after procedure about re-starting home eliquis  - continue coreg, digoxin, entresto, aldactone, and lasix (3x weekly)  - Pulm recs appreciated. Bronchodilators prn, symbicort and incruse.     Additional Notes:      Case discussed with:  [x]Patient  []Family  []Nursing  []Case Management  DVT Prophylaxis:  []Lovenox  []Hep SQ  []SCDs  []Coumadin   [x]On Heparin gtt    Objective:   VS:   Visit Vitals  /58 (BP 1 Location: Right arm, BP Patient Position: At rest)   Pulse 68   Temp 98.5 °F (36.9 °C)   Resp 16   Wt 70.8 kg (156 lb)   SpO2 95%   BMI 25.96 kg/m²      Tmax/24hrs: Temp (24hrs), Av.9 °F (36.6 °C), Min:97 °F (36.1 °C), Max:98.7 °F (37.1 °C)      Intake/Output Summary (Last 24 hours) at 2019 1332  Last data filed at 2019 1030  Gross per 24 hour   Intake 1110.96 ml   Output 1595 ml   Net -484.04 ml       General:  Alert, NAD  Cardiovascular:  RRR  Pulmonary:  LSC throughout; respiratory effort WNL  GI:  +BS in all four quadrants, soft, non-tender  Extremities:  No edema; 2+ dorsalis pedis pulses bilaterally  Neuro: a and ox3        Labs:    Recent Results (from the past 24 hour(s))   CBC WITH AUTOMATED DIFF    Collection Time: 11/01/19  6:27 AM   Result Value Ref Range    WBC 6.6 4.6 - 13.2 K/uL    RBC 3.31 (L) 4.20 - 5.30 M/uL    HGB 10.4 (L) 12.0 - 16.0 g/dL    HCT 31.5 (L) 35.0 - 45.0 %    MCV 95.2 74.0 - 97.0 FL    MCH 31.4 24.0 - 34.0 PG    MCHC 33.0 31.0 - 37.0 g/dL    RDW 14.5 11.6 - 14.5 %    PLATELET 050 756 - 321 K/uL    MPV 10.5 9.2 - 11.8 FL    NEUTROPHILS 60 40 - 73 %    LYMPHOCYTES 20 (L) 21 - 52 %    MONOCYTES 9 3 - 10 %    EOSINOPHILS 10 (H) 0 - 5 %    BASOPHILS 1 0 - 2 %    ABS. NEUTROPHILS 4.0 1.8 - 8.0 K/UL    ABS. LYMPHOCYTES 1.3 0.9 - 3.6 K/UL    ABS. MONOCYTES 0.6 0.05 - 1.2 K/UL    ABS. EOSINOPHILS 0.6 (H) 0.0 - 0.4 K/UL    ABS.  BASOPHILS 0.0 0.0 - 0.1 K/UL    DF AUTOMATED     METABOLIC PANEL, BASIC    Collection Time: 11/01/19  6:27 AM   Result Value Ref Range    Sodium 141 136 - 145 mmol/L    Potassium 3.8 3.5 - 5.5 mmol/L    Chloride 109 100 - 111 mmol/L    CO2 25 21 - 32 mmol/L    Anion gap 7 3.0 - 18 mmol/L    Glucose 114 (H) 74 - 99 mg/dL    BUN 14 7.0 - 18 MG/DL    Creatinine 1.02 0.6 - 1.3 MG/DL    BUN/Creatinine ratio 14 12 - 20      GFR est AA >60 >60 ml/min/1.73m2    GFR est non-AA 54 (L) >60 ml/min/1.73m2    Calcium 8.5 8.5 - 10.1 MG/DL   PTT    Collection Time: 11/01/19  6:27 AM   Result Value Ref Range    aPTT 81.7 (H) 23.0 - 36.4 SEC       Signed By: CINTHIA Tidwell     November 1, 2019

## 2019-11-01 NOTE — PROGRESS NOTES
Spoke to Dr. Soren Painting, to hold anticoagulation for 24 hours post-op. Thus after procedure today, NO resumption of heparin drip NO eliquis.

## 2019-11-01 NOTE — PROGRESS NOTES
Discharge planning    Reviewed chart. Surgery today. CM will continue to monitor for transitional needs.     WU AnthonyN, RN  Pager # [phone]  Care Manager

## 2019-11-02 LAB
ANION GAP SERPL CALC-SCNC: 8 MMOL/L (ref 3–18)
APTT PPP: 27 SEC (ref 23–36.4)
BASOPHILS # BLD: 0 K/UL (ref 0–0.1)
BASOPHILS NFR BLD: 0 % (ref 0–2)
BUN SERPL-MCNC: 14 MG/DL (ref 7–18)
BUN/CREAT SERPL: 12 (ref 12–20)
CALCIUM SERPL-MCNC: 8.3 MG/DL (ref 8.5–10.1)
CHLORIDE SERPL-SCNC: 109 MMOL/L (ref 100–111)
CO2 SERPL-SCNC: 25 MMOL/L (ref 21–32)
CREAT SERPL-MCNC: 1.2 MG/DL (ref 0.6–1.3)
DIFFERENTIAL METHOD BLD: ABNORMAL
EOSINOPHIL # BLD: 0 K/UL (ref 0–0.4)
EOSINOPHIL NFR BLD: 0 % (ref 0–5)
ERYTHROCYTE [DISTWIDTH] IN BLOOD BY AUTOMATED COUNT: 14.4 % (ref 11.6–14.5)
GLUCOSE SERPL-MCNC: 151 MG/DL (ref 74–99)
HCT VFR BLD AUTO: 30.6 % (ref 35–45)
HGB BLD-MCNC: 9.9 G/DL (ref 12–16)
LYMPHOCYTES # BLD: 0.8 K/UL (ref 0.9–3.6)
LYMPHOCYTES NFR BLD: 5 % (ref 21–52)
MCH RBC QN AUTO: 31.3 PG (ref 24–34)
MCHC RBC AUTO-ENTMCNC: 32.4 G/DL (ref 31–37)
MCV RBC AUTO: 96.8 FL (ref 74–97)
MONOCYTES # BLD: 1.2 K/UL (ref 0.05–1.2)
MONOCYTES NFR BLD: 7 % (ref 3–10)
NEUTS SEG # BLD: 14 K/UL (ref 1.8–8)
NEUTS SEG NFR BLD: 88 % (ref 40–73)
PLATELET # BLD AUTO: 240 K/UL (ref 135–420)
PMV BLD AUTO: 10.7 FL (ref 9.2–11.8)
POTASSIUM SERPL-SCNC: 4.3 MMOL/L (ref 3.5–5.5)
RBC # BLD AUTO: 3.16 M/UL (ref 4.2–5.3)
SODIUM SERPL-SCNC: 142 MMOL/L (ref 136–145)
WBC # BLD AUTO: 16 K/UL (ref 4.6–13.2)

## 2019-11-02 PROCEDURE — 94640 AIRWAY INHALATION TREATMENT: CPT

## 2019-11-02 PROCEDURE — 74011250636 HC RX REV CODE- 250/636: Performed by: SURGERY

## 2019-11-02 PROCEDURE — 74011250637 HC RX REV CODE- 250/637: Performed by: INTERNAL MEDICINE

## 2019-11-02 PROCEDURE — 80048 BASIC METABOLIC PNL TOTAL CA: CPT

## 2019-11-02 PROCEDURE — 85730 THROMBOPLASTIN TIME PARTIAL: CPT

## 2019-11-02 PROCEDURE — 74011250637 HC RX REV CODE- 250/637: Performed by: NURSE PRACTITIONER

## 2019-11-02 PROCEDURE — 74011250637 HC RX REV CODE- 250/637: Performed by: SURGERY

## 2019-11-02 PROCEDURE — 85025 COMPLETE CBC W/AUTO DIFF WBC: CPT

## 2019-11-02 PROCEDURE — 65270000029 HC RM PRIVATE

## 2019-11-02 PROCEDURE — 74011000250 HC RX REV CODE- 250

## 2019-11-02 PROCEDURE — 74011250637 HC RX REV CODE- 250/637: Performed by: HOSPITALIST

## 2019-11-02 PROCEDURE — 74011000258 HC RX REV CODE- 258: Performed by: SURGERY

## 2019-11-02 RX ORDER — OXYCODONE AND ACETAMINOPHEN 5; 325 MG/1; MG/1
1 TABLET ORAL
Status: DISCONTINUED | OUTPATIENT
Start: 2019-11-02 | End: 2019-11-03 | Stop reason: HOSPADM

## 2019-11-02 RX ADMIN — KETOROLAC TROMETHAMINE 15 MG: 15 INJECTION, SOLUTION INTRAMUSCULAR; INTRAVENOUS at 11:52

## 2019-11-02 RX ADMIN — HYDROMORPHONE HYDROCHLORIDE 0.5 MG: 2 INJECTION, SOLUTION INTRAMUSCULAR; INTRAVENOUS; SUBCUTANEOUS at 14:08

## 2019-11-02 RX ADMIN — KETOROLAC TROMETHAMINE 15 MG: 15 INJECTION, SOLUTION INTRAMUSCULAR; INTRAVENOUS at 06:08

## 2019-11-02 RX ADMIN — FAMOTIDINE 20 MG: 20 TABLET, FILM COATED ORAL at 17:38

## 2019-11-02 RX ADMIN — SACUBITRIL AND VALSARTAN 1 TABLET: 49; 51 TABLET, FILM COATED ORAL at 20:42

## 2019-11-02 RX ADMIN — HYDROMORPHONE HYDROCHLORIDE 0.5 MG: 2 INJECTION, SOLUTION INTRAMUSCULAR; INTRAVENOUS; SUBCUTANEOUS at 08:33

## 2019-11-02 RX ADMIN — BUDESONIDE AND FORMOTEROL FUMARATE DIHYDRATE 2 PUFF: 160; 4.5 AEROSOL RESPIRATORY (INHALATION) at 10:37

## 2019-11-02 RX ADMIN — TIOTROPIUM BROMIDE 18 MCG: 18 CAPSULE ORAL; RESPIRATORY (INHALATION) at 10:37

## 2019-11-02 RX ADMIN — KETOROLAC TROMETHAMINE 15 MG: 15 INJECTION, SOLUTION INTRAMUSCULAR; INTRAVENOUS at 23:14

## 2019-11-02 RX ADMIN — OXYCODONE HYDROCHLORIDE AND ACETAMINOPHEN 1 TABLET: 5; 325 TABLET ORAL at 18:55

## 2019-11-02 RX ADMIN — DIGOXIN 0.12 MG: 125 TABLET ORAL at 08:23

## 2019-11-02 RX ADMIN — SACUBITRIL AND VALSARTAN 1 TABLET: 49; 51 TABLET, FILM COATED ORAL at 08:22

## 2019-11-02 RX ADMIN — KETOROLAC TROMETHAMINE 15 MG: 15 INJECTION, SOLUTION INTRAMUSCULAR; INTRAVENOUS at 17:37

## 2019-11-02 RX ADMIN — PIPERACILLIN AND TAZOBACTAM 3.38 G: 3; .375 INJECTION, POWDER, LYOPHILIZED, FOR SOLUTION INTRAVENOUS at 00:06

## 2019-11-02 RX ADMIN — DEXTROSE MONOHYDRATE AND SODIUM CHLORIDE 100 ML/HR: 5; .45 INJECTION, SOLUTION INTRAVENOUS at 11:33

## 2019-11-02 RX ADMIN — DEXTROSE MONOHYDRATE AND SODIUM CHLORIDE 100 ML/HR: 5; .45 INJECTION, SOLUTION INTRAVENOUS at 00:56

## 2019-11-02 RX ADMIN — FAMOTIDINE 20 MG: 20 TABLET, FILM COATED ORAL at 08:23

## 2019-11-02 RX ADMIN — APIXABAN 2.5 MG: 2.5 TABLET, FILM COATED ORAL at 18:35

## 2019-11-02 RX ADMIN — CARVEDILOL 25 MG: 25 TABLET, FILM COATED ORAL at 08:00

## 2019-11-02 RX ADMIN — DULOXETINE HYDROCHLORIDE 60 MG: 60 CAPSULE, DELAYED RELEASE ORAL at 08:23

## 2019-11-02 RX ADMIN — SPIRONOLACTONE 50 MG: 25 TABLET ORAL at 08:23

## 2019-11-02 RX ADMIN — CARVEDILOL 25 MG: 25 TABLET, FILM COATED ORAL at 17:38

## 2019-11-02 RX ADMIN — DEXTROSE MONOHYDRATE AND SODIUM CHLORIDE 100 ML/HR: 5; .45 INJECTION, SOLUTION INTRAVENOUS at 22:11

## 2019-11-02 NOTE — PROGRESS NOTES
Spoke to surgeon Curtis Lyons about patients ambulatory status and diet orders. MD stated for patient to be ambulatory with assistance. Also to change diet as tolerated. Changed patient to GI lite diet, No N/V with previous diet.  MD states he will see patient later today

## 2019-11-02 NOTE — INTERDISCIPLINARY ROUNDS
Interdisciplinary Round Note Patient Information: 
 Russell Aden 
 470/01 Reason for Admission: Cholecystitis, unspecified [K81.9] Attending Provider:   Estela Moralez MD 
Primary Care Physician: 
     Jimmy Perry MD 
     [phone] Estimated discharge date:  11/3/19 Hospital day: 3 
[de-identified] 3d 4h 
RRAT Score: High Risk 28 Total Score 3 Has Seen PCP in Last 6 Months (Yes=3, No=0) 2 . Living with Significant Other. Assisted Living. LTAC. SNF. or  
Rehab  
 4 IP Visits Last 12 Months (1-3=4, 4=9, >4=11) 5 Pt. Coverage (Medicare=5 , Medicaid, or Self-Pay=4) 21 Charlson Comorbidity Score (Age + Comorbid Conditions) Criteria that do not apply:  
 Patient Length of Stay (>5 days = 3) paced No 
n/a Other n/a Not needed Lines, Drains, & Airways 
n/a 
 
  
IV Antibiotics:   
Current Antimicrobial Therapy (168h ago, onward) None GI Prophylaxis: GI Prophylaxis: no 
 Type: pepcid Recent Glucose Results:  
Lab Results Component Value Date/Time  (H) 11/02/2019 11:15 AM  
  
Activity Level: Activity Level: Bed Rest 
 
Needs assistance with ADLs: no 
 
 
 Goals for Today: Pain control, OOB, Recommendations:  
Discharge Disposition: Home Independent 
n/a Care Management involvement for home health follow up for:  wound care Needs for Discharge: Unknown at this time IDR Team: Nurse,MD,Case Management Recommendations from IDR team: Home independantly 11/3/19 Other Notes: n/a

## 2019-11-02 NOTE — PERIOP NOTES
TRANSFER - OUT REPORT:    Verbal report given to Phebe Severs, RN on Lamar Peña  being transferred to (unit) for routine post - op       Report consisted of patients Situation, Background, Assessment and   Recommendations(SBAR). Information from the following report(s) SBAR, Kardex, Procedure Summary, Intake/Output, MAR and Recent Results was reviewed with the receiving nurse. Lines:   Venous Access Device mediport 10/31/19 Upper chest (subclavicular area), left (Active)   Central Line Being Utilized Yes 11/1/2019 10:49 PM   Criteria for Appropriate Use Irritant/vesicant medication 11/1/2019  9:11 AM   Site Assessment Clean, dry, & intact 11/1/2019 10:49 PM   Date of Last Dressing Change 11/01/19 11/1/2019  9:11 AM   Dressing Status New 11/1/2019  6:27 AM   Dressing Type Disk with Chlorhexadine gluconate (CHG) 11/1/2019  9:11 AM   Action Taken Open ports on tubing capped 11/1/2019  9:11 AM   Positive Blood Return (Medial Site) Yes 11/1/2019  6:27 AM      Visit Vitals  /44 (BP 1 Location: Right arm)   Pulse 80   Temp 98.3 °F (36.8 °C)   Resp 10   Wt 70.8 kg (156 lb)   SpO2 97%   BMI 25.96 kg/m²     Opportunity for questions and clarification was provided.       Patient transported with:   O2 @ 3 liters  Registered Nurse

## 2019-11-02 NOTE — PROGRESS NOTES
Bedside and Verbal shift change report given to Laura (oncoming nurse) by Valentino Giraldo (offgoing nurse). Report included the following information SBAR, Kardex, MAR and Recent Results.

## 2019-11-02 NOTE — ANESTHESIA PREPROCEDURE EVALUATION
Relevant Problems   No relevant active problems       Anesthetic History   No history of anesthetic complications            Review of Systems / Medical History  Patient summary reviewed, nursing notes reviewed and pertinent labs reviewed    Pulmonary    COPD: mild        Asthma : well controlled       Neuro/Psych   Within defined limits           Cardiovascular    Hypertension: well controlled        Dysrhythmias   CAD    Exercise tolerance: >4 METS  Comments: AICD   GI/Hepatic/Renal     GERD: well controlled           Endo/Other      Hypothyroidism: well controlled       Other Findings              Physical Exam    Airway  Mallampati: III  TM Distance: < 4 cm  Neck ROM: decreased range of motion   Mouth opening: Normal     Cardiovascular  Regular rate and rhythm,  S1 and S2 normal,  no murmur, click, rub, or gallop  Rhythm: regular  Rate: normal         Dental    Dentition: Poor dentition     Pulmonary  Breath sounds clear to auscultation               Abdominal  Abdominal exam normal       Other Findings            Anesthetic Plan    ASA: 3  Anesthesia type: general          Induction: Intravenous  Anesthetic plan and risks discussed with: Patient

## 2019-11-02 NOTE — PROGRESS NOTES
Problem: Pain  Goal: *Control of Pain  Outcome: Progressing Towards Goal     Problem: Lap Mary: 2-4 hours Post-Op (Initiate SCIP measures post-procedure)  Goal: Activity/Safety  Outcome: Progressing Towards Goal  Goal: Discharge Planning  Outcome: Progressing Towards Goal

## 2019-11-02 NOTE — PROGRESS NOTES
GEN.  SURG  POD  #  1  S/P  LUL/ HYBRID  CHOLECYSTECTOMY and  Drainage    Seen and examined  Patient,   By  Bedside  Alert, awake, oriented, fairly comfortable  No N/V,  Voiding  Well, Mechelle clear  Liquids  Well(  Wants to eat)  No BM/Flatus  Yet  No  Fever.  VS  Stable  ABDOMEN: Mild distention  With  Mild  Tympanism,Dressing  Clean/  Dry,  No bleeding; Drain - serosanguinous fluid,  Trending down  EXT:  Neg  Homans sign  Labs: Leukocytosis reactive    Surgically  Stable    Plan:  OOB  WITH  ASSISTANCE             ADVANCE  DIET  REG             OK TO  START  IV  HEPARIN  TONIGHT ( 6PM)              PERCOCET  5MG/325MG  PO  FOR  MILD  PAIN              RE CHECK  LABS IN AM

## 2019-11-02 NOTE — ANESTHESIA POSTPROCEDURE EVALUATION
Procedure(s):  SINGLE INCISION LAPAROSCOPIC CHOLECYSTECTOMY POSSIBLE OPEN. general    Anesthesia Post Evaluation      Multimodal analgesia: multimodal analgesia used between 6 hours prior to anesthesia start to PACU discharge  Patient location during evaluation: PACU  Patient participation: complete - patient participated  Level of consciousness: awake and alert  Pain score: 3  Airway patency: patent  Anesthetic complications: no  Cardiovascular status: acceptable  Respiratory status: acceptable  Hydration status: acceptable  Post anesthesia nausea and vomiting:  none      Vitals Value Taken Time   /43 11/1/2019 11:57 PM   Temp 36.8 °C (98.3 °F) 11/1/2019 11:52 PM   Pulse 82 11/2/2019 12:05 AM   Resp 11 11/2/2019 12:05 AM   SpO2 97 % 11/2/2019 12:05 AM   Vitals shown include unvalidated device data.

## 2019-11-02 NOTE — ROUTINE PROCESS
Bedside and Verbal shift change report given to Jaya Nicolas (oncoming nurse) by Mikey Prasad RN (offgoing nurse). Report included the following information SBAR, Kardex and MAR.

## 2019-11-02 NOTE — PROGRESS NOTES
Progress Note  Pulmonary, Critical Care, and Sleep Medicine    Name: Gilberto Hodges MRN: [id number]   : 1950 Hospital: [address] [place] [address] Dr   Date: 2019        IMPRESSION:   · Acute cholecystitis S/p sepsis and septic shock, S/p percutaneous drainage. S/p Lap kd, release of hernia with incarcerated omentum, lysis of adhesions,   · Asthma well controlled and not in exacerbation  · Metastatic breast cancer  · DVT/PE on anticoagulation  · Pulmonary HTN Grp 2/3   · Non ischemic cardiomyopathy EF 36%  · PFT with combined restriction and obstruction      PLAN:   · Pt awaiting Lap kd for later today  · Heparin gtt held. Pt to resume Eliquis once ok with surgeon  · Bronchodilators prn  · Continue Symbicort and Incruse  · Bronchial hygiene  · Encouraged and instructed on incentive spirometry  · Early mobilization     Subjective/Interval History:   I have reviewed the flowsheet and previous days notes. Reviewed interval history. Discussed management with nursing staff. 19  Pt denies chest pain, SOB or wheezing. No new respiratory symptoms  Complains of abdominal soreness relieved with Toradol    ROS:Pertinent items are noted in HPI. Orders reviewed including medications. Changes made if indicated. Telemetry monitor reviewed at the bedside.   Objective:   Vital Signs:    Visit Vitals  /55 (BP 1 Location: Right arm, BP Patient Position: At rest)   Pulse 82   Temp 98.6 °F (37 °C)   Resp 15   Wt 70.8 kg (156 lb)   SpO2 99%   BMI 25.96 kg/m²       O2 Device: Nasal cannula   O2 Flow Rate (L/min): 3 l/min   Temp (24hrs), Av.7 °F (36.5 °C), Min:96.5 °F (35.8 °C), Max:98.6 °F (37 °C)       Intake/Output:   Last shift:      701 - 1900  In: 500 [P.O.:500]  Out: -   Last 3 shifts: 10/31 1901 -  07  In: 2996 [P.O.:360; I.V.:2636]  Out: 860 [Urine:550; Drains:285]    Intake/Output Summary (Last 24 hours) at 2019 1532  Last data filed at 2019 1239  Gross per 24 hour Intake 3045 ml   Output 250 ml   Net 2795 ml        Physical Exam:    General:  Alert, cooperative, in no distress, appears stated age. Head:  Normocephalic, without obvious abnormality, atraumatic. Eyes:  ANicteric, PERRL,   Nose: Nares normal. Mucosa normal. No drainage or sinus tenderness. Throat: Lips, mucosa, and tongue normal.  NO Thrush   Neck: Supple, symmetrical, trachea midline, no adenopathy, thyroid: no enlargment/tenderness/nodules    Back:   Symmetric    Lungs:   Bilateral auscultation no rales or wheezes   Chest wall:  No tenderness or deformity. NO intercostal retractions   Heart:  Regular rate and rhythm, S1, S2 normal, no murmur, click, rub or gallop. Abdomen:   Soft, non-tender , Hybrid lap sites noted. Bowel sounds normal. No masses,  No organomegaly. NO paradoxical motion   Extremities: normal, atraumatic, no cyanosis or edema. Pulses: 2+ and symmetric all extremities. Skin: Skin color, texture, turgor normal. No rashes or lesions. NO clubbing   Lymph nodes: Cervical, supraclavicular nodes normal.   Neurologic: Grossly nonfocal      :        Devices:             Drips:    DATA:  Labs:  Recent Labs     11/02/19  1115 11/01/19  0627 10/31/19  0411   WBC 16.0* 6.6 6.4   HGB 9.9* 10.4* 10.4*   HCT 30.6* 31.5* 32.1*    260 258     Recent Labs     11/02/19  1115 11/01/19  0627 10/31/19  0411 10/30/19  1555    141 139 141  140   K 4.3 3.8 3.8 3.9  3.7    109 110 110  110   CO2 25 25 21 25  24   * 114* 100* 116*  114*   BUN 14 14 14 16  15   CREA 1.20 1.02 0.88 0.90  0.92   CA 8.3* 8.5 8.7 8.8  8.9   ALB  --   --   --  3.0*   SGOT  --   --   --  20   ALT  --   --   --  31     No results for input(s): PH, PCO2, PO2, HCO3, FIO2 in the last 72 hours.     Imaging:  []        I have personally reviewed the patients radiographs and reports  []         []        No change from prior, tubes and lines in adequate position  []        Improved   [] Worsening  High complexity decision making was performed during this consultation and evaluation.      Prema Monet MD

## 2019-11-02 NOTE — PROGRESS NOTES
Problem: Falls - Risk of  Goal: *Absence of Falls  Description  Document Donata Carrel Fall Risk and appropriate interventions in the flowsheet.   Outcome: Progressing Towards Goal  Note:   Fall Risk Interventions:  Mobility Interventions: Bed/chair exit alarm, Patient to call before getting OOB         Medication Interventions: Bed/chair exit alarm, Patient to call before getting OOB    Elimination Interventions: Bed/chair exit alarm, Call light in reach              Problem: Patient Education: Go to Patient Education Activity  Goal: Patient/Family Education  Outcome: Progressing Towards Goal     Problem: Pain  Goal: *Control of Pain  Outcome: Progressing Towards Goal     Problem: Lap Mary: 2-4 hours Post-Op (Initiate SCIP measures post-procedure)  Goal: Off Pathway (Use only if patient is Off Pathway)  Outcome: Progressing Towards Goal  Goal: Activity/Safety  Outcome: Progressing Towards Goal  Goal: Consults, if ordered  Outcome: Progressing Towards Goal  Goal: Diagnostic Test/Procedures  Outcome: Progressing Towards Goal  Goal: Nutrition/Diet  Outcome: Progressing Towards Goal  Goal: Discharge Planning  Outcome: Progressing Towards Goal  Goal: Medications  Outcome: Progressing Towards Goal  Goal: Respiratory  Outcome: Progressing Towards Goal  Goal: Treatments/Interventions/Procedures  Outcome: Progressing Towards Goal  Goal: Psychosocial  Outcome: Progressing Towards Goal  Goal: *Hemodynamically stable  Outcome: Progressing Towards Goal  Goal: *Optimal pain control at patient's stated goal  Outcome: Progressing Towards Goal  Goal: *Absence of infection signs and symptoms  Outcome: Progressing Towards Goal     Problem: Lap Mary: 4-12 hours Post-Op  Goal: Off Pathway (Use only if patient is Off Pathway)  Outcome: Progressing Towards Goal  Goal: Activity/Safety  Outcome: Progressing Towards Goal  Goal: Nutrition/Diet  Outcome: Progressing Towards Goal  Goal: Medications  Outcome: Progressing Towards Goal  Goal: Respiratory  Outcome: Progressing Towards Goal  Goal: Treatments/Interventions/Procedures  Outcome: Progressing Towards Goal  Goal: Psychosocial  Outcome: Progressing Towards Goal  Goal: *Demonstrates progressive activity  Outcome: Progressing Towards Goal  Goal: *Hemodynamically stable  Outcome: Progressing Towards Goal  Goal: *Optimal pain control at patient's stated goal  Outcome: Progressing Towards Goal  Goal: *Absence of infection signs and symptoms  Outcome: Progressing Towards Goal     Problem: Pressure Injury - Risk of  Goal: *Prevention of pressure injury  Description  Document Salvador Scale and appropriate interventions in the flowsheet. Outcome: Progressing Towards Goal  Note:   Pressure Injury Interventions:             Activity Interventions: Increase time out of bed, PT/OT evaluation    Mobility Interventions: Assess need for specialty bed, HOB 30 degrees or less    Nutrition Interventions: Document food/fluid/supplement intake                     Problem: Patient Education: Go to Patient Education Activity  Goal: Patient/Family Education  Outcome: Progressing Towards Goal     Problem: Infection - Risk of, Central Venous Catheter-Associated Bloodstream Infection  Goal: *Absence of infection signs and symptoms  Outcome: Progressing Towards Goal     Problem: Infection - Risk of, Surgical Site Infection  Goal: *Absence of surgical site infection signs and symptoms  Outcome: Progressing Towards Goal

## 2019-11-02 NOTE — PROGRESS NOTES
[facility] Hospitalist Group  Progress Note    Patient: Ayah Boucher Age: 78 y.o. : 1950 MR#: [id number] SSN: [id number]  Date: 2019     Subjective:   Patient examined - post surg - POD#1   Slightly sore     Assessment/Plan:   1. Cholecystitis s/p percutaneous drain placement on 2019  2. Recent PE and right peroneal vein DVT per CTA chest and duplex on 2019, on eliquis 2.5 mg BID at home  3. Recurrent breast cancer with lung metastasis, s/p left mastectomy, s/p chemo and radiation, followed by Dr. Suresh Valencia  4. Chronic systolic CHF stage 3. EF 36-40%. No exacerbation    - Pt underwent cholecystectomy - POD#1 - clear liquid , is tolerating it well   - continue coreg, digoxin, entresto, aldactone, and lasix (3x weekly)  - Pulm recs appreciated. Bronchodilators prn, symbicort and incruse. - Holding Heparin gtt for now - will discuss with Surg at the time of discharge regarding restarting Eliquis     Additional Notes:      Case discussed with:  [x]Patient  []Family  []Nursing  []Case Management  DVT Prophylaxis:  []Lovenox  []Hep SQ  []SCDs  []Coumadin   [x]On Heparin gtt    Objective:   VS:   Visit Vitals  /55 (BP 1 Location: Right arm, BP Patient Position: At rest)   Pulse 82   Temp 98.6 °F (37 °C)   Resp 15   Wt 70.8 kg (156 lb)   SpO2 99%   BMI 25.96 kg/m²      Tmax/24hrs: Temp (24hrs), Av.9 °F (36.6 °C), Min:96.5 °F (35.8 °C), Max:98.8 °F (37.1 °C)      Intake/Output Summary (Last 24 hours) at 2019 1158  Last data filed at 2019 0900  Gross per 24 hour   Intake 2795 ml   Output 250 ml   Net 2545 ml       General:  Alert, NAD  Cardiovascular:  RRR  Pulmonary:  LSC throughout; respiratory effort WNL  GI:  Mild tenderness s/p surg   Extremities:  No edema; 2+ dorsalis pedis pulses bilaterally  Neuro: AAOx3         Labs:    No results found for this or any previous visit (from the past 24 hour(s)).     Signed By: Rosanna You MD     2019

## 2019-11-02 NOTE — PROGRESS NOTES
Followed up with Surgeon Gi Lizarraga and  Hospitalist about starting heparin or eliquis. Conclusion was to stop heparin and restart eliquis 2.5mg BID. One dose will be given 1900, then in the AM. Family made aware.

## 2019-11-02 NOTE — OP NOTES
[address]  OPERATIVE REPORT    Name:  Mariia Mir  MR#:   [id number]  :  1950  ACCOUNT #:  [de-identified]  DATE OF SERVICE:  2019    PREOPERATIVE DIAGNOSES:  Acute cholecystitis secondary to cholelithiasis status post gallbladder drainage with sepsis. POSTOPERATIVE DIAGNOSES:  Acute cholecystitis secondary to cholelithiasis status post gallbladder drainage with sepsis. SECONDARY DIAGNOSIS:  Left breast cancer with lung metastasis, stable    PROCEDURE PERFORMED:  Single incision hybrid laparoscopic cholecystectomy. SURGEON:  Jojo Gill MD.    ASSISTANT:  Bridget Leong    ANESTHESIA:  General given by nurse anesthetist under Dr. Art Mc' supervision and Marcaine 0.25% plain local infiltration. COMPLICATIONS:  None. SPECIMENS REMOVED:  GB AND  CONTENTS. IMPLANTS:  NONE.    ESTIMATED BLOOD LOSS:  50 mL. DRAIN:  HEMODUCT DRAIN  FR  19. INSTRUMENT COUNT:  Correct. FINDINGS:  Presence of moderate to severe adhesions in the right upper quadrant from previous hepatic mass resection and adhesions of the round ligament to the anterior abdomen. Gallbladder is large with a big stone noted at the anterolateral portion of the gallbladder. Gallbladder is thick. Drain is in place into the dome of the gallbladder. Presence of a small umbilical hernia. PROCEDURE:  With the patient in supine position after induction of adequate general endotracheal anesthesia, the abdomen was then prepped with Betadine and draped in usual sterile fashion to include the drain site. Sterile drapes were placed and a time-out was called documenting the patient's name, surgeon performing the operation, procedure being done, allergies, antibiotics and everybody concurred and agreed to proceed. The navel was infiltrated with Marcaine 0.25% plain. An incision was then made in the navel area in a longitudinal manner exposing the fascia.   At this point, a small hernia was noted with incarcerated omentum. The leaflet was enlarged. The incision was enlarged to about 2.5 cm. A suture was then placed on both sides of the fascia for better traction using 3-0 Vicryl. With bowel dissection, the hernia was released, eventually divided using Harmonic scalpel, and the opening was excised through the abdominal cavity and at this point, adhesions were also noted. The plastic portion of the MediPort was inserted, followed by the body trough and the gel with four 10 mm ports were placed. Carbon dioxide insufflation was then begun maintaining slow flow at 5 and then 10 watching for any cardiovascular issues. Noted that the patient is able to sustain an increased carbon dioxide infusion. This was then sized about 14 mmHg pressure inside. The umbilical cord was visualized and did notice some adhesions along the midline and this was released using Harmonic scalpel. The 5 mm port was placed, followed by Marcaine infiltration in the epigastric region and adhesions in the medial segment of the right lower lobe was released using Harmonic scalpel. There were some adhesions surrounding the drain as well and the gallbladder dome. These adhesions were released. Then, this was followed by dissection of the gallbladder in a dome-down manner using Harmonic scalpel all the way down to the ampullary portion and the ampulla was dissected very carefully using a [place], exposing the cystic duct and the cystic artery. The cystic artery was divided close to the gallbladder using Harmonic scalpel with excellent division and coagulation. There was no bleeding noted. The cystic duct was then dissected further and this was Endolooped with 2-0 PDS. The suture was divided long, followed by division of the gallbladder past the Endoloop. The gallbladder was then extracted through the navel. Due to the size of this stone, the incision needed to be extended because the stone was very proud. Gallbladder extraction was made. This was followed by insufflation of the abdomen and the abdomen was again checked for any bleeding, which was none. The area was irrigated with vancomycin solution through the irrigation fluid. All fluid irrigant was suctioned out. A drain was inserted coming out through the epigastric region and secured with 2-0 silk. The GelPort was removed, followed by closure of the fascia with 2-0 Prolene in a figure-of-eight manner. The skin was closed with 3-0 Vicryl in navel-like formation. Bacitracin ointment was placed, followed by 4x4 and Tegaderm, and a negative pressure was used to enhance the pressure of the gauze with bacitracin into the navel. A Bioclusive dressing was placed on the drain site. The patient tolerated the procedure well. Radu Palma MD      [id number]  D:  11/01/2019 23:17  T:  11/02/2019 5:35  JOB #:  [id number]  CC:   MD Oanh Jules MD

## 2019-11-02 NOTE — PROGRESS NOTES
[facility], [address] [place] [address]  Gilberto Whitley  [phone]    PROGRESS NOTE    Rayna Montes          11/2/2019   2:40 PM    Follow-up of SOB    Impression:   1. Chronic Systolic CHF-stage C NYHA class II- III-compensated. Monitor for s/s fluid overload. Continue lasix po 3 times a week.     2. Sustained Vtach-  Episode happened in 8/2019 noted on ICD check. AICD was fired x3  She had altered mentation.  She also had hypokalemia at that time- will monitor in telemetry. So far no recurrence       3. Nonischemic cardiomyopathy (EF 26-30%)-likely from Herceptin use. recent echo 9/20/19 showed EF% 36-40%-patient on Entresto. Lasix reduced to 3 times a week. She is on aldactone 50 mg daily. Follow bmp.       4. Hx of recent Acute PE and DVT- patient was on Eliquis at home. Now on heparin drip.   5. Metastatic breast cancer now back on chemotherapy.  Lung metastasis likely cause for shortness of breath.   6. Cholecystitis- s/p status post lap cholecystectomy     Plan:  1) No change in therapy. Above mentioned treatment plan was discussed in detail with the patient and communicated with the hospital staff as well. Patient understands the plan and agrees. Interval Epic notes and radiographs independently reviewed. Labs reviewed and time marked.     Subjective: Pt Still having issues with abdominal pain postoperatively    Objective:     Visit Vitals:  Visit Vitals  /55 (BP 1 Location: Right arm, BP Patient Position: At rest)   Pulse 82   Temp 98.6 °F (37 °C)   Resp 15   Wt 70.8 kg (156 lb)   SpO2 99%   BMI 25.96 kg/m²       Intake/Output Summary (Last 24 hours)     Intake/Output Summary (Last 24 hours) at 11/2/2019 1440  Last data filed at 11/2/2019 1239  Gross per 24 hour   Intake 3045 ml   Output 250 ml   Net 2795 ml       Physical Exam:  GENERAL: alert, cooperative, no distress, appears stated age  SKIN: Normal.  LYMPHATIC: Cervical, supraclavicular, and axillary nodes normal.   HEENT: PERRLA, EOMI, Sclera clear, anicteric, Oropharynx clear, no lesions, Neck supple with midline trachea, Thyroid without masses and Trachea midline  NECK: [Exam, IVAV:[id number]}  LUNG: clear to auscultation bilaterally  HEART: regular rate and rhythm, S1, S2 normal, no murmur, click, rub or gallop  BREAST normal appearance, no masses or tenderness  ABDOMEN: abnormal findings:  distended, tenderness moderate and guarding in the periumbilical area  EXTREMITIES:  extremities normal, atraumatic, no cyanosis or edema  NEURO: normal without focal findings  mental status, speech normal, alert and oriented x iii  LINDA  reflexes normal and symmetric    Current Facility-Administration Medications  Current Facility-Administered Medications   Medication Dose Route Frequency    HYDROmorphone (DILAUDID) injection 0.5 mg  0.5 mg IntraVENous Q3H PRN    ketorolac (TORADOL) injection 15 mg  15 mg IntraVENous Q6H    dextrose 5 % - 0.45% NaCl infusion  100 mL/hr IntraVENous CONTINUOUS    furosemide (LASIX) tablet 20 mg  20 mg Oral Q MON, WED & FRI    albuterol-ipratropium (DUO-NEB) 2.5 MG-0.5 MG/3 ML  3 mL Nebulization Q6H PRN    budesonide-formoterol (SYMBICORT) 160-4.5 mcg/actuation HFA inhaler 2 Puff  2 Puff Inhalation BID RT    carvedilol (COREG) tablet 25 mg  25 mg Oral BID WITH MEALS    digoxin (LANOXIN) tablet 0.125 mg  0.125 mg Oral DAILY    DULoxetine (CYMBALTA) capsule 60 mg  60 mg Oral DAILY    spironolactone (ALDACTONE) tablet 50 mg  50 mg Oral DAILY    tiotropium (SPIRIVA) inhalation capsule 18 mcg  1 Cap Inhalation DAILY    ondansetron (ZOFRAN) injection 4 mg  4 mg IntraVENous Q4H PRN    [Held by provider] heparin 25,000 units in D5W 250 ml infusion  18-36 Units/kg/hr IntraVENous TITRATE    sacubitril-valsartan (ENTRESTO) 49-51 mg tablet 1 Tab  1 Tab Oral Q12H    famotidine (PEPCID) tablet 20 mg  20 mg Oral BID       Allergies   Allergen Reactions    Adhesive Other (comments)     blisters         Past Medical History  Past Medical History:   Diagnosis Date    Abnormal nuclear cardiac imaging test 06/11/2010    Lg fixed anterior, septal, apical, inferoseptal defect sugg RCA & LAD disease or cardiomyopathy. EF 20%. Global hykn. Nondiagnostic EKG.  Acute bronchitis 10/15/2018    Persistent cough and wheezing in spite of prednisone and antibiotic. Will refer to pulmonary    Adenocarcinoma of breast; locally advanced invasive ductal adenocarcinoma of left breast     Asthma     Automatic implantable cardiac defibrillator in situ     Automatic implantable cardiac defibrillator in situ     [facility]     Cancer ([facility]     breast cancer left    Chemotherapy convalescence or palliative care 2012    Chronic combined systolic and diastolic heart failure (HCC)     Remains symptomatic, nyha class 3 ef improving.  Congestive heart failure, unspecified     chronic class ll    Echocardiogram 09/27/2010    EF 30%. Global hykn. Mild MR. Mild TR.  GERD (gastroesophageal reflux disease)     Hyperlipidemia     Hypertension     Mitral valve disorders(424.0) 1/15/2014    mild to moderate mr     Mitral valve disorders(424.0) 1/15/2014    mild to moderate mr     MVP (mitral valve prolapse)     Nonischemic cardiomyopathy (HCC)     EF 20-30% despite medical therapy    Nonspecific abnormal electrocardiogram (ECG) (EKG)     Osteopenia     Other primary cardiomyopathies     Pacemaker 10/27/10    s/p implantation, without complication    Poisoning by other and unspecified agents primarily affecting the cardiovascular system(972.9)     Ef slightly better to 45%, STABLE back on chemo.  Radiation therapy     Radiation therapy complication 9037    S/P cardiac catheterization 07/08/10    Patent coronary arteries. LVEDP 25.  EF 25%. Global hykn. Moderate MR.  RA 10.  RV 40/10. PA 40/20.  20.  CO/CI 4.5/2.45 (Larry).     Shortness of breath     Syncope and collapse     Thyroid disease     goiter     Social History  Social History     Socioeconomic History    Marital status:      Spouse name: Not on file    Number of children: Not on file    Years of education: Not on file    Highest education level: Not on file   Occupational History    Not on file   Social Needs    Financial resource strain: Not on file    Food insecurity:     Worry: Not on file     Inability: Not on file    Transportation needs:     Medical: Not on file     Non-medical: Not on file   Tobacco Use    Smoking status: Never Smoker    Smokeless tobacco: Never Used   Substance and Sexual Activity    Alcohol use: No    Drug use: No    Sexual activity: Not on file   Lifestyle    Physical activity:     Days per week: Not on file     Minutes per session: Not on file    Stress: Not on file   Relationships    Social connections:     Talks on phone: Not on file     Gets together: Not on file     Attends Alevism service: Not on file     Active member of club or organization: Not on file     Attends meetings of clubs or organizations: Not on file     Relationship status: Not on file    Intimate partner violence:     Fear of current or ex partner: Not on file     Emotionally abused: Not on file     Physically abused: Not on file     Forced sexual activity: Not on file   Other Topics Concern    Not on file   Social History Narrative    Not on file     Family History  Family History   Problem Relation Age of Onset    Hypertension Mother     High Cholesterol Mother     Heart Disease Father         paemaker implant at 80       Constitutional: Negative for chills  Skin: Negative for increasing number of lesions, none  HEENT: Negative for tinnitus  Eyes: Negative for blurred vision  Cardiovascular: Negative for No shortness of breath  Respiratory: Negative for cough  Gastrointestinal: Negative for change in bowel habits  Genitourinary: Negative for negative  Musculoskeletal: Negative for arthralgias  Heme: Negative for blood transfusions  Allergies: Negative for rhinorrhea  Neurological: Negative for vertigo  Psychiatric: Negative for  anxiety    Telemetry Findings:NSR    Labs last 24 hours:  Recent Results (from the past 24 hour(s))   PTT    Collection Time: 11/02/19 11:15 AM   Result Value Ref Range    aPTT 27.0 23.0 - 36.4 SEC   CBC WITH AUTOMATED DIFF    Collection Time: 11/02/19 11:15 AM   Result Value Ref Range    WBC 16.0 (H) 4.6 - 13.2 K/uL    RBC 3.16 (L) 4.20 - 5.30 M/uL    HGB 9.9 (L) 12.0 - 16.0 g/dL    HCT 30.6 (L) 35.0 - 45.0 %    MCV 96.8 74.0 - 97.0 FL    MCH 31.3 24.0 - 34.0 PG    MCHC 32.4 31.0 - 37.0 g/dL    RDW 14.4 11.6 - 14.5 %    PLATELET 790 568 - 198 K/uL    MPV 10.7 9.2 - 11.8 FL    NEUTROPHILS 88 (H) 40 - 73 %    LYMPHOCYTES 5 (L) 21 - 52 %    MONOCYTES 7 3 - 10 %    EOSINOPHILS 0 0 - 5 %    BASOPHILS 0 0 - 2 %    ABS. NEUTROPHILS 14.0 (H) 1.8 - 8.0 K/UL    ABS. LYMPHOCYTES 0.8 (L) 0.9 - 3.6 K/UL    ABS. MONOCYTES 1.2 0.05 - 1.2 K/UL    ABS. EOSINOPHILS 0.0 0.0 - 0.4 K/UL    ABS.  BASOPHILS 0.0 0.0 - 0.1 K/UL    DF AUTOMATED     METABOLIC PANEL, BASIC    Collection Time: 11/02/19 11:15 AM   Result Value Ref Range    Sodium 142 136 - 145 mmol/L    Potassium 4.3 3.5 - 5.5 mmol/L    Chloride 109 100 - 111 mmol/L    CO2 25 21 - 32 mmol/L    Anion gap 8 3.0 - 18 mmol/L    Glucose 151 (H) 74 - 99 mg/dL    BUN 14 7.0 - 18 MG/DL    Creatinine 1.20 0.6 - 1.3 MG/DL    BUN/Creatinine ratio 12 12 - 20      GFR est AA 54 (L) >60 ml/min/1.73m2    GFR est non-AA 44 (L) >60 ml/min/1.73m2    Calcium 8.3 (L) 8.5 - 10.1 MG/DL          Wili Huerta MD  11/2/2019  2:40 PM

## 2019-11-03 VITALS
SYSTOLIC BLOOD PRESSURE: 109 MMHG | RESPIRATION RATE: 18 BRPM | OXYGEN SATURATION: 94 % | HEART RATE: 76 BPM | DIASTOLIC BLOOD PRESSURE: 41 MMHG | BODY MASS INDEX: 26.23 KG/M2 | WEIGHT: 157.63 LBS | TEMPERATURE: 97.9 F

## 2019-11-03 LAB
ANION GAP SERPL CALC-SCNC: 8 MMOL/L (ref 3–18)
APTT PPP: 30.9 SEC (ref 23–36.4)
BASOPHILS # BLD: 0 K/UL (ref 0–0.1)
BASOPHILS NFR BLD: 0 % (ref 0–2)
BUN SERPL-MCNC: 10 MG/DL (ref 7–18)
BUN/CREAT SERPL: 10 (ref 12–20)
CALCIUM SERPL-MCNC: 7.9 MG/DL (ref 8.5–10.1)
CHLORIDE SERPL-SCNC: 105 MMOL/L (ref 100–111)
CO2 SERPL-SCNC: 26 MMOL/L (ref 21–32)
CREAT SERPL-MCNC: 0.98 MG/DL (ref 0.6–1.3)
DIFFERENTIAL METHOD BLD: ABNORMAL
EOSINOPHIL # BLD: 0.3 K/UL (ref 0–0.4)
EOSINOPHIL NFR BLD: 4 % (ref 0–5)
ERYTHROCYTE [DISTWIDTH] IN BLOOD BY AUTOMATED COUNT: 14.6 % (ref 11.6–14.5)
GLUCOSE SERPL-MCNC: 149 MG/DL (ref 74–99)
HCT VFR BLD AUTO: 28.4 % (ref 35–45)
HGB BLD-MCNC: 9.3 G/DL (ref 12–16)
LYMPHOCYTES # BLD: 1.1 K/UL (ref 0.9–3.6)
LYMPHOCYTES NFR BLD: 14 % (ref 21–52)
MCH RBC QN AUTO: 31.4 PG (ref 24–34)
MCHC RBC AUTO-ENTMCNC: 32.7 G/DL (ref 31–37)
MCV RBC AUTO: 95.9 FL (ref 74–97)
MONOCYTES # BLD: 0.6 K/UL (ref 0.05–1.2)
MONOCYTES NFR BLD: 8 % (ref 3–10)
NEUTS SEG # BLD: 6.1 K/UL (ref 1.8–8)
NEUTS SEG NFR BLD: 74 % (ref 40–73)
PLATELET # BLD AUTO: 215 K/UL (ref 135–420)
PMV BLD AUTO: 10.2 FL (ref 9.2–11.8)
POTASSIUM SERPL-SCNC: 3.5 MMOL/L (ref 3.5–5.5)
RBC # BLD AUTO: 2.96 M/UL (ref 4.2–5.3)
SODIUM SERPL-SCNC: 139 MMOL/L (ref 136–145)
WBC # BLD AUTO: 8.2 K/UL (ref 4.6–13.2)

## 2019-11-03 PROCEDURE — 74011250637 HC RX REV CODE- 250/637: Performed by: SURGERY

## 2019-11-03 PROCEDURE — 74011250637 HC RX REV CODE- 250/637: Performed by: HOSPITALIST

## 2019-11-03 PROCEDURE — 74011250636 HC RX REV CODE- 250/636: Performed by: HOSPITALIST

## 2019-11-03 PROCEDURE — 74011250636 HC RX REV CODE- 250/636: Performed by: SURGERY

## 2019-11-03 PROCEDURE — 80048 BASIC METABOLIC PNL TOTAL CA: CPT

## 2019-11-03 PROCEDURE — 74011250637 HC RX REV CODE- 250/637: Performed by: NURSE PRACTITIONER

## 2019-11-03 PROCEDURE — 85730 THROMBOPLASTIN TIME PARTIAL: CPT

## 2019-11-03 PROCEDURE — 85025 COMPLETE CBC W/AUTO DIFF WBC: CPT

## 2019-11-03 PROCEDURE — 74011250637 HC RX REV CODE- 250/637: Performed by: INTERNAL MEDICINE

## 2019-11-03 RX ORDER — HEPARIN SODIUM (PORCINE) LOCK FLUSH IV SOLN 100 UNIT/ML 100 UNIT/ML
500 SOLUTION INTRAVENOUS
Status: COMPLETED | OUTPATIENT
Start: 2019-11-03 | End: 2019-11-03

## 2019-11-03 RX ORDER — OXYCODONE AND ACETAMINOPHEN 5; 325 MG/1; MG/1
1 TABLET ORAL
Qty: 20 TAB | Refills: 0 | Status: SHIPPED | OUTPATIENT
Start: 2019-11-03 | End: 2019-11-08

## 2019-11-03 RX ADMIN — FAMOTIDINE 20 MG: 20 TABLET, FILM COATED ORAL at 08:01

## 2019-11-03 RX ADMIN — OXYCODONE HYDROCHLORIDE AND ACETAMINOPHEN 1 TABLET: 5; 325 TABLET ORAL at 04:47

## 2019-11-03 RX ADMIN — SACUBITRIL AND VALSARTAN 1 TABLET: 49; 51 TABLET, FILM COATED ORAL at 08:00

## 2019-11-03 RX ADMIN — CARVEDILOL 25 MG: 25 TABLET, FILM COATED ORAL at 08:00

## 2019-11-03 RX ADMIN — DULOXETINE HYDROCHLORIDE 60 MG: 60 CAPSULE, DELAYED RELEASE ORAL at 09:00

## 2019-11-03 RX ADMIN — SPIRONOLACTONE 50 MG: 25 TABLET ORAL at 08:00

## 2019-11-03 RX ADMIN — KETOROLAC TROMETHAMINE 15 MG: 15 INJECTION, SOLUTION INTRAMUSCULAR; INTRAVENOUS at 06:10

## 2019-11-03 RX ADMIN — KETOROLAC TROMETHAMINE 15 MG: 15 INJECTION, SOLUTION INTRAMUSCULAR; INTRAVENOUS at 11:31

## 2019-11-03 RX ADMIN — DIGOXIN 0.12 MG: 125 TABLET ORAL at 08:00

## 2019-11-03 RX ADMIN — APIXABAN 2.5 MG: 2.5 TABLET, FILM COATED ORAL at 08:00

## 2019-11-03 RX ADMIN — HEPARIN SODIUM (PORCINE) LOCK FLUSH IV SOLN 100 UNIT/ML 500 UNITS: 100 SOLUTION at 11:56

## 2019-11-03 NOTE — PROGRESS NOTES
Discharge planning    Discharge order noted for today. Pt and spouse refused [facility] services. Patient stated \" I don't need home health. I now how to empty the drain and measure the contents its easy. \"   stated \" We have everything at home that we need. \" Dr. Chapin Lopez aware of  [facility] refusal.  Met with patient and  and are agreeable to the transition plan today. Transport has been arranged with . Updated bedside RN, Beba Lopez,  to the transition plan.         Hieu Florez, BSN, RN  Pager # [phone]  Care Manager

## 2019-11-03 NOTE — PROGRESS NOTES
Problem: Falls - Risk of  Goal: *Absence of Falls  Description  Document Char Moise Fall Risk and appropriate interventions in the flowsheet.   Outcome: Progressing Towards Goal  Note:   Fall Risk Interventions:  Mobility Interventions: Bed/chair exit alarm, Patient to call before getting OOB         Medication Interventions: Bed/chair exit alarm, Patient to call before getting OOB    Elimination Interventions: Bed/chair exit alarm, Call light in reach              Problem: Patient Education: Go to Patient Education Activity  Goal: Patient/Family Education  Outcome: Progressing Towards Goal     Problem: Pain  Goal: *Control of Pain  Outcome: Progressing Towards Goal     Problem: Lap Mary: 2-4 hours Post-Op (Initiate SCIP measures post-procedure)  Goal: Off Pathway (Use only if patient is Off Pathway)  Outcome: Progressing Towards Goal  Goal: Activity/Safety  Outcome: Progressing Towards Goal  Goal: Consults, if ordered  Outcome: Progressing Towards Goal  Goal: Diagnostic Test/Procedures  Outcome: Progressing Towards Goal  Goal: Nutrition/Diet  Outcome: Progressing Towards Goal  Goal: Discharge Planning  Outcome: Progressing Towards Goal  Goal: Medications  Outcome: Progressing Towards Goal  Goal: Respiratory  Outcome: Progressing Towards Goal  Goal: Treatments/Interventions/Procedures  Outcome: Progressing Towards Goal  Goal: Psychosocial  Outcome: Progressing Towards Goal  Goal: *Hemodynamically stable  Outcome: Progressing Towards Goal  Goal: *Optimal pain control at patient's stated goal  Outcome: Progressing Towards Goal  Goal: *Absence of infection signs and symptoms  Outcome: Progressing Towards Goal     Problem: Lap Mary: 4-12 hours Post-Op  Goal: Off Pathway (Use only if patient is Off Pathway)  Outcome: Progressing Towards Goal  Goal: Activity/Safety  Outcome: Progressing Towards Goal  Goal: Nutrition/Diet  Outcome: Progressing Towards Goal  Goal: Medications  Outcome: Progressing Towards Goal  Goal: Respiratory  Outcome: Progressing Towards Goal  Goal: Treatments/Interventions/Procedures  Outcome: Progressing Towards Goal  Goal: Psychosocial  Outcome: Progressing Towards Goal  Goal: *Demonstrates progressive activity  Outcome: Progressing Towards Goal  Goal: *Hemodynamically stable  Outcome: Progressing Towards Goal  Goal: *Optimal pain control at patient's stated goal  Outcome: Progressing Towards Goal  Goal: *Absence of infection signs and symptoms  Outcome: Progressing Towards Goal     Problem: Lap Mary: 12 Hours through Discharge  Goal: Off Pathway (Use only if patient is Off Pathway)  Outcome: Progressing Towards Goal  Goal: Activity/Safety  Outcome: Progressing Towards Goal  Goal: Nutrition/Diet  Outcome: Progressing Towards Goal  Goal: Medications  Outcome: Progressing Towards Goal  Goal: Respiratory  Outcome: Progressing Towards Goal  Goal: Treatments/Interventions/Procedures  Outcome: Progressing Towards Goal  Goal: Psychosocial  Outcome: Progressing Towards Goal     Problem: Pressure Injury - Risk of  Goal: *Prevention of pressure injury  Description  Document Salvador Scale and appropriate interventions in the flowsheet. Outcome: Progressing Towards Goal  Note:   Pressure Injury Interventions:             Activity Interventions: Increase time out of bed    Mobility Interventions: HOB 30 degrees or less    Nutrition Interventions: Document food/fluid/supplement intake                     Problem: Patient Education: Go to Patient Education Activity  Goal: Patient/Family Education  Outcome: Progressing Towards Goal     Problem: Infection - Risk of, Central Venous Catheter-Associated Bloodstream Infection  Goal: *Absence of infection signs and symptoms  Outcome: Progressing Towards Goal     Problem: Infection - Risk of, Surgical Site Infection  Goal: *Absence of surgical site infection signs and symptoms  Outcome: Progressing Towards Goal

## 2019-11-03 NOTE — ROUTINE PROCESS
Bedside and Verbal shift change report given to Jonathan Manley (oncoming nurse) by Sierra Nava RN (offgoing nurse). Report included the following information SBAR, Kardex and MAR.

## 2019-11-03 NOTE — DISCHARGE SUMMARY
Discharge Summary    Patient: Sadaf Barney MRN: [id number]  CSN: [id number]    YOB: 1950  Age: 78 y.o. Sex: female    DOA: 10/30/2019 LOS:  LOS: 4 days   Discharge Date:      Admission Diagnoses: Cholecystitis, unspecified [K81.9]    Discharge Diagnoses:    Chr cholecystitis   H/o PE with R peroneal FVT - continue Eliquis   H/o Recurrent Breast cancer with lung mets   Chr systolic CHF stage 3   AOCD    Discharge Condition: Stable    Consults: General Surgery    PHYSICAL EXAM  Visit Vitals  /53 (BP 1 Location: Right arm, BP Patient Position: At rest)   Pulse 74   Temp 98.4 °F (36.9 °C)   Resp 18   Wt 71.5 kg (157 lb 10.1 oz)   SpO2 95%   BMI 26.23 kg/m²       General: Alert, cooperative, no acute distress    HEENT: PERRLA, EOMI. Anicteric sclerae. Lungs:  CTA Bilaterally. No Wheezing/Rhonchi/Rales. Heart:  Regular rate and Rhythm. Abdomen: Soft, Non distended, Non tender. + Bowel sounds. Extremities: No edema/ cyanosis/ clubbing  Psych:   Good insight. Not anxious or agitated. Neurologic:  AA oriented X 3. Moves all extremities. HPI:  Angelina Smith is a 78 y.o.  female who presents with from home as direct admission for cholecystectomy per Dr. Shruthi Burnette on 110/01/2019. Patient denies any issues at home since hospital discharge, including no nausea, vomiting or abdominal pain.  has been providing drain care without difficulty. Patient last took her eliquis on 10/29/2019 in preparation for surgery.         Hospital Course:   Patient was admitted to the hospital as a direct admission for cholecystectomy per Dr. Shruthi Burnette.  The patient had acute cholecystitis in September 2019 had a drain placed at that time. Patient has multiple medical problems which include history of PE with right peroneal DVT, history of recurrent breast cancer with mets , history of chronic systolic congestive heart failure stage III.   The patient is currently on Eliquis which was held at the time of admission. The patient was started on a heparin drip. She underwent a cholecystectomy without any complications. She was started on a clear liquid diet and is tolerating it and the diet was slowly advanced. Her Eliquis has been resumed at this time and she is being discharged home to follow-up with her primary care physician's, general surgery and hematology/oncology 1 to 2 weeks. The same was discussed with her  at bedside. Imaging:  None       Procedures:   Lap Cholecystectomy         Discharge Medications:     Current Discharge Medication List      START taking these medications    Details   oxyCODONE-acetaminophen (PERCOCET) 5-325 mg per tablet Take 1 Tab by mouth every six (6) hours as needed for Pain for up to 5 days. Max Daily Amount: 4 Tabs. Qty: 20 Tab, Refills: 0    Associated Diagnoses: Cholecystitis         CONTINUE these medications which have NOT CHANGED    Details   spironolactone (ALDACTONE) 50 mg tablet Take 1 Tab by mouth daily. Qty: 90 Tab, Refills: 3      carvedilol (COREG) 25 mg tablet Take 1 Tab by mouth two (2) times daily (with meals). Qty: 180 Tab, Refills: 3      apixaban (ELIQUIS) 2.5 mg tablet Take 1 Tab by mouth two (2) times a day. Qty: 60 Tab, Refills: 2      digoxin (LANOXIN) 0.125 mg tablet Take 1 Tab by mouth daily. Qty: 90 Tab, Refills: 3      furosemide (LASIX) 20 mg tablet Take 1 Tab by mouth daily. Qty: 30 Tab, Refills: 0      tiotropium (SPIRIVA WITH HANDIHALER) 18 mcg inhalation capsule Take 1 Cap by inhalation daily. sacubitril-valsartan (ENTRESTO) 49 mg/51 mg tablet Take 1 Tab by mouth two (2) times a day. Qty: 180 Tab, Refills: 3      budesonide-formoterol (SYMBICORT) 160-4.5 mcg/actuation HFAA Take 2 Puffs by inhalation two (2) times a day. Qty: 1 Inhaler, Refills: 0      albuterol-ipratropium (DUO-NEB) 2.5 mg-0.5 mg/3 ml nebu 3 mL by Nebulization route every six (6) hours as needed (wheezing or sob).  File under Medicare Part B, ICD codes J44.9, C78.01  Qty: 120 Nebule, Refills: 3      DULoxetine (CYMBALTA) 60 mg capsule Take 60 mg by mouth daily. montelukast (SINGULAIR) 10 mg tablet Take 1 Tab by mouth daily. Qty: 90 Tab, Refills: 3      raloxifene (EVISTA) 60 mg tablet Take 60 mg by mouth daily. Associated Diagnoses: Malignant neoplasm of breast (female), unspecified site      multivitamin (ONE A DAY) tablet Take 1 Tab by mouth daily. plant stanol eliezer (CHOLEST OFF PO) Take 1 Tab by mouth daily. Biotin 2,500 mcg cap Take 1 Cap by mouth daily. melatonin 10 mg tab Take 1 Tab by mouth nightly.              Current Facility-Administered Medications:     oxyCODONE-acetaminophen (PERCOCET) 5-325 mg per tablet 1 Tab, 1 Tab, Oral, Q6H PRN, Jose Alfredo Baeza MD, 1 Tab at 11/03/19 0447    apixaban (ELIQUIS) tablet 2.5 mg, 2.5 mg, Oral, BID, Brent Hollingsworth MD, 2.5 mg at 11/03/19 0800    HYDROmorphone (DILAUDID) injection 0.5 mg, 0.5 mg, IntraVENous, Q3H PRN, Jose Alfredo Baeza MD, 0.5 mg at 11/02/19 1408    ketorolac (TORADOL) injection 15 mg, 15 mg, IntraVENous, Q6H, Jose Alfredo Baeza MD, 15 mg at 11/03/19 0610    dextrose 5 % - 0.45% NaCl infusion, 100 mL/hr, IntraVENous, CONTINUOUS, Jose Alfredo Baeza MD, Last Rate: 100 mL/hr at 11/02/19 2211, 100 mL/hr at 11/02/19 2211    furosemide (LASIX) tablet 20 mg, 20 mg, Oral, Q MON, WED & FRI, Disha Dailey NP    albuterol-ipratropium (DUO-NEB) 2.5 MG-0.5 MG/3 ML, 3 mL, Nebulization, Q6H PRN, Travis Roman NP    budesonide-formoterol (SYMBICORT) 160-4.5 mcg/actuation HFA inhaler 2 Puff, 2 Puff, Inhalation, BID RT, Ayaz Mantis B, NP, Stopped at 11/02/19 2000    carvedilol (COREG) tablet 25 mg, 25 mg, Oral, BID WITH MEALS, Ayaz Mantis B, NP, 25 mg at 11/03/19 0800    digoxin (LANOXIN) tablet 0.125 mg, 0.125 mg, Oral, DAILY, Ayaz Mantis B, NP, 0.125 mg at 11/03/19 0800    DULoxetine (CYMBALTA) capsule 60 mg, 60 mg, Oral, DAILY, Loreda Primus B, NP, 60 mg at 11/03/19 0900    spironolactone (ALDACTONE) tablet 50 mg, 50 mg, Oral, DAILY, Loreda Primus B, NP, 50 mg at 11/03/19 0800    tiotropium (SPIRIVA) inhalation capsule 18 mcg, 1 Cap, Inhalation, DAILY, Loreda Primus B, NP, 18 mcg at 11/02/19 1037    ondansetron (ZOFRAN) injection 4 mg, 4 mg, IntraVENous, Q4H PRN, Guevara Dorantes NP    sacubitril-valsartan (ENTRESTO) 49-51 mg tablet 1 Tab, 1 Tab, Oral, Q12H, Loreda Primus B, NP, 1 Tab at 11/03/19 0800    famotidine (PEPCID) tablet 20 mg, 20 mg, Oral, BID, Alonso Kim MD, 20 mg at 11/03/19 0801  · It is important that you take the medication exactly as they are prescribed. · Keep your medication in the bottles provided by the pharmacist and keep a list of the medication names, dosages, and times to be taken in your wallet. · Do not take other medications without consulting your doctor.          Minutes spent on discharge: 42 minutes spent coordinating this discharge (review instructions/follow-up, prescriptions, preparing report for sign off)    Flory Nation MD  11/3/2019 11:19 AM

## 2019-11-03 NOTE — PROGRESS NOTES
GEN.  SURG    Seen patient/ examined;  by bedside  Remains alert, awake, oriented  No GI issues, Flatus positive, no BM yet  Pain is trending down, responds with po pain med  VS stable, no  Fever, riky reg diet  Abdomen: Soft, mild distention  Minimal  Pain  RUQ  Labs: No Leukocytosis, All WNL    SURGICALLY STABLE    REC:   OK FOR D/C    OK TO  SHOWER( quick) no tub bath    RECORD DRAINAGE  DAILY AND BRING RECORDING TO OFFICE   OFFICE  CHECK  ONE WEEK POST OP( office to  Call for appointment)   Percocet  For  Pain   Now on Eloquist

## 2019-11-04 ENCOUNTER — PATIENT OUTREACH (OUTPATIENT)
Dept: CASE MANAGEMENT | Age: 69
End: 2019-11-04

## 2019-11-04 NOTE — PROGRESS NOTES
Hospital Discharge Follow-Up      Date/Time:  2019 11:44 AM    Patient was admitted to [facility] on 10/30/19 and discharged on 11/3/19 for laparoscopic cholecystectomy. The physician discharge summary was available at the time of outreach. Patient was contacted within 1 business days of discharge. Top Challenges reviewed with the provider   Advance Care Planning:   Does patient have an Advance Directive:  not on file        Method of communication with provider :none    Inpatient RRAT score: 28  Was this a readmission? no   Patient stated reason for the readmission: N/A    Care Transition Nurse (CTN) contacted the patient by telephone to perform post hospital discharge assessment. Spoke with Lesli Cintron, /caregiver who voiced patient was asleep. Verified PHI,  name and  with caregiver as identifiers. Provided introduction to self, and explanation of the CTN role. Caregiver received hospital discharge instructions. CTN reviewed discharge instructions and red flags with caregiver who verbalized understanding. Caregiver given an opportunity to ask questions and does not have any further questions or concerns at this time. The caregiver agrees to contact the PCP office for questions related to their healthcare. CTN provided contact information for future reference. Disease Specific:   N/A    Patients top risk factors for readmission:  None at this time    Home Health orders at discharge: patient refused  [address]: N/A  Date of initial visit: [address] ordered at discharge: none  [address] received: N/A    Medication(s):   New Medications at Discharge: Percocet  Changed Medications at Discharge: None  Discontinued Medications at Discharge: None    Medication reconciliation was performed with caregiver, who verbalizes understanding of administration of home medications.   There were no barriers to obtaining medications identified at this time. Referral to Pharm D needed: no     Current Outpatient Medications   Medication Sig    oxyCODONE-acetaminophen (PERCOCET) 5-325 mg per tablet Take 1 Tab by mouth every six (6) hours as needed for Pain for up to 5 days. Max Daily Amount: 4 Tabs.  spironolactone (ALDACTONE) 50 mg tablet Take 1 Tab by mouth daily.  carvedilol (COREG) 25 mg tablet Take 1 Tab by mouth two (2) times daily (with meals).  apixaban (ELIQUIS) 2.5 mg tablet Take 1 Tab by mouth two (2) times a day.  digoxin (LANOXIN) 0.125 mg tablet Take 1 Tab by mouth daily.  furosemide (LASIX) 20 mg tablet Take 1 Tab by mouth daily. (Patient taking differently: Take 20 mg by mouth every Monday, Wednesday, Friday.)    tiotropium (SPIRIVA WITH HANDIHALER) 18 mcg inhalation capsule Take 1 Cap by inhalation daily.  sacubitril-valsartan (ENTRESTO) 49 mg/51 mg tablet Take 1 Tab by mouth two (2) times a day.  budesonide-formoterol (SYMBICORT) 160-4.5 mcg/actuation HFAA Take 2 Puffs by inhalation two (2) times a day.  albuterol-ipratropium (DUO-NEB) 2.5 mg-0.5 mg/3 ml nebu 3 mL by Nebulization route every six (6) hours as needed (wheezing or sob). File under Medicare Part B, ICD codes J44.9, C78.01    DULoxetine (CYMBALTA) 60 mg capsule Take 60 mg by mouth daily.  montelukast (SINGULAIR) 10 mg tablet Take 1 Tab by mouth daily.  raloxifene (EVISTA) 60 mg tablet Take 60 mg by mouth daily. No current facility-administered medications for this visit.         Medications Discontinued During This Encounter   Medication Reason    Biotin 2,500 mcg cap Not A Current Medication    melatonin 10 mg tab Not A Current Medication    multivitamin (ONE A DAY) tablet Not A Current Medication    plant stanol eliezer (CHOLEST OFF PO) Not A Current Medication       [facility] follow up appointment(s):   Future Appointments   Date Time Provider Priscila Alves   11/18/2019 10:30 AM [facility]   1/20/2020 11:30 AM Agustin Casillas MD [address]   1/22/2020 12:00 PM Destin Mata MD [facility] [address]   2/17/2020  9:00 AM [facility] [address]   6/22/2020  9:00 AM [facility] [address]      Non-[facility] follow up appointment(s): Dr. Brandy Sung (oncology); patient to call to reschedule missed appt due to hospitalization  Awaiting call from Dr. Nagy December office for follow up appt  [facility]:  out of service area       Goals      Attends follow-up appointments as directed. Ensure provider appt is scheduled within 7 days post-discharge; 11/4: Patient awaiting call from surgeons' office for follow up appt   Confirm patient attended post-discharge provider appt   Complete post-visit call to confirm attendance and update care needs             Prevent complications post hospitalization.       Plan of Care:   CTN will monitor X 4 weeks  Review/educate common or potential \"red flags\" of condition worsening;11/4: Educated/reviewed s/s to monitor and report:redness, warmth at site(s), swelling, bleeding or pus-like drainage, chills, fever (> 100.5), low body temperature (<97.2), nausea/vomiting that prevents eating/drinking/taking medications, SOB, chest pain/pressure, decreased urine output  Instruct and encourage spirometry if ordered; 11/4: Encouraged to have patient use ICS 10 X every hour while awake to help reduce risk of post surgical PNA  Discuss and provide resources for ACP

## 2019-11-18 ENCOUNTER — TELEPHONE (OUTPATIENT)
Dept: CARDIOLOGY CLINIC | Age: 69
End: 2019-11-18

## 2019-11-18 NOTE — TELEPHONE ENCOUNTER
Patient stating having a syncopal episode at doctors office. Made aware to maintain a b/p log this week and turn in on Friday which patient stated she will do.

## 2019-11-18 NOTE — TELEPHONE ENCOUNTER
Patient calling stating this weekend had an episode of falling out on Friday. Yesterday 11/17/19 BP at 111/41 but was 98/48 today which she stated has been around that reading this weekend. Has been taking all medications in chart. Would like to know if needs to decrease any medication dose.

## 2019-11-18 NOTE — TELEPHONE ENCOUNTER
Did she have a syncopal episode, or just dizziness? Maintain b/p log this week and send to office on Friday.

## 2019-11-19 ENCOUNTER — PATIENT OUTREACH (OUTPATIENT)
Dept: CASE MANAGEMENT | Age: 69
End: 2019-11-19

## 2019-11-19 ENCOUNTER — HOSPITAL ENCOUNTER (OUTPATIENT)
Dept: CT IMAGING | Age: 69
Discharge: HOME | End: 2019-11-19
Attending: INTERNAL MEDICINE
Payer: MEDICARE

## 2019-11-19 DIAGNOSIS — C50.412 BREAST CANCER OF UPPER-OUTER QUADRANT OF LEFT FEMALE BREAST (HCC): ICD-10-CM

## 2019-11-19 PROCEDURE — 71260 CT THORAX DX C+: CPT

## 2019-11-19 PROCEDURE — 74011636320 HC RX REV CODE- 636/320: Performed by: INTERNAL MEDICINE

## 2019-11-19 RX ADMIN — IOPAMIDOL 70 ML: 612 INJECTION, SOLUTION INTRAVENOUS at 17:13

## 2019-11-19 NOTE — PROGRESS NOTES
Transitions of Care Coordination  Follow-Up    Date/Time:  11/19/2019 4:09 PM     CTN (Care Transitions Nurse) contacted patient for Transitions of Care Coordination  follow up. Spoke to patient. Introduced self/role and reason for call. Patient verbalized she is doing good. Patient voiced she was on her way out the door to get a chest CT. Patient being followed by Dr. Consuelo Woodruff for breast cancer with lung mets. Future Appointments   Date Time Provider Priscila Alves   11/19/2019  5:00 PM [facility] CT RM 1 [facility]   12/16/2019  9:45 AM [facility]   1/8/2020  1:00 PM Mahamed Cruz MD [facility] SCHED   1/20/2020 11:30 AM Bud Chauhan MD [address]   2/17/2020  9:00 AM [facility]   6/22/2020  9:00 AM [facility] [address] Attends follow-up appointments as directed. Ensure provider appt is scheduled within 7 days post-discharge; 11/4: Patient awaiting call from surgeons' office for follow up appt   Confirm patient attended post-discharge provider appt   Complete post-visit call to confirm attendance and update care needs; Completed             Prevent complications post hospitalization.       Plan of Care:   CTN will monitor X 4 weeks  Review/educate common or potential \"red flags\" of condition worsening;11/4: Educated/reviewed s/s to monitor and report:redness, warmth at site(s), swelling, bleeding or pus-like drainage, chills, fever (> 100.5), low body temperature (<97.2), nausea/vomiting that prevents eating/drinking/taking medications, SOB, chest pain/pressure, decreased urine output  Instruct and encourage spirometry if ordered; 11/4: Encouraged to have patient use ICS 10 X every hour while awake to help reduce risk of post surgical PNA  Discuss and provide resources for ACP

## 2019-12-06 ENCOUNTER — PATIENT OUTREACH (OUTPATIENT)
Dept: FAMILY MEDICINE CLINIC | Age: 69
End: 2019-12-06

## 2019-12-06 NOTE — PROGRESS NOTES
Patient Outreach made to patient. She states she is doing well. She has followed up with the surgeon. No assistance is needed at this time. This episode is resolved. Patient has graduated from the Transitions of Care Coordination  program on 12/06/19. Patient's symptoms are stable at this time. Patient/family has the ability to self-manage. Care management goals have been completed at this time. No further nurse navigator follow up scheduled. Goals Addressed                 This Visit's Progress     COMPLETED: Attends follow-up appointments as directed. Ensure provider appt is scheduled within 7 days post-discharge; 11/4: Patient awaiting call from surgeons' office for follow up appt   Confirm patient attended post-discharge provider appt   Complete post-visit call to confirm attendance and update care needs; Completed             COMPLETED: Prevent complications post hospitalization. Plan of Care:   CTN will monitor X 4 weeks  Review/educate common or potential \"red flags\" of condition worsening;11/4: Educated/reviewed s/s to monitor and report:redness, warmth at site(s), swelling, bleeding or pus-like drainage, chills, fever (> 100.5), low body temperature (<97.2), nausea/vomiting that prevents eating/drinking/taking medications, SOB, chest pain/pressure, decreased urine output  Instruct and encourage spirometry if ordered; 11/4: Encouraged to have patient use ICS 10 X every hour while awake to help reduce risk of post surgical PNA  Discuss and provide resources for ACP              Pt has nurse navigator's contact information for any further questions, concerns, or needs.   Patients upcoming visits:    Future Appointments   Date Time Provider Priscila Alves   12/16/2019  9:45 AM [facility]   1/2/2020 11:30 AM [facility] PET CT RM 1 [facility]   1/8/2020  1:00 PM Wilmer Santos MD [facility]   1/20/2020 11:30 AM Vania Bloom MD [address] 2/17/2020  9:00 AM [facility]   6/22/2020  9:00 AM [facility]

## 2019-12-23 RX ORDER — SPIRONOLACTONE 25 MG/1
50 TABLET ORAL DAILY
Qty: 60 TAB | Refills: 4 | Status: SHIPPED | OUTPATIENT
Start: 2019-12-23 | End: 2019-12-26 | Stop reason: SDUPTHER

## 2019-12-23 RX ORDER — SPIRONOLACTONE 50 MG/1
100 TABLET, FILM COATED ORAL DAILY
Qty: 30 TAB | Refills: 1 | OUTPATIENT
Start: 2019-12-23

## 2019-12-23 NOTE — TELEPHONE ENCOUNTER
At office visit on 10/9/19 Dr. Rozina Westfall increased from 25 mg to 50 mg daily.  50 mg is on backorder, pharmacy wants to fill 100 mg and take 1/2 daily

## 2019-12-26 RX ORDER — SPIRONOLACTONE 25 MG/1
50 TABLET ORAL DAILY
Qty: 180 TAB | Refills: 3 | Status: SHIPPED | OUTPATIENT
Start: 2019-12-26 | End: 2020-06-05 | Stop reason: SDUPTHER

## 2020-01-02 ENCOUNTER — HOSPITAL ENCOUNTER (OUTPATIENT)
Dept: PET IMAGING | Age: 70
Discharge: HOME | End: 2020-01-02
Attending: INTERNAL MEDICINE
Payer: MEDICARE

## 2020-01-02 DIAGNOSIS — C50.412 MALIGNANT NEOPLASM OF UPPER-OUTER QUADRANT OF LEFT FEMALE BREAST (HCC): ICD-10-CM

## 2020-01-02 PROCEDURE — A9552 F18 FDG: HCPCS

## 2020-01-04 NOTE — PROGRESS NOTES
[facility] Hospitalist Group  Progress Note    Patient: Leonetta Castleman Age: 78 y.o. : 1950 MR#: [id number] SSN: [id number]  Date: 2019     Subjective/24-hour events:     Feeling markedly better today. Had percutaneous drainage of gallbladder yesterday and has been able to be weaned from pressor support. Still has some mild RUQ discomfort but denies abdominal distension and N/V. Family present at bedside. Assessment:   Acute cholecystitis s/p percutaneous drainage/cholecystostomy tube placement   Sepsis with septic shock secondary to above  Acute kidney injury, improved  Hypomagnesemia and hyponatremia  Metastatic breast cancer   Chronic systolic CHF  Cardiomyopathy with AICD  HTN by hx, with current hypotension secondary to sepsis     Plan:  Continue antibiotic therapy and await cultures. Will likely ask ID to see on Monday for assistance with antibiotic management. Drain management per surgery. Continue heparin infusion for now. Will resume eliquis when appropriate. Resume b-blocker/cardiac regimen when per cardiology. BPs stable thus far off of pressor support. Monitor lytes/replace as necessary. PT/OT, mobilize as soon as able. Downgrade status, transfer out of ICU. Follow. Case discussed with:  [x]Patient  [x]Family  [x]Nursing  []Case Management  DVT Prophylaxis:  []Lovenox  []Hep SQ  []SCDs  []Coumadin   [x]On Heparin gtt    Objective:   VS:   Visit Vitals  /49   Pulse 81   Temp 98.5 °F (36.9 °C)   Resp 24   Ht 5' 5\" (1.651 m)   Wt 77.1 kg (170 lb)   SpO2 99%   Breastfeeding? No   BMI 28.29 kg/m²      Tmax/24hrs: Temp (24hrs), Av.3 °F (36.8 °C), Min:97.7 °F (36.5 °C), Max:98.7 °F (37.1 °C)      Intake/Output Summary (Last 24 hours) at 2019 1328  Last data filed at 2019 1300  Gross per 24 hour   Intake 1925.85 ml   Output 1850 ml   Net 75.85 ml       General:  In NAD. Appears much more vigorous today.   Cardiovascular:  S1, S2. Rate WNL. Pulmonary:  Clear, no wheezes. Effort nonlabored. GI:  Abdomen soft, + mild RUQ TTP. No guarding/rebound. Extremities:  Warm, no ischemia or edema.   Neuro:  Awake and alert, motor grossly nonfocal.    Labs:    Recent Results (from the past 24 hour(s))   GLUCOSE, POC    Collection Time: 09/20/19  5:50 PM   Result Value Ref Range    Glucose (POC) 165 (H) 70 - 110 mg/dL   GLUCOSE, POC    Collection Time: 09/21/19  2:11 AM   Result Value Ref Range    Glucose (POC) 128 (H) 70 - 110 mg/dL   PTT    Collection Time: 09/21/19  3:13 AM   Result Value Ref Range    aPTT 86.9 (H) 23.0 - 82.1 SEC   METABOLIC PANEL, BASIC    Collection Time: 09/21/19  3:13 AM   Result Value Ref Range    Sodium 141 136 - 145 mmol/L    Potassium 3.1 (L) 3.5 - 5.5 mmol/L    Chloride 105 100 - 111 mmol/L    CO2 28 21 - 32 mmol/L    Anion gap 8 3.0 - 18 mmol/L    Glucose 123 (H) 74 - 99 mg/dL    BUN 23 (H) 7.0 - 18 MG/DL    Creatinine 1.25 0.6 - 1.3 MG/DL    BUN/Creatinine ratio 18 12 - 20      GFR est AA 51 (L) >60 ml/min/1.73m2    GFR est non-AA 42 (L) >60 ml/min/1.73m2    Calcium 7.6 (L) 8.5 - 10.1 MG/DL   MAGNESIUM    Collection Time: 09/21/19  3:13 AM   Result Value Ref Range    Magnesium 1.4 (L) 1.6 - 2.6 mg/dL   PHOSPHORUS    Collection Time: 09/21/19  3:13 AM   Result Value Ref Range    Phosphorus 1.8 (L) 2.5 - 4.9 MG/DL   GLUCOSE, POC    Collection Time: 09/21/19 12:06 PM   Result Value Ref Range    Glucose (POC) 122 (H) 70 - 110 mg/dL       Signed By: Teo Chand MD     September 21, 2019 echymossis lower abdomen, forehead and b/l UE and LE

## 2020-01-08 ENCOUNTER — OFFICE VISIT (OUTPATIENT)
Dept: CARDIOLOGY CLINIC | Age: 70
End: 2020-01-08

## 2020-01-08 VITALS
HEIGHT: 65 IN | OXYGEN SATURATION: 98 % | WEIGHT: 166.4 LBS | DIASTOLIC BLOOD PRESSURE: 55 MMHG | HEART RATE: 85 BPM | BODY MASS INDEX: 27.72 KG/M2 | SYSTOLIC BLOOD PRESSURE: 105 MMHG

## 2020-01-08 DIAGNOSIS — I49.01 VENTRICULAR FIBRILLATION (HCC): ICD-10-CM

## 2020-01-08 DIAGNOSIS — I05.9 MITRAL VALVE DISEASE: ICD-10-CM

## 2020-01-08 DIAGNOSIS — I42.8 NONISCHEMIC CARDIOMYOPATHY (HCC): Primary | ICD-10-CM

## 2020-01-08 DIAGNOSIS — C50.412 MALIGNANT NEOPLASM OF UPPER-OUTER QUADRANT OF LEFT FEMALE BREAST, UNSPECIFIED ESTROGEN RECEPTOR STATUS (HCC): ICD-10-CM

## 2020-01-08 DIAGNOSIS — Z95.810 AUTOMATIC IMPLANTABLE CARDIOVERTER-DEFIBRILLATOR IN SITU: ICD-10-CM

## 2020-01-08 DIAGNOSIS — I50.22 SYSTOLIC CHF, CHRONIC (HCC): ICD-10-CM

## 2020-01-08 DIAGNOSIS — I10 ESSENTIAL HYPERTENSION: ICD-10-CM

## 2020-01-08 NOTE — PROGRESS NOTES
HISTORY OF PRESENT ILLNESS  Gilda Mobley is a 78 y.o. female. Patient with cmp,chf.post  icd   On follow up patient denies any chest pains, palpitation or other significant symptoms. Patient is here for follow up of diagnostic tests. Results will be discussed. He feels extremely fatigued tired and weak. Recently reported decrease in renal function. Patient seen for transition of care visit. Discharge date 5/6/2019  Nurse encounter 5/6/2019  Physician encounter 5/8/2019. Admitted with acute CHF, acute PE, DVT. Patient had worsening cardiomyopathy. Symptoms are slowly improving significant improvement of shortness of breath. Orthopnea significantly better. No edema. Still feels tired on minimal activity exertion    CHF   The history is provided by the patient. This is a recurrent problem. The problem occurs constantly. The problem has been gradually improving. Associated symptoms include shortness of breath. Pertinent negatives include no chest pain. The symptoms are aggravated by exertion. The symptoms are relieved by rest.   Cardiomyopathy   The history is provided by the patient. This is a chronic problem. The problem has been resolved. Associated symptoms include shortness of breath. Pertinent negatives include no chest pain. Hypertension   The history is provided by the patient. This is a chronic problem. The problem occurs constantly. The problem has not changed since onset. Associated symptoms include shortness of breath. Pertinent negatives include no chest pain. Valvular Heart Disease   The history is provided by the patient. This is a chronic problem. The problem occurs constantly. The problem has not changed since onset. Associated symptoms include shortness of breath. Pertinent negatives include no chest pain. Hospital Follow Up   Associated symptoms include shortness of breath. Pertinent negatives include no chest pain.        Review of Systems   Constitutional: Negative for chills and fever. HENT: Negative for nosebleeds. Eyes: Negative for blurred vision and double vision. Respiratory: Positive for shortness of breath. Negative for cough, hemoptysis, sputum production and wheezing. Cardiovascular: Negative for chest pain, palpitations, orthopnea, claudication, leg swelling and PND. Gastrointestinal: Negative for heartburn, nausea and vomiting. Musculoskeletal: Negative for myalgias. Skin: Negative for rash. Neurological: Negative for dizziness and weakness. Endo/Heme/Allergies: Does not bruise/bleed easily. Family History   Problem Relation Age of Onset    Hypertension Mother     High Cholesterol Mother     Heart Disease Father         paemaker implant at 80       Past Medical History:   Diagnosis Date    Abnormal nuclear cardiac imaging test 06/11/2010    Lg fixed anterior, septal, apical, inferoseptal defect sugg RCA & LAD disease or cardiomyopathy. EF 20%. Global hykn. Nondiagnostic EKG.  Acute bronchitis 10/15/2018    Persistent cough and wheezing in spite of prednisone and antibiotic. Will refer to pulmonary    Adenocarcinoma of breast; locally advanced invasive ductal adenocarcinoma of left breast     Asthma     Automatic implantable cardiac defibrillator in situ     Automatic implantable cardiac defibrillator in situ     [facility] [address]     Cancer ([facility] [address]     breast cancer left    Chemotherapy convalescence or palliative care 2012    Chronic combined systolic and diastolic heart failure (HCC)     Remains symptomatic, nyha class 3 ef improving.  Congestive heart failure, unspecified     chronic class ll    Echocardiogram 09/27/2010    EF 30%. Global hykn. Mild MR. Mild TR.     GERD (gastroesophageal reflux disease)     Hyperlipidemia     Hypertension     Mitral valve disorders(424.0) 1/15/2014    mild to moderate mr     Mitral valve disorders(424.0) 1/15/2014    mild to moderate mr     MVP (mitral valve prolapse)     Nonischemic cardiomyopathy ([address]     EF 20-30% despite medical therapy    Nonspecific abnormal electrocardiogram (ECG) (EKG)     Osteopenia     Other primary cardiomyopathies     Pacemaker 10/27/10    s/p implantation, without complication    Poisoning by other and unspecified agents primarily affecting the cardiovascular system(972.9)     Ef slightly better to 45%, STABLE back on chemo.  Radiation therapy     Radiation therapy complication 5539    S/P cardiac catheterization 07/08/10    Patent coronary arteries. LVEDP 25.  EF 25%. Global hykn. Moderate MR.  RA 10.  RV 40/10. PA 40/20.  20.  CO/CI 4.5/2.45 (Larry).  Shortness of breath     Syncope and collapse     Thyroid disease     goiter       Past Surgical History:   Procedure Laterality Date    CARDIAC SURG PROCEDURE UNLIST      Difibrillator; BI V ICD    HX MASTECTOMY  09/28/11    modified radical mastectomy and axillary dissection    HX ORTHOPAEDIC      HX OTHER SURGICAL      surgery to remove part of liver    HX PACEMAKER  10/27/10    s/p [employer] biventricular AICD, without complication    HX RADICAL MASTECTOMY      IR CHOLECYSTOSTOMY PERCUTANEOUS  9/20/2019    IR INSERT TUNL CVC W PORT OVER 5 YEARS  1/16/2019    NEUROLOGICAL PROCEDURE UNLISTED      Cervical fusion       Social History     Tobacco Use    Smoking status: Never Smoker    Smokeless tobacco: Never Used   Substance Use Topics    Alcohol use: No       Allergies   Allergen Reactions    Adhesive Other (comments)     blisters       Current Outpatient Medications   Medication Sig    spironolactone (ALDACTONE) 25 mg tablet Take 2 Tabs by mouth daily.  carvedilol (COREG) 25 mg tablet Take 1 Tab by mouth two (2) times daily (with meals).  apixaban (ELIQUIS) 2.5 mg tablet Take 1 Tab by mouth two (2) times a day.  digoxin (LANOXIN) 0.125 mg tablet Take 1 Tab by mouth daily.  furosemide (LASIX) 20 mg tablet Take 1 Tab by mouth daily.  (Patient taking differently: Take 20 mg by mouth every Monday, Wednesday, Friday.)    tiotropium (SPIRIVA WITH HANDIHALER) 18 mcg inhalation capsule Take 1 Cap by inhalation daily.  sacubitril-valsartan (ENTRESTO) 49 mg/51 mg tablet Take 1 Tab by mouth two (2) times a day.  budesonide-formoterol (SYMBICORT) 160-4.5 mcg/actuation HFAA Take 2 Puffs by inhalation two (2) times a day.  albuterol-ipratropium (DUO-NEB) 2.5 mg-0.5 mg/3 ml nebu 3 mL by Nebulization route every six (6) hours as needed (wheezing or sob). File under Medicare Part B, ICD codes J44.9, C78.01    DULoxetine (CYMBALTA) 60 mg capsule Take 60 mg by mouth daily.  montelukast (SINGULAIR) 10 mg tablet Take 1 Tab by mouth daily.  raloxifene (EVISTA) 60 mg tablet Take 60 mg by mouth daily. No current facility-administered medications for this visit. Visit Vitals  /55   Pulse 85   Ht 5' 5\" (1.651 m)   Wt 75.5 kg (166 lb 6.4 oz)   SpO2 98%   BMI 27.69 kg/m²         Physical Exam   Constitutional: She is oriented to person, place, and time. She appears well-developed and well-nourished. HENT:   Head: Normocephalic and atraumatic. Eyes: Conjunctivae are normal.   Neck: Neck supple. No JVD present. No tracheal deviation present. No thyromegaly present. Cardiovascular: Normal rate and regular rhythm. PMI is not displaced. Exam reveals no gallop, no S3 and no decreased pulses. Murmur heard. High-pitched blowing holosystolic murmur is present with a grade of 2/6 at the apex. Pulmonary/Chest: No respiratory distress. She has wheezes. She has no rales. She exhibits no tenderness. Abdominal: Soft. There is no tenderness. Musculoskeletal:         General: No edema. Neurological: She is alert and oriented to person, place, and time. Skin: Skin is warm. Psychiatric: She has a normal mood and affect. Ms. Romana Carranza has a reminder for a \"due or due soon\" health maintenance.  I have asked that she contact her primary care provider for follow-up on this health maintenance. No flowsheet data found. SUMMARY:echo:6/2014  Left ventricle: The ventricle was mildly dilated. Systolic function was  normal. Ejection fraction was estimated in the range of 50 % to 55 %. There were no regional wall motion abnormalities. Doppler parameters were  consistent with abnormal left ventricular relaxation (grade 1 diastolic  dysfunction). Left atrium: The atrium was mildly dilated. Mitral valve: There was systolic bowing of the posterior leaflet, but  without diagnostic evidence for prolapse. There was mild to moderate  regurgitation. SUMMARY:echo:12/2014  Left ventricle: Systolic function was at the lower limits of normal.  Ejection fraction was estimated to be 53 %. There were no regional wall  motion abnormalities. Doppler parameters were consistent with abnormal  left ventricular relaxation (grade 1 diastolic dysfunction). Right ventricle: A pacing wire was present. Mitral valve: There was systolic bowing of the posterior leaflet, but  without diagnostic evidence for prolapse. There was mild regurgitation. Tricuspid valve: Pulmonary artery systolic pressure: 22 mmHg. 12/2015:echo    SUMMARY:  Left ventricle: Systolic function was normal. Ejection fraction was  estimated in the range of 50 % to 55 %. There was mild diffuse  hypokinesis. Doppler parameters were consistent with abnormal left  ventricular relaxation (grade 1 diastolic dysfunction). Mitral valve: There was systolic bowing of the anterior and posterior  leaflets, but without diagnostic evidence for prolapse. There was mild  regurgitation. Tricuspid valve: There was mild regurgitation. Tricuspid regurgitation  peak velocity: 2.3 m/sec. Pulmonary artery systolic pressure: 25 mmHg. pft-6/2017  Maximal Mid Expiratory Flow rate is reduced to 43 % predicted  Forced Expiratory Volume in one second is reduced to 69 % predicted  FEV 1% is reduced     Volumes:   All Volumes are reduced except for RV    Flow Volume Loop:  Nonspecific obstructive pattern in Flow Volume Loop    Bronchodilator:  No significant improvement with bronchodilator therapy    Diffusion:  Abnormal Diffusion Capacity reduced to 62 % predicted  DLCO normalizes to alveolar ventilation    Impression:  Moderate obstructive defect, Predominately small airways, Mild restrictive ventilatory defect, Reduced diffusion capacity indicating a decrease in alveolar surface area for gas exchange  I Have personally reviewed recent relevant labs available and discussed with patient    Interpretation Summary -6/2018  · Calculated left ventricular ejection fraction is 55%. Left ventricular mild concentric hypertrophy observed. Mild (grade 1) left ventricular diastolic dysfunction. · Left atrial cavity size is mildly dilated. · There was systolic bowing of the anterior and posterior leaflets, but without diagnostic evidence for prolapse. Mild mitral valve regurgitation. · Mild tricuspid valve regurgitation is present. Pulmonary arterial systolic pressure is 18 mmHg. · Mild pulmonic valve regurgitation is present. · Small pericardial effusion. Interpretation Summary 12/2018    · Left ventricular low normal systolic function. Estimated left ventricular ejection fraction is 51 - 55%. Visually measured ejection fraction. Normal left ventricular wall motion, no regional wall motion abnormality noted. Moderate (grade 2) left ventricular diastolic dysfunction. · There is no evidence of pulmonary hypertension. · Mild to moderate mitral valve regurgitation. · Left atrial cavity size is mildly dilated. Interpretation Summary 3/2019       · Normal cavity size, wall thickness and diastolic function. There is low normal systolic function. The estimated ejection fraction is 51 - 55%. No regional wall motion abnormality noted. Global longitudinal strain is -13.00%. Abnormal left ventricular strain. · Normal right ventricular size and function. Pacing wire present  · Mild to moderate mitral valve regurgitation. Mild prolapse of the posterior leaflet within the mitral valve. · Mild pulmonic valve regurgitation is present. · Mild tricuspid valve regurgitation. Pulmonary arterial systolic pressure is 58.9 mmHg. Mild pulmonary hypertension. 5/2019    · Left Ventricle: Mild concentric hypertrophy. Severe global systolic dysfunction. Estimated left ventricular ejection fraction is 26 - 30%. Biplane method used to measure ejection fraction. Left ventricular global hypokinesis. · IVC/Hepatic Veins: Severely elevated central venous pressure (15+ mmHg); IVC diameter is larger than 21 mm and collapses less than 50% with respiration. · Pulmonary Artery: Mild pulmonary hypertension. Pulmonary arterial systolic pressure is 44 mmHg. · Pericardium: Trivial-to-small circumferential pericardial effusion measuring 10 mm. · Mitral Valve: Moderate mitral valve regurgitation. · Right Ventricle: Pacing wire present   Interpretation Summary 5/2019    · Acute occlusive thrombus present in the right peroneal vein. IMPRESSION: 5/2019     1. Positive examination for pulmonary emboli.   - There is likely a combination of acute and subacute PE in the lower lobe  segmental and subsegmental branch vessels. Interpretation Summary 7/2019    · Left Ventricle: Normal cavity size. Mild concentric hypertrophy. Severe systolic dysfunction. Estimated left ventricular ejection fraction is 21 - 25%. Abnormal left ventricular wall motion. Inconclusive left ventricular diastolic function. · Left Atrium: Severely dilated left atrium. · Mitral Valve: Mitral valve thickening. Mitral annular calcification. Moderate mitral valve regurgitation. · Tricuspid Valve: Mild tricuspid valve regurgitation is present. Pulmonary arterial systolic pressure is 04.6 mmHg. Mild pulmonary hypertension.   · IVC/Hepatic Veins: Moderately elevated central venous pressure (10-15 mmHg); IVC diameter is larger than 21 mm and collapses more than 50% with respiration. · Pericardium: Trivial-to-small pericardial effusion. Interpretation Summary 9/2019    · Left Ventricle: Normal cavity size. Upper normal wall thickness. Moderate-to-severe systolic dysfunction. Estimated left ventricular ejection fraction is 36 - 40%. Biplane method used to measure ejection fraction. Left ventricular global hypokinesis. · Pericardium: Trivial pericardial effusion. Assessment         ICD-10-CM ICD-9-CM    1. Nonischemic cardiomyopathy (HCC) I42.8 425.4     Stable continue treatment   2. Automatic implantable cardioverter-defibrillator in situ Z95.810 V45.02     Episode of ventricular fibrillation in August.  Patient hypokalemia at that time since then has been on Aldactone. 3. Essential hypertension I10 401.9    4. Ventricular fibrillation ([facility] [address] I49.01 427.41     Episode happened in 8/2019 noted on ICD check. Patient was in hospital with altered mentation. She also had hypokalemia at that time. 5. Malignant neoplasm of upper-outer quadrant of left female breast, unspecified estrogen receptor status (HCC) C50.412 174.4    6. Mitral valve disease I05.9 394.9    7. Systolic CHF, chronic (HCC) I50.22 428.22      428.0     Considered CHF class III. Improved. Tolerating Entresto. Due to hypokalemia I will increase Aldactone and reduce Lasix to 2-3 times a week   Tounge swelling not related to lisinopril as she had swelling even after stopping lisinopril  7/2018  I will hold Lasix as recent decrease in renal function. No clinical sign of fluid overload. 10/2018  Shortness of breath due to acute bronchitis bilateral wheezing. Will refer back to pulmonary. No sign of fluid overload. Will keep off Lasix  1/2019  Metastatic breast cancer now back on chemotherapy. Lung metastasis likely cause for shortness of breath which is improving. Low blood pressure will reduce Coreg to 6.25 twice a day.   Continue lisinopril. Recheck echo in 2 months on chemotherapy  4/2019  Cardiac status stable. Echo EF remains stable continue Coreg and lisinopril. Check echo in 3 months. Patient remains on chemotherapy    5/2019  Recent admission with increased shortness of breath combination of pulmonary embolism and decompensated systolic heart failure with worsening ejection fraction. Class III CHF. 10 pound weight loss from peak. Continue current therapy. Continue anticoagulation. Follow-up 3 weeks  5/30/2019  Fluid overload stable. Remains weak. Follow-up echo in about a month to reassess LV function. 10/2019  Cardiac status stable. CHF compensated. Shortness of breath and edema improving. Continue Entresto. Aldactone increased to 50 mg a day due to hypokalemia. Lasix will be reduced to 2-3 times a week. Episode of ventricular fibrillation in 8/2019 noticed on ICD check. Patient had hypokalemia during that time will continue to monitor. If patient needs laparoscopic surgery her risk is high about 5 to 10%. If open surgical procedure is required it will carry high risk    1/2020  Cardiac status stable. She completed Chemotherapy in October and is doing well. She is tolerating Entresto. She takes lasix 2-3 x weekly as needed for edema. Reports blood work drawn this week with normal renal function and potassium. Reports an episode of syncope in Dr. Ramirez Rolling office in November and b/p readings were labile at that time. ICD checked 11/2019 (after syncopal episode) no arrhythmias seen. Continue current medications. No orders of the defined types were placed in this encounter. Follow-up and Dispositions    · Return in about 3 months (around 4/8/2020). I have independently evaluated taken history and examined the patient. All relevant labs and testing data's are reviewed. Care plan discussed and updated after review.   Alpa Montez MD

## 2020-01-08 NOTE — PATIENT INSTRUCTIONS

## 2020-01-20 ENCOUNTER — OFFICE VISIT (OUTPATIENT)
Dept: PULMONOLOGY | Age: 70
End: 2020-01-20

## 2020-01-20 VITALS
HEIGHT: 65 IN | HEART RATE: 100 BPM | SYSTOLIC BLOOD PRESSURE: 108 MMHG | RESPIRATION RATE: 18 BRPM | OXYGEN SATURATION: 98 % | TEMPERATURE: 95.4 F | DIASTOLIC BLOOD PRESSURE: 51 MMHG | BODY MASS INDEX: 27.97 KG/M2 | WEIGHT: 167.9 LBS

## 2020-01-20 DIAGNOSIS — J44.9 CHRONIC ASTHMATIC BRONCHITIS (HCC): ICD-10-CM

## 2020-01-20 DIAGNOSIS — C78.01 MALIGNANT NEOPLASM METASTATIC TO RIGHT LUNG (HCC): Primary | ICD-10-CM

## 2020-01-20 DIAGNOSIS — I42.0 DILATED CARDIOMYOPATHY (HCC): ICD-10-CM

## 2020-01-20 DIAGNOSIS — R94.2 ABNORMAL PFT: ICD-10-CM

## 2020-01-20 DIAGNOSIS — I27.82 OTHER CHRONIC PULMONARY EMBOLISM WITHOUT ACUTE COR PULMONALE (HCC): ICD-10-CM

## 2020-01-20 NOTE — COMMUNICATION BODY
[facility] Pulmonary, Critical Care, and Sleep Medicine Pulmonary Office visit Name: Jonathon Demarco : 1950 Date: 2020 Subjective:  
 
Here for follow-up-asthma, metastatic breast CA to lung. Pulmonary embolism 20 Pulmonary condition includes-metastatic breast CA-endobronchial obstruction with symptoms of shortness of breath and wheezing with of substantially improved after chemotherapy with reexpansion of atelectatic lung and improvement in postobstructive atelectasis. Also diagnosed with DVT and PE over the summer She completed Chemotherapy in October and is doing well. She had a follow-up PET scan  with improved findings discussed with her by her oncologist. 
She feels much better-was able to travel to her son's home for [place] For past few days she is complaining of left-sided chest wall pain-comes and goes and worse with moving Denies any significant cough ,wheezing or chest pain Denies any mucus Denies hemoptysis Denies any recent ER visits In addition she has cardiomyopathy with EF,30% , heart failure associated with chemotherapy. Cardiac -she is tolerating Entresto. She takes lasix 2-3 x weekly as needed for edema. Reports blood work drawn this week with normal renal function and potassium. Reports an episode of syncope in Dr. Sharpe Weatherford office in November and b/p readings were labile at that time. ICD checked 2019 (after syncopal episode) no arrhythmias seen. Had cholecystectomy without any complications on  Background history  
metastatic breast Ca- recurrence with Lung mets- parenchymal and endobronchial.  
She has shown good response to treatment which was recently validated with a PET CT. HPI: 
Patient is a 78 y.o. female has been experiencing SOB for past 1 year. SOB:  
Symptoms occur with exertion and at night.  Able to walk about about 3-4 Blocks. Cannot climb stairs. Activities of daily living are affected and improves with pacing. Has orthopnea/ paroxysmal nocturnal dyspnea SOB relieved with pacing and resting. C/o sinus congestion. Denies any significant Cough: 
Has some wheezing, no chest pain, fever, chills, night sweats dyspepsia, reflux. Has had AICD placed and replaced. Left mastectomy with chemo- radiation : 5 years back Weight gain of 50 lbs over few years. Comorbid conditions include- DM, HTN, CHF- nonischemic cardiomyopathy, Breast ca- treated: s/p mastectomy -L and chemo radiation Non smoker Occupational exposure-none Past Medical History:  
Diagnosis Date  Abnormal nuclear cardiac imaging test 06/11/2010 Lg fixed anterior, septal, apical, inferoseptal defect sugg RCA & LAD disease or cardiomyopathy. EF 20%. Global hykn. Nondiagnostic EKG.  Acute bronchitis 10/15/2018 Persistent cough and wheezing in spite of prednisone and antibiotic. 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   
 Asthma  Automatic implantable cardiac defibrillator in situ  Automatic implantable cardiac defibrillator in situ  Breast cancer ([facility] [address]  Cancer ([address] breast cancer left  Chemotherapy convalescence or palliative care 2012  Chronic combined systolic and diastolic heart failure ([address] Remains symptomatic, nyha class 3 ef improving.  Congestive heart failure, unspecified   
 chronic class ll  Echocardiogram 09/27/2010 EF 30%. Global hykn. Mild MR. Mild TR.  GERD (gastroesophageal reflux disease)  Hyperlipidemia  Hypertension  Mitral valve disorders(424.0) 1/15/2014  
 mild to moderate mr  Mitral valve disorders(424.0) 1/15/2014  
 mild to moderate mr  MVP (mitral valve prolapse)  Nonischemic cardiomyopathy ([address] EF 20-30% despite medical therapy  Nonspecific abnormal electrocardiogram (ECG) (EKG)  Osteopenia  Other primary cardiomyopathies  Pacemaker 10/27/10  
 s/p implantation, without complication  Poisoning by other and unspecified agents primarily affecting the cardiovascular system(972.9) Ef slightly better to 45%, STABLE back on chemo.  Radiation therapy  Radiation therapy complication 0681  S/P cardiac catheterization 07/08/10 Patent coronary arteries. LVEDP 25.  EF 25%. Global hykn. Moderate MR.  RA 10.  RV 40/10. PA 40/20.  20.  CO/CI 4.5/2.45 (Larry).  Shortness of breath  Syncope and collapse  Thyroid disease   
 goiter Past Surgical History:  
Procedure Laterality Date  CARDIAC SURG PROCEDURE UNLIST Difibrillator; BI V ICD  HX CHOLECYSTECTOMY  11/01/2019  HX MASTECTOMY  09/28/11  
 modified radical mastectomy and axillary dissection  HX ORTHOPAEDIC    
 HX OTHER SURGICAL    
 surgery to remove part of liver  HX PACEMAKER  10/27/10  
 s/p [employer] biventricular AICD, without complication  HX RADICAL MASTECTOMY  IR CHOLECYSTOSTOMY PERCUTANEOUS  9/20/2019  IR INSERT TUNL CVC W PORT OVER 5 YEARS  1/16/2019  
 NEUROLOGICAL PROCEDURE UNLISTED Cervical fusion Allergies Allergen Reactions  Adhesive Other (comments)  
  blisters Ethelene Gauss Current Outpatient Medications Medication Sig Dispense Refill  spironolactone (ALDACTONE) 25 mg tablet Take 2 Tabs by mouth daily. 180 Tab 3  carvedilol (COREG) 25 mg tablet Take 1 Tab by mouth two (2) times daily (with meals). 180 Tab 3  
 apixaban (ELIQUIS) 2.5 mg tablet Take 1 Tab by mouth two (2) times a day. 60 Tab 2  
 digoxin (LANOXIN) 0.125 mg tablet Take 1 Tab by mouth daily. 90 Tab 3  furosemide (LASIX) 20 mg tablet Take 1 Tab by mouth daily.  (Patient taking differently: Take 20 mg by mouth every Monday, Wednesday, Friday.) 30 Tab 0  
  tiotropium (SPIRIVA WITH HANDIHALER) 18 mcg inhalation capsule Take 1 Cap by inhalation daily.  sacubitril-valsartan (ENTRESTO) 49 mg/51 mg tablet Take 1 Tab by mouth two (2) times a day. 180 Tab 3  
 budesonide-formoterol (SYMBICORT) 160-4.5 mcg/actuation HFAA Take 2 Puffs by inhalation two (2) times a day. 1 Inhaler 0  
 albuterol-ipratropium (DUO-NEB) 2.5 mg-0.5 mg/3 ml nebu 3 mL by Nebulization route every six (6) hours as needed (wheezing or sob). File under Medicare Part B, ICD codes J44.9, C78.01 120 Nebule 3  
 DULoxetine (CYMBALTA) 60 mg capsule Take 60 mg by mouth daily.  montelukast (SINGULAIR) 10 mg tablet Take 1 Tab by mouth daily. 90 Tab 3  
 raloxifene (EVISTA) 60 mg tablet Take 60 mg by mouth daily. Review of Systems: 
HEENT: No epistaxis, no nasal drainage no difficulty in swallowing, no redness in eyes Respiratory: as above Cardiovascular: no chest pain, no palpitations, no chronic leg edema, no syncope Gastrointestinal: no abd pain, no vomiting, no diarrhea, no bleeding symptoms Genitourinary: No urinary symptoms or hematuria Integument/breast: No ulcers or rashes Musculoskeletal:Neg 
Neurological: No focal weakness, no seizures, no headaches Behvioral/Psych: No anxiety, no depression Constitutional: No fever, no chills, no weight loss, no night sweats Objective:  
 
Visit Vitals /51 (BP 1 Location: Right arm, BP Patient Position: Sitting) Pulse 100 Temp 95.4 °F (35.2 °C) (Oral) Resp 18 Ht 5' 5\" (1.651 m) Wt 76.2 kg (167 lb 14.4 oz) SpO2 98% BMI 27.94 kg/m² Physical Exam:  
General: comfortable, no acute distress HEENT: pupils reactive, sclera anicteric, EOM intact Neck: No adenopathy or thyroid swelling, no lymphadenopathy or JVD, supple Chest: multiple scars from previous surgery, surgically absent left breast, on palpation -tender spot left lower chest wall, no swelling or lesions noted CVS: S1S2 
 RS: Mod AE bilaterally, no tactile fremitus or egophony, no accessory muscle use, no rales, no wheezing Abd: soft, non tender, no hepatosplenomegaly, right upper quadrant drain Neuro: non focal, awake, alert Extrm: no leg edema, clubbing or cyanosis Skin: no rash Data review:  
Pertinent labs: CBC, BMP, LFT's PFT: 
 
Date FVC FEV1  FEV1/FVC CYF24-89 TLC RV RV/TLC VC DLCO  
6/29/2017 75 69 67 43 77 81 nl  62  
11/2018  46  35     49 FLOWS: 
Maximal Mid Expiratory Flow rate is reduced to 43 % predicted Forced Expiratory Volume in one second is reduced to 69 % predicted FEV 1% is reduced Volumes: All Volumes are reduced except for RV Flow Volume Loop: 
Nonspecific obstructive pattern in Flow Volume Loop Bronchodilator: No significant improvement with bronchodilator therapy Diffusion: Abnormal Diffusion Capacity reduced to 62 % predicted DLCO normalizes to alveolar ventilation Impression: Moderate obstructive defect, Predominately small airways, Mild restrictive ventilatory defect, Reduced diffusion capacity indicating a decrease in alveolar surface area for gas exchange 6 min walk test;walked 330 feet with no O2 desaturation or significant heart rate change. DI- stable 05/03/19 ECHO ADULT FOLLOW-UP OR LIMITED 05/03/2019 5/3/2019 Narrative · Left Ventricle: Mild concentric hypertrophy. Severe global systolic  
dysfunction. Estimated left ventricular ejection fraction is 26 - 30%. Biplane method used to measure ejection fraction. Left ventricular global  
hypokinesis. · IVC/Hepatic Veins: Severely elevated central venous pressure (15+ mmHg); IVC diameter is larger than 21 mm and collapses less than 50% with  
respiration. · Pulmonary Artery: Mild pulmonary hypertension. Pulmonary arterial  
systolic pressure is 44 mmHg. · Pericardium: Trivial-to-small circumferential pericardial effusion  
measuring 10 mm. · Mitral Valve: Moderate mitral valve regurgitation. · Right Ventricle: Pacing wire present Signed by: Yudi New MD  
 
Echo 3/2019: 
· Normal cavity size, wall thickness and diastolic function. There is low normal systolic function. The estimated ejection fraction is 51 - 55%. No regional wall motion abnormality noted. Global longitudinal strain is -13.00%. Abnormal left ventricular strain. · Normal right ventricular size and function. Pacing wire present · Mild to moderate mitral valve regurgitation. Mild prolapse of the posterior leaflet within the mitral valve. · Mild pulmonic valve regurgitation is present. · Mild tricuspid valve regurgitation. Pulmonary arterial systolic pressure is 17.1 mmHg. Mild pulmonary hypertension. Echo: 1/18/2017: 
Left ventricle: Systolic function was normal. Ejection fraction was 
estimated to be 50 %. There were no regional wall motion abnormalities. Doppler parameters were consistent with abnormal left ventricular 
relaxation (grade 1 diastolic dysfunction). Right ventricle: The size was normal. Systolic function was reduced. Left atrium: Size was normal. 
Right atrium: Size was normal. 
Mitral valve: There was systolic bowing of the anterior and posterior 
leaflets, but without diagnostic evidence for prolapse. There was mild 
regurgitation. Aortic valve: The valve was trileaflet. Leaflets exhibited normal 
thickness and normal cuspal separation. Tricuspid valve: Pulmonary artery systolic pressure: 18 mmHg. Pulmonic valve: There was mild regurgitation. Imaging: 
I have personally reviewed the patients radiographs and have reviewed the reports: 
CT Results (most recent): 
Results from Hospital Encounter encounter on 11/19/19 CT CHEST W CONT Narrative INDICATION: Breast carcinoma. EXAM: CT thorax.  
 
TECHNIQUE: Spiral images of the chest were performed according to routine 
protocol after administration of  70 cc of Isovue contrast media intravenously. Coronal and sagittal reconstructions were also provided for evaluation. COMPARISON: 9/18/2019 and 5/2/2019 exam's. All CT scans at this facility are performed using dose optimization technique as 
appropriate to a performed exam, to include automated exposure control, 
adjustment of the mA and/or kV according to patient size (including appropriate 
matching for site-specific examinations), or use of iterative reconstruction 
technique. CHEST FINDINGS: 
 
Lymph nodes: No lymphadenopathy. Thyroid: Right thyroid lobe nodules. Limited evaluation. Loletta Nunnery Mediastinum: Heart is enlarged without pericardial effusions. Small hiatal 
hernia. Lungs:  
 
Minimal bibasilar scarring. Left mastectomy. Development of nonobstructing left upper renal pole nephrolithiasis series 2 
image 59 measuring approximately 6 mm. Gallbladder has been removed. Small cystic lesion abutting the superior aspect of the uncinate process 
measuring approximate 1.5 x 1.6 in meters without significant interval change. Series 2 image 55 Bones: No lytic or blastic lesions. Impression IMPRESSION: 
 
1. Minimal bibasilar scarring. No pathologic adenopathy. No enhancing masses. 2.   Nonobstructing left upper renal pole calyceal stone. Limited evaluation. Follow-up with CT renal stone protocol is suggested. 3. Gallbladder has been removed. 4. Small unchanged cystic lesion abutting superior and posterior aspect of the 
uncinate process. Cyst versus cystic mass versus cystic adenopathy. Limited 
evaluation. 5. Left mastectomy. PET Results (most recent): 
Results from [facility] Encounter encounter on 01/02/20 PET/CT TUMOR IMAGE SKULL THIGH (SUB) Narrative POSITRON EMISSION TOMOGRAPHY (WITH LIMITED NONCONTRAST COMPUTED TOMOGRAPHY) CPT: 70230  INDICATION:  
 History of relapsed left breast carcinoma treated by neoadjuvant therapy, 
 mastectomy and hormonal therapy. Restaging study for subsequent treatment 
planning. REFERENCES: Prior PET 7/5/2019. CT chest 11/19/2019. TECHNIQUE:  
 Following administration of 17.7 mCi 18F-fluorodeoxyglucose (FDG) in the left 
infusion port, PET imaging was performed spanning from the skull base to the 
proximal thighs. 
-Serum glucose measured 131 mg/dl at the time of radiopharmaceutical 
administration. Prior to PET imaging, helically acquired CT imaging was performed during tidal 
respiration, after administration of oral contrast only. This is done for 
attenuation correction for the PET emission data and more precise anatomic 
localization of PET findings on the multiplanar and coregistered images. This 
imaging is preformed using reduced kVp and mAs to reduce radiation dose. >]   
  
 --  PET FINDINGS  --  
 No abnormal hypermetabolic activity appreciated in the neck. Elisa [place] No abnormal hypermetabolic activity appreciated in the chest within the 
limitations of extensive artifact from hardware. Typically heterogeneous metabolic activity associated with the liver. No 
discrete lesion. Spleen normal size and not hypermetabolic. There are no other areas of abnormal metabolic activity appreciated in the 
abdomen or pelvis which cannot be attributed to renal collecting system, ureter, 
bladder or bowel wall activity>]. No definite hypermetabolic bone lesions appreciated, although bone scan can be 
more sensitive in this respect. --  CT FINDINGS  -- These limited CT images do not replace diagnostic quality contrast enhanced CT 
scan for evaluation of solid organs, bowel, retroperitoneum and vasculature. CT NECK:  
 Severely limited by lack of intravenous contrast.  No appreciable 
lymphadenopathy. Streak artifact from lower cervical fusion hardware. Heterogeneous right thyroid nodularity versus nodule.  
  
 CT CHEST:  
 Limited evaluation of the hilum and vasculature without intravenous contrast. 
No pleural effusion. No new or enlarging lymphadenopathy. Portions of the 
mediastinum obscured by streak artifact from cardiac pacing wires. 
-Cardiomegaly. Tiny pericardial effusion, decreased from previous PET. Subsegmental atelectasis or scarring in the dependent lung bases bilaterally. Significant motion artifact. 
-Interval decrease in size of subcutaneous nodule right posterior shoulder image 44, not hypermetabolic. 
-Previously identified 6 mm nodular density superior right lower lobe along the 
fissure is smaller, 2 mm on image 50. Peggyann Gang CT ABDOMEN:  
 Limited noncontrast images. Significant motion artifact. 8 mm angiomyolipoma 
posterior left renal cortex. Similar 1.1 cm cystic nodule or lesion along the 
uncinate process of the pancreas image 113. CT PELVIS:  
 Limited noncontrast images. No bowel distention to suggest obstruction. Descending and sigmoid diverticulosis. Lobulated presumed fibroid uterus. Stable 1.3 cm soft tissue nodule right lateral to the rectum image 196 containing 
punctate calcifications. 
-Stable 8 mm sclerotic lesion posterior right iliac image 163, without 
hypermetabolic activity. Stable 6 mm sclerotic lesion left iliac image 180. Impression IMPRESSION: 
  
1. No focal hypermetabolic abnormalities appreciated to diagnose viable 
malignancy or new metastatic disease. 2. Several stable subcentimeter sclerotic bone lesions without appreciable 
hypermetabolism. 3. Incidental findings as above, including: Similar cardiomegaly with tiny 
effusion. Hepatic steatosis. Left renal angiomyolipoma. Stable cystic lesion 
along the uncinate process of the pancreas. Diverticulosis. Enlarged fibroid 
uterus. PET-CT 3/22/2019: 
IMPRESSION: 
1. Significant interval treatment response compared 12/18 -Hypermetabolic tumor has resolved -Some small lung nodules remain, nonspecific (no worsening of lung nodules) -Stable blastic manubrial metastasis 2. Mild superior endplate compression fracture T11 which is interval new and is 
probably benign/osteoporotic. 
-May have a few millimeter retropulsed bone but no osseous central spinal 
stenosis is evident 
  
Incidental findings: 
-Gallstone 
-Trace pericardial effusion. Cardiomegaly 
-Fibroid uterus XR Results (most recent): 
Results from Hospital Encounter encounter on 09/18/19 XR CHEST PORT Narrative EXAM:  Chest Portable. INDICATION:  Chest pain COMPARISON:  09/18/19 TECHNIQUE:  Portable AP chest study FINDINGS:  
  
- ICD hub in the right upper torso region with 3 intact leads through the left 
subclavian approach. - Left IJ approach single-chamber infusion port in place. 
- Both lungs are hypoinflated. - Bandlike densities are noted in the retrocardiac region and in the right upper 
lung zone medial aspect. Most likely related to subsegmental atelectatic 
changes vs. scarring. Subtle streaky densities in the left mid lung zone. Developing infiltrate cannot be excluded for the right upper lung zone and in 
the left mid lung zone. - No costophrenic angle blunting or pneumothorax. - No significant cardiac silhouette enlargement. Impression IMPRESSION: 
 
1. Pacemaker and left IJ infusion port identified, unchanged in interval.  No 
pneumothorax. 2.  Atelectatic changes bilaterally. Subtle developing infiltrate cannot be 
excluded for the left mid lung zone and right upper lung zone. CXR 8/26/2014: 
 
AICD. No change in lead placement. No visualized lead fracture. Lungs appear 
unremarkable. Normal size heart. Impression: No acute findings. Patient Active Problem List  
Diagnosis Code  Hypertension I10  
 MVP (mitral valve prolapse) I34.1  Nonischemic cardiomyopathy (HCC) I42.8  Cancer ([facility] [address] C80.1  Adenocarcinoma of breast; locally advanced invasive ductal adenocarcinoma of left breast C50.919  
 Poisoning by other and unspecified agents primarily affecting the cardiovascular system(972.9) T46.904A  Syncope and collapse R55  Systolic CHF, chronic (HCC) I50.22  
 Other primary cardiomyopathies I42.8  Shortness of breath R06.02  
 Nonspecific abnormal electrocardiogram (ECG) (EKG) R94.31  
 Automatic implantable cardioverter-defibrillator in situ Z95.810  
 Mitral valve disease I05.9  Abnormal PFT R94.2  Acute bronchitis J20.9  Chronic asthmatic bronchitis ([facility] [address] J44.9  Malignant neoplasm metastatic to lung (HCC) C78.00  Seasonal allergic rhinitis due to pollen J30.1  Dilated cardiomyopathy (HCC) I42.0  Breast cancer ([facility] [address] C50.919  
 Pulmonary embolism (HCC) I26.99  
 Chronic bronchitis ([facility] [address] J42  Hypoxia R09.02  
 Lung metastases (HCC) C78.00  
 UTI (urinary tract infection) N39.0  CAD (coronary artery disease) I25.10  Elevated troponin R79.89  Weakness generalized R53.1  Chronic anticoagulation Z79.01  
 History of pulmonary embolism Z86.711  
 Hypokalemia E87.6  Hypomagnesemia E83.42  
 Acute encephalopathy G93.40  Cholecystitis K81.9  Ventricular fibrillation (HCC) I49.01  
 Cholecystitis, unspecified K81.9 IMPRESSION:  
 
· Chest wall pain-most likely musculoskeletal.  Reproducible with palpation. Explained PET scan findings to patient-no concerning lesions noted in that area · Asthma/reactive airways-significantly improved control after treatment of endobronchial metastatic breast CA. patient has not been using her inhalers and is able to tolerate change in temperatures cold air · DVT and PE-insetting of metastatic CA. On anticoagulation and will need lifelong · Metastatic breast cancer to Lung with extensive endobronchial involvement. Clinical response to chemotherapy. Improved PET/CT findings and symptoms of wheezing shortness of breath and cough. · SOB on exertion - Carcinomatosis and  Endobronchial inflammation/obstruction from tumor load. -Further complicated by cardiomyopathy- improving with adjustments in medications · Abnormal PFT- combined restrictive and Obstructive component-progression noted -Asthma with reduced DLCO ( corrects with Volume adjustment.) Suspect restrictive component from chest wall deformity. · Pulm HTN-new likely group 2 and 3 
· H/o invasive ductal breast Ca · H/o non ischemic cardiomyopathy- recent worsening LV function EF of 30% · AICD RECOMMENDATIONS:  
 
· Continue with current observation- 
· We will use nonpharmacologic therapy-heating pad and take some Tylenol for pain. We will consider further evaluation if it does not resolve in next few days · Will continue Incruse and Symbicort-she can use sparingly with seasonal changes · Incentive spirometry · Continue anticoagulation-Eliquis lifelong-hold perioperatively · Continue optimizing cardiac medications for cardiomyopathy · Continue nebulized bronchodilators PRN 
· Continue singulair , Nasonex · GERD precautions · Preventive vaccinations- recieved · Monitor response to interventions and make appropriate adjustments · Defer further imaging and oncologic surveillance to oncology · Discussed with patient and  · Follow-up in 6 months Health maintenance screens deferred to Primary care provider.  
  
Jaxon Nunez MD

## 2020-01-20 NOTE — PROGRESS NOTES
Gilda Mobley presents today for   Chief Complaint   Patient presents with    Lung Nodule     CT done 11/19, PET CT done 1/2    Shortness of Breath    Other     Pulm HTN       Is someone accompanying this pt?  Luly Weathers     Is the patient using any DME equipment during [address]? No     -DME Company n/a     Depression Screening:  3 most recent PHQ Screens 1/20/2020   Little interest or pleasure in doing things Not at all   Feeling down, depressed, irritable, or hopeless Not at all   Total Score PHQ 2 0       Learning Assessment:  Learning Assessment 10/9/2019   PRIMARY LEARNER Patient   HIGHEST LEVEL OF EDUCATION - PRIMARY LEARNER  -   BARRIERS PRIMARY LEARNER NONE   PRIMARY LANGUAGE ENGLISH   LEARNER PREFERENCE PRIMARY DEMONSTRATION   ANSWERED BY self     -   RELATIONSHIP SELF       Abuse Screening:  Abuse Screening Questionnaire 4/30/2019   Do you ever feel afraid of your partner? N   Are you in a relationship with someone who physically or mentally threatens you? N   Is it safe for you to go home? Y       Fall Risk  Fall Risk Assessment, last 12 mths 1/20/2020   Able to walk? Yes   Fall in past 12 months? Yes   Fall with injury? No   Number of falls in past 12 months 1   Fall Risk Score 1         Coordination of Care:  1. Have you been to the ER, urgent care clinic since your last visit? Hospitalized since your last visit? Yes; Name: [facility]    2. Have you seen or consulted any other health care providers outside of the [address] [place] [address] since your last visit? Include any pap smears or colon screening.  Dr Araseli Wesley PCP, Dr Abrahan Quiles

## 2020-01-20 NOTE — LETTER
1/20/20 Patient: Zohreh Colon YOB: 1950 Date of Visit: 1/20/2020 Faye Osuna MD 
[address] [place] [id number] VIA Facsimile: [phone] Dear Faye Osuna MD, Thank you for referring Ms. Krystal Lynn to [address] [place] [address] for evaluation. My notes for this consultation are attached. If you have questions, please do not hesitate to call me. I look forward to following your patient along with you.  
 
 
Sincerely, 
 
Shira Morales MD 
 
 Yanira Doan is not participating in Recreational Therapy. Group time: 1600    Personal goal for participation: fresh air break    Goal orientation: social    Group therapy participation: refuse    Therapeutic interventions reviewed and discussed: Staff encouraged Pt.  To participate in group    Impression of participation: Pt refuse, chose to pace despite staff encouragement

## 2020-01-20 NOTE — PROGRESS NOTES
[facility]  Pulmonary, Critical Care, and Sleep Medicine      Pulmonary Office visit    Name: Nora Rajan     : 1950     Date: 2020        Subjective:     Here for follow-up-asthma, metastatic breast CA to lung. Pulmonary embolism    20     Pulmonary condition includes-metastatic breast CA-endobronchial obstruction with symptoms of shortness of breath and wheezing with of substantially improved after chemotherapy with reexpansion of atelectatic lung and improvement in postobstructive atelectasis. Also diagnosed with DVT and PE over the summer  She completed Chemotherapy in October and is doing well. She had a follow-up PET scan  with improved findings discussed with her by her oncologist.  She feels much better-was able to travel to her son's home for [place]  For past few days she is complaining of left-sided chest wall pain-comes and goes and worse with moving  Denies any significant cough ,wheezing or chest pain  Denies any mucus  Denies hemoptysis  Denies any recent ER visits  In addition she has cardiomyopathy with EF,30% , heart failure associated with chemotherapy. Cardiac -she is tolerating Entresto. She takes lasix 2-3 x weekly as needed for edema. Reports blood work drawn this week with normal renal function and potassium. Reports an episode of syncope in Dr. Tavares Donovan office in November and b/p readings were labile at that time. ICD checked 2019 (after syncopal episode) no arrhythmias seen. Had cholecystectomy without any complications on       Background history   metastatic breast Ca- recurrence with Lung mets- parenchymal and endobronchial.   She has shown good response to treatment which was recently validated with a PET CT.    HPI:  Patient is a 78 y.o. female has been experiencing SOB for past 1 year. SOB:   Symptoms occur with exertion and at night. Able to walk about about 3-4  Blocks. Cannot climb stairs.  Activities of daily living are affected and improves with pacing. Has orthopnea/ paroxysmal nocturnal dyspnea  SOB relieved with pacing and resting. C/o sinus congestion. Denies any significant Cough:  Has some wheezing, no chest pain, fever, chills, night sweats dyspepsia, reflux. Has had AICD placed and replaced. Left mastectomy with chemo- radiation : 5 years back  Weight gain of 50 lbs over few years. Comorbid conditions include- DM, HTN, CHF- nonischemic cardiomyopathy, Breast ca- treated: s/p mastectomy -L and chemo radiation  Non smoker  Occupational exposure-none    Past Medical History:   Diagnosis Date    Abnormal nuclear cardiac imaging test 06/11/2010    Lg fixed anterior, septal, apical, inferoseptal defect sugg RCA & LAD disease or cardiomyopathy. EF 20%. Global hykn. Nondiagnostic EKG.  Acute bronchitis 10/15/2018    Persistent cough and wheezing in spite of prednisone and antibiotic. Will refer to pulmonary    Adenocarcinoma of breast; locally advanced invasive ductal adenocarcinoma of left breast     Asthma     Automatic implantable cardiac defibrillator in situ     Automatic implantable cardiac defibrillator in situ     [facility]     Cancer ([facility]     breast cancer left    Chemotherapy convalescence or palliative care 2012    Chronic combined systolic and diastolic heart failure (HCC)     Remains symptomatic, nyha class 3 ef improving.  Congestive heart failure, unspecified     chronic class ll    Echocardiogram 09/27/2010    EF 30%. Global hykn. Mild MR. Mild TR.     GERD (gastroesophageal reflux disease)     Hyperlipidemia     Hypertension     Mitral valve disorders(424.0) 1/15/2014    mild to moderate mr     Mitral valve disorders(424.0) 1/15/2014    mild to moderate mr     MVP (mitral valve prolapse)     Nonischemic cardiomyopathy (HCC)     EF 20-30% despite medical therapy    Nonspecific abnormal electrocardiogram (ECG) (EKG)     Osteopenia     Other primary cardiomyopathies     Pacemaker 10/27/10    s/p implantation, without complication    Poisoning by other and unspecified agents primarily affecting the cardiovascular system(972.9)     Ef slightly better to 45%, STABLE back on chemo.  Radiation therapy     Radiation therapy complication 4516    S/P cardiac catheterization 07/08/10    Patent coronary arteries. LVEDP 25.  EF 25%. Global hykn. Moderate MR.  RA 10.  RV 40/10. PA 40/20.  20.  CO/CI 4.5/2.45 (Larry).  Shortness of breath     Syncope and collapse     Thyroid disease     goiter     Past Surgical History:   Procedure Laterality Date    CARDIAC SURG PROCEDURE UNLIST      Difibrillator; BI V ICD    HX CHOLECYSTECTOMY  11/01/2019    HX MASTECTOMY  09/28/11    modified radical mastectomy and axillary dissection    HX ORTHOPAEDIC      HX OTHER SURGICAL      surgery to remove part of liver    HX PACEMAKER  10/27/10    s/p [employer] biventricular AICD, without complication    HX RADICAL MASTECTOMY      IR CHOLECYSTOSTOMY PERCUTANEOUS  9/20/2019    IR INSERT TUNL CVC W PORT OVER 5 YEARS  1/16/2019    NEUROLOGICAL PROCEDURE UNLISTED      Cervical fusion       Allergies   Allergen Reactions    Adhesive Other (comments)     blisters     . Current Outpatient Medications   Medication Sig Dispense Refill    spironolactone (ALDACTONE) 25 mg tablet Take 2 Tabs by mouth daily. 180 Tab 3    carvedilol (COREG) 25 mg tablet Take 1 Tab by mouth two (2) times daily (with meals). 180 Tab 3    apixaban (ELIQUIS) 2.5 mg tablet Take 1 Tab by mouth two (2) times a day. 60 Tab 2    digoxin (LANOXIN) 0.125 mg tablet Take 1 Tab by mouth daily. 90 Tab 3    furosemide (LASIX) 20 mg tablet Take 1 Tab by mouth daily. (Patient taking differently: Take 20 mg by mouth every Monday, Wednesday, Friday.) 30 Tab 0    tiotropium (SPIRIVA WITH HANDIHALER) 18 mcg inhalation capsule Take 1 Cap by inhalation daily.       sacubitril-valsartan (ENTRESTO) 49 mg/51 mg tablet Take 1 Tab by mouth two (2) times a day. 180 Tab 3    budesonide-formoterol (SYMBICORT) 160-4.5 mcg/actuation HFAA Take 2 Puffs by inhalation two (2) times a day. 1 Inhaler 0    albuterol-ipratropium (DUO-NEB) 2.5 mg-0.5 mg/3 ml nebu 3 mL by Nebulization route every six (6) hours as needed (wheezing or sob). File under Medicare Part B, ICD codes J44.9, C78.01 120 Nebule 3    DULoxetine (CYMBALTA) 60 mg capsule Take 60 mg by mouth daily.  montelukast (SINGULAIR) 10 mg tablet Take 1 Tab by mouth daily. 90 Tab 3    raloxifene (EVISTA) 60 mg tablet Take 60 mg by mouth daily.        Review of Systems:  HEENT: No epistaxis, no nasal drainage no difficulty in swallowing, no redness in eyes  Respiratory: as above  Cardiovascular: no chest pain, no palpitations, no chronic leg edema, no syncope  Gastrointestinal: no abd pain, no vomiting, no diarrhea, no bleeding symptoms  Genitourinary: No urinary symptoms or hematuria  Integument/breast: No ulcers or rashes  Musculoskeletal:Neg  Neurological: No focal weakness, no seizures, no headaches  Behvioral/Psych: No anxiety, no depression  Constitutional: No fever, no chills, no weight loss, no night sweats     Objective:     Visit Vitals  /51 (BP 1 Location: Right arm, BP Patient Position: Sitting)   Pulse 100   Temp 95.4 °F (35.2 °C) (Oral)   Resp 18   Ht 5' 5\" (1.651 m)   Wt 76.2 kg (167 lb 14.4 oz)   SpO2 98%   BMI 27.94 kg/m²        Physical Exam:   General: comfortable, no acute distress  HEENT: pupils reactive, sclera anicteric, EOM intact  Neck: No adenopathy or thyroid swelling, no lymphadenopathy or JVD, supple  Chest: multiple scars from previous surgery, surgically absent left breast, on palpation -tender spot left lower chest wall, no swelling or lesions noted  CVS: S1S2  RS: Mod AE bilaterally, no tactile fremitus or egophony, no accessory muscle use, no rales, no wheezing  Abd: soft, non tender, no hepatosplenomegaly, right upper quadrant drain  Neuro: non focal, awake, alert  Extrm: no leg edema, clubbing or cyanosis  Skin: no rash    Data review:   Pertinent labs: CBC, BMP, LFT's    PFT:    Date FVC FEV1  FEV1/FVC FMP00-01 TLC RV RV/TLC VC DLCO   6/29/2017 75 69 67 43 77 81 nl  62   11/2018  46  35     49                           FLOWS:  Maximal Mid Expiratory Flow rate is reduced to 43 % predicted  Forced Expiratory Volume in one second is reduced to 69 % predicted  FEV 1% is reduced   Volumes: All Volumes are reduced except for RV  Flow Volume Loop:  Nonspecific obstructive pattern in Flow Volume Loop  Bronchodilator:  No significant improvement with bronchodilator therapy  Diffusion:  Abnormal Diffusion Capacity reduced to 62 % predicted  DLCO normalizes to alveolar ventilation  Impression:  Moderate obstructive defect, Predominately small airways, Mild restrictive ventilatory defect, Reduced diffusion capacity indicating a decrease in alveolar surface area for gas exchange    6 min walk test;walked 330 feet with no O2 desaturation or significant heart rate change. DI- stable  05/03/19   ECHO ADULT FOLLOW-UP OR LIMITED 05/03/2019 5/3/2019    Narrative · Left Ventricle: Mild concentric hypertrophy. Severe global systolic   dysfunction. Estimated left ventricular ejection fraction is 26 - 30%. Biplane method used to measure ejection fraction. Left ventricular global   hypokinesis. · IVC/Hepatic Veins: Severely elevated central venous pressure (15+ mmHg);   IVC diameter is larger than 21 mm and collapses less than 50% with   respiration. · Pulmonary Artery: Mild pulmonary hypertension. Pulmonary arterial   systolic pressure is 44 mmHg. · Pericardium: Trivial-to-small circumferential pericardial effusion   measuring 10 mm. · Mitral Valve: Moderate mitral valve regurgitation. · Right Ventricle: Pacing wire present        Signed by: Bijal Valencia MD     Echo 3/2019:  · Normal cavity size, wall thickness and diastolic function.  There is low normal systolic function. The estimated ejection fraction is 51 - 55%. No regional wall motion abnormality noted. Global longitudinal strain is -13.00%. Abnormal left ventricular strain. · Normal right ventricular size and function. Pacing wire present  · Mild to moderate mitral valve regurgitation. Mild prolapse of the posterior leaflet within the mitral valve. · Mild pulmonic valve regurgitation is present. · Mild tricuspid valve regurgitation. Pulmonary arterial systolic pressure is 60.3 mmHg. Mild pulmonary hypertension. Echo: 1/18/2017:  Left ventricle: Systolic function was normal. Ejection fraction was  estimated to be 50 %. There were no regional wall motion abnormalities. Doppler parameters were consistent with abnormal left ventricular  relaxation (grade 1 diastolic dysfunction). Right ventricle: The size was normal. Systolic function was reduced. Left atrium: Size was normal.  Right atrium: Size was normal.  Mitral valve: There was systolic bowing of the anterior and posterior  leaflets, but without diagnostic evidence for prolapse. There was mild  regurgitation. Aortic valve: The valve was trileaflet. Leaflets exhibited normal  thickness and normal cuspal separation. Tricuspid valve: Pulmonary artery systolic pressure: 18 mmHg. Pulmonic valve: There was mild regurgitation. Imaging:  I have personally reviewed the patients radiographs and have reviewed the reports:  CT Results (most recent):  Results from Hospital Encounter encounter on 11/19/19   CT CHEST W CONT    Narrative INDICATION: Breast carcinoma. EXAM: CT thorax. TECHNIQUE: Spiral images of the chest were performed according to routine  protocol after administration of  70 cc of Isovue contrast media intravenously. Coronal and sagittal reconstructions were also provided for evaluation. COMPARISON: 9/18/2019 and 5/2/2019 exam's.     All CT scans at this facility are performed using dose optimization technique as  appropriate to a performed exam, to include automated exposure control,  adjustment of the mA and/or kV according to patient size (including appropriate  matching for site-specific examinations), or use of iterative reconstruction  technique. CHEST FINDINGS:    Lymph nodes: No lymphadenopathy. Thyroid: Right thyroid lobe nodules. Limited evaluation. .    Mediastinum: Heart is enlarged without pericardial effusions. Small hiatal  hernia. Lungs:     Minimal bibasilar scarring. Left mastectomy. Development of nonobstructing left upper renal pole nephrolithiasis series 2  image 59 measuring approximately 6 mm. Gallbladder has been removed. Small cystic lesion abutting the superior aspect of the uncinate process  measuring approximate 1.5 x 1.6 in meters without significant interval change. Series 2 image 55    Bones: No lytic or blastic lesions. Impression IMPRESSION:    1. Minimal bibasilar scarring. No pathologic adenopathy. No enhancing masses. 2.   Nonobstructing left upper renal pole calyceal stone. Limited evaluation. Follow-up with CT renal stone protocol is suggested. 3. Gallbladder has been removed. 4. Small unchanged cystic lesion abutting superior and posterior aspect of the  uncinate process. Cyst versus cystic mass versus cystic adenopathy. Limited  evaluation. 5. Left mastectomy. PET Results (most recent):  Results from [place] encounter on 01/02/20   PET/CT TUMOR IMAGE SKULL THIGH (SUB)    Narrative POSITRON EMISSION TOMOGRAPHY (WITH LIMITED NONCONTRAST COMPUTED TOMOGRAPHY)      CPT: 87515      INDICATION:    History of relapsed left breast carcinoma treated by neoadjuvant therapy,  mastectomy and hormonal therapy. Restaging study for subsequent treatment  planning. REFERENCES: Prior PET 7/5/2019. CT chest 11/19/2019.       TECHNIQUE:    Following administration of 17.7 mCi 18F-fluorodeoxyglucose (FDG) in the left  infusion port, PET imaging was performed spanning from the skull base to the  proximal thighs.  -Serum glucose measured 131 mg/dl at the time of radiopharmaceutical  administration. Prior to PET imaging, helically acquired CT imaging was performed during tidal  respiration, after administration of oral contrast only. This is done for  attenuation correction for the PET emission data and more precise anatomic  localization of PET findings on the multiplanar and coregistered images. This  imaging is preformed using reduced kVp and mAs to reduce radiation dose. >]        --  PET FINDINGS  --    No abnormal hypermetabolic activity appreciated in the neck. .      No abnormal hypermetabolic activity appreciated in the chest within the  limitations of extensive artifact from hardware. Typically heterogeneous metabolic activity associated with the liver. No  discrete lesion. Spleen normal size and not hypermetabolic. There are no other areas of abnormal metabolic activity appreciated in the  abdomen or pelvis which cannot be attributed to renal collecting system, ureter,  bladder or bowel wall activity>]. No definite hypermetabolic bone lesions appreciated, although bone scan can be  more sensitive in this respect. --  CT FINDINGS  --     These limited CT images do not replace diagnostic quality contrast enhanced CT  scan for evaluation of solid organs, bowel, retroperitoneum and vasculature. CT NECK:    Severely limited by lack of intravenous contrast.  No appreciable  lymphadenopathy. Streak artifact from lower cervical fusion hardware. Heterogeneous right thyroid nodularity versus nodule. CT CHEST:    Limited evaluation of the hilum and vasculature without intravenous contrast.  No pleural effusion. No new or enlarging lymphadenopathy. Portions of the  mediastinum obscured by streak artifact from cardiac pacing wires.  -Cardiomegaly. Tiny pericardial effusion, decreased from previous PET.   Subsegmental atelectasis or scarring in the dependent lung bases bilaterally. Significant motion artifact.  -Interval decrease in size of subcutaneous nodule right posterior shoulder image  44, not hypermetabolic.  -Previously identified 6 mm nodular density superior right lower lobe along the  fissure is smaller, 2 mm on image 50. [facility] CT ABDOMEN:    Limited noncontrast images. Significant motion artifact. 8 mm angiomyolipoma  posterior left renal cortex. Similar 1.1 cm cystic nodule or lesion along the  uncinate process of the pancreas image 113. CT PELVIS:    Limited noncontrast images. No bowel distention to suggest obstruction. Descending and sigmoid diverticulosis. Lobulated presumed fibroid uterus. Stable  1.3 cm soft tissue nodule right lateral to the rectum image 196 containing  punctate calcifications.  -Stable 8 mm sclerotic lesion posterior right iliac image 163, without  hypermetabolic activity. Stable 6 mm sclerotic lesion left iliac image 180. Impression IMPRESSION:     1. No focal hypermetabolic abnormalities appreciated to diagnose viable  malignancy or new metastatic disease. 2. Several stable subcentimeter sclerotic bone lesions without appreciable  hypermetabolism. 3. Incidental findings as above, including: Similar cardiomegaly with tiny  effusion. Hepatic steatosis. Left renal angiomyolipoma. Stable cystic lesion  along the uncinate process of the pancreas. Diverticulosis. Enlarged fibroid  uterus. PET-CT 3/22/2019:  IMPRESSION:  1. Significant interval treatment response compared 07/45  -Hypermetabolic tumor has resolved  -Some small lung nodules remain, nonspecific (no worsening of lung nodules)  -Stable blastic manubrial metastasis  2.  Mild superior endplate compression fracture T11 which is interval new and is  probably benign/osteoporotic.  -May have a few millimeter retropulsed bone but no osseous central spinal  stenosis is evident     Incidental findings:  -Gallstone  -Trace pericardial effusion. Cardiomegaly  -Fibroid uterus    XR Results (most recent):  Results from Hospital Encounter encounter on 09/18/19   XR CHEST PORT    Narrative EXAM:  Chest Portable. INDICATION:  Chest pain     COMPARISON:  09/18/19     TECHNIQUE:  Portable AP chest study    FINDINGS:      - ICD hub in the right upper torso region with 3 intact leads through the left  subclavian approach. - Left IJ approach single-chamber infusion port in place.  - Both lungs are hypoinflated. - Bandlike densities are noted in the retrocardiac region and in the right upper  lung zone medial aspect. Most likely related to subsegmental atelectatic  changes vs. scarring. Subtle streaky densities in the left mid lung zone. Developing infiltrate cannot be excluded for the right upper lung zone and in  the left mid lung zone. - No costophrenic angle blunting or pneumothorax. - No significant cardiac silhouette enlargement. Impression IMPRESSION:    1. Pacemaker and left IJ infusion port identified, unchanged in interval.  No  pneumothorax. 2.  Atelectatic changes bilaterally. Subtle developing infiltrate cannot be  excluded for the left mid lung zone and right upper lung zone. CXR 8/26/2014:    AICD. No change in lead placement. No visualized lead fracture. Lungs appear  unremarkable. Normal size heart. Impression: No acute findings.      Patient Active Problem List   Diagnosis Code    Hypertension I10    MVP (mitral valve prolapse) I34.1    Nonischemic cardiomyopathy (HCC) I42.8    Cancer (HCC) C80.1    Adenocarcinoma of breast; locally advanced invasive ductal adenocarcinoma of left breast C50.919    Poisoning by other and unspecified agents primarily affecting the cardiovascular system(972.9) T46.904A    Syncope and collapse E27    Systolic CHF, chronic (HCC) I50.22    Other primary cardiomyopathies I42.8    Shortness of breath R06.02    Nonspecific abnormal electrocardiogram (ECG) (EKG) R94.31    Automatic implantable cardioverter-defibrillator in situ Z95.810    Mitral valve disease I05.9    Abnormal PFT R94.2    Acute bronchitis J20.9    Chronic asthmatic bronchitis (HCC) J44.9    Malignant neoplasm metastatic to lung (HCC) C78.00    Seasonal allergic rhinitis due to pollen J30.1    Dilated cardiomyopathy (HCC) I42.0    Breast cancer (HCC) C50.919    Pulmonary embolism (HCC) I26.99    Chronic bronchitis (HCC) J42    Hypoxia R09.02    Lung metastases (HCC) C78.00    UTI (urinary tract infection) N39.0    CAD (coronary artery disease) I25.10    Elevated troponin R79.89    Weakness generalized R53.1    Chronic anticoagulation Z79.01    History of pulmonary embolism Z86.711    Hypokalemia E87.6    Hypomagnesemia E83.42    Acute encephalopathy G93.40    Cholecystitis K81.9    Ventricular fibrillation (HCC) I49.01    Cholecystitis, unspecified K81.9     IMPRESSION:     · Chest wall pain-most likely musculoskeletal.  Reproducible with palpation. Explained PET scan findings to patient-no concerning lesions noted in that area  · Asthma/reactive airways-significantly improved control after treatment of endobronchial metastatic breast CA. patient has not been using her inhalers and is able to tolerate change in temperatures cold air  · DVT and PE-insetting of metastatic CA. On anticoagulation and will need lifelong  · Metastatic breast cancer to Lung with extensive endobronchial involvement. Clinical response to chemotherapy. Improved PET/CT findings and symptoms of wheezing shortness of breath and cough. · SOB on exertion - Carcinomatosis and  Endobronchial inflammation/obstruction from tumor load.  -Further complicated by cardiomyopathy- improving with adjustments in medications  · Abnormal PFT- combined restrictive and Obstructive component-progression noted -Asthma with reduced DLCO ( corrects with Volume adjustment.) Suspect restrictive component from chest wall deformity. · Pulm HTN-new likely group 2 and 3  · H/o invasive ductal breast Ca  · H/o non ischemic cardiomyopathy- recent worsening LV function EF of 30%  · AICD      RECOMMENDATIONS:     · Continue with current observation-  · We will use nonpharmacologic therapy-heating pad and take some Tylenol for pain. We will consider further evaluation if it does not resolve in next few days  · Will continue Incruse and Symbicort-she can use sparingly with seasonal changes  · Incentive spirometry  · Continue anticoagulation-Eliquis lifelong-hold perioperatively  · Continue optimizing cardiac medications for cardiomyopathy  · Continue nebulized bronchodilators PRN  · Continue singulair , Nasonex  · GERD precautions  · Preventive vaccinations- recieved  · Monitor response to interventions and make appropriate adjustments  · Defer further imaging and oncologic surveillance to oncology  · Discussed with patient and   · Follow-up in 6 months     Health maintenance screens deferred to Primary care provider.      Emerita Lopez MD

## 2020-03-12 ENCOUNTER — HOSPITAL ENCOUNTER (OUTPATIENT)
Dept: PET IMAGING | Age: 70
Discharge: HOME | End: 2020-03-12
Attending: INTERNAL MEDICINE
Payer: MEDICARE

## 2020-03-12 DIAGNOSIS — C50.412 MALIGNANT NEOPLASM OF UPPER-OUTER QUADRANT OF LEFT FEMALE BREAST (HCC): ICD-10-CM

## 2020-03-12 PROCEDURE — A9552 F18 FDG: HCPCS

## 2020-04-01 ENCOUNTER — VIRTUAL VISIT (OUTPATIENT)
Dept: CARDIOLOGY CLINIC | Age: 70
End: 2020-04-01

## 2020-04-01 VITALS
HEIGHT: 65 IN | DIASTOLIC BLOOD PRESSURE: 53 MMHG | TEMPERATURE: 97.2 F | HEART RATE: 82 BPM | BODY MASS INDEX: 27.32 KG/M2 | SYSTOLIC BLOOD PRESSURE: 110 MMHG | WEIGHT: 164 LBS

## 2020-04-01 DIAGNOSIS — I42.8 NONISCHEMIC CARDIOMYOPATHY (HCC): ICD-10-CM

## 2020-04-01 DIAGNOSIS — Z95.810 AUTOMATIC IMPLANTABLE CARDIOVERTER-DEFIBRILLATOR IN SITU: ICD-10-CM

## 2020-04-01 DIAGNOSIS — I50.22 SYSTOLIC CHF, CHRONIC (HCC): Primary | ICD-10-CM

## 2020-04-01 DIAGNOSIS — I10 ESSENTIAL HYPERTENSION: ICD-10-CM

## 2020-04-01 NOTE — PROGRESS NOTES
1. Have you been to the ER, urgent care clinic since your last visit? Hospitalized since your last visit? No    2. Have you seen or consulted any other health care providers outside of the [address] [place] [address] since your last visit? Include any pap smears or colon screening.  Yes Where: Oncology Reason for visit: Routine Visit

## 2020-04-01 NOTE — PROGRESS NOTES
Dannielle Turner is a 69 y.o. female who was seen by synchronous (real-time) audio-video technology on 4/1/2020. Consent:  She and/or her healthcare decision maker is aware that this patient-initiated Telehealth encounter is a billable service, with coverage as determined by her insurance carrier. She is aware that she may receive a bill and has provided verbal consent to proceed: Yes    712  Subjective:   Dannielle Turner was seen for Hypertension (3 Month Follow Up)    Patient is seen today for video visit. She has a history of cardiomyopathy, systolic CHF and ICD placement. On follow-up her shortness of breath is stable mainly exertional edema is improving. No orthopnea PND. No fever. Coding Help - Use CPT Codes 38368-63703, [id number] for Established and New Patients respectively, either employing EM elements or Time rules. Other codes (example consult codes) may also apply. Family History   Problem Relation Age of Onset    Hypertension Mother     High Cholesterol Mother     Heart Disease Father         paemaker implant at 80     Past Surgical History:   Procedure Laterality Date    CARDIAC SURG PROCEDURE UNLIST      Difibrillator; BI V ICD    HX CHOLECYSTECTOMY  11/01/2019    HX MASTECTOMY  09/28/11    modified radical mastectomy and axillary dissection    HX ORTHOPAEDIC      HX OTHER SURGICAL      surgery to remove part of liver    HX PACEMAKER  10/27/10    s/p [employer] biventricular AICD, without complication    HX RADICAL MASTECTOMY      IR CHOLECYSTOSTOMY PERCUTANEOUS  9/20/2019    IR INSERT TUNL CVC W PORT OVER 5 YEARS  1/16/2019    NEUROLOGICAL PROCEDURE UNLISTED      Cervical fusion     Allergies   Allergen Reactions    Adhesive Other (comments)     blisters       Current Outpatient Medications:     spironolactone (ALDACTONE) 25 mg tablet, Take 2 Tabs by mouth daily. , Disp: 180 Tab, Rfl: 3    carvedilol (COREG) 25 mg tablet, Take 1 Tab by mouth two (2) times daily (with meals). , Disp: 180 Tab, Rfl: 3    apixaban (ELIQUIS) 2.5 mg tablet, Take 1 Tab by mouth two (2) times a day., Disp: 60 Tab, Rfl: 2    digoxin (LANOXIN) 0.125 mg tablet, Take 1 Tab by mouth daily. , Disp: 90 Tab, Rfl: 3    furosemide (LASIX) 20 mg tablet, Take 1 Tab by mouth daily. (Patient taking differently: Take 20 mg by mouth every Tuesday and Friday.), Disp: 30 Tab, Rfl: 0    tiotropium (SPIRIVA WITH HANDIHALER) 18 mcg inhalation capsule, Take 1 Cap by inhalation daily. , Disp: , Rfl:     sacubitril-valsartan (ENTRESTO) 49 mg/51 mg tablet, Take 1 Tab by mouth two (2) times a day., Disp: 180 Tab, Rfl: 3    budesonide-formoterol (SYMBICORT) 160-4.5 mcg/actuation HFAA, Take 2 Puffs by inhalation two (2) times a day., Disp: 1 Inhaler, Rfl: 0    albuterol-ipratropium (DUO-NEB) 2.5 mg-0.5 mg/3 ml nebu, 3 mL by Nebulization route every six (6) hours as needed (wheezing or sob). File under Medicare Part B, ICD codes J44.9, C78.01, Disp: 120 Nebule, Rfl: 3    DULoxetine (CYMBALTA) 60 mg capsule, Take 60 mg by mouth daily. , Disp: , Rfl:     montelukast (SINGULAIR) 10 mg tablet, Take 1 Tab by mouth daily. , Disp: 90 Tab, Rfl: 3    raloxifene (EVISTA) 60 mg tablet, Take 60 mg by mouth daily. , Disp: , Rfl:   Allergies   Allergen Reactions    Adhesive Other (comments)     blisters         Review of Systems   Review of Systems   Constitutional: Negative for chills and fever. HENT: Negative for nosebleeds. Eyes: Negative for blurred vision and double vision. Respiratory: Negative for cough, hemoptysis, sputum production, shortness of breath and wheezing. Cardiovascular: Negative for chest pain, palpitations, orthopnea, claudication, leg swelling and PND. Gastrointestinal: Negative for abdominal pain, heartburn, nausea and vomiting. Musculoskeletal: Negative for myalgias. Skin: Negative for rash. Neurological: Negative for dizziness, weakness and headaches.    Endo/Heme/Allergies: Does not bruise/bleed easily. PHYSICAL EXAMINATION:  [ INSTRUCTIONS:  \"[x]\" Indicates a positive item  \"[]\" Indicates a negative item  -- DELETE ALL ITEMS NOT EXAMINED]  Vital Signs: (As obtained by patient/caregiver at home)  Visit Vitals  /53 (BP 1 Location: Right arm, BP Patient Position: Sitting)   Pulse 82   Temp 97.2 °F (36.2 °C) (Temporal)   Ht 5' 5\" (1.651 m)   Wt 74.4 kg (164 lb)   BMI 27.29 kg/m²        Constitutional: [x] Appears well-developed and well-nourished [x] No apparent distress      [] Abnormal -     Mental status: [x] Alert and awake  [x] Oriented to person/place/time [x] Able to follow commands    [] Abnormal -     Eyes:   EOM    [x]  Normal    [] Abnormal -   Sclera  [x]  Normal    [] Abnormal -          Discharge [x]  None visible   [] Abnormal -     HENT: [x] Normocephalic, atraumatic  [] Abnormal -   [x] Mouth/Throat: Mucous membranes are moist    External Ears [x] Normal  [] Abnormal -    Neck: [x] No visualized mass,NO JVD [] Abnormal -     Pulmonary/Chest: [x] Respiratory effort normal   [x] No visualized signs of difficulty breathing or respiratory distress        [] Abnormal -      Musculoskeletal:   [x] Normal gait with no signs of ataxia         [x] Normal range of motion of neck        [] Abnormal -     Neurological:        [x] No Facial Asymmetry (Cranial nerve 7 motor function) (limited exam due to video visit)          [x] No gaze palsy        [] Abnormal -          Skin:        [x] No significant exanthematous lesions ,no bruising       [] Abnormal -            Psychiatric:       [x] Normal Affect [] Abnormal -        [x] No Hallucinations  Extremities:           No Edema    Other pertinent observable physical exam findings:-    Pertinent LAB and reports  I have reviewed available  pertinent notes, labs and reports and included in current evaluation of this patient. Assessment & Plan:   Diagnoses and all orders for this visit:    1.  Systolic CHF, chronic [facility])  Comments:  Stable compensated class III continue treatment    2. Nonischemic cardiomyopathy ([facility] [address]  Comments:  Stable monitor continue treatment    3. Automatic implantable cardioverter-defibrillator in situ  Comments:  Normal function recent check    4. Essential hypertension  Comments:  Stable        4/2020  Cardiac status stable. Continue treatment monitor  Follow-up and Dispositions    · Return in about 3 months (around 7/1/2020). No orders of the defined types were placed in this encounter. Follow-up and Dispositions    · Return in about 3 months (around 7/1/2020). We discussed the expected course, resolution and complications of the diagnosis(es) in detail. Medication risks, benefits, costs, interactions, and alternatives were discussed as indicated. I advised her to contact the office if her condition worsens, changes or fails to improve as anticipated. She expressed understanding with the diagnosis(es) and plan. I was in the office while conducting this encounter. Pursuant to the emergency declaration under the [facility], [facility] waiver authority and the [employer] and [employer] Act, this Virtual  Visit was conducted, with patient's consent, to reduce the patient's risk of exposure to COVID-19 and provide continuity of care for an established patient. Services were provided through a video synchronous discussion virtually to substitute for in-person clinic visit.     Elisha Child MD

## 2020-05-07 RX ORDER — DIGOXIN 125 MCG
0.12 TABLET ORAL DAILY
Qty: 90 TAB | Refills: 3 | Status: SHIPPED | OUTPATIENT
Start: 2020-05-07 | End: 2021-04-26

## 2020-05-28 ENCOUNTER — HOSPITAL ENCOUNTER (OUTPATIENT)
Dept: PET IMAGING | Age: 70
Discharge: HOME | End: 2020-05-28
Attending: PHYSICIAN ASSISTANT
Payer: MEDICARE

## 2020-05-28 DIAGNOSIS — C50.412 MALIGNANT NEOPLASM OF UPPER-OUTER QUADRANT OF LEFT FEMALE BREAST (HCC): ICD-10-CM

## 2020-05-28 PROCEDURE — A9552 F18 FDG: HCPCS

## 2020-06-05 RX ORDER — SPIRONOLACTONE 25 MG/1
50 TABLET ORAL DAILY
Qty: 14 TAB | Refills: 0 | Status: SHIPPED | OUTPATIENT
Start: 2020-06-05 | End: 2021-02-17

## 2020-06-22 RX ORDER — UMECLIDINIUM 62.5 UG/1
AEROSOL, POWDER ORAL
Qty: 1 INHALER | Refills: 3 | Status: SHIPPED | OUTPATIENT
Start: 2020-06-22 | End: 2020-06-23 | Stop reason: SDUPTHER

## 2020-06-23 ENCOUNTER — TELEPHONE (OUTPATIENT)
Dept: PULMONOLOGY | Age: 70
End: 2020-06-23

## 2020-06-23 RX ORDER — UMECLIDINIUM 62.5 UG/1
AEROSOL, POWDER ORAL
Qty: 3 INHALER | Refills: 3 | Status: SHIPPED | COMMUNITY
Start: 2020-06-23 | End: 2020-08-31

## 2020-06-25 ENCOUNTER — OFFICE VISIT (OUTPATIENT)
Dept: PULMONOLOGY | Age: 70
End: 2020-06-25

## 2020-06-25 VITALS
TEMPERATURE: 98.4 F | SYSTOLIC BLOOD PRESSURE: 119 MMHG | OXYGEN SATURATION: 96 % | HEART RATE: 87 BPM | WEIGHT: 162.6 LBS | BODY MASS INDEX: 27.09 KG/M2 | HEIGHT: 65 IN | DIASTOLIC BLOOD PRESSURE: 58 MMHG | RESPIRATION RATE: 16 BRPM

## 2020-06-25 DIAGNOSIS — J44.9 CHRONIC ASTHMATIC BRONCHITIS (HCC): Primary | ICD-10-CM

## 2020-06-25 DIAGNOSIS — I27.82 OTHER CHRONIC PULMONARY EMBOLISM WITHOUT ACUTE COR PULMONALE (HCC): ICD-10-CM

## 2020-06-25 DIAGNOSIS — C78.01 MALIGNANT NEOPLASM METASTATIC TO RIGHT LUNG (HCC): ICD-10-CM

## 2020-06-25 DIAGNOSIS — I42.8 NONISCHEMIC CARDIOMYOPATHY (HCC): ICD-10-CM

## 2020-06-25 NOTE — PROGRESS NOTES
[facility]  Pulmonary, Critical Care, and Sleep Medicine      Pulmonary Office visit    Name: Raymond Cao     : 1950     Date: 2020        Subjective:     Here for follow-up-asthma, metastatic breast CA to lung. Pulmonary embolism    20     Pulmonary condition includes-metastatic breast CA-endobronchial obstruction with symptoms of shortness of breath and wheezing with of substantially improved after chemotherapy with reexpansion of atelectatic lung and improvement in postobstructive atelectasis. Also diagnosed with DVT and PE -. She completed Chemotherapy in October and is doing well. She had a follow-up PET scan January and recently seen by oncologist with no new concerns-PET scan 2020 shows no recurrent cancer  She has a follow-up with cardiology next week with follow-up echocardiogram    She feels much better-no significant shortness of breath. Infectious not using any of her inhalers now  Denies any significant cough , except occasionally when she drinks cold liquids or eats a blizzard , denies wheezing or chest pain  Denies any mucus  Denies hemoptysis  Denies any recent ER visits  In addition she has cardiomyopathy with EF,30% , heart failure associated with chemotherapy. Cardiac -she is tolerating Entresto. She takes lasix 2-3 x weekly as needed for edema. Reports blood work drawn this week with normal renal function and potassium. Reports an episode of syncope in Dr. Cinda Forbes office in November and b/p readings were labile at that time. ICD checked 2019 (after syncopal episode) no arrhythmias seen. Had cholecystectomy without any complications on       Background history   metastatic breast Ca- recurrence with Lung mets- parenchymal and endobronchial.   She has shown good response to treatment which was recently validated with a PET CT.         Past Medical History:   Diagnosis Date    Abnormal nuclear cardiac imaging test 06/11/2010    Lg fixed anterior, septal, apical, inferoseptal defect sugg RCA & LAD disease or cardiomyopathy. EF 20%. Global hykn. Nondiagnostic EKG.  Acute bronchitis 10/15/2018    Persistent cough and wheezing in spite of prednisone and antibiotic. Will refer to pulmonary    Adenocarcinoma of breast; locally advanced invasive ductal adenocarcinoma of left breast     Asthma     Automatic implantable cardiac defibrillator in situ     Automatic implantable cardiac defibrillator in situ     [facility] ([facility]     Cancer ([facility]     breast cancer left    Chemotherapy convalescence or palliative care 2012    Chronic combined systolic and diastolic heart failure (HCC)     Remains symptomatic, nyha class 3 ef improving.  Congestive heart failure, unspecified     chronic class ll    Echocardiogram 09/27/2010    EF 30%. Global hykn. Mild MR. Mild TR.  GERD (gastroesophageal reflux disease)     Hyperlipidemia     Hypertension     Mitral valve disorders(424.0) 1/15/2014    mild to moderate mr     Mitral valve disorders(424.0) 1/15/2014    mild to moderate mr     MVP (mitral valve prolapse)     Nonischemic cardiomyopathy (HCC)     EF 20-30% despite medical therapy    Nonspecific abnormal electrocardiogram (ECG) (EKG)     Osteopenia     Other primary cardiomyopathies     Pacemaker 10/27/10    s/p implantation, without complication    Poisn by oth uns agents prim aff cv sys     Ef slightly better to 45%, STABLE back on chemo.  Radiation therapy     Radiation therapy complication 3915    S/P cardiac catheterization 07/08/10    Patent coronary arteries. LVEDP 25.  EF 25%. Global hykn. Moderate MR.  RA 10.  RV 40/10. PA 40/20.  20.  CO/CI 4.5/2.45 (Larry).     Shortness of breath     Syncope and collapse     Thyroid disease     goiter     Past Surgical History:   Procedure Laterality Date    CARDIAC SURG PROCEDURE UNLIST      Difibrillator; BI V ICD    HX CHOLECYSTECTOMY  11/01/2019  HX MASTECTOMY  09/28/11    modified radical mastectomy and axillary dissection    HX ORTHOPAEDIC      HX OTHER SURGICAL      surgery to remove part of liver    HX PACEMAKER  10/27/10    s/p [employer] biventricular AICD, without complication    HX RADICAL MASTECTOMY      IR CHOLECYSTOSTOMY PERCUTANEOUS  9/20/2019    IR INSERT TUNL CVC W PORT OVER 5 YEARS  1/16/2019    NEUROLOGICAL PROCEDURE UNLISTED      Cervical fusion       Allergies   Allergen Reactions    Adhesive Other (comments)     blisters     . Current Outpatient Medications   Medication Sig Dispense Refill    umeclidinium (Incruse Ellipta) 62.5 mcg/actuation inhaler USE 1 INHALATION ORALLY    DAILY 3 Inhaler 3    spironolactone (ALDACTONE) 25 mg tablet Take 2 Tabs by mouth daily. 14 Tab 0    digoxin (LANOXIN) 0.125 mg tablet Take 1 Tab by mouth daily. 90 Tab 3    carvedilol (COREG) 25 mg tablet Take 1 Tab by mouth two (2) times daily (with meals). 180 Tab 3    apixaban (ELIQUIS) 2.5 mg tablet Take 1 Tab by mouth two (2) times a day. 60 Tab 2    furosemide (LASIX) 20 mg tablet Take 1 Tab by mouth daily. (Patient taking differently: Take 20 mg by mouth every Tuesday and Friday.) 30 Tab 0    tiotropium (SPIRIVA WITH HANDIHALER) 18 mcg inhalation capsule Take 1 Cap by inhalation daily.  sacubitril-valsartan (ENTRESTO) 49 mg/51 mg tablet Take 1 Tab by mouth two (2) times a day. 180 Tab 3    budesonide-formoterol (SYMBICORT) 160-4.5 mcg/actuation HFAA Take 2 Puffs by inhalation two (2) times a day. 1 Inhaler 0    albuterol-ipratropium (DUO-NEB) 2.5 mg-0.5 mg/3 ml nebu 3 mL by Nebulization route every six (6) hours as needed (wheezing or sob). File under Medicare Part B, ICD codes J44.9, C78.01 120 Nebule 3    DULoxetine (CYMBALTA) 60 mg capsule Take 60 mg by mouth daily.  montelukast (SINGULAIR) 10 mg tablet Take 1 Tab by mouth daily. 90 Tab 3    raloxifene (EVISTA) 60 mg tablet Take 60 mg by mouth daily.        Review of Systems:  HEENT: No epistaxis, no nasal drainage no difficulty in swallowing, no redness in eyes  Respiratory: as above  Cardiovascular: no chest pain, no palpitations, no chronic leg edema, no syncope  Gastrointestinal: no abd pain, no vomiting, no diarrhea, no bleeding symptoms  Genitourinary: No urinary symptoms or hematuria  Integument/breast: No ulcers or rashes  Musculoskeletal:Neg  Neurological: No focal weakness, no seizures, no headaches  Behvioral/Psych: No anxiety, no depression  Constitutional: No fever, no chills, no weight loss, no night sweats     Objective:     Visit Vitals  /58 (BP 1 Location: Right arm, BP Patient Position: Sitting)   Pulse 87   Temp 98.4 °F (36.9 °C) (Oral)   Resp 16   Ht 5' 5\" (1.651 m)   Wt 73.8 kg (162 lb 9.6 oz)   SpO2 96% Comment: RA Rest   BMI 27.06 kg/m²        Physical Exam:   General: comfortable, no acute distress  HEENT: pupils reactive, sclera anicteric, EOM intact  Neck: No adenopathy or thyroid swelling, no lymphadenopathy or JVD, supple  Chest: multiple scars from previous surgery, surgically absent left breast, on palpation -tender spot left lower chest wall, no swelling or lesions noted  CVS: S1S2  RS: Mod AE bilaterally, no tactile fremitus or egophony, no accessory muscle use, no rales, no wheezing  Abd: soft, non tender, no hepatosplenomegaly, right upper quadrant drain  Neuro: non focal, awake, alert  Extrm: no leg edema, clubbing or cyanosis  Skin: no rash    Data review:   Pertinent labs: CBC, BMP, LFT's    PFT:    Date FVC FEV1  FEV1/FVC CHJ45-22 TLC RV RV/TLC VC DLCO   6/29/2017 75 69 67 43 77 81 nl  62   11/2018  46  35     49                           FLOWS:  Maximal Mid Expiratory Flow rate is reduced to 43 % predicted  Forced Expiratory Volume in one second is reduced to 69 % predicted  FEV 1% is reduced   Volumes:   All Volumes are reduced except for RV  Flow Volume Loop:  Nonspecific obstructive pattern in Flow Volume Loop  Bronchodilator:  No significant improvement with bronchodilator therapy  Diffusion:  Abnormal Diffusion Capacity reduced to 62 % predicted  DLCO normalizes to alveolar ventilation  Impression:  Moderate obstructive defect, Predominately small airways, Mild restrictive ventilatory defect, Reduced diffusion capacity indicating a decrease in alveolar surface area for gas exchange    6 min walk test;walked 330 feet with no O2 desaturation or significant heart rate change. DI- stable  05/03/19   ECHO ADULT FOLLOW-UP OR LIMITED 05/03/2019 5/3/2019    Narrative · Left Ventricle: Mild concentric hypertrophy. Severe global systolic   dysfunction. Estimated left ventricular ejection fraction is 26 - 30%. Biplane method used to measure ejection fraction. Left ventricular global   hypokinesis. · IVC/Hepatic Veins: Severely elevated central venous pressure (15+ mmHg);   IVC diameter is larger than 21 mm and collapses less than 50% with   respiration. · Pulmonary Artery: Mild pulmonary hypertension. Pulmonary arterial   systolic pressure is 44 mmHg. · Pericardium: Trivial-to-small circumferential pericardial effusion   measuring 10 mm. · Mitral Valve: Moderate mitral valve regurgitation. · Right Ventricle: Pacing wire present        Signed by: Ronda Yanes MD     Echo 3/2019:  · Normal cavity size, wall thickness and diastolic function. There is low normal systolic function. The estimated ejection fraction is 51 - 55%. No regional wall motion abnormality noted. Global longitudinal strain is -13.00%. Abnormal left ventricular strain. · Normal right ventricular size and function. Pacing wire present  · Mild to moderate mitral valve regurgitation. Mild prolapse of the posterior leaflet within the mitral valve. · Mild pulmonic valve regurgitation is present. · Mild tricuspid valve regurgitation. Pulmonary arterial systolic pressure is 98.5 mmHg. Mild pulmonary hypertension.   Echo: 1/18/2017:  Left ventricle: Systolic function was normal. Ejection fraction was  estimated to be 50 %. There were no regional wall motion abnormalities. Doppler parameters were consistent with abnormal left ventricular  relaxation (grade 1 diastolic dysfunction). Right ventricle: The size was normal. Systolic function was reduced. Left atrium: Size was normal.  Right atrium: Size was normal.  Mitral valve: There was systolic bowing of the anterior and posterior  leaflets, but without diagnostic evidence for prolapse. There was mild  regurgitation. Aortic valve: The valve was trileaflet. Leaflets exhibited normal  thickness and normal cuspal separation. Tricuspid valve: Pulmonary artery systolic pressure: 18 mmHg. Pulmonic valve: There was mild regurgitation. Imaging:  I have personally reviewed the patients radiographs and have reviewed the reports:  CT Results (most recent):  Results from Hospital Encounter encounter on 11/19/19   CT CHEST W CONT    Narrative INDICATION: Breast carcinoma. EXAM: CT thorax. TECHNIQUE: Spiral images of the chest were performed according to routine  protocol after administration of  70 cc of Isovue contrast media intravenously. Coronal and sagittal reconstructions were also provided for evaluation. COMPARISON: 9/18/2019 and 5/2/2019 exam's. All CT scans at this facility are performed using dose optimization technique as  appropriate to a performed exam, to include automated exposure control,  adjustment of the mA and/or kV according to patient size (including appropriate  matching for site-specific examinations), or use of iterative reconstruction  technique. CHEST FINDINGS:    Lymph nodes: No lymphadenopathy. Thyroid: Right thyroid lobe nodules. Limited evaluation. .    Mediastinum: Heart is enlarged without pericardial effusions. Small hiatal  hernia. Lungs:     Minimal bibasilar scarring. Left mastectomy.     Development of nonobstructing left upper renal pole nephrolithiasis series 2  image 59 measuring approximately 6 mm. Gallbladder has been removed. Small cystic lesion abutting the superior aspect of the uncinate process  measuring approximate 1.5 x 1.6 in meters without significant interval change. Series 2 image 55    Bones: No lytic or blastic lesions. Impression IMPRESSION:    1. Minimal bibasilar scarring. No pathologic adenopathy. No enhancing masses. 2.   Nonobstructing left upper renal pole calyceal stone. Limited evaluation. Follow-up with CT renal stone protocol is suggested. 3. Gallbladder has been removed. 4. Small unchanged cystic lesion abutting superior and posterior aspect of the  uncinate process. Cyst versus cystic mass versus cystic adenopathy. Limited  evaluation. 5. Left mastectomy. PET Results (most recent):  Results from [facility] on 05/28/20   PET/CT TUMOR IMAGE SKULL THIGH (SUB)    Narrative POSITRON EMISSION TOMOGRAPHY (WITH LIMITED NONCONTRAST COMPUTED TOMOGRAPHY)      CPT: 19295      INDICATION:    History of left breast carcinoma treated with neoadjuvant therapy and  mastectomy. Subsequent metastatic disease involving lung and mediastinum. Chemotherapy. Restaging study for subsequent treatment planning      REFERENCES: Prior PET 3/12/2020      TECHNIQUE:    Following administration of 16.9 mCi 18F-fluorodeoxyglucose (FDG) in the right  hand, PET imaging was performed spanning from the skull base to the proximal  thighs. Serum glucose measured 114 mg/dl at the time of radiopharmaceutical  administration. Prior to PET imaging, helically acquired CT imaging was performed during tidal  respiration, after administration of oral contrast only. This is done for  attenuation correction for the PET emission data and more precise anatomic  localization of PET findings on the multiplanar and coregistered images. This  imaging is preformed using reduced kVp and mAs to reduce radiation dose. >]        --  PET FINDINGS --  Background mediastinal blood pool activity SUV Max 2.6. Background hepatic metabolic activity SUV Max 3.2. No appreciable abnormal hypermetabolic activity in the neck. No appreciable abnormal hypermetabolic activity in the chest. Previous bilobed  nodular density in the anterior central right lower lobe remains, certainly no  larger. Typically heterogeneous metabolic activity associated with the liver. No  discrete lesion. Spleen is nonenlarged and not hypermetabolic       No other areas of abnormal metabolic activity appreciated in the abdomen or  pelvis which cannot be reasonably attributed to vasculature, urinary tract  excretion or physiologic bowel activity. No definite hypermetabolic bone lesions appreciated, although bone scan can be  more sensitive in this respect. --  CT FINDINGS  --     These limited CT images do not replace diagnostic quality contrast enhanced CT  scan for evaluation of solid organs, bowel, retroperitoneum and vasculature. CT NECK:    Severely limited by lack of intravenous contrast.  Streak artifact from  anterior fusion hardware.  -Similar nodularity right thyroid without overt hypermetabolism. . Minimal  mucosal thickening left maxillary sinus. CT CHEST:    Limited evaluation of the hilum and vasculature without intravenous contrast.  Artifact from cardiac pacing wires. Infusion catheter in the left chest. No  pericardial or pleural effusion. Similar lenticular scarring or atelectasis at  the dependent left base. Lipoma within the right latissimus muscle. [place] CT ABDOMEN:    Limited noncontrast images. No bowel distention. No hydronephrosis. No free  fluid. .       CT PELVIS:    Limited noncontrast images. No bowel distention. No ascites. Similar globular  enlarged uterine contours with some calcifications in the fundal region most  consistent with underlying fibroid disease. Impression IMPRESSION:  1.   No new hypermetabolic abnormalities appreciated to diagnose viable  malignancy or additional metastatic disease. 2. incidental CT findings as discussed above. PET-CT 3/22/2019:  IMPRESSION:  1. Significant interval treatment response compared 10/19  -Hypermetabolic tumor has resolved  -Some small lung nodules remain, nonspecific (no worsening of lung nodules)  -Stable blastic manubrial metastasis  2. Mild superior endplate compression fracture T11 which is interval new and is  probably benign/osteoporotic.  -May have a few millimeter retropulsed bone but no osseous central spinal  stenosis is evident     Incidental findings:  -Gallstone  -Trace pericardial effusion. Cardiomegaly  -Fibroid uterus    XR Results (most recent):  Results from Hospital Encounter encounter on 09/18/19   XR CHEST PORT    Narrative EXAM:  Chest Portable. INDICATION:  Chest pain     COMPARISON:  09/18/19     TECHNIQUE:  Portable AP chest study    FINDINGS:      - ICD hub in the right upper torso region with 3 intact leads through the left  subclavian approach. - Left IJ approach single-chamber infusion port in place.  - Both lungs are hypoinflated. - Bandlike densities are noted in the retrocardiac region and in the right upper  lung zone medial aspect. Most likely related to subsegmental atelectatic  changes vs. scarring. Subtle streaky densities in the left mid lung zone. Developing infiltrate cannot be excluded for the right upper lung zone and in  the left mid lung zone. - No costophrenic angle blunting or pneumothorax. - No significant cardiac silhouette enlargement. Impression IMPRESSION:    1. Pacemaker and left IJ infusion port identified, unchanged in interval.  No  pneumothorax. 2.  Atelectatic changes bilaterally. Subtle developing infiltrate cannot be  excluded for the left mid lung zone and right upper lung zone. CXR 8/26/2014:    AICD. No change in lead placement. No visualized lead fracture.  Lungs appear  unremarkable. Normal size heart. Impression: No acute findings. Patient Active Problem List   Diagnosis Code    Hypertension I10    MVP (mitral valve prolapse) I34.1    Nonischemic cardiomyopathy (HCC) I42.8    Cancer (HCC) C80.1    Adenocarcinoma of breast; locally advanced invasive ductal adenocarcinoma of left breast C50.919    Poisn by oth uns agents prim aff cv sys T46.904A    Syncope and collapse T80    Systolic CHF, chronic (HCC) I50.22    Other primary cardiomyopathies I42.8    Shortness of breath R06.02    Nonspecific abnormal electrocardiogram (ECG) (EKG) R94.31    Automatic implantable cardioverter-defibrillator in situ Z95.810    Mitral valve disease I05.9    Abnormal PFT R94.2    Acute bronchitis J20.9    Chronic asthmatic bronchitis (HCC) J44.9    Malignant neoplasm metastatic to lung (HCC) C78.00    Seasonal allergic rhinitis due to pollen J30.1    Dilated cardiomyopathy (HCC) I42.0    Breast cancer (HCC) C50.919    Pulmonary embolism (HCC) I26.99    Chronic bronchitis (HCC) J42    Hypoxia R09.02    Lung metastases (HCC) C78.00    UTI (urinary tract infection) N39.0    CAD (coronary artery disease) I25.10    Elevated troponin R79.89    Weakness generalized R53.1    Chronic anticoagulation Z79.01    History of pulmonary embolism Z86.711    Hypokalemia E87.6    Hypomagnesemia E83.42    Acute encephalopathy G93.40    Cholecystitis K81.9    Ventricular fibrillation (HCC) I49.01    Cholecystitis, unspecified K81.9     IMPRESSION:     · Asthma/reactive airways-significantly improved control after treatment of endobronchial metastatic breast CA. patient has not been using her inhalers and is able to tolerate change in temperatures cold air  · DVT and PE-insetting of metastatic CA. On anticoagulation and will need lifelong  · Metastatic breast cancer to Lung with extensive endobronchial involvement. Clinical response to chemotherapy. Improved PET/CT findings and symptoms of wheezing shortness of breath and cough. No evidence of recurrence on PET/CT  · SOB on exertion - Carcinomatosis and  Endobronchial inflammation/obstruction from tumor load. -Further complicated by cardiomyopathy- improving with adjustments in medications  · Abnormal PFT- combined restrictive and Obstructive component-progression noted -Asthma with reduced DLCO ( corrects with Volume adjustment.) Suspect restrictive component from chest wall deformity. · Pulm HTN-new likely group 2 and 3  · H/o invasive ductal breast Ca  · H/o non ischemic cardiomyopathy- recent worsening LV function EF of 30%  · AICD      RECOMMENDATIONS:     · Continue with current observation-will continue holding inhalers  · Will continue Incruse and Symbicort-she can use sparingly with seasonal changes  · Incentive spirometry  · Continue anticoagulation-Eliquis lifelong-hold perioperatively  · Continue optimizing cardiac medications for cardiomyopathy  · Continue nebulized bronchodilators PRN  · Continue singulair , Nasonex  · GERD precautions  · Preventive vaccinations- recieved  · Monitor response to interventions and make appropriate adjustments  · Defer further imaging and oncologic surveillance to oncology  · Discussed with patient and   · Follow-up in 6 months     Health maintenance screens deferred to Primary care provider.      Alejandra Gates MD

## 2020-06-25 NOTE — LETTER
6/25/20 Patient: Lex Geiger YOB: 1950 Date of Visit: 6/25/2020 Leon Pinto MD 
[address] [place] [address] [place] [address] VIA Facsimile: [phone] Dear Leon Pinto MD, Thank you for referring Ms. Nash Johnson to [address] [place] [address] for evaluation. My notes for this consultation are attached. If you have questions, please do not hesitate to call me. I look forward to following your patient along with you.  
 
 
Sincerely, 
 
Lizette Hooper MD

## 2020-06-25 NOTE — PROGRESS NOTES
Miko Shaffer presents today for   Chief Complaint   Patient presents with    Follow Up Chronic Condition       Is someone accompanying this pt? No    Is the patient using any DME equipment during OV? No    -DME Company None    Depression Screening:  3 most recent PHQ Screens 1/20/2020   Little interest or pleasure in doing things Not at all   Feeling down, depressed, irritable, or hopeless Not at all   Total Score PHQ 2 0       Learning Assessment:  Learning Assessment 10/9/2019   PRIMARY LEARNER Patient   HIGHEST LEVEL OF EDUCATION - PRIMARY LEARNER  -   BARRIERS PRIMARY LEARNER NONE   PRIMARY LANGUAGE ENGLISH   LEARNER PREFERENCE PRIMARY DEMONSTRATION   ANSWERED BY self     -   RELATIONSHIP SELF       Abuse Screening:  Abuse Screening Questionnaire 4/30/2019   Do you ever feel afraid of your partner? N   Are you in a relationship with someone who physically or mentally threatens you? N   Is it safe for you to go home? Y       Fall Risk  Fall Risk Assessment, last 12 mths 1/20/2020   Able to walk? Yes   Fall in past 12 months? Yes   Fall with injury? No   Number of falls in past 12 months 1   Fall Risk Score 1         Coordination of Care:  1. Have you been to the ER, urgent care clinic since your last visit? Hospitalized since your last visit? No    2. Have you seen or consulted any other health care providers outside of the [address] [place] [address] since your last visit?  Include any pap smears or colon screen

## 2020-07-01 ENCOUNTER — HOSPITAL ENCOUNTER (OUTPATIENT)
Dept: MAMMOGRAPHY | Age: 70
Discharge: HOME | End: 2020-07-01
Attending: INTERNAL MEDICINE
Payer: MEDICARE

## 2020-07-01 DIAGNOSIS — Z12.31 VISIT FOR SCREENING MAMMOGRAM: ICD-10-CM

## 2020-07-01 PROCEDURE — 77063 BREAST TOMOSYNTHESIS BI: CPT

## 2020-07-08 ENCOUNTER — OFFICE VISIT (OUTPATIENT)
Dept: CARDIOLOGY CLINIC | Age: 70
End: 2020-07-08

## 2020-07-08 VITALS
TEMPERATURE: 97 F | SYSTOLIC BLOOD PRESSURE: 120 MMHG | WEIGHT: 163.6 LBS | BODY MASS INDEX: 27.26 KG/M2 | HEART RATE: 84 BPM | DIASTOLIC BLOOD PRESSURE: 53 MMHG | HEIGHT: 65 IN

## 2020-07-08 DIAGNOSIS — I10 ESSENTIAL HYPERTENSION: ICD-10-CM

## 2020-07-08 DIAGNOSIS — Z95.810 AUTOMATIC IMPLANTABLE CARDIOVERTER-DEFIBRILLATOR IN SITU: ICD-10-CM

## 2020-07-08 DIAGNOSIS — I49.01 VENTRICULAR FIBRILLATION (HCC): ICD-10-CM

## 2020-07-08 DIAGNOSIS — I50.22 SYSTOLIC CHF, CHRONIC (HCC): Primary | ICD-10-CM

## 2020-07-08 DIAGNOSIS — I42.8 NONISCHEMIC CARDIOMYOPATHY (HCC): ICD-10-CM

## 2020-07-08 NOTE — PROGRESS NOTES
HISTORY OF PRESENT ILLNESS  Leelee John is a 69 y.o. female. Patient with cmp,chf.post  icd   On follow up patient denies any chest pains, palpitation or other significant symptoms. Patient is here for follow up of diagnostic tests. Results will be discussed. He feels extremely fatigued tired and weak. Recently reported decrease in renal function. Patient seen for transition of care visit. Discharge date 5/6/2019  Nurse encounter 5/6/2019  Physician encounter 5/8/2019. Admitted with acute CHF, acute PE, DVT. Patient had worsening cardiomyopathy. Symptoms are slowly improving significant improvement of shortness of breath. Orthopnea significantly better. No edema. Still feels tired on minimal activity exertion    Hospital Follow Up   Associated symptoms include shortness of breath. Pertinent negatives include no chest pain. CHF   The history is provided by the patient. This is a recurrent problem. The problem occurs constantly. The problem has been gradually improving. Associated symptoms include shortness of breath. Pertinent negatives include no chest pain. The symptoms are aggravated by exertion. The symptoms are relieved by rest.   Cardiomyopathy   The history is provided by the patient. This is a chronic problem. The problem has been resolved. Associated symptoms include shortness of breath. Pertinent negatives include no chest pain. Hypertension   The history is provided by the patient. This is a chronic problem. The problem occurs constantly. The problem has not changed since onset. Associated symptoms include shortness of breath. Pertinent negatives include no chest pain. Valvular Heart Disease   The history is provided by the patient. This is a chronic problem. The problem occurs constantly. The problem has not changed since onset. Associated symptoms include shortness of breath. Pertinent negatives include no chest pain.        Review of Systems   Constitutional: Negative for chills and fever. HENT: Negative for nosebleeds. Eyes: Negative for blurred vision and double vision. Respiratory: Positive for shortness of breath. Negative for cough, hemoptysis, sputum production and wheezing. Cardiovascular: Negative for chest pain, palpitations, orthopnea, claudication, leg swelling and PND. Gastrointestinal: Negative for heartburn, nausea and vomiting. Musculoskeletal: Negative for myalgias. Skin: Negative for rash. Neurological: Negative for dizziness and weakness. Endo/Heme/Allergies: Does not bruise/bleed easily. Family History   Problem Relation Age of Onset    Hypertension Mother     High Cholesterol Mother     Heart Disease Father         paemaker implant at 80       Past Medical History:   Diagnosis Date    Abnormal nuclear cardiac imaging test 06/11/2010    Lg fixed anterior, septal, apical, inferoseptal defect sugg RCA & LAD disease or cardiomyopathy. EF 20%. Global hykn. Nondiagnostic EKG.  Acute bronchitis 10/15/2018    Persistent cough and wheezing in spite of prednisone and antibiotic. Will refer to pulmonary    Adenocarcinoma of breast; locally advanced invasive ductal adenocarcinoma of left breast     Asthma     Automatic implantable cardiac defibrillator in situ     Automatic implantable cardiac defibrillator in situ     [facility]     Cancer ([facility] [facility]     breast cancer left    Chemotherapy convalescence or palliative care 2012    Chronic combined systolic and diastolic heart failure (HCC)     Remains symptomatic, nyha class 3 ef improving.  Congestive heart failure, unspecified     chronic class ll    Echocardiogram 09/27/2010    EF 30%. Global hykn. Mild MR. Mild TR.     GERD (gastroesophageal reflux disease)     Hyperlipidemia     Hypertension     Mitral valve disorders(424.0) 1/15/2014    mild to moderate mr     Mitral valve disorders(424.0) 1/15/2014    mild to moderate mr     MVP (mitral valve prolapse)     Nonischemic cardiomyopathy ([address]     EF 20-30% despite medical therapy    Nonspecific abnormal electrocardiogram (ECG) (EKG)     Osteopenia     Other primary cardiomyopathies     Pacemaker 10/27/10    s/p implantation, without complication    Poisn by oth uns agents prim aff cv sys     Ef slightly better to 45%, STABLE back on chemo.  Radiation therapy     Radiation therapy complication 3531    S/P cardiac catheterization 07/08/10    Patent coronary arteries. LVEDP 25.  EF 25%. Global hykn. Moderate MR.  RA 10.  RV 40/10. PA 40/20.  20.  CO/CI 4.5/2.45 (Larry).  Shortness of breath     Syncope and collapse     Thyroid disease     goiter       Past Surgical History:   Procedure Laterality Date    CARDIAC SURG PROCEDURE UNLIST      Difibrillator; BI V ICD    HX CHOLECYSTECTOMY  11/01/2019    HX MASTECTOMY  09/28/11    modified radical mastectomy and axillary dissection    HX ORTHOPAEDIC      HX OTHER SURGICAL      surgery to remove part of liver    HX PACEMAKER  10/27/10    s/p [employer] biventricular AICD, without complication    HX RADICAL MASTECTOMY      IR CHOLECYSTOSTOMY PERCUTANEOUS  9/20/2019    IR INSERT TUNL CVC W PORT OVER 5 YEARS  1/16/2019    NEUROLOGICAL PROCEDURE UNLISTED      Cervical fusion       Social History     Tobacco Use    Smoking status: Never Smoker    Smokeless tobacco: Never Used   Substance Use Topics    Alcohol use: No       Allergies   Allergen Reactions    Adhesive Other (comments)     blisters       Current Outpatient Medications   Medication Sig    umeclidinium (Incruse Ellipta) 62.5 mcg/actuation inhaler USE 1 INHALATION ORALLY    DAILY    spironolactone (ALDACTONE) 25 mg tablet Take 2 Tabs by mouth daily.  digoxin (LANOXIN) 0.125 mg tablet Take 1 Tab by mouth daily.  carvedilol (COREG) 25 mg tablet Take 1 Tab by mouth two (2) times daily (with meals).  apixaban (ELIQUIS) 2.5 mg tablet Take 1 Tab by mouth two (2) times a day.     furosemide (LASIX) 20 mg tablet Take 1 Tab by mouth daily. (Patient taking differently: Take 20 mg by mouth every Tuesday and Friday.)    tiotropium (SPIRIVA WITH HANDIHALER) 18 mcg inhalation capsule Take 1 Cap by inhalation daily.  sacubitril-valsartan (ENTRESTO) 49 mg/51 mg tablet Take 1 Tab by mouth two (2) times a day.  budesonide-formoterol (SYMBICORT) 160-4.5 mcg/actuation HFAA Take 2 Puffs by inhalation two (2) times a day.  albuterol-ipratropium (DUO-NEB) 2.5 mg-0.5 mg/3 ml nebu 3 mL by Nebulization route every six (6) hours as needed (wheezing or sob). File under Medicare Part B, ICD codes J44.9, C78.01    DULoxetine (CYMBALTA) 60 mg capsule Take 60 mg by mouth daily.  montelukast (SINGULAIR) 10 mg tablet Take 1 Tab by mouth daily.  raloxifene (EVISTA) 60 mg tablet Take 60 mg by mouth daily. No current facility-administered medications for this visit. Visit Vitals  /53 (BP 1 Location: Left arm, BP Patient Position: Sitting)   Pulse 84   Temp 97 °F (36.1 °C) (Temporal)   Ht 5' 5\" (1.651 m)   Wt 74.2 kg (163 lb 9.6 oz)   BMI 27.22 kg/m²         Physical Exam   Constitutional: She is oriented to person, place, and time. She appears well-developed and well-nourished. HENT:   Head: Normocephalic and atraumatic. Eyes: Conjunctivae are normal.   Neck: Neck supple. No JVD present. No tracheal deviation present. No thyromegaly present. Cardiovascular: Normal rate and regular rhythm. PMI is not displaced. Exam reveals no gallop, no S3 and no decreased pulses. Murmur heard. High-pitched blowing holosystolic murmur is present with a grade of 2/6 at the apex. Pulmonary/Chest: No respiratory distress. She has wheezes. She has no rales. She exhibits no tenderness. Abdominal: Soft. There is no abdominal tenderness. Musculoskeletal:         General: No edema. Neurological: She is alert and oriented to person, place, and time. Skin: Skin is warm. Psychiatric: She has a normal mood and affect. Ms. Mariel Bence has a reminder for a \"due or due soon\" health maintenance. I have asked that she contact her primary care provider for follow-up on this health maintenance. No flowsheet data found. SUMMARY:echo:6/2014  Left ventricle: The ventricle was mildly dilated. Systolic function was  normal. Ejection fraction was estimated in the range of 50 % to 55 %. There were no regional wall motion abnormalities. Doppler parameters were  consistent with abnormal left ventricular relaxation (grade 1 diastolic  dysfunction). Left atrium: The atrium was mildly dilated. Mitral valve: There was systolic bowing of the posterior leaflet, but  without diagnostic evidence for prolapse. There was mild to moderate  regurgitation. SUMMARY:echo:12/2014  Left ventricle: Systolic function was at the lower limits of normal.  Ejection fraction was estimated to be 53 %. There were no regional wall  motion abnormalities. Doppler parameters were consistent with abnormal  left ventricular relaxation (grade 1 diastolic dysfunction). Right ventricle: A pacing wire was present. Mitral valve: There was systolic bowing of the posterior leaflet, but  without diagnostic evidence for prolapse. There was mild regurgitation. Tricuspid valve: Pulmonary artery systolic pressure: 22 mmHg. 12/2015:echo    SUMMARY:  Left ventricle: Systolic function was normal. Ejection fraction was  estimated in the range of 50 % to 55 %. There was mild diffuse  hypokinesis. Doppler parameters were consistent with abnormal left  ventricular relaxation (grade 1 diastolic dysfunction). Mitral valve: There was systolic bowing of the anterior and posterior  leaflets, but without diagnostic evidence for prolapse. There was mild  regurgitation. Tricuspid valve: There was mild regurgitation. Tricuspid regurgitation  peak velocity: 2.3 m/sec. Pulmonary artery systolic pressure: 25 mmHg.       pft-6/2017  Maximal Mid Expiratory Flow rate is reduced to 43 % predicted  Forced Expiratory Volume in one second is reduced to 69 % predicted  FEV 1% is reduced     Volumes: All Volumes are reduced except for RV    Flow Volume Loop:  Nonspecific obstructive pattern in Flow Volume Loop    Bronchodilator:  No significant improvement with bronchodilator therapy    Diffusion:  Abnormal Diffusion Capacity reduced to 62 % predicted  DLCO normalizes to alveolar ventilation    Impression:  Moderate obstructive defect, Predominately small airways, Mild restrictive ventilatory defect, Reduced diffusion capacity indicating a decrease in alveolar surface area for gas exchange  I Have personally reviewed recent relevant labs available and discussed with patient    Interpretation Summary -6/2018  · Calculated left ventricular ejection fraction is 55%. Left ventricular mild concentric hypertrophy observed. Mild (grade 1) left ventricular diastolic dysfunction. · Left atrial cavity size is mildly dilated. · There was systolic bowing of the anterior and posterior leaflets, but without diagnostic evidence for prolapse. Mild mitral valve regurgitation. · Mild tricuspid valve regurgitation is present. Pulmonary arterial systolic pressure is 18 mmHg. · Mild pulmonic valve regurgitation is present. · Small pericardial effusion. Interpretation Summary 12/2018    · Left ventricular low normal systolic function. Estimated left ventricular ejection fraction is 51 - 55%. Visually measured ejection fraction. Normal left ventricular wall motion, no regional wall motion abnormality noted. Moderate (grade 2) left ventricular diastolic dysfunction. · There is no evidence of pulmonary hypertension. · Mild to moderate mitral valve regurgitation. · Left atrial cavity size is mildly dilated. Interpretation Summary 3/2019       · Normal cavity size, wall thickness and diastolic function. There is low normal systolic function. The estimated ejection fraction is 51 - 55%.  No regional wall motion abnormality noted. Global longitudinal strain is -13.00%. Abnormal left ventricular strain. · Normal right ventricular size and function. Pacing wire present  · Mild to moderate mitral valve regurgitation. Mild prolapse of the posterior leaflet within the mitral valve. · Mild pulmonic valve regurgitation is present. · Mild tricuspid valve regurgitation. Pulmonary arterial systolic pressure is 73.9 mmHg. Mild pulmonary hypertension. 5/2019    · Left Ventricle: Mild concentric hypertrophy. Severe global systolic dysfunction. Estimated left ventricular ejection fraction is 26 - 30%. Biplane method used to measure ejection fraction. Left ventricular global hypokinesis. · IVC/Hepatic Veins: Severely elevated central venous pressure (15+ mmHg); IVC diameter is larger than 21 mm and collapses less than 50% with respiration. · Pulmonary Artery: Mild pulmonary hypertension. Pulmonary arterial systolic pressure is 44 mmHg. · Pericardium: Trivial-to-small circumferential pericardial effusion measuring 10 mm. · Mitral Valve: Moderate mitral valve regurgitation. · Right Ventricle: Pacing wire present   Interpretation Summary 5/2019    · Acute occlusive thrombus present in the right peroneal vein. IMPRESSION: 5/2019     1. Positive examination for pulmonary emboli.   - There is likely a combination of acute and subacute PE in the lower lobe  segmental and subsegmental branch vessels. Interpretation Summary 7/2019    · Left Ventricle: Normal cavity size. Mild concentric hypertrophy. Severe systolic dysfunction. Estimated left ventricular ejection fraction is 21 - 25%. Abnormal left ventricular wall motion. Inconclusive left ventricular diastolic function. · Left Atrium: Severely dilated left atrium. · Mitral Valve: Mitral valve thickening. Mitral annular calcification. Moderate mitral valve regurgitation. · Tricuspid Valve: Mild tricuspid valve regurgitation is present.  Pulmonary arterial systolic pressure is 81.8 mmHg. Mild pulmonary hypertension. · IVC/Hepatic Veins: Moderately elevated central venous pressure (10-15 mmHg); IVC diameter is larger than 21 mm and collapses more than 50% with respiration. · Pericardium: Trivial-to-small pericardial effusion. Interpretation Summary 9/2019    · Left Ventricle: Normal cavity size. Upper normal wall thickness. Moderate-to-severe systolic dysfunction. Estimated left ventricular ejection fraction is 36 - 40%. Biplane method used to measure ejection fraction. Left ventricular global hypokinesis. · Pericardium: Trivial pericardial effusion. Assessment         ICD-10-CM ICD-9-CM    1. Systolic CHF, chronic (HCC) I50.22 428.22 ECHO ADULT COMPLETE     428.0     Stable continue current treatment monitor   2. Nonischemic cardiomyopathy (HCC) I42.8 425.4     Stable monitor   3. Automatic implantable cardioverter-defibrillator in situ Z95.810 V45.02     Normal function   4. Ventricular fibrillation (HCC) I49.01 427.41     Stable monitor   5. Essential hypertension I10 401.9     Continue treatment monitor   Tounge swelling not related to lisinopril as she had swelling even after stopping lisinopril  7/2018  I will hold Lasix as recent decrease in renal function. No clinical sign of fluid overload. 10/2018  Shortness of breath due to acute bronchitis bilateral wheezing. Will refer back to pulmonary. No sign of fluid overload. Will keep off Lasix  1/2019  Metastatic breast cancer now back on chemotherapy. Lung metastasis likely cause for shortness of breath which is improving. Low blood pressure will reduce Coreg to 6.25 twice a day. Continue lisinopril. Recheck echo in 2 months on chemotherapy  4/2019  Cardiac status stable. Echo EF remains stable continue Coreg and lisinopril. Check echo in 3 months.   Patient remains on chemotherapy    5/2019  Recent admission with increased shortness of breath combination of pulmonary embolism and decompensated systolic heart failure with worsening ejection fraction. Class III CHF. 10 pound weight loss from peak. Continue current therapy. Continue anticoagulation. Follow-up 3 weeks  5/30/2019  Fluid overload stable. Remains weak. Follow-up echo in about a month to reassess LV function. 10/2019  Cardiac status stable. CHF compensated. Shortness of breath and edema improving. Continue Entresto. Aldactone increased to 50 mg a day due to hypokalemia. Lasix will be reduced to 2-3 times a week. Episode of ventricular fibrillation in 8/2019 noticed on ICD check. Patient had hypokalemia during that time will continue to monitor. If patient needs laparoscopic surgery her risk is high about 5 to 10%. If open surgical procedure is required it will carry high risk    1/2020  Cardiac status stable. She completed Chemotherapy in October and is doing well. She is tolerating Entresto. She takes lasix 2-3 x weekly as needed for edema. Reports blood work drawn this week with normal renal function and potassium. Reports an episode of syncope in Dr. Regina Worley office in November and b/p readings were labile at that time. ICD checked 11/2019 (after syncopal episode) no arrhythmias seen. Continue current medications. 7/2020  Cardiac status stable. Continue treatment. Follow-up echo  Orders Placed This Encounter    ECHO ADULT COMPLETE     Standing Status:   Future     Standing Expiration Date:   7/8/2021     Order Specific Question:   Contrast Enhancement (Bubble Study, Definity, Optison) may be used if criteria listed in established evidence-based protocol has been identified. Answer:    Yes         David Pearson MD

## 2020-07-08 NOTE — PROGRESS NOTES
1. Have you been to the ER, urgent care clinic since your last visit? Hospitalized since your last visit?     no    2. Have you seen or consulted any other health care providers outside of the [address] [place] [address] since your last visit? Include any pap smears or colon screening.       Yes When: x 2 wk ago with oncologist

## 2020-08-24 RX ORDER — SACUBITRIL AND VALSARTAN 49; 51 MG/1; MG/1
TABLET, FILM COATED ORAL
Qty: 180 TAB | Refills: 3 | Status: SHIPPED | OUTPATIENT
Start: 2020-08-24 | End: 2021-08-07

## 2020-08-31 ENCOUNTER — VIRTUAL VISIT (OUTPATIENT)
Dept: CARDIOLOGY CLINIC | Age: 70
End: 2020-08-31

## 2020-08-31 DIAGNOSIS — I49.01 VENTRICULAR FIBRILLATION (HCC): ICD-10-CM

## 2020-08-31 DIAGNOSIS — I50.22 SYSTOLIC CHF, CHRONIC (HCC): Primary | ICD-10-CM

## 2020-08-31 DIAGNOSIS — I10 ESSENTIAL HYPERTENSION: ICD-10-CM

## 2020-08-31 DIAGNOSIS — I42.8 NONISCHEMIC CARDIOMYOPATHY (HCC): ICD-10-CM

## 2020-08-31 NOTE — PROGRESS NOTES
1. Have you been to the ER, urgent care clinic since your last visit? Hospitalized since your last visit? No    2. Have you seen or consulted any other health care providers outside of the [address] [place] [address] since your last visit? Include any pap smears or colon screening.  Yes Where: Dermatology Routine

## 2020-08-31 NOTE — PROGRESS NOTES
Mackenzie Schilling is a 69 y.o. female who was seen by synchronous (real-time) audio-video technology on 8/31/2020. Consent:  She and/or her healthcare decision maker is aware that this patient-initiated Telehealth encounter is a billable service, with coverage as determined by her insurance carrier. She is aware that she may receive a bill and has provided verbal consent to proceed: Yes    712  Subjective:   Mackenzie Schilling was seen for CHF (post Echo)  8/2020  Patient is seen today for video visit. She has a history of cardiomyopathy, systolic CHF and ICD placement. Patient is here for follow up of diagnostic tests. Results will be discussed. On follow up patient denies any chest pains,sob, palpitation or other significant symptoms. Coding Help - Use CPT Codes 23840-36719, [id number] for Established and New Patients respectively, either employing EM elements or Time rules. Other codes (example consult codes) may also apply.     Family History   Problem Relation Age of Onset    Hypertension Mother     High Cholesterol Mother     Heart Disease Father         paemaker implant at 80     Past Surgical History:   Procedure Laterality Date    CARDIAC SURG PROCEDURE UNLIST      Difibrillator; BI V ICD    HX CHOLECYSTECTOMY  11/01/2019    HX MASTECTOMY  09/28/11    modified radical mastectomy and axillary dissection    HX ORTHOPAEDIC      HX OTHER SURGICAL      surgery to remove part of liver    HX PACEMAKER  10/27/10    s/p [employer] biventricular AICD, without complication    HX RADICAL MASTECTOMY      IR CHOLECYSTOSTOMY PERCUTANEOUS  9/20/2019    IR INSERT TUNL CVC W PORT OVER 5 YEARS  1/16/2019    NEUROLOGICAL PROCEDURE UNLISTED      Cervical fusion     Allergies   Allergen Reactions    Adhesive Other (comments)     blisters       Current Outpatient Medications:     Entresto 49-51 mg tab tablet, TAKE 1 TABLET TWICE A DAY, Disp: 180 Tab, Rfl: 3    spironolactone (ALDACTONE) 25 mg tablet, Take 2 Tabs by mouth daily. , Disp: 14 Tab, Rfl: 0    digoxin (LANOXIN) 0.125 mg tablet, Take 1 Tab by mouth daily. , Disp: 90 Tab, Rfl: 3    carvedilol (COREG) 25 mg tablet, Take 1 Tab by mouth two (2) times daily (with meals). , Disp: 180 Tab, Rfl: 3    apixaban (ELIQUIS) 2.5 mg tablet, Take 1 Tab by mouth two (2) times a day., Disp: 60 Tab, Rfl: 2    furosemide (LASIX) 20 mg tablet, Take 1 Tab by mouth daily. (Patient taking differently: Take 20 mg by mouth every Tuesday and Friday.), Disp: 30 Tab, Rfl: 0    tiotropium (SPIRIVA WITH HANDIHALER) 18 mcg inhalation capsule, Take 1 Cap by inhalation daily. , Disp: , Rfl:     albuterol-ipratropium (DUO-NEB) 2.5 mg-0.5 mg/3 ml nebu, 3 mL by Nebulization route every six (6) hours as needed (wheezing or sob). File under Medicare Part B, ICD codes J44.9, C78.01, Disp: 120 Nebule, Rfl: 3    DULoxetine (CYMBALTA) 60 mg capsule, Take 60 mg by mouth daily. , Disp: , Rfl:     montelukast (SINGULAIR) 10 mg tablet, Take 1 Tab by mouth daily. , Disp: 90 Tab, Rfl: 3    raloxifene (EVISTA) 60 mg tablet, Take 60 mg by mouth daily. , Disp: , Rfl:   Allergies   Allergen Reactions    Adhesive Other (comments)     blisters         Review of Systems   Review of Systems   Constitutional: Negative for chills and fever. HENT: Negative for nosebleeds. Eyes: Negative for blurred vision and double vision. Respiratory: Negative for cough, hemoptysis, sputum production, shortness of breath and wheezing. Cardiovascular: Negative for chest pain, palpitations, orthopnea, claudication, leg swelling and PND. Gastrointestinal: Negative for abdominal pain, heartburn, nausea and vomiting. Musculoskeletal: Negative for myalgias. Skin: Negative for rash. Neurological: Negative for dizziness, weakness and headaches. Endo/Heme/Allergies: Does not bruise/bleed easily.    PHYSICAL EXAMINATION:  [ INSTRUCTIONS:  \"[x]\" Indicates a positive item  \"[]\" Indicates a negative item  -- DELETE ALL ITEMS NOT EXAMINED]  Vital Signs: (As obtained by patient/caregiver at home)  There were no vitals taken for this visit. Constitutional: [x] Appears well-developed and well-nourished [x] No apparent distress      [] Abnormal -     Mental status: [x] Alert and awake  [x] Oriented to person/place/time [x] Able to follow commands    [] Abnormal -     Eyes:   EOM    [x]  Normal    [] Abnormal -   Sclera  [x]  Normal    [] Abnormal -          Discharge [x]  None visible   [] Abnormal -     HENT: [x] Normocephalic, atraumatic  [] Abnormal -   [x] Mouth/Throat: Mucous membranes are moist    External Ears [x] Normal  [] Abnormal -    Neck: [x] No visualized mass,NO JVD [] Abnormal -     Pulmonary/Chest: [x] Respiratory effort normal   [x] No visualized signs of difficulty breathing or respiratory distress        [] Abnormal -      Musculoskeletal:   [x] Normal gait with no signs of ataxia         [x] Normal range of motion of neck        [] Abnormal -     Neurological:        [x] No Facial Asymmetry (Cranial nerve 7 motor function) (limited exam due to video visit)          [x] No gaze palsy        [] Abnormal -          Skin:        [x] No significant exanthematous lesions ,no bruising       [] Abnormal -            Psychiatric:       [x] Normal Affect [] Abnormal -        [x] No Hallucinations  Extremities:           No Edema    Other pertinent observable physical exam findings:-    Pertinent LAB and reports  I have reviewed available  pertinent notes, labs and reports and included in current evaluation of this patient. Assessment & Plan:   Diagnoses and all orders for this visit:    1. Systolic CHF, chronic (HCC)  Comments:  Stable continue treatment monitor    2. Nonischemic cardiomyopathy ([address]  Comments:  Improving. Continue to continue. 3. Ventricular fibrillation (HCC)  Comments:  Stable no treatment    4.  Essential hypertension  Comments:  Stable monitor      Follow-up and Dispositions    · Return in about 3 months (around 11/30/2020). Interpretation Summary 8/2020      · LV: Estimated LVEF is 45 - 50%. Normal cavity size. Upper normal wall thickness. Wall motion: normal. Mild (grade 1) left ventricular diastolic dysfunction. · LA: Left Atrium volume index is 35.88 mL/m2. · RV: Pacer/ICD present. · MV: Mild mitral annular calcification. Mild mitral valve regurgitation is present. · TV: Mild tricuspid valve regurgitation is present. · PA: Pulmonary arterial systolic pressure is 24 mmHg. 8/2020  Cardiac status stable. continue current treatment,lvef improving    No orders of the defined types were placed in this encounter. Follow-up and Dispositions    · Return in about 3 months (around 11/30/2020). We discussed the expected course, resolution and complications of the diagnosis(es) in detail. Medication risks, benefits, costs, interactions, and alternatives were discussed as indicated. I advised her to contact the office if her condition worsens, changes or fails to improve as anticipated. She expressed understanding with the diagnosis(es) and plan. I was in the office while conducting this encounter. Pursuant to the emergency declaration under the [facility] waiver authority and the [employer] and [employer] General Act, this Virtual  Visit was conducted, with patient's consent, to reduce the patient's risk of exposure to COVID-19 and provide continuity of care for an established patient. Services were provided through a video synchronous discussion virtually to substitute for in-person clinic visit.     Leopold Bile, MD

## 2020-09-08 ENCOUNTER — TELEPHONE (OUTPATIENT)
Dept: PULMONOLOGY | Age: 70
End: 2020-09-08

## 2020-09-08 RX ORDER — MONTELUKAST SODIUM 10 MG/1
10 TABLET ORAL DAILY
Qty: 90 TAB | Refills: 3 | Status: SHIPPED | COMMUNITY
Start: 2020-09-08 | End: 2021-08-25 | Stop reason: SDUPTHER

## 2020-09-17 RX ORDER — CARVEDILOL 25 MG/1
25 TABLET ORAL 2 TIMES DAILY WITH MEALS
Qty: 180 TAB | Refills: 3 | Status: SHIPPED | OUTPATIENT
Start: 2020-09-17 | End: 2021-09-03

## 2020-10-18 PROBLEM — H25.012 CORTICAL AGE-RELATED CATARACT OF LEFT EYE: Status: ACTIVE | Noted: 2020-10-18

## 2020-10-19 PROBLEM — H25.012 CORTICAL AGE-RELATED CATARACT OF LEFT EYE: Status: RESOLVED | Noted: 2020-10-18 | Resolved: 2020-10-19

## 2020-10-31 PROBLEM — H25.011 CORTICAL AGE-RELATED CATARACT OF RIGHT EYE: Status: ACTIVE | Noted: 2020-10-31

## 2020-11-02 PROBLEM — H25.011 CORTICAL AGE-RELATED CATARACT OF RIGHT EYE: Status: RESOLVED | Noted: 2020-10-31 | Resolved: 2020-11-02

## 2020-11-19 ENCOUNTER — TRANSCRIBE ORDER (OUTPATIENT)
Dept: SCHEDULING | Age: 70
End: 2020-11-19

## 2020-11-19 DIAGNOSIS — C50.412 MALIGNANT NEOPLASM OF UPPER-OUTER QUADRANT OF LEFT FEMALE BREAST (HCC): Primary | ICD-10-CM

## 2020-11-23 ENCOUNTER — OFFICE VISIT (OUTPATIENT)
Dept: CARDIOLOGY CLINIC | Age: 70
End: 2020-11-23
Payer: MEDICARE

## 2020-11-23 VITALS
DIASTOLIC BLOOD PRESSURE: 40 MMHG | BODY MASS INDEX: 27.49 KG/M2 | HEIGHT: 65 IN | SYSTOLIC BLOOD PRESSURE: 91 MMHG | OXYGEN SATURATION: 97 % | WEIGHT: 165 LBS | HEART RATE: 81 BPM | TEMPERATURE: 97.6 F

## 2020-11-23 DIAGNOSIS — I49.01 VENTRICULAR FIBRILLATION (HCC): ICD-10-CM

## 2020-11-23 DIAGNOSIS — Z95.810 AUTOMATIC IMPLANTABLE CARDIOVERTER-DEFIBRILLATOR IN SITU: ICD-10-CM

## 2020-11-23 DIAGNOSIS — I10 ESSENTIAL HYPERTENSION: ICD-10-CM

## 2020-11-23 DIAGNOSIS — I42.8 NONISCHEMIC CARDIOMYOPATHY (HCC): ICD-10-CM

## 2020-11-23 DIAGNOSIS — I50.22 SYSTOLIC CHF, CHRONIC (HCC): Primary | ICD-10-CM

## 2020-11-23 PROCEDURE — G8752 SYS BP LESS 140: HCPCS | Performed by: INTERNAL MEDICINE

## 2020-11-23 PROCEDURE — 1090F PRES/ABSN URINE INCON ASSESS: CPT | Performed by: INTERNAL MEDICINE

## 2020-11-23 PROCEDURE — 1100F PTFALLS ASSESS-DOCD GE2>/YR: CPT | Performed by: INTERNAL MEDICINE

## 2020-11-23 PROCEDURE — G8432 DEP SCR NOT DOC, RNG: HCPCS | Performed by: INTERNAL MEDICINE

## 2020-11-23 PROCEDURE — 3017F COLORECTAL CA SCREEN DOC REV: CPT | Performed by: INTERNAL MEDICINE

## 2020-11-23 PROCEDURE — G8754 DIAS BP LESS 90: HCPCS | Performed by: INTERNAL MEDICINE

## 2020-11-23 PROCEDURE — G8536 NO DOC ELDER MAL SCRN: HCPCS | Performed by: INTERNAL MEDICINE

## 2020-11-23 PROCEDURE — G8419 CALC BMI OUT NRM PARAM NOF/U: HCPCS | Performed by: INTERNAL MEDICINE

## 2020-11-23 PROCEDURE — G8399 PT W/DXA RESULTS DOCUMENT: HCPCS | Performed by: INTERNAL MEDICINE

## 2020-11-23 PROCEDURE — 3288F FALL RISK ASSESSMENT DOCD: CPT | Performed by: INTERNAL MEDICINE

## 2020-11-23 PROCEDURE — G8427 DOCREV CUR MEDS BY ELIG CLIN: HCPCS | Performed by: INTERNAL MEDICINE

## 2020-11-23 PROCEDURE — G9899 SCRN MAM PERF RSLTS DOC: HCPCS | Performed by: INTERNAL MEDICINE

## 2020-11-23 PROCEDURE — 99214 OFFICE O/P EST MOD 30 MIN: CPT | Performed by: INTERNAL MEDICINE

## 2020-11-23 NOTE — PROGRESS NOTES
Patient didn't bring medications, verbally reviewed. 1. Have you been to the ER, urgent care clinic since your last visit? Hospitalized since your last visit? No    2. Have you seen or consulted any other health care providers outside of the [address] [place] [address] since your last visit? Include any pap smears or colon screening.  Yes Where: Gastroenterology

## 2020-11-23 NOTE — PROGRESS NOTES
HISTORY OF PRESENT ILLNESS  Jose F Whaley is a 69 y.o. female. Patient with cmp,chf.post  icd   On follow up patient denies any chest pains, palpitation or other significant symptoms. Patient is here for follow up of diagnostic tests. Results will be discussed. He feels extremely fatigued tired and weak. Recently reported decrease in renal function. Patient seen for transition of care visit. Discharge date 5/6/2019  Nurse encounter 5/6/2019  Physician encounter 5/8/2019. Admitted with acute CHF, acute PE, DVT. Patient had worsening cardiomyopathy. Symptoms are slowly improving significant improvement of shortness of breath. Orthopnea significantly better. No edema. Still feels tired on minimal activity exertion    Hypertension   The history is provided by the patient. This is a chronic problem. The problem occurs constantly. The problem has not changed since onset. Associated symptoms include shortness of breath. Pertinent negatives include no chest pain. CHF   The history is provided by the patient. This is a recurrent problem. The problem occurs constantly. The problem has been gradually improving. Associated symptoms include shortness of breath. Pertinent negatives include no chest pain. The symptoms are aggravated by exertion. The symptoms are relieved by rest.   Cardiomyopathy   The history is provided by the patient. This is a chronic problem. The problem has been resolved. Associated symptoms include shortness of breath. Pertinent negatives include no chest pain. Valvular Heart Disease   The history is provided by the patient. This is a chronic problem. The problem occurs constantly. The problem has not changed since onset. Associated symptoms include shortness of breath. Pertinent negatives include no chest pain. Review of Systems   Constitutional: Negative for chills and fever. HENT: Negative for nosebleeds. Eyes: Negative for blurred vision and double vision.    Respiratory: Positive for shortness of breath. Negative for cough, hemoptysis, sputum production and wheezing. Cardiovascular: Negative for chest pain, palpitations, orthopnea, claudication, leg swelling and PND. Gastrointestinal: Negative for heartburn, nausea and vomiting. Musculoskeletal: Negative for myalgias. Skin: Negative for rash. Neurological: Negative for dizziness and weakness. Endo/Heme/Allergies: Does not bruise/bleed easily. Family History   Problem Relation Age of Onset    Hypertension Mother     High Cholesterol Mother     Heart Disease Father         paemaker implant at 80       Past Medical History:   Diagnosis Date    Abnormal nuclear cardiac imaging test 06/11/2010    Lg fixed anterior, septal, apical, inferoseptal defect sugg RCA & LAD disease or cardiomyopathy. EF 20%. Global hykn. Nondiagnostic EKG.  Acute bronchitis 10/15/2018    Persistent cough and wheezing in spite of prednisone and antibiotic. Will refer to pulmonary    Adenocarcinoma of breast; locally advanced invasive ductal adenocarcinoma of left breast     Asthma     Automatic implantable cardiac defibrillator in situ     Automatic implantable cardiac defibrillator in situ     [facility] [address]     Cancer ([facility] [address]     breast cancer left    Chemotherapy convalescence or palliative care 2012    Chronic combined systolic and diastolic heart failure (HCC)     Remains symptomatic, nyha class 3 ef improving.  Congestive heart failure, unspecified     chronic class ll    Cortical age-related cataract of left eye 10/18/2020    Cortical age-related cataract of right eye 10/31/2020    Echocardiogram 09/27/2010    EF 30%. Global hykn. Mild MR. Mild TR.     GERD (gastroesophageal reflux disease)     Hyperlipidemia     Hypertension     Mitral valve disorders(424.0) 1/15/2014    mild to moderate mr     Mitral valve disorders(424.0) 1/15/2014    mild to moderate mr     MVP (mitral valve prolapse)     Nonischemic cardiomyopathy ([address]     EF 20-30% despite medical therapy    Nonspecific abnormal electrocardiogram (ECG) (EKG)     Osteopenia     Other primary cardiomyopathies     Pacemaker 10/27/10    s/p implantation, without complication    Poisn by oth uns agents prim aff cv sys     Ef slightly better to 45%, STABLE back on chemo.  Radiation therapy     Radiation therapy complication 4940    S/P cardiac catheterization 07/08/10    Patent coronary arteries. LVEDP 25.  EF 25%. Global hykn. Moderate MR.  RA 10.  RV 40/10. PA 40/20.  20.  CO/CI 4.5/2.45 (Larry).  Shortness of breath     Syncope and collapse     Thyroid disease     goiter       Past Surgical History:   Procedure Laterality Date    CARDIAC SURG PROCEDURE UNLIST      Difibrillator; BI V ICD    HX CATARACT REMOVAL Left 10/19/2020    HX CHOLECYSTECTOMY  11/01/2019    HX MASTECTOMY  09/28/11    modified radical mastectomy and axillary dissection    HX ORTHOPAEDIC      HX OTHER SURGICAL      surgery to remove part of liver    HX PACEMAKER  10/27/10    s/p [employer] biventricular AICD, without complication    HX RADICAL MASTECTOMY      IR CHOLECYSTOSTOMY PERCUTANEOUS  9/20/2019    IR INSERT TUNL CVC W PORT OVER 5 YEARS  1/16/2019    NEUROLOGICAL PROCEDURE UNLISTED      Cervical fusion       Social History     Tobacco Use    Smoking status: Never Smoker    Smokeless tobacco: Never Used   Substance Use Topics    Alcohol use: No       Allergies   Allergen Reactions    Adhesive Other (comments)     blisters       Current Outpatient Medications   Medication Sig    carvediloL (COREG) 25 mg tablet Take 1 Tab by mouth two (2) times daily (with meals).  montelukast (SINGULAIR) 10 mg tablet Take 1 Tab by mouth daily.  Entresto 49-51 mg tab tablet TAKE 1 TABLET TWICE A DAY    spironolactone (ALDACTONE) 25 mg tablet Take 2 Tabs by mouth daily.  digoxin (LANOXIN) 0.125 mg tablet Take 1 Tab by mouth daily.     apixaban (ELIQUIS) 2.5 mg tablet Take 1 Tab by mouth two (2) times a day.  furosemide (LASIX) 20 mg tablet Take 1 Tab by mouth daily. (Patient taking differently: Take 20 mg by mouth every Tuesday and Friday.)    tiotropium (SPIRIVA WITH HANDIHALER) 18 mcg inhalation capsule Take 1 Cap by inhalation daily.  albuterol-ipratropium (DUO-NEB) 2.5 mg-0.5 mg/3 ml nebu 3 mL by Nebulization route every six (6) hours as needed (wheezing or sob). File under Medicare Part B, ICD codes J44.9, C78.01    DULoxetine (CYMBALTA) 60 mg capsule Take 60 mg by mouth daily.  raloxifene (EVISTA) 60 mg tablet Take 60 mg by mouth daily. No current facility-administered medications for this visit. Visit Vitals  BP (!) 91/40 (BP 1 Location: Left arm, BP Patient Position: Sitting)   Pulse 81   Temp 97.6 °F (36.4 °C) (Temporal)   Ht 5' 5\" (1.651 m)   Wt 74.8 kg (165 lb)   SpO2 97%   BMI 27.46 kg/m²         Physical Exam   Constitutional: She is oriented to person, place, and time. She appears well-developed and well-nourished. HENT:   Head: Normocephalic and atraumatic. Eyes: Conjunctivae are normal.   Neck: Neck supple. No JVD present. No tracheal deviation present. No thyromegaly present. Cardiovascular: Normal rate and regular rhythm. PMI is not displaced. Exam reveals no gallop, no S3 and no decreased pulses. Murmur heard. High-pitched blowing holosystolic murmur is present with a grade of 2/6 at the apex. Pulmonary/Chest: No respiratory distress. She has wheezes. She has no rales. She exhibits no tenderness. Abdominal: Soft. There is no abdominal tenderness. Musculoskeletal:         General: No edema. Neurological: She is alert and oriented to person, place, and time. Skin: Skin is warm. Psychiatric: She has a normal mood and affect. Ms. Rosalba Brown has a reminder for a \"due or due soon\" health maintenance. I have asked that she contact her primary care provider for follow-up on this health maintenance.     No flowsheet data found. SUMMARY:echo:6/2014  Left ventricle: The ventricle was mildly dilated. Systolic function was  normal. Ejection fraction was estimated in the range of 50 % to 55 %. There were no regional wall motion abnormalities. Doppler parameters were  consistent with abnormal left ventricular relaxation (grade 1 diastolic  dysfunction). Left atrium: The atrium was mildly dilated. Mitral valve: There was systolic bowing of the posterior leaflet, but  without diagnostic evidence for prolapse. There was mild to moderate  regurgitation. SUMMARY:echo:12/2014  Left ventricle: Systolic function was at the lower limits of normal.  Ejection fraction was estimated to be 53 %. There were no regional wall  motion abnormalities. Doppler parameters were consistent with abnormal  left ventricular relaxation (grade 1 diastolic dysfunction). Right ventricle: A pacing wire was present. Mitral valve: There was systolic bowing of the posterior leaflet, but  without diagnostic evidence for prolapse. There was mild regurgitation. Tricuspid valve: Pulmonary artery systolic pressure: 22 mmHg. 12/2015:echo    SUMMARY:  Left ventricle: Systolic function was normal. Ejection fraction was  estimated in the range of 50 % to 55 %. There was mild diffuse  hypokinesis. Doppler parameters were consistent with abnormal left  ventricular relaxation (grade 1 diastolic dysfunction). Mitral valve: There was systolic bowing of the anterior and posterior  leaflets, but without diagnostic evidence for prolapse. There was mild  regurgitation. Tricuspid valve: There was mild regurgitation. Tricuspid regurgitation  peak velocity: 2.3 m/sec. Pulmonary artery systolic pressure: 25 mmHg. pft-6/2017  Maximal Mid Expiratory Flow rate is reduced to 43 % predicted  Forced Expiratory Volume in one second is reduced to 69 % predicted  FEV 1% is reduced     Volumes:   All Volumes are reduced except for RV    Flow Volume Loop:  Nonspecific obstructive pattern in Flow Volume Loop    Bronchodilator:  No significant improvement with bronchodilator therapy    Diffusion:  Abnormal Diffusion Capacity reduced to 62 % predicted  DLCO normalizes to alveolar ventilation    Impression:  Moderate obstructive defect, Predominately small airways, Mild restrictive ventilatory defect, Reduced diffusion capacity indicating a decrease in alveolar surface area for gas exchange  I Have personally reviewed recent relevant labs available and discussed with patient    Interpretation Summary -6/2018  · Calculated left ventricular ejection fraction is 55%. Left ventricular mild concentric hypertrophy observed. Mild (grade 1) left ventricular diastolic dysfunction. · Left atrial cavity size is mildly dilated. · There was systolic bowing of the anterior and posterior leaflets, but without diagnostic evidence for prolapse. Mild mitral valve regurgitation. · Mild tricuspid valve regurgitation is present. Pulmonary arterial systolic pressure is 18 mmHg. · Mild pulmonic valve regurgitation is present. · Small pericardial effusion. Interpretation Summary 12/2018    · Left ventricular low normal systolic function. Estimated left ventricular ejection fraction is 51 - 55%. Visually measured ejection fraction. Normal left ventricular wall motion, no regional wall motion abnormality noted. Moderate (grade 2) left ventricular diastolic dysfunction. · There is no evidence of pulmonary hypertension. · Mild to moderate mitral valve regurgitation. · Left atrial cavity size is mildly dilated. Interpretation Summary 3/2019       · Normal cavity size, wall thickness and diastolic function. There is low normal systolic function. The estimated ejection fraction is 51 - 55%. No regional wall motion abnormality noted. Global longitudinal strain is -13.00%. Abnormal left ventricular strain. · Normal right ventricular size and function.  Pacing wire present  · Mild to moderate mitral valve regurgitation. Mild prolapse of the posterior leaflet within the mitral valve. · Mild pulmonic valve regurgitation is present. · Mild tricuspid valve regurgitation. Pulmonary arterial systolic pressure is 91.9 mmHg. Mild pulmonary hypertension. 5/2019    · Left Ventricle: Mild concentric hypertrophy. Severe global systolic dysfunction. Estimated left ventricular ejection fraction is 26 - 30%. Biplane method used to measure ejection fraction. Left ventricular global hypokinesis. · IVC/Hepatic Veins: Severely elevated central venous pressure (15+ mmHg); IVC diameter is larger than 21 mm and collapses less than 50% with respiration. · Pulmonary Artery: Mild pulmonary hypertension. Pulmonary arterial systolic pressure is 44 mmHg. · Pericardium: Trivial-to-small circumferential pericardial effusion measuring 10 mm. · Mitral Valve: Moderate mitral valve regurgitation. · Right Ventricle: Pacing wire present   Interpretation Summary 5/2019    · Acute occlusive thrombus present in the right peroneal vein. IMPRESSION: 5/2019     1. Positive examination for pulmonary emboli.   - There is likely a combination of acute and subacute PE in the lower lobe  segmental and subsegmental branch vessels. Interpretation Summary 7/2019    · Left Ventricle: Normal cavity size. Mild concentric hypertrophy. Severe systolic dysfunction. Estimated left ventricular ejection fraction is 21 - 25%. Abnormal left ventricular wall motion. Inconclusive left ventricular diastolic function. · Left Atrium: Severely dilated left atrium. · Mitral Valve: Mitral valve thickening. Mitral annular calcification. Moderate mitral valve regurgitation. · Tricuspid Valve: Mild tricuspid valve regurgitation is present. Pulmonary arterial systolic pressure is 97.8 mmHg. Mild pulmonary hypertension.   · IVC/Hepatic Veins: Moderately elevated central venous pressure (10-15 mmHg); IVC diameter is larger than 21 mm and collapses more than 50% with respiration. · Pericardium: Trivial-to-small pericardial effusion. Interpretation Summary 9/2019    · Left Ventricle: Normal cavity size. Upper normal wall thickness. Moderate-to-severe systolic dysfunction. Estimated left ventricular ejection fraction is 36 - 40%. Biplane method used to measure ejection fraction. Left ventricular global hypokinesis. · Pericardium: Trivial pericardial effusion. Interpretation Summary 8/2020      · LV: Estimated LVEF is 45 - 50%. Normal cavity size. Upper normal wall thickness. Wall motion: normal. Mild (grade 1) left ventricular diastolic dysfunction. · LA: Left Atrium volume index is 35.88 mL/m2. · RV: Pacer/ICD present. · MV: Mild mitral annular calcification. Mild mitral valve regurgitation is present. · TV: Mild tricuspid valve regurgitation is present. · PA: Pulmonary arterial systolic pressure is 24 mmHg. Assessment         ICD-10-CM ICD-9-CM    1. Systolic CHF, chronic (HCC)  I50.22 428.22      428.0    2. Nonischemic cardiomyopathy (HCC)  I42.8 425.4    3. Ventricular fibrillation (HCC)  I49.01 427.41    4. Essential hypertension  I10 401.9    5. Automatic implantable cardioverter-defibrillator in situ  Z95.810 V45.02    Tounge swelling not related to lisinopril as she had swelling even after stopping lisinopril  7/2018  I will hold Lasix as recent decrease in renal function. No clinical sign of fluid overload. 10/2018  Shortness of breath due to acute bronchitis bilateral wheezing. Will refer back to pulmonary. No sign of fluid overload. Will keep off Lasix  1/2019  Metastatic breast cancer now back on chemotherapy. Lung metastasis likely cause for shortness of breath which is improving. Low blood pressure will reduce Coreg to 6.25 twice a day. Continue lisinopril. Recheck echo in 2 months on chemotherapy  4/2019  Cardiac status stable. Echo EF remains stable continue Coreg and lisinopril. Check echo in 3 months. Patient remains on chemotherapy    5/2019  Recent admission with increased shortness of breath combination of pulmonary embolism and decompensated systolic heart failure with worsening ejection fraction. Class III CHF. 10 pound weight loss from peak. Continue current therapy. Continue anticoagulation. Follow-up 3 weeks  5/30/2019  Fluid overload stable. Remains weak. Follow-up echo in about a month to reassess LV function. 10/2019  Cardiac status stable. CHF compensated. Shortness of breath and edema improving. Continue Entresto. Aldactone increased to 50 mg a day due to hypokalemia. Lasix will be reduced to 2-3 times a week. Episode of ventricular fibrillation in 8/2019 noticed on ICD check. Patient had hypokalemia during that time will continue to monitor. If patient needs laparoscopic surgery her risk is high about 5 to 10%. If open surgical procedure is required it will carry high risk    1/2020  Cardiac status stable. She completed Chemotherapy in October and is doing well. She is tolerating Entresto. She takes lasix 2-3 x weekly as needed for edema. Reports blood work drawn this week with normal renal function and potassium. Reports an episode of syncope in Dr. Sally Mata office in November and b/p readings were labile at that time. ICD checked 11/2019 (after syncopal episode) no arrhythmias seen. Continue current medications. 7/2020  Cardiac status stable. Continue treatment. Follow-up echo  8/2020 virtual visit  Cardiac status stable. continue current treatment,lvef improving  11/2020  Cardiac status stable. Low normal blood pressure but asymptomatic continue monitoring. CHF compensated  No orders of the defined types were placed in this encounter.         Cari Whipple MD

## 2020-11-25 ENCOUNTER — HOSPITAL ENCOUNTER (OUTPATIENT)
Dept: LAB | Age: 70
Discharge: HOME | End: 2020-11-25
Payer: MEDICARE

## 2020-11-25 PROCEDURE — 87635 SARS-COV-2 COVID-19 AMP PRB: CPT

## 2020-11-27 ENCOUNTER — HOSPITAL ENCOUNTER (OUTPATIENT)
Dept: PET IMAGING | Age: 70
Discharge: HOME | End: 2020-11-27
Attending: INTERNAL MEDICINE
Payer: MEDICARE

## 2020-11-27 DIAGNOSIS — C50.412 MALIGNANT NEOPLASM OF UPPER-OUTER QUADRANT OF LEFT FEMALE BREAST (HCC): ICD-10-CM

## 2020-11-27 PROCEDURE — A9552 F18 FDG: HCPCS

## 2020-11-28 LAB — SARS-COV-2, COV2NT: NOT DETECTED

## 2020-11-30 ENCOUNTER — ANESTHESIA EVENT (OUTPATIENT)
Dept: ENDOSCOPY | Age: 70
End: 2020-11-30
Payer: MEDICARE

## 2020-12-01 ENCOUNTER — ANESTHESIA (OUTPATIENT)
Dept: ENDOSCOPY | Age: 70
End: 2020-12-01
Payer: MEDICARE

## 2020-12-01 ENCOUNTER — HOSPITAL ENCOUNTER (OUTPATIENT)
Age: 70
Setting detail: OUTPATIENT SURGERY
Discharge: HOME | End: 2020-12-01
Attending: INTERNAL MEDICINE | Admitting: INTERNAL MEDICINE
Payer: MEDICARE

## 2020-12-01 VITALS
RESPIRATION RATE: 18 BRPM | BODY MASS INDEX: 27.56 KG/M2 | HEIGHT: 65 IN | TEMPERATURE: 97.6 F | OXYGEN SATURATION: 98 % | HEART RATE: 69 BPM | SYSTOLIC BLOOD PRESSURE: 123 MMHG | WEIGHT: 165.4 LBS | DIASTOLIC BLOOD PRESSURE: 57 MMHG

## 2020-12-01 LAB — POTASSIUM SERPL-SCNC: 3.6 MMOL/L (ref 3.5–5.5)

## 2020-12-01 PROCEDURE — 99100 ANES PT EXTEME AGE<1 YR&>70: CPT | Performed by: ANESTHESIOLOGY

## 2020-12-01 PROCEDURE — 2709999900 HC NON-CHARGEABLE SUPPLY: Performed by: INTERNAL MEDICINE

## 2020-12-01 PROCEDURE — 77030008565 HC TBNG SUC IRR ERBE -B: Performed by: INTERNAL MEDICINE

## 2020-12-01 PROCEDURE — 88305 TISSUE EXAM BY PATHOLOGIST: CPT

## 2020-12-01 PROCEDURE — 76060000032 HC ANESTHESIA 0.5 TO 1 HR: Performed by: INTERNAL MEDICINE

## 2020-12-01 PROCEDURE — 84132 ASSAY OF SERUM POTASSIUM: CPT

## 2020-12-01 PROCEDURE — 76040000007: Performed by: INTERNAL MEDICINE

## 2020-12-01 PROCEDURE — 00811 ANES LWR INTST NDSC NOS: CPT | Performed by: ANESTHESIOLOGY

## 2020-12-01 PROCEDURE — 74011250636 HC RX REV CODE- 250/636: Performed by: NURSE ANESTHETIST, CERTIFIED REGISTERED

## 2020-12-01 PROCEDURE — 74011250636 HC RX REV CODE- 250/636: Performed by: ANESTHESIOLOGY

## 2020-12-01 PROCEDURE — 77030021593 HC FCPS BIOP ENDOSC BSC -A: Performed by: INTERNAL MEDICINE

## 2020-12-01 RX ORDER — SODIUM CHLORIDE 0.9 % (FLUSH) 0.9 %
5-40 SYRINGE (ML) INJECTION EVERY 8 HOURS
Status: CANCELLED | OUTPATIENT
Start: 2020-12-01

## 2020-12-01 RX ORDER — SODIUM CHLORIDE 0.9 % (FLUSH) 0.9 %
5-40 SYRINGE (ML) INJECTION AS NEEDED
Status: CANCELLED | OUTPATIENT
Start: 2020-12-01

## 2020-12-01 RX ORDER — HEPARIN SODIUM (PORCINE) LOCK FLUSH IV SOLN 100 UNIT/ML 100 UNIT/ML
300 SOLUTION INTRAVENOUS AS NEEDED
Status: DISCONTINUED | OUTPATIENT
Start: 2020-12-01 | End: 2020-12-01 | Stop reason: HOSPADM

## 2020-12-01 RX ORDER — MAGNESIUM SULFATE 100 %
4 CRYSTALS MISCELLANEOUS AS NEEDED
Status: CANCELLED | OUTPATIENT
Start: 2020-12-01

## 2020-12-01 RX ORDER — ONDANSETRON 2 MG/ML
4 INJECTION INTRAMUSCULAR; INTRAVENOUS ONCE
Status: CANCELLED | OUTPATIENT
Start: 2020-12-01 | End: 2020-12-01

## 2020-12-01 RX ORDER — LIDOCAINE HYDROCHLORIDE 10 MG/ML
0.1 INJECTION, SOLUTION EPIDURAL; INFILTRATION; INTRACAUDAL; PERINEURAL AS NEEDED
Status: DISCONTINUED | OUTPATIENT
Start: 2020-12-01 | End: 2020-12-01 | Stop reason: HOSPADM

## 2020-12-01 RX ORDER — PROPOFOL 10 MG/ML
INJECTION, EMULSION INTRAVENOUS AS NEEDED
Status: DISCONTINUED | OUTPATIENT
Start: 2020-12-01 | End: 2020-12-01 | Stop reason: HOSPADM

## 2020-12-01 RX ORDER — DEXTROSE 50 % IN WATER (D50W) INTRAVENOUS SYRINGE
25-50 AS NEEDED
Status: CANCELLED | OUTPATIENT
Start: 2020-12-01

## 2020-12-01 RX ORDER — SODIUM CHLORIDE, SODIUM LACTATE, POTASSIUM CHLORIDE, CALCIUM CHLORIDE 600; 310; 30; 20 MG/100ML; MG/100ML; MG/100ML; MG/100ML
25 INJECTION, SOLUTION INTRAVENOUS CONTINUOUS
Status: DISCONTINUED | OUTPATIENT
Start: 2020-12-01 | End: 2020-12-01 | Stop reason: HOSPADM

## 2020-12-01 RX ADMIN — SODIUM CHLORIDE, SODIUM LACTATE, POTASSIUM CHLORIDE, AND CALCIUM CHLORIDE 25 ML/HR: 600; 310; 30; 20 INJECTION, SOLUTION INTRAVENOUS at 08:17

## 2020-12-01 RX ADMIN — HEPARIN SODIUM (PORCINE) LOCK FLUSH IV SOLN 100 UNIT/ML 300 UNITS: 100 SOLUTION at 10:25

## 2020-12-01 RX ADMIN — PROPOFOL 70 MG: 10 INJECTION, EMULSION INTRAVENOUS at 09:03

## 2020-12-01 RX ADMIN — PROPOFOL 70 MG: 10 INJECTION, EMULSION INTRAVENOUS at 09:07

## 2020-12-01 RX ADMIN — PROPOFOL 100 MG: 10 INJECTION, EMULSION INTRAVENOUS at 09:13

## 2020-12-01 NOTE — PROCEDURES
[place]  [facility], [address]      Brief Procedure Note    Joao Fernández  1950  [id number]    Date of Procedure: 12/1/2020    Preoperative diagnosis: Personal history of colonic polyps:   V12.72 - Z86.010  Encounter for screening for other viral diseases:  V73.0 - Z11.59  Colorectal cancer Screening:   V76.51 - Z12.11  Cardiac implanted device in situ:  Z95.9  Body mass index 27.0 - 27.9, adult:   V85.23 - Z68.27  Malignant tumor of breast:  174.9 - C50.929    Postoperative diagnosis:  sigmoid polyp, hemorrhoids    Type of Anesthesia: MAC (monitered anesthesia care)    Description of Findings: same as post op dx    Procedure: Procedure(s):  COLONOSCOPY with polypectomy    :  Dr. Julia Stephens MD    Assistant(s): [unfilled]    Type of Anesthesia:MAC     EBL:None, no implants.     Specimens:   ID Type Source Tests Collected by Time Destination   1 : Sigmoid polyp Preservative Sigmoid  Ulisses Pulido MD 12/1/2020 8551 Pathology       Findings: See printed and scanned procedure note    Complications: None    Dr. Julia Stephens MD  12/1/2020  9:42 AM

## 2020-12-01 NOTE — ANESTHESIA PREPROCEDURE EVALUATION
Relevant Problems   No relevant active problems       Anesthetic History   No history of anesthetic complications            Review of Systems / Medical History  Patient summary reviewed and pertinent labs reviewed    Pulmonary          Shortness of breath      Comments: Lung mets from breast cancer   Neuro/Psych              Cardiovascular    Hypertension  Valvular problems/murmurs: mitral insufficiency    CHF: NYHA Classification I  Dysrhythmias   Pacemaker      Comments: AICD hx V Fib   Seen recently by Cardiology as virtual visit   GI/Hepatic/Renal     GERD           Endo/Other      Hypothyroidism  Blood dyscrasia and cancer     Other Findings   Comments: Breast cancer with lung mets  Chemo           Physical Exam    Airway  Mallampati: II  TM Distance: 4 - 6 cm  Neck ROM: normal range of motion   Mouth opening: Normal     Cardiovascular    Rhythm: regular  Rate: normal        Comments: AICD  Pacer Dental    Dentition: Poor dentition     Pulmonary                Comments: Non labored Abdominal  GI exam deferred       Other Findings            Anesthetic Plan    ASA: 4  Anesthesia type: MAC            Anesthetic plan and risks discussed with: Patient

## 2020-12-01 NOTE — DISCHARGE INSTRUCTIONS
DISCHARGE SUMMARY from Nurse  PATIENT INSTRUCTIONS:    Resume Eliquis today    After general anesthesia or intravenous sedation, for 24 hours or while taking prescription Narcotics:  · Limit your activities  · Do not drive and operate hazardous machinery  · Do not make important personal or business decisions  · Do  not drink alcoholic beverages  · If you have not urinated within 8 hours after discharge, please contact your surgeon on call. Report the following to your surgeon:  · Excessive pain, swelling, redness or odor of or around the surgical area  · Temperature over 100.5  · Nausea and vomiting lasting longer than 4 hours or if unable to take medications  · Any signs of decreased circulation or nerve impairment to extremity: change in color, persistent  numbness, tingling, coldness or increase pain  · Any questions    What to do at Home:  Recommended activity: Activity as tolerated and no driving for today. *  Please give a list of your current medications to your Primary Care Provider. *  Please update this list whenever your medications are discontinued, doses are      changed, or new medications (including over-the-counter products) are added. *  Please carry medication information at all times in case of emergency situations. These are general instructions for a healthy lifestyle:    No smoking/ No tobacco products/ Avoid exposure to second hand smoke  Surgeon General's Warning:  Quitting smoking now greatly reduces serious risk to your health.     Obesity, smoking, and sedentary lifestyle greatly increases your risk for illness    A healthy diet, regular physical exercise & weight monitoring are important for maintaining a healthy lifestyle    You may be retaining fluid if you have a history of heart failure or if you experience any of the following symptoms:  Weight gain of 3 pounds or more overnight or 5 pounds in a week, increased swelling in our hands or feet or shortness of breath while lying flat in bed. Please call your doctor as soon as you notice any of these symptoms; do not wait until your next office visit. The discharge information has been reviewed with the patient. The patient verbalized understanding. Discharge medications reviewed with the patient and appropriate educational materials and side effects teaching were provided. ___________________________________________________________________________________________________________________________________    Patient Education   Colonoscopy: What to Expect at Home  Your Recovery  After a colonoscopy, you'll stay at the clinic for 1 to 2 hours until the medicines wear off. Then you can go home. But you'll need to arrange for a ride. Your doctor will tell you when you can eat and do your other usual activities. Your doctor will talk to you about when you'll need your next colonoscopy. Your doctor can help you decide how often you need to be checked. This will depend on the results of your test and your risk for colorectal cancer. After the test, you may be bloated or have gas pains. You may need to pass gas. If a biopsy was done or a polyp was removed, you may have streaks of blood in your stool (feces) for a few days. Problems such as heavy rectal bleeding may not occur until several weeks after the test. This isn't common. But it can happen after polyps are removed. This care sheet gives you a general idea about how long it will take for you to recover. But each person recovers at a different pace. Follow the steps below to get better as quickly as possible. How can you care for yourself at home? Activity    · Rest when you feel tired.     · You can do your normal activities when it feels okay to do so. Diet    · Follow your doctor's directions for eating.     · Unless your doctor has told you not to, drink plenty of fluids. This helps to replace the fluids that were lost during the colon prep.     · Do not drink alcohol. Medicines    · Your doctor will tell you if and when you can restart your medicines. He or she will also give you instructions about taking any new medicines.     · If you take aspirin or some other blood thinner, ask your doctor if and when to start taking it again. Make sure that you understand exactly what your doctor wants you to do.     · If polyps were removed or a biopsy was done during the test, your doctor may tell you not to take aspirin or other anti-inflammatory medicines for a few days. These include ibuprofen (Advil, Motrin) and naproxen (Aleve). Other instructions    · For your safety, do not drive or operate machinery until the medicine wears off and you can think clearly. Your doctor may tell you not to drive or operate machinery until the day after your test.     · Do not sign legal documents or make major decisions until the medicine wears off and you can think clearly. The anesthesia can make it hard for you to fully understand what you are agreeing to. Follow-up care is a key part of your treatment and safety. Be sure to make and go to all appointments, and call your doctor if you are having problems. It's also a good idea to know your test results and keep a list of the medicines you take. When should you call for help? Call 911 anytime you think you may need emergency care. For example, call if:    · You passed out (lost consciousness).     · You pass maroon or bloody stools.     · You have trouble breathing. Call your doctor now or seek immediate medical care if:    · You have pain that does not get better after you take pain medicine.     · You are sick to your stomach or cannot drink fluids.     · You have new or worse belly pain.     · You have blood in your stools.     · You have a fever.     · You cannot pass stools or gas. Watch closely for changes in your health, and be sure to contact your doctor if you have any problems. Where can you learn more?   Go to http://www.gray.com/  Enter E264 in the search box to learn more about \"Colonoscopy: What to Expect at Home. \"  Current as of: April 29, 2020               Content Version: 12.6  © 2006-2020 [employer]. Care instructions adapted under license by [employer] (which disclaims liability or warranty for this information). If you have questions about a medical condition or this instruction, always ask your healthcare professional. [place] [address] any warranty or liability for your use of this information. Restart eliquis today. Patient Education   Colon Polyps: Care Instructions  Your Care Instructions     Colon polyps are growths in the colon or the rectum. The cause of most colon polyps is not known, and most people who get them do not have any problems. But a certain kind can turn into cancer. For this reason, regular testing for colon polyps is important for people as they get older. It is also important for anyone who has an increased risk for colon cancer. Polyps are usually found through routine colon cancer screening tests. Although most colon polyps are not cancerous, they are usually removed and then tested for cancer. Screening for colon cancer saves lives because the cancer can usually be cured if it is caught early. If you have a polyp that is the type that can turn into cancer, you may need more tests to examine your entire colon. The doctor will remove any other polyps that he or she finds, and you will be tested more often. Follow-up care is a key part of your treatment and safety. Be sure to make and go to all appointments, and call your doctor if you are having problems. It's also a good idea to know your test results and keep a list of the medicines you take. How can you care for yourself at home? Regular exams to look for colon polyps are the best way to prevent polyps from turning into colon cancer.  These can include stool tests, sigmoidoscopy, colonoscopy, and CT colonography. Talk with your doctor about a testing schedule that is right for you. To prevent polyps  There is no home treatment that can prevent colon polyps. But these steps may help lower your risk for cancer. · Stay active. Being active can help you get to and stay at a healthy weight. Try to exercise on most days of the week. Walking is a good choice. · Eat well. Choose a variety of vegetables, fruits, legumes (such as peas and beans), fish, poultry, and whole grains. · Do not smoke. If you need help quitting, talk to your doctor about stop-smoking programs and medicines. These can increase your chances of quitting for good. · If you drink alcohol, limit how much you drink. Limit alcohol to 2 drinks a day for men and 1 drink a day for women. When should you call for help? Call your doctor now or seek immediate medical care if:    · You have severe belly pain.     · Your stools are maroon or very bloody. Watch closely for changes in your health, and be sure to contact your doctor if:    · You have a fever.     · You have nausea or vomiting.     · You have a change in bowel habits (new constipation or diarrhea).     · Your symptoms get worse or are not improving as expected. Where can you learn more? Go to http://www.bowling.com/  Enter C571 in the search box to learn more about \"Colon Polyps: Care Instructions. \"  Current as of: April 29, 2020               Content Version: 12.6  © 9915-0456 [employer]. Care instructions adapted under license by [employer] (which disclaims liability or warranty for this information). If you have questions about a medical condition or this instruction, always ask your healthcare professional. [place] [address] any warranty or liability for your use of this information.        Patient Education   Hemorrhoids: Care Instructions  Your Care Instructions Hemorrhoids are enlarged veins that develop in the anal canal. Bleeding during bowel movements, itching, swelling, and rectal pain are the most common symptoms. They can be uncomfortable at times, but hemorrhoids rarely are a serious problem. You can treat most hemorrhoids with simple changes to your diet and bowel habits. These changes include eating more fiber and not straining to pass stools. Most hemorrhoids do not need surgery or other treatment unless they are very large and painful or bleed a lot. Follow-up care is a key part of your treatment and safety. Be sure to make and go to all appointments, and call your doctor if you are having problems. It's also a good idea to know your test results and keep a list of the medicines you take. How can you care for yourself at home? · Sit in a few inches of warm water (sitz bath) 3 times a day and after bowel movements. The warm water helps with pain and itching. · Put ice on your anal area several times a day for 10 minutes at a time. Put a thin cloth between the ice and your skin. Follow this by placing a warm, wet towel on the area for another 10 to 20 minutes. · Take pain medicines exactly as directed. ? If the doctor gave you a prescription medicine for pain, take it as prescribed. ? If you are not taking a prescription pain medicine, ask your doctor if you can take an over-the-counter medicine. · Keep the anal area clean, but be gentle. Use water and a fragrance-free soap, such as [place], or use baby wipes or medicated pads, such as Tucks. · Wear cotton underwear and loose clothing to decrease moisture in the anal area. · Eat more fiber. Include foods such as whole-grain breads and cereals, raw vegetables, raw and dried fruits, and beans. · Drink plenty of fluids, enough so that your urine is light yellow or clear like water.  If you have kidney, heart, or liver disease and have to limit fluids, talk with your doctor before you increase the amount of fluids you drink. · Use a stool softener that contains bran or psyllium. You can save money by buying bran or psyllium (available in bulk at most health food stores) and sprinkling it on foods or stirring it into fruit juice. Or you can use a product such as Metamucil or Hydrocil. · Practice healthy bowel habits. ? Go to the bathroom as soon as you have the urge. ? Avoid straining to pass stools. Relax and give yourself time to let things happen naturally. ? Do not hold your breath while passing stools. ? Do not read while sitting on the toilet. Get off the toilet as soon as you have finished. · Take your medicines exactly as prescribed. Call your doctor if you think you are having a problem with your medicine. When should you call for help? Call 911 anytime you think you may need emergency care. For example, call if:    · You pass maroon or very bloody stools. Call your doctor now or seek immediate medical care if:    · You have increased pain.     · You have increased bleeding. Watch closely for changes in your health, and be sure to contact your doctor if:    · Your symptoms have not improved after 3 or 4 days. Where can you learn more? Go to http://www.M-Factor.com/  Enter F228 in the search box to learn more about \"Hemorrhoids: Care Instructions. \"  Current as of: April 15, 2020               Content Version: 12.6  © 2362-5105 [employer]. Care instructions adapted under license by [employer] (which disclaims liability or warranty for this information). If you have questions about a medical condition or this instruction, always ask your healthcare professional. [place] [address] any warranty or liability for your use of this information.

## 2020-12-01 NOTE — ANESTHESIA POSTPROCEDURE EVALUATION
Procedure(s):  COLONOSCOPY with polypectomy. MAC    Anesthesia Post Evaluation      Multimodal analgesia: multimodal analgesia used between 6 hours prior to anesthesia start to PACU discharge  Patient location during evaluation: PACU  Patient participation: complete - patient participated  Level of consciousness: awake and alert  Pain management: adequate  Airway patency: patent  Anesthetic complications: no  Cardiovascular status: acceptable  Respiratory status: acceptable  Hydration status: acceptable  Post anesthesia nausea and vomiting:  none      INITIAL Post-op Vital signs:   Vitals Value Taken Time   /47 12/1/2020  9:57 AM   Temp 37 °C (98.6 °F) 12/1/2020  9:27 AM   Pulse 68 12/1/2020  9:58 AM   Resp 13 12/1/2020  9:58 AM   SpO2 99 % 12/1/2020  9:58 AM   Vitals shown include unvalidated device data.

## 2020-12-01 NOTE — H&P
Chief Complaint: for colonoscopy  History of present illness: Unclear if had true polyps. Last colonoscopy about 8 yrs ago. Denies bleeding pain. PMH:   Past Medical History:   Diagnosis Date    Abnormal nuclear cardiac imaging test 06/11/2010    Lg fixed anterior, septal, apical, inferoseptal defect sugg RCA & LAD disease or cardiomyopathy. EF 20%. Global hykn. Nondiagnostic EKG.  Acute bronchitis 10/15/2018    Persistent cough and wheezing in spite of prednisone and antibiotic. Will refer to pulmonary    Adenocarcinoma of breast; locally advanced invasive ductal adenocarcinoma of left breast     Asthma     Automatic implantable cardiac defibrillator in situ     Automatic implantable cardiac defibrillator in situ     [facility] cancer [facility] [address]     Cancer ([address]     breast cancer left    Chemotherapy convalescence or palliative care 2012    Chronic combined systolic and diastolic heart failure (HCC)     Remains symptomatic, nyha class 3 ef improving.  Congestive heart failure, unspecified     chronic class ll    Cortical age-related cataract of left eye 10/18/2020    Cortical age-related cataract of right eye 10/31/2020    DVT of lower limb, acute ([address] 2019    Right leg    Echocardiogram 09/27/2010    EF 30%. Global hykn. Mild MR. Mild TR.  GERD (gastroesophageal reflux disease)     History of pulmonary embolus (PE) 2019    Hyperlipidemia     Hypertension     Mitral valve disorders(424.0) 1/15/2014    mild to moderate mr     Mitral valve disorders(424.0) 1/15/2014    mild to moderate mr     MVP (mitral valve prolapse)     Nonischemic cardiomyopathy (HCC)     EF 20-30% despite medical therapy    Nonspecific abnormal electrocardiogram (ECG) (EKG)     Osteopenia     Other primary cardiomyopathies     Pacemaker 10/27/10    s/p implantation, without complication    Poisn by oth uns agents prim aff cv sys     Ef slightly better to 45%, STABLE back on chemo.     Radiation therapy     Radiation therapy complication 1634    S/P cardiac catheterization 07/08/10    Patent coronary arteries. LVEDP 25.  EF 25%. Global hykn. Moderate MR.  RA 10.  RV 40/10. PA 40/20.  20.  CO/CI 4.5/2.45 (Larry).  Shortness of breath     Syncope and collapse     Thyroid disease     goiter     Allergies:    Allergies   Allergen Reactions    Adhesive Other (comments)     blisters     Medications:   Current Facility-Administered Medications:     lidocaine (PF) (XYLOCAINE) 10 mg/mL (1 %) injection 0.1 mL, 0.1 mL, SubCUTAneous, PRN, Brein, [place], CRNA    lactated Ringers infusion, 25 mL/hr, IntraVENous, CONTINUOUS, Brein, Spring, CRNA  FH:   Family History   Problem Relation Age of Onset    Hypertension Mother     High Cholesterol Mother     Heart Disease Father         paemaker implant at 80     Social:   Social History     Socioeconomic History    Marital status:      Spouse name: Not on file    Number of children: Not on file    Years of education: Not on file    Highest education level: Not on file   Tobacco Use    Smoking status: Never Smoker    Smokeless tobacco: Never Used   Substance and Sexual Activity    Alcohol use: No    Drug use: No     Surgical H:   Past Surgical History:   Procedure Laterality Date    CARDIAC SURG PROCEDURE UNLIST      Difibrillator; BI V ICD    HX CATARACT REMOVAL Left 10/19/2020    HX CHOLECYSTECTOMY  11/01/2019    HX MASTECTOMY  09/28/11    modified radical mastectomy and axillary dissection    HX ORTHOPAEDIC      HX OTHER SURGICAL      surgery to remove part of liver    HX PACEMAKER  10/27/10    s/p Medtronic biventricular AICD, without complication    HX RADICAL MASTECTOMY      IR CHOLECYSTOSTOMY PERCUTANEOUS  9/20/2019    IR INSERT TUNL CVC W PORT OVER 5 YEARS  1/16/2019    NEUROLOGICAL PROCEDURE UNLISTED      Cervical fusion       ROS: negative    Physical Exam:   Visit Vitals  Ht 5' 5\" (1.651 m)   Wt 74.8 kg (165 lb)   BMI 27.46 kg/m²     General appearance: alert, no distress  Eyes: pupils equal and reactive, extraocular eye movements intact  Nodes: No gross adenopathy in neck. Skin: no spider angiomata, jaundice, palmar erythema   Respiratory: clear to auscultation bilaterally  Cardiovascular: regular heart rate, no murmurs, no JVD, normal rate and regular rhythm  Abdomen: soft, non-tender, liver not enlarged, spleen not palpable, no obvious ascites  Extremities: no muscle wasting, no gross arthritic changes  Neurologic: alert and oriented, cranial nerves grossly intact, no asterixis    Labs: No results found for this or any previous visit (from the past 24 hour(s)). Imp/ Plan: Will proceed with colonoscopy as planned. Risk benefits alternative including but not limited to infection, bleeding, perforation of viscous, allergic reaction and resultant morbidity and mortality was discussed. Chance of missing a significant lesion due to various reasons were discussed.       Manuela Norris MD  [facility], [address]

## 2021-02-17 RX ORDER — SPIRONOLACTONE 25 MG/1
TABLET ORAL
Qty: 180 TAB | Refills: 3 | Status: SHIPPED | OUTPATIENT
Start: 2021-02-17 | End: 2021-12-27

## 2021-02-18 ENCOUNTER — OFFICE VISIT (OUTPATIENT)
Dept: CARDIOLOGY CLINIC | Age: 71
End: 2021-02-18
Payer: MEDICARE

## 2021-02-18 ENCOUNTER — TRANSCRIBE ORDER (OUTPATIENT)
Dept: SCHEDULING | Age: 71
End: 2021-02-18

## 2021-02-18 VITALS
OXYGEN SATURATION: 97 % | TEMPERATURE: 97.5 F | DIASTOLIC BLOOD PRESSURE: 56 MMHG | WEIGHT: 165 LBS | SYSTOLIC BLOOD PRESSURE: 115 MMHG | BODY MASS INDEX: 27.49 KG/M2 | HEIGHT: 65 IN | HEART RATE: 87 BPM

## 2021-02-18 DIAGNOSIS — I49.01 VENTRICULAR FIBRILLATION (HCC): ICD-10-CM

## 2021-02-18 DIAGNOSIS — Z95.810 AUTOMATIC IMPLANTABLE CARDIOVERTER-DEFIBRILLATOR IN SITU: ICD-10-CM

## 2021-02-18 DIAGNOSIS — I50.22 SYSTOLIC CHF, CHRONIC (HCC): Primary | ICD-10-CM

## 2021-02-18 DIAGNOSIS — I10 ESSENTIAL HYPERTENSION: ICD-10-CM

## 2021-02-18 DIAGNOSIS — I42.8 NONISCHEMIC CARDIOMYOPATHY (HCC): ICD-10-CM

## 2021-02-18 DIAGNOSIS — C50.412 MALIGNANT NEOPLASM OF UPPER-OUTER QUADRANT OF LEFT FEMALE BREAST (HCC): Primary | ICD-10-CM

## 2021-02-18 PROCEDURE — G9899 SCRN MAM PERF RSLTS DOC: HCPCS | Performed by: INTERNAL MEDICINE

## 2021-02-18 PROCEDURE — G8752 SYS BP LESS 140: HCPCS | Performed by: INTERNAL MEDICINE

## 2021-02-18 PROCEDURE — G8419 CALC BMI OUT NRM PARAM NOF/U: HCPCS | Performed by: INTERNAL MEDICINE

## 2021-02-18 PROCEDURE — 99214 OFFICE O/P EST MOD 30 MIN: CPT | Performed by: INTERNAL MEDICINE

## 2021-02-18 PROCEDURE — G8427 DOCREV CUR MEDS BY ELIG CLIN: HCPCS | Performed by: INTERNAL MEDICINE

## 2021-02-18 PROCEDURE — 1101F PT FALLS ASSESS-DOCD LE1/YR: CPT | Performed by: INTERNAL MEDICINE

## 2021-02-18 PROCEDURE — 3017F COLORECTAL CA SCREEN DOC REV: CPT | Performed by: INTERNAL MEDICINE

## 2021-02-18 PROCEDURE — G8536 NO DOC ELDER MAL SCRN: HCPCS | Performed by: INTERNAL MEDICINE

## 2021-02-18 PROCEDURE — G8399 PT W/DXA RESULTS DOCUMENT: HCPCS | Performed by: INTERNAL MEDICINE

## 2021-02-18 PROCEDURE — 1090F PRES/ABSN URINE INCON ASSESS: CPT | Performed by: INTERNAL MEDICINE

## 2021-02-18 PROCEDURE — G8754 DIAS BP LESS 90: HCPCS | Performed by: INTERNAL MEDICINE

## 2021-02-18 PROCEDURE — G8432 DEP SCR NOT DOC, RNG: HCPCS | Performed by: INTERNAL MEDICINE

## 2021-02-18 NOTE — PROGRESS NOTES
1. Have you been to the ER, urgent care clinic since your last visit? Hospitalized since your last visit?     no    2. Have you seen or consulted any other health care providers outside of the [address] [place] [address] since your last visit? Include any pap smears or colon screening. Yes When: x couple of months ago with PCP      3. Do you need any refills today?    No

## 2021-02-18 NOTE — PROGRESS NOTES
HISTORY OF PRESENT ILLNESS  Samia Saleem is a 66 y.o. female. Patient with cmp,chf.post  icd   On follow up patient denies any chest pains, palpitation or other significant symptoms. Patient is here for follow up of diagnostic tests. Results will be discussed. He feels extremely fatigued tired and weak. Recently reported decrease in renal function. Patient seen for transition of care visit. Discharge date 5/6/2019  Nurse encounter 5/6/2019  Physician encounter 5/8/2019. Admitted with acute CHF, acute PE, DVT. Patient had worsening cardiomyopathy. Symptoms are slowly improving significant improvement of shortness of breath. Orthopnea significantly better. No edema. Still feels tired on minimal activity exertion  2/2021  Patient seen today for follow-up. Her shortness of breath is stable. Denies any other complaint at present    Hypertension  The history is provided by the patient. This is a chronic problem. The problem occurs constantly. The problem has not changed since onset. Associated symptoms include shortness of breath. Pertinent negatives include no chest pain. CHF  The history is provided by the patient. This is a recurrent problem. The problem occurs constantly. The problem has been gradually improving. Associated symptoms include shortness of breath. Pertinent negatives include no chest pain. The symptoms are aggravated by exertion. The symptoms are relieved by rest.   Cardiomyopathy  The history is provided by the patient. This is a chronic problem. The problem has been resolved. Associated symptoms include shortness of breath. Pertinent negatives include no chest pain. Valvular Heart Disease  The history is provided by the patient. This is a chronic problem. The problem occurs constantly. The problem has not changed since onset. Associated symptoms include shortness of breath. Pertinent negatives include no chest pain.        Review of Systems   Constitutional: Negative for chills and fever.   HENT: Negative for nosebleeds. Eyes: Negative for blurred vision and double vision. Respiratory: Positive for shortness of breath. Negative for cough, hemoptysis, sputum production and wheezing. Cardiovascular: Negative for chest pain, palpitations, orthopnea, claudication, leg swelling and PND. Gastrointestinal: Negative for heartburn, nausea and vomiting. Musculoskeletal: Negative for myalgias. Skin: Negative for rash. Neurological: Negative for dizziness and weakness. Endo/Heme/Allergies: Does not bruise/bleed easily. Family History   Problem Relation Age of Onset    Hypertension Mother     High Cholesterol Mother     Heart Disease Father         paemaker implant at 80       Past Medical History:   Diagnosis Date    Abnormal nuclear cardiac imaging test 06/11/2010    Lg fixed anterior, septal, apical, inferoseptal defect sugg RCA & LAD disease or cardiomyopathy. EF 20%. Global hykn. Nondiagnostic EKG.  Acute bronchitis 10/15/2018    Persistent cough and wheezing in spite of prednisone and antibiotic. Will refer to pulmonary    Adenocarcinoma of breast; locally advanced invasive ductal adenocarcinoma of left breast     Asthma     Automatic implantable cardiac defibrillator in situ     Automatic implantable cardiac defibrillator in situ     Breast cancer ([facility] [address]     Cancer ([address]     breast cancer left    Chemotherapy convalescence or palliative care 2012    Chronic combined systolic and diastolic heart failure (HCC)     Remains symptomatic, nyha class 3 ef improving.  Congestive heart failure, unspecified     chronic class ll    Cortical age-related cataract of left eye 10/18/2020    Cortical age-related cataract of right eye 10/31/2020    DVT of lower limb, acute ([address] 2019    Right leg    Echocardiogram 09/27/2010    EF 30%. Global hykn. Mild MR. Mild TR.     GERD (gastroesophageal reflux disease)     History of pulmonary embolus (PE) 2019    Hyperlipidemia     Hypertension     Mitral valve disorders(424.0) 1/15/2014    mild to moderate mr     Mitral valve disorders(424.0) 1/15/2014    mild to moderate mr     MVP (mitral valve prolapse)     Nonischemic cardiomyopathy (HCC)     EF 20-30% despite medical therapy    Nonspecific abnormal electrocardiogram (ECG) (EKG)     Osteopenia     Other primary cardiomyopathies     Pacemaker 10/27/10    s/p implantation, without complication    Poisn by oth uns agents prim aff cv sys     Ef slightly better to 45%, STABLE back on chemo.  Radiation therapy     Radiation therapy complication 6045    S/P cardiac catheterization 07/08/10    Patent coronary arteries. LVEDP 25.  EF 25%. Global hykn. Moderate MR.  RA 10.  RV 40/10. PA 40/20.  20.  CO/CI 4.5/2.45 (Larry).     Shortness of breath     Syncope and collapse     Thyroid disease     goiter       Past Surgical History:   Procedure Laterality Date    COLONOSCOPY N/A 12/1/2020    COLONOSCOPY with polypectomy performed by Braden Samano MD at [address] [place] [address] HX CATARACT REMOVAL Left 10/19/2020    HX CHOLECYSTECTOMY  11/01/2019    HX MASTECTOMY  09/28/11    modified radical mastectomy and axillary dissection    HX ORTHOPAEDIC      HX OTHER SURGICAL      surgery to remove part of liver    HX PACEMAKER  10/27/10    s/p [employer] biventricular AICD, without complication    HX RADICAL MASTECTOMY      IR CHOLECYSTOSTOMY PERCUTANEOUS  9/20/2019    IR INSERT TUNL CVC W PORT OVER 5 YEARS  1/16/2019    NEUROLOGICAL PROCEDURE UNLISTED      Cervical fusion    [place] CARDIAC SURG PROCEDURE UNLIST      Difibrillator; BI V ICD       Social History     Tobacco Use    Smoking status: Never Smoker    Smokeless tobacco: Never Used   Substance Use Topics    Alcohol use: No       Allergies   Allergen Reactions    Adhesive Other (comments)     blisters       Current Outpatient Medications   Medication Sig    spironolactone (ALDACTONE) 25 mg tablet TAKE 2 TABLETS DAILY (Patient taking differently: Take 50 mg by mouth daily.)    carvediloL (COREG) 25 mg tablet Take 1 Tab by mouth two (2) times daily (with meals).  montelukast (SINGULAIR) 10 mg tablet Take 1 Tab by mouth daily.  Entresto 49-51 mg tab tablet TAKE 1 TABLET TWICE A DAY    digoxin (LANOXIN) 0.125 mg tablet Take 1 Tab by mouth daily.  apixaban (ELIQUIS) 2.5 mg tablet Take 1 Tab by mouth two (2) times a day. (Patient taking differently: Take 2.5 mg by mouth two (2) times a day. Last dose 11/29/20)    furosemide (LASIX) 20 mg tablet Take 1 Tab by mouth daily. (Patient taking differently: Take 20 mg by mouth every Tuesday and Friday.)    tiotropium (SPIRIVA WITH HANDIHALER) 18 mcg inhalation capsule Take 1 Cap by inhalation daily.  albuterol-ipratropium (DUO-NEB) 2.5 mg-0.5 mg/3 ml nebu 3 mL by Nebulization route every six (6) hours as needed (wheezing or sob). File under Medicare Part B, ICD codes J44.9, C78.01    DULoxetine (CYMBALTA) 60 mg capsule Take 60 mg by mouth daily.  raloxifene (EVISTA) 60 mg tablet Take 60 mg by mouth daily. No current facility-administered medications for this visit. Visit Vitals  BP (!) 115/56 (BP 1 Location: Right arm, BP Patient Position: Sitting, BP Cuff Size: Large adult)   Pulse 87   Temp 97.5 °F (36.4 °C) (Temporal)   Ht 5' 5\" (1.651 m)   Wt 74.8 kg (165 lb)   SpO2 97%   BMI 27.46 kg/m²         Physical Exam   Constitutional: She is oriented to person, place, and time. She appears well-developed and well-nourished. HENT:   Head: Normocephalic and atraumatic. Eyes: Conjunctivae are normal.   Neck: Neck supple. No JVD present. No tracheal deviation present. No thyromegaly present. Cardiovascular: Normal rate and regular rhythm. PMI is not displaced. Exam reveals no gallop, no S3 and no decreased pulses. Murmur heard. High-pitched blowing holosystolic murmur is present with a grade of 2/6 at the apex.   Pulmonary/Chest: No respiratory distress. She has wheezes. She has no rales. She exhibits no tenderness. Abdominal: Soft. There is no abdominal tenderness. Musculoskeletal:         General: No edema. Neurological: She is alert and oriented to person, place, and time. Skin: Skin is warm. Psychiatric: She has a normal mood and affect. Ms. Cathleen Hubbard has a reminder for a \"due or due soon\" health maintenance. I have asked that she contact her primary care provider for follow-up on this health maintenance. No flowsheet data found. SUMMARY:echo:6/2014  Left ventricle: The ventricle was mildly dilated. Systolic function was  normal. Ejection fraction was estimated in the range of 50 % to 55 %. There were no regional wall motion abnormalities. Doppler parameters were  consistent with abnormal left ventricular relaxation (grade 1 diastolic  dysfunction). Left atrium: The atrium was mildly dilated. Mitral valve: There was systolic bowing of the posterior leaflet, but  without diagnostic evidence for prolapse. There was mild to moderate  regurgitation. SUMMARY:echo:12/2014  Left ventricle: Systolic function was at the lower limits of normal.  Ejection fraction was estimated to be 53 %. There were no regional wall  motion abnormalities. Doppler parameters were consistent with abnormal  left ventricular relaxation (grade 1 diastolic dysfunction). Right ventricle: A pacing wire was present. Mitral valve: There was systolic bowing of the posterior leaflet, but  without diagnostic evidence for prolapse. There was mild regurgitation. Tricuspid valve: Pulmonary artery systolic pressure: 22 mmHg. 12/2015:echo    SUMMARY:  Left ventricle: Systolic function was normal. Ejection fraction was  estimated in the range of 50 % to 55 %. There was mild diffuse  hypokinesis. Doppler parameters were consistent with abnormal left  ventricular relaxation (grade 1 diastolic dysfunction). Mitral valve:  There was systolic bowing of the anterior and posterior  leaflets, but without diagnostic evidence for prolapse. There was mild  regurgitation. Tricuspid valve: There was mild regurgitation. Tricuspid regurgitation  peak velocity: 2.3 m/sec. Pulmonary artery systolic pressure: 25 mmHg. pft-6/2017  Maximal Mid Expiratory Flow rate is reduced to 43 % predicted  Forced Expiratory Volume in one second is reduced to 69 % predicted  FEV 1% is reduced     Volumes: All Volumes are reduced except for RV    Flow Volume Loop:  Nonspecific obstructive pattern in Flow Volume Loop    Bronchodilator:  No significant improvement with bronchodilator therapy    Diffusion:  Abnormal Diffusion Capacity reduced to 62 % predicted  DLCO normalizes to alveolar ventilation    Impression:  Moderate obstructive defect, Predominately small airways, Mild restrictive ventilatory defect, Reduced diffusion capacity indicating a decrease in alveolar surface area for gas exchange  I Have personally reviewed recent relevant labs available and discussed with patient    Interpretation Summary -6/2018  · Calculated left ventricular ejection fraction is 55%. Left ventricular mild concentric hypertrophy observed. Mild (grade 1) left ventricular diastolic dysfunction. · Left atrial cavity size is mildly dilated. · There was systolic bowing of the anterior and posterior leaflets, but without diagnostic evidence for prolapse. Mild mitral valve regurgitation. · Mild tricuspid valve regurgitation is present. Pulmonary arterial systolic pressure is 18 mmHg. · Mild pulmonic valve regurgitation is present. · Small pericardial effusion. Interpretation Summary 12/2018    · Left ventricular low normal systolic function. Estimated left ventricular ejection fraction is 51 - 55%. Visually measured ejection fraction. Normal left ventricular wall motion, no regional wall motion abnormality noted. Moderate (grade 2) left ventricular diastolic dysfunction.   · There is no evidence of pulmonary hypertension. · Mild to moderate mitral valve regurgitation. · Left atrial cavity size is mildly dilated. Interpretation Summary 3/2019       · Normal cavity size, wall thickness and diastolic function. There is low normal systolic function. The estimated ejection fraction is 51 - 55%. No regional wall motion abnormality noted. Global longitudinal strain is -13.00%. Abnormal left ventricular strain. · Normal right ventricular size and function. Pacing wire present  · Mild to moderate mitral valve regurgitation. Mild prolapse of the posterior leaflet within the mitral valve. · Mild pulmonic valve regurgitation is present. · Mild tricuspid valve regurgitation. Pulmonary arterial systolic pressure is 42.4 mmHg. Mild pulmonary hypertension. 5/2019    · Left Ventricle: Mild concentric hypertrophy. Severe global systolic dysfunction. Estimated left ventricular ejection fraction is 26 - 30%. Biplane method used to measure ejection fraction. Left ventricular global hypokinesis. · IVC/Hepatic Veins: Severely elevated central venous pressure (15+ mmHg); IVC diameter is larger than 21 mm and collapses less than 50% with respiration. · Pulmonary Artery: Mild pulmonary hypertension. Pulmonary arterial systolic pressure is 44 mmHg. · Pericardium: Trivial-to-small circumferential pericardial effusion measuring 10 mm. · Mitral Valve: Moderate mitral valve regurgitation. · Right Ventricle: Pacing wire present   Interpretation Summary 5/2019    · Acute occlusive thrombus present in the right peroneal vein. IMPRESSION: 5/2019     1. Positive examination for pulmonary emboli.   - There is likely a combination of acute and subacute PE in the lower lobe  segmental and subsegmental branch vessels. Interpretation Summary 7/2019    · Left Ventricle: Normal cavity size. Mild concentric hypertrophy. Severe systolic dysfunction. Estimated left ventricular ejection fraction is 21 - 25%.  Abnormal left ventricular wall motion. Inconclusive left ventricular diastolic function. · Left Atrium: Severely dilated left atrium. · Mitral Valve: Mitral valve thickening. Mitral annular calcification. Moderate mitral valve regurgitation. · Tricuspid Valve: Mild tricuspid valve regurgitation is present. Pulmonary arterial systolic pressure is 93.8 mmHg. Mild pulmonary hypertension. · IVC/Hepatic Veins: Moderately elevated central venous pressure (10-15 mmHg); IVC diameter is larger than 21 mm and collapses more than 50% with respiration. · Pericardium: Trivial-to-small pericardial effusion. Interpretation Summary 9/2019    · Left Ventricle: Normal cavity size. Upper normal wall thickness. Moderate-to-severe systolic dysfunction. Estimated left ventricular ejection fraction is 36 - 40%. Biplane method used to measure ejection fraction. Left ventricular global hypokinesis. · Pericardium: Trivial pericardial effusion. Interpretation Summary 8/2020      · LV: Estimated LVEF is 45 - 50%. Normal cavity size. Upper normal wall thickness. Wall motion: normal. Mild (grade 1) left ventricular diastolic dysfunction. · LA: Left Atrium volume index is 35.88 mL/m2. · RV: Pacer/ICD present. · MV: Mild mitral annular calcification. Mild mitral valve regurgitation is present. · TV: Mild tricuspid valve regurgitation is present. · PA: Pulmonary arterial systolic pressure is 24 mmHg. Assessment         ICD-10-CM ICD-9-CM    1. Systolic CHF, chronic (HCC)  I50.22 428.22      428.0     Stable continue treatment monitor   2. Nonischemic cardiomyopathy (HCC)  I42.8 425.4     Stable monitor   3. Ventricular fibrillation (HCC)  I49.01 427.41     Stable asymptomatic   4. Essential hypertension  I10 401.9     Blood pressure is better continue treatment tolerating Entresto and Coreg   5.  Automatic implantable cardioverter-defibrillator in situ  Z95.810 V45.02     Set up [facility]   Tounge swelling not related to lisinopril as she had swelling even after stopping lisinopril  7/2018  I will hold Lasix as recent decrease in renal function. No clinical sign of fluid overload. 10/2018  Shortness of breath due to acute bronchitis bilateral wheezing. Will refer back to pulmonary. No sign of fluid overload. Will keep off Lasix  1/2019  Metastatic breast cancer now back on chemotherapy. Lung metastasis likely cause for shortness of breath which is improving. Low blood pressure will reduce Coreg to 6.25 twice a day. Continue lisinopril. Recheck echo in 2 months on chemotherapy  4/2019  Cardiac status stable. Echo EF remains stable continue Coreg and lisinopril. Check echo in 3 months. Patient remains on chemotherapy    5/2019  Recent admission with increased shortness of breath combination of pulmonary embolism and decompensated systolic heart failure with worsening ejection fraction. Class III CHF. 10 pound weight loss from peak. Continue current therapy. Continue anticoagulation. Follow-up 3 weeks  5/30/2019  Fluid overload stable. Remains weak. Follow-up echo in about a month to reassess LV function. 10/2019  Cardiac status stable. CHF compensated. Shortness of breath and edema improving. Continue Entresto. Aldactone increased to 50 mg a day due to hypokalemia. Lasix will be reduced to 2-3 times a week. Episode of ventricular fibrillation in 8/2019 noticed on ICD check. Patient had hypokalemia during that time will continue to monitor. If patient needs laparoscopic surgery her risk is high about 5 to 10%. If open surgical procedure is required it will carry high risk    1/2020  Cardiac status stable. She completed Chemotherapy in October and is doing well. She is tolerating Entresto. She takes lasix 2-3 x weekly as needed for edema. Reports blood work drawn this week with normal renal function and potassium.   Reports an episode of syncope in Dr. Cinda Forbes office in November and b/p readings were labile at that time. ICD checked 11/2019 (after syncopal episode) no arrhythmias seen. Continue current medications. 7/2020  Cardiac status stable. Continue treatment. Follow-up echo  8/2020 virtual visit  Cardiac status stable. continue current treatment,lvef improving  11/2020  Cardiac status stable. Low normal blood pressure but asymptomatic continue monitoring. CHF compensated  To 2021  Stable cardiac status CHF compensated continue current medical management tolerating treatment for CHF with Entresto and Coreg. No orders of the defined types were placed in this encounter.         Ramiro Raines MD

## 2021-02-25 ENCOUNTER — HOSPITAL ENCOUNTER (OUTPATIENT)
Dept: PET IMAGING | Age: 71
Discharge: HOME | End: 2021-02-25
Attending: INTERNAL MEDICINE
Payer: MEDICARE

## 2021-02-25 DIAGNOSIS — C50.412 MALIGNANT NEOPLASM OF UPPER-OUTER QUADRANT OF LEFT FEMALE BREAST (HCC): ICD-10-CM

## 2021-02-25 PROCEDURE — A9552 F18 FDG: HCPCS

## 2021-03-01 NOTE — CDMP QUERY
Patient admitted with UTI and AMS, noted to have Hx of PE4/2019. Please clarify the acuity of the PE in your progress notes and d/c summary. => Acute PE   => Acute Recurrent PE   => Chronic PE   => Other Explanation of clinical findings   => Clinically Unable to determine (no explanation for clinical findings)    The medical record reflects the following:     Risk Factors: PMH PE 4/2019     Clinical Indicators: eliquis for Hx of PE     Treatment: Eliquis continued in hospital    Reference:  Acute PE:  \"Acute\" defines the period of time beginning with the initial diagnosis, up to and including the entire period of time where anticoagulation is instituted (3-12 months)     Acute Recurrent PE: \"Recurrent\" incorporates the definition of acute with the diagnosis of recurrent and ends 3-12 months beyond that time. These patients will likely require lifelong anticoagulation therapy. Chronic PE:  \"Chronic\" is equivalent to chronic thromboembolic pulmonary HTN.     Thank you,  Brandy Edmondson RN/CCDS  [phone] caffeine

## 2021-04-26 RX ORDER — DIGOXIN 125 MCG
TABLET ORAL
Qty: 90 TAB | Refills: 3 | Status: SHIPPED | OUTPATIENT
Start: 2021-04-26 | End: 2022-04-13

## 2021-05-13 ENCOUNTER — OFFICE VISIT (OUTPATIENT)
Dept: CARDIOLOGY CLINIC | Age: 71
End: 2021-05-13
Payer: MEDICARE

## 2021-05-13 VITALS
OXYGEN SATURATION: 96 % | HEART RATE: 90 BPM | WEIGHT: 166.2 LBS | BODY MASS INDEX: 27.69 KG/M2 | HEIGHT: 65 IN | SYSTOLIC BLOOD PRESSURE: 107 MMHG | DIASTOLIC BLOOD PRESSURE: 56 MMHG | TEMPERATURE: 97 F

## 2021-05-13 DIAGNOSIS — I50.22 SYSTOLIC CHF, CHRONIC (HCC): Primary | ICD-10-CM

## 2021-05-13 DIAGNOSIS — I49.01 VENTRICULAR FIBRILLATION (HCC): ICD-10-CM

## 2021-05-13 DIAGNOSIS — Z95.810 AUTOMATIC IMPLANTABLE CARDIOVERTER-DEFIBRILLATOR IN SITU: ICD-10-CM

## 2021-05-13 DIAGNOSIS — I42.8 NONISCHEMIC CARDIOMYOPATHY (HCC): ICD-10-CM

## 2021-05-13 DIAGNOSIS — I10 ESSENTIAL HYPERTENSION: ICD-10-CM

## 2021-05-13 PROCEDURE — G8419 CALC BMI OUT NRM PARAM NOF/U: HCPCS | Performed by: INTERNAL MEDICINE

## 2021-05-13 PROCEDURE — G8427 DOCREV CUR MEDS BY ELIG CLIN: HCPCS | Performed by: INTERNAL MEDICINE

## 2021-05-13 PROCEDURE — 3017F COLORECTAL CA SCREEN DOC REV: CPT | Performed by: INTERNAL MEDICINE

## 2021-05-13 PROCEDURE — G8432 DEP SCR NOT DOC, RNG: HCPCS | Performed by: INTERNAL MEDICINE

## 2021-05-13 PROCEDURE — G8754 DIAS BP LESS 90: HCPCS | Performed by: INTERNAL MEDICINE

## 2021-05-13 PROCEDURE — G8536 NO DOC ELDER MAL SCRN: HCPCS | Performed by: INTERNAL MEDICINE

## 2021-05-13 PROCEDURE — G8752 SYS BP LESS 140: HCPCS | Performed by: INTERNAL MEDICINE

## 2021-05-13 PROCEDURE — 1090F PRES/ABSN URINE INCON ASSESS: CPT | Performed by: INTERNAL MEDICINE

## 2021-05-13 PROCEDURE — G9899 SCRN MAM PERF RSLTS DOC: HCPCS | Performed by: INTERNAL MEDICINE

## 2021-05-13 PROCEDURE — G8399 PT W/DXA RESULTS DOCUMENT: HCPCS | Performed by: INTERNAL MEDICINE

## 2021-05-13 PROCEDURE — 1101F PT FALLS ASSESS-DOCD LE1/YR: CPT | Performed by: INTERNAL MEDICINE

## 2021-05-13 PROCEDURE — 99214 OFFICE O/P EST MOD 30 MIN: CPT | Performed by: INTERNAL MEDICINE

## 2021-05-13 NOTE — PROGRESS NOTES
1. Have you been to the ER, urgent care clinic since your last visit? Hospitalized since your last visit? No    2. Have you seen or consulted any other health care providers outside of the [address] [place] [address] since your last visit? Include any pap smears or colon screening.  No

## 2021-05-13 NOTE — PROGRESS NOTES
HISTORY OF PRESENT ILLNESS  Wesley Schmidt is a 66 y.o. female. Patient with cmp,chf.post  icd   On follow up patient denies any chest pains, palpitation or other significant symptoms. Patient is here for follow up of diagnostic tests. Results will be discussed. He feels extremely fatigued tired and weak. Recently reported decrease in renal function. Patient seen for transition of care visit. Discharge date 5/6/2019  Nurse encounter 5/6/2019  Physician encounter 5/8/2019. Admitted with acute CHF, acute PE, DVT. Patient had worsening cardiomyopathy. Symptoms are slowly improving significant improvement of shortness of breath. Orthopnea significantly better. No edema. Still feels tired on minimal activity exertion  2/2021  Patient seen today for follow-up. Her shortness of breath is stable. Denies any other complaint at present    Cardiomyopathy  The history is provided by the patient. This is a chronic problem. The problem has been resolved. Associated symptoms include shortness of breath. Pertinent negatives include no chest pain. Hypertension  The history is provided by the patient. This is a chronic problem. The problem occurs constantly. The problem has not changed since onset. Associated symptoms include shortness of breath. Pertinent negatives include no chest pain. CHF  The history is provided by the patient. This is a recurrent problem. The problem occurs constantly. The problem has been gradually improving. Associated symptoms include shortness of breath. Pertinent negatives include no chest pain. The symptoms are aggravated by exertion. The symptoms are relieved by rest.   Valvular Heart Disease  The history is provided by the patient. This is a chronic problem. The problem occurs constantly. The problem has not changed since onset. Associated symptoms include shortness of breath. Pertinent negatives include no chest pain. Follow-up  Associated symptoms include shortness of breath. Pertinent negatives include no chest pain. Review of Systems   Constitutional: Negative for chills and fever. HENT: Negative for nosebleeds. Eyes: Negative for blurred vision and double vision. Respiratory: Positive for shortness of breath. Negative for cough, hemoptysis, sputum production and wheezing. Cardiovascular: Negative for chest pain, palpitations, orthopnea, claudication, leg swelling and PND. Gastrointestinal: Negative for heartburn, nausea and vomiting. Musculoskeletal: Negative for myalgias. Skin: Negative for rash. Neurological: Negative for dizziness and weakness. Endo/Heme/Allergies: Does not bruise/bleed easily. Family History   Problem Relation Age of Onset    Hypertension Mother     High Cholesterol Mother     Heart Disease Father         paemaker implant at 80       Past Medical History:   Diagnosis Date    Abnormal nuclear cardiac imaging test 06/11/2010    Lg fixed anterior, septal, apical, inferoseptal defect sugg RCA & LAD disease or cardiomyopathy. EF 20%. Global hykn. Nondiagnostic EKG.  Acute bronchitis 10/15/2018    Persistent cough and wheezing in spite of prednisone and antibiotic. Will refer to pulmonary    Adenocarcinoma of breast; locally advanced invasive ductal adenocarcinoma of left breast     Asthma     Automatic implantable cardiac defibrillator in situ     Automatic implantable cardiac defibrillator in situ     [facility] [address] [address]     Cancer ([address]     breast cancer left    Chemotherapy convalescence or palliative care 2012    Chronic combined systolic and diastolic heart failure (HCC)     Remains symptomatic, nyha class 3 ef improving.  Congestive heart failure, unspecified     chronic class ll    Cortical age-related cataract of left eye 10/18/2020    Cortical age-related cataract of right eye 10/31/2020    DVT of lower limb, acute ([address] 2019    Right leg    Echocardiogram 09/27/2010    EF 30%. Global hykn. Mild MR. Mild TR.  GERD (gastroesophageal reflux disease)     History of pulmonary embolus (PE) 2019    Hyperlipidemia     Hypertension     Mitral valve disorders(424.0) 1/15/2014    mild to moderate mr     Mitral valve disorders(424.0) 1/15/2014    mild to moderate mr     MVP (mitral valve prolapse)     Nonischemic cardiomyopathy (HCC)     EF 20-30% despite medical therapy    Nonspecific abnormal electrocardiogram (ECG) (EKG)     Osteopenia     Other primary cardiomyopathies     Pacemaker 10/27/10    s/p implantation, without complication    Poisn by oth uns agents prim aff cv sys     Ef slightly better to 45%, STABLE back on chemo.  Radiation therapy     Radiation therapy complication 9365    S/P cardiac catheterization 07/08/10    Patent coronary arteries. LVEDP 25.  EF 25%. Global hykn. Moderate MR.  RA 10.  RV 40/10. PA 40/20.  20.  CO/CI 4.5/2.45 (Larry).     Shortness of breath     Syncope and collapse     Thyroid disease     goiter       Past Surgical History:   Procedure Laterality Date    COLONOSCOPY N/A 12/1/2020    COLONOSCOPY with polypectomy performed by Shonda Pérez MD at [address] HX CATARACT REMOVAL Left 10/19/2020    HX CHOLECYSTECTOMY  11/01/2019    HX MASTECTOMY  09/28/11    modified radical mastectomy and axillary dissection    HX ORTHOPAEDIC      HX OTHER SURGICAL      surgery to remove part of liver    HX PACEMAKER  10/27/10    s/p [employer] biventricular AICD, without complication    HX RADICAL MASTECTOMY      IR CHOLECYSTOSTOMY PERCUTANEOUS  9/20/2019    IR INSERT TUNL CVC W PORT OVER 5 YEARS  1/16/2019    NEUROLOGICAL PROCEDURE UNLISTED      Cervical fusion    MO CARDIAC SURG PROCEDURE UNLIST      Difibrillator; BI V ICD       Social History     Tobacco Use    Smoking status: Never Smoker    Smokeless tobacco: Never Used   Substance Use Topics    Alcohol use: No       Allergies   Allergen Reactions    Adhesive Other (comments)     blisters Current Outpatient Medications   Medication Sig    digoxin (LANOXIN) 0.125 mg tablet TAKE 1 TABLET DAILY    spironolactone (ALDACTONE) 25 mg tablet TAKE 2 TABLETS DAILY (Patient taking differently: Take 50 mg by mouth daily.)    carvediloL (COREG) 25 mg tablet Take 1 Tab by mouth two (2) times daily (with meals).  montelukast (SINGULAIR) 10 mg tablet Take 1 Tab by mouth daily.  Entresto 49-51 mg tab tablet TAKE 1 TABLET TWICE A DAY    apixaban (ELIQUIS) 2.5 mg tablet Take 1 Tab by mouth two (2) times a day. (Patient taking differently: Take 2.5 mg by mouth two (2) times a day. Last dose 11/29/20)    furosemide (LASIX) 20 mg tablet Take 1 Tab by mouth daily. (Patient taking differently: Take 20 mg by mouth every Tuesday and Friday.)    tiotropium (SPIRIVA WITH HANDIHALER) 18 mcg inhalation capsule Take 1 Cap by inhalation daily.  albuterol-ipratropium (DUO-NEB) 2.5 mg-0.5 mg/3 ml nebu 3 mL by Nebulization route every six (6) hours as needed (wheezing or sob). File under Medicare Part B, ICD codes J44.9, C78.01    DULoxetine (CYMBALTA) 60 mg capsule Take 60 mg by mouth daily.  raloxifene (EVISTA) 60 mg tablet Take 60 mg by mouth daily. No current facility-administered medications for this visit. Visit Vitals  Ht 5' 5\" (1.651 m)   BMI 27.46 kg/m²         Physical Exam   Constitutional: She is oriented to person, place, and time. She appears well-developed and well-nourished. HENT:   Head: Normocephalic and atraumatic. Eyes: Conjunctivae are normal.   Neck: Neck supple. No JVD present. No tracheal deviation present. No thyromegaly present. Cardiovascular: Normal rate and regular rhythm. PMI is not displaced. Exam reveals no gallop, no S3 and no decreased pulses. Murmur heard. High-pitched blowing holosystolic murmur is present with a grade of 2/6 at the apex. Pulmonary/Chest: No respiratory distress. She has wheezes. She has no rales. She exhibits no tenderness.    Abdominal: Soft. There is no abdominal tenderness. Musculoskeletal:         General: No edema. Neurological: She is alert and oriented to person, place, and time. Skin: Skin is warm. Psychiatric: She has a normal mood and affect. Ms. Sully Medina has a reminder for a \"due or due soon\" health maintenance. I have asked that she contact her primary care provider for follow-up on this health maintenance. No flowsheet data found. SUMMARY:echo:6/2014  Left ventricle: The ventricle was mildly dilated. Systolic function was  normal. Ejection fraction was estimated in the range of 50 % to 55 %. There were no regional wall motion abnormalities. Doppler parameters were  consistent with abnormal left ventricular relaxation (grade 1 diastolic  dysfunction). Left atrium: The atrium was mildly dilated. Mitral valve: There was systolic bowing of the posterior leaflet, but  without diagnostic evidence for prolapse. There was mild to moderate  regurgitation. SUMMARY:echo:12/2014  Left ventricle: Systolic function was at the lower limits of normal.  Ejection fraction was estimated to be 53 %. There were no regional wall  motion abnormalities. Doppler parameters were consistent with abnormal  left ventricular relaxation (grade 1 diastolic dysfunction). Right ventricle: A pacing wire was present. Mitral valve: There was systolic bowing of the posterior leaflet, but  without diagnostic evidence for prolapse. There was mild regurgitation. Tricuspid valve: Pulmonary artery systolic pressure: 22 mmHg. 12/2015:echo    SUMMARY:  Left ventricle: Systolic function was normal. Ejection fraction was  estimated in the range of 50 % to 55 %. There was mild diffuse  hypokinesis. Doppler parameters were consistent with abnormal left  ventricular relaxation (grade 1 diastolic dysfunction). Mitral valve: There was systolic bowing of the anterior and posterior  leaflets, but without diagnostic evidence for prolapse.  There was mild  regurgitation. Tricuspid valve: There was mild regurgitation. Tricuspid regurgitation  peak velocity: 2.3 m/sec. Pulmonary artery systolic pressure: 25 mmHg. pft-6/2017  Maximal Mid Expiratory Flow rate is reduced to 43 % predicted  Forced Expiratory Volume in one second is reduced to 69 % predicted  FEV 1% is reduced     Volumes: All Volumes are reduced except for RV    Flow Volume Loop:  Nonspecific obstructive pattern in Flow Volume Loop    Bronchodilator:  No significant improvement with bronchodilator therapy    Diffusion:  Abnormal Diffusion Capacity reduced to 62 % predicted  DLCO normalizes to alveolar ventilation    Impression:  Moderate obstructive defect, Predominately small airways, Mild restrictive ventilatory defect, Reduced diffusion capacity indicating a decrease in alveolar surface area for gas exchange  I Have personally reviewed recent relevant labs available and discussed with patient    Interpretation Summary -6/2018  · Calculated left ventricular ejection fraction is 55%. Left ventricular mild concentric hypertrophy observed. Mild (grade 1) left ventricular diastolic dysfunction. · Left atrial cavity size is mildly dilated. · There was systolic bowing of the anterior and posterior leaflets, but without diagnostic evidence for prolapse. Mild mitral valve regurgitation. · Mild tricuspid valve regurgitation is present. Pulmonary arterial systolic pressure is 18 mmHg. · Mild pulmonic valve regurgitation is present. · Small pericardial effusion. Interpretation Summary 12/2018    · Left ventricular low normal systolic function. Estimated left ventricular ejection fraction is 51 - 55%. Visually measured ejection fraction. Normal left ventricular wall motion, no regional wall motion abnormality noted. Moderate (grade 2) left ventricular diastolic dysfunction. · There is no evidence of pulmonary hypertension. · Mild to moderate mitral valve regurgitation.   · Left atrial cavity size is mildly dilated. Interpretation Summary 3/2019       · Normal cavity size, wall thickness and diastolic function. There is low normal systolic function. The estimated ejection fraction is 51 - 55%. No regional wall motion abnormality noted. Global longitudinal strain is -13.00%. Abnormal left ventricular strain. · Normal right ventricular size and function. Pacing wire present  · Mild to moderate mitral valve regurgitation. Mild prolapse of the posterior leaflet within the mitral valve. · Mild pulmonic valve regurgitation is present. · Mild tricuspid valve regurgitation. Pulmonary arterial systolic pressure is 74.5 mmHg. Mild pulmonary hypertension. 5/2019    · Left Ventricle: Mild concentric hypertrophy. Severe global systolic dysfunction. Estimated left ventricular ejection fraction is 26 - 30%. Biplane method used to measure ejection fraction. Left ventricular global hypokinesis. · IVC/Hepatic Veins: Severely elevated central venous pressure (15+ mmHg); IVC diameter is larger than 21 mm and collapses less than 50% with respiration. · Pulmonary Artery: Mild pulmonary hypertension. Pulmonary arterial systolic pressure is 44 mmHg. · Pericardium: Trivial-to-small circumferential pericardial effusion measuring 10 mm. · Mitral Valve: Moderate mitral valve regurgitation. · Right Ventricle: Pacing wire present   Interpretation Summary 5/2019    · Acute occlusive thrombus present in the right peroneal vein. IMPRESSION: 5/2019     1. Positive examination for pulmonary emboli.   - There is likely a combination of acute and subacute PE in the lower lobe  segmental and subsegmental branch vessels. Interpretation Summary 7/2019    · Left Ventricle: Normal cavity size. Mild concentric hypertrophy. Severe systolic dysfunction. Estimated left ventricular ejection fraction is 21 - 25%. Abnormal left ventricular wall motion. Inconclusive left ventricular diastolic function.   · Left Atrium: Severely dilated left atrium. · Mitral Valve: Mitral valve thickening. Mitral annular calcification. Moderate mitral valve regurgitation. · Tricuspid Valve: Mild tricuspid valve regurgitation is present. Pulmonary arterial systolic pressure is 70.1 mmHg. Mild pulmonary hypertension. · IVC/Hepatic Veins: Moderately elevated central venous pressure (10-15 mmHg); IVC diameter is larger than 21 mm and collapses more than 50% with respiration. · Pericardium: Trivial-to-small pericardial effusion. Interpretation Summary 9/2019    · Left Ventricle: Normal cavity size. Upper normal wall thickness. Moderate-to-severe systolic dysfunction. Estimated left ventricular ejection fraction is 36 - 40%. Biplane method used to measure ejection fraction. Left ventricular global hypokinesis. · Pericardium: Trivial pericardial effusion. Interpretation Summary 8/2020      · LV: Estimated LVEF is 45 - 50%. Normal cavity size. Upper normal wall thickness. Wall motion: normal. Mild (grade 1) left ventricular diastolic dysfunction. · LA: Left Atrium volume index is 35.88 mL/m2. · RV: Pacer/ICD present. · MV: Mild mitral annular calcification. Mild mitral valve regurgitation is present. · TV: Mild tricuspid valve regurgitation is present. · PA: Pulmonary arterial systolic pressure is 24 mmHg. Assessment         ICD-10-CM ICD-9-CM    1. Systolic CHF, chronic (HCC)  I50.22 428.22      428.0    2. Nonischemic cardiomyopathy (HCC)  I42.8 425.4    3. Ventricular fibrillation (HCC)  I49.01 427.41    4. Essential hypertension  I10 401.9    5. Automatic implantable cardioverter-defibrillator in situ  Z95.810 V45.02    Tounge swelling not related to lisinopril as she had swelling even after stopping lisinopril  7/2018  I will hold Lasix as recent decrease in renal function. No clinical sign of fluid overload. 10/2018  Shortness of breath due to acute bronchitis bilateral wheezing. Will refer back to pulmonary. No sign of fluid overload. Will keep off Lasix  1/2019  Metastatic breast cancer now back on chemotherapy. Lung metastasis likely cause for shortness of breath which is improving. Low blood pressure will reduce Coreg to 6.25 twice a day. Continue lisinopril. Recheck echo in 2 months on chemotherapy  4/2019  Cardiac status stable. Echo EF remains stable continue Coreg and lisinopril. Check echo in 3 months. Patient remains on chemotherapy    5/2019  Recent admission with increased shortness of breath combination of pulmonary embolism and decompensated systolic heart failure with worsening ejection fraction. Class III CHF. 10 pound weight loss from peak. Continue current therapy. Continue anticoagulation. Follow-up 3 weeks  5/30/2019  Fluid overload stable. Remains weak. Follow-up echo in about a month to reassess LV function. 10/2019  Cardiac status stable. CHF compensated. Shortness of breath and edema improving. Continue Entresto. Aldactone increased to 50 mg a day due to hypokalemia. Lasix will be reduced to 2-3 times a week. Episode of ventricular fibrillation in 8/2019 noticed on ICD check. Patient had hypokalemia during that time will continue to monitor. If patient needs laparoscopic surgery her risk is high about 5 to 10%. If open surgical procedure is required it will carry high risk    1/2020  Cardiac status stable. She completed Chemotherapy in October and is doing well. She is tolerating Entresto. She takes lasix 2-3 x weekly as needed for edema. Reports blood work drawn this week with normal renal function and potassium. Reports an episode of syncope in Dr. Harman Rojas office in November and b/p readings were labile at that time. ICD checked 11/2019 (after syncopal episode) no arrhythmias seen. Continue current medications. 7/2020  Cardiac status stable. Continue treatment. Follow-up echo  8/2020 virtual visit  Cardiac status stable. continue current treatment,lvef improving  11/2020  Cardiac status stable. Low normal blood pressure but asymptomatic continue monitoring. CHF compensated  2/2021  Stable cardiac status CHF compensated continue current medical management tolerating treatment for CHF with Entresto and Coreg. 5/2021  Stable cardiac status. Still feels fatigue and tired. LVEF is improving. She has cut down Coreg to 12.5 mg twice a day. Blood pressure stable. CHF compensated continue treatment      No orders of the defined types were placed in this encounter.         Annalise Terry MD

## 2021-06-18 ENCOUNTER — TELEPHONE (OUTPATIENT)
Dept: CARDIOLOGY CLINIC | Age: 71
End: 2021-06-18

## 2021-06-18 DIAGNOSIS — I42.8 NONISCHEMIC CARDIOMYOPATHY (HCC): Primary | ICD-10-CM

## 2021-06-18 DIAGNOSIS — I50.22 SYSTOLIC CHF, CHRONIC (HCC): ICD-10-CM

## 2021-06-18 RX ORDER — ATORVASTATIN CALCIUM 20 MG/1
20 TABLET, FILM COATED ORAL DAILY
COMMUNITY
End: 2021-06-18 | Stop reason: SDUPTHER

## 2021-06-18 NOTE — TELEPHONE ENCOUNTER
Called patient to let know per written message from [facility] ,after reviewing blood work, lipids are elevated. Will recommend to begin Atorvastatin 20 mg and recheck lipids and LFT in 4 wks. Patient verbalized understanding and had no further questions at this time.

## 2021-06-21 RX ORDER — ATORVASTATIN CALCIUM 20 MG/1
20 TABLET, FILM COATED ORAL DAILY
Qty: 90 TABLET | Refills: 3 | Status: SHIPPED | OUTPATIENT
Start: 2021-06-21 | End: 2021-09-15

## 2021-07-05 RX ORDER — UMECLIDINIUM 62.5 UG/1
AEROSOL, POWDER ORAL
Qty: 1 INHALER | Refills: 5 | Status: SHIPPED | OUTPATIENT
Start: 2021-07-05 | End: 2021-11-12

## 2021-07-19 ENCOUNTER — TRANSCRIBE ORDER (OUTPATIENT)
Dept: SCHEDULING | Age: 71
End: 2021-07-19

## 2021-07-19 DIAGNOSIS — I42.0 DILATED CARDIOMYOPATHY (HCC): Primary | ICD-10-CM

## 2021-07-20 ENCOUNTER — HOSPITAL ENCOUNTER (OUTPATIENT)
Dept: NON INVASIVE DIAGNOSTICS | Age: 71
Discharge: HOME | End: 2021-07-20
Attending: INTERNAL MEDICINE
Payer: MEDICARE

## 2021-07-20 VITALS
DIASTOLIC BLOOD PRESSURE: 56 MMHG | SYSTOLIC BLOOD PRESSURE: 107 MMHG | WEIGHT: 166 LBS | HEIGHT: 65 IN | BODY MASS INDEX: 27.66 KG/M2

## 2021-07-20 DIAGNOSIS — I42.0 DILATED CARDIOMYOPATHY (HCC): ICD-10-CM

## 2021-07-20 LAB
ECHO AO ROOT DIAM: 2.7 CM
ECHO LA AREA 4C: 16.71 CM2
ECHO LA VOL 2C: 55.45 ML (ref 22–52)
ECHO LA VOL 4C: 43.94 ML (ref 22–52)
ECHO LA VOL BP: 53.58 ML (ref 22–52)
ECHO LA VOL/BSA BIPLANE: 29.28 ML/M2 (ref 16–28)
ECHO LA VOLUME INDEX A2C: 30.3 ML/M2 (ref 16–28)
ECHO LA VOLUME INDEX A4C: 24.01 ML/M2 (ref 16–28)
ECHO LV INTERNAL DIMENSION DIASTOLIC: 5.49 CM (ref 3.9–5.3)
ECHO LV INTERNAL DIMENSION SYSTOLIC: 3.78 CM
ECHO LV IVSD: 0.88 CM (ref 0.6–0.9)
ECHO LV MASS 2D: 172.2 G (ref 67–162)
ECHO LV MASS INDEX 2D: 94.1 G/M2 (ref 43–95)
ECHO LV POSTERIOR WALL DIASTOLIC: 0.82 CM (ref 0.6–0.9)
ECHO LVOT DIAM: 1.97 CM
ECHO LVOT PEAK GRADIENT: 3.15 MMHG
ECHO LVOT PEAK VELOCITY: 88.76 CM/S
ECHO LVOT SV: 53.7 ML
ECHO LVOT VTI: 17.57 CM
ECHO MV A VELOCITY: 101.48 CM/S
ECHO MV E DECELERATION TIME (DT): 114.46 MS
ECHO MV E VELOCITY: 56.72 CM/S
ECHO MV E/A RATIO: 0.56
LVOT MG: 1.92 MMHG

## 2021-07-20 PROCEDURE — 93306 TTE W/DOPPLER COMPLETE: CPT

## 2021-08-02 ENCOUNTER — TRANSCRIBE ORDER (OUTPATIENT)
Dept: SCHEDULING | Age: 71
End: 2021-08-02

## 2021-08-02 DIAGNOSIS — C50.919 BREAST CANCER (HCC): Primary | ICD-10-CM

## 2021-08-04 ENCOUNTER — TRANSCRIBE ORDER (OUTPATIENT)
Dept: SCHEDULING | Age: 71
End: 2021-08-04

## 2021-08-04 DIAGNOSIS — C50.412 MALIGNANT NEOPLASM OF UPPER-OUTER QUADRANT OF LEFT FEMALE BREAST (HCC): Primary | ICD-10-CM

## 2021-08-07 RX ORDER — SACUBITRIL AND VALSARTAN 49; 51 MG/1; MG/1
TABLET, FILM COATED ORAL
Qty: 180 TABLET | Refills: 3 | Status: SHIPPED | OUTPATIENT
Start: 2021-08-07 | End: 2022-07-28

## 2021-08-19 ENCOUNTER — HOSPITAL ENCOUNTER (OUTPATIENT)
Dept: PET IMAGING | Age: 71
Discharge: HOME | End: 2021-08-19
Attending: PHYSICIAN ASSISTANT
Payer: MEDICARE

## 2021-08-19 DIAGNOSIS — C50.412 MALIGNANT NEOPLASM OF UPPER-OUTER QUADRANT OF LEFT FEMALE BREAST (HCC): ICD-10-CM

## 2021-08-19 PROCEDURE — 74011000250 HC RX REV CODE- 250

## 2021-08-19 PROCEDURE — A9552 F18 FDG: HCPCS

## 2021-08-19 RX ORDER — BARIUM SULFATE 20 MG/ML
875 SUSPENSION ORAL
Status: COMPLETED | OUTPATIENT
Start: 2021-08-19 | End: 2021-08-19

## 2021-08-19 RX ORDER — FLUDEOXYGLUCOSE F-18 200 MCI/ML
10 INJECTION INTRAVENOUS ONCE
Status: COMPLETED | OUTPATIENT
Start: 2021-08-19 | End: 2021-08-19

## 2021-08-19 RX ADMIN — BARIUM SULFATE 875 ML: 21 SUSPENSION ORAL at 10:42

## 2021-08-19 RX ADMIN — FLUDEOXYGLUCOSE F-18 10 MILLICURIE: 200 INJECTION INTRAVENOUS at 10:42

## 2021-08-24 NOTE — TELEPHONE ENCOUNTER
Marla Ames from [facility] called([phone])  They need a refill for Singulair 10mg. Please send order JJG#[id number].

## 2021-08-25 RX ORDER — MONTELUKAST SODIUM 10 MG/1
10 TABLET ORAL DAILY
Qty: 90 TABLET | Refills: 0 | Status: SHIPPED | OUTPATIENT
Start: 2021-08-25

## 2021-08-30 ENCOUNTER — TRANSCRIBE ORDER (OUTPATIENT)
Dept: SCHEDULING | Age: 71
End: 2021-08-30

## 2021-08-30 DIAGNOSIS — Z12.31 SCREENING MAMMOGRAM FOR HIGH-RISK PATIENT: Primary | ICD-10-CM

## 2021-08-31 ENCOUNTER — HOSPITAL ENCOUNTER (OUTPATIENT)
Dept: MAMMOGRAPHY | Age: 71
Discharge: HOME | End: 2021-08-31
Attending: PHYSICIAN ASSISTANT
Payer: MEDICARE

## 2021-08-31 DIAGNOSIS — Z12.31 SCREENING MAMMOGRAM FOR HIGH-RISK PATIENT: ICD-10-CM

## 2021-08-31 PROCEDURE — 77063 BREAST TOMOSYNTHESIS BI: CPT

## 2021-09-01 ENCOUNTER — TRANSCRIBE ORDER (OUTPATIENT)
Dept: INTERVENTIONAL RADIOLOGY/VASCULAR | Age: 71
End: 2021-09-01

## 2021-09-01 DIAGNOSIS — C50.412 BREAST CANCER OF UPPER-OUTER QUADRANT OF LEFT FEMALE BREAST (HCC): Primary | ICD-10-CM

## 2021-09-02 ENCOUNTER — TELEPHONE (OUTPATIENT)
Dept: CARDIOLOGY CLINIC | Age: 71
End: 2021-09-02

## 2021-09-02 DIAGNOSIS — Z45.02 IMPLANTABLE CARDIOVERTER-DEFIBRILLATOR (ICD) AT END OF BATTERY LIFE: Primary | ICD-10-CM

## 2021-09-02 NOTE — TELEPHONE ENCOUNTER
Per Dr Jack Cohen after Percilla Serum carelink done on 9/2/21:      RRT  Normal function  Stable volume  Refer to EP     Called and advised patient that someone from EP office will call her to schedule appointment since battery is low. Patient states understanding and will call if any furhter questions or concerns.

## 2021-09-03 RX ORDER — CARVEDILOL 25 MG/1
TABLET ORAL
Qty: 180 TABLET | Refills: 3 | Status: SHIPPED | OUTPATIENT
Start: 2021-09-03

## 2021-09-13 ENCOUNTER — HOSPITAL ENCOUNTER (OUTPATIENT)
Dept: PHYSICAL THERAPY | Age: 71
Discharge: HOME | End: 2021-09-13
Payer: MEDICARE

## 2021-09-13 PROCEDURE — 97162 PT EVAL MOD COMPLEX 30 MIN: CPT

## 2021-09-13 PROCEDURE — 97110 THERAPEUTIC EXERCISES: CPT

## 2021-09-13 NOTE — PROGRESS NOTES
PT DAILY TREATMENT NOTE     Patient Name: Marlene Anderson  Date:2021  : 1950  [x]  Patient  Verified  [place]or: [place] MED[place] / Plan: [place] MED[place]ARE [place] & B / Product Type: Medicare /    In time:2:15  Out time:3:00  Total Treatment Time (min): 45  Visit #: 1 of 8    Medicare/BCBS Only   Total Timed Codes (min):  15 1:1 Treatment Time:  15       Treatment Area: Muscle weakness (generalized) [M62.81]    SUBJECTIVE  Pain Level (0-10 scale): 0/10  Any medication changes, allergies to medications, adverse drug reactions, diagnosis change, or new procedure performed?: [x] No    [] Yes (see summary sheet for update)  Subjective functional status/changes:   [] No changes reported  The patient has a chief complaint of fatigue and quickly tiring when performing chores. OBJECTIVE  15 min Therapeutic Exercise:  [] See flow sheet :   Rationale: increase ROM and increase strength to improve the patients ability to improve ADL ease. With   [] TE   [] TA   [] neuro   [] other: Patient Education: [x] Review HEP    [] Progressed/Changed HEP based on:   [] positioning   [] body mechanics   [] transfers   [] heat/ice application    [] other:      Other Objective/Functional Measures: See IE     Pain Level (0-10 scale) post treatment: 0/10    ASSESSMENT/Changes in Function: See POC. Patient will continue to benefit from skilled PT services to modify and progress therapeutic interventions, address functional mobility deficits, address ROM deficits, address strength deficits, analyze and address soft tissue restrictions, analyze and cue movement patterns, analyze and modify body mechanics/ergonomics, assess and modify postural abnormalities and instruct in home and community integration and decreased balance to attain remaining goals. []  See Plan of Care  []  See progress note/recertification  []  See Discharge Summary         Progress towards goals / Updated goals:  Short Term Goals:  To be accomplished in 2 weeks: 1.The patient will report independence and compliance of HEP to maximize therapeutic benefit. IE: issued HEP              2. The patient will improve sit to stands to 13 times in 30\" to improve efficiency of transfers   IE 11 times  Long Term Goals: To be accomplished in 4 weeks:              1. The patient will improve FOTO score to 54 to improve ease of ADLs. IE: 45               2. The patient will improve 6 minute walk test to ambulating 1,200' to improve ease of community negotiation. IE 5 minutes at 960'              3. The patient will improve EC on airex to 10\" to improve balance in dimly lit rooms. IE unable              4. The patient will report ambulating 10 minutes continuously in neighborhood to maximize ease of community efficiency.     IE: 5 minutes       PLAN  []  Upgrade activities as tolerated     [x]  Continue plan of care  []  Update interventions per flow sheet       []  Discharge due to:_  []  Other:_      Jayme Adam, PT 9/13/2021  5:50 PM    Future Appointments   Date Time Provider Priscila Alves   9/15/2021 11:40 AM Lázaro Cui MD [facility] BS AMB   9/16/2021  9:00 AM [facility] IR RM 1 [facility]   9/22/2021  2:30 PM Alee Toro, PT [facility]   9/28/2021  2:15 PM Alee Toro, PT [facility]   9/29/2021 12:45 PM Elly Rey MD [facility] BS AMB   10/5/2021  2:15 PM Martha Light, PT [facility]

## 2021-09-13 NOTE — PROGRESS NOTES
[facility]  [address]  [place], [address]  [phone] fax    Plan of Care/ Statement of Necessity for Physical Therapy Services    Patient name: Curly Shelton Start of Care: 2021   Referral source: Emily Patel : 1950    Medical Diagnosis: Muscle weakness (generalized) [M62.81]  Payor: Ankush Wills / Plan: [place] MEDICARE PART A & B / Product Type: Medicare /  Onset Date:Chronic     Treatment Diagnosis: Deconditioning UE/LE   Prior Hospitalization: see medical history Provider#: [id number]   Medications: Verified on Patient summary List    Comorbidities: Cervical fusion, Heart disease, osteoporosis, pacemaker, hx breast/lung cancer, mediport   Prior Level of Function: The patient reports that she has had difficulty getting back into any sort of chores, or normalcy for quite some time since her cancer treatments. The Plan of Care and following information is based on the information from the initial evaluation. Assessment/ key information: The patient is a 66year old female with a chief complaint of fatigue and reports of \"feeling tired\" with activity. She lives with her  and she indicates he takes care of the chores, and the patient mostly sits. She denies any regimen concerning exercise or walks. She presents with a 6 minute walk test results: incomplete, but able to ambulate for 5 minutes at 960'. Her 30\" sit to stand test results were 11 seconds. She demonstrates rather poor static standing balance, noting MT on airex 7\" and EC on airex unable. The patient's strength of her LEs was good noting between 4+/5 MMT and 5/5 MMT. Due to many appointments, she is in agreement to manage her care with [facility] and attend 1 session a week.  The patient has signs and symptoms consistent     Evaluation Complexity History MEDIUM  Complexity : 1-2 comorbidities / personal factors will impact the outcome/ POC ; Examination MEDIUM Complexity : 3 Standardized tests and measures addressing body structure, function, activity limitation and / or participation in recreation  ;Presentation MEDIUM Complexity : Evolving with changing characteristics  ; Clinical Decision Making MEDIUM Complexity : FOTO score of 26-74  Overall Complexity Rating: MEDIUM  Problem List: pain affecting function, decrease ROM, decrease strength, impaired gait/ balance, decrease ADL/ functional abilitiies, decrease activity tolerance, decrease flexibility/ joint mobility and decrease transfer abilities   Treatment Plan may include any combination of the following: Therapeutic exercise, Therapeutic activities, Neuromuscular re-education, Physical agent/modality, Gait/balance training, Manual therapy, Patient education, Self Care training, Functional mobility training and Home safety training  Patient / Family readiness to learn indicated by: asking questions, trying to perform skills and interest  Persons(s) to be included in education: patient (P)  Barriers to Learning/Limitations: None  Patient Goal (s): Get stronger.   Patient Self Reported Health Status: good  Rehabilitation Potential: good    Short Term Goals: To be accomplished in 2 weeks: 1. The patient will report independence and compliance of HEP to maximize therapeutic benefit. 2. The patient will improve sit to stands to 13 times in 30\" to improve efficiency of transfers  Long Term Goals: To be accomplished in 4 weeks:   1. The patient will improve FOTO score to 54 to improve ease of ADLs. 2. The patient will improve 6 minute walk test to ambulating 1,200' to improve ease of community negotiation. 3. The patient will improve EC on airex to 10\" to improve balance in dimly lit rooms. 4. The patient will report ambulating 10 minutes continuously in neighborhood to maximize ease of community efficiency. Frequency / Duration: Patient to be seen 1 times per week for 4 weeks.     Patient/ Caregiver education and instruction: Diagnosis, prognosis, self care, activity modification and exercises   [x]  Plan of care has been reviewed with PTA    Certification Period: 9/13/2021 - 10/11/2021  Leanna Fly, PT 9/13/2021 3:53 PM    ________________________________________________________________________    I certify that the above Therapy Services are being furnished while the patient is under my care. I agree with the treatment plan and certify that this therapy is necessary.     [de-identified] Signature:____________________  Date:____________Time: _________     CINTHIA Hernandez  Please sign and return to [facility] [address] [address] [address]  [place], [facility] [address]  [phone] fax

## 2021-09-15 ENCOUNTER — HOSPITAL ENCOUNTER (OUTPATIENT)
Dept: MAMMOGRAPHY | Age: 71
Discharge: HOME | End: 2021-09-15
Attending: INTERNAL MEDICINE
Payer: MEDICARE

## 2021-09-15 ENCOUNTER — HOSPITAL ENCOUNTER (OUTPATIENT)
Dept: ULTRASOUND IMAGING | Age: 71
Discharge: HOME | End: 2021-09-15
Attending: INTERNAL MEDICINE
Payer: MEDICARE

## 2021-09-15 ENCOUNTER — OFFICE VISIT (OUTPATIENT)
Dept: CARDIOLOGY CLINIC | Age: 71
End: 2021-09-15
Payer: MEDICARE

## 2021-09-15 VITALS — BODY MASS INDEX: 27.62 KG/M2 | HEIGHT: 65 IN

## 2021-09-15 DIAGNOSIS — I05.9 MITRAL VALVE DISEASE: ICD-10-CM

## 2021-09-15 DIAGNOSIS — R92.8 ABNORMAL MAMMOGRAM: ICD-10-CM

## 2021-09-15 DIAGNOSIS — I50.22 SYSTOLIC CHF, CHRONIC (HCC): ICD-10-CM

## 2021-09-15 DIAGNOSIS — R92.2 BREAST DENSITY: ICD-10-CM

## 2021-09-15 DIAGNOSIS — Z45.02 IMPLANTABLE CARDIOVERTER-DEFIBRILLATOR (ICD) AT END OF BATTERY LIFE: Primary | ICD-10-CM

## 2021-09-15 DIAGNOSIS — Z79.01 ON APIXABAN THERAPY: ICD-10-CM

## 2021-09-15 DIAGNOSIS — I10 ESSENTIAL HYPERTENSION: ICD-10-CM

## 2021-09-15 DIAGNOSIS — I42.0 DILATED CARDIOMYOPATHY (HCC): ICD-10-CM

## 2021-09-15 PROCEDURE — 1101F PT FALLS ASSESS-DOCD LE1/YR: CPT | Performed by: INTERNAL MEDICINE

## 2021-09-15 PROCEDURE — G8536 NO DOC ELDER MAL SCRN: HCPCS | Performed by: INTERNAL MEDICINE

## 2021-09-15 PROCEDURE — 77061 BREAST TOMOSYNTHESIS UNI: CPT

## 2021-09-15 PROCEDURE — 3017F COLORECTAL CA SCREEN DOC REV: CPT | Performed by: INTERNAL MEDICINE

## 2021-09-15 PROCEDURE — G8419 CALC BMI OUT NRM PARAM NOF/U: HCPCS | Performed by: INTERNAL MEDICINE

## 2021-09-15 PROCEDURE — 99205 OFFICE O/P NEW HI 60 MIN: CPT | Performed by: INTERNAL MEDICINE

## 2021-09-15 PROCEDURE — G8510 SCR DEP NEG, NO PLAN REQD: HCPCS | Performed by: INTERNAL MEDICINE

## 2021-09-15 PROCEDURE — G8427 DOCREV CUR MEDS BY ELIG CLIN: HCPCS | Performed by: INTERNAL MEDICINE

## 2021-09-15 PROCEDURE — 1090F PRES/ABSN URINE INCON ASSESS: CPT | Performed by: INTERNAL MEDICINE

## 2021-09-15 PROCEDURE — G9899 SCRN MAM PERF RSLTS DOC: HCPCS | Performed by: INTERNAL MEDICINE

## 2021-09-15 PROCEDURE — G8756 NO BP MEASURE DOC: HCPCS | Performed by: INTERNAL MEDICINE

## 2021-09-15 PROCEDURE — G8399 PT W/DXA RESULTS DOCUMENT: HCPCS | Performed by: INTERNAL MEDICINE

## 2021-09-15 RX ORDER — SODIUM CHLORIDE 0.9 % (FLUSH) 0.9 %
5-40 SYRINGE (ML) INJECTION EVERY 8 HOURS
Status: CANCELLED | OUTPATIENT
Start: 2021-09-15

## 2021-09-15 RX ORDER — SODIUM CHLORIDE 0.9 % (FLUSH) 0.9 %
5-40 SYRINGE (ML) INJECTION AS NEEDED
Status: CANCELLED | OUTPATIENT
Start: 2021-09-15

## 2021-09-15 NOTE — PATIENT INSTRUCTIONS
[facility] [place]          Patient  EP Instructions                  1. You are scheduled to have a Gen change for Medtronic AICD on  October 11th , at 08:00am.    Please check in at 07:00am.    2. Please go to [facility] and monserrat in the outpatient parking lot that is located around to the back of the hospital and enter through the [facility]. Once you enter through the [facility] check in with the  there. The  will either give you directions or assist you in getting to the cath holding area. 3.  You are not to eat or drink anything after midnight the night before your                   procedure. 4. Please continue to take your medications with a small sip of water on the morning of the procedure with the following exceptions:  Hold Eliquis for 48 hours prior to procedure. Hold Furosemide the morning of procedure     5. If you are diabetic, do not take your insulin/sugar pill the morning of the procedure. 6. We encourage families to wait in the waiting room on the first floor while the procedure is being done. The Doctor will come out and talk with you as soon as the procedure is over. 7. There is the possibility that you may spend the night in the hospital, depending on the results of the procedure. This will be determined after the procedure is done. 8.   If you or your family have any questions, please call our office Monday-Friday 9:00am         -4:30 pm , at [phone], and ask to speak to one of the nurses.

## 2021-09-15 NOTE — PROGRESS NOTES
History of Present Illness:  66year old female referred by Dr. Cheryl Topete for evaluation for Medtronic biventricular AICD at [facility]. She has mild fatigue and dyspnea, which is relatively constant. No chest pain or syncope. No PND or orthopnea. Some occasional edema. Her breast cancer is in remission and she is planning to get her left sided catheter explanted tomorrow at [place]. Impression:  1. Medtronic biventricular AICD at [facility], triggering 09/02/21. 2. Hx of transient nonischemic cardiomyopathy with EF 50% July, 2021. 3. Chronic class 2 systolic heart failure. 4. Hx of hypertension. 5. Hx of PE, DVT in 2019, on Xarelto. 6. Hx of remote left breast cancer, S/P chemo and radiation, no recurrence. She has left sided catheter in place, planning to get removed 09/16/21. Plan:  I discussed risks, benefits and alternatives to generator change-out, as with pacing she did improve with LV function. I discussed increased risk of infection. Life expectancy greater than one year. All questions answered and I will make arrangements. Past Medical History:   Diagnosis Date    Abnormal nuclear cardiac imaging test 06/11/2010    Lg fixed anterior, septal, apical, inferoseptal defect sugg RCA & LAD disease or cardiomyopathy. EF 20%. Global hykn. Nondiagnostic EKG.  Acute bronchitis 10/15/2018    Persistent cough and wheezing in spite of prednisone and antibiotic. Will refer to pulmonary    Adenocarcinoma of breast; locally advanced invasive ductal adenocarcinoma of left breast     Asthma     Automatic implantable cardiac defibrillator in situ     Automatic implantable cardiac defibrillator in situ     Breast cancer [facility] [address]     Cancer ([facility] [address]     breast cancer left    Chemotherapy convalescence or palliative care 2012    Chronic combined systolic and diastolic heart failure (HCC)     Remains symptomatic, nyha class 3 ef improving.     Congestive heart failure, unspecified     chronic class ll  Cortical age-related cataract of left eye 10/18/2020    Cortical age-related cataract of right eye 10/31/2020    DVT of lower limb, acute ([address] 2019    Right leg    Echocardiogram 09/27/2010    EF 30%. Global hykn. Mild MR. Mild TR.  GERD (gastroesophageal reflux disease)     History of pulmonary embolus (PE) 2019    Hyperlipidemia     Hypertension     Mitral valve disorders(424.0) 1/15/2014    mild to moderate mr     Mitral valve disorders(424.0) 1/15/2014    mild to moderate mr     MVP (mitral valve prolapse)     Nonischemic cardiomyopathy (HCC)     EF 20-30% despite medical therapy    Nonspecific abnormal electrocardiogram (ECG) (EKG)     Osteopenia     Other primary cardiomyopathies     Pacemaker 10/27/10    s/p implantation, without complication    Poisn by oth uns agents prim aff cv sys     Ef slightly better to 45%, STABLE back on chemo.  Radiation therapy     Radiation therapy complication 0911    S/P cardiac catheterization 07/08/10    Patent coronary arteries. LVEDP 25.  EF 25%. Global hykn. Moderate MR.  RA 10.  RV 40/10. PA 40/20.  20.  CO/CI 4.5/2.45 (Larry).  Shortness of breath     Syncope and collapse     Thyroid disease     goiter       Current Outpatient Medications   Medication Sig Dispense Refill    carvediloL (COREG) 25 mg tablet TAKE 1 TABLET TWICE DAILY  WITH MEALS 180 Tablet 3    montelukast (SINGULAIR) 10 mg tablet Take 1 Tablet by mouth daily. PATIENT NEEDS APPOINTMENT 90 Tablet 0    Entresto 49-51 mg tab tablet TAKE 1 TABLET TWICE A  Tablet 3    Incruse Ellipta 62.5 mcg/actuation inhaler USE 1 INHALATION ORALLY    DAILY 1 Inhaler 5    digoxin (LANOXIN) 0.125 mg tablet TAKE 1 TABLET DAILY 90 Tab 3    spironolactone (ALDACTONE) 25 mg tablet TAKE 2 TABLETS DAILY (Patient taking differently: Take 50 mg by mouth daily. ) 180 Tab 3    apixaban (ELIQUIS) 2.5 mg tablet Take 1 Tab by mouth two (2) times a day.  (Patient taking differently: Take 2.5 mg by mouth two (2) times a day. Last dose 11/29/20) 60 Tab 2    furosemide (LASIX) 20 mg tablet Take 1 Tab by mouth daily. (Patient taking differently: Take 20 mg by mouth every Tuesday and Friday.) 30 Tab 0    albuterol-ipratropium (DUO-NEB) 2.5 mg-0.5 mg/3 ml nebu 3 mL by Nebulization route every six (6) hours as needed (wheezing or sob). File under Medicare Part B, ICD codes J44.9, C78.01 120 Nebule 3    DULoxetine (CYMBALTA) 60 mg capsule Take 60 mg by mouth daily.  raloxifene (EVISTA) 60 mg tablet Take 60 mg by mouth daily. Social History   reports that she has never smoked. She has never used smokeless tobacco.   reports no history of alcohol use. Family History  family history includes Heart Disease in her father; High Cholesterol in her mother; Hypertension in her mother. Review of Systems  Except as stated above include:  Constitutional: Negative for fever, chills and malaise/fatigue. HEENT: No congestion or recent URI. Gastrointestinal: No nausea, vomiting, abdominal pain, bloody stools. Pulmonary:  Negative except as stated above. Cardiac:  Negative except as stated above. Musculoskeletal: Negative except as stated above. Neurological:  No localized symptoms. Skin:  Negative except as stated above. Psych:  Negative except as stated above. Endocrine:  Negative except as stated above. PHYSICAL EXAM  BP Readings from Last 3 Encounters:   07/20/21 (!) 107/56   05/13/21 (!) 107/56   02/18/21 (!) 115/56     Pulse Readings from Last 3 Encounters:   05/13/21 90   02/18/21 87   12/01/20 69     Wt Readings from Last 3 Encounters:   07/20/21 75.3 kg (166 lb)   05/13/21 75.4 kg (166 lb 3.2 oz)   02/18/21 74.8 kg (165 lb)     General:   Well developed, well groomed. Head/Neck:   No obvious jugular venous distention     No obvious carotid pulsations. No evidence of xanthelasma. Lungs:   No respiratory distress. Clear bilaterally. Heart:  Regular rate and rhythm. Normal S1/S2. Palpation grossly normal.    No significant murmurs, rubs or gallops. AICD pocket intact. Abdomen:   Non-acute abdomen. No obvious pulsations. Extremities:   Intact peripheral pulses. No significant edema. Neurological:   Alert and oriented to person, place, time. No focal neurological deficit visually. Skin:   No obvious rash    Blood Pressure Metric:  Monitor recommended and adjustments stated if needed.

## 2021-09-15 NOTE — LETTER
9/15/2021 12:07 PM    Curly Shelton  [id number]  1950        Insurance:  Medicare                  Auth # _____________________      Proc Date: Mon 10/11                Proc Time:  8:00      Performing MD : Dr. Ling Khan with:  Sharon Splinter                                               Date:9/15/2021          HP: 9/15     EKG: ______    Labs:______  CXR: _______  Orders:  9/15          Special Instructions:  _____________________________________________________  ______________________________________________________________________  ______________________________________________________________________          The materials enclosed with this facsimile transmission are private and confidential and are the property of the sender. If you are not the intended recipient, be advised that any unauthorized use, disclosure, copying, distribution, or the taking of any action in reliance on the contents of this telecopied information is strictly prohibited. If you have received this in error, please immediately notify the sender via telephone to arrange for return of the forwarded documentation.

## 2021-09-15 NOTE — PROGRESS NOTES
Josietito Chen presents today for   Chief Complaint   Patient presents with    New Patient     referred by Ricki Demarco for ICD battery at end of life        Josie Chen preferred language for health care discussion is english/other. Is someone accompanying this pt? no    Is the patient using any DME equipment during [address]? no    Depression Screening:  3 most recent PHQ Screens 9/15/2021   PHQ Not Done -   Little interest or pleasure in doing things Not at all   Feeling down, depressed, irritable, or hopeless Not at all   Total Score PHQ 2 0       Learning Assessment:  Learning Assessment 10/9/2019   PRIMARY LEARNER Patient   HIGHEST LEVEL OF EDUCATION - PRIMARY LEARNER  -   BARRIERS PRIMARY LEARNER NONE   PRIMARY LANGUAGE ENGLISH   LEARNER PREFERENCE PRIMARY DEMONSTRATION   ANSWERED BY self     -   RELATIONSHIP SELF       Abuse Screening:  Abuse Screening Questionnaire 4/30/2019   Do you ever feel afraid of your partner? N   Are you in a relationship with someone who physically or mentally threatens you? N   Is it safe for you to go home? Y       Fall Risk  Fall Risk Assessment, last 12 mths 9/15/2021   Able to walk? Yes   Fall in past 12 months? 0   Do you feel unsteady? 0   Are you worried about falling 0   Number of falls in past 12 months -   Fall with injury? -       Pt currently taking Anticoagulant therapy? Eliquis     Coordination of Care:  1. Have you been to the ER, urgent care clinic since your last visit? Hospitalized since your last visit? no    2. Have you seen or consulted any other health care providers outside of the [address] [place] [address] since your last visit? Include any pap smears or colon screening.  no

## 2021-09-16 ENCOUNTER — TRANSCRIBE ORDER (OUTPATIENT)
Dept: SCHEDULING | Age: 71
End: 2021-09-16

## 2021-09-16 ENCOUNTER — APPOINTMENT (OUTPATIENT)
Dept: ULTRASOUND IMAGING | Age: 71
End: 2021-09-16
Attending: INTERNAL MEDICINE
Payer: MEDICARE

## 2021-09-16 ENCOUNTER — HOSPITAL ENCOUNTER (OUTPATIENT)
Dept: INTERVENTIONAL RADIOLOGY/VASCULAR | Age: 71
Discharge: HOME | End: 2021-09-16
Attending: PHYSICIAN ASSISTANT | Admitting: RADIOLOGY
Payer: MEDICARE

## 2021-09-16 VITALS
HEART RATE: 76 BPM | SYSTOLIC BLOOD PRESSURE: 124 MMHG | WEIGHT: 165 LBS | RESPIRATION RATE: 18 BRPM | TEMPERATURE: 98.1 F | HEIGHT: 65 IN | DIASTOLIC BLOOD PRESSURE: 57 MMHG | BODY MASS INDEX: 27.49 KG/M2 | OXYGEN SATURATION: 97 %

## 2021-09-16 DIAGNOSIS — C50.412 BREAST CANCER OF UPPER-OUTER QUADRANT OF LEFT FEMALE BREAST (HCC): ICD-10-CM

## 2021-09-16 LAB
ANION GAP SERPL CALC-SCNC: 7 MMOL/L (ref 3–18)
APTT PPP: 27.2 SEC (ref 23–36.4)
BUN SERPL-MCNC: 18 MG/DL (ref 7–18)
BUN/CREAT SERPL: 18 (ref 12–20)
CALCIUM SERPL-MCNC: 8.6 MG/DL (ref 8.5–10.1)
CHLORIDE SERPL-SCNC: 108 MMOL/L (ref 100–111)
CO2 SERPL-SCNC: 25 MMOL/L (ref 21–32)
CREAT SERPL-MCNC: 1 MG/DL (ref 0.6–1.3)
ERYTHROCYTE [DISTWIDTH] IN BLOOD BY AUTOMATED COUNT: 12.9 % (ref 11.6–14.5)
GLUCOSE SERPL-MCNC: 118 MG/DL (ref 74–99)
HCT VFR BLD AUTO: 36.8 % (ref 35–45)
HGB BLD-MCNC: 12.2 G/DL (ref 12–16)
INR PPP: 1 (ref 0.8–1.2)
MCH RBC QN AUTO: 31.8 PG (ref 24–34)
MCHC RBC AUTO-ENTMCNC: 33.2 G/DL (ref 31–37)
MCV RBC AUTO: 95.8 FL (ref 78–100)
PLATELET # BLD AUTO: 273 K/UL (ref 135–420)
PMV BLD AUTO: 9.7 FL (ref 9.2–11.8)
POTASSIUM SERPL-SCNC: 4.1 MMOL/L (ref 3.5–5.5)
PROTHROMBIN TIME: 12.9 SEC (ref 11.5–15.2)
RBC # BLD AUTO: 3.84 M/UL (ref 4.2–5.3)
SODIUM SERPL-SCNC: 140 MMOL/L (ref 136–145)
WBC # BLD AUTO: 8.7 K/UL (ref 4.6–13.2)

## 2021-09-16 PROCEDURE — 80048 BASIC METABOLIC PNL TOTAL CA: CPT

## 2021-09-16 PROCEDURE — 76642 ULTRASOUND BREAST LIMITED: CPT

## 2021-09-16 PROCEDURE — 74011250636 HC RX REV CODE- 250/636: Performed by: RADIOLOGY

## 2021-09-16 PROCEDURE — 74011000250 HC RX REV CODE- 250: Performed by: SURGERY

## 2021-09-16 PROCEDURE — 85027 COMPLETE CBC AUTOMATED: CPT

## 2021-09-16 PROCEDURE — 85730 THROMBOPLASTIN TIME PARTIAL: CPT

## 2021-09-16 PROCEDURE — 74011250636 HC RX REV CODE- 250/636: Performed by: SURGERY

## 2021-09-16 PROCEDURE — 36590 REMOVAL TUNNELED CV CATH: CPT

## 2021-09-16 PROCEDURE — 85610 PROTHROMBIN TIME: CPT

## 2021-09-16 RX ORDER — LIDOCAINE HYDROCHLORIDE AND EPINEPHRINE 20; 5 MG/ML; UG/ML
20 INJECTION, SOLUTION EPIDURAL; INFILTRATION; INTRACAUDAL; PERINEURAL ONCE
Status: COMPLETED | OUTPATIENT
Start: 2021-09-16 | End: 2021-09-16

## 2021-09-16 RX ORDER — MIDAZOLAM HYDROCHLORIDE 1 MG/ML
.5-2 INJECTION, SOLUTION INTRAMUSCULAR; INTRAVENOUS
Status: DISCONTINUED | OUTPATIENT
Start: 2021-09-16 | End: 2021-09-16 | Stop reason: ALTCHOICE

## 2021-09-16 RX ORDER — SODIUM CHLORIDE 9 MG/ML
20 INJECTION, SOLUTION INTRAVENOUS CONTINUOUS
Status: DISCONTINUED | OUTPATIENT
Start: 2021-09-16 | End: 2021-09-16 | Stop reason: HOSPADM

## 2021-09-16 RX ORDER — FENTANYL CITRATE 50 UG/ML
12.5-5 INJECTION, SOLUTION INTRAMUSCULAR; INTRAVENOUS
Status: DISCONTINUED | OUTPATIENT
Start: 2021-09-16 | End: 2021-09-16 | Stop reason: ALTCHOICE

## 2021-09-16 RX ORDER — SODIUM CHLORIDE 0.9 % (FLUSH) 0.9 %
5-40 SYRINGE (ML) INJECTION AS NEEDED
Status: DISCONTINUED | OUTPATIENT
Start: 2021-09-16 | End: 2021-09-16 | Stop reason: HOSPADM

## 2021-09-16 RX ORDER — SODIUM CHLORIDE 0.9 % (FLUSH) 0.9 %
5-40 SYRINGE (ML) INJECTION EVERY 8 HOURS
Status: DISCONTINUED | OUTPATIENT
Start: 2021-09-16 | End: 2021-09-16 | Stop reason: HOSPADM

## 2021-09-16 RX ADMIN — MIDAZOLAM 1 MG: 1 INJECTION INTRAMUSCULAR; INTRAVENOUS at 10:05

## 2021-09-16 RX ADMIN — LIDOCAINE HYDROCHLORIDE,EPINEPHRINE BITARTRATE 400 MG: 20; .005 INJECTION, SOLUTION EPIDURAL; INFILTRATION; INTRACAUDAL; PERINEURAL at 10:02

## 2021-09-16 RX ADMIN — FENTANYL CITRATE 50 MCG: 50 INJECTION, SOLUTION INTRAMUSCULAR; INTRAVENOUS at 10:00

## 2021-09-16 RX ADMIN — SODIUM CHLORIDE 20 ML/HR: 900 INJECTION, SOLUTION INTRAVENOUS at 09:59

## 2021-09-16 RX ADMIN — FENTANYL CITRATE 50 MCG: 50 INJECTION, SOLUTION INTRAMUSCULAR; INTRAVENOUS at 10:05

## 2021-09-16 RX ADMIN — WATER 2 G: 1 INJECTION INTRAMUSCULAR; INTRAVENOUS; SUBCUTANEOUS at 10:06

## 2021-09-16 RX ADMIN — MIDAZOLAM 1 MG: 1 INJECTION INTRAMUSCULAR; INTRAVENOUS at 10:00

## 2021-09-16 NOTE — ROUTINE PROCESS
0820-Cath holding summary     Patient escorted to cath holding from waiting area ambulatory, alert and oriented x 4, voicing no complaints. Changed into gown and placed on monitor. NPO since MN. Lab results, med rec and H&P reviewed on chart. PIV x 1 inserted without difficulty. Family to bedside. 0930-TRANSFER - OUT REPORT:    Verbal report given to Anali(name) on Catherine Hampton  being transferred to IR(unit) for ordered procedure       Report consisted of patients Situation, Background, Assessment and   Recommendations(SBAR). Information from the following report(s) SBAR, Kardex and Intake/Output was reviewed with the receiving nurse. Lines:       Opportunity for questions and clarification was provided. Patient transported with:   Tech        1030-TRANSFER - IN REPORT:  Verbal report received from Kasey(name) on Catherine Hampton  being received from IR(unit) for routine post - op      Report consisted of patients Situation, Background, Assessment and   Recommendations(SBAR). Information from the following report(s) SBAR, Kardex and MAR was reviewed with the receiving nurse. Opportunity for questions and clarification was provided. Assessment completed upon patients arrival to unit and care assumed.

## 2021-09-16 NOTE — PROGRESS NOTES
I have personally seen and evaluated the device findings. Interrogation reviewed and I agree with assessment.     Priti Guardado

## 2021-09-22 ENCOUNTER — HOSPITAL ENCOUNTER (OUTPATIENT)
Dept: PHYSICAL THERAPY | Age: 71
Discharge: HOME | End: 2021-09-22
Payer: MEDICARE

## 2021-09-22 PROCEDURE — 97110 THERAPEUTIC EXERCISES: CPT

## 2021-09-22 PROCEDURE — 97112 NEUROMUSCULAR REEDUCATION: CPT

## 2021-09-22 NOTE — PROGRESS NOTES
PT DAILY TREATMENT NOTE     Patient Name: Maryana Quiroga  Date:2021  : 1950  [x]  Patient  Verified  Payor: VA MEDICARE / Plan: VA MEDICARE PART A & B / Product Type: Medicare /    In time:2:30  Out time:3:08  Total Treatment Time (min): 38  Visit #: 2 of 8     Medicare/BCBS Only   Total Timed Codes (min):  38 1:1 Treatment Time:  38       Treatment Area: Muscle weakness (generalized) [M62.81]    SUBJECTIVE  Pain Level (0-10 scale): 3-4/10  Any medication changes, allergies to medications, adverse drug reactions, diagnosis change, or new procedure performed?: [x] No    [] Yes (see summary sheet for update)  Subjective functional status/changes:   [] No changes reported  The patient reports compliance with HEP but fatigue by the time she gets to the leg lift part of her exercises and has a hard time completing them. OBJECTIVE  23 min Therapeutic Exercise:  [] See flow sheet :   Rationale: increase ROM and increase strength to improve the patients ability to improve ADL ease. 15 min Neuromuscular Re-education:  [x]  See flow sheet :   Rationale: increase strength, improve coordination and improve balance  to improve the patients ability to improve ADL ease. With   [] TE   [] TA   [] neuro   [] other: Patient Education: [x] Review HEP    [] Progressed/Changed HEP based on:   [] positioning   [] body mechanics   [] transfers   [] heat/ice application    [] other:      Other Objective/Functional Measures:     Pain Level (0-10 scale) post treatment: 0/10    ASSESSMENT/Changes in Function: The patient required 3 rest breaks throughout session, with her 02sats remaining at 97% or above even during exertion. She reports compliance with HEP and is encouraged with progressed to date. She was able to attain EC FT for 30\" without LOB. Due to reports of fatigue with SLR following ambulation upon performance of HEP, PT recommended keeping HEP the same.  The patient reports good compliance with walking program and will continue. She has been able to increase the time to about 6-7 minutes at a time. Patient will continue to benefit from skilled PT services to modify and progress therapeutic interventions, address functional mobility deficits, address ROM deficits, address strength deficits, analyze and address soft tissue restrictions, analyze and cue movement patterns, analyze and modify body mechanics/ergonomics, assess and modify postural abnormalities and instruct in home and community integration to attain remaining goals. []  See Plan of Care  []  See progress note/recertification  []  See Discharge Summary         Progress towards goals / Updated goals:  Short Term Goals: To be accomplished in 2 weeks:              6. The patient will report independence and compliance of HEP to maximize therapeutic benefit. IE: issued HEP   Current: Met reports compliance 9/22/2021              2. The patient will improve sit to stands to 13 times in 30\" to improve efficiency of transfers              IE 11 times  Long Term Goals: To be accomplished in 4 weeks:              1. The patient will improve FOTO score to 54 to improve ease of ADLs. IE: 39               2. The patient will improve 6 minute walk test to ambulating 1,200' to improve ease of community negotiation. IE 5 minutes at 960'              3. The patient will improve EC on airex to 10\" to improve balance in dimly lit rooms. IE unable    Current: FT floor 30\"  [phone]. The patient will report ambulating 10 minutes continuously in neighborhood to maximize ease of community efficiency.                IE: 5 minutes    PLAN  []  Upgrade activities as tolerated     [x]  Continue plan of care  []  Update interventions per flow sheet       []  Discharge due to:_  []  Other:_      Afia Villatoro, PT 9/22/2021  2:36 PM    Future Appointments   Date Time Provider Priscila Alves 9/28/2021  2:15 PM Teresita Light, PT [facility]   9/29/2021 12:45 PM Nisa Reza MD [facility]   10/5/2021  2:15 PM Teresita Light, PT [facility]

## 2021-09-28 ENCOUNTER — HOSPITAL ENCOUNTER (OUTPATIENT)
Dept: PHYSICAL THERAPY | Age: 71
Discharge: HOME | End: 2021-09-28
Payer: MEDICARE

## 2021-09-28 PROCEDURE — 97112 NEUROMUSCULAR REEDUCATION: CPT

## 2021-09-28 PROCEDURE — 97110 THERAPEUTIC EXERCISES: CPT

## 2021-09-28 NOTE — PROGRESS NOTES
PT DAILY TREATMENT NOTE     Patient Name: Amberly Lamb  Date:2021  : 1950  [x]  Patient  Verified  Payor: [place] MEDICARE / Plan: [place] MEDICARE PART A & B / Product Type: Medicare /    In time:2:12  Out time:2:53  Total Treatment Time (min): 41  Visit #: 3 of 4    Medicare/BCBS Only   Total Timed Codes (min):  41 1:1 Treatment Time:  41       Treatment Area: Muscle weakness (generalized) [M62.81]    SUBJECTIVE  Pain Level (0-10 scale): 3/10  Any medication changes, allergies to medications, adverse drug reactions, diagnosis change, or new procedure performed?: [x] No    [] Yes (see summary sheet for update)  Subjective functional status/changes:   [] No changes reported  The patient states that she still has difficulty completing both her walking program and leg lifts due to fatigue. She presents with calf soreness today. OBJECTIVE  29 min Therapeutic Exercise:  [] See flow sheet :   Rationale: increase ROM and increase strength to improve the patients ability to improve ADL ease. 12 min Neuromuscular Re-education:  []  See flow sheet :   Rationale: increase strength, improve coordination, improve balance and increase proprioception  to improve the patients ability to improve ADL ease. With   [] TE   [] TA   [] neuro   [] other: Patient Education: [x] Review HEP    [] Progressed/Changed HEP based on:   [] positioning   [] body mechanics   [] transfers   [] heat/ice application    [] other:      Other Objective/Functional Measures:   Sit to stand test: 13x     Pain Level (0-10 scale) post treatment: 0/10    ASSESSMENT/Changes in Function: The patient had good pain control post session. She required 2 rest breaks, but her O2 sats did not drop below 96%. Her HEP has been increased to add hip x 3, but to perform these 1 time per day, with her walking program twice.      Patient will continue to benefit from skilled PT services to modify and progress therapeutic interventions, address functional mobility deficits, address strength deficits, analyze and address soft tissue restrictions, analyze and cue movement patterns, analyze and modify body mechanics/ergonomics, assess and modify postural abnormalities and instruct in home and community integration to attain remaining goals. []  See Plan of Care  []  See progress note/recertification  []  See Discharge Summary         Progress towards goals / Updated goals:  Short Term Goals: To be accomplished in 2 weeks:              0. The patient will report independence and compliance of HEP to maximize therapeutic benefit.              XJ: issued HEP              Current: Met reports compliance 9/22/2021              2. The patient will improve sit to stands to 13 times in 30\" to improve efficiency of transfers              IE 11 times   Current: met 9/28/2021  Long Term Goals: To be accomplished in 4 weeks:              1. The patient will improve FOTO score to 54 to improve ease of ADLs.             UV: 64               2. The patient will improve 6 minute walk test to ambulating 1,200' to improve ease of community negotiation.              IE 5 minutes at 960'              3. The patient will improve EC on airex to 10\" to improve balance in dimly lit rooms.             IJ unable               Current: FT floor 30\"  [id number]. The patient will report ambulating 10 minutes continuously in neighborhood to maximize ease of community efficiency.                IE: 5 minutes     PLAN  []  Upgrade activities as tolerated     [x]  Continue plan of care      Evan Bro, NILDA 9/28/2021  2:18 PM    Future Appointments   Date Time Provider Priscila Alves   10/4/2021 11:45 AM Kathryn Angeles MD [facility] BS AMB   10/4/2021  1:00 PM HBV US RM 1 [facility] HBV   10/4/2021  2:00 PM HBV US RM 1 [facility] HBV   10/5/2021  2:15 PM Sergio Light, PT [facility] HBV   10/27/2021 11:45 AM Dexter Lester MD CAP BS AMB

## 2021-09-30 NOTE — PROGRESS NOTES
Breast Consult    Ms. Raymond Cao is a 66year old woman with left Stage IV [id number] ER/VT negative, HER positive breast cancer, s/p left modified radical mastectomy by Dr. Antoine Vergara 9/28/11. She completed neoadjuvant docetaxel and cytoxan per Dr. Telma Perales and completed a year of transtuzumab. According to Dr. Telma Perales course was complicated by cardiomyopathy. She completed post mastectomy radiation after repositioning of her pacemaker. In 2018 she ws diagnosed with metastatic cancer in lung nodule, mediastinal nodes and sternum after presenting with cough and shortness of breath. She was treated with paclitaxel, pertuzumab and transtuzumab until worsening of her cardiomyopathy resulted in discontinuing HER directed therapy. She was transitioned to Gemcitabine with excellent response and radiographic resolution of metastasis. Above information largely from Dr. Cinda Forbes note. She was referred to me for abnormal imaging. The area of concern was identified on routine screening exam. She denies breast pain, mass or nipple discharge. Her most recent previous mammogram was 7/21/21. She is without complaint related to her breasts. Breast/GYN history:    Past Medical History:   Diagnosis Date    Abnormal nuclear cardiac imaging test 06/11/2010    Lg fixed anterior, septal, apical, inferoseptal defect sugg RCA & LAD disease or cardiomyopathy. EF 20%. Global hykn. Nondiagnostic EKG.  Acute bronchitis 10/15/2018    Persistent cough and wheezing in spite of prednisone and antibiotic.   Will refer to pulmonary    Adenocarcinoma of breast; locally advanced invasive ductal adenocarcinoma of left breast     Asthma     Automatic implantable cardiac defibrillator in situ     Automatic implantable cardiac defibrillator in situ     [facility]     Cancer ([facility]     breast cancer left    Chemotherapy convalescence or palliative care 2012    Chronic combined systolic and diastolic heart failure ([address]     Remains symptomatic, nyha class 3 ef improving.  Congestive heart failure, unspecified     chronic class ll    Cortical age-related cataract of left eye 10/18/2020    Cortical age-related cataract of right eye 10/31/2020    DVT of lower limb, acute ([address] 2019    Right leg    Echocardiogram 09/27/2010    EF 30%. Global hykn. Mild MR. Mild TR.  GERD (gastroesophageal reflux disease)     History of pulmonary embolus (PE) 2019    Hyperlipidemia     Hypertension     Mitral valve disorders(424.0) 1/15/2014    mild to moderate mr     Mitral valve disorders(424.0) 1/15/2014    mild to moderate mr     MVP (mitral valve prolapse)     Nonischemic cardiomyopathy (HCC)     EF 20-30% despite medical therapy    Nonspecific abnormal electrocardiogram (ECG) (EKG)     Osteopenia     Other primary cardiomyopathies     Pacemaker 10/27/10    s/p implantation, without complication    Poisn by oth uns agents prim aff cv sys     Ef slightly better to 45%, STABLE back on chemo.  Radiation therapy     Radiation therapy complication 0836    S/P cardiac catheterization 07/08/10    Patent coronary arteries. LVEDP 25.  EF 25%. Global hykn. Moderate MR.  RA 10.  RV 40/10. PA 40/20.  20.  CO/CI 4.5/2.45 (Larry).     Shortness of breath     Syncope and collapse     Thyroid disease     goiter       Past Surgical History:   Procedure Laterality Date    COLONOSCOPY N/A 12/1/2020    COLONOSCOPY with polypectomy performed by Rivera Kent MD at [address] HX CATARACT REMOVAL Left 10/19/2020    HX CHOLECYSTECTOMY  11/01/2019    HX MASTECTOMY  09/28/11    modified radical mastectomy and axillary dissection    HX ORTHOPAEDIC      HX OTHER SURGICAL      surgery to remove part of liver    HX PACEMAKER  10/27/10    s/p [employer] biventricular AICD, without complication    HX RADICAL MASTECTOMY      IR CHOLECYSTOSTOMY PERCUTANEOUS  9/20/2019    IR INSERT TUNL CVC W PORT OVER 5 YEARS  1/16/2019    IR REMOVE TUNL CVAD W PORT/PUMP  9/16/2021    NEUROLOGICAL PROCEDURE UNLISTED      Cervical fusion    WI CARDIAC SURG PROCEDURE UNLIST      Difibrillator; BI V ICD       Current Outpatient Medications on File Prior to Visit   Medication Sig Dispense Refill    carvediloL (COREG) 25 mg tablet TAKE 1 TABLET TWICE DAILY  WITH MEALS 180 Tablet 3    montelukast (SINGULAIR) 10 mg tablet Take 1 Tablet by mouth daily. PATIENT NEEDS APPOINTMENT 90 Tablet 0    Entresto 49-51 mg tab tablet TAKE 1 TABLET TWICE A  Tablet 3    digoxin (LANOXIN) 0.125 mg tablet TAKE 1 TABLET DAILY 90 Tab 3    spironolactone (ALDACTONE) 25 mg tablet TAKE 2 TABLETS DAILY (Patient taking differently: Take 50 mg by mouth daily. ) 180 Tab 3    apixaban (ELIQUIS) 2.5 mg tablet Take 1 Tab by mouth two (2) times a day. (Patient taking differently: Take 2.5 mg by mouth two (2) times a day. Last dose 11/29/20) 60 Tab 2    DULoxetine (CYMBALTA) 60 mg capsule Take 60 mg by mouth daily.  raloxifene (EVISTA) 60 mg tablet Take 60 mg by mouth daily.  Incruse Ellipta 62.5 mcg/actuation inhaler USE 1 INHALATION ORALLY    DAILY (Patient not taking: Reported on 9/16/2021) 1 Inhaler 5    furosemide (LASIX) 20 mg tablet Take 1 Tab by mouth daily. (Patient taking differently: Take 20 mg by mouth every Monday, Wednesday, Friday.) 30 Tab 0    albuterol-ipratropium (DUO-NEB) 2.5 mg-0.5 mg/3 ml nebu 3 mL by Nebulization route every six (6) hours as needed (wheezing or sob). File under Medicare Part B, ICD codes J44.9, C78.01 (Patient not taking: Reported on 9/16/2021) 120 Nebule 3     No current facility-administered medications on file prior to visit.        Allergies   Allergen Reactions    Adhesive Other (comments)     blisters       Social History     Tobacco Use    Smoking status: Never Smoker    Smokeless tobacco: Never Used   Substance Use Topics    Alcohol use: No    Drug use: No       Family History   Problem Relation Age of Onset    Hypertension Mother     High Cholesterol Mother     Heart Disease Father         paemaker implant at 80       OB History    No obstetric history on file. ROS:   Positives are bolded  General: fevers, chills, night sweats, fatigue, weight loss, weight gain  GI: nausea, vomiting, abdominal pain, change in bowel habits, hematochezia, melena, GERD  Integ: dermatitis, abnormal moles  HEENT: visual changes, vertigo, epistaxis, dysphagia, hoarseness  Cardiac: chest pain, palpitations, HTN, edema, varicosities  Resp: cough, shortness of breath, wheezing, hemoptysis, snoring, reactive airway disease  : hematuria, dysuria, frequency, urgency, nocturia, stress urinary incontinence   MSK: weakness, joint pain, arthritis  Endocrine: diabetes, thyroid disease, polyuria, polydipsia, polyphagia, poor wound healing, heat intolerance, cold intolerance  Lymph/Heme: anemia, bruising, history of blood transfusions  Neuro: dizziness, headache, fainting, seizures, stroke  Psych: anxiety, depression    Physical Exam:  Visit Vitals  BP (!) 122/58   Temp 97.3 °F (36.3 °C) (Skin)   Resp 18   Ht 5' 5\" (1.651 m)   Wt 76.2 kg (168 lb)   BMI 27.96 kg/m²       Gen: Well developed, well nourished woman in no acute distress seborrheic keratosis  Head: normocephalic, atraumatic  Mouth: Clear, no overt lesions, oral mucosa pink and moist  Neck: supple, no masses, no adenopathy, trachea midline  Resp: clear bilaterally  Cardio: Regular rate and rhythm  Abdomen: soft, nontender, nondistended  Extremities: warm, well-perfused  Neuro: sensation and strength grossly intact and symmetrical  Psych: alert and oriented to person, place and time  Breasts:   Right: Examined in both the supine and upright positions. There was no supraclavicular, infraclavicular, or axillary lympadenopathy. There were no dominant masses, no skin changes, no asymmetry identified   Left[de-identified] Examined in both the supine and upright positions.   There was no supraclavicular, infraclavicular, or axillary lympadenopathy. There were no dominant masses, no skin changes, no asymmetry identified s/p mastectomy without reconstruction    Imagin/15/21 right mammogram  3 suspicious hypoechoic masses right breast 9:00 position.      BIRADS 4: Suspicious abnormality  Management Recommendation: Ultrasound-guided core biopsy of the 2 larger masses  located 4 cm from the nipple and 1.5 cm from the nipple. The findings and  recommendations were discussed with the patient at the time of the exam who is  in agreement. The patient was referred to Ped[facility] Cornell, [address], for notification of the ordering provider and  coordination of care. Patient is prescribed Eliquis for which she stopped  yesterday for a procedure performed today.       Pathology:  [address]  A. LEFT SENTINEL LYMPH NODE, EXCISION -  ONE BENIGN-APPEARING LYMPH NODE, NEGATIVE FOR MALIGNANCY (0/1). B. LEFT BREAST AND AXILLARY CONTENTS, MODIFIED RADICAL MASTECTOMY -  INVASIVE MUCINOUS ADENOCARCINOMA. SIZE OF THE INVASIVE CARCINOMA: 5.3 X 4.2 X 1.7 CM. HISTOLOGIC TYPE: MUCINOUS ADENOCARCINOMA  HISTOLOGIC GRADE: II  NUCLEAR GRADE: II  FOCALITY: UNIFOCAL  IN-SITU CARCINOMA COMPONENT: NOT IDENTIFIED  LYMPHOVASCULAR SPACE INVASION: PRESENT  MARGINS OF RESECTION: NEGATIVE FOR CARCINOMA  MICROCALCIFICATIONS: PRESENT ASSOCIATED WITH BENIGN DUCTAL EPITHELIUM  SKIN: NEGATIVE FOR CARCINOMA  NIPPLE: POSITIVE FOR LYMPHOVASCULAR SPACE INVASION  LYMPH NODES: THREE OF NINE AXILLARY LYMPH NODES POSITIVE FOR METASTATIC  ADENOCARCINOMA (3/9). ESTROGEN RECEPTOR: NEGATIVE ([id number])  PROGESTERONE RECEPTOR: NEGATIVE ([id number])  HER2/ana (FISH): POSITIVE ([id number])  OTHER PATHOLOGIC FINDINGS: MULTIPLE SKIN SEBORRHEIC KERATOSES, LARGEST  MEASURING 2.4 CM.   AJCC STAGE: T4 N1, Mx    Impression:  Patient Active Problem List   Diagnosis Code    Hypertension I10    MVP (mitral valve prolapse) I34.1    Nonischemic cardiomyopathy (HCC) I42.8    Cancer (HCC) C80.1    Adenocarcinoma of breast; locally advanced invasive ductal adenocarcinoma of left breast C50.919    Poisn by oth uns agents prim aff cv sys T46.904A    Syncope and collapse Y98    Systolic CHF, chronic (HCC) I50.22    Other primary cardiomyopathies I42.8    Shortness of breath R06.02    Nonspecific abnormal electrocardiogram (ECG) (EKG) R94.31    Automatic implantable cardioverter-defibrillator in situ Z95.810    Mitral valve disease I05.9    Abnormal PFT R94.2    Acute bronchitis J20.9    Chronic asthmatic bronchitis (HCC) J44.9    Malignant neoplasm metastatic to lung (HCC) C78.00    Seasonal allergic rhinitis due to pollen J30.1    Dilated cardiomyopathy (HCC) I42.0    Breast cancer (HCC) C50.919    Pulmonary embolism (HCC) I26.99    Chronic bronchitis (HCC) J42    Hypoxia R09.02    Lung metastases (HCC) C78.00    UTI (urinary tract infection) N39.0    CAD (coronary artery disease) I25.10    Elevated troponin R77.8    Weakness generalized R53.1    Chronic anticoagulation Z79.01    History of pulmonary embolism Z86.711    Hypokalemia E87.6    Hypomagnesemia E83.42    Acute encephalopathy G93.40    Cholecystitis K81.9    Ventricular fibrillation (HCC) I49.01    Cholecystitis, unspecified K81.9          66year old woman with abnormal imaging. The imaging was reviewed in detail. Core biopsy explained and recommended. Risks, benefits and options discussed. We discussed that after the biopsy bruising is quite common and it is normal to feel a \"lump\". Both generally resolve within a few weeks. We discussed the possibility of inaccurate result, though that is distinctly uncommon. Ms. Sandie Bland understands and wishes to proceed. Follow up one week for site check and pathology review. Please call sooner with questions or concerns.

## 2021-10-04 ENCOUNTER — HOSPITAL ENCOUNTER (OUTPATIENT)
Dept: ULTRASOUND IMAGING | Age: 71
Discharge: HOME | End: 2021-10-04
Attending: SURGERY
Payer: MEDICARE

## 2021-10-04 ENCOUNTER — OFFICE VISIT (OUTPATIENT)
Dept: SURGERY | Age: 71
End: 2021-10-04
Payer: MEDICARE

## 2021-10-04 ENCOUNTER — HOSPITAL ENCOUNTER (OUTPATIENT)
Dept: MAMMOGRAPHY | Age: 71
Discharge: HOME | End: 2021-10-04
Attending: SURGERY
Payer: MEDICARE

## 2021-10-04 VITALS
RESPIRATION RATE: 18 BRPM | DIASTOLIC BLOOD PRESSURE: 58 MMHG | SYSTOLIC BLOOD PRESSURE: 122 MMHG | BODY MASS INDEX: 27.99 KG/M2 | HEIGHT: 65 IN | WEIGHT: 168 LBS | TEMPERATURE: 97.3 F

## 2021-10-04 DIAGNOSIS — R92.8 ABNORMAL MAMMOGRAM: ICD-10-CM

## 2021-10-04 DIAGNOSIS — N63.0 LUMP OR MASS IN BREAST: ICD-10-CM

## 2021-10-04 DIAGNOSIS — C50.912 ADENOCARCINOMA OF LEFT BREAST (HCC): Primary | ICD-10-CM

## 2021-10-04 PROCEDURE — 74011000250 HC RX REV CODE- 250: Performed by: SURGERY

## 2021-10-04 PROCEDURE — G8752 SYS BP LESS 140: HCPCS | Performed by: SURGERY

## 2021-10-04 PROCEDURE — A4648 IMPLANTABLE TISSUE MARKER: HCPCS

## 2021-10-04 PROCEDURE — G9899 SCRN MAM PERF RSLTS DOC: HCPCS | Performed by: SURGERY

## 2021-10-04 PROCEDURE — 88305 TISSUE EXAM BY PATHOLOGIST: CPT

## 2021-10-04 PROCEDURE — 3017F COLORECTAL CA SCREEN DOC REV: CPT | Performed by: SURGERY

## 2021-10-04 PROCEDURE — 1101F PT FALLS ASSESS-DOCD LE1/YR: CPT | Performed by: SURGERY

## 2021-10-04 PROCEDURE — G8419 CALC BMI OUT NRM PARAM NOF/U: HCPCS | Performed by: SURGERY

## 2021-10-04 PROCEDURE — 88342 IMHCHEM/IMCYTCHM 1ST ANTB: CPT

## 2021-10-04 PROCEDURE — 77065 DX MAMMO INCL CAD UNI: CPT

## 2021-10-04 PROCEDURE — 99204 OFFICE O/P NEW MOD 45 MIN: CPT | Performed by: SURGERY

## 2021-10-04 PROCEDURE — 88341 IMHCHEM/IMCYTCHM EA ADD ANTB: CPT

## 2021-10-04 PROCEDURE — G8536 NO DOC ELDER MAL SCRN: HCPCS | Performed by: SURGERY

## 2021-10-04 PROCEDURE — 1090F PRES/ABSN URINE INCON ASSESS: CPT | Performed by: SURGERY

## 2021-10-04 PROCEDURE — 77030020003 US BX BREAST VAC RT 1ST LESION W/CLIP AND SPECIMEN

## 2021-10-04 PROCEDURE — G8432 DEP SCR NOT DOC, RNG: HCPCS | Performed by: SURGERY

## 2021-10-04 PROCEDURE — G8399 PT W/DXA RESULTS DOCUMENT: HCPCS | Performed by: SURGERY

## 2021-10-04 PROCEDURE — G8427 DOCREV CUR MEDS BY ELIG CLIN: HCPCS | Performed by: SURGERY

## 2021-10-04 PROCEDURE — G8754 DIAS BP LESS 90: HCPCS | Performed by: SURGERY

## 2021-10-04 RX ORDER — LIDOCAINE HYDROCHLORIDE AND EPINEPHRINE 10; 10 MG/ML; UG/ML
30 INJECTION, SOLUTION INFILTRATION; PERINEURAL ONCE
Status: COMPLETED | OUTPATIENT
Start: 2021-10-04 | End: 2021-10-04

## 2021-10-04 RX ORDER — LIDOCAINE HYDROCHLORIDE 10 MG/ML
20 INJECTION, SOLUTION EPIDURAL; INFILTRATION; INTRACAUDAL; PERINEURAL
Status: COMPLETED | OUTPATIENT
Start: 2021-10-04 | End: 2021-10-04

## 2021-10-04 RX ADMIN — LIDOCAINE HYDROCHLORIDE AND EPINEPHRINE 30 ML: 10; 10 INJECTION, SOLUTION INFILTRATION; PERINEURAL at 14:24

## 2021-10-04 RX ADMIN — LIDOCAINE HYDROCHLORIDE 20 ML: 10 INJECTION, SOLUTION EPIDURAL; INFILTRATION; INTRACAUDAL; PERINEURAL at 14:24

## 2021-10-04 NOTE — LETTER
10/4/2021 11:58 AM    Patient:  Roselia Reyna   YOB: 1950  Date of Visit: 10/4/2021      Kenny Guerrero MD  [address] [place] [address]  Via Fax: [phone]     Daniel Quezada, [address] [facility] [address]  Via Fax: [phone]    Dear MD Daniel Mccloud MD,      I had the pleasure of seeing Ms. Eagle Andrade in my office today for her breast cancer. I am including a copy of my office visit today. If you have questions, please do not hesitate to call me. I look forward to following Ms. Luna Lim along with you and will keep you updated as to her progress.            Sincerely,      Ronel Kaminski MD

## 2021-10-05 ENCOUNTER — HOSPITAL ENCOUNTER (OUTPATIENT)
Dept: PHYSICAL THERAPY | Age: 71
Discharge: HOME | End: 2021-10-05
Payer: MEDICARE

## 2021-10-05 PROCEDURE — 97112 NEUROMUSCULAR REEDUCATION: CPT

## 2021-10-05 PROCEDURE — 97110 THERAPEUTIC EXERCISES: CPT

## 2021-10-05 NOTE — PROGRESS NOTES
[facility]  [address]  [place], [address]  [phone] fax    Physical Therapy Discharge Summary    Patient name: Rocky Amor Start of Care: 2021   Referral source: DenisShaista Castilloma : 1950               Medical Diagnosis: Muscle weakness (generalized) [M62.81]  Payor: [place] MEDICARE / Plan: [place] MEDICARE PART A & B / Product Type: Medicare /  Onset Date:Chronic               Treatment Diagnosis: Deconditioning UE/LE   Prior Hospitalization: see medical history Provider#: [id number]   Medications: Verified on Patient summary List    Comorbidities: Cervical fusion, Heart disease, osteoporosis, pacemaker, hx breast/lung cancer, mediport   Prior Level of Function: The patient reports that she has had difficulty getting back into any sort of chores, or normalcy for quite some time since her cancer treatments. Visits from Start of Care: 4   Missed Visits: 0  Reporting Period : 2021 to 10/05/2021    Summary of Care:  Short Term Goals: To be accomplished in 2 weeks:              1. The patient will report independence and compliance of HEP to maximize therapeutic benefit.              IE: issued HEP              [id number]: Met reports compliance 2021              2. The patient will improve sit to stands to 13 times in 30\" to improve efficiency of transfers              IE 11 times              Current: met 2021  Long Term Goals: To be accomplished in 4 weeks:              1. The patient will improve FOTO score to 54 to improve ease of ADLs.             FH: 45               Current: Improved to 48              2. The patient will improve 6 minute walk test to ambulating 1,200' to improve ease of community negotiation.              IE 5 minutes at 960'              Current: Met 1200' 10/05/2021              3. The patient will improve EC on airex to 10\" to improve balance in dimly lit rooms.               MARILY unable               JASKARANK: FT floor 30\"  [id number]. The patient will report ambulating 10 minutes continuously in neighborhood to maximize ease of community efficiency.                IE: 5 minutes               Current: Met - the patient reports she is able to walk 10 minutes continuously without a break. ASSESSMENT/RECOMMENDATIONS: The patient has made excellent progress, has demonstrated independence with HEP and met all goals with exception of FOTO score, but does not accurately portray improvement up to this point. She plans to continue with HEP upon discharge and continue her walking program. She completed interventions without any increase in pain, nor difficulty with O2 sat dropping (able to maintain 98% of higher even with activity). She left the clinic with all questions answered.     [x]Discontinue therapy: [x]Patient has reached or is progressing toward set goals      []Patient is non-compliant or has abdicated      []Due to lack of appreciable progress towards set [address], PT 10/5/2021 3:00 PM

## 2021-10-05 NOTE — PROGRESS NOTES
PT DAILY TREATMENT NOTE     Patient Name: Hue Ludwig  Date:10/5/2021  : 1950  [x]  Patient  Verified  Payor: VA MEDICARE / Plan: VA MEDICARE PART A & B / Product Type: Medicare /    In time:2:15  Out time:2:58  Total Treatment Time (min): 43  Visit #: 4 of 4    Medicare/BCBS Only   Total Timed Codes (min):  43 1:1 Treatment Time:  43       Treatment Area: Muscle weakness (generalized) [M62.81]    SUBJECTIVE  Pain Level (0-10 scale): 0/10  Any medication changes, allergies to medications, adverse drug reactions, diagnosis change, or new procedure performed?: [x] No    [] Yes (see summary sheet for update)  Subjective functional status/changes:   [] No changes reported  The patient reports that she has been able to perform 10 minutes of walking continuously at home and is confident she can continue without increase in difficulty. OBJECTIVE  28 min Therapeutic Exercise:  [] See flow sheet :   Rationale: increase ROM and increase strength to improve the patients ability to improve ADL ease. 15 min Neuromuscular Re-education:  []  See flow sheet :   Rationale: increase ROM and increase strength  to improve the patients ability to improve ADL ease. With   [] TE   [] TA   [] neuro   [] other: Patient Education: [x] Review HEP    [] Progressed/Changed HEP based on:   [] positioning   [] body mechanics   [] transfers   [] heat/ice application    [] other:      Other Objective/Functional Measures: FOTO: score: 48    6 minute walk test: 1200' completed    Subjective report of ambulating 10 minutes at a time. Pain Level (0-10 scale) post treatment: 0/10    ASSESSMENT/Changes in Function: The patient has made excellent progress, has demonstrated independence with HEP and met all goals with exception of FOTO score, but does not accurately portray improvement up to this point.  She plans to continue with HEP upon discharge and continue her walking program. She completed interventions without any increase in pain, nor difficulty with O2 sat dropping. She will benefit     []  See Plan of Care  []  See progress note/recertification  [x]  See Discharge Summary         Progress towards goals / Updated goals:  Short Term Goals: To be accomplished in 2 weeks:              9. The patient will report independence and compliance of HEP to maximize therapeutic benefit.              IE: issued HEP              [id number]: Met reports compliance 9/22/2021              2. The patient will improve sit to stands to 13 times in 30\" to improve efficiency of transfers              IE 11 times              Current: met 9/28/2021  Long Term Goals: To be accomplished in 4 weeks:              1. The patient will improve FOTO score to 54 to improve ease of ADLs.             NI: 45    Current: Improved to 48              2. The patient will improve 6 minute walk test to ambulating 1,200' to improve ease of community negotiation.              IE 5 minutes at 960'   Current: Met 1200' 10/05/2021              3. The patient will improve EC on airex to 10\" to improve balance in dimly lit rooms.             KV unable               [id number]: FT floor 30\"              0. The patient will report ambulating 10 minutes continuously in neighborhood to maximize ease of community efficiency.                IE: 5 minutes    Current: Met - the patient reports she is able to walk 10 minutes continuously without a break.      PLAN  []  Upgrade activities as tolerated     []  Continue plan of care  []  Update interventions per flow sheet       [x]  Discharge due to:_good progress towards goals  []  Other:_      Amparo Public, PT 10/5/2021  2:20 PM    Future Appointments   Date Time Provider Priscila Joselyn   10/13/2021  1:30 PM Isiah Angeles MD B[facility] BS AMB   10/27/2021 11:45 AM Shanell Mora MD CAP BS AMB

## 2021-10-07 NOTE — H&P
Plan bivaicd generator changeout as previously discussed. Left sided mediport recently removed, well healed. Held Xarelto since Friday. Pertinent risks, benefits, alternatives discussed. Plan moderate sedation if anesthesia not available. Risks include emergent intubation as well as possibly inability to intubate. Exacerbation of lung and heart disease including but not limited to heart failure and COPD/asthma. A shared decision making was made between physician/provider and patient/family using evidence-based information. Risks included but not limited to acute and chronic pain, infection, bleeding, deep venous thrombosis with chronic swelling of extremity, pulmonary embolism, anesthesia reactions, pneumothorax, hemothorax, emergent open heart surgery, need for repeat surgery to replace/reposition leads, and death. There can be a prolonged hospitalization with brain damage and reduced or compromised long term mobility. By stating these are possible risks, this does not exclude the potential for additional risks not named here. Given the possible movement/manipulation of shoulder and arm after the procedure, there is a chance a 2nd or 3rd procedure could be necessary soon after the first, within hours to weeks to reposition or replace a lead or generator. All questions answered. In regards to AICD, I discussed long term issues of regular monitoring at least every 3 months, possibilities of lead and device malfunction including inappropriate shocks, as well as inability to obtain a commercial drivers license, and interference with arc welding and magnetic field exposure restrictions. If not MRI compatible device, I discussed inability to have future MRI scans but CT scans are acceptable. History of Present Illness:  66year old female referred by Dr. Dewayne Carter for evaluation for Medtronic biventricular AICD at [facility]. She has mild fatigue and dyspnea, which is relatively constant.   No chest pain or syncope. No PND or orthopnea. Some occasional edema. Her breast cancer is in remission and she is planning to get her left sided catheter explanted tomorrow at [facility].       Impression:  1. Medtronic biventricular AICD at [facility], triggering 09/02/21. 2. Hx of transient nonischemic cardiomyopathy with EF 50% July, 2021. 3. Chronic class 2 systolic heart failure. 4. Hx of hypertension. 5. Hx of PE, DVT in 2019, on Xarelto. 6. Hx of remote left breast cancer, S/P chemo and radiation, no recurrence. She has left sided catheter in place, planning to get removed 09/16/21.       Plan:  I discussed risks, benefits and alternatives to generator change-out, as with pacing she did improve with LV function. I discussed increased risk of infection. Life expectancy greater than one year. All questions answered and I will make arrangements.             Past Medical History:   Diagnosis Date    Abnormal nuclear cardiac imaging test 06/11/2010     Lg fixed anterior, septal, apical, inferoseptal defect sugg RCA & LAD disease or cardiomyopathy. EF 20%. Global hykn. Nondiagnostic EKG.  Acute bronchitis 10/15/2018     Persistent cough and wheezing in spite of prednisone and antibiotic. Will refer to pulmonary    Adenocarcinoma of breast; locally advanced invasive ductal adenocarcinoma of left breast      Asthma      Automatic implantable cardiac defibrillator in situ      Automatic implantable cardiac defibrillator in situ      Breast cancer ([address]      Cancer ([address]       breast cancer left    Chemotherapy convalescence or palliative care 2012    Chronic combined systolic and diastolic heart failure (HCC)       Remains symptomatic, nyha class 3 ef improving.     Congestive heart failure, unspecified       chronic class ll    Cortical age-related cataract of left eye 10/18/2020    Cortical age-related cataract of right eye 10/31/2020    DVT of lower limb, acute ([address] 2019     Right leg    Echocardiogram 09/27/2010     EF 30%. Global hykn. Mild MR. Mild TR.  GERD (gastroesophageal reflux disease)      History of pulmonary embolus (PE) 2019    Hyperlipidemia      Hypertension      Mitral valve disorders(424.0) 1/15/2014     mild to moderate mr     Mitral valve disorders(424.0) 1/15/2014     mild to moderate mr     MVP (mitral valve prolapse)      Nonischemic cardiomyopathy (HCC)       EF 20-30% despite medical therapy    Nonspecific abnormal electrocardiogram (ECG) (EKG)      Osteopenia      Other primary cardiomyopathies      Pacemaker 10/27/10     s/p implantation, without complication    Poisn by oth uns agents prim aff cv sys       Ef slightly better to 45%, STABLE back on chemo.  Radiation therapy      Radiation therapy complication 4638    S/P cardiac catheterization 07/08/10     Patent coronary arteries. LVEDP 25.  EF 25%. Global hykn. Moderate MR.  RA 10.  RV 40/10. PA 40/20.  20.  CO/CI 4.5/2.45 (Larry).  Shortness of breath      Syncope and collapse      Thyroid disease       goiter                Current Outpatient Medications   Medication Sig Dispense Refill    carvediloL (COREG) 25 mg tablet TAKE 1 TABLET TWICE DAILY  WITH MEALS 180 Tablet 3    montelukast (SINGULAIR) 10 mg tablet Take 1 Tablet by mouth daily. PATIENT NEEDS APPOINTMENT 90 Tablet 0    Entresto 49-51 mg tab tablet TAKE 1 TABLET TWICE A  Tablet 3    Incruse Ellipta 62.5 mcg/actuation inhaler USE 1 INHALATION ORALLY    DAILY 1 Inhaler 5    digoxin (LANOXIN) 0.125 mg tablet TAKE 1 TABLET DAILY 90 Tab 3    spironolactone (ALDACTONE) 25 mg tablet TAKE 2 TABLETS DAILY (Patient taking differently: Take 50 mg by mouth daily. ) 180 Tab 3    apixaban (ELIQUIS) 2.5 mg tablet Take 1 Tab by mouth two (2) times a day. (Patient taking differently: Take 2.5 mg by mouth two (2) times a day. Last dose 11/29/20) 60 Tab 2    furosemide (LASIX) 20 mg tablet Take 1 Tab by mouth daily.  (Patient taking differently: Take 20 mg by mouth every Tuesday and Friday.) 30 Tab 0    albuterol-ipratropium (DUO-NEB) 2.5 mg-0.5 mg/3 ml nebu 3 mL by Nebulization route every six (6) hours as needed (wheezing or sob). File under Medicare Part B, ICD codes J44.9, C78.01 120 Nebule 3    DULoxetine (CYMBALTA) 60 mg capsule Take 60 mg by mouth daily.        raloxifene (EVISTA) 60 mg tablet Take 60 mg by mouth daily.             Social History   reports that she has never smoked. She has never used smokeless tobacco.   reports no history of alcohol use.     Family History  family history includes Heart Disease in her father; High Cholesterol in her mother; Hypertension in her mother.     Review of Systems  Except as stated above include:  Constitutional: Negative for fever, chills and malaise/fatigue. HEENT: No congestion or recent URI. Gastrointestinal: No nausea, vomiting, abdominal pain, bloody stools. Pulmonary:  Negative except as stated above. Cardiac:  Negative except as stated above. Musculoskeletal: Negative except as stated above. Neurological:  No localized symptoms. Skin:  Negative except as stated above. Psych:  Negative except as stated above. Endocrine:  Negative except as stated above.     PHYSICAL EXAM      BP Readings from Last 3 Encounters:   07/20/21 (!) 107/56   05/13/21 (!) 107/56   02/18/21 (!) 115/56          Pulse Readings from Last 3 Encounters:   05/13/21 90   02/18/21 87   12/01/20 69          Wt Readings from Last 3 Encounters:   07/20/21 75.3 kg (166 lb)   05/13/21 75.4 kg (166 lb 3.2 oz)   02/18/21 74.8 kg (165 lb)      General:          Well developed, well groomed. Head/Neck:     No obvious jugular venous distention                           No obvious carotid pulsations. No evidence of xanthelasma. Lungs:             No respiratory distress. Clear bilaterally. Heart:              Regular rate and rhythm. Normal S1/S2. Palpation grossly normal.                          No significant murmurs, rubs or gallops. AICD pocket intact. Abdomen:        Non-acute abdomen. No obvious pulsations. Extremities:     Intact peripheral pulses. No significant edema. Neurological:   Alert and oriented to person, place, time. No focal neurological deficit visually.   Skin:                No obvious rash     Blood Pressure Metric:  Monitor recommended and adjustments stated if needed.         Electronically signed by Marcial eLiva MD at 09/15/21 1084

## 2021-10-08 ENCOUNTER — HOSPITAL ENCOUNTER (OUTPATIENT)
Dept: LAB | Age: 71
Discharge: HOME | End: 2021-10-08
Payer: MEDICARE

## 2021-10-08 ENCOUNTER — TELEPHONE (OUTPATIENT)
Dept: SURGERY | Age: 71
End: 2021-10-08

## 2021-10-08 ENCOUNTER — HOSPITAL ENCOUNTER (OUTPATIENT)
Dept: GENERAL RADIOLOGY | Age: 71
Discharge: HOME | End: 2021-10-08
Payer: MEDICARE

## 2021-10-08 DIAGNOSIS — I50.22 SYSTOLIC CHF, CHRONIC (HCC): ICD-10-CM

## 2021-10-08 DIAGNOSIS — Z45.02 IMPLANTABLE CARDIOVERTER-DEFIBRILLATOR (ICD) AT END OF BATTERY LIFE: ICD-10-CM

## 2021-10-08 DIAGNOSIS — I42.0 DILATED CARDIOMYOPATHY (HCC): ICD-10-CM

## 2021-10-08 DIAGNOSIS — I42.8 NONISCHEMIC CARDIOMYOPATHY (HCC): ICD-10-CM

## 2021-10-08 LAB
ALBUMIN SERPL-MCNC: 3.6 G/DL (ref 3.4–5)
ALBUMIN/GLOB SERPL: 1.1 {RATIO} (ref 0.8–1.7)
ALP SERPL-CCNC: 62 U/L (ref 45–117)
ALT SERPL-CCNC: 23 U/L (ref 13–56)
ANION GAP SERPL CALC-SCNC: 7 MMOL/L (ref 3–18)
AST SERPL-CCNC: 11 U/L (ref 10–38)
BASOPHILS # BLD: 0.1 K/UL (ref 0–0.1)
BASOPHILS NFR BLD: 1 % (ref 0–2)
BILIRUB SERPL-MCNC: 0.2 MG/DL (ref 0.2–1)
BUN SERPL-MCNC: 26 MG/DL (ref 7–18)
BUN/CREAT SERPL: 21 (ref 12–20)
CALCIUM SERPL-MCNC: 9.1 MG/DL (ref 8.5–10.1)
CHLORIDE SERPL-SCNC: 104 MMOL/L (ref 100–111)
CO2 SERPL-SCNC: 26 MMOL/L (ref 21–32)
CREAT SERPL-MCNC: 1.22 MG/DL (ref 0.6–1.3)
DIFFERENTIAL METHOD BLD: ABNORMAL
EOSINOPHIL # BLD: 0.2 K/UL (ref 0–0.4)
EOSINOPHIL NFR BLD: 3 % (ref 0–5)
ERYTHROCYTE [DISTWIDTH] IN BLOOD BY AUTOMATED COUNT: 13.1 % (ref 11.6–14.5)
GLOBULIN SER CALC-MCNC: 3.4 G/DL (ref 2–4)
GLUCOSE SERPL-MCNC: 122 MG/DL (ref 74–99)
HCT VFR BLD AUTO: 39.3 % (ref 35–45)
HGB BLD-MCNC: 12.9 G/DL (ref 12–16)
INR PPP: 1 (ref 0.8–1.2)
LYMPHOCYTES # BLD: 1.7 K/UL (ref 0.9–3.6)
LYMPHOCYTES NFR BLD: 23 % (ref 21–52)
MCH RBC QN AUTO: 31.9 PG (ref 24–34)
MCHC RBC AUTO-ENTMCNC: 32.8 G/DL (ref 31–37)
MCV RBC AUTO: 97.3 FL (ref 78–100)
MONOCYTES # BLD: 0.6 K/UL (ref 0.05–1.2)
MONOCYTES NFR BLD: 8 % (ref 3–10)
NEUTS SEG # BLD: 4.7 K/UL (ref 1.8–8)
NEUTS SEG NFR BLD: 65 % (ref 40–73)
PLATELET # BLD AUTO: 291 K/UL (ref 135–420)
PMV BLD AUTO: 10 FL (ref 9.2–11.8)
POTASSIUM SERPL-SCNC: 4.8 MMOL/L (ref 3.5–5.5)
PROT SERPL-MCNC: 7 G/DL (ref 6.4–8.2)
PROTHROMBIN TIME: 12.8 SEC (ref 11.5–15.2)
RBC # BLD AUTO: 4.04 M/UL (ref 4.2–5.3)
SODIUM SERPL-SCNC: 137 MMOL/L (ref 136–145)
WBC # BLD AUTO: 7.2 K/UL (ref 4.6–13.2)

## 2021-10-08 PROCEDURE — 80053 COMPREHEN METABOLIC PANEL: CPT

## 2021-10-08 PROCEDURE — 82248 BILIRUBIN DIRECT: CPT

## 2021-10-08 PROCEDURE — 71046 X-RAY EXAM CHEST 2 VIEWS: CPT

## 2021-10-08 PROCEDURE — U0003 INFECTIOUS AGENT DETECTION BY NUCLEIC ACID (DNA OR RNA); SEVERE ACUTE RESPIRATORY SYNDROME CORONAVIRUS 2 (SARS-COV-2) (CORONAVIRUS DISEASE [COVID-19]), AMPLIFIED PROBE TECHNIQUE, MAKING USE OF HIGH THROUGHPUT TECHNOLOGIES AS DESCRIBED BY CMS-2020-01-R: HCPCS

## 2021-10-08 PROCEDURE — 85610 PROTHROMBIN TIME: CPT

## 2021-10-08 PROCEDURE — 36415 COLL VENOUS BLD VENIPUNCTURE: CPT

## 2021-10-08 PROCEDURE — 85025 COMPLETE CBC W/AUTO DIFF WBC: CPT

## 2021-10-08 NOTE — TELEPHONE ENCOUNTER
Called [facility] [address] per Dr. Damari Stephen to notify her that there was no evidence of cancer in pathology specimen. Dr. Damari Stephen will review pathology report in detail at follow up appointment. Patient verbalized understanding. Confirmed upcoming appointment with patient.

## 2021-10-10 ENCOUNTER — ANESTHESIA EVENT (OUTPATIENT)
Dept: CARDIAC CATH/INVASIVE PROCEDURES | Age: 71
End: 2021-10-10
Payer: MEDICARE

## 2021-10-10 LAB — BILIRUB DIRECT SERPL-MCNC: <0.1 MG/DL (ref 0–0.2)

## 2021-10-11 ENCOUNTER — HOSPITAL ENCOUNTER (OUTPATIENT)
Age: 71
Setting detail: OUTPATIENT SURGERY
Discharge: HOME | End: 2021-10-11
Attending: INTERNAL MEDICINE | Admitting: INTERNAL MEDICINE
Payer: MEDICARE

## 2021-10-11 ENCOUNTER — ANESTHESIA (OUTPATIENT)
Dept: CARDIAC CATH/INVASIVE PROCEDURES | Age: 71
End: 2021-10-11
Payer: MEDICARE

## 2021-10-11 VITALS
RESPIRATION RATE: 16 BRPM | HEART RATE: 82 BPM | TEMPERATURE: 98.2 F | OXYGEN SATURATION: 97 % | DIASTOLIC BLOOD PRESSURE: 53 MMHG | SYSTOLIC BLOOD PRESSURE: 132 MMHG

## 2021-10-11 DIAGNOSIS — Z95.810 AICD (AUTOMATIC CARDIOVERTER/DEFIBRILLATOR) PRESENT: Primary | ICD-10-CM

## 2021-10-11 DIAGNOSIS — Z45.02 AICD AT END OF BATTERY LIFE: ICD-10-CM

## 2021-10-11 LAB — SARS-COV-2, COV2NT: NOT DETECTED

## 2021-10-11 PROCEDURE — C1882 AICD, OTHER THAN SING/DUAL: HCPCS | Performed by: INTERNAL MEDICINE

## 2021-10-11 PROCEDURE — 77030040375: Performed by: INTERNAL MEDICINE

## 2021-10-11 PROCEDURE — 76060000033 HC ANESTHESIA 1 TO 1.5 HR: Performed by: INTERNAL MEDICINE

## 2021-10-11 PROCEDURE — 33264 RMVL & RPLCMT DFB GEN MLT LD: CPT | Performed by: INTERNAL MEDICINE

## 2021-10-11 PROCEDURE — 00534 ANES INSERTION/RPLCMT CVDFB: CPT | Performed by: NURSE ANESTHETIST, CERTIFIED REGISTERED

## 2021-10-11 PROCEDURE — 77030002996 HC SUT SLK J&J -A: Performed by: INTERNAL MEDICINE

## 2021-10-11 PROCEDURE — 77030002933 HC SUT MCRYL J&J -A: Performed by: INTERNAL MEDICINE

## 2021-10-11 PROCEDURE — 74011250636 HC RX REV CODE- 250/636: Performed by: NURSE ANESTHETIST, CERTIFIED REGISTERED

## 2021-10-11 PROCEDURE — 99100 ANES PT EXTEME AGE<1 YR&>70: CPT | Performed by: NURSE ANESTHETIST, CERTIFIED REGISTERED

## 2021-10-11 PROCEDURE — 00534 ANES INSERTION/RPLCMT CVDFB: CPT | Performed by: ANESTHESIOLOGY

## 2021-10-11 PROCEDURE — 74011000250 HC RX REV CODE- 250: Performed by: INTERNAL MEDICINE

## 2021-10-11 PROCEDURE — 77030031139 HC SUT VCRL2 J&J -A: Performed by: INTERNAL MEDICINE

## 2021-10-11 PROCEDURE — 77030018729 HC ELECTRD DEFIB PAD CARD -B: Performed by: INTERNAL MEDICINE

## 2021-10-11 PROCEDURE — 99100 ANES PT EXTEME AGE<1 YR&>70: CPT | Performed by: ANESTHESIOLOGY

## 2021-10-11 PROCEDURE — 77030028698 HC BLD TISS PLSM MEDT -D: Performed by: INTERNAL MEDICINE

## 2021-10-11 PROCEDURE — 74011000250 HC RX REV CODE- 250: Performed by: NURSE ANESTHETIST, CERTIFIED REGISTERED

## 2021-10-11 PROCEDURE — 77030019580 HC CBL PACE MEDT -B: Performed by: INTERNAL MEDICINE

## 2021-10-11 DEVICE — DEFIBRILLATOR CARD W51XH71MM D13MM 35CUCM 80GM IS1 DF-1 PFZ: Type: IMPLANTABLE DEVICE | Status: FUNCTIONAL

## 2021-10-11 RX ORDER — OXYCODONE AND ACETAMINOPHEN 5; 325 MG/1; MG/1
1 TABLET ORAL
Status: CANCELLED | OUTPATIENT
Start: 2021-10-11

## 2021-10-11 RX ORDER — SODIUM CHLORIDE 0.9 % (FLUSH) 0.9 %
5-40 SYRINGE (ML) INJECTION EVERY 8 HOURS
Status: DISCONTINUED | OUTPATIENT
Start: 2021-10-11 | End: 2021-10-11 | Stop reason: HOSPADM

## 2021-10-11 RX ORDER — SODIUM CHLORIDE, SODIUM LACTATE, POTASSIUM CHLORIDE, CALCIUM CHLORIDE 600; 310; 30; 20 MG/100ML; MG/100ML; MG/100ML; MG/100ML
75 INJECTION, SOLUTION INTRAVENOUS CONTINUOUS
Status: DISCONTINUED | OUTPATIENT
Start: 2021-10-11 | End: 2021-10-11 | Stop reason: HOSPADM

## 2021-10-11 RX ORDER — PROPOFOL 10 MG/ML
VIAL (ML) INTRAVENOUS
Status: DISCONTINUED | OUTPATIENT
Start: 2021-10-11 | End: 2021-10-11 | Stop reason: HOSPADM

## 2021-10-11 RX ORDER — SODIUM CHLORIDE 9 MG/ML
INJECTION, SOLUTION INTRAVENOUS
Status: DISCONTINUED | OUTPATIENT
Start: 2021-10-11 | End: 2021-10-11 | Stop reason: HOSPADM

## 2021-10-11 RX ORDER — LIDOCAINE HYDROCHLORIDE 10 MG/ML
INJECTION, SOLUTION EPIDURAL; INFILTRATION; INTRACAUDAL; PERINEURAL AS NEEDED
Status: DISCONTINUED | OUTPATIENT
Start: 2021-10-11 | End: 2021-10-11 | Stop reason: HOSPADM

## 2021-10-11 RX ORDER — CEFAZOLIN SODIUM 1 G/3ML
INJECTION, POWDER, FOR SOLUTION INTRAMUSCULAR; INTRAVENOUS AS NEEDED
Status: DISCONTINUED | OUTPATIENT
Start: 2021-10-11 | End: 2021-10-11 | Stop reason: HOSPADM

## 2021-10-11 RX ORDER — PROPOFOL 10 MG/ML
INJECTION, EMULSION INTRAVENOUS AS NEEDED
Status: DISCONTINUED | OUTPATIENT
Start: 2021-10-11 | End: 2021-10-11 | Stop reason: HOSPADM

## 2021-10-11 RX ORDER — FAMOTIDINE 20 MG/1
20 TABLET, FILM COATED ORAL ONCE
Status: DISCONTINUED | OUTPATIENT
Start: 2021-10-11 | End: 2021-10-11 | Stop reason: HOSPADM

## 2021-10-11 RX ORDER — LIDOCAINE HYDROCHLORIDE 20 MG/ML
INJECTION, SOLUTION EPIDURAL; INFILTRATION; INTRACAUDAL; PERINEURAL AS NEEDED
Status: DISCONTINUED | OUTPATIENT
Start: 2021-10-11 | End: 2021-10-11 | Stop reason: HOSPADM

## 2021-10-11 RX ORDER — SODIUM CHLORIDE 0.9 % (FLUSH) 0.9 %
5-40 SYRINGE (ML) INJECTION AS NEEDED
Status: DISCONTINUED | OUTPATIENT
Start: 2021-10-11 | End: 2021-10-11 | Stop reason: HOSPADM

## 2021-10-11 RX ORDER — OXYCODONE AND ACETAMINOPHEN 5; 325 MG/1; MG/1
1 TABLET ORAL
Qty: 12 TABLET | Refills: 0 | Status: SHIPPED | OUTPATIENT
Start: 2021-10-11 | End: 2021-10-14

## 2021-10-11 RX ORDER — LIDOCAINE HYDROCHLORIDE 10 MG/ML
0.1 INJECTION, SOLUTION EPIDURAL; INFILTRATION; INTRACAUDAL; PERINEURAL AS NEEDED
Status: DISCONTINUED | OUTPATIENT
Start: 2021-10-11 | End: 2021-10-11 | Stop reason: HOSPADM

## 2021-10-11 RX ADMIN — PROPOFOL 30 MG: 10 INJECTION, EMULSION INTRAVENOUS at 08:10

## 2021-10-11 RX ADMIN — CEFAZOLIN SODIUM 2 G: 1 INJECTION, POWDER, FOR SOLUTION INTRAMUSCULAR; INTRAVENOUS at 08:16

## 2021-10-11 RX ADMIN — LIDOCAINE HYDROCHLORIDE 100 MG: 20 INJECTION, SOLUTION EPIDURAL; INFILTRATION; INTRACAUDAL; PERINEURAL at 08:10

## 2021-10-11 RX ADMIN — PROPOFOL 100 MCG/KG/MIN: 10 INJECTION, EMULSION INTRAVENOUS at 08:07

## 2021-10-11 RX ADMIN — SODIUM CHLORIDE: 900 INJECTION, SOLUTION INTRAVENOUS at 08:09

## 2021-10-11 NOTE — Clinical Note
A Bovie was used. Mode: monopolar. Coagulation Settin. Cut Settin. Site (pad location): upper leg. Laterality: left.

## 2021-10-11 NOTE — Clinical Note
TRANSFER - IN REPORT:     Verbal report received from: [address] [place] [address] Drive. Report consisted of patient's Situation, Background, Assessment and   Recommendations(SBAR). Opportunity for questions and clarification was provided. Assessment completed upon patient's arrival to unit and care assumed. Patient transported with a Cardiac Cath Tech / Patient Care Tech.

## 2021-10-11 NOTE — PROGRESS NOTES
Breast Consult    Ms. Ifeoma Callahan is a 66year old woman with left Stage IV [id number] ER/TX negative, HER positive breast cancer, s/p left modified radical mastectomy by Dr. Demetrio Holder 9/28/11 and right ADH, s/p core biopsy 10/4/21. She completed neoadjuvant docetaxel and cytoxan per Dr. Cassandra Cruz and completed a year of transtuzumab. According to Dr. Cassandra Cruz course was complicated by cardiomyopathy. She completed post mastectomy radiation after repositioning of her pacemaker. In 2018 she was diagnosed with metastatic cancer in lung nodule, mediastinal nodes and sternum after presenting with cough and shortness of breath. She was treated with paclitaxel, pertuzumab and transtuzumab until worsening of her cardiomyopathy resulted in discontinuing HER directed therapy. She was transitioned to Gemcitabine with excellent response and radiographic resolution of metastasis. Above information largely from Dr. Beverly Factor note. She was referred to me for abnormal imaging. The area of concern was identified on routine screening exam. She denies breast pain, mass or nipple discharge. Her most recent previous mammogram was 7/21/21. She is without complaint related to her breasts. Core biopsy 10/4/21 demonstrated ADH. *          Breast/GYN history:    Past Medical History:   Diagnosis Date    Abnormal nuclear cardiac imaging test 06/11/2010    Lg fixed anterior, septal, apical, inferoseptal defect sugg RCA & LAD disease or cardiomyopathy. EF 20%. Global hykn. Nondiagnostic EKG.  Acute bronchitis 10/15/2018    Persistent cough and wheezing in spite of prednisone and antibiotic.   Will refer to pulmonary    Adenocarcinoma of breast; locally advanced invasive ductal adenocarcinoma of left breast     Asthma     Automatic implantable cardiac defibrillator in situ     Automatic implantable cardiac defibrillator in situ     [facility]     Cancer ([facility]     breast cancer left    Chemotherapy convalescence or palliative care 2012    Chronic combined systolic and diastolic heart failure (HCC)     Remains symptomatic, nyha class 3 ef improving.  Congestive heart failure, unspecified     chronic class ll    Cortical age-related cataract of left eye 10/18/2020    Cortical age-related cataract of right eye 10/31/2020    DVT of lower limb, acute ([facility] 2019    Right leg    Echocardiogram 09/27/2010    EF 30%. Global hykn. Mild MR. Mild TR.  GERD (gastroesophageal reflux disease)     History of pulmonary embolus (PE) 2019    Hyperlipidemia     Hypertension     Mitral valve disorders(424.0) 1/15/2014    mild to moderate mr     Mitral valve disorders(424.0) 1/15/2014    mild to moderate mr     MVP (mitral valve prolapse)     Nonischemic cardiomyopathy (HCC)     EF 20-30% despite medical therapy    Nonspecific abnormal electrocardiogram (ECG) (EKG)     Osteopenia     Other primary cardiomyopathies     Pacemaker 10/27/10    s/p implantation, without complication    Poisn by oth uns agents prim aff cv sys     Ef slightly better to 45%, STABLE back on chemo.  Radiation therapy     Radiation therapy complication 6663    S/P cardiac catheterization 07/08/10    Patent coronary arteries. LVEDP 25.  EF 25%. Global hykn. Moderate MR.  RA 10.  RV 40/10. PA 40/20.  20.  CO/CI 4.5/2.45 (Larry).     Shortness of breath     Syncope and collapse     Thyroid disease     goiter       Past Surgical History:   Procedure Laterality Date    COLONOSCOPY N/A 12/1/2020    COLONOSCOPY with polypectomy performed by Obinna Carranza MD at [address] HX CATARACT REMOVAL Left 10/19/2020    HX CHOLECYSTECTOMY  11/01/2019    HX MASTECTOMY  09/28/11    modified radical mastectomy and axillary dissection    HX ORTHOPAEDIC      HX OTHER SURGICAL      surgery to remove part of liver    HX PACEMAKER  10/27/10    s/p Medtronic biventricular AICD, without complication    HX RADICAL MASTECTOMY      IR CHOLECYSTOSTOMY PERCUTANEOUS  9/20/2019    IR INSERT TUNL CVC W PORT OVER 5 YEARS  1/16/2019    IR REMOVE TUNL CVAD W PORT/PUMP  9/16/2021    NEUROLOGICAL PROCEDURE UNLISTED      Cervical fusion    [place] CARDIAC SURG PROCEDURE UNLIST      Difibrillator; BI V ICD       Current Outpatient Medications on File Prior to Visit   Medication Sig Dispense Refill    carvediloL (COREG) 25 mg tablet TAKE 1 TABLET TWICE DAILY  WITH MEALS 180 Tablet 3    montelukast (SINGULAIR) 10 mg tablet Take 1 Tablet by mouth daily. PATIENT NEEDS APPOINTMENT 90 Tablet 0    Entresto 49-51 mg tab tablet TAKE 1 TABLET TWICE A  Tablet 3    digoxin (LANOXIN) 0.125 mg tablet TAKE 1 TABLET DAILY 90 Tab 3    spironolactone (ALDACTONE) 25 mg tablet TAKE 2 TABLETS DAILY (Patient taking differently: Take 50 mg by mouth daily. ) 180 Tab 3    furosemide (LASIX) 20 mg tablet Take 1 Tab by mouth daily. (Patient taking differently: Take 20 mg by mouth every Monday, Wednesday, Friday.) 30 Tab 0    DULoxetine (CYMBALTA) 60 mg capsule Take 60 mg by mouth daily.  raloxifene (EVISTA) 60 mg tablet Take 60 mg by mouth daily.  oxyCODONE-acetaminophen (Percocet) 5-325 mg per tablet Take 1 Tablet by mouth every six (6) hours as needed for Pain for up to 3 days. Max Daily Amount: 4 Tablets. (Patient not taking: Reported on 10/13/2021) 12 Tablet 0    Incruse Ellipta 62.5 mcg/actuation inhaler USE 1 INHALATION ORALLY    DAILY (Patient not taking: Reported on 9/16/2021) 1 Inhaler 5    apixaban (ELIQUIS) 2.5 mg tablet Take 1 Tab by mouth two (2) times a day. (Patient not taking: Reported on 10/13/2021) 60 Tab 2    albuterol-ipratropium (DUO-NEB) 2.5 mg-0.5 mg/3 ml nebu 3 mL by Nebulization route every six (6) hours as needed (wheezing or sob). File under Medicare Part B, ICD codes J44.9, C78.01 (Patient not taking: Reported on 9/16/2021) 120 Nebule 3     No current facility-administered medications on file prior to visit.        Allergies Allergen Reactions    Adhesive Other (comments)     blisters       Social History     Tobacco Use    Smoking status: Never Smoker    Smokeless tobacco: Never Used   Substance Use Topics    Alcohol use: No    Drug use: No       Family History   Problem Relation Age of Onset    Hypertension Mother     High Cholesterol Mother     Heart Disease Father         paemaker implant at 80       OB History    No obstetric history on file.            ROS:   Positives are bolded  General: fevers, chills, night sweats, fatigue, weight loss, weight gain  GI: nausea, vomiting, abdominal pain, change in bowel habits, hematochezia, melena, GERD  Integ: dermatitis, abnormal moles  HEENT: visual changes, vertigo, epistaxis, dysphagia, hoarseness  Cardiac: chest pain, palpitations, HTN, edema, varicosities  Resp: cough, shortness of breath, wheezing, hemoptysis, snoring, reactive airway disease  : hematuria, dysuria, frequency, urgency, nocturia, stress urinary incontinence   MSK: weakness, joint pain, arthritis  Endocrine: diabetes, thyroid disease, polyuria, polydipsia, polyphagia, poor wound healing, heat intolerance, cold intolerance  Lymph/Heme: anemia, bruising, history of blood transfusions  Neuro: dizziness, headache, fainting, seizures, stroke  Psych: anxiety, depression    Physical Exam:  Visit Vitals  BP (!) 91/57 (BP 1 Location: Left lower arm, BP Patient Position: Sitting)   Pulse 87   Temp 97.4 °F (36.3 °C)   Ht 5' 5\" (1.651 m)   Wt 76.7 kg (169 lb)   SpO2 98%   BMI 28.12 kg/m²       Gen: Well developed, well nourished woman in no acute distress seborrheic keratosis  Head: normocephalic, atraumatic  Mouth: Clear, no overt lesions, oral mucosa pink and moist  Neck: supple, no masses, no adenopathy, trachea midline  Resp: clear bilaterally  Cardio: Regular rate and rhythm  Abdomen: soft, nontender, nondistended  Extremities: warm, well-perfused  Neuro: sensation and strength grossly intact and symmetrical  Psych: alert and oriented to person, place and time  Breasts: palpation deferred, core biopsy site clean, ecchymosis  Right: Examined in both the supine and upright positions. There was no supraclavicular, infraclavicular, or axillary lympadenopathy. There were no dominant masses, no skin changes, no asymmetry identified   Left[de-identified] Examined in both the supine and upright positions. There was no supraclavicular, infraclavicular, or axillary lympadenopathy. There were no dominant masses, no skin changes, no asymmetry identified s/p mastectomy without reconstruction    Imagin/15/21 right mammogram  3 suspicious hypoechoic masses right breast 9:00 position.      BIRADS 4: Suspicious abnormality  Management Recommendation: Ultrasound-guided core biopsy of the 2 larger masses  located 4 cm from the nipple and 1.5 cm from the nipple. The findings and  recommendations were discussed with the patient at the time of the exam who is  in agreement. The patient was referred to Agustín Chau, [address] [place] [address], for notification of the ordering provider and  coordination of care. Patient is prescribed Eliquis for which she stopped  yesterday for a procedure performed today.       Pathology:  10/4/21  A: Right breast mass, 9:00, 4 cm from nipple, biopsy:        Breast tissue with fibroadenomatous change, columnar cell change,   usual ductal hyperplasia, apocrine metaplasia, and intraductal   micropapillomas. B: Right breast mass, 9:00, 2 cm from nipple, biopsy:        Breast tissue with focal atypical ductal hyperplasia, usual ductal   hyperplasia, columnar cell change, and apocrine metaplasia    [address]  A. LEFT SENTINEL LYMPH NODE, EXCISION -  ONE BENIGN-APPEARING LYMPH NODE, NEGATIVE FOR MALIGNANCY (0/1). B. LEFT BREAST AND AXILLARY CONTENTS, MODIFIED RADICAL MASTECTOMY -  INVASIVE MUCINOUS ADENOCARCINOMA. SIZE OF THE INVASIVE CARCINOMA: 5.3 X 4.2 X 1.7 CM.   HISTOLOGIC TYPE: MUCINOUS ADENOCARCINOMA  HISTOLOGIC GRADE: II  NUCLEAR GRADE: II  FOCALITY: UNIFOCAL  IN-SITU CARCINOMA COMPONENT: NOT IDENTIFIED  LYMPHOVASCULAR SPACE INVASION: PRESENT  MARGINS OF RESECTION: NEGATIVE FOR CARCINOMA  MICROCALCIFICATIONS: PRESENT ASSOCIATED WITH BENIGN DUCTAL EPITHELIUM  SKIN: NEGATIVE FOR CARCINOMA  NIPPLE: POSITIVE FOR LYMPHOVASCULAR SPACE INVASION  LYMPH NODES: THREE OF NINE AXILLARY LYMPH NODES POSITIVE FOR METASTATIC  ADENOCARCINOMA (3/9). ESTROGEN RECEPTOR: NEGATIVE ([id number])  PROGESTERONE RECEPTOR: NEGATIVE ([id number])  HER2/ana (FISH): POSITIVE ([id number])  OTHER PATHOLOGIC FINDINGS: MULTIPLE SKIN SEBORRHEIC KERATOSES, LARGEST  MEASURING 2.4 CM.   AJCC STAGE: T4 N1, Mx    Impression:  Patient Active Problem List   Diagnosis Code    Hypertension I10    MVP (mitral valve prolapse) I34.1    Nonischemic cardiomyopathy (HCC) I42.8    Cancer (HCC) C80.1    Adenocarcinoma of breast; locally advanced invasive ductal adenocarcinoma of left breast C50.919    Poisn by oth uns agents prim aff cv sys T46.904A    Syncope and collapse U19    Systolic CHF, chronic (HCC) I50.22    Other primary cardiomyopathies I42.8    Shortness of breath R06.02    Nonspecific abnormal electrocardiogram (ECG) (EKG) R94.31    Automatic implantable cardioverter-defibrillator in situ Z95.810    Mitral valve disease I05.9    Abnormal PFT R94.2    Acute bronchitis J20.9    Chronic asthmatic bronchitis (HCC) J44.9    Malignant neoplasm metastatic to lung (HCC) C78.00    Seasonal allergic rhinitis due to pollen J30.1    Dilated cardiomyopathy (HCC) I42.0    Breast cancer (HCC) C50.919    Pulmonary embolism (HCC) I26.99    Chronic bronchitis (HCC) J42    Hypoxia R09.02    Lung metastases (HCC) C78.00    UTI (urinary tract infection) N39.0    CAD (coronary artery disease) I25.10    Elevated troponin R77.8    Weakness generalized R53.1    Chronic anticoagulation Z79.01    History of pulmonary embolism Z86.711  Hypokalemia E87.6    Hypomagnesemia E83.42    Acute encephalopathy G93.40    Cholecystitis K81.9    Ventricular fibrillation (HCC) I49.01    Cholecystitis, unspecified K81.9          66year old woman with left Stage IV [id number] ER/DC negative, HER positive breast cancer, s/p left modified radical mastectomy by Dr. Maira Mariano 9/28/11 and right ADH, s/p core biopsy 10/4/21. We discussed the results of atypical ductal hyperplasia (ADH) in detail. We discussed that generally ADH is not considered cancer or pre-cancer, but rather a high risk marker. A woman with ADH may have a higher than average risk of developing breast cancer. We discussed that treatment can range from observation alone to medication. Removal of the breasts (mastectomy) is not needed or recommended for ADH alone. When diagnosed on a core biopsy, excision is generally recommended to ensure removal of abnormal cells as well as to avoid undersampling of ADH associated with cancer. Ms. Grey Giordano is interested in excisional biopsy right breast.  Please call sooner with questions or concerns.

## 2021-10-11 NOTE — Clinical Note
TRANSFER - OUT REPORT:     Verbal report given to: Danni. Report consisted of patient's Situation, Background, Assessment and   Recommendations(SBAR). Opportunity for questions and clarification was provided.

## 2021-10-11 NOTE — ROUTINE PROCESS
0710-Cath holding summary     Patient escorted to cath holding from waiting area ambulatory, alert and oriented x 4, voicing no complaints. Changed into gown and placed on monitor. NPO since MN. Lab results, med rec and H&P reviewed on chart. PIV x 1  inserted without difficulty. Family to bedside. 0759-TRANSFER - OUT REPORT:    Verbal report given to Tree(name) on [facility]  being transferred to EP lab(unit) for ordered procedure       Report consisted of patients Situation, Background, Assessment and   Recommendations(SBAR). Information from the following report(s) SBAR, Kardex, Intake/Output, MAR, Pre Procedure Checklist and Procedure Verification was reviewed with the receiving nurse. Lines:   20 g right hand. Opportunity for questions and clarification was provided. Patient transported with:   Tech       0901-TRANSFER - IN REPORT:  Verbal report received from Selvin(name) on [facility]  being received from EP lab(unit) for routine post - op      Report consisted of patients Situation, Background, Assessment and   Recommendations(SBAR). Information from the following report(s) SBAR, Kardex, MAR, Cardiac Rhythm paced and Pre Procedure Checklist was reviewed with the receiving nurse. Report includes  S/p Biventricular gen change right side,set @ DDD  bpm.Received 2 g Ancef ,Propofol for anaesthesia. Opportunity for questions and clarification was provided. Assessment completed upon patients arrival to unit and care assumed. 0910-Patient back from EP lab,/p Biventricular gen change right side,set @ DDD  bpm done procedure tolerated well. Check sites no bleeding ,no hematoma dressing dry & intact. Will continue monitoring. Family at bedside. 1110-AVS Discharge instructions reviewed with patient and copy given to patient. All questions answered. Patient verbalized understanding to all discharge instructions. PIV removed.  Procedural site within normal limits. No hematoma or bleeding noted from procedural and PIV site. No pain noted at discharge. Patient discharged with support person in stable condition. Escorted out to vehicle for transport home.

## 2021-10-11 NOTE — DISCHARGE INSTRUCTIONS
Resume Xarelto on Thursday. Disposition:  Will need follow-up with device/wound check in 7 days in my office. Please contact office at [phone] to confirm appointment. Main Office:    [address], [place] [address]    Restrictions: For affected arm:  No lifting greater than 10 lbs or lifting elbow above shoulder for 2 weeks. Keep incision clean and dry for a total of 72 hours after procedure. Remove dressing in 7 days if not already removed. No hot tubs or pools for 2 weeks. OK to shower with \"pat\" dry incision after 72 hours. No driving x 24 hours.

## 2021-10-13 ENCOUNTER — OFFICE VISIT (OUTPATIENT)
Dept: SURGERY | Age: 71
End: 2021-10-13
Payer: MEDICARE

## 2021-10-13 VITALS
SYSTOLIC BLOOD PRESSURE: 91 MMHG | BODY MASS INDEX: 28.16 KG/M2 | OXYGEN SATURATION: 98 % | DIASTOLIC BLOOD PRESSURE: 57 MMHG | HEIGHT: 65 IN | WEIGHT: 169 LBS | HEART RATE: 87 BPM | TEMPERATURE: 97.4 F

## 2021-10-13 DIAGNOSIS — C50.912 ADENOCARCINOMA OF LEFT BREAST (HCC): Primary | ICD-10-CM

## 2021-10-13 PROCEDURE — G8754 DIAS BP LESS 90: HCPCS | Performed by: SURGERY

## 2021-10-13 PROCEDURE — G9899 SCRN MAM PERF RSLTS DOC: HCPCS | Performed by: SURGERY

## 2021-10-13 PROCEDURE — G8427 DOCREV CUR MEDS BY ELIG CLIN: HCPCS | Performed by: SURGERY

## 2021-10-13 PROCEDURE — 3017F COLORECTAL CA SCREEN DOC REV: CPT | Performed by: SURGERY

## 2021-10-13 PROCEDURE — G8432 DEP SCR NOT DOC, RNG: HCPCS | Performed by: SURGERY

## 2021-10-13 PROCEDURE — 1090F PRES/ABSN URINE INCON ASSESS: CPT | Performed by: SURGERY

## 2021-10-13 PROCEDURE — G8399 PT W/DXA RESULTS DOCUMENT: HCPCS | Performed by: SURGERY

## 2021-10-13 PROCEDURE — G8752 SYS BP LESS 140: HCPCS | Performed by: SURGERY

## 2021-10-13 PROCEDURE — 99213 OFFICE O/P EST LOW 20 MIN: CPT | Performed by: SURGERY

## 2021-10-13 PROCEDURE — G8419 CALC BMI OUT NRM PARAM NOF/U: HCPCS | Performed by: SURGERY

## 2021-10-13 PROCEDURE — 1101F PT FALLS ASSESS-DOCD LE1/YR: CPT | Performed by: SURGERY

## 2021-10-13 PROCEDURE — G8536 NO DOC ELDER MAL SCRN: HCPCS | Performed by: SURGERY

## 2021-10-13 NOTE — PROGRESS NOTES
Maria Ines Vargas is a 66 y.o. female (: 1950) presenting to address:    Chief Complaint   Patient presents with    Surgical Follow-up     Right breast biopsy 10/4/21        Medication list and allergies have been reviewed with Maria Ines Vargas and updated as of today's date. I have gone over all Medical, Surgical and Social History with Maria Ines Vargas and updated/added the information accordingly. 1. Have you been to the ER, Urgent Care or Hospitalized since your last visit? YES, pacemaker was replaced      2. Have you followed up with your PCP or any other Physicians since your procedure/ last office visit?    NO

## 2021-10-13 NOTE — LETTER
10/13/2021 1:46 PM    Patient:  Pam Mendosa   YOB: 1950  Date of Visit: 10/13/2021      Gerardo Kunz, [address] [facility] [address]  Via Fax: [phone]     Kae Estrada MD  [address] [facility] [address]  [address] [place] [id number]  Via Fax: [phone]    Dear MD Kae Barger MD,      I had the pleasure of seeing Ms. Stoney Novoa in my office today for her breast mass. I am including a copy of my office visit today. If you have questions, please do not hesitate to call me. I look forward to following Ms. Kaleb Allen along with you and will keep you updated as to her progress.            Sincerely,      Kolby Traore MD

## 2021-10-15 NOTE — ANESTHESIA POSTPROCEDURE EVALUATION
Procedure(s):  REMOVE & REPLACE ICD BIV MULTI LEADS. No value filed.     Anesthesia Post Evaluation      Multimodal analgesia: multimodal analgesia used between 6 hours prior to anesthesia start to PACU discharge  Patient location during evaluation: bedside  Patient participation: complete - patient participated  Level of consciousness: awake  Pain score: 0  Pain management: adequate  Airway patency: patent  Anesthetic complications: no  Cardiovascular status: stable  Respiratory status: acceptable  Hydration status: acceptable  Post anesthesia nausea and vomiting:  controlled  Final Post Anesthesia Temperature Assessment:  Normothermia (36.0-37.5 degrees C)      INITIAL Post-op Vital signs:   Vitals Value Taken Time   BP 91/57 10/13/21 1332   Temp 36.3 °C (97.4 °F) 10/13/21 1332   Pulse 87 10/13/21 1332   Resp 16 10/11/21 1045   SpO2 98 % 10/13/21 1332

## 2021-10-15 NOTE — ANESTHESIA PREPROCEDURE EVALUATION
Relevant Problems   No relevant active problems       Anesthesia Evaluation         Anesthetic Plan    ASA: 3  Anesthesia type: MAC          Induction: Intravenous  Anesthetic plan and risks discussed with: Patient

## 2021-10-21 ENCOUNTER — CLINICAL SUPPORT (OUTPATIENT)
Dept: CARDIOLOGY CLINIC | Age: 71
End: 2021-10-21
Payer: MEDICARE

## 2021-10-21 DIAGNOSIS — Z95.810 AICD (AUTOMATIC CARDIOVERTER/DEFIBRILLATOR) PRESENT: Primary | ICD-10-CM

## 2021-10-21 PROCEDURE — 93284 PRGRMG EVAL IMPLANTABLE DFB: CPT | Performed by: INTERNAL MEDICINE

## 2021-10-25 NOTE — PROGRESS NOTES
I have personally seen and evaluated the device findings. Interrogation reviewed and I agree with assessment.     Osbaldo Blanc

## 2021-10-27 ENCOUNTER — OFFICE VISIT (OUTPATIENT)
Dept: CARDIOLOGY CLINIC | Age: 71
End: 2021-10-27
Payer: MEDICARE

## 2021-10-27 VITALS
WEIGHT: 168 LBS | DIASTOLIC BLOOD PRESSURE: 60 MMHG | HEART RATE: 96 BPM | HEIGHT: 65 IN | SYSTOLIC BLOOD PRESSURE: 111 MMHG | OXYGEN SATURATION: 97 % | BODY MASS INDEX: 27.99 KG/M2

## 2021-10-27 DIAGNOSIS — I42.0 DILATED CARDIOMYOPATHY (HCC): ICD-10-CM

## 2021-10-27 DIAGNOSIS — I50.22 SYSTOLIC CHF, CHRONIC (HCC): Primary | ICD-10-CM

## 2021-10-27 DIAGNOSIS — I10 ESSENTIAL HYPERTENSION: ICD-10-CM

## 2021-10-27 DIAGNOSIS — Z01.810 PREOPERATIVE CARDIOVASCULAR EXAMINATION: ICD-10-CM

## 2021-10-27 DIAGNOSIS — Z95.810 AICD (AUTOMATIC CARDIOVERTER/DEFIBRILLATOR) PRESENT: ICD-10-CM

## 2021-10-27 PROCEDURE — 99214 OFFICE O/P EST MOD 30 MIN: CPT | Performed by: INTERNAL MEDICINE

## 2021-10-27 PROCEDURE — G8752 SYS BP LESS 140: HCPCS | Performed by: INTERNAL MEDICINE

## 2021-10-27 PROCEDURE — 1090F PRES/ABSN URINE INCON ASSESS: CPT | Performed by: INTERNAL MEDICINE

## 2021-10-27 PROCEDURE — G9899 SCRN MAM PERF RSLTS DOC: HCPCS | Performed by: INTERNAL MEDICINE

## 2021-10-27 PROCEDURE — G8419 CALC BMI OUT NRM PARAM NOF/U: HCPCS | Performed by: INTERNAL MEDICINE

## 2021-10-27 PROCEDURE — G8427 DOCREV CUR MEDS BY ELIG CLIN: HCPCS | Performed by: INTERNAL MEDICINE

## 2021-10-27 PROCEDURE — G8754 DIAS BP LESS 90: HCPCS | Performed by: INTERNAL MEDICINE

## 2021-10-27 PROCEDURE — G8432 DEP SCR NOT DOC, RNG: HCPCS | Performed by: INTERNAL MEDICINE

## 2021-10-27 PROCEDURE — 1101F PT FALLS ASSESS-DOCD LE1/YR: CPT | Performed by: INTERNAL MEDICINE

## 2021-10-27 PROCEDURE — G8536 NO DOC ELDER MAL SCRN: HCPCS | Performed by: INTERNAL MEDICINE

## 2021-10-27 PROCEDURE — G8399 PT W/DXA RESULTS DOCUMENT: HCPCS | Performed by: INTERNAL MEDICINE

## 2021-10-27 PROCEDURE — 3017F COLORECTAL CA SCREEN DOC REV: CPT | Performed by: INTERNAL MEDICINE

## 2021-10-27 NOTE — PROGRESS NOTES
1. Have you been to the ER, urgent care clinic since your last visit? Hospitalized since your last visit? No    2. Have you seen or consulted any other health care providers outside of the [address] [place] [address] since your last visit? Include any pap smears or colon screening.  No

## 2021-10-27 NOTE — PROGRESS NOTES
HISTORY OF PRESENT ILLNESS  Roselia Reyna is a 66 y.o. female. Patient with cmp,chf.post  icd   On follow up patient denies any chest pains, palpitation or other significant symptoms. Patient is here for follow up of diagnostic tests. Results will be discussed. He feels extremely fatigued tired and weak. Recently reported decrease in renal function. Patient seen for transition of care visit. Discharge date 5/6/2019  Nurse encounter 5/6/2019  Physician encounter 5/8/2019. Admitted with acute CHF, acute PE, DVT. Patient had worsening cardiomyopathy. Symptoms are slowly improving significant improvement of shortness of breath. Orthopnea significantly better. No edema. Still feels tired on minimal activity exertion  2/2021  Patient seen today for follow-up. Her shortness of breath is stable. Denies any other complaint at present    Follow-up  Associated symptoms include shortness of breath. Pertinent negatives include no chest pain. Cardiomyopathy  The history is provided by the patient. This is a chronic problem. The problem has been resolved. Associated symptoms include shortness of breath. Pertinent negatives include no chest pain. Hypertension  The history is provided by the patient. This is a chronic problem. The problem occurs constantly. The problem has not changed since onset. Associated symptoms include shortness of breath. Pertinent negatives include no chest pain. CHF  The history is provided by the patient. This is a recurrent problem. The problem occurs constantly. The problem has been gradually improving. Associated symptoms include shortness of breath. Pertinent negatives include no chest pain. The symptoms are aggravated by exertion. The symptoms are relieved by rest.   Valvular Heart Disease  The history is provided by the patient. This is a chronic problem. The problem occurs constantly. The problem has not changed since onset. Associated symptoms include shortness of breath. Pertinent negatives include no chest pain. Review of Systems   Constitutional: Negative for chills and fever. HENT: Negative for nosebleeds. Eyes: Negative for blurred vision and double vision. Respiratory: Positive for shortness of breath. Negative for cough, hemoptysis, sputum production and wheezing. Cardiovascular: Negative for chest pain, palpitations, orthopnea, claudication, leg swelling and PND. Gastrointestinal: Negative for heartburn, nausea and vomiting. Musculoskeletal: Negative for myalgias. Skin: Negative for rash. Neurological: Negative for dizziness and weakness. Endo/Heme/Allergies: Does not bruise/bleed easily. Family History   Problem Relation Age of Onset    Hypertension Mother     High Cholesterol Mother     Heart Disease Father         paemaker implant at 80       Past Medical History:   Diagnosis Date    Abnormal nuclear cardiac imaging test 06/11/2010    Lg fixed anterior, septal, apical, inferoseptal defect sugg RCA & LAD disease or cardiomyopathy. EF 20%. Global hykn. Nondiagnostic EKG.  Acute bronchitis 10/15/2018    Persistent cough and wheezing in spite of prednisone and antibiotic. Will refer to pulmonary    Adenocarcinoma of breast; locally advanced invasive ductal adenocarcinoma of left breast     Asthma     Automatic implantable cardiac defibrillator in situ     Automatic implantable cardiac defibrillator in situ     [facility] ([facility]     Cancer ([address]     breast cancer left    Chemotherapy convalescence or palliative care 2012    Chronic combined systolic and diastolic heart failure (HCC)     Remains symptomatic, nyha class 3 ef improving.  Congestive heart failure, unspecified     chronic class ll    Cortical age-related cataract of left eye 10/18/2020    Cortical age-related cataract of right eye 10/31/2020    DVT of lower limb, acute ([address] 2019    Right leg    Echocardiogram 09/27/2010    EF 30%. Global hykn. Mild MR. Mild TR.  GERD (gastroesophageal reflux disease)     History of pulmonary embolus (PE) 2019    Hyperlipidemia     Hypertension     Mitral valve disorders(424.0) 1/15/2014    mild to moderate mr     Mitral valve disorders(424.0) 1/15/2014    mild to moderate mr     MVP (mitral valve prolapse)     Nonischemic cardiomyopathy (HCC)     EF 20-30% despite medical therapy    Nonspecific abnormal electrocardiogram (ECG) (EKG)     Osteopenia     Other primary cardiomyopathies     Pacemaker 10/27/10    s/p implantation, without complication    Poisn by oth uns agents prim aff cv sys     Ef slightly better to 45%, STABLE back on chemo.  Radiation therapy     Radiation therapy complication 5381    S/P cardiac catheterization 07/08/10    Patent coronary arteries. LVEDP 25.  EF 25%. Global hykn. Moderate MR.  RA 10.  RV 40/10. PA 40/20.  20.  CO/CI 4.5/2.45 (Larry).     Shortness of breath     Syncope and collapse     Thyroid disease     goiter       Past Surgical History:   Procedure Laterality Date    COLONOSCOPY N/A 12/1/2020    COLONOSCOPY with polypectomy performed by Roxana Fonseca MD at [address] HX CATARACT REMOVAL Left 10/19/2020    HX CHOLECYSTECTOMY  11/01/2019    HX MASTECTOMY  09/28/11    modified radical mastectomy and axillary dissection    HX ORTHOPAEDIC      HX OTHER SURGICAL      surgery to remove part of liver    HX PACEMAKER  10/27/10    s/p Medtronic biventricular AICD, without complication    HX RADICAL MASTECTOMY      IR CHOLECYSTOSTOMY PERCUTANEOUS  9/20/2019    IR INSERT TUNL CVC W PORT OVER 5 YEARS  1/16/2019    IR REMOVE TUNL CVAD W PORT/PUMP  9/16/2021    NEUROLOGICAL PROCEDURE UNLISTED      Cervical fusion    NH CARDIAC SURG PROCEDURE UNLIST      Difibrillator; BI V ICD       Social History     Tobacco Use    Smoking status: Never Smoker    Smokeless tobacco: Never Used   Substance Use Topics    Alcohol use: No       Allergies   Allergen Reactions    Adhesive Other (comments)     blisters       Current Outpatient Medications   Medication Sig    carvediloL (COREG) 25 mg tablet TAKE 1 TABLET TWICE DAILY  WITH MEALS    montelukast (SINGULAIR) 10 mg tablet Take 1 Tablet by mouth daily. PATIENT NEEDS APPOINTMENT    Entresto 49-51 mg tab tablet TAKE 1 TABLET TWICE A DAY    Incruse Ellipta 62.5 mcg/actuation inhaler USE 1 INHALATION ORALLY    DAILY    digoxin (LANOXIN) 0.125 mg tablet TAKE 1 TABLET DAILY    spironolactone (ALDACTONE) 25 mg tablet TAKE 2 TABLETS DAILY (Patient taking differently: Take 50 mg by mouth daily.)    apixaban (ELIQUIS) 2.5 mg tablet Take 1 Tab by mouth two (2) times a day.  furosemide (LASIX) 20 mg tablet Take 1 Tab by mouth daily. (Patient taking differently: Take 20 mg by mouth every Monday, Wednesday, Friday.)    albuterol-ipratropium (DUO-NEB) 2.5 mg-0.5 mg/3 ml nebu 3 mL by Nebulization route every six (6) hours as needed (wheezing or sob). File under Medicare Part B, ICD codes J44.9, C78.01    DULoxetine (CYMBALTA) 60 mg capsule Take 60 mg by mouth daily.  raloxifene (EVISTA) 60 mg tablet Take 60 mg by mouth daily. No current facility-administered medications for this visit. Visit Vitals  /60 (BP 1 Location: Left upper arm, BP Patient Position: Sitting, BP Cuff Size: Adult)   Pulse 96   Ht 5' 5\" (1.651 m)   Wt 76.2 kg (168 lb)   SpO2 97%   BMI 27.96 kg/m²         Physical Exam  Constitutional:       Appearance: She is well-developed. HENT:      Head: Normocephalic and atraumatic. Eyes:      Conjunctiva/sclera: Conjunctivae normal.   Neck:      Thyroid: No thyromegaly. Vascular: No JVD. Trachea: No tracheal deviation. Cardiovascular:      Rate and Rhythm: Normal rate and regular rhythm. Chest Wall: PMI is not displaced. Pulses: No decreased pulses. Heart sounds: Murmur heard. High-pitched blowing holosystolic murmur is present with a grade of 2/6 at the apex. No gallop.  No S3 sounds. Pulmonary:      Effort: No respiratory distress. Breath sounds: Wheezing present. No rales. Chest:      Chest wall: No tenderness. Abdominal:      Palpations: Abdomen is soft. Tenderness: There is no abdominal tenderness. Musculoskeletal:      Cervical back: Neck supple. Skin:     General: Skin is warm. Neurological:      Mental Status: She is alert and oriented to person, place, and time. Ms. Maria G Brannon has a reminder for a \"due or due soon\" health maintenance. I have asked that she contact her primary care provider for follow-up on this health maintenance. No flowsheet data found. SUMMARY:echo:6/2014  Left ventricle: The ventricle was mildly dilated. Systolic function was  normal. Ejection fraction was estimated in the range of 50 % to 55 %. There were no regional wall motion abnormalities. Doppler parameters were  consistent with abnormal left ventricular relaxation (grade 1 diastolic  dysfunction). Left atrium: The atrium was mildly dilated. Mitral valve: There was systolic bowing of the posterior leaflet, but  without diagnostic evidence for prolapse. There was mild to moderate  regurgitation. SUMMARY:echo:12/2014  Left ventricle: Systolic function was at the lower limits of normal.  Ejection fraction was estimated to be 53 %. There were no regional wall  motion abnormalities. Doppler parameters were consistent with abnormal  left ventricular relaxation (grade 1 diastolic dysfunction). Right ventricle: A pacing wire was present. Mitral valve: There was systolic bowing of the posterior leaflet, but  without diagnostic evidence for prolapse. There was mild regurgitation. Tricuspid valve: Pulmonary artery systolic pressure: 22 mmHg. 12/2015:echo    SUMMARY:  Left ventricle: Systolic function was normal. Ejection fraction was  estimated in the range of 50 % to 55 %. There was mild diffuse  hypokinesis.  Doppler parameters were consistent with abnormal left  ventricular relaxation (grade 1 diastolic dysfunction). Mitral valve: There was systolic bowing of the anterior and posterior  leaflets, but without diagnostic evidence for prolapse. There was mild  regurgitation. Tricuspid valve: There was mild regurgitation. Tricuspid regurgitation  peak velocity: 2.3 m/sec. Pulmonary artery systolic pressure: 25 mmHg. pft-6/2017  Maximal Mid Expiratory Flow rate is reduced to 43 % predicted  Forced Expiratory Volume in one second is reduced to 69 % predicted  FEV 1% is reduced     Volumes: All Volumes are reduced except for RV    Flow Volume Loop:  Nonspecific obstructive pattern in Flow Volume Loop    Bronchodilator:  No significant improvement with bronchodilator therapy    Diffusion:  Abnormal Diffusion Capacity reduced to 62 % predicted  DLCO normalizes to alveolar ventilation    Impression:  Moderate obstructive defect, Predominately small airways, Mild restrictive ventilatory defect, Reduced diffusion capacity indicating a decrease in alveolar surface area for gas exchange  I Have personally reviewed recent relevant labs available and discussed with patient    Interpretation Summary -6/2018  · Calculated left ventricular ejection fraction is 55%. Left ventricular mild concentric hypertrophy observed. Mild (grade 1) left ventricular diastolic dysfunction. · Left atrial cavity size is mildly dilated. · There was systolic bowing of the anterior and posterior leaflets, but without diagnostic evidence for prolapse. Mild mitral valve regurgitation. · Mild tricuspid valve regurgitation is present. Pulmonary arterial systolic pressure is 18 mmHg. · Mild pulmonic valve regurgitation is present. · Small pericardial effusion. Interpretation Summary 12/2018    · Left ventricular low normal systolic function. Estimated left ventricular ejection fraction is 51 - 55%. Visually measured ejection fraction.  Normal left ventricular wall motion, no regional wall motion abnormality noted. Moderate (grade 2) left ventricular diastolic dysfunction. · There is no evidence of pulmonary hypertension. · Mild to moderate mitral valve regurgitation. · Left atrial cavity size is mildly dilated. Interpretation Summary 3/2019       · Normal cavity size, wall thickness and diastolic function. There is low normal systolic function. The estimated ejection fraction is 51 - 55%. No regional wall motion abnormality noted. Global longitudinal strain is -13.00%. Abnormal left ventricular strain. · Normal right ventricular size and function. Pacing wire present  · Mild to moderate mitral valve regurgitation. Mild prolapse of the posterior leaflet within the mitral valve. · Mild pulmonic valve regurgitation is present. · Mild tricuspid valve regurgitation. Pulmonary arterial systolic pressure is 22.1 mmHg. Mild pulmonary hypertension. 5/2019    · Left Ventricle: Mild concentric hypertrophy. Severe global systolic dysfunction. Estimated left ventricular ejection fraction is 26 - 30%. Biplane method used to measure ejection fraction. Left ventricular global hypokinesis. · IVC/Hepatic Veins: Severely elevated central venous pressure (15+ mmHg); IVC diameter is larger than 21 mm and collapses less than 50% with respiration. · Pulmonary Artery: Mild pulmonary hypertension. Pulmonary arterial systolic pressure is 44 mmHg. · Pericardium: Trivial-to-small circumferential pericardial effusion measuring 10 mm. · Mitral Valve: Moderate mitral valve regurgitation. · Right Ventricle: Pacing wire present   Interpretation Summary 5/2019    · Acute occlusive thrombus present in the right peroneal vein. IMPRESSION: 5/2019     1. Positive examination for pulmonary emboli.   - There is likely a combination of acute and subacute PE in the lower lobe  segmental and subsegmental branch vessels. Interpretation Summary 7/2019    · Left Ventricle: Normal cavity size.  Mild concentric hypertrophy. Severe systolic dysfunction. Estimated left ventricular ejection fraction is 21 - 25%. Abnormal left ventricular wall motion. Inconclusive left ventricular diastolic function. · Left Atrium: Severely dilated left atrium. · Mitral Valve: Mitral valve thickening. Mitral annular calcification. Moderate mitral valve regurgitation. · Tricuspid Valve: Mild tricuspid valve regurgitation is present. Pulmonary arterial systolic pressure is 75.3 mmHg. Mild pulmonary hypertension. · IVC/Hepatic Veins: Moderately elevated central venous pressure (10-15 mmHg); IVC diameter is larger than 21 mm and collapses more than 50% with respiration. · Pericardium: Trivial-to-small pericardial effusion. Interpretation Summary 9/2019    · Left Ventricle: Normal cavity size. Upper normal wall thickness. Moderate-to-severe systolic dysfunction. Estimated left ventricular ejection fraction is 36 - 40%. Biplane method used to measure ejection fraction. Left ventricular global hypokinesis. · Pericardium: Trivial pericardial effusion. Interpretation Summary 8/2020      · LV: Estimated LVEF is 45 - 50%. Normal cavity size. Upper normal wall thickness. Wall motion: normal. Mild (grade 1) left ventricular diastolic dysfunction. · LA: Left Atrium volume index is 35.88 mL/m2. · RV: Pacer/ICD present. · MV: Mild mitral annular calcification. Mild mitral valve regurgitation is present. · TV: Mild tricuspid valve regurgitation is present. · PA: Pulmonary arterial systolic pressure is 24 mmHg. Interpretation Summary 7/2021    · LV: Estimated LVEF is 50 - 55%. Visually measured ejection fraction. Normal wall thickness and diastolic function. Dilated left ventricle. Low normal systolic function. Wall motion: normal.  · RV: Pacer/ICD present.        Conclusion 10/2021    PROCEDURES   - Generator changeout of Medtronic  biventricular automatic implantable cardioverter defibrillator.   - Conscious sedation with versed and fentanyl. Assessment         ICD-10-CM ICD-9-CM    1. Systolic CHF, chronic (HCC)  I50.22 428.22      428.0     Stable compensated continue treatment   2. Dilated cardiomyopathy (HCC)  I42.0 425.4     Stable monitor   3. AICD (automatic cardioverter/defibrillator) present  Z95.810 V45.02     Stable for generator change   4. Essential hypertension  I10 401.9     Stable monitor   5. Preoperative cardiovascular examination  Z01.810 V72.81     Okay to proceed with bracing. As planned medium cardiac risk. Okay to hold Eliquis for surgery   Tounge swelling not related to lisinopril as she had swelling even after stopping lisinopril  7/2018  I will hold Lasix as recent decrease in renal function. No clinical sign of fluid overload. 10/2018  Shortness of breath due to acute bronchitis bilateral wheezing. Will refer back to pulmonary. No sign of fluid overload. Will keep off Lasix  1/2019  Metastatic breast cancer now back on chemotherapy. Lung metastasis likely cause for shortness of breath which is improving. Low blood pressure will reduce Coreg to 6.25 twice a day. Continue lisinopril. Recheck echo in 2 months on chemotherapy  4/2019  Cardiac status stable. Echo EF remains stable continue Coreg and lisinopril. Check echo in 3 months. Patient remains on chemotherapy    5/2019  Recent admission with increased shortness of breath combination of pulmonary embolism and decompensated systolic heart failure with worsening ejection fraction. Class III CHF. 10 pound weight loss from peak. Continue current therapy. Continue anticoagulation. Follow-up 3 weeks  5/30/2019  Fluid overload stable. Remains weak. Follow-up echo in about a month to reassess LV function. 10/2019  Cardiac status stable. CHF compensated. Shortness of breath and edema improving. Continue Entresto. Aldactone increased to 50 mg a day due to hypokalemia. Lasix will be reduced to 2-3 times a week. Episode of ventricular fibrillation in 8/2019 noticed on ICD check. Patient had hypokalemia during that time will continue to monitor. If patient needs laparoscopic surgery her risk is high about 5 to 10%. If open surgical procedure is required it will carry high risk    1/2020  Cardiac status stable. She completed Chemotherapy in October and is doing well. She is tolerating Entresto. She takes lasix 2-3 x weekly as needed for edema. Reports blood work drawn this week with normal renal function and potassium. Reports an episode of syncope in Dr. Beverly Factor office in November and b/p readings were labile at that time. ICD checked 11/2019 (after syncopal episode) no arrhythmias seen. Continue current medications. 7/2020  Cardiac status stable. Continue treatment. Follow-up echo  8/2020 virtual visit  Cardiac status stable. continue current treatment,lvef improving  11/2020  Cardiac status stable. Low normal blood pressure but asymptomatic continue monitoring. CHF compensated  2/2021  Stable cardiac status CHF compensated continue current medical management tolerating treatment for CHF with Entresto and Coreg. 5/2021  Stable cardiac status. Still feels fatigue and tired. LVEF is improving. She has cut down Coreg to 12.5 mg twice a day. Blood pressure stable. CHF compensated continue treatment  10/2021  Stable cardiac status. Recent generator change for ICD. Okay to proceed with breast surgery. Okay to hold Eliquis. Medium cardiac risk. Eliquis is used on prophylaxis to reduce DVT and PE  No orders of the defined types were placed in this encounter.         Erin Odonnell MD

## 2021-11-02 ENCOUNTER — HOSPITAL ENCOUNTER (OUTPATIENT)
Dept: MAMMOGRAPHY | Age: 71
Discharge: HOME | End: 2021-11-02
Attending: SURGERY
Payer: MEDICARE

## 2021-11-02 DIAGNOSIS — R92.8 ABNORMAL MAMMOGRAM: ICD-10-CM

## 2021-11-02 DIAGNOSIS — N63.0 LUMP OR MASS IN BREAST: ICD-10-CM

## 2021-11-02 DIAGNOSIS — N60.91 ATYPICAL DUCTAL HYPERPLASIA OF RIGHT BREAST: ICD-10-CM

## 2021-11-02 PROCEDURE — 19281 PERQ DEVICE BREAST 1ST IMAG: CPT

## 2021-11-02 PROCEDURE — 74011000250 HC RX REV CODE- 250: Performed by: SURGERY

## 2021-11-02 RX ORDER — LIDOCAINE HYDROCHLORIDE 10 MG/ML
10 INJECTION, SOLUTION EPIDURAL; INFILTRATION; INTRACAUDAL; PERINEURAL
Status: COMPLETED | OUTPATIENT
Start: 2021-11-02 | End: 2021-11-02

## 2021-11-02 RX ADMIN — LIDOCAINE HYDROCHLORIDE 10 ML: 10 INJECTION, SOLUTION EPIDURAL; INFILTRATION; INTRACAUDAL; PERINEURAL at 11:25

## 2021-11-11 ENCOUNTER — HOSPITAL ENCOUNTER (OUTPATIENT)
Dept: PREADMISSION TESTING | Age: 71
Discharge: HOME | End: 2021-11-11
Payer: MEDICARE

## 2021-11-11 DIAGNOSIS — C50.912 ADENOCARCINOMA OF LEFT BREAST (HCC): Primary | ICD-10-CM

## 2021-11-11 PROCEDURE — U0003 INFECTIOUS AGENT DETECTION BY NUCLEIC ACID (DNA OR RNA); SEVERE ACUTE RESPIRATORY SYNDROME CORONAVIRUS 2 (SARS-COV-2) (CORONAVIRUS DISEASE [COVID-19]), AMPLIFIED PROBE TECHNIQUE, MAKING USE OF HIGH THROUGHPUT TECHNOLOGIES AS DESCRIBED BY CMS-2020-01-R: HCPCS

## 2021-11-12 LAB — SARS-COV-2, NAA: NOT DETECTED

## 2021-11-12 NOTE — PERIOP NOTES
PRE-SURGICAL INSTRUCTIONS        Patient's Name:  Delilah Click      EYXAT'I Date:  11/12/2021            Covid Testing Date and Time: Negative    Surgery Date:  11/16/2021                1. Do NOT eat or drink anything, including candy, gum, or ice chips after midnight on 11/15, unless you have specific instructions from your surgeon or anesthesia provider to do so.  2. You may brush your teeth before coming to the hospital.  3. No smoking 24 hours prior to the day of surgery. 4. No alcohol 24 hours prior to the day of surgery. 5. No recreational drugs for one week prior to the day of surgery. 6. Leave all valuables, including money/purse, at home. 7. Remove all jewelry, nail polish, acrylic nails, and makeup (including mascara); no lotions powders, deodorant, or perfume/cologne/after shave on the skin. 8. Follow instruction for Hibiclens washes and CHG wipes from surgeon's office. 9. Glasses/contact lenses and dentures may be worn to the hospital.  They will be removed prior to surgery. 10. Call your doctor if symptoms of a cold or illness develop within 24-48 hours prior to your surgery. 11.  If you are having an outpatient procedure, please make arrangements for a responsible ADULT TO [address] [place] [address] and stay with you for 24 hours after your surgery. 12. ONE VISITOR in the hospital at this time for outpatient procedures. Exceptions may be made for surgical admissions, per nursing unit guidelines      Special Instructions:      Bring any pertinent legal medical records. Take these medications the morning of surgery with a sip of water:  Entresto ,Digoxin , Carvedilol. Follow physician instructions about stopping anticoagulants. Last dose Eliquis 11/12  Complete bowel prep per MD instructions. On the day of surgery, come in the main entrance of [facility]. Let the  at the desk know you are there for surgery.   A staff member will come escort you to the surgical area on the second floor. If you have any questions or concerns, please do not hesitate to call:     (Prior to the day of surgery) PAT department:  [phone]   (Day of surgery) Pre-Op department:  [phone]    These surgical instructions were reviewed with Allyne Dire during the PAT phone call.

## 2021-11-15 ENCOUNTER — ANESTHESIA EVENT (OUTPATIENT)
Dept: SURGERY | Age: 71
End: 2021-11-15
Payer: MEDICARE

## 2021-11-16 ENCOUNTER — ANESTHESIA (OUTPATIENT)
Dept: SURGERY | Age: 71
End: 2021-11-16
Payer: MEDICARE

## 2021-11-16 ENCOUNTER — APPOINTMENT (OUTPATIENT)
Dept: MAMMOGRAPHY | Age: 71
End: 2021-11-16
Attending: SURGERY
Payer: MEDICARE

## 2021-11-16 ENCOUNTER — HOSPITAL ENCOUNTER (OUTPATIENT)
Age: 71
Setting detail: OUTPATIENT SURGERY
Discharge: HOME | End: 2021-11-16
Attending: SURGERY | Admitting: SURGERY
Payer: MEDICARE

## 2021-11-16 VITALS
OXYGEN SATURATION: 95 % | DIASTOLIC BLOOD PRESSURE: 58 MMHG | TEMPERATURE: 97.2 F | HEART RATE: 70 BPM | SYSTOLIC BLOOD PRESSURE: 114 MMHG | WEIGHT: 166.8 LBS | BODY MASS INDEX: 27.79 KG/M2 | RESPIRATION RATE: 16 BRPM | HEIGHT: 65 IN

## 2021-11-16 DIAGNOSIS — R92.8 ABNORMAL MAMMOGRAM: ICD-10-CM

## 2021-11-16 DIAGNOSIS — N60.92 ATYPICAL DUCTAL HYPERPLASIA OF LEFT BREAST: ICD-10-CM

## 2021-11-16 PROCEDURE — 77030040361 HC SLV COMPR DVT MDII -B: Performed by: SURGERY

## 2021-11-16 PROCEDURE — 77030010507 HC ADH SKN DERMBND J&J -B: Performed by: SURGERY

## 2021-11-16 PROCEDURE — 74011250636 HC RX REV CODE- 250/636: Performed by: NURSE ANESTHETIST, CERTIFIED REGISTERED

## 2021-11-16 PROCEDURE — 77030040922 HC BLNKT HYPOTHRM STRY -A: Performed by: SURGERY

## 2021-11-16 PROCEDURE — 88342 IMHCHEM/IMCYTCHM 1ST ANTB: CPT

## 2021-11-16 PROCEDURE — 88360 TUMOR IMMUNOHISTOCHEM/MANUAL: CPT

## 2021-11-16 PROCEDURE — 74011000250 HC RX REV CODE- 250: Performed by: NURSE ANESTHETIST, CERTIFIED REGISTERED

## 2021-11-16 PROCEDURE — 76210000006 HC OR PH I REC 0.5 TO 1 HR: Performed by: SURGERY

## 2021-11-16 PROCEDURE — 76210000020 HC REC RM PH II FIRST 0.5 HR: Performed by: SURGERY

## 2021-11-16 PROCEDURE — 76010000138 HC OR TIME 0.5 TO 1 HR: Performed by: SURGERY

## 2021-11-16 PROCEDURE — 00400 ANES INTEGUMENTARY SYS NOS: CPT | Performed by: ANESTHESIOLOGY

## 2021-11-16 PROCEDURE — 74011000250 HC RX REV CODE- 250: Performed by: SURGERY

## 2021-11-16 PROCEDURE — 77030002933 HC SUT MCRYL J&J -A: Performed by: SURGERY

## 2021-11-16 PROCEDURE — 99100 ANES PT EXTEME AGE<1 YR&>70: CPT | Performed by: ANESTHESIOLOGY

## 2021-11-16 PROCEDURE — 99100 ANES PT EXTEME AGE<1 YR&>70: CPT | Performed by: NURSE ANESTHETIST, CERTIFIED REGISTERED

## 2021-11-16 PROCEDURE — 74011250637 HC RX REV CODE- 250/637: Performed by: NURSE ANESTHETIST, CERTIFIED REGISTERED

## 2021-11-16 PROCEDURE — 2709999900 HC NON-CHARGEABLE SUPPLY: Performed by: SURGERY

## 2021-11-16 PROCEDURE — 88307 TISSUE EXAM BY PATHOLOGIST: CPT

## 2021-11-16 PROCEDURE — 19301 PARTIAL MASTECTOMY: CPT | Performed by: SURGERY

## 2021-11-16 PROCEDURE — 77030040201 HC PRB SET LOCALIZER DISP BIPT -D: Performed by: SURGERY

## 2021-11-16 PROCEDURE — 77030031139 HC SUT VCRL2 J&J -A: Performed by: SURGERY

## 2021-11-16 PROCEDURE — 77030002996 HC SUT SLK J&J -A: Performed by: SURGERY

## 2021-11-16 PROCEDURE — 77030010512 HC APPL CLP LIG J&J -C: Performed by: SURGERY

## 2021-11-16 PROCEDURE — 00400 ANES INTEGUMENTARY SYS NOS: CPT | Performed by: NURSE ANESTHETIST, CERTIFIED REGISTERED

## 2021-11-16 PROCEDURE — 77030040356 HC CORD BPLR FRCP COVD -A: Performed by: SURGERY

## 2021-11-16 PROCEDURE — 74011250636 HC RX REV CODE- 250/636: Performed by: SURGERY

## 2021-11-16 PROCEDURE — 76060000032 HC ANESTHESIA 0.5 TO 1 HR: Performed by: SURGERY

## 2021-11-16 RX ORDER — PHENYLEPHRINE HCL IN 0.9% NACL 1 MG/10 ML
SYRINGE (ML) INTRAVENOUS AS NEEDED
Status: DISCONTINUED | OUTPATIENT
Start: 2021-11-16 | End: 2021-11-16 | Stop reason: HOSPADM

## 2021-11-16 RX ORDER — HYDROMORPHONE HYDROCHLORIDE 2 MG/ML
0.25 INJECTION, SOLUTION INTRAMUSCULAR; INTRAVENOUS; SUBCUTANEOUS AS NEEDED
Status: DISCONTINUED | OUTPATIENT
Start: 2021-11-16 | End: 2021-11-16 | Stop reason: HOSPADM

## 2021-11-16 RX ORDER — FENTANYL CITRATE 50 UG/ML
50 INJECTION, SOLUTION INTRAMUSCULAR; INTRAVENOUS
Status: DISCONTINUED | OUTPATIENT
Start: 2021-11-16 | End: 2021-11-16 | Stop reason: HOSPADM

## 2021-11-16 RX ORDER — ONDANSETRON 2 MG/ML
4 INJECTION INTRAMUSCULAR; INTRAVENOUS ONCE
Status: DISCONTINUED | OUTPATIENT
Start: 2021-11-16 | End: 2021-11-16 | Stop reason: HOSPADM

## 2021-11-16 RX ORDER — SODIUM CHLORIDE 0.9 % (FLUSH) 0.9 %
5-40 SYRINGE (ML) INJECTION AS NEEDED
Status: DISCONTINUED | OUTPATIENT
Start: 2021-11-16 | End: 2021-11-16 | Stop reason: HOSPADM

## 2021-11-16 RX ORDER — DEXAMETHASONE SODIUM PHOSPHATE 4 MG/ML
INJECTION, SOLUTION INTRA-ARTICULAR; INTRALESIONAL; INTRAMUSCULAR; INTRAVENOUS; SOFT TISSUE AS NEEDED
Status: DISCONTINUED | OUTPATIENT
Start: 2021-11-16 | End: 2021-11-16 | Stop reason: HOSPADM

## 2021-11-16 RX ORDER — SODIUM CHLORIDE, SODIUM LACTATE, POTASSIUM CHLORIDE, CALCIUM CHLORIDE 600; 310; 30; 20 MG/100ML; MG/100ML; MG/100ML; MG/100ML
25 INJECTION, SOLUTION INTRAVENOUS CONTINUOUS
Status: DISCONTINUED | OUTPATIENT
Start: 2021-11-16 | End: 2021-11-16 | Stop reason: HOSPADM

## 2021-11-16 RX ORDER — PROPOFOL 10 MG/ML
INJECTION, EMULSION INTRAVENOUS AS NEEDED
Status: DISCONTINUED | OUTPATIENT
Start: 2021-11-16 | End: 2021-11-16 | Stop reason: HOSPADM

## 2021-11-16 RX ORDER — LIDOCAINE HYDROCHLORIDE 20 MG/ML
INJECTION, SOLUTION EPIDURAL; INFILTRATION; INTRACAUDAL; PERINEURAL AS NEEDED
Status: DISCONTINUED | OUTPATIENT
Start: 2021-11-16 | End: 2021-11-16 | Stop reason: HOSPADM

## 2021-11-16 RX ORDER — SODIUM CHLORIDE 0.9 % (FLUSH) 0.9 %
5-40 SYRINGE (ML) INJECTION EVERY 8 HOURS
Status: DISCONTINUED | OUTPATIENT
Start: 2021-11-16 | End: 2021-11-16 | Stop reason: HOSPADM

## 2021-11-16 RX ORDER — FAMOTIDINE 20 MG/1
20 TABLET, FILM COATED ORAL ONCE
Status: COMPLETED | OUTPATIENT
Start: 2021-11-16 | End: 2021-11-16

## 2021-11-16 RX ORDER — MIDAZOLAM HYDROCHLORIDE 1 MG/ML
INJECTION, SOLUTION INTRAMUSCULAR; INTRAVENOUS AS NEEDED
Status: DISCONTINUED | OUTPATIENT
Start: 2021-11-16 | End: 2021-11-16 | Stop reason: HOSPADM

## 2021-11-16 RX ORDER — BUPIVACAINE HYDROCHLORIDE AND EPINEPHRINE 2.5; 5 MG/ML; UG/ML
INJECTION, SOLUTION EPIDURAL; INFILTRATION; INTRACAUDAL; PERINEURAL
Status: DISCONTINUED
Start: 2021-11-16 | End: 2021-11-16 | Stop reason: HOSPADM

## 2021-11-16 RX ORDER — LIDOCAINE HYDROCHLORIDE AND EPINEPHRINE 10; 10 MG/ML; UG/ML
INJECTION, SOLUTION INFILTRATION; PERINEURAL AS NEEDED
Status: DISCONTINUED | OUTPATIENT
Start: 2021-11-16 | End: 2021-11-16 | Stop reason: HOSPADM

## 2021-11-16 RX ORDER — ONDANSETRON 2 MG/ML
INJECTION INTRAMUSCULAR; INTRAVENOUS AS NEEDED
Status: DISCONTINUED | OUTPATIENT
Start: 2021-11-16 | End: 2021-11-16 | Stop reason: HOSPADM

## 2021-11-16 RX ORDER — EPHEDRINE SULFATE/0.9% NACL/PF 25 MG/5 ML
SYRINGE (ML) INTRAVENOUS AS NEEDED
Status: DISCONTINUED | OUTPATIENT
Start: 2021-11-16 | End: 2021-11-16 | Stop reason: HOSPADM

## 2021-11-16 RX ADMIN — PROPOFOL 140 MG: 10 INJECTION, EMULSION INTRAVENOUS at 07:46

## 2021-11-16 RX ADMIN — Medication 50 MCG: at 07:59

## 2021-11-16 RX ADMIN — SODIUM CHLORIDE, SODIUM LACTATE, POTASSIUM CHLORIDE, AND CALCIUM CHLORIDE 25 ML/HR: 600; 310; 30; 20 INJECTION, SOLUTION INTRAVENOUS at 06:50

## 2021-11-16 RX ADMIN — WATER 2 G: 1 INJECTION INTRAMUSCULAR; INTRAVENOUS; SUBCUTANEOUS at 07:51

## 2021-11-16 RX ADMIN — Medication 5 MG: at 07:57

## 2021-11-16 RX ADMIN — MIDAZOLAM HYDROCHLORIDE 1 MG: 2 INJECTION, SOLUTION INTRAMUSCULAR; INTRAVENOUS at 07:35

## 2021-11-16 RX ADMIN — DEXAMETHASONE SODIUM PHOSPHATE 4 MG: 4 INJECTION, SOLUTION INTRAMUSCULAR; INTRAVENOUS at 07:52

## 2021-11-16 RX ADMIN — FAMOTIDINE 20 MG: 20 TABLET ORAL at 06:40

## 2021-11-16 RX ADMIN — LIDOCAINE HYDROCHLORIDE 50 MG: 20 INJECTION, SOLUTION EPIDURAL; INFILTRATION; INTRACAUDAL; PERINEURAL at 07:46

## 2021-11-16 RX ADMIN — MIDAZOLAM HYDROCHLORIDE 1 MG: 2 INJECTION, SOLUTION INTRAMUSCULAR; INTRAVENOUS at 07:38

## 2021-11-16 RX ADMIN — ONDANSETRON 4 MG: 2 INJECTION INTRAMUSCULAR; INTRAVENOUS at 08:07

## 2021-11-16 NOTE — H&P
History and physical reviewed. Patient was examined. No change from below. Patient marked. All questions answered. Breast Consult     Ms. Gladys Ivory is a 66year old woman with left Stage IV [id number] ER/AZ negative, HER positive breast cancer, s/p left modified radical mastectomy by Dr. Abdoulaye Hernandez 9/28/11 and right ADH, s/p core biopsy 10/4/21. She completed neoadjuvant docetaxel and cytoxan per Dr. Rosalino Huerta and completed a year of transtuzumab. According to Dr. Rosalino Huerta course was complicated by cardiomyopathy. She completed post mastectomy radiation after repositioning of her pacemaker.      In 2018 she was diagnosed with metastatic cancer in lung nodule, mediastinal nodes and sternum after presenting with cough and shortness of breath. She was treated with paclitaxel, pertuzumab and transtuzumab until worsening of her cardiomyopathy resulted in discontinuing HER directed therapy. She was transitioned to Gemcitabine with excellent response and radiographic resolution of metastasis.        Above information largely from Dr. Polina Lopez note.       She was referred to me for abnormal imaging. The area of concern was identified on routine screening exam. She denies breast pain, mass or nipple discharge. Her most recent previous mammogram was 7/21/21. She is without complaint related to her breasts.      Core biopsy 10/4/21 demonstrated ADH. *              Breast/GYN history:         Past Medical History:   Diagnosis Date    Abnormal nuclear cardiac imaging test 06/11/2010     Lg fixed anterior, septal, apical, inferoseptal defect sugg RCA & LAD disease or cardiomyopathy. EF 20%. Global hykn. Nondiagnostic EKG.  Acute bronchitis 10/15/2018     Persistent cough and wheezing in spite of prednisone and antibiotic.   Will refer to pulmonary    Adenocarcinoma of breast; locally advanced invasive ductal adenocarcinoma of left breast      Asthma      Automatic implantable cardiac defibrillator in situ    Automatic implantable cardiac defibrillator in situ      [facility]      Cancer ([facility] [facility]       breast cancer left    Chemotherapy convalescence or palliative care 2012    Chronic combined systolic and diastolic heart failure (HCC)       Remains symptomatic, nyha class 3 ef improving.  Congestive heart failure, unspecified       chronic class ll    Cortical age-related cataract of left eye 10/18/2020    Cortical age-related cataract of right eye 10/31/2020    DVT of lower limb, acute ([facility] [address] [facility] 2019     Right leg    Echocardiogram 09/27/2010     EF 30%. Global hykn. Mild MR. Mild TR.  GERD (gastroesophageal reflux disease)      History of pulmonary embolus (PE) 2019    Hyperlipidemia      Hypertension      Mitral valve disorders(424.0) 1/15/2014     mild to moderate mr     Mitral valve disorders(424.0) 1/15/2014     mild to moderate mr     MVP (mitral valve prolapse)      Nonischemic cardiomyopathy (HCC)       EF 20-30% despite medical therapy    Nonspecific abnormal electrocardiogram (ECG) (EKG)      Osteopenia      Other primary cardiomyopathies      Pacemaker 10/27/10     s/p implantation, without complication    Poisn by oth uns agents prim aff cv sys       Ef slightly better to 45%, STABLE back on chemo.  Radiation therapy      Radiation therapy complication 9437    S/P cardiac catheterization 07/08/10     Patent coronary arteries. LVEDP 25.  EF 25%. Global hykn. Moderate MR.  RA 10.  RV 40/10. PA 40/20.  20.  CO/CI 4.5/2.45 (Larry).     Shortness of breath      Syncope and collapse      Thyroid disease       goiter               Past Surgical History:   Procedure Laterality Date    COLONOSCOPY N/A 12/1/2020     COLONOSCOPY with polypectomy performed by Darcie Lorenz MD at [address] HX CATARACT REMOVAL Left 10/19/2020    HX CHOLECYSTECTOMY   11/01/2019    HX MASTECTOMY   09/28/11     modified radical mastectomy and axillary dissection    HX ORTHOPAEDIC      HX OTHER SURGICAL         surgery to remove part of liver    HX PACEMAKER   10/27/10     s/p [employer] biventricular AICD, without complication    HX RADICAL MASTECTOMY        IR CHOLECYSTOSTOMY PERCUTANEOUS   9/20/2019    IR INSERT TUNL CVC W PORT OVER 5 YEARS   1/16/2019    IR REMOVE TUNL CVAD W PORT/PUMP   9/16/2021    NEUROLOGICAL PROCEDURE UNLISTED         Cervical fusion    [place] CARDIAC SURG PROCEDURE UNLIST         Difibrillator; BI V ICD                Current Outpatient Medications on File Prior to Visit   Medication Sig Dispense Refill    carvediloL (COREG) 25 mg tablet TAKE 1 TABLET TWICE DAILY  WITH MEALS 180 Tablet 3    montelukast (SINGULAIR) 10 mg tablet Take 1 Tablet by mouth daily. PATIENT NEEDS APPOINTMENT 90 Tablet 0    Entresto 49-51 mg tab tablet TAKE 1 TABLET TWICE A  Tablet 3    digoxin (LANOXIN) 0.125 mg tablet TAKE 1 TABLET DAILY 90 Tab 3    spironolactone (ALDACTONE) 25 mg tablet TAKE 2 TABLETS DAILY (Patient taking differently: Take 50 mg by mouth daily. ) 180 Tab 3    furosemide (LASIX) 20 mg tablet Take 1 Tab by mouth daily. (Patient taking differently: Take 20 mg by mouth every Monday, Wednesday, Friday.) 30 Tab 0    DULoxetine (CYMBALTA) 60 mg capsule Take 60 mg by mouth daily.        raloxifene (EVISTA) 60 mg tablet Take 60 mg by mouth daily.        oxyCODONE-acetaminophen (Percocet) 5-325 mg per tablet Take 1 Tablet by mouth every six (6) hours as needed for Pain for up to 3 days. Max Daily Amount: 4 Tablets. (Patient not taking: Reported on 10/13/2021) 12 Tablet 0    Incruse Ellipta 62.5 mcg/actuation inhaler USE 1 INHALATION ORALLY    DAILY (Patient not taking: Reported on 9/16/2021) 1 Inhaler 5    apixaban (ELIQUIS) 2.5 mg tablet Take 1 Tab by mouth two (2) times a day.  (Patient not taking: Reported on 10/13/2021) 60 Tab 2    albuterol-ipratropium (DUO-NEB) 2.5 mg-0.5 mg/3 ml nebu 3 mL by Nebulization route every six (6) hours as needed (wheezing or sob). File under Medicare Part B, ICD codes J44.9, C78.01 (Patient not taking: Reported on 9/16/2021) 120 Nebule 3      No current facility-administered medications on file prior to visit.               Allergies   Allergen Reactions    Adhesive Other (comments)       blisters         Social History           Tobacco Use    Smoking status: Never Smoker    Smokeless tobacco: Never Used   Substance Use Topics    Alcohol use: No    Drug use: No               Family History   Problem Relation Age of Onset    Hypertension Mother      High Cholesterol Mother      Heart Disease Father           paemaker implant at 80         OB History    No obstetric history on file.               ROS:   Positives are bolded  General: fevers, chills, night sweats, fatigue, weight loss, weight gain  GI: nausea, vomiting, abdominal pain, change in bowel habits, hematochezia, melena, GERD  Integ: dermatitis, abnormal moles  HEENT: visual changes, vertigo, epistaxis, dysphagia, hoarseness  Cardiac: chest pain, palpitations, HTN, edema, varicosities  Resp: cough, shortness of breath, wheezing, hemoptysis, snoring, reactive airway disease  : hematuria, dysuria, frequency, urgency, nocturia, stress urinary incontinence   MSK: weakness, joint pain, arthritis  Endocrine: diabetes, thyroid disease, polyuria, polydipsia, polyphagia, poor wound healing, heat intolerance, cold intolerance  Lymph/Heme: anemia, bruising, history of blood transfusions  Neuro: dizziness, headache, fainting, seizures, stroke  Psych: anxiety, depression     Physical Exam:  Visit Vitals  BP (!) 91/57 (BP 1 Location: Left lower arm, BP Patient Position: Sitting)   Pulse 87   Temp 97.4 °F (36.3 °C)   Ht 5' 5\" (1.651 m)   Wt 76.7 kg (169 lb)   SpO2 98%   BMI 28.12 kg/m²         Gen:  Well developed, well nourished woman in no acute distress seborrheic keratosis  Head: normocephalic, atraumatic  Mouth: Clear, no overt lesions, oral mucosa pink and moist  Neck: supple, no masses, no adenopathy, trachea midline  Resp: clear bilaterally  Cardio: Regular rate and rhythm  Abdomen: soft, nontender, nondistended  Extremities: warm, well-perfused  Neuro: sensation and strength grossly intact and symmetrical  Psych: alert and oriented to person, place and time  Breasts: palpation deferred, core biopsy site clean, ecchymosis  Right: Examined in both the supine and upright positions. There was no supraclavicular, infraclavicular, or axillary lympadenopathy. There were no dominant masses, no skin changes, no asymmetry identified   Left[de-identified] Examined in both the supine and upright positions. There was no supraclavicular, infraclavicular, or axillary lympadenopathy. There were no dominant masses, no skin changes, no asymmetry identified s/p mastectomy without reconstruction     Imagin/15/21 right mammogram  3 suspicious hypoechoic masses right breast 9:00 position.      BIRADS 4: Suspicious abnormality  Management Recommendation: Ultrasound-guided core biopsy of the 2 larger masses  located 4 cm from the nipple and 1.5 cm from the nipple. The findings and  recommendations were discussed with the patient at the time of the exam who is  in agreement. The patient was referred to [facility], [address] [place] [address], for notification of the ordering provider and  coordination of care. Patient is prescribed Eliquis for which she stopped  yesterday for a procedure performed today.        Pathology:  10/4/21  A: Right breast mass, 9:00, 4 cm from nipple, biopsy:        Breast tissue with fibroadenomatous change, columnar cell change,   usual ductal hyperplasia, apocrine metaplasia, and intraductal   micropapillomas. B: Right breast mass, 9:00, 2 cm from nipple, biopsy:        Breast tissue with focal atypical ductal hyperplasia, usual ductal   hyperplasia, columnar cell change, and apocrine metaplasia     11 Mark SANDERS  LEFT SENTINEL LYMPH NODE, EXCISION -  ONE BENIGN-APPEARING LYMPH NODE, NEGATIVE FOR MALIGNANCY (0/1). B. LEFT BREAST AND AXILLARY CONTENTS, MODIFIED RADICAL MASTECTOMY -  INVASIVE MUCINOUS ADENOCARCINOMA. SIZE OF THE INVASIVE CARCINOMA: 5.3 X 4.2 X 1.7 CM. HISTOLOGIC TYPE: MUCINOUS ADENOCARCINOMA  HISTOLOGIC GRADE: II  NUCLEAR GRADE: II  FOCALITY: UNIFOCAL  IN-SITU CARCINOMA COMPONENT: NOT IDENTIFIED  LYMPHOVASCULAR SPACE INVASION: PRESENT  MARGINS OF RESECTION: NEGATIVE FOR CARCINOMA  MICROCALCIFICATIONS: PRESENT ASSOCIATED WITH BENIGN DUCTAL EPITHELIUM  SKIN: NEGATIVE FOR CARCINOMA  NIPPLE: POSITIVE FOR LYMPHOVASCULAR SPACE INVASION  LYMPH NODES: THREE OF NINE AXILLARY LYMPH NODES POSITIVE FOR METASTATIC  ADENOCARCINOMA (3/9). ESTROGEN RECEPTOR: NEGATIVE ([id number])  PROGESTERONE RECEPTOR: NEGATIVE ([id number])  HER2/ana (FISH): POSITIVE ([id number])  OTHER PATHOLOGIC FINDINGS: MULTIPLE SKIN SEBORRHEIC KERATOSES, LARGEST  MEASURING 2.4 CM.   AJCC STAGE: T4 N1, Mx     Impression:       Patient Active Problem List   Diagnosis Code    Hypertension I10    MVP (mitral valve prolapse) I34.1    Nonischemic cardiomyopathy (HCC) I42.8    Cancer (HCC) C80.1    Adenocarcinoma of breast; locally advanced invasive ductal adenocarcinoma of left breast C50.919    Poisn by oth uns agents prim aff cv sys T46.904A    Syncope and collapse L67    Systolic CHF, chronic (HCC) I50.22    Other primary cardiomyopathies I42.8    Shortness of breath R06.02    Nonspecific abnormal electrocardiogram (ECG) (EKG) R94.31    Automatic implantable cardioverter-defibrillator in situ Z95.810    Mitral valve disease I05.9    Abnormal PFT R94.2    Acute bronchitis J20.9    Chronic asthmatic bronchitis (HCC) J44.9    Malignant neoplasm metastatic to lung (HCC) C78.00    Seasonal allergic rhinitis due to pollen J30.1    Dilated cardiomyopathy (HCC) I42.0    Breast cancer (HCC) C50.919    Pulmonary embolism (HCC) I26.99    Chronic bronchitis (HCC) J42    Hypoxia R09.02    Lung metastases (HCC) C78.00    UTI (urinary tract infection) N39.0    CAD (coronary artery disease) I25.10    Elevated troponin R77.8    Weakness generalized R53.1    Chronic anticoagulation Z79.01    History of pulmonary embolism Z86.711    Hypokalemia E87.6    Hypomagnesemia E83.42    Acute encephalopathy G93.40    Cholecystitis K81.9    Ventricular fibrillation (HCC) I49.01    Cholecystitis, unspecified K81. 9             66year old woman with left Stage IV [id number] ER/WV negative, HER positive breast cancer, s/p left modified radical mastectomy by Dr. Jah Mobley 9/28/11 and right ADH, s/p core biopsy 10/4/21. We discussed the results of atypical ductal hyperplasia (ADH) in detail. We discussed that generally ADH is not considered cancer or pre-cancer, but rather a high risk marker. A woman with ADH may have a higher than average risk of developing breast cancer. We discussed that treatment can range from observation alone to medication. Removal of the breasts (mastectomy) is not needed or recommended for ADH alone. When diagnosed on a core biopsy, excision is generally recommended to ensure removal of abnormal cells as well as to avoid undersampling of ADH associated with cancer. Ms. Maria G Brannon is interested in excisional biopsy right breast.  Please call sooner with questions or concerns.            Electronically signed by Mary Ellen Barnes MD at 10/13/21 [id number]

## 2021-11-16 NOTE — PERIOP NOTES
Per Anesthesia MD it's okay to use this patients left hand for I.V. site for today's surgical procedure. The patient agreed as well.

## 2021-11-16 NOTE — PROGRESS NOTES
conducted a pre-surgery visit with Nohemi Benitez, who is a 66 y.o.,female. The  provided the following Interventions:  Initiated a relationship of care and support. Offered prayer and assurance of continued prayers on patient's behalf. There is no advance directive for this patient. Plan:  Chaplains will continue to follow and will provide pastoral care on an as needed/requested basis.  recommends bedside caregivers page  on duty if patient shows signs of acute spiritual or emotional distress.   Reiseñor 3   Board Certified [address] [place] [address]   [phone]

## 2021-11-16 NOTE — ANESTHESIA PREPROCEDURE EVALUATION
Relevant Problems   RESPIRATORY SYSTEM   (+) Chronic asthmatic bronchitis (HCC)   (+) Chronic bronchitis (HCC)   (+) Shortness of breath      CARDIOVASCULAR   (+) CAD (coronary artery disease)   (+) Hypertension   (+) MVP (mitral valve prolapse)   (+) Ventricular fibrillation (HCC)      PERSONAL HX & FAMILY HX OF CANCER   (+) Adenocarcinoma of breast; locally advanced invasive ductal adenocarcinoma of left breast   (+) Breast cancer (HCC)   (+) Cancer (HCC)   (+) Lung metastases (HCC)   (+) Malignant neoplasm metastatic to lung [facility])       Anesthetic History   No history of anesthetic complications            Review of Systems / Medical History  Patient summary reviewed and pertinent labs reviewed    Pulmonary            Asthma        Neuro/Psych   Within defined limits           Cardiovascular    Hypertension          Pacemaker (Newly replaced ~ 1 month ago) and CAD (Ishchemic cardiomyopathy)         GI/Hepatic/Renal     GERD: well controlled           Endo/Other      Hypothyroidism: well controlled  Cancer     Other Findings   Comments: H/O Left Breast cancer -2001 and metastasis to lung in 2019           Physical Exam    Airway  Mallampati: II  TM Distance: 4 - 6 cm  Neck ROM: normal range of motion   Mouth opening: Normal     Cardiovascular  Regular rate and rhythm,  S1 and S2 normal,  no murmur, click, rub, or gallop             Dental  No notable dental hx       Pulmonary  Breath sounds clear to auscultation               Abdominal  GI exam deferred       Other Findings            Anesthetic Plan    ASA: 3  Anesthesia type: general          Induction: Intravenous  Anesthetic plan and risks discussed with: Patient

## 2021-11-16 NOTE — DISCHARGE INSTRUCTIONS
Open Breast Biopsy: What to Expect at Home  Your Recovery  An open breast biopsy is surgery to remove abnormal breast tissue. The breast tissue will be sent to a lab, where a doctor will look at the tissue under a microscope to check for breast cancer. Your doctor may have some answers right away. But it can take up to 1 to 2 weeks to get the final results. Your doctor will discuss the results with you. For a few days after the surgery, you will probably feel tired and have some pain. The skin around the cut (incision) may feel firm, swollen, and tender. The area may be bruised. Tenderness should go away in about a week, and the bruising will fade within two weeks. Firmness and swelling may last 6 to 8 weeks. Your incision may have been closed with strips of tape or stitches. If you have strips of tape on the incision, leave the tape on for a week or until it falls off. If you have stitches, your doctor will remove them in about a week. This care sheet gives you a general idea about how long it will take for you to recover. But each person recovers at a different pace. Follow the steps below to get better as quickly as possible. How can you care for yourself at home? Activity    · Rest when you feel tired. Getting enough sleep will help you recover.     · Try to walk each day. Start by walking a little more than you did the day before. Bit by bit, increase the amount you walk. Walking boosts blood flow and helps prevent pneumonia and constipation.     · For 2 weeks, avoid strenuous activities that put pressure on your chest or that involve vigorous movement of your upper body and arm on the side of the biopsy. Examples of these might include strenuous housework, holding an active child, jogging, or aerobic exercise.     · For 2 weeks, avoid lifting anything that would make you strain.  This may include heavy grocery bags and milk containers, a heavy briefcase or backpack, cat litter or dog food bags, a vacuum , or a child.     · Ask your doctor when you can drive again.     · You will probably need to take 1 or 2 days off from work. This depends on the type of work you do and how you feel. Diet    · You can eat your normal diet. If your stomach is upset, try bland, low-fat foods like plain rice, broiled chicken, toast, and yogurt. Medicines    · Your doctor will tell you if and when you can restart your medicines. He or she will also give you instructions about taking any new medicines.     · If you take aspirin or some other blood thinner, ask your doctor if and when to start taking it again. Make sure that you understand exactly what your doctor wants you to do.     · Be safe with medicines. Take pain medicines exactly as directed. ? If the doctor gave you a prescription medicine for pain, take it as prescribed. ? If you are not taking a prescription pain medicine, ask your doctor if you can take an over-the-counter medicine.     · If you think your pain medicine is making you sick to your stomach:  ? Take your medicine after meals (unless your doctor has told you not to). ? Ask your doctor for a different pain medicine.     · If your doctor prescribed antibiotics, take them as directed. Do not stop taking them just because you feel better. You need to take the full course of antibiotics. Incision care    · If you have strips of tape on the incision, leave the tape on for a week or until it falls off.     · Wash the area daily with warm, soapy water, and pat it dry. Don't use hydrogen peroxide or alcohol, which can slow healing. You may cover the area with a gauze bandage if it weeps or rubs against clothing. Change the bandage every day.     · Keep the area clean and dry. Other instructions    · For the first 3 days after surgery, wear a supportive bra all the time, even at night. Follow-up care is a key part of your treatment and safety.  Be sure to make and go to all appointments, and call your doctor if you are having problems. It's also a good idea to know your test results and keep a list of the medicines you take. When should you call for help? Call 911 anytime you think you may need emergency care. For example, call if:    · You passed out (lost consciousness).     · You have chest pain, are short of breath, or cough up blood. Call your doctor now or seek immediate medical care if:    · You are sick to your stomach or cannot drink fluids.     · You have pain that does not get better after you take pain medicine.     · You cannot pass stools or gas.     · You have signs of a blood clot in your leg (called a deep vein thrombosis), such as:  ? Pain in your calf, back of the knee, thigh, or groin. ? Redness or swelling in your leg.     · You have signs of infection, such as:  ? Increased pain, swelling, warmth, or redness. ? Red streaks leading from the incision. ? Pus draining from the incision. ? A fever.     · You have loose stitches, or your incision comes open.     · Bright red blood has soaked through the bandage over your incision. Watch closely for changes in your health, and be sure to contact your doctor if:    · You have any problems.     · You have new or worse swelling or pain in your arm. Where can you learn more? Go to http://www.gray.com/  Enter T965 in the search box to learn more about \"Open Breast Biopsy: What to Expect at Home. \"  Current as of: December 17, 2020               Content Version: 13.0  © 2006-2021 [employer]. Care instructions adapted under license by [employer] (which disclaims liability or warranty for this information). If you have questions about a medical condition or this instruction, always ask your healthcare professional. [place] [address] any warranty or liability for your use of this information.   DISCHARGE SUMMARY from Nurse    PATIENT INSTRUCTIONS:    After general anesthesia or intravenous sedation, for 24 hours or while taking prescription Narcotics:  · Limit your activities  · Do not drive and operate hazardous machinery  · Do not make important personal or business decisions  · Do  not drink alcoholic beverages  · If you have not urinated within 8 hours after discharge, please contact your surgeon on call. Report the following to your surgeon:  · Excessive pain, swelling, redness or odor of or around the surgical area  · Temperature over 100.5  · Nausea and vomiting lasting longer than 4 hours or if unable to take medications  · Any signs of decreased circulation or nerve impairment to extremity: change in color, persistent  numbness, tingling, coldness or increase pain  · Any questions    What to do at Home:  *  Please give a list of your current medications to your Primary Care Provider. *  Please update this list whenever your medications are discontinued, doses are      changed, or new medications (including over-the-counter products) are added. *  Please carry medication information at all times in case of emergency situations. These are general instructions for a healthy lifestyle:    No smoking/ No tobacco products/ Avoid exposure to second hand smoke  Surgeon General's Warning:  Quitting smoking now greatly reduces serious risk to your health. Obesity, smoking, and sedentary lifestyle greatly increases your risk for illness    A healthy diet, regular physical exercise & weight monitoring are important for maintaining a healthy lifestyle    You may be retaining fluid if you have a history of heart failure or if you experience any of the following symptoms:  Weight gain of 3 pounds or more overnight or 5 pounds in a week, increased swelling in our hands or feet or shortness of breath while lying flat in bed. Please call your doctor as soon as you notice any of these symptoms; do not wait until your next office visit.       The discharge information has been reviewed with the patient. The patient verbalized understanding. Discharge medications reviewed with the patient and appropriate educational materials and side effects teaching were provided.   ___________________________________________________________________________________________________________________________________

## 2021-11-16 NOTE — ANESTHESIA POSTPROCEDURE EVALUATION
Procedure(s):  RIGHT BREAST EXCISIONAL BIOPSY WITH LOCALIZATION (TAG 11/2/21 @ [facility] 1300). general    Anesthesia Post Evaluation      Multimodal analgesia: multimodal analgesia used between 6 hours prior to anesthesia start to PACU discharge  Patient location during evaluation: bedside  Patient participation: complete - patient participated  Level of consciousness: awake  Pain score: 0  Pain management: adequate  Airway patency: patent  Anesthetic complications: no  Cardiovascular status: stable  Respiratory status: acceptable  Hydration status: acceptable  Post anesthesia nausea and vomiting:  controlled  Final Post Anesthesia Temperature Assessment:  Normothermia (36.0-37.5 degrees C)      INITIAL Post-op Vital signs:   Vitals Value Taken Time   /46 11/16/21 0849   Temp 36.1 °C (97 °F) 11/16/21 0821   Pulse 81 11/16/21 0853   Resp 12 11/16/21 0853   SpO2 96 % 11/16/21 0854   Vitals shown include unvalidated device data.

## 2021-11-16 NOTE — OP NOTES
Date of Procedure: 11/16/2021  Preoperative Diagnosis: C50.912 ADENOCARCINOMA OF LEFT BREAST  Postoperative Diagnosis: * No post-op diagnosis entered *  Procedure(s):  Procedure(s):  RIGHT BREAST EXCISIONAL BIOPSY WITH LOCALIZATION (TAG 11/2/21 @ [facility] 1300)    Surgeon(s) and Role:      Joe Don MD - Primary         Surgical Staff: Circ-1: Becky Choi RN  Scrub Tech-1: Quincy Patel  Surg Asst-1: Tracy Arias      Event Time In     Event Time In   Incision Start 0759   Incision Close      Event Time In   Patient In - Facility (Arrived) 8443   Patient In - Pre-op 22 [id number]   Anesthesia Start 7576   Patient Ready for Pre-op Visitors 0968   Patient Ready for [facility]   Surgeon Available 0726   Patient Out - Pre-op [id number]   Patient In - [facility]   Surgeon In [address] 0740   Incision Start [phone]   Incision Close    Surgeon out of OR 9737   Patient Out - OR    Anesthesia Stop    Patient In - Phase I    Notice to Anesthesia    Care Complete - Phase I    Patient Out - Phase I    Patient In - Phase II    Patient Tolerates Liquids    Patient Ready for Visitors    Patient Education Complete    Patient Voided    Care Complete - Phase II    Patient Out - Phase II    End of [address]    Patient In - Overflow    Patient Out - Overflow        Anesthesia: General  Anesthesia staff: Anesthesiologist: Gladys Hinojosa MD  CRNA: Elaine Alvarez CRNA  Estimated Blood Loss: 3cc  Specimens:   ID Type Source Tests Collected by Time Destination   1 : right breast tissue Preservative Breast  Soo Aceves MD 11/16/2021 0803 Pathology        Findings:  clip and tag in specimen, bipolar cautery used instead of bovie electrocautery  Complications: none noted  Implants: * No implants in log *      The patient was identified in the preoperative holding area and the risks again were reviewed with the patient who understands and agrees.  She understands the risk of bleeding, infection, damage to surrounding structures, hematoma/seroma formation, and the possibility of missing the lesion in question. She also understands additional surgery may be indicated. The patient previously underwent placement of a localizing clip. She was also marked by me to confirm the site and the procedure. The patient was taken to the operating suite and placed supine on the table. Compression stockings were placed and anesthesia induced without complication. She was then prepped and draped in the usual sterile fashion and an appropriate time-out was performed to confirm the patient, the side, and the procedure. Local anesthetic was infiltrated. An incision was made at the [address] [place] [address] position in the right breast at the inferior margin of skin lesion. We then dissected into the subcutaneous tissue of the breast towards the localizer clip. We then used 10 blade to remove a core of breast tissue around the localizing tag, attempting to remove the lesion in its entirety with attention to margins. There was excellent hemostasis with bipolar and the specimen was marked for orientation on removal of the tissue. This was then handed off the field for radiographic and pathologic analysis. A clip was placed to carson the site. The breast tissue was mobilized to close the defect with 2-0 vicryl. All sponge and instrument counts were reported to me as correct. We continued the closure with a 3-0 vicryl suture, followed by 4-0 monocryl suture. Dermabond was then applied. The family was updated after the procedure. The patient was taken to recovery in good condition.

## 2021-11-19 NOTE — PROGRESS NOTES
Ms. Ifeoma Callahan is a 66year old woman with left Stage IV [id number] ER/TX negative, HER positive breast cancer, s/p left modified radical mastectomy by Dr. 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 She had been diagnosed with ADH, on core biopsy 10/4/21. She completed neoadjuvant docetaxel and cytoxan per Dr. Cassandra Cruz and completed a year of transtuzumab. According to Dr. Cassandra Cruz course was complicated by cardiomyopathy. She completed post mastectomy radiation after repositioning of her pacemaker. In 2018 she was diagnosed with metastatic cancer in lung nodule, mediastinal nodes and sternum after presenting with cough and shortness of breath. She was treated with paclitaxel, pertuzumab and transtuzumab until worsening of her cardiomyopathy resulted in discontinuing HER directed therapy. She was transitioned to Gemcitabine with excellent response and radiographic resolution of metastasis. Above information largely from Dr. Beverly Factor note. She was referred to me for abnormal imaging. The area of concern was identified on routine screening exam. She denies breast pain, mass or nipple discharge. Her most recent previous mammogram was 7/21/21. She is without complaint related to her breasts. Core biopsy 10/4/21 demonstrated right ADH. DCIS was noted on surgical specimen. Breast/GYN history:    Past Medical History:   Diagnosis Date    Adenocarcinoma of breast; locally advanced invasive ductal adenocarcinoma of left breast     Asthma     Breast cancer ([facility] [address] 2011    Chemo and Radiation    Breast cancer metastasized to lung [facility]) 2019    Chemo    Chemotherapy convalescence or palliative care 2012    Chronic combined systolic and diastolic heart failure (HCC)     Remains symptomatic, nyha class 3 ef improving.     Congestive heart failure, unspecified     chronic class ll    DVT of lower limb, acute ([facility] [address] 2019    Right leg    GERD (gastroesophageal reflux disease)     History of pulmonary embolus (PE) 2019    Hypertension     MVP (mitral valve prolapse)     Nonischemic cardiomyopathy (HCC)     EF 20-30% despite medical therapy    Osteopenia     Pacemaker 10/27/10    s/p implantation, without complication    Radiation therapy     S/P cardiac catheterization 07/08/10    Patent coronary arteries. LVEDP 25.  EF 25%. Global hykn. Moderate MR.  RA 10.  RV 40/10. PA 40/20.  20.  CO/CI 4.5/2.45 (Larry).  Thyroid disease     goiter       Past Surgical History:   Procedure Laterality Date    COLONOSCOPY N/A 12/1/2020    COLONOSCOPY with polypectomy performed by Dem[facility]rius Valdovinos MD at [facility] HX BREAST BIOPSY Right 11/16/2021    RIGHT BREAST EXCISIONAL BIOPSY WITH LOCALIZATION (TAG 11/2/21 @ [facility] 1300) performed by Rachel Wetzel MD at [address] [place] [address] HX CATARACT REMOVAL Bilateral 10/19/2020    HX CERVICAL FUSION  2000    C5,C6    HX CHOLECYSTECTOMY  11/01/2019    HX PACEMAKER  10/27/10    s/p [employer] biventricular AICD, without complication    HX PACEMAKER  10/2021    Battery changed    HX RADICAL MASTECTOMY  09/28/11    modified radical mastectomy and axillary dissection    IR CHOLECYSTOSTOMY PERCUTANEOUS  9/20/2019    IR INSERT TUNL CVC W PORT OVER 5 YEARS  1/16/2019    IR REMOVE TUNL CVAD W PORT/PUMP  9/16/2021    [facility] ABDOMEN SURGERY PROC UNLISTED  1994    Partial liver removal    [facility] CARDIAC SURG PROCEDURE UNLIST      Difibrillator; BI V ICD       Current Outpatient Medications on File Prior to Visit   Medication Sig Dispense Refill    carvediloL (COREG) 25 mg tablet TAKE 1 TABLET TWICE DAILY  WITH MEALS 180 Tablet 3    montelukast (SINGULAIR) 10 mg tablet Take 1 Tablet by mouth daily.  PATIENT NEEDS APPOINTMENT 90 Tablet 0    Entresto 49-51 mg tab tablet TAKE 1 TABLET TWICE A  Tablet 3    digoxin (LANOXIN) 0.125 mg tablet TAKE 1 TABLET DAILY 90 Tab 3    spironolactone (ALDACTONE) 25 mg tablet TAKE 2 TABLETS DAILY (Patient taking differently: Take 50 mg by mouth daily. ) 180 Tab 3    apixaban (ELIQUIS) 2.5 mg tablet Take 1 Tab by mouth two (2) times a day. 60 Tab 2    furosemide (LASIX) 20 mg tablet Take 1 Tab by mouth daily. (Patient taking differently: Take 20 mg by mouth every Monday, Wednesday, Friday.) 30 Tab 0    DULoxetine (CYMBALTA) 60 mg capsule Take 60 mg by mouth daily.  raloxifene (EVISTA) 60 mg tablet Take 60 mg by mouth daily. No current facility-administered medications on file prior to visit. Allergies   Allergen Reactions    Adhesive Other (comments)     blisters       Social History     Tobacco Use    Smoking status: Never Smoker    Smokeless tobacco: Never Used   Vaping Use    Vaping Use: Never used   Substance Use Topics    Alcohol use: No    Drug use: Never       Family History   Problem Relation Age of Onset    Hypertension Mother     High Cholesterol Mother     Heart Disease Father         paemaker implant at 80       OB History    No obstetric history on file.            ROS:   Positives are bolded  General: fevers, chills, night sweats, fatigue, weight loss, weight gain  GI: nausea, vomiting, abdominal pain, change in bowel habits, hematochezia, melena, GERD  Integ: dermatitis, abnormal moles  HEENT: visual changes, vertigo, epistaxis, dysphagia, hoarseness  Cardiac: chest pain, palpitations, HTN, edema, varicosities  Resp: cough, shortness of breath, wheezing, hemoptysis, snoring, reactive airway disease  : hematuria, dysuria, frequency, urgency, nocturia, stress urinary incontinence   MSK: weakness, joint pain, arthritis  Endocrine: diabetes, thyroid disease, polyuria, polydipsia, polyphagia, poor wound healing, heat intolerance, cold intolerance  Lymph/Heme: anemia, bruising, history of blood transfusions  Neuro: dizziness, headache, fainting, seizures, stroke  Psych: anxiety, depression    Physical Exam:  Visit Vitals  /60   Temp 97.3 °F (36.3 °C) (Skin)   Resp 20   Ht 5' 5\" (1.651 m)   Wt 76.7 kg (169 lb)   BMI 28.12 kg/m²       Gen: Well developed, well nourished woman in no acute distress seborrheic keratosis  Head: normocephalic, atraumatic  Mouth: Clear, no overt lesions, oral mucosa pink and moist  Neck: supple, no masses, no adenopathy, trachea midline  Resp: clear bilaterally  Cardio: Regular rate and rhythm  Abdomen: soft, nontender, nondistended  Extremities: warm, well-perfused  Neuro: sensation and strength grossly intact and symmetrical  Psych: alert and oriented to person, place and time  Breasts: palpation deferred, incision clean, ecchymosis  Right: Examined in both the supine and upright positions. There was no supraclavicular, infraclavicular, or axillary lympadenopathy. There were no dominant masses, no skin changes, no asymmetry identified   Left[de-identified] Examined in both the supine and upright positions. There was no supraclavicular, infraclavicular, or axillary lympadenopathy. There were no dominant masses, no skin changes, no asymmetry identified s/p mastectomy without reconstruction    Imagin/15/21 right mammogram  3 suspicious hypoechoic masses right breast 9:00 position.      BIRADS 4: Suspicious abnormality  Management Recommendation: Ultrasound-guided core biopsy of the 2 larger masses  located 4 cm from the nipple and 1.5 cm from the nipple. The findings and  recommendations were discussed with the patient at the time of the exam who is  in agreement. The patient was referred to Ara Hoover, [address] [place] [address], for notification of the ordering provider and  coordination of care. Patient is prescribed Eliquis for which she stopped  yesterday for a procedure performed today.       Pathology:  21  Right breast, lumpectomy:        Ductal carcinoma in situ.    Proliferative fibrocystic change.      SPECIMEN       Procedure: Excision (less than total mastectomy)       Specimen Laterality: Right  TUMOR       Histologic Type: Ductal carcinoma in situ       Size (Extent) of DCIS: 3 mm       Nuclear Grade: Grade II (intermediate)       Necrosis: Not identified    MARGINS       Margins: Positive for DCIS           Positive Margin(s): Posterior    LYMPH NODES       Regional Lymph Nodes: No lymph nodes submitted or found    PATHOLOGIC STAGE CLASSIFICATION (pTNM, AJCC 8th Edition)       Primary Tumor (pT): pTis (DCIS)       Regional Lymph Nodes (pN): pNX     10/4/21  A: Right breast mass, 9:00, 4 cm from nipple, biopsy:        Breast tissue with fibroadenomatous change, columnar cell change,   usual ductal hyperplasia, apocrine metaplasia, and intraductal   micropapillomas. B: Right breast mass, 9:00, 2 cm from nipple, biopsy:        Breast tissue with focal atypical ductal hyperplasia, usual ductal   hyperplasia, columnar cell change, and apocrine metaplasia    9/28/11 Flores  A. LEFT SENTINEL LYMPH NODE, EXCISION -  ONE BENIGN-APPEARING LYMPH NODE, NEGATIVE FOR MALIGNANCY (0/1). B. LEFT BREAST AND AXILLARY CONTENTS, MODIFIED RADICAL MASTECTOMY -  INVASIVE MUCINOUS ADENOCARCINOMA. SIZE OF THE INVASIVE CARCINOMA: 5.3 X 4.2 X 1.7 CM. HISTOLOGIC TYPE: MUCINOUS ADENOCARCINOMA  HISTOLOGIC GRADE: II  NUCLEAR GRADE: II  FOCALITY: UNIFOCAL  IN-SITU CARCINOMA COMPONENT: NOT IDENTIFIED  LYMPHOVASCULAR SPACE INVASION: PRESENT  MARGINS OF RESECTION: NEGATIVE FOR CARCINOMA  MICROCALCIFICATIONS: PRESENT ASSOCIATED WITH BENIGN DUCTAL EPITHELIUM  SKIN: NEGATIVE FOR CARCINOMA  NIPPLE: POSITIVE FOR LYMPHOVASCULAR SPACE INVASION  LYMPH NODES: THREE OF NINE AXILLARY LYMPH NODES POSITIVE FOR METASTATIC  ADENOCARCINOMA (3/9). ESTROGEN RECEPTOR: NEGATIVE ([id number])  PROGESTERONE RECEPTOR: NEGATIVE ([id number])  HER2/ana (FISH): POSITIVE ([id number])  OTHER PATHOLOGIC FINDINGS: MULTIPLE SKIN SEBORRHEIC KERATOSES, LARGEST  MEASURING 2.4 CM.   AJCC STAGE: T4 N1, Mx    Impression:  Patient Active Problem List   Diagnosis Code    Hypertension I10    MVP (mitral valve prolapse) I34.1    Nonischemic cardiomyopathy (HCC) I42.8    Cancer (HCC) C80.1    Adenocarcinoma of breast; locally advanced invasive ductal adenocarcinoma of left breast C50.919    Poisn by oth uns agents prim aff cv sys T46.904A    Syncope and collapse A35    Systolic CHF, chronic (HCC) I50.22    Other primary cardiomyopathies I42.8    Shortness of breath R06.02    Nonspecific abnormal electrocardiogram (ECG) (EKG) R94.31    Automatic implantable cardioverter-defibrillator in situ Z95.810    Mitral valve disease I05.9    Abnormal PFT R94.2    Acute bronchitis J20.9    Chronic asthmatic bronchitis (HCC) J44.9    Malignant neoplasm metastatic to lung (HCC) C78.00    Seasonal allergic rhinitis due to pollen J30.1    Dilated cardiomyopathy (HCC) I42.0    Breast cancer (HCC) C50.919    Pulmonary embolism (HCC) I26.99    Chronic bronchitis (HCC) J42    Hypoxia R09.02    Lung metastases (HCC) C78.00    UTI (urinary tract infection) N39.0    CAD (coronary artery disease) I25.10    Elevated troponin R77.8    Weakness generalized R53.1    Chronic anticoagulation Z79.01    History of pulmonary embolism Z86.711    Hypokalemia E87.6    Hypomagnesemia E83.42    Acute encephalopathy G93.40    Cholecystitis K81.9    Ventricular fibrillation (HCC) I49.01    Cholecystitis, unspecified K81.9    Breast neoplasm, Tis (DCIS), right D05.11          66year old woman with left Stage IV [id number] ER/MD negative, HER positive breast cancer, s/p left modified radical mastectomy by Dr. Higinio Mcneil 9/28/11 and right DCIS, s/p partial mastectomy 11/16/21. We reviewed the pathology in detail. We discussed how  ductal carcinoma in-situ (DCIS) differs from invasive cancer. We discussed that there are many options for treatment of breast cancer. Surgery, chemotherapy, radiation and hormone therapy are all tools that may be used in the treatment of breast cancer.   For each patient, we determine which will be most beneficial based on her individual set of circumstances. For some patients all four treatment categories will be recommended. For others one or more of these options are not appropriate. Regarding surgery, there are two main options, lumpectomy or mastectomy. We discussed both in detail. Overall survival and distant recurrence rates are the same. The decision is generally a personal decision more so than a medical one. Lumpectomy is also known as breast conservation surgery or partial mastectomy. The goal is to remove the area of concern as well as surrounding area of uninvolved tissue (\"clear margins\"). Radiation is almost always recommended with lumpectomy to allow for acceptable local recurrence rates. Local recurrence rates are approximately 6% after lumpectomy with radiation. Risks, benefits and options were discussed in detail to include, but not limited to, bleeding, infection, risks of anesthesia, injury to surrounding structures and other unforeseen events such as stroke, heart attack or death. Mastectomy was then addressed. With mastectomy, almost all of the breast tissue is removed. We are not able to remove 100% of the breast tissue. The risk of local recurrence is approximately 2-4% after mastectomy. The overlying skin is generally numb. Most often, the numbness is permanent. Mastectomy can be performed with or without reconstruction. The reconstruction is performed by the plastic surgeon. Commonly it is a multi-step process with placement of tissue expanders as the first step. If she is interested in reconstruction, I will refer her to plastic surgery. The reconstructed breast differs in many ways from the native breast.  The goal is that in a bra or clothing, no one can tell she has had a mastectomy. Radiation generally is not needed after mastectomy.   There are some circumstances, usually based on size, margins, local extension or lymph node status, where post-mastectomy radiation is recommended. Risks, benefits and options were discussed in detail to include, but not limited to, bleeding, infection, risks of anesthesia, injury to surrounding structures and other unforeseen events such as stroke, heart attack or death. Regarding lymph nodes, as she is clinically node negative, she is a candidate for sentinel node biopsy if she elects for mastectomy. If she prefers lumpectomy, sentinel node biopsy would not be necessary as we can go back and perform at a subsequent time in the event invasive cancer is noted on final specimen. We discussed this procedure is a targeted sampling of the axillary lymph nodes to allow for staging of her disease. She will be injected with radioactive isotope as well as blue dye to allow for mapping and removal of the sentinel nodes. By removing only the sentinel nodes and not performing axillary node dissection, she has a decreased risk of lymphedema (arm swelling) and nerve injury. We did specifically discuss that both of these risks still exist.   Risks, benefits and options were discussed in detail to include, but not limited to, bleeding, infection, risks of anesthesia, injury to surrounding structures and other unforeseen events such as stroke, heart attack or death. If a significant amount of cancer is noted in her lymph nodes, an axillary lymph node dissection may be recommended. In this procedure more, but not all, of the lymph nodes under the arm are removed. The chance of both lymphedema and nerve injury are increased with axillary node dissection compared to sentinel node biopsy. I generally recommend referral to physical therapy and a lymphedema specialist if an axillary node dissection is performed. Chemotherapy was addressed. Chemotherapy is systemic treatment aimed largely to decrease chance of spread or recurrence of cancer.   It is administered by a medical oncologist. Often the decision for chemotherapy is made after final pathology. Chemotherapy is not recommended for DCIS. We discussed radiation. Radiation is a local therapy aimed to decrease the chance of local recurrence. It is administered under the direction of a radiation oncologist.  Radiation is almost always recommended with lumpectomy. It generally is not needed after mastectomy. There are some circumstances, usually based on size, margins, local extension or lymph node status, where post-mastectomy radiation is recommended. Most commonly it is administered five days a week for up to seven weeks. Most of the side effects, with the exception of fatigue, are local.  For women with implants, the risk of contracture and other complication may be higher with radiation therapy. Anti-hormone or hormone blocking therapy is used in hormone sensitive, estrogen receptor positive breast cancer. It is generally recommended for 5-10 years. There are two categories of hormone blocking medications. Tamoxifen is a selective estrogen reuptake modulator (SERM). There are several aromatase inhibitors. These medications are generally prescribed by a medical oncologist.      We discussed genetic testing as well. I would be happy to send if she is interested. Previously she had not felt it necessary as she has sons, but she is considering now with second cancer and granddaughters. Ms. Owen Whitney has essentially had lumpectomy with positive margin. We discussed reexcision versus completion mastectomy. She will discuss with Dr. Lulú Barrientos and let us know her decision. All questions were answered. She was asked to call with any additional questions or concerns.

## 2021-11-22 ENCOUNTER — OFFICE VISIT (OUTPATIENT)
Dept: SURGERY | Age: 71
End: 2021-11-22
Payer: MEDICARE

## 2021-11-22 VITALS
HEIGHT: 65 IN | TEMPERATURE: 97.3 F | BODY MASS INDEX: 28.16 KG/M2 | DIASTOLIC BLOOD PRESSURE: 60 MMHG | RESPIRATION RATE: 20 BRPM | SYSTOLIC BLOOD PRESSURE: 112 MMHG | WEIGHT: 169 LBS

## 2021-11-22 DIAGNOSIS — D05.11 BREAST NEOPLASM, TIS (DCIS), RIGHT: Primary | ICD-10-CM

## 2021-11-22 PROCEDURE — 99024 POSTOP FOLLOW-UP VISIT: CPT | Performed by: SURGERY

## 2021-11-22 NOTE — PROGRESS NOTES
Patient presents for post op visit with hx of left Stage IV [id number] ER/ID negative, HER positive breast cancer, s/p left modified radical mastectomy by Dr. Jah Mobley 9/28/11 and right DCIS, s/p partial mastectomy 11/16/21. She had been diagnosed with ADH, on core biopsy 10/4/21. 1. Have you been to the ER, urgent care clinic since your last visit? Hospitalized since your last visit? No    2. Have you seen or consulted any other health care providers outside of the [address] [place] [address] since your last visit? Include any pap smears or colon screening.  No

## 2021-11-22 NOTE — LETTER
11/22/2021 11:00 AM    Patient:  Iliana Daley   YOB: 1950  Date of Visit: 11/22/2021      Robbie Burk, [address], [place]. \[facility].\"" [id number]  Via Fax: [phone]     Cyn Kaminski MD  [address] [address]  [address] [address] [address]  Via Fax: [phone]    Dear MD Cyn Richard MD,      I had the pleasure of seeing Ms. Ashley Penaloza in my office today for her newly diagnosed right DCIS. I am including a copy of my office visit today. If you have questions, please do not hesitate to call me. I look forward to following Ms. Radha Moore along with you and will keep you updated as to her progress.            Sincerely,      Sue Sandhu MD

## 2021-12-09 NOTE — DISCHARGE SUMMARY
[address] [place] [address]    Name:  Tabitha Hearn  MR#:   [id number]  :  1950  ACCOUNT #:  [de-identified]  ADMIT DATE:  2019  DISCHARGE DATE:  2019    PRIMARY CARE PHYSICIAN:  Junior Marcio MD    DISPOSITION:  Discharged to home with home health care. DISCHARGE CONDITION:  Stable. DISCHARGE DIAGNOSES:  1. Acute metabolic encephalopathy, improved now. 2.  Recurrent urinary tract infection. 3.  Chronic systolic heart failure, ejection fraction of around 25%. 4.  Acute pulmonary embolism and deep venous thrombosis, on Eliquis. 5.  Hypertension. 6.  Metastatic breast cancer. 7.  Moderate mitral regurgitation. 8.  Hypokalemia, repleted in the hospital.  9.  Asymptomatic gallbladder stones. DISCHARGE MEDICATIONS:  DuoNeb every 6 hours p.r.n., Eliquis 5 mg b.i.d., vitamin B12 one tablet daily, Biotin 2500 mcg daily, Symbicort 1 puff b.i.d., Coreg 25 mg b.i.d., Vantin 200 mg b.i.d. for 5 days, vitamin D3 2000 International Units daily, Cymbalta 30 mg daily, digoxin 0.125 mg daily,  Evista 60 mg daily, Lasix 20 mg daily, melatonin 10 mg at bedtime, Nasonex nasal spray as needed, Singulair 10 mg daily as needed, multivitamin 1 tablet daily, naloxone as needed, Neutra-Phos 2 packets b.i.d., Entresto 1 tablet b.i.d., Spiriva inhalation capsule daily, spironolactone 25 mg daily, Tessalon Perles t.i.d. p.r.n. for cough. CONSULTATIONS IN THE HOSPITAL:  Consultation with Dr. Shauna Purcell, Urology. Consultation with Dr. Qiana Bell, Cardiology. PROCEDURES:  None. MAJOR INVESTIGATIONS DURING THE HOSPITAL STAY:  1. The patient had a CT of abdomen and pelvis which showed no obstructive changes in the kidney or ureters, no signs of pyelonephritis, stable lung nodules, large gallstones noted. 2.  The patient also had an echocardiogram today. Preliminary report per Cardiology. No change in her EF.     HOSPITAL COURSE:  This is a 69-year-old  female who presented to the emergency room with confusion, was noted to have UTI, was admitted to the hospital because of her acute metabolic encephalopathy and also because of her UTI. The patient was treated with antibiotics. Given her recurrent UTIs, CT of abdomen and pelvis was done which did not show any stones and no obstructive uropathy. Given her recurrent UTI, Urology was consulted. Urology saw the patient and recommended no clear signs of urological concern other than some mild microscopic hematuria. For that, the patient will be followed outpatient. Urology signed up on the patient. Cardiology saw the patient as the patient had some symptoms of lightheadedness. Cardiology recommended that the patient is euvolemic. Recommended to continue Lasix and also added Aldactone. Cardiology followed up on the patient today, recommended the patient is stable. Recommended okay for discharge from the standpoint after limited echo. Per my discussion with Cardiology, limited echo has no changes though, Cardiology cleared for discharge. The patient's urine culture proved contaminant, I am not sure if the patient had the possibility of urinary tract infection. Given her symptoms and abnormal UA, I am going to treat her with antibiotics for a total of seven days. The patient's confusion currently is completely resolved. She is back to her baseline. She has been ambulating without any problem. She would like to go home, and also  also feels she is very much back to normal.  They would like to take her home. Given her urine cultures and blood cultures are negative and no fevers, the patient is hemodynamically and medically stable for discharge. The patient will be discharged home today. DISCHARGE INSTRUCTIONS, FOLLOWUP APPOINTMENTS, AND PHYSICAL EXAMINATION:  Please refer to the previous discharge summary. The patient had a rapid improvement in her symptoms and also no further fevers.   The patient is safe to be discharged. Virginie Edwards MD      [id number]  D:  08/28/2019 18:15  T:  08/29/2019 1:15  JOB #:  [id number]  CC:   MD Radha Anderson MD Total Volume Injected (Ccs- Only Use Numbers And Decimals): 0.6

## 2021-12-20 ENCOUNTER — TELEPHONE (OUTPATIENT)
Dept: SURGERY | Age: 71
End: 2021-12-20

## 2021-12-20 ENCOUNTER — TELEPHONE (OUTPATIENT)
Dept: CARDIOLOGY CLINIC | Age: 71
End: 2021-12-20

## 2021-12-20 NOTE — TELEPHONE ENCOUNTER
Patient is scheduled to have Right mastectomy surgery on 1/4/22 at [facility] by Dr Paty Maldonado, can we clear from last visit? Can she hold Eliquis? How many days?   Please advise

## 2021-12-20 NOTE — TELEPHONE ENCOUNTER
Left message with Dr. Cole Beavers office to request clearance for right mastectomy on Tuesday, January 4, 2022/obtain eliquis pre op management. Inquire if Ms. Keyon Smallwood is fine to proceed with scheduled surgery based on October 2021 clearance.

## 2021-12-21 NOTE — TELEPHONE ENCOUNTER
Okay to proceed with surgery medium cardiac risk.   Okay to hold Eliquis for 2 days prior to surgery

## 2021-12-21 NOTE — TELEPHONE ENCOUNTER
Called and left message with Ananya Yeboah from Dr. Maxine Figueroa office regarding clearance for patient per Dr. Bertram Serrato. Okay to proceed with surgery medium cardiac risk. Okay to hold Eliquis for 2 days prior to surgery. If any questions to call office.

## 2021-12-27 RX ORDER — CHOLECALCIFEROL TAB 125 MCG (5000 UNIT) 125 MCG
5000 TAB ORAL DAILY
COMMUNITY

## 2021-12-27 RX ORDER — ASCORBIC ACID 500 MG
1000 TABLET ORAL DAILY
COMMUNITY

## 2021-12-27 RX ORDER — SPIRONOLACTONE 50 MG/1
50 TABLET, FILM COATED ORAL DAILY
COMMUNITY
End: 2022-01-27 | Stop reason: SDUPTHER

## 2021-12-27 RX ORDER — MULTIVIT WITH MINERALS/HERBS
1 TABLET ORAL DAILY
COMMUNITY

## 2021-12-27 RX ORDER — BISMUTH SUBSALICYLATE 262 MG
1 TABLET,CHEWABLE ORAL DAILY
COMMUNITY

## 2021-12-27 RX ORDER — TEMAZEPAM 30 MG/1
30 CAPSULE ORAL
COMMUNITY
Start: 2021-11-11

## 2021-12-27 NOTE — PERIOP NOTES
PRE-SURGICAL INSTRUCTIONS        Patient's Name:  Alice Cummings      MQCID'K Date:  12/27/2021            Covid Testing Date and Time: Vaccinated    Surgery Date:  1/4/2022                1. Do NOT eat or drink anything, including candy, gum, or ice chips after midnight on 01/03, unless you have specific instructions from your surgeon or anesthesia provider to do so.  2. You may brush your teeth before coming to the hospital.  3. No smoking 24 hours prior to the day of surgery. 4. No alcohol 24 hours prior to the day of surgery. 5. No recreational drugs for one week prior to the day of surgery. 6. Leave all valuables, including money/purse, at home. 7. Remove all jewelry, nail polish, acrylic nails, and makeup (including mascara); no lotions powders, deodorant, or perfume/cologne/after shave on the skin. 8. Follow instruction for Hibiclens washes and CHG wipes from surgeon's office. 9. Glasses/contact lenses and dentures may be worn to the hospital.  They will be removed prior to surgery. 10. Call your doctor if symptoms of a cold or illness develop within 24-48 hours prior to your surgery. 11.  If you are having an outpatient procedure, please make arrangements for a responsible ADULT TO [address] [place] [address] and stay with you for 24 hours after your surgery. 12. ONE VISITOR in the hospital at this time for outpatient procedures. Exceptions may be made for surgical admissions, per nursing unit guidelines      Special Instructions:      Bring any pertinent legal medical records. Take these medications the morning of surgery with a sip of water:  Digoxin , Entresto , Carvedilol , Spironolactone. Follow physician instructions about stopping anticoagulants. Eliquis 2 days prior. On the day of surgery, come in the main entrance of [facility]. Let the  at the desk know you are there for surgery.   A staff member will come escort you to the surgical area on the second floor. If you have any questions or concerns, please do not hesitate to call:     (Prior to the day of surgery) [facility] department:  [phone]   (Day of surgery) Pre-Op department:  [phone]    These surgical instructions were reviewed with Vanesa Cunha during the [facility] phone call.

## 2021-12-31 ENCOUNTER — TELEPHONE (OUTPATIENT)
Dept: SURGERY | Age: 71
End: 2021-12-31

## 2021-12-31 NOTE — TELEPHONE ENCOUNTER
Spoke to Ms. Trevino to inform of surgery time arrival of 5:30am/stop eliquis 48 hours prior to surgery per cardiologist.

## 2022-01-03 ENCOUNTER — ANESTHESIA EVENT (OUTPATIENT)
Dept: SURGERY | Age: 72
End: 2022-01-03
Payer: MEDICARE

## 2022-01-04 ENCOUNTER — HOSPITAL ENCOUNTER (OUTPATIENT)
Age: 72
Setting detail: OUTPATIENT SURGERY
Discharge: HOME | End: 2022-01-04
Attending: SURGERY | Admitting: SURGERY
Payer: MEDICARE

## 2022-01-04 ENCOUNTER — ANESTHESIA (OUTPATIENT)
Dept: SURGERY | Age: 72
End: 2022-01-04
Payer: MEDICARE

## 2022-01-04 VITALS
TEMPERATURE: 97.1 F | DIASTOLIC BLOOD PRESSURE: 58 MMHG | RESPIRATION RATE: 20 BRPM | SYSTOLIC BLOOD PRESSURE: 105 MMHG | HEIGHT: 65 IN | BODY MASS INDEX: 27.92 KG/M2 | WEIGHT: 167.6 LBS | HEART RATE: 70 BPM | OXYGEN SATURATION: 96 %

## 2022-01-04 DIAGNOSIS — D05.11 BREAST NEOPLASM, TIS (DCIS), RIGHT: Primary | ICD-10-CM

## 2022-01-04 PROCEDURE — 77030002996 HC SUT SLK J&J -A: Performed by: SURGERY

## 2022-01-04 PROCEDURE — 74011250636 HC RX REV CODE- 250/636: Performed by: NURSE ANESTHETIST, CERTIFIED REGISTERED

## 2022-01-04 PROCEDURE — 74011250637 HC RX REV CODE- 250/637: Performed by: SURGERY

## 2022-01-04 PROCEDURE — 19303 MAST SIMPLE COMPLETE: CPT | Performed by: SURGERY

## 2022-01-04 PROCEDURE — 77030037400 HC ADH TISS HI VISC EXOFIN CHMP -B: Performed by: SURGERY

## 2022-01-04 PROCEDURE — 74011000272 HC RX REV CODE- 272: Performed by: SURGERY

## 2022-01-04 PROCEDURE — 77030040922 HC BLNKT HYPOTHRM STRY -A: Performed by: SURGERY

## 2022-01-04 PROCEDURE — 00400 ANES INTEGUMENTARY SYS NOS: CPT | Performed by: ANESTHESIOLOGY

## 2022-01-04 PROCEDURE — 77030032490 HC SLV COMPR SCD KNE COVD -B: Performed by: SURGERY

## 2022-01-04 PROCEDURE — 77030031139 HC SUT VCRL2 J&J -A: Performed by: SURGERY

## 2022-01-04 PROCEDURE — 76210000026 HC REC RM PH II 1 TO 1.5 HR: Performed by: SURGERY

## 2022-01-04 PROCEDURE — 74011000250 HC RX REV CODE- 250: Performed by: SURGERY

## 2022-01-04 PROCEDURE — 77030002933 HC SUT MCRYL J&J -A: Performed by: SURGERY

## 2022-01-04 PROCEDURE — 76010000153 HC OR TIME 1.5 TO 2 HR: Performed by: SURGERY

## 2022-01-04 PROCEDURE — 74011250636 HC RX REV CODE- 250/636

## 2022-01-04 PROCEDURE — 74011000258 HC RX REV CODE- 258: Performed by: NURSE ANESTHETIST, CERTIFIED REGISTERED

## 2022-01-04 PROCEDURE — 74011000250 HC RX REV CODE- 250: Performed by: NURSE ANESTHETIST, CERTIFIED REGISTERED

## 2022-01-04 PROCEDURE — 76060000034 HC ANESTHESIA 1.5 TO 2 HR: Performed by: SURGERY

## 2022-01-04 PROCEDURE — 2709999900 HC NON-CHARGEABLE SUPPLY: Performed by: SURGERY

## 2022-01-04 PROCEDURE — 77030010509 HC AIRWY LMA MSK TELE -A: Performed by: ANESTHESIOLOGY

## 2022-01-04 PROCEDURE — 74011250636 HC RX REV CODE- 250/636: Performed by: SURGERY

## 2022-01-04 PROCEDURE — 77030013079 HC BLNKT BAIR HGGR 3M -A: Performed by: ANESTHESIOLOGY

## 2022-01-04 PROCEDURE — 00400 ANES INTEGUMENTARY SYS NOS: CPT | Performed by: NURSE ANESTHETIST, CERTIFIED REGISTERED

## 2022-01-04 PROCEDURE — 74011250637 HC RX REV CODE- 250/637: Performed by: NURSE ANESTHETIST, CERTIFIED REGISTERED

## 2022-01-04 PROCEDURE — 76210000016 HC OR PH I REC 1 TO 1.5 HR: Performed by: SURGERY

## 2022-01-04 PROCEDURE — 99100 ANES PT EXTEME AGE<1 YR&>70: CPT | Performed by: ANESTHESIOLOGY

## 2022-01-04 PROCEDURE — 77030040361 HC SLV COMPR DVT MDII -B: Performed by: SURGERY

## 2022-01-04 PROCEDURE — 99100 ANES PT EXTEME AGE<1 YR&>70: CPT | Performed by: NURSE ANESTHETIST, CERTIFIED REGISTERED

## 2022-01-04 PROCEDURE — 88307 TISSUE EXAM BY PATHOLOGIST: CPT

## 2022-01-04 RX ORDER — SODIUM CHLORIDE 0.9 % (FLUSH) 0.9 %
5-40 SYRINGE (ML) INJECTION EVERY 8 HOURS
Status: DISCONTINUED | OUTPATIENT
Start: 2022-01-04 | End: 2022-01-04 | Stop reason: HOSPADM

## 2022-01-04 RX ORDER — FAMOTIDINE 20 MG/1
20 TABLET, FILM COATED ORAL ONCE
Status: COMPLETED | OUTPATIENT
Start: 2022-01-04 | End: 2022-01-04

## 2022-01-04 RX ORDER — ONDANSETRON 2 MG/ML
INJECTION INTRAMUSCULAR; INTRAVENOUS
Status: COMPLETED
Start: 2022-01-04 | End: 2022-01-04

## 2022-01-04 RX ORDER — LIDOCAINE HYDROCHLORIDE 20 MG/ML
INJECTION, SOLUTION EPIDURAL; INFILTRATION; INTRACAUDAL; PERINEURAL AS NEEDED
Status: DISCONTINUED | OUTPATIENT
Start: 2022-01-04 | End: 2022-01-04 | Stop reason: HOSPADM

## 2022-01-04 RX ORDER — OXYCODONE AND ACETAMINOPHEN 5; 325 MG/1; MG/1
1 TABLET ORAL ONCE
Status: COMPLETED | OUTPATIENT
Start: 2022-01-04 | End: 2022-01-04

## 2022-01-04 RX ORDER — SODIUM CHLORIDE, SODIUM LACTATE, POTASSIUM CHLORIDE, CALCIUM CHLORIDE 600; 310; 30; 20 MG/100ML; MG/100ML; MG/100ML; MG/100ML
125 INJECTION, SOLUTION INTRAVENOUS CONTINUOUS
Status: DISCONTINUED | OUTPATIENT
Start: 2022-01-04 | End: 2022-01-04 | Stop reason: HOSPADM

## 2022-01-04 RX ORDER — FENTANYL CITRATE 50 UG/ML
50 INJECTION, SOLUTION INTRAMUSCULAR; INTRAVENOUS AS NEEDED
Status: DISCONTINUED | OUTPATIENT
Start: 2022-01-04 | End: 2022-01-04 | Stop reason: HOSPADM

## 2022-01-04 RX ORDER — SODIUM CHLORIDE 0.9 % (FLUSH) 0.9 %
5-40 SYRINGE (ML) INJECTION AS NEEDED
Status: DISCONTINUED | OUTPATIENT
Start: 2022-01-04 | End: 2022-01-04 | Stop reason: HOSPADM

## 2022-01-04 RX ORDER — SODIUM CHLORIDE, SODIUM LACTATE, POTASSIUM CHLORIDE, CALCIUM CHLORIDE 600; 310; 30; 20 MG/100ML; MG/100ML; MG/100ML; MG/100ML
INJECTION, SOLUTION INTRAVENOUS
Status: DISCONTINUED | OUTPATIENT
Start: 2022-01-04 | End: 2022-01-04 | Stop reason: HOSPADM

## 2022-01-04 RX ORDER — FENTANYL CITRATE 50 UG/ML
INJECTION, SOLUTION INTRAMUSCULAR; INTRAVENOUS AS NEEDED
Status: DISCONTINUED | OUTPATIENT
Start: 2022-01-04 | End: 2022-01-04 | Stop reason: HOSPADM

## 2022-01-04 RX ORDER — PHENYLEPHRINE HCL IN 0.9% NACL 1 MG/10 ML
SYRINGE (ML) INTRAVENOUS AS NEEDED
Status: DISCONTINUED | OUTPATIENT
Start: 2022-01-04 | End: 2022-01-04 | Stop reason: HOSPADM

## 2022-01-04 RX ORDER — DEXAMETHASONE SODIUM PHOSPHATE 4 MG/ML
INJECTION, SOLUTION INTRA-ARTICULAR; INTRALESIONAL; INTRAMUSCULAR; INTRAVENOUS; SOFT TISSUE AS NEEDED
Status: DISCONTINUED | OUTPATIENT
Start: 2022-01-04 | End: 2022-01-04 | Stop reason: HOSPADM

## 2022-01-04 RX ORDER — EPHEDRINE SULFATE/0.9% NACL/PF 25 MG/5 ML
SYRINGE (ML) INTRAVENOUS AS NEEDED
Status: DISCONTINUED | OUTPATIENT
Start: 2022-01-04 | End: 2022-01-04 | Stop reason: HOSPADM

## 2022-01-04 RX ORDER — HYDROMORPHONE HYDROCHLORIDE 2 MG/ML
INJECTION, SOLUTION INTRAMUSCULAR; INTRAVENOUS; SUBCUTANEOUS
Status: COMPLETED
Start: 2022-01-04 | End: 2022-01-04

## 2022-01-04 RX ORDER — FENTANYL CITRATE 50 UG/ML
100 INJECTION, SOLUTION INTRAMUSCULAR; INTRAVENOUS
Status: DISCONTINUED | OUTPATIENT
Start: 2022-01-04 | End: 2022-01-04 | Stop reason: HOSPADM

## 2022-01-04 RX ORDER — FENTANYL CITRATE 50 UG/ML
INJECTION, SOLUTION INTRAMUSCULAR; INTRAVENOUS
Status: COMPLETED
Start: 2022-01-04 | End: 2022-01-04

## 2022-01-04 RX ORDER — LIDOCAINE HYDROCHLORIDE 10 MG/ML
0.1 INJECTION, SOLUTION EPIDURAL; INFILTRATION; INTRACAUDAL; PERINEURAL AS NEEDED
Status: DISCONTINUED | OUTPATIENT
Start: 2022-01-04 | End: 2022-01-04 | Stop reason: HOSPADM

## 2022-01-04 RX ORDER — OXYCODONE AND ACETAMINOPHEN 5; 325 MG/1; MG/1
1 TABLET ORAL
Qty: 10 TABLET | Refills: 0 | Status: SHIPPED | OUTPATIENT
Start: 2022-01-04 | End: 2022-01-07

## 2022-01-04 RX ORDER — ONDANSETRON 2 MG/ML
INJECTION INTRAMUSCULAR; INTRAVENOUS AS NEEDED
Status: DISCONTINUED | OUTPATIENT
Start: 2022-01-04 | End: 2022-01-04 | Stop reason: HOSPADM

## 2022-01-04 RX ORDER — ONDANSETRON 2 MG/ML
4 INJECTION INTRAMUSCULAR; INTRAVENOUS ONCE
Status: COMPLETED | OUTPATIENT
Start: 2022-01-04 | End: 2022-01-04

## 2022-01-04 RX ORDER — PROPOFOL 10 MG/ML
INJECTION, EMULSION INTRAVENOUS AS NEEDED
Status: DISCONTINUED | OUTPATIENT
Start: 2022-01-04 | End: 2022-01-04 | Stop reason: HOSPADM

## 2022-01-04 RX ADMIN — Medication 10 MG: at 08:48

## 2022-01-04 RX ADMIN — FENTANYL CITRATE 25 MCG: 50 INJECTION, SOLUTION INTRAMUSCULAR; INTRAVENOUS at 09:02

## 2022-01-04 RX ADMIN — SODIUM CHLORIDE, SODIUM LACTATE, POTASSIUM CHLORIDE, AND CALCIUM CHLORIDE: 600; 310; 30; 20 INJECTION, SOLUTION INTRAVENOUS at 09:00

## 2022-01-04 RX ADMIN — Medication 100 MCG: at 07:56

## 2022-01-04 RX ADMIN — FENTANYL CITRATE 25 MCG: 50 INJECTION, SOLUTION INTRAMUSCULAR; INTRAVENOUS at 09:35

## 2022-01-04 RX ADMIN — SODIUM CHLORIDE, SODIUM LACTATE, POTASSIUM CHLORIDE, AND CALCIUM CHLORIDE: 600; 310; 30; 20 INJECTION, SOLUTION INTRAVENOUS at 07:40

## 2022-01-04 RX ADMIN — SODIUM CHLORIDE, SODIUM LACTATE, POTASSIUM CHLORIDE, AND CALCIUM CHLORIDE 125 ML/HR: 600; 310; 30; 20 INJECTION, SOLUTION INTRAVENOUS at 07:02

## 2022-01-04 RX ADMIN — Medication 200 MCG: at 08:01

## 2022-01-04 RX ADMIN — LIDOCAINE HYDROCHLORIDE 100 MG: 20 INJECTION, SOLUTION EPIDURAL; INFILTRATION; INTRACAUDAL; PERINEURAL at 07:44

## 2022-01-04 RX ADMIN — Medication 200 MCG: at 08:39

## 2022-01-04 RX ADMIN — ONDANSETRON 4 MG: 2 INJECTION INTRAMUSCULAR; INTRAVENOUS at 10:05

## 2022-01-04 RX ADMIN — Medication 100 MCG: at 08:33

## 2022-01-04 RX ADMIN — PROPOFOL 150 MG: 10 INJECTION, EMULSION INTRAVENOUS at 07:45

## 2022-01-04 RX ADMIN — FENTANYL CITRATE 25 MCG: 50 INJECTION, SOLUTION INTRAMUSCULAR; INTRAVENOUS at 08:54

## 2022-01-04 RX ADMIN — Medication 100 MCG: at 08:44

## 2022-01-04 RX ADMIN — ONDANSETRON 4 MG: 2 INJECTION INTRAMUSCULAR; INTRAVENOUS at 08:29

## 2022-01-04 RX ADMIN — FENTANYL CITRATE 25 MCG: 50 INJECTION, SOLUTION INTRAMUSCULAR; INTRAVENOUS at 08:18

## 2022-01-04 RX ADMIN — OXYCODONE HYDROCHLORIDE AND ACETAMINOPHEN 1 TABLET: 5; 325 TABLET ORAL at 10:50

## 2022-01-04 RX ADMIN — Medication 100 MCG: at 08:15

## 2022-01-04 RX ADMIN — FENTANYL CITRATE 25 MCG: 50 INJECTION INTRAMUSCULAR; INTRAVENOUS at 09:35

## 2022-01-04 RX ADMIN — DEXMEDETOMIDINE HYDROCHLORIDE 6 MCG: 100 INJECTION, SOLUTION, CONCENTRATE INTRAVENOUS at 08:28

## 2022-01-04 RX ADMIN — FENTANYL CITRATE 25 MCG: 50 INJECTION, SOLUTION INTRAMUSCULAR; INTRAVENOUS at 07:58

## 2022-01-04 RX ADMIN — Medication 5 MG: at 07:50

## 2022-01-04 RX ADMIN — WATER 2 G: 1 INJECTION INTRAMUSCULAR; INTRAVENOUS; SUBCUTANEOUS at 07:50

## 2022-01-04 RX ADMIN — DEXMEDETOMIDINE HYDROCHLORIDE 4 MCG: 100 INJECTION, SOLUTION, CONCENTRATE INTRAVENOUS at 08:20

## 2022-01-04 RX ADMIN — FAMOTIDINE 20 MG: 20 TABLET ORAL at 07:01

## 2022-01-04 RX ADMIN — DEXMEDETOMIDINE HYDROCHLORIDE 6 MCG: 100 INJECTION, SOLUTION, CONCENTRATE INTRAVENOUS at 07:43

## 2022-01-04 RX ADMIN — DEXAMETHASONE SODIUM PHOSPHATE 4 MG: 4 INJECTION, SOLUTION INTRAMUSCULAR; INTRAVENOUS at 07:55

## 2022-01-04 RX ADMIN — Medication 200 MCG: at 07:58

## 2022-01-04 RX ADMIN — HYDROMORPHONE HYDROCHLORIDE 0.5 MG: 2 INJECTION, SOLUTION INTRAMUSCULAR; INTRAVENOUS; SUBCUTANEOUS at 09:50

## 2022-01-04 RX ADMIN — DEXMEDETOMIDINE HYDROCHLORIDE 4 MCG: 100 INJECTION, SOLUTION, CONCENTRATE INTRAVENOUS at 08:06

## 2022-01-04 RX ADMIN — Medication 10 MG: at 07:53

## 2022-01-04 NOTE — PROGRESS NOTES
conducted a pre-surgery visit with Nayeli Cronin, who is a 66 y.o.,female. The  provided the following Interventions:  Initiated a relationship of care and support. Offered prayer and assurance of continued prayers on patient's behalf. There is no advance directive for this patient. Plan:  Chaplains will continue to follow and will provide pastoral care on an as needed/requested basis.  recommends bedside caregivers page  on duty if patient shows signs of acute spiritual or emotional distress.   Reiseñor 3   Board Certified [address] [place] [address]   [phone]

## 2022-01-04 NOTE — ANESTHESIA PREPROCEDURE EVALUATION
Relevant Problems   RESPIRATORY SYSTEM   (+) Chronic asthmatic bronchitis (HCC)   (+) Chronic bronchitis (HCC)   (+) Shortness of breath      CARDIOVASCULAR   (+) CAD (coronary artery disease)   (+) Hypertension   (+) MVP (mitral valve prolapse)   (+) Ventricular fibrillation (HCC)      PERSONAL HX & FAMILY HX OF CANCER   (+) Adenocarcinoma of breast; locally advanced invasive ductal adenocarcinoma of left breast   (+) Breast cancer (HCC)   (+) Cancer (HCC)   (+) Lung metastases (HCC)   (+) Malignant neoplasm metastatic to lung [facility])       Anesthetic History   No history of anesthetic complications            Review of Systems / Medical History  Patient summary reviewed and pertinent labs reviewed    Pulmonary            Asthma : well controlled    Comments: H/o lung cancer   Neuro/Psych   Within defined limits           Cardiovascular    Hypertension      CHF: NYHA Classification II, dyspnea on exertion  Dysrhythmias   Pacemaker, CAD and hyperlipidemia    Exercise tolerance: <4 METS  Comments: EF 50%   GI/Hepatic/Renal     GERD           Endo/Other      Hypothyroidism: well controlled  Cancer     Other Findings              Physical Exam    Airway  Mallampati: II  TM Distance: > 6 cm  Neck ROM: normal range of motion   Mouth opening: Normal     Cardiovascular  Regular rate and rhythm,  S1 and S2 normal,  no murmur, click, rub, or gallop             Dental  No notable dental hx       Pulmonary  Breath sounds clear to auscultation               Abdominal  GI exam deferred       Other Findings            Anesthetic Plan    ASA: 3  Anesthesia type: general          Induction: Intravenous  Anesthetic plan and risks discussed with: Patient

## 2022-01-04 NOTE — H&P
History and physical reviewed. Patient was examined. No change from below. Patient marked. All questions answered. Patient requesting Percocet for pain postoperatively. Allergies noted, confirmed patient has taken and tolerated this or similar medication in the past and this is her choice for narcotic pain medication. We discussed tylenol and ibuprofen may be sufficient, assuming she has not been told to avoid either medication (generally due to concerns for kidneys, stomach or liver). I have advised her to limit acetaminophen from all sources to less than 4,000mg per day and not to drive while taking narcotic pain medication. I have recommended taking narcotic pain medication sparingly and only if needed. If using the narcotic pain medication, I have recommended taking with food. We discussed this class of medication can constipate, so I have encouraged use of Miralax or Milk of Magnesia if constipation occurs. This class of medication can also be addicting. Again I recommend taking narcotic pain medication sparingly and only if needed. Ms. Curry Palumbo is a 66year old woman with left Stage IV [id number] ER/GA negative, HER positive breast cancer, s/p left modified radical mastectomy by Dr. 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 She had been diagnosed with ADH, on core biopsy 10/4/21. She completed neoadjuvant docetaxel and cytoxan per Dr. Rocky Edwards and completed a year of transtuzumab. According to Dr. Rocky Edwards course was complicated by cardiomyopathy. She completed post mastectomy radiation after repositioning of her pacemaker.      In 2018 she was diagnosed with metastatic cancer in lung nodule, mediastinal nodes and sternum after presenting with cough and shortness of breath. She was treated with paclitaxel, pertuzumab and transtuzumab until worsening of her cardiomyopathy resulted in discontinuing HER directed therapy.   She was transitioned to Gemcitabine with excellent response and radiographic resolution of metastasis.        Above information largely from Dr. Lela Osler note.       She was referred to me for abnormal imaging. The area of concern was identified on routine screening exam. She denies breast pain, mass or nipple discharge. Her most recent previous mammogram was 7/21/21. She is without complaint related to her breasts.      Core biopsy 10/4/21 demonstrated right ADH. DCIS was noted on surgical specimen.                Breast/GYN history:         Past Medical History:   Diagnosis Date    Adenocarcinoma of breast; locally advanced invasive ductal adenocarcinoma of left breast      Asthma      Breast cancer ([address] 2011     Chemo and Radiation    Breast cancer metastasized to lung ([facility] [address] 2019     Chemo    Chemotherapy convalescence or palliative care 2012    Chronic combined systolic and diastolic heart failure (HCC)       Remains symptomatic, nyha class 3 ef improving.  Congestive heart failure, unspecified       chronic class ll    DVT of lower limb, acute ([facility] [address] 2019     Right leg    GERD (gastroesophageal reflux disease)      History of pulmonary embolus (PE) 2019    Hypertension      MVP (mitral valve prolapse)      Nonischemic cardiomyopathy (HCC)       EF 20-30% despite medical therapy    Osteopenia      Pacemaker 10/27/10     s/p implantation, without complication    Radiation therapy      S/P cardiac catheterization 07/08/10     Patent coronary arteries. LVEDP 25.  EF 25%. Global hykn. Moderate MR.  RA 10.  RV 40/10. PA 40/20.  20.  CO/CI 4.5/2.45 (Larry).     Thyroid disease       goiter               Past Surgical History:   Procedure Laterality Date    COLONOSCOPY N/A 12/1/2020     COLONOSCOPY with polypectomy performed by Jean Pierre Casillas MD at [facility]    HX BREAST BIOPSY Right 11/16/2021     RIGHT BREAST EXCISIONAL BIOPSY WITH LOCALIZATION (TAG 11/2/21 @ [facility]) performed by Cindi Javier MD MORGAN at [address] [place] [address] HX CATARACT REMOVAL Bilateral 10/19/2020    HX CERVICAL FUSION   2000     C5,C6    HX CHOLECYSTECTOMY   11/01/2019    HX PACEMAKER   10/27/10     s/p [employer] biventricular AICD, without complication    HX PACEMAKER   10/2021     Battery changed    HX RADICAL MASTECTOMY   09/28/11     modified radical mastectomy and axillary dissection    IR CHOLECYSTOSTOMY PERCUTANEOUS   9/20/2019    IR INSERT TUNL CVC W PORT OVER 5 YEARS   1/16/2019    IR REMOVE TUNL CVAD W PORT/PUMP   9/16/2021    MS ABDOMEN SURGERY PROC UNLISTED   1994     Partial liver removal    MS CARDIAC SURG PROCEDURE UNLIST         Difibrillator; BI V ICD                Current Outpatient Medications on File Prior to Visit   Medication Sig Dispense Refill    carvediloL (COREG) 25 mg tablet TAKE 1 TABLET TWICE DAILY  WITH MEALS 180 Tablet 3    montelukast (SINGULAIR) 10 mg tablet Take 1 Tablet by mouth daily. PATIENT NEEDS APPOINTMENT 90 Tablet 0    Entresto 49-51 mg tab tablet TAKE 1 TABLET TWICE A  Tablet 3    digoxin (LANOXIN) 0.125 mg tablet TAKE 1 TABLET DAILY 90 Tab 3    spironolactone (ALDACTONE) 25 mg tablet TAKE 2 TABLETS DAILY (Patient taking differently: Take 50 mg by mouth daily. ) 180 Tab 3    apixaban (ELIQUIS) 2.5 mg tablet Take 1 Tab by mouth two (2) times a day. 60 Tab 2    furosemide (LASIX) 20 mg tablet Take 1 Tab by mouth daily.  (Patient taking differently: Take 20 mg by mouth every Monday, Wednesday, Friday.) 30 Tab 0    DULoxetine (CYMBALTA) 60 mg capsule Take 60 mg by mouth daily.        raloxifene (EVISTA) 60 mg tablet Take 60 mg by mouth daily.          No current facility-administered medications on file prior to visit.               Allergies   Allergen Reactions    Adhesive Other (comments)       blisters         Social History           Tobacco Use    Smoking status: Never Smoker    Smokeless tobacco: Never Used   Vaping Use    Vaping Use: Never used   Substance Use Topics  Alcohol use: No    Drug use: Never         Family History   Problem Relation Age of Onset    Hypertension Mother      High Cholesterol Mother      Heart Disease Father           paemaker implant at 80         OB History    No obstetric history on file.               ROS:   Positives are bolded  General: fevers, chills, night sweats, fatigue, weight loss, weight gain  GI: nausea, vomiting, abdominal pain, change in bowel habits, hematochezia, melena, GERD  Integ: dermatitis, abnormal moles  HEENT: visual changes, vertigo, epistaxis, dysphagia, hoarseness  Cardiac: chest pain, palpitations, HTN, edema, varicosities  Resp: cough, shortness of breath, wheezing, hemoptysis, snoring, reactive airway disease  : hematuria, dysuria, frequency, urgency, nocturia, stress urinary incontinence   MSK: weakness, joint pain, arthritis  Endocrine: diabetes, thyroid disease, polyuria, polydipsia, polyphagia, poor wound healing, heat intolerance, cold intolerance  Lymph/Heme: anemia, bruising, history of blood transfusions  Neuro: dizziness, headache, fainting, seizures, stroke  Psych: anxiety, depression     Physical Exam:  Visit Vitals  /60   Temp 97.3 °F (36.3 °C) (Skin)   Resp 20   Ht 5' 5\" (1.651 m)   Wt 76.7 kg (169 lb)   BMI 28.12 kg/m²         Gen: Well developed, well nourished woman in no acute distress seborrheic keratosis  Head: normocephalic, atraumatic  Mouth: Clear, no overt lesions, oral mucosa pink and moist  Neck: supple, no masses, no adenopathy, trachea midline  Resp: clear bilaterally  Cardio: Regular rate and rhythm  Abdomen: soft, nontender, nondistended  Extremities: warm, well-perfused  Neuro: sensation and strength grossly intact and symmetrical  Psych: alert and oriented to person, place and time  Breasts: palpation deferred, incision clean, ecchymosis  Right: Examined in both the supine and upright positions. There was no supraclavicular, infraclavicular, or axillary lympadenopathy.    There were no dominant masses, no skin changes, no asymmetry identified   Left[de-identified] Examined in both the supine and upright positions. There was no supraclavicular, infraclavicular, or axillary lympadenopathy. There were no dominant masses, no skin changes, no asymmetry identified s/p mastectomy without reconstruction     Imagin/15/21 right mammogram  3 suspicious hypoechoic masses right breast 9:00 position.      BIRADS 4: Suspicious abnormality  Management Recommendation: Ultrasound-guided core biopsy of the 2 larger masses  located 4 cm from the nipple and 1.5 cm from the nipple. The findings and  recommendations were discussed with the patient at the time of the exam who is  in agreement. The patient was referred to [facility], [address] [place] [address], for notification of the ordering provider and  coordination of care. Patient is prescribed Eliquis for which she stopped  yesterday for a procedure performed today.        Pathology:  21  Right breast, lumpectomy:        Ductal carcinoma in situ. Proliferative fibrocystic change.      SPECIMEN       Procedure: Excision (less than total mastectomy)       Specimen Laterality: Right    TUMOR       Histologic Type: Ductal carcinoma in situ       Size (Extent) of DCIS: 3 mm       Nuclear Grade: Grade II (intermediate)       Necrosis: Not identified    MARGINS       Margins: Positive for DCIS           Positive Margin(s): Posterior    LYMPH NODES       Regional Lymph Nodes: No lymph nodes submitted or found    PATHOLOGIC STAGE CLASSIFICATION (pTNM, AJCC 8th Edition)       Primary Tumor (pT): pTis (DCIS)       Regional Lymph Nodes (pN): pNX      10/4/21  A: Right breast mass, 9:00, 4 cm from nipple, biopsy:        Breast tissue with fibroadenomatous change, columnar cell change,   usual ductal hyperplasia, apocrine metaplasia, and intraductal   micropapillomas.      B: Right breast mass, 9:00, 2 cm from nipple, biopsy:        Breast tissue with focal atypical ductal hyperplasia, usual ductal   hyperplasia, columnar cell change, and apocrine metaplasia     9/28/11 Flores  A. LEFT SENTINEL LYMPH NODE, EXCISION -  ONE BENIGN-APPEARING LYMPH NODE, NEGATIVE FOR MALIGNANCY (0/1). B. LEFT BREAST AND AXILLARY CONTENTS, MODIFIED RADICAL MASTECTOMY -  INVASIVE MUCINOUS ADENOCARCINOMA. SIZE OF THE INVASIVE CARCINOMA: 5.3 X 4.2 X 1.7 CM. HISTOLOGIC TYPE: MUCINOUS ADENOCARCINOMA  HISTOLOGIC GRADE: II  NUCLEAR GRADE: II  FOCALITY: UNIFOCAL  IN-SITU CARCINOMA COMPONENT: NOT IDENTIFIED  LYMPHOVASCULAR SPACE INVASION: PRESENT  MARGINS OF RESECTION: NEGATIVE FOR CARCINOMA  MICROCALCIFICATIONS: PRESENT ASSOCIATED WITH BENIGN DUCTAL EPITHELIUM  SKIN: NEGATIVE FOR CARCINOMA  NIPPLE: POSITIVE FOR LYMPHOVASCULAR SPACE INVASION  LYMPH NODES: THREE OF NINE AXILLARY LYMPH NODES POSITIVE FOR METASTATIC  ADENOCARCINOMA (3/9). ESTROGEN RECEPTOR: NEGATIVE ([id number])  PROGESTERONE RECEPTOR: NEGATIVE ([id number])  HER2/ana (FISH): POSITIVE ([id number])  OTHER PATHOLOGIC FINDINGS: MULTIPLE SKIN SEBORRHEIC KERATOSES, LARGEST  MEASURING 2.4 CM.   AJCC STAGE: T4 N1, Mx     Impression:       Patient Active Problem List   Diagnosis Code    Hypertension I10    MVP (mitral valve prolapse) I34.1    Nonischemic cardiomyopathy (HCC) I42.8    Cancer (HCC) C80.1    Adenocarcinoma of breast; locally advanced invasive ductal adenocarcinoma of left breast C50.919    Poisn by oth uns agents prim aff cv sys T46.904A    Syncope and collapse A02    Systolic CHF, chronic (HCC) I50.22    Other primary cardiomyopathies I42.8    Shortness of breath R06.02    Nonspecific abnormal electrocardiogram (ECG) (EKG) R94.31    Automatic implantable cardioverter-defibrillator in situ Z95.810    Mitral valve disease I05.9    Abnormal PFT R94.2    Acute bronchitis J20.9    Chronic asthmatic bronchitis (HCC) J44.9    Malignant neoplasm metastatic to lung (HCC) C78.00    Seasonal allergic rhinitis due to pollen J30.1    Dilated cardiomyopathy (HCC) I42.0    Breast cancer (HCC) C50.919    Pulmonary embolism (HCC) I26.99    Chronic bronchitis (HCC) J42    Hypoxia R09.02    Lung metastases (HCC) C78.00    UTI (urinary tract infection) N39.0    CAD (coronary artery disease) I25.10    Elevated troponin R77.8    Weakness generalized R53.1    Chronic anticoagulation Z79.01    History of pulmonary embolism Z86.711    Hypokalemia E87.6    Hypomagnesemia E83.42    Acute encephalopathy G93.40    Cholecystitis K81.9    Ventricular fibrillation (HCC) I49.01    Cholecystitis, unspecified K81.9    Breast neoplasm, Tis (DCIS), right D05.11             66year old woman with left Stage IV [id number] ER/WV negative, HER positive breast cancer, s/p left modified radical mastectomy by Dr. Vicky Valencia 9/28/11 and right DCIS, s/p partial mastectomy 11/16/21.     We reviewed the pathology in detail. We discussed how  ductal carcinoma in-situ (DCIS) differs from invasive cancer.         We discussed that there are many options for treatment of breast cancer. Surgery, chemotherapy, radiation and hormone therapy are all tools that may be used in the treatment of breast cancer. For each patient, we determine which will be most beneficial based on her individual set of circumstances. For some patients all four treatment categories will be recommended. For others one or more of these options are not appropriate.     Regarding surgery, there are two main options, lumpectomy or mastectomy. We discussed both in detail. Overall survival and distant recurrence rates are the same. The decision is generally a personal decision more so than a medical one.      Lumpectomy is also known as breast conservation surgery or partial mastectomy. The goal is to remove the area of concern as well as surrounding area of uninvolved tissue (\"clear margins\").    Radiation is almost always recommended with lumpectomy to allow for acceptable local recurrence rates. Local recurrence rates are approximately 6% after lumpectomy with radiation. Risks, benefits and options were discussed in detail to include, but not limited to, bleeding, infection, risks of anesthesia, injury to surrounding structures and other unforeseen events such as stroke, heart attack or death.       Mastectomy was then addressed. With mastectomy, almost all of the breast tissue is removed. We are not able to remove 100% of the breast tissue. The risk of local recurrence is approximately 2-4% after mastectomy. The overlying skin is generally numb. Most often, the numbness is permanent. Mastectomy can be performed with or without reconstruction. The reconstruction is performed by the plastic surgeon. Commonly it is a multi-step process with placement of tissue expanders as the first step. If she is interested in reconstruction, I will refer her to plastic surgery. The reconstructed breast differs in many ways from the native breast.  The goal is that in a bra or clothing, no one can tell she has had a mastectomy. Radiation generally is not needed after mastectomy. There are some circumstances, usually based on size, margins, local extension or lymph node status, where post-mastectomy radiation is recommended. Risks, benefits and options were discussed in detail to include, but not limited to, bleeding, infection, risks of anesthesia, injury to surrounding structures and other unforeseen events such as stroke, heart attack or death.      Regarding lymph nodes, as she is clinically node negative, she is a candidate for sentinel node biopsy if she elects for mastectomy. If she prefers lumpectomy, sentinel node biopsy would not be necessary as we can go back and perform at a subsequent time in the event invasive cancer is noted on final specimen. We discussed this procedure is a targeted sampling of the axillary lymph nodes to allow for staging of her disease. She will be injected with radioactive isotope as well as blue dye to allow for mapping and removal of the sentinel nodes. By removing only the sentinel nodes and not performing axillary node dissection, she has a decreased risk of lymphedema (arm swelling) and nerve injury. We did specifically discuss that both of these risks still exist.   Risks, benefits and options were discussed in detail to include, but not limited to, bleeding, infection, risks of anesthesia, injury to surrounding structures and other unforeseen events such as stroke, heart attack or death.     If a significant amount of cancer is noted in her lymph nodes, an axillary lymph node dissection may be recommended. In this procedure more, but not all, of the lymph nodes under the arm are removed. The chance of both lymphedema and nerve injury are increased with axillary node dissection compared to sentinel node biopsy. I generally recommend referral to physical therapy and a lymphedema specialist if an axillary node dissection is performed.      Chemotherapy was addressed. Chemotherapy is systemic treatment aimed largely to decrease chance of spread or recurrence of cancer. It is administered by a medical oncologist.  Often the decision for chemotherapy is made after final pathology. Chemotherapy is not recommended for DCIS.     We discussed radiation. Radiation is a local therapy aimed to decrease the chance of local recurrence. It is administered under the direction of a radiation oncologist.  Radiation is almost always recommended with lumpectomy. It generally is not needed after mastectomy. There are some circumstances, usually based on size, margins, local extension or lymph node status, where post-mastectomy radiation is recommended. Most commonly it is administered five days a week for up to seven weeks.   Most of the side effects, with the exception of fatigue, are local.  For women with implants, the risk of contracture and other complication may be higher with radiation therapy.       Anti-hormone or hormone blocking therapy is used in hormone sensitive, estrogen receptor positive breast cancer. It is generally recommended for 5-10 years. There are two categories of hormone blocking medications. Tamoxifen is a selective estrogen reuptake modulator (SERM). There are several aromatase inhibitors. These medications are generally prescribed by a medical oncologist.       We discussed genetic testing as well. I would be happy to send if she is interested. Previously she had not felt it necessary as she has sons, but she is considering now with second cancer and granddaughters.      Ms. Jo Ann Dee has essentially had lumpectomy with positive margin. We discussed reexcision versus completion mastectomy. She will discuss with Dr. Kimmie Purcell and let us know her decision. All questions were answered.   She was asked to call with any additional questions or concerns.                      Electronically signed by Dilip Dai MD at 11/22/21 1100

## 2022-01-04 NOTE — ANESTHESIA POSTPROCEDURE EVALUATION
Procedure(s):  RIGHT MASTECTOMY. general    Anesthesia Post Evaluation      Multimodal analgesia: multimodal analgesia used between 6 hours prior to anesthesia start to PACU discharge  Patient location during evaluation: PACU  Patient participation: complete - patient participated  Level of consciousness: awake  Pain score: 3  Pain management: adequate  Airway patency: patent  Anesthetic complications: no  Cardiovascular status: acceptable  Respiratory status: acceptable  Hydration status: acceptable  Post anesthesia nausea and vomiting:  none  Final Post Anesthesia Temperature Assessment:  Normothermia (36.0-37.5 degrees C)      INITIAL Post-op Vital signs:   Vitals Value Taken Time   /58 01/04/22 0923   Temp 36.1 °C (97 °F) 01/04/22 0913   Pulse 86 01/04/22 0925   Resp 11 01/04/22 0925   SpO2 100 % 01/04/22 0925   Vitals shown include unvalidated device data.

## 2022-01-04 NOTE — DISCHARGE INSTRUCTIONS
Patient Education     BAILEE LINDALANEY Friday 1/7/22       Mastectomy: What to Expect at Home  Your Recovery     Right after the surgery, you will probably feel weak, and you may feel sore for 2 to 3 days. You may feel pulling or stretching near or under your arm. You may also have itching, tingling, and throbbing in the area. This will get better in a few days. You will likely have several drains near your incision. These help with healing. The drains will be removed when the fluid buildup slows. Drains are usually removed in the first few weeks after surgery. You may be able to go back to your normal routine or return to work in several weeks, but it may take longer. How long it takes you to recover will depend on the type of surgery you had. It also depends on whether you had breast reconstruction at the same time, or if you need other treatment. Your doctor or nurse will be able to give you an idea of what you can expect. When you find out that you have cancer, you may feel many emotions and may need some help coping. This is common. Seek out family, friends, and counselors for support. You also can do things at home to make yourself feel better while you go through treatment. Call the [employer] (2-397.145.6493) or visit its website at [address] to learn more. This care sheet gives you a general idea about how long it will take for you to recover. But each person recovers at a different pace. Follow the steps below to get better as quickly as possible. How can you care for yourself at home? Activity    · Rest when you feel tired. Getting enough sleep will help you recover. After any activity, rest and raise your affected arm for a period of time equal to your activity time.     · Try to walk each day. Start by walking a little more than you did the day before. Bit by bit, increase the amount you walk.  Walking boosts blood flow and helps prevent pneumonia and constipation.     · Avoid strenuous activities, such as biking, jogging, weightlifting, or aerobic exercise, until your doctor says it is okay. This includes housework, especially if you have to use your affected arm. You will probably be able to do your normal activities in 3 to 6 weeks. Avoid repeated motions with your affected arm, such as weed pulling, window cleaning, or vacuuming, for 6 months.     · Avoid lifting anything over 10 to 15 pounds for 4 to 6 weeks. This may include a child, grocery bags, a heavy briefcase or backpack, cat litter or dog food bags, or a vacuum .     · Ask your doctor when you can drive again.     · You will probably be able to go back to work or your normal routine in 3 to 6 weeks. This depends on the type of work you do and any further treatment.     · Your doctor will let you know how soon you can take showers or baths. Diet    · You can eat your normal diet. If your stomach is upset, try bland, low-fat foods like plain rice, broiled chicken, toast, and yogurt.     · Drink plenty of fluids (unless your doctor tells you not to).     · You may notice that your bowels are not regular right after your surgery. This is common. Try to avoid constipation and straining with bowel movements. Take a fiber supplement such as Citrucel or Metamucil every day. If you have not had a bowel movement after a couple of days, take a mild laxative like Milk of Magnesia or a stool softener like Colace. Medicines    · Your doctor will tell you if and when you can restart your medicines. He or she will also give you instructions about taking any new medicines.     · If you take aspirin or some other blood thinner, ask your doctor if and when to start taking it again. Make sure that you understand exactly what your doctor wants you to do.     · Take pain medicines exactly as directed. ? If the doctor gave you a prescription medicine for pain, take it as prescribed.   ? If you are not taking a prescription pain medicine, ask your doctor if you can take an over-the-counter medicine.     · If your doctor prescribed antibiotics, take them as directed. Do not stop taking them just because you feel better. You need to take the full course of antibiotics.     · If you think your pain medicine is making you sick to your stomach:  ? Take your medicine after meals (unless your doctor has told you not to). ? Ask your doctor for a different pain medicine. Incision care    · You will have a dressing over the cut (incision). A dressing helps the incision heal and protects it. Your doctor will tell you how to take care of this.     · A woman may wear a special bra (surgi-bra) that holds the dressing in place after the surgery. The doctor will say when the bra is no longer needed. Drain care    · You may have one or more drains near your incision. Your doctor will tell you how to take care of them. Arm exercises    · If you had any lymph nodes removed from under your arm, your doctor will advise you to do arm exercises. Do not do the exercises until your doctor says it is okay. Ice and elevation    · Do not use ice for swelling or pain.     · Prop up your arm on a pillow when you sit or lie down. Try to keep your arm above the level of your heart. This will help reduce swelling. Follow-up care is a key part of your treatment and safety. Be sure to make and go to all appointments, and call your doctor if you are having problems. It's also a good idea to know your test results and keep a list of the medicines you take. When should you call for help? Call 911 anytime you think you may need emergency care. For example, call if:    · You passed out (lost consciousness).     · You have chest pain, are short of breath, or cough up blood.    Call your doctor now or seek immediate medical care if:    · You are sick to your stomach or cannot drink fluids.     · You cannot pass stools or gas.     · You have pain that does not get better after you take your pain medicine.     · You have loose stitches, or your incision comes open.     · Bright red blood has soaked through the bandage over your incision.     · You have signs of a blood clot in your leg (called a deep vein thrombosis), such as:  ? Pain in your calf, back of the knee, thigh, or groin. ? Redness or swelling in your leg.     · You have signs of infection, such as:  ? Increased pain, swelling, warmth, or redness. ? Red streaks leading from the incision. ? Pus draining from the incision. ? A fever. Watch closely for changes in your health, and be sure to contact your doctor if:    · You have any problems.     · You have new or worse swelling or pain in your arm. Where can you learn more? Go to http://www.gray.com/  Enter S340 in the search box to learn more about \"Mastectomy: What to Expect at Home. \"  Current as of: December 17, 2020               Content Version: 13.0  © 2006-2021 [employer]. Care instructions adapted under license by [employer] (which disclaims liability or warranty for this information). If you have questions about a medical condition or this instruction, always ask your healthcare professional. [place] [address] any warranty or liability for your use of this information. Patient Education        Surgical Drain Care: Care Instructions  Your Care Instructions     After a surgery, fluid may collect inside your body in the surgical area. This makes an infection or other problems more likely. A surgical drain allows the fluid to flow out. The doctor puts a thin, flexible rubber tube into the area of your body where the fluid is likely to collect. The rubber tube carries the fluid outside your body. The most common type of surgical drain carries the fluid into a collection bulb that you empty. This is called a Joshua-Marcos (RAMO) drain.  The drain uses suction created by the bulb to pull the fluid from your body into the bulb. The rubber tube will probably be held in place by one or two stitches in your skin. The bulb will probably be attached with a safety pin to your clothes or near the bandage so that it doesn't flip around or pull on the stitches. Another type of drain is called a Penrose drain. This type of drain doesn't have a bulb. Instead, the end of the tube is open. That allows the fluid to drain onto a dressing taped to your skin. The drain may be kept in place next to your skin with a stitch or a safety pin in the tube. When you first get the drain, the fluid will be bloody. It will change color from red to pink to a light yellow or clear as the wound heals and the fluid starts to go away. Your doctor may give you information on when you no longer need the drain and when it will be removed. Follow-up care is a key part of your treatment and safety. Be sure to make and go to all appointments, and call your doctor if you are having problems. It's also a good idea to know your test results and keep a list of the medicines you take. How do you empty the bulb of a Joshua-Marcos drain? Follow any instructions your doctor gives you. How often you empty the bulb depends on how much fluid is draining. Empty the bulb when it is half full. 1. Wash your hands with soap and water. 2. Take the plug out of the bulb. 3. Empty the bulb. If your doctor asks you to measure the fluid, empty the fluid into a measuring cup, and write down the color and how much you collected. Your doctor will want to know this information. 4. Clean the plug with alcohol. 5. Squeeze the bulb until it is flat. This removes all the air from the bulb. You may need to put the bulb on a table or a counter to flatten it. 6. Keep the bulb flat, and put the plug in. The bulb should stay flat after you put the plug back in. This creates the suction that pulls the fluid into the bulb.   7. Empty the fluid into the toilet. 8. Wash your hands. How do you change the dressing around your surgical drain? You may have a dressing (bandage). The dressing is often made of gauze pads held on with tape. Your doctor will tell you how often to change it. 1. Wash your hands with soap and water. 2. Take off the dressing from around the drain. 3. Clean the drain site and the skin around it with soap and water. Use gauze or a cotton swab. 4. When the site is dry, put on a new dressing. The way your dressing is put on depends on what kind of drain you have. You will get instructions for your type of drain. 5. Wash your hands again with soap and water. Your doctor may ask you to keep track of your dressing changes. Write down the time of day and the amount and color of the fluid on the dressing. How do you help prevent clogs in your surgical drain? Squeezing or \"milking\" the tube of your surgical drain can help prevent clogs so that it drains correctly. Your doctor will tell you when you need to do this. In general, you do this when:  · You see a clot in the tube that prevents fluid from draining. The clot may look like a dark, stringy lining. · You see fluid leaking around the tube where it goes into the skin. Follow these steps for milking the tube. 1. Use one hand to hold and pinch the tube where it leaves the skin. 2. With the thumb and first finger of your other hand, pinch the tube just below where you're holding it. 3. Slowly and firmly push your thumb and first finger down the tubing toward the end of the tube. 4. Repeat this as many times as needed to move the clot. If you have a Joshua-Marcos (RAMO) drain, the clot should move down the tube and into the bulb. If you have a Penrose drain, the clot should move into the dressing. When should you call for help?    Call your doctor now or seek immediate medical care if:    · You have signs of infection, such as:  ? Increased pain, swelling, warmth, or redness around the area.  ? Red streaks leading from the area. ? Pus draining from the area. ? A fever.     · You see a sudden change in the color or smell of the drainage.     · The tube is coming loose where it leaves your skin. Watch closely for changes in your health, and be sure to contact your doctor if:    · You see a lot of fluid around the drain.     · You cannot remove a clot from the tube by milking the tube. Where can you learn more? Go to http://www.gray.com/  Enter K117 in the search box to learn more about \"Surgical Drain Care: Care Instructions. \"  Current as of: July 1, 2021               Content Version: 13.0  © 2006-2021 [employer]. Care instructions adapted under license by [employer] (which disclaims liability or warranty for this information). If you have questions about a medical condition or this instruction, always ask your healthcare professional. [place] [address] any warranty or liability for your use of this information. DISCHARGE SUMMARY from Nurse    PATIENT INSTRUCTIONS:    After general anesthesia or intravenous sedation, for 24 hours or while taking prescription Narcotics:  · Limit your activities  · Do not drive and operate hazardous machinery  · Do not make important personal or business decisions  · Do  not drink alcoholic beverages  · If you have not urinated within 8 hours after discharge, please contact your surgeon on call.     Report the following to your surgeon:  · Excessive pain, swelling, redness or odor of or around the surgical area  · Temperature over 100.5  · Nausea and vomiting lasting longer than 4 hours or if unable to take medications  · Any signs of decreased circulation or nerve impairment to extremity: change in color, persistent  numbness, tingling, coldness or increase pain  · Any questions        These are general instructions for a healthy lifestyle:    No smoking/ No tobacco products/ Avoid exposure to second hand smoke  Surgeon General's Warning:  Quitting smoking now greatly reduces serious risk to your health. Obesity, smoking, and sedentary lifestyle greatly increases your risk for illness    A healthy diet, regular physical exercise & weight monitoring are important for maintaining a healthy lifestyle    You may be retaining fluid if you have a history of heart failure or if you experience any of the following symptoms:  Weight gain of 3 pounds or more overnight or 5 pounds in a week, increased swelling in our hands or feet or shortness of breath while lying flat in bed. Please call your doctor as soon as you notice any of these symptoms; do not wait until your next office visit. The discharge information has been reviewed with the patient. The patient verbalized understanding. Discharge medications reviewed with the patient and appropriate educational materials and side effects teaching were provided.   ___________________________________________________________________________________________________________________________________

## 2022-01-04 NOTE — OP NOTES
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, DCIS, RIGHT  Procedure(s):  Procedure(s):  RIGHT MASTECTOMY    Surgeon(s) and Role:      Carlitos Clemons MD - Primary         Surgical Staff: Circ-1: Gi Krishna RN  Scrub Tech-1: Marcin JULES  Surg Asst-1: Darcella Alosa      Event Time In     Event Time In   Incision Start 0800   Incision Close      Event Time In   Patient In - Facility (Arrived)    Patient In - Pre-op 0645   Anesthesia Start 0740   Patient Ready for Pre-op Visitors 0703   Patient Ready for OR 0549   Surgeon Available 0732   Patient Out - Pre-op [id number]   Patient In - [address]   Surgeon In [address] [place] [address] 0739   Incision Start 0800   Incision Close    Surgeon out of OR    Patient Out - OR    Anesthesia Stop    Patient In - Phase I    Notice to Anesthesia    Care Complete - Phase I    Patient Out - Phase I    Patient In - Phase II    Patient Tolerates Liquids    Patient Ready for Visitors    Patient Education Complete    Patient Voided    Care Complete - Phase II    Patient Out - Phase II    End of Periop Care    Patient In - Overflow    Patient Out - Overflow        Anesthesia: General  Anesthesia staff: Anesthesiologist: Belinda Morris MD  CRNA: Duke Duque CRNA  Estimated Blood Loss: 5cc  Specimens:   ID Type Source Tests Collected by Time Destination   1 : Right Breast  Preservative Breast  Hallie Sinha MD 1/4/2022 0827 Pathology        Findings: harmonic scalpel used, defibrillator avoided, right breast intact   Complications: none noted  Implants: * No implants in log *      The patient was identified in the preoperative holding area and the risks again were reviewed with the patient who understands and agrees.   Risks, benefits and options were discussed in detail to include, but not limited to, bleeding, infection, risks of anesthesia, injury to surrounding structures and other unforeseen events such as stroke, heart attack or death. She understands the possible need for additional surgery. She was also marked by me  to confirm the site and the procedure. The patient was taken to the operating suite and placed supine on the table. Compression stockings were placed and anesthesia induced without complication. She was then prepped and draped in the usual sterile fashion and an appropriate time-out was performed to confirm the patient, the side, and the procedure. Attention was first turned to the right  breast.  The skin was cut in an ellipse. Dissection of breast tissue was extended to sternum medially, inframmary fold inferiorly, the lateral and superior aspects of the breast tissue. The breast was dissected off of the pectoralis to include the pectoralis fascia. The specimen was oriented and passed off. A Bernard drain was placed. The flaps were reapproximated with 2-0 vicryl, followed by monocryl. Sterile surgical dressings were applied. All counts were reported to me as correct. Family was updated.

## 2022-01-07 NOTE — PROGRESS NOTES
Ms. Marshall Snellen is a 66year old woman with left Stage IV [id number] ER/IL negative, HER positive breast cancer, s/p left modified radical mastectomy by Dr. Alistair Valdez 9/28/11 and right DCIS, s/p partial mastectomy 11/16/21 with completion mastectomy 1/4/22. She had been diagnosed with ADH, on core biopsy 10/4/21. She completed neoadjuvant docetaxel and cytoxan per Dr. Maria G Christensen and completed a year of transtuzumab. According to Dr. Maria G Christensen course was complicated by cardiomyopathy. She completed post mastectomy radiation after repositioning of her pacemaker. In 2018 she was diagnosed with metastatic cancer in lung nodule, mediastinal nodes and sternum after presenting with cough and shortness of breath. She was treated with paclitaxel, pertuzumab and transtuzumab until worsening of her cardiomyopathy resulted in discontinuing HER directed therapy. She was transitioned to Gemcitabine with excellent response and radiographic resolution of metastasis. Above information largely from Dr. Vikki Austin note. She was referred to me for abnormal imaging. The area of concern was identified on routine screening exam. She denies breast pain, mass or nipple discharge. Her most recent previous mammogram was 7/21/21. She is without complaint related to her breasts. Core biopsy 10/4/21 demonstrated right ADH. DCIS was noted on surgical specimen. She underwent completion mastectomy 1/4/22 demonstrating residual DCIS. Breast/GYN history:    Past Medical History:   Diagnosis Date    Adenocarcinoma of breast; locally advanced invasive ductal adenocarcinoma of left breast     Asthma     Breast cancer ([facility] [address] 2011    Chemo and Radiation Left     Breast cancer metastasized to lung [facility]) 2019    Chemo    Breast cancer, right ([facility] [address] 2021    Chronic combined systolic and diastolic heart failure (HCC)     Remains symptomatic, nyha class 3 ef improving.     Congestive heart failure, unspecified     chronic class ll    DVT of lower limb, acute ([address] 2019    Right leg, and PE    GERD (gastroesophageal reflux disease)     History of pulmonary embolus (PE) 2019    Hypertension     Long term current use of anticoagulant therapy     MVP (mitral valve prolapse)     Nonischemic cardiomyopathy (HCC)     EF 20-30% despite medical therapy    Osteopenia     Pacemaker 10/27/10    s/p implantation, without complication    Radiation therapy     Thyroid disease     goiter       Past Surgical History:   Procedure Laterality Date    COLONOSCOPY N/A 12/1/2020    COLONOSCOPY with polypectomy performed by [facility]ui [facility] [address] Right 11/16/2021    RIGHT BREAST EXCISIONAL BIOPSY WITH LOCALIZATION (TAG 11/2/21 @ [facility]) performed by Heena Lopez MD at  [facility] HX CATARACT REMOVAL Bilateral 10/19/2020    HX CERVICAL FUSION  2000    C5,C6    HX CHOLECYSTECTOMY  11/01/2019    HX HEART CATHETERIZATION      HX MASTECTOMY Right 1/4/2022    RIGHT MASTECTOMY performed by Heena Lopez MD at [address] [place] [address] HX PACEMAKER  10/27/10    s/p [employer] biventricular AICD, without complication    HX PACEMAKER  10/2021    Battery changed    HX RADICAL MASTECTOMY  09/28/11    modified radical mastectomy and axillary dissection    IR CHOLECYSTOSTOMY PERCUTANEOUS  9/20/2019    IR INSERT TUNL CVC W PORT OVER 5 YEARS  1/16/2019    Removed    IR REMOVE TUNL CVAD W PORT/PUMP  9/16/2021    removed    NE ABDOMEN SURGERY PROC UNLISTED  1994    Partial liver removal       Current Outpatient Medications on File Prior to Visit   Medication Sig Dispense Refill    spironolactone (ALDACTONE) 50 mg tablet Take 50 mg by mouth daily.  temazepam (RESTORIL) 30 mg capsule Take 30 mg by mouth nightly.  b complex vitamins tablet Take 1 Tablet by mouth daily.  multivitamin (ONE A DAY) tablet Take 1 Tablet by mouth daily.       ascorbic acid, vitamin C, (Vitamin C) 500 mg tablet Take 1,000 mg by mouth daily.  cholecalciferol (VITAMIN D3) (5000 Units/125 mcg) tab tablet Take 5,000 Units by mouth daily.  phytosterol/pantethine (CHOLESTOFF COMPLETE PO) Take 1 Tablet by mouth daily.  carvediloL (COREG) 25 mg tablet TAKE 1 TABLET TWICE DAILY  WITH MEALS 180 Tablet 3    montelukast (SINGULAIR) 10 mg tablet Take 1 Tablet by mouth daily. PATIENT NEEDS APPOINTMENT 90 Tablet 0    Entresto 49-51 mg tab tablet TAKE 1 TABLET TWICE A  Tablet 3    digoxin (LANOXIN) 0.125 mg tablet TAKE 1 TABLET DAILY 90 Tab 3    apixaban (ELIQUIS) 2.5 mg tablet Take 1 Tab by mouth two (2) times a day. 60 Tab 2    furosemide (LASIX) 20 mg tablet Take 1 Tab by mouth daily. (Patient taking differently: Take 20 mg by mouth every Monday, Wednesday, Friday.) 30 Tab 0    DULoxetine (CYMBALTA) 60 mg capsule Take 60 mg by mouth daily.  raloxifene (EVISTA) 60 mg tablet Take 60 mg by mouth daily. No current facility-administered medications on file prior to visit. Allergies   Allergen Reactions    Adhesive Other (comments)     blisters       Social History     Tobacco Use    Smoking status: Never Smoker    Smokeless tobacco: Never Used   Vaping Use    Vaping Use: Never used   Substance Use Topics    Alcohol use: No    Drug use: Never       Family History   Problem Relation Age of Onset    Hypertension Mother     High Cholesterol Mother     Heart Disease Father         paemaker implant at 80       OB History    No obstetric history on file.            ROS:   Positives are bolded  General: fevers, chills, night sweats, fatigue, weight loss, weight gain  GI: nausea, vomiting, abdominal pain, change in bowel habits, hematochezia, melena, GERD  Integ: dermatitis, abnormal moles  HEENT: visual changes, vertigo, epistaxis, dysphagia, hoarseness  Cardiac: chest pain, palpitations, HTN, edema, varicosities  Resp: cough, shortness of breath, wheezing, hemoptysis, snoring, reactive airway disease  : hematuria, dysuria, frequency, urgency, nocturia, stress urinary incontinence   MSK: weakness, joint pain, arthritis  Endocrine: diabetes, thyroid disease, polyuria, polydipsia, polyphagia, poor wound healing, heat intolerance, cold intolerance  Lymph/Heme: anemia, bruising, history of blood transfusions  Neuro: dizziness, headache, fainting, seizures, stroke  Psych: anxiety, depression    Physical Exam:  Visit Vitals  BP 96/64   Pulse 84   Temp 97.4 °F (36.3 °C)   Resp 20   Ht 5' 5\" (1.651 m)   Wt 74.4 kg (164 lb)   SpO2 96%   BMI 27.29 kg/m²       Gen: Well developed, well nourished woman in no acute distress seborrheic keratosis  Head: normocephalic, atraumatic  Mouth: Clear, no overt lesions, oral mucosa pink and moist  Neck: supple, no masses, no adenopathy, trachea midline  Resp: clear bilaterally  Cardio: Regular rate and rhythm  Abdomen: soft, nontender, nondistended  Extremities: warm, well-perfused  Neuro: sensation and strength grossly intact and symmetrical  Psych: alert and oriented to person, place and time  Breasts: palpation deferred, incision clean,   Right: Examined in both the supine and upright positions. There was no supraclavicular, infraclavicular, or axillary lympadenopathy. There were no dominant masses, no skin changes, no asymmetry identified   Left[de-identified] Examined in both the supine and upright positions. There was no supraclavicular, infraclavicular, or axillary lympadenopathy. There were no dominant masses, no skin changes, no asymmetry identified s/p mastectomy without reconstruction    Imagin/15/21 right mammogram  3 suspicious hypoechoic masses right breast 9:00 position.      BIRADS 4: Suspicious abnormality  Management Recommendation: Ultrasound-guided core biopsy of the 2 larger masses  located 4 cm from the nipple and 1.5 cm from the nipple. The findings and  recommendations were discussed with the patient at the time of the exam who is  in agreement. The patient was referred to Chrsitian Pinzon, [address] [place] [address], for notification of the ordering provider and  coordination of care. Patient is prescribed Eliquis for which she stopped  yesterday for a procedure performed today.       Pathology:  1/4/22  Right breast, simple mastectomy:        Single focus of residual ductal carcinoma in situ (DCIS), cribriform   and solid patterns, nuclear grade 2. Size: 11 mm   Location: Upper outer quadrant   Margins: Widely clear     11/16/21  Right breast, lumpectomy:        Ductal carcinoma in situ. Proliferative fibrocystic change.      SPECIMEN       Procedure: Excision (less than total mastectomy)       Specimen Laterality: Right    TUMOR       Histologic Type: Ductal carcinoma in situ       Size (Extent) of DCIS: 3 mm       Nuclear Grade: Grade II (intermediate)       Necrosis: Not identified    MARGINS       Margins: Positive for DCIS           Positive Margin(s): Posterior    LYMPH NODES       Regional Lymph Nodes: No lymph nodes submitted or found    PATHOLOGIC STAGE CLASSIFICATION (pTNM, AJCC 8th Edition)       Primary Tumor (pT): pTis (DCIS)       Regional Lymph Nodes (pN): pNX     10/4/21  A: Right breast mass, 9:00, 4 cm from nipple, biopsy:        Breast tissue with fibroadenomatous change, columnar cell change,   usual ductal hyperplasia, apocrine metaplasia, and intraductal   micropapillomas. B: Right breast mass, 9:00, 2 cm from nipple, biopsy:        Breast tissue with focal atypical ductal hyperplasia, usual ductal   hyperplasia, columnar cell change, and apocrine metaplasia    9/28/11 Flores  A. LEFT SENTINEL LYMPH NODE, EXCISION -  ONE BENIGN-APPEARING LYMPH NODE, NEGATIVE FOR MALIGNANCY (0/1). B. LEFT BREAST AND AXILLARY CONTENTS, MODIFIED RADICAL MASTECTOMY -  INVASIVE MUCINOUS ADENOCARCINOMA. SIZE OF THE INVASIVE CARCINOMA: 5.3 X 4.2 X 1.7 CM.   HISTOLOGIC TYPE: MUCINOUS ADENOCARCINOMA  HISTOLOGIC GRADE: II  NUCLEAR GRADE: II  FOCALITY: UNIFOCAL  IN-SITU CARCINOMA COMPONENT: NOT IDENTIFIED  LYMPHOVASCULAR SPACE INVASION: PRESENT  MARGINS OF RESECTION: NEGATIVE FOR CARCINOMA  MICROCALCIFICATIONS: PRESENT ASSOCIATED WITH BENIGN DUCTAL EPITHELIUM  SKIN: NEGATIVE FOR CARCINOMA  NIPPLE: POSITIVE FOR LYMPHOVASCULAR SPACE INVASION  LYMPH NODES: THREE OF NINE AXILLARY LYMPH NODES POSITIVE FOR METASTATIC  ADENOCARCINOMA (3/9). ESTROGEN RECEPTOR: NEGATIVE ([id number])  PROGESTERONE RECEPTOR: NEGATIVE ([id number])  HER2/ana (FISH): POSITIVE ([id number])  OTHER PATHOLOGIC FINDINGS: MULTIPLE SKIN SEBORRHEIC KERATOSES, LARGEST  MEASURING 2.4 CM.   AJCC STAGE: T4 N1, Mx    Impression:  Patient Active Problem List   Diagnosis Code    Hypertension I10    MVP (mitral valve prolapse) I34.1    Nonischemic cardiomyopathy (HCC) I42.8    Cancer (HCC) C80.1    Adenocarcinoma of breast; locally advanced invasive ductal adenocarcinoma of left breast C50.919    Poisn by oth uns agents prim aff cv sys T46.904A    Syncope and collapse L01    Systolic CHF, chronic (HCC) I50.22    Other primary cardiomyopathies I42.8    Shortness of breath R06.02    Nonspecific abnormal electrocardiogram (ECG) (EKG) R94.31    Automatic implantable cardioverter-defibrillator in situ Z95.810    Mitral valve disease I05.9    Abnormal PFT R94.2    Acute bronchitis J20.9    Chronic asthmatic bronchitis (HCC) J44.9    Malignant neoplasm metastatic to lung (HCC) C78.00    Seasonal allergic rhinitis due to pollen J30.1    Dilated cardiomyopathy (HCC) I42.0    Breast cancer (HCC) C50.919    Pulmonary embolism (HCC) I26.99    Chronic bronchitis (HCC) J42    Hypoxia R09.02    Lung metastases (HCC) C78.00    UTI (urinary tract infection) N39.0    CAD (coronary artery disease) I25.10    Elevated troponin R77.8    Weakness generalized R53.1    Chronic anticoagulation Z79.01    History of pulmonary embolism Z86.711    Hypokalemia E87.6    Hypomagnesemia E83.42  Acute encephalopathy G93.40    Cholecystitis K81.9    Ventricular fibrillation (HCC) I49.01    Cholecystitis, unspecified K81.9    Breast neoplasm, Tis (DCIS), right D05.11          66year old woman with left Stage IV [id number] ER/WA negative, HER positive breast cancer, s/p left modified radical mastectomy by Dr. Donnie Morse 9/28/11 and right DCIS, s/p partial mastectomy 11/16/21 with completion mastectomy 1/4/22. The pathology was reviewed. Drain removed without difficulty. Follow up with Dr. Jamar Julian as scheduled. Follow up with me 1 month. All questions were answered. She was asked to call with any additional questions or concerns.

## 2022-01-12 ENCOUNTER — OFFICE VISIT (OUTPATIENT)
Dept: SURGERY | Age: 72
End: 2022-01-12
Payer: MEDICARE

## 2022-01-12 VITALS
HEART RATE: 84 BPM | SYSTOLIC BLOOD PRESSURE: 96 MMHG | HEIGHT: 65 IN | RESPIRATION RATE: 20 BRPM | BODY MASS INDEX: 27.32 KG/M2 | DIASTOLIC BLOOD PRESSURE: 64 MMHG | OXYGEN SATURATION: 96 % | WEIGHT: 164 LBS | TEMPERATURE: 97.4 F

## 2022-01-12 DIAGNOSIS — C78.01 MALIGNANT NEOPLASM METASTATIC TO RIGHT LUNG (HCC): Primary | ICD-10-CM

## 2022-01-12 PROCEDURE — 99024 POSTOP FOLLOW-UP VISIT: CPT | Performed by: SURGERY

## 2022-01-12 NOTE — LETTER
1/12/2022 1:27 PM    Patient:  Marshall Snellen   YOB: 1950  Date of Visit: 1/12/2022      Tom Odonnell, [address] [facility] [address]  Via Fax: [phone]     Renee Acosta MD  [address] [facility] [address]  [address] [address] [address]  Via Fax: [phone]    Dear MD Renee Quintero MD,      I had the pleasure of seeing Ms. Laina Lozano in my office today for her breast cancer. I am including a copy of my office visit today. If you have questions, please do not hesitate to call me. I look forward to following Ms. Camacho Reading along with you and will keep you updated as to her progress.            Sincerely,      Marija Kenney MD

## 2022-01-19 ENCOUNTER — OFFICE VISIT (OUTPATIENT)
Dept: CARDIOLOGY CLINIC | Age: 72
End: 2022-01-19
Payer: MEDICARE

## 2022-01-19 VITALS
HEIGHT: 65 IN | WEIGHT: 167 LBS | DIASTOLIC BLOOD PRESSURE: 49 MMHG | OXYGEN SATURATION: 96 % | SYSTOLIC BLOOD PRESSURE: 99 MMHG | HEART RATE: 79 BPM | BODY MASS INDEX: 27.82 KG/M2

## 2022-01-19 DIAGNOSIS — Z95.810 AICD (AUTOMATIC CARDIOVERTER/DEFIBRILLATOR) PRESENT: ICD-10-CM

## 2022-01-19 DIAGNOSIS — I50.22 SYSTOLIC CHF, CHRONIC (HCC): Primary | ICD-10-CM

## 2022-01-19 DIAGNOSIS — I10 ESSENTIAL HYPERTENSION: ICD-10-CM

## 2022-01-19 DIAGNOSIS — I42.0 DILATED CARDIOMYOPATHY (HCC): ICD-10-CM

## 2022-01-19 PROCEDURE — G8419 CALC BMI OUT NRM PARAM NOF/U: HCPCS | Performed by: INTERNAL MEDICINE

## 2022-01-19 PROCEDURE — 99214 OFFICE O/P EST MOD 30 MIN: CPT | Performed by: INTERNAL MEDICINE

## 2022-01-19 PROCEDURE — 1101F PT FALLS ASSESS-DOCD LE1/YR: CPT | Performed by: INTERNAL MEDICINE

## 2022-01-19 PROCEDURE — G8752 SYS BP LESS 140: HCPCS | Performed by: INTERNAL MEDICINE

## 2022-01-19 PROCEDURE — G8427 DOCREV CUR MEDS BY ELIG CLIN: HCPCS | Performed by: INTERNAL MEDICINE

## 2022-01-19 PROCEDURE — G9899 SCRN MAM PERF RSLTS DOC: HCPCS | Performed by: INTERNAL MEDICINE

## 2022-01-19 PROCEDURE — G8754 DIAS BP LESS 90: HCPCS | Performed by: INTERNAL MEDICINE

## 2022-01-19 PROCEDURE — 1090F PRES/ABSN URINE INCON ASSESS: CPT | Performed by: INTERNAL MEDICINE

## 2022-01-19 PROCEDURE — G8399 PT W/DXA RESULTS DOCUMENT: HCPCS | Performed by: INTERNAL MEDICINE

## 2022-01-19 PROCEDURE — 3017F COLORECTAL CA SCREEN DOC REV: CPT | Performed by: INTERNAL MEDICINE

## 2022-01-19 PROCEDURE — G8510 SCR DEP NEG, NO PLAN REQD: HCPCS | Performed by: INTERNAL MEDICINE

## 2022-01-19 PROCEDURE — G8536 NO DOC ELDER MAL SCRN: HCPCS | Performed by: INTERNAL MEDICINE

## 2022-01-19 NOTE — PROGRESS NOTES
HISTORY OF PRESENT ILLNESS  Chinedu Alfredo is a 66 y.o. female. Patient with cmp,chf.post  icd   On follow up patient denies any chest pains, palpitation or other significant symptoms. Patient is here for follow up of diagnostic tests. Results will be discussed. He feels extremely fatigued tired and weak. Recently reported decrease in renal function. Patient seen for transition of care visit. Discharge date 5/6/2019  Nurse encounter 5/6/2019  Physician encounter 5/8/2019. Admitted with acute CHF, acute PE, DVT. Patient had worsening cardiomyopathy. Symptoms are slowly improving significant improvement of shortness of breath. Orthopnea significantly better. No edema. Still feels tired on minimal activity exertion  2/2021  Patient seen today for follow-up. Her shortness of breath is stable. Denies any other complaint at present    Follow-up  Associated symptoms include shortness of breath. Pertinent negatives include no chest pain. Cardiomyopathy  The history is provided by the patient. This is a chronic problem. The problem has been resolved. Associated symptoms include shortness of breath. Pertinent negatives include no chest pain. Hypertension  The history is provided by the patient. This is a chronic problem. The problem occurs constantly. The problem has not changed since onset. Associated symptoms include shortness of breath. Pertinent negatives include no chest pain. CHF  The history is provided by the patient. This is a recurrent problem. The problem occurs constantly. The problem has been gradually improving. Associated symptoms include shortness of breath. Pertinent negatives include no chest pain. The symptoms are aggravated by exertion. The symptoms are relieved by rest.   Valvular Heart Disease  The history is provided by the patient. This is a chronic problem. The problem occurs constantly. The problem has not changed since onset. Associated symptoms include shortness of breath. Pertinent negatives include no chest pain. Review of Systems   Constitutional: Negative for chills and fever. HENT: Negative for nosebleeds. Eyes: Negative for blurred vision and double vision. Respiratory: Positive for shortness of breath. Negative for cough, hemoptysis, sputum production and wheezing. Cardiovascular: Negative for chest pain, palpitations, orthopnea, claudication, leg swelling and PND. Gastrointestinal: Negative for heartburn, nausea and vomiting. Musculoskeletal: Negative for myalgias. Skin: Negative for rash. Neurological: Negative for dizziness and weakness. Endo/Heme/Allergies: Does not bruise/bleed easily. Family History   Problem Relation Age of Onset    Hypertension Mother     High Cholesterol Mother     Heart Disease Father         paemaker implant at 80       Past Medical History:   Diagnosis Date    Adenocarcinoma of breast; locally advanced invasive ductal adenocarcinoma of left breast     Asthma     Breast cancer ([address] 2011    Chemo and Radiation Left     Breast cancer metastasized to lung ([address] 2019    Chemo    Breast cancer, right ([address] 2021    Chronic combined systolic and diastolic heart failure ([address]     Remains symptomatic, nyha class 3 ef improving.     Congestive heart failure, unspecified     chronic class ll    DVT of lower limb, acute ([address] 2019    Right leg, and PE    GERD (gastroesophageal reflux disease)     History of pulmonary embolus (PE) 2019    Hypertension     Long term current use of anticoagulant therapy     MVP (mitral valve prolapse)     Nonischemic cardiomyopathy (HCC)     EF 20-30% despite medical therapy    Osteopenia     Pacemaker 10/27/10    s/p implantation, without complication    Radiation therapy     Thyroid disease     goiter       Past Surgical History:   Procedure Laterality Date    COLONOSCOPY N/A 12/1/2020    COLONOSCOPY with polypectomy performed by Anirudh Mckenna MD at [address] HX APPENDECTOMY  1994    HX BREAST BIOPSY Right 11/16/2021    RIGHT BREAST EXCISIONAL BIOPSY WITH LOCALIZATION (TAG 11/2/21 @ [facility] 1300) performed by Anamika Alarcon MD at [address] [place] [address] HX CATARACT REMOVAL Bilateral 10/19/2020    HX CERVICAL FUSION  2000    C5,C6    HX CHOLECYSTECTOMY  11/01/2019    HX HEART CATHETERIZATION      HX MASTECTOMY Right 1/4/2022    RIGHT MASTECTOMY performed by Anamika Alarcon MD at [address] [place] [address] HX PACEMAKER  10/27/10    s/p [employer]tr[employer] biventricular AICD, without complication    HX PACEMAKER  10/2021    Battery changed    HX RADICAL MASTECTOMY  09/28/11    modified radical mastectomy and axillary dissection    IR CHOLECYSTOSTOMY PERCUTANEOUS  9/20/2019    IR INSERT TUNL CVC W PORT OVER 5 YEARS  1/16/2019    Removed    IR REMOVE TUNL CVAD W PORT/PUMP  9/16/2021    removed    MD ABDOMEN SURGERY PROC UNLISTED  1994    Partial liver removal       Social History     Tobacco Use    Smoking status: Never Smoker    Smokeless tobacco: Never Used   Substance Use Topics    Alcohol use: No       Allergies   Allergen Reactions    Adhesive Other (comments)     blisters       Current Outpatient Medications   Medication Sig    spironolactone (ALDACTONE) 50 mg tablet Take 50 mg by mouth daily.  temazepam (RESTORIL) 30 mg capsule Take 30 mg by mouth nightly.  b complex vitamins tablet Take 1 Tablet by mouth daily.  multivitamin (ONE A DAY) tablet Take 1 Tablet by mouth daily.  ascorbic acid, vitamin C, (Vitamin C) 500 mg tablet Take 1,000 mg by mouth daily.  cholecalciferol (VITAMIN D3) (5000 Units/125 mcg) tab tablet Take 5,000 Units by mouth daily.  phytosterol/pantethine (CHOLESTOFF COMPLETE PO) Take 1 Tablet by mouth daily.  carvediloL (COREG) 25 mg tablet TAKE 1 TABLET TWICE DAILY  WITH MEALS    montelukast (SINGULAIR) 10 mg tablet Take 1 Tablet by mouth daily.  PATIENT NEEDS APPOINTMENT    Entresto 49-51 mg tab tablet TAKE 1 TABLET TWICE A DAY    digoxin (LANOXIN) 0.125 mg tablet TAKE 1 TABLET DAILY    apixaban (ELIQUIS) 2.5 mg tablet Take 1 Tab by mouth two (2) times a day.  furosemide (LASIX) 20 mg tablet Take 1 Tab by mouth daily. (Patient taking differently: Take 20 mg by mouth every Monday, Wednesday, Friday.)    DULoxetine (CYMBALTA) 60 mg capsule Take 60 mg by mouth daily.  raloxifene (EVISTA) 60 mg tablet Take 60 mg by mouth daily. No current facility-administered medications for this visit. Visit Vitals  BP (!) 99/49 (BP 1 Location: Right arm, BP Patient Position: Sitting)   Pulse 79   Ht 5' 5\" (1.651 m)   Wt 75.8 kg (167 lb)   SpO2 96%   BMI 27.79 kg/m²         Physical Exam  Constitutional:       Appearance: She is well-developed. HENT:      Head: Normocephalic and atraumatic. Eyes:      Conjunctiva/sclera: Conjunctivae normal.   Neck:      Thyroid: No thyromegaly. Vascular: No JVD. Trachea: No tracheal deviation. Cardiovascular:      Rate and Rhythm: Normal rate and regular rhythm. Chest Wall: PMI is not displaced. Pulses: No decreased pulses. Heart sounds: Murmur heard. High-pitched blowing holosystolic murmur is present with a grade of 2/6 at the apex. No gallop. No S3 sounds. Pulmonary:      Effort: No respiratory distress. Breath sounds: Wheezing present. No rales. Chest:      Chest wall: No tenderness. Abdominal:      Palpations: Abdomen is soft. Tenderness: There is no abdominal tenderness. Musculoskeletal:      Cervical back: Neck supple. Skin:     General: Skin is warm. Neurological:      Mental Status: She is alert and oriented to person, place, and time. Ms. Janice Arango has a reminder for a \"due or due soon\" health maintenance. I have asked that she contact her primary care provider for follow-up on this health maintenance. No flowsheet data found. SUMMARY:echo:6/2014  Left ventricle: The ventricle was mildly dilated.  Systolic function was  normal. Ejection fraction was estimated in the range of 50 % to 55 %. There were no regional wall motion abnormalities. Doppler parameters were  consistent with abnormal left ventricular relaxation (grade 1 diastolic  dysfunction). Left atrium: The atrium was mildly dilated. Mitral valve: There was systolic bowing of the posterior leaflet, but  without diagnostic evidence for prolapse. There was mild to moderate  regurgitation. SUMMARY:echo:12/2014  Left ventricle: Systolic function was at the lower limits of normal.  Ejection fraction was estimated to be 53 %. There were no regional wall  motion abnormalities. Doppler parameters were consistent with abnormal  left ventricular relaxation (grade 1 diastolic dysfunction). Right ventricle: A pacing wire was present. Mitral valve: There was systolic bowing of the posterior leaflet, but  without diagnostic evidence for prolapse. There was mild regurgitation. Tricuspid valve: Pulmonary artery systolic pressure: 22 mmHg. 12/2015:echo    SUMMARY:  Left ventricle: Systolic function was normal. Ejection fraction was  estimated in the range of 50 % to 55 %. There was mild diffuse  hypokinesis. Doppler parameters were consistent with abnormal left  ventricular relaxation (grade 1 diastolic dysfunction). Mitral valve: There was systolic bowing of the anterior and posterior  leaflets, but without diagnostic evidence for prolapse. There was mild  regurgitation. Tricuspid valve: There was mild regurgitation. Tricuspid regurgitation  peak velocity: 2.3 m/sec. Pulmonary artery systolic pressure: 25 mmHg. pft-6/2017  Maximal Mid Expiratory Flow rate is reduced to 43 % predicted  Forced Expiratory Volume in one second is reduced to 69 % predicted  FEV 1% is reduced     Volumes:   All Volumes are reduced except for RV    Flow Volume Loop:  Nonspecific obstructive pattern in Flow Volume Loop    Bronchodilator:  No significant improvement with bronchodilator therapy    Diffusion:  Abnormal Diffusion Capacity reduced to 62 % predicted  DLCO normalizes to alveolar ventilation    Impression:  Moderate obstructive defect, Predominately small airways, Mild restrictive ventilatory defect, Reduced diffusion capacity indicating a decrease in alveolar surface area for gas exchange  I Have personally reviewed recent relevant labs available and discussed with patient    Interpretation Summary -6/2018  · Calculated left ventricular ejection fraction is 55%. Left ventricular mild concentric hypertrophy observed. Mild (grade 1) left ventricular diastolic dysfunction. · Left atrial cavity size is mildly dilated. · There was systolic bowing of the anterior and posterior leaflets, but without diagnostic evidence for prolapse. Mild mitral valve regurgitation. · Mild tricuspid valve regurgitation is present. Pulmonary arterial systolic pressure is 18 mmHg. · Mild pulmonic valve regurgitation is present. · Small pericardial effusion. Interpretation Summary 12/2018    · Left ventricular low normal systolic function. Estimated left ventricular ejection fraction is 51 - 55%. Visually measured ejection fraction. Normal left ventricular wall motion, no regional wall motion abnormality noted. Moderate (grade 2) left ventricular diastolic dysfunction. · There is no evidence of pulmonary hypertension. · Mild to moderate mitral valve regurgitation. · Left atrial cavity size is mildly dilated. Interpretation Summary 3/2019       · Normal cavity size, wall thickness and diastolic function. There is low normal systolic function. The estimated ejection fraction is 51 - 55%. No regional wall motion abnormality noted. Global longitudinal strain is -13.00%. Abnormal left ventricular strain. · Normal right ventricular size and function. Pacing wire present  · Mild to moderate mitral valve regurgitation. Mild prolapse of the posterior leaflet within the mitral valve.   · Mild pulmonic valve regurgitation is present. · Mild tricuspid valve regurgitation. Pulmonary arterial systolic pressure is 40.8 mmHg. Mild pulmonary hypertension. 5/2019    · Left Ventricle: Mild concentric hypertrophy. Severe global systolic dysfunction. Estimated left ventricular ejection fraction is 26 - 30%. Biplane method used to measure ejection fraction. Left ventricular global hypokinesis. · IVC/Hepatic Veins: Severely elevated central venous pressure (15+ mmHg); IVC diameter is larger than 21 mm and collapses less than 50% with respiration. · Pulmonary Artery: Mild pulmonary hypertension. Pulmonary arterial systolic pressure is 44 mmHg. · Pericardium: Trivial-to-small circumferential pericardial effusion measuring 10 mm. · Mitral Valve: Moderate mitral valve regurgitation. · Right Ventricle: Pacing wire present   Interpretation Summary 5/2019    · Acute occlusive thrombus present in the right peroneal vein. IMPRESSION: 5/2019     1. Positive examination for pulmonary emboli.   - There is likely a combination of acute and subacute PE in the lower lobe  segmental and subsegmental branch vessels. Interpretation Summary 7/2019    · Left Ventricle: Normal cavity size. Mild concentric hypertrophy. Severe systolic dysfunction. Estimated left ventricular ejection fraction is 21 - 25%. Abnormal left ventricular wall motion. Inconclusive left ventricular diastolic function. · Left Atrium: Severely dilated left atrium. · Mitral Valve: Mitral valve thickening. Mitral annular calcification. Moderate mitral valve regurgitation. · Tricuspid Valve: Mild tricuspid valve regurgitation is present. Pulmonary arterial systolic pressure is 32.4 mmHg. Mild pulmonary hypertension. · IVC/Hepatic Veins: Moderately elevated central venous pressure (10-15 mmHg); IVC diameter is larger than 21 mm and collapses more than 50% with respiration. · Pericardium: Trivial-to-small pericardial effusion.           Interpretation Summary 9/2019    · Left Ventricle: Normal cavity size. Upper normal wall thickness. Moderate-to-severe systolic dysfunction. Estimated left ventricular ejection fraction is 36 - 40%. Biplane method used to measure ejection fraction. Left ventricular global hypokinesis. · Pericardium: Trivial pericardial effusion. Interpretation Summary 8/2020      · LV: Estimated LVEF is 45 - 50%. Normal cavity size. Upper normal wall thickness. Wall motion: normal. Mild (grade 1) left ventricular diastolic dysfunction. · LA: Left Atrium volume index is 35.88 mL/m2. · RV: Pacer/ICD present. · MV: Mild mitral annular calcification. Mild mitral valve regurgitation is present. · TV: Mild tricuspid valve regurgitation is present. · PA: Pulmonary arterial systolic pressure is 24 mmHg. Interpretation Summary 7/2021    · LV: Estimated LVEF is 50 - 55%. Visually measured ejection fraction. Normal wall thickness and diastolic function. Dilated left ventricle. Low normal systolic function. Wall motion: normal.  · RV: Pacer/ICD present. Conclusion 10/2021    PROCEDURES   - Generator changeout of [employer]tronic  biventricular automatic implantable cardioverter defibrillator.   - Conscious sedation with versed and fentanyl. Assessment         ICD-10-CM ICD-9-CM    1. Systolic CHF, chronic (HCC)  I50.22 428.22 DIGOXIN     312.9 METABOLIC PANEL, BASIC    Stable compensated class II continue treatment   2. Dilated cardiomyopathy (HCC)  I42.0 425.4     Stable continue therapy   3. AICD (automatic cardioverter/defibrillator) present  Z95.810 V45.02     Normal function monitor   4. Essential hypertension  I10 401.9     Stable. Blood pressure on low normal side. Tounge swelling not related to lisinopril as she had swelling even after stopping lisinopril  7/2018  I will hold Lasix as recent decrease in renal function. No clinical sign of fluid overload.   10/2018  Shortness of breath due to acute bronchitis bilateral wheezing. Will refer back to pulmonary. No sign of fluid overload. Will keep off Lasix  1/2019  Metastatic breast cancer now back on chemotherapy. Lung metastasis likely cause for shortness of breath which is improving. Low blood pressure will reduce Coreg to 6.25 twice a day. Continue lisinopril. Recheck echo in 2 months on chemotherapy  4/2019  Cardiac status stable. Echo EF remains stable continue Coreg and lisinopril. Check echo in 3 months. Patient remains on chemotherapy    5/2019  Recent admission with increased shortness of breath combination of pulmonary embolism and decompensated systolic heart failure with worsening ejection fraction. Class III CHF. 10 pound weight loss from peak. Continue current therapy. Continue anticoagulation. Follow-up 3 weeks  5/30/2019  Fluid overload stable. Remains weak. Follow-up echo in about a month to reassess LV function. 10/2019  Cardiac status stable. CHF compensated. Shortness of breath and edema improving. Continue Entresto. Aldactone increased to 50 mg a day due to hypokalemia. Lasix will be reduced to 2-3 times a week. Episode of ventricular fibrillation in 8/2019 noticed on ICD check. Patient had hypokalemia during that time will continue to monitor. If patient needs laparoscopic surgery her risk is high about 5 to 10%. If open surgical procedure is required it will carry high risk    1/2020  Cardiac status stable. She completed Chemotherapy in October and is doing well. She is tolerating Entresto. She takes lasix 2-3 x weekly as needed for edema. Reports blood work drawn this week with normal renal function and potassium. Reports an episode of syncope in Dr. Sukh Acosta office in November and b/p readings were labile at that time. ICD checked 11/2019 (after syncopal episode) no arrhythmias seen. Continue current medications. 7/2020  Cardiac status stable. Continue treatment.   Follow-up echo  8/2020 virtual visit  Cardiac status stable. continue current treatment,lvef improving  11/2020  Cardiac status stable. Low normal blood pressure but asymptomatic continue monitoring. CHF compensated  2/2021  Stable cardiac status CHF compensated continue current medical management tolerating treatment for CHF with Entresto and Coreg. 5/2021  Stable cardiac status. Still feels fatigue and tired. LVEF is improving. She has cut down Coreg to 12.5 mg twice a day. Blood pressure stable. CHF compensated continue treatment  10/2021  Stable cardiac status. Recent generator change for ICD. Okay to proceed with breast surgery. Okay to hold Eliquis. Medium cardiac risk. Eliquis is used on prophylaxis to reduce DVT and PE  1/2022  Stable cardiac s/p generator change out. Also had mastectomy on the right side for recurrence of breast cancer. Stable postprocedure. Check digoxin level.   Blood pressure on low normal side but asymptomatic  Orders Placed This Encounter    DIGOXIN     Standing Status:   Future     Standing Expiration Date:   6/66/1466    METABOLIC PANEL, BASIC     Standing Status:   Future     Standing Expiration Date:   2/18/2022         Soy Parr MD

## 2022-01-19 NOTE — PROGRESS NOTES
1. Have you been to the ER, urgent care clinic since your last visit? Hospitalized since your last visit?     no    2. Have you seen or consulted any other health care providers outside of the [address] [place] [address] since your last visit? Include any pap smears or colon screening.       no

## 2022-01-25 ENCOUNTER — NURSE NAVIGATOR (OUTPATIENT)
Dept: SURGERY | Age: 72
End: 2022-01-25

## 2022-01-25 NOTE — PROGRESS NOTES
Patient outreach to follow up after right breast completion mastectomy by Dr. Sonia Zuniga on 1/4/2022. I left voicemail with my contact information and a request for a return call.

## 2022-01-27 RX ORDER — SPIRONOLACTONE 25 MG/1
50 TABLET ORAL DAILY
Qty: 180 TABLET | Refills: 1 | Status: SHIPPED | OUTPATIENT
Start: 2022-01-27 | End: 2022-07-28

## 2022-01-28 ENCOUNTER — HOSPITAL ENCOUNTER (OUTPATIENT)
Dept: LAB | Age: 72
Discharge: HOME | End: 2022-01-28
Payer: MEDICARE

## 2022-01-28 DIAGNOSIS — I50.22 SYSTOLIC CHF, CHRONIC (HCC): ICD-10-CM

## 2022-01-28 LAB
ANION GAP SERPL CALC-SCNC: 7 MMOL/L (ref 3–18)
BUN SERPL-MCNC: 17 MG/DL (ref 7–18)
BUN/CREAT SERPL: 16 (ref 12–20)
CALCIUM SERPL-MCNC: 9.4 MG/DL (ref 8.5–10.1)
CHLORIDE SERPL-SCNC: 106 MMOL/L (ref 100–111)
CO2 SERPL-SCNC: 27 MMOL/L (ref 21–32)
CREAT SERPL-MCNC: 1.07 MG/DL (ref 0.6–1.3)
DIGOXIN SERPL-MCNC: 1.3 NG/ML (ref 0.9–2)
GLUCOSE SERPL-MCNC: 111 MG/DL (ref 74–99)
POTASSIUM SERPL-SCNC: 4.2 MMOL/L (ref 3.5–5.5)
SODIUM SERPL-SCNC: 140 MMOL/L (ref 136–145)

## 2022-01-28 PROCEDURE — 36415 COLL VENOUS BLD VENIPUNCTURE: CPT

## 2022-01-28 PROCEDURE — 80048 BASIC METABOLIC PNL TOTAL CA: CPT

## 2022-01-28 PROCEDURE — 80162 ASSAY OF DIGOXIN TOTAL: CPT

## 2022-01-31 ENCOUNTER — TELEPHONE (OUTPATIENT)
Dept: CARDIOLOGY CLINIC | Age: 72
End: 2022-01-31

## 2022-01-31 NOTE — TELEPHONE ENCOUNTER
Spoke to patient per Dr. Adelaida Angeles regarding [place] reviewed. No significant abnormality. She voices understanding and acceptance of this advice and will call back if any further questions or concerns.

## 2022-01-31 NOTE — TELEPHONE ENCOUNTER
----- Message from Erma Mercedes MD sent at 1/31/2022  7:46 AM EST -----  Lab/test  reviewed  No significant abnormality

## 2022-02-14 NOTE — PROGRESS NOTES
Ms. Alice Cummings is a 67year old woman with left Stage IV [id number] ER/OK negative, HER positive breast cancer, s/p left modified radical mastectomy by Dr. Zaria Treviño 9/28/11 and right DCIS, s/p partial mastectomy 11/16/21 with completion mastectomy 1/4/22. She had been diagnosed with ADH, on core biopsy 10/4/21. She completed neoadjuvant docetaxel and cytoxan per Dr. Yary Andino and completed a year of transtuzumab. According to Dr. Yary Andino course was complicated by cardiomyopathy. She completed post mastectomy radiation after repositioning of her pacemaker. In 2018 she was diagnosed with metastatic cancer in lung nodule, mediastinal nodes and sternum after presenting with cough and shortness of breath. She was treated with paclitaxel, pertuzumab and transtuzumab until worsening of her cardiomyopathy resulted in discontinuing HER directed therapy. She was transitioned to Gemcitabine with excellent response and radiographic resolution of metastasis. Above information largely from Dr. Justin Lauren note. She was referred to me for abnormal imaging. The area of concern was identified on routine screening exam. She denies breast pain, mass or nipple discharge. Her most recent previous mammogram was 7/21/21. She is without complaint related to her breasts. Core biopsy 10/4/21 demonstrated right ADH. DCIS was noted on surgical specimen. She underwent completion mastectomy 1/4/22 demonstrating residual DCIS. Breast/GYN history:    Past Medical History:   Diagnosis Date    Adenocarcinoma of breast; locally advanced invasive ductal adenocarcinoma of left breast     Asthma     Breast cancer ([facility] [address] 2011    Chemo and Radiation Left     Breast cancer metastasized to lung [facility]) 2019    Chemo    Breast cancer, right ([facility] [address] 2021    Chronic combined systolic and diastolic heart failure (HCC)     Remains symptomatic, nyha class 3 ef improving.     Congestive heart failure, unspecified     chronic class ll    DVT of lower limb, acute ([address] 2019    Right leg, and PE    GERD (gastroesophageal reflux disease)     History of pulmonary embolus (PE) 2019    Hypertension     Long term current use of anticoagulant therapy     MVP (mitral valve prolapse)     Nonischemic cardiomyopathy (HCC)     EF 20-30% despite medical therapy    Osteopenia     Pacemaker 10/27/10    s/p implantation, without complication    Radiation therapy     Thyroid disease     goiter       Past Surgical History:   Procedure Laterality Date    COLONOSCOPY N/A 12/1/2020    COLONOSCOPY with polypectomy performed by Ever Raymond MD [address] Right 11/16/2021    RIGHT BREAST EXCISIONAL BIOPSY WITH LOCALIZATION (TAG 11/2/21 @ [facility]) performed by Dilip Dai MD at [address] HX CATARACT REMOVAL Bilateral 10/19/2020    HX CERVICAL FUSION  2000    C5,C6    HX CHOLECYSTECTOMY  11/01/2019    HX HEART CATHETERIZATION      HX MASTECTOMY Right 1/4/2022    RIGHT MASTECTOMY performed by Dilip Dai MD at [address] HX PACEMAKER  10/27/10    s/p [employer] biventricular AICD, without complication    HX PACEMAKER  10/2021    Battery changed    HX RADICAL MASTECTOMY  09/28/11    modified radical mastectomy and axillary dissection    IR CHOLECYSTOSTOMY PERCUTANEOUS  9/20/2019    IR INSERT TUNL CVC W PORT OVER 5 YEARS  1/16/2019    Removed    IR REMOVE TUNL CVAD W PORT/PUMP  9/16/2021    removed    SC ABDOMEN SURGERY PROC UNLISTED  1994    Partial liver removal       Current Outpatient Medications on File Prior to Visit   Medication Sig Dispense Refill    spironolactone (ALDACTONE) 25 mg tablet Take 2 Tablets by mouth daily. 180 Tablet 1    temazepam (RESTORIL) 30 mg capsule Take 30 mg by mouth nightly.  b complex vitamins tablet Take 1 Tablet by mouth daily.  multivitamin (ONE A DAY) tablet Take 1 Tablet by mouth daily.       ascorbic acid, vitamin C, (Vitamin C) 500 mg tablet Take 1,000 mg by mouth daily.  cholecalciferol (VITAMIN D3) (5000 Units/125 mcg) tab tablet Take 5,000 Units by mouth daily.  phytosterol/pantethine (CHOLESTOFF COMPLETE PO) Take 1 Tablet by mouth daily.  carvediloL (COREG) 25 mg tablet TAKE 1 TABLET TWICE DAILY  WITH MEALS 180 Tablet 3    montelukast (SINGULAIR) 10 mg tablet Take 1 Tablet by mouth daily. PATIENT NEEDS APPOINTMENT 90 Tablet 0    Entresto 49-51 mg tab tablet TAKE 1 TABLET TWICE A  Tablet 3    digoxin (LANOXIN) 0.125 mg tablet TAKE 1 TABLET DAILY 90 Tab 3    apixaban (ELIQUIS) 2.5 mg tablet Take 1 Tab by mouth two (2) times a day. 60 Tab 2    furosemide (LASIX) 20 mg tablet Take 1 Tab by mouth daily. (Patient taking differently: Take 20 mg by mouth every Monday, Wednesday, Friday.) 30 Tab 0    DULoxetine (CYMBALTA) 60 mg capsule Take 60 mg by mouth daily.  raloxifene (EVISTA) 60 mg tablet Take 60 mg by mouth daily. No current facility-administered medications on file prior to visit. Allergies   Allergen Reactions    Adhesive Other (comments)     blisters       Social History     Tobacco Use    Smoking status: Never Smoker    Smokeless tobacco: Never Used   Vaping Use    Vaping Use: Never used   Substance Use Topics    Alcohol use: No    Drug use: Never       Family History   Problem Relation Age of Onset    Hypertension Mother     High Cholesterol Mother     Heart Disease Father         paemaker implant at 80       OB History    No obstetric history on file.            ROS:   Positives are bolded  General: fevers, chills, night sweats, fatigue, weight loss, weight gain  GI: nausea, vomiting, abdominal pain, change in bowel habits, hematochezia, melena, GERD  Integ: dermatitis, abnormal moles  HEENT: visual changes, vertigo, epistaxis, dysphagia, hoarseness  Cardiac: chest pain, palpitations, HTN, edema, varicosities  Resp: cough, shortness of breath, wheezing, hemoptysis, snoring, reactive airway disease  : hematuria, dysuria, frequency, urgency, nocturia, stress urinary incontinence   MSK: weakness, joint pain, arthritis  Endocrine: diabetes, thyroid disease, polyuria, polydipsia, polyphagia, poor wound healing, heat intolerance, cold intolerance  Lymph/Heme: anemia, bruising, history of blood transfusions  Neuro: dizziness, headache, fainting, seizures, stroke  Psych: anxiety, depression    Physical Exam:  Visit Vitals  Temp 97 °F (36.1 °C)   Ht 5' 5\" (1.651 m)   Wt 76.2 kg (168 lb)   BMI 27.96 kg/m²       Gen: Well developed, well nourished woman in no acute distress seborrheic keratosis  Head: normocephalic, atraumatic  Mouth: Clear, no overt lesions, oral mucosa pink and moist  Neck: supple, no masses, no adenopathy, trachea midline  Resp: clear bilaterally  Cardio: Regular rate and rhythm  Abdomen: soft, nontender, nondistended  Extremities: warm, well-perfused  Neuro: sensation and strength grossly intact and symmetrical  Psych: alert and oriented to person, place and time  Breasts: palpation deferred, incision clean, healing well  Right: Examined in both the supine and upright positions. There was no supraclavicular, infraclavicular, or axillary lympadenopathy. There were no dominant masses, no skin changes, no asymmetry identified   Left[de-identified] Examined in both the supine and upright positions. There was no supraclavicular, infraclavicular, or axillary lympadenopathy. There were no dominant masses, no skin changes, no asymmetry identified s/p mastectomy without reconstruction    Imagin/15/21 right mammogram  3 suspicious hypoechoic masses right breast 9:00 position.      BIRADS 4: Suspicious abnormality  Management Recommendation: Ultrasound-guided core biopsy of the 2 larger masses  located 4 cm from the nipple and 1.5 cm from the nipple. The findings and  recommendations were discussed with the patient at the time of the exam who is  in agreement.  The patient was referred to Michelle Kerr, [address] [place] [address], for notification of the ordering provider and  coordination of care. Patient is prescribed Eliquis for which she stopped  yesterday for a procedure performed today.       Pathology:  1/4/22  Right breast, simple mastectomy:        Single focus of residual ductal carcinoma in situ (DCIS), cribriform   and solid patterns, nuclear grade 2. Size: 11 mm   Location: Upper outer quadrant   Margins: Widely clear     11/16/21  Right breast, lumpectomy:        Ductal carcinoma in situ. Proliferative fibrocystic change.      SPECIMEN       Procedure: Excision (less than total mastectomy)       Specimen Laterality: Right    TUMOR       Histologic Type: Ductal carcinoma in situ       Size (Extent) of DCIS: 3 mm       Nuclear Grade: Grade II (intermediate)       Necrosis: Not identified    MARGINS       Margins: Positive for DCIS           Positive Margin(s): Posterior    LYMPH NODES       Regional Lymph Nodes: No lymph nodes submitted or found    PATHOLOGIC STAGE CLASSIFICATION (pTNM, AJCC 8th Edition)       Primary Tumor (pT): pTis (DCIS)       Regional Lymph Nodes (pN): pNX     10/4/21  A: Right breast mass, 9:00, 4 cm from nipple, biopsy:        Breast tissue with fibroadenomatous change, columnar cell change,   usual ductal hyperplasia, apocrine metaplasia, and intraductal   micropapillomas. B: Right breast mass, 9:00, 2 cm from nipple, biopsy:        Breast tissue with focal atypical ductal hyperplasia, usual ductal   hyperplasia, columnar cell change, and apocrine metaplasia    9/28/11 Flores  A. LEFT SENTINEL LYMPH NODE, EXCISION -  ONE BENIGN-APPEARING LYMPH NODE, NEGATIVE FOR MALIGNANCY (0/1). B. LEFT BREAST AND AXILLARY CONTENTS, MODIFIED RADICAL MASTECTOMY -  INVASIVE MUCINOUS ADENOCARCINOMA. SIZE OF THE INVASIVE CARCINOMA: 5.3 X 4.2 X 1.7 CM.   HISTOLOGIC TYPE: MUCINOUS ADENOCARCINOMA  HISTOLOGIC GRADE: II  NUCLEAR GRADE: II  FOCALITY: UNIFOCAL  IN-SITU CARCINOMA COMPONENT: NOT IDENTIFIED  LYMPHOVASCULAR SPACE INVASION: PRESENT  MARGINS OF RESECTION: NEGATIVE FOR CARCINOMA  MICROCALCIFICATIONS: PRESENT ASSOCIATED WITH BENIGN DUCTAL EPITHELIUM  SKIN: NEGATIVE FOR CARCINOMA  NIPPLE: POSITIVE FOR LYMPHOVASCULAR SPACE INVASION  LYMPH NODES: THREE OF NINE AXILLARY LYMPH NODES POSITIVE FOR METASTATIC  ADENOCARCINOMA (3/9). ESTROGEN RECEPTOR: NEGATIVE ([id number])  PROGESTERONE RECEPTOR: NEGATIVE ([id number])  HER2/ana (FISH): POSITIVE ([id number])  OTHER PATHOLOGIC FINDINGS: MULTIPLE SKIN SEBORRHEIC KERATOSES, LARGEST  MEASURING 2.4 CM.   AJCC STAGE: T4 N1, Mx    Impression:  Patient Active Problem List   Diagnosis Code    Hypertension I10    MVP (mitral valve prolapse) I34.1    Nonischemic cardiomyopathy (HCC) I42.8    Cancer (HCC) C80.1    Adenocarcinoma of breast; locally advanced invasive ductal adenocarcinoma of left breast C50.919    Poisn by oth uns agents prim aff cv sys T46.904A    Syncope and collapse V99    Systolic CHF, chronic (HCC) I50.22    Other primary cardiomyopathies I42.8    Shortness of breath R06.02    Nonspecific abnormal electrocardiogram (ECG) (EKG) R94.31    Automatic implantable cardioverter-defibrillator in situ Z95.810    Mitral valve disease I05.9    Abnormal PFT R94.2    Acute bronchitis J20.9    Chronic asthmatic bronchitis (HCC) J44.9    Malignant neoplasm metastatic to lung (HCC) C78.00    Seasonal allergic rhinitis due to pollen J30.1    Dilated cardiomyopathy (HCC) I42.0    Breast cancer (HCC) C50.919    Pulmonary embolism (HCC) I26.99    Chronic bronchitis (HCC) J42    Hypoxia R09.02    Lung metastases (HCC) C78.00    UTI (urinary tract infection) N39.0    CAD (coronary artery disease) I25.10    Elevated troponin R77.8    Weakness generalized R53.1    Chronic anticoagulation Z79.01    History of pulmonary embolism Z86.711    Hypokalemia E87.6    Hypomagnesemia E83.42    Acute encephalopathy G93.40    Cholecystitis K81.9    Ventricular fibrillation (HCC) I49.01    Cholecystitis, unspecified K81.9    Breast neoplasm, Tis (DCIS), right D05.11          67year old woman with left Stage IV [id number] ER/GA negative, HER positive breast cancer, s/p left modified radical mastectomy by Dr. Anthony Wright 9/28/11 and right DCIS, s/p partial mastectomy 11/16/21 with completion mastectomy 1/4/22. Follow up with Dr. Severo Phelan as scheduled. Follow up with me 3 months. All questions were answered. She was asked to call with any additional questions or concerns.

## 2022-02-16 ENCOUNTER — OFFICE VISIT (OUTPATIENT)
Dept: SURGERY | Age: 72
End: 2022-02-16
Payer: MEDICARE

## 2022-02-16 VITALS
DIASTOLIC BLOOD PRESSURE: 70 MMHG | OXYGEN SATURATION: 96 % | HEIGHT: 65 IN | SYSTOLIC BLOOD PRESSURE: 108 MMHG | BODY MASS INDEX: 27.99 KG/M2 | TEMPERATURE: 97 F | HEART RATE: 90 BPM | RESPIRATION RATE: 18 BRPM | WEIGHT: 168 LBS

## 2022-02-16 DIAGNOSIS — C50.912 ADENOCARCINOMA OF LEFT BREAST (HCC): ICD-10-CM

## 2022-02-16 DIAGNOSIS — D05.11 BREAST NEOPLASM, TIS (DCIS), RIGHT: Primary | ICD-10-CM

## 2022-02-16 PROCEDURE — 99024 POSTOP FOLLOW-UP VISIT: CPT | Performed by: SURGERY

## 2022-02-16 NOTE — LETTER
2/16/2022 1:23 PM    Patient:  Anita Ortega   YOB: 1950  Date of Visit: 2/16/2022      Gretchen Bowen, [address] [facility] [address]  Via Fax: [phone]     Bernard Gilbert MD  [address] [facility] [address]  [address] [address] [address]  Via Fax: [phone]    Dear MD Bernard Snell MD,      I had the pleasure of seeing Ms. Axel Guzman in my office today. I am including a copy of my office visit today. If you have questions, please do not hesitate to call me. I look forward to following Ms. Teja Petty along with you and will keep you updated as to her progress.            Sincerely,      Janie Syed MD

## 2022-03-18 PROBLEM — J44.9 CHRONIC ASTHMATIC BRONCHITIS (HCC): Status: ACTIVE | Noted: 2019-02-07

## 2022-03-18 PROBLEM — J44.89 CHRONIC ASTHMATIC BRONCHITIS: Status: ACTIVE | Noted: 2019-02-07

## 2022-03-18 PROBLEM — C78.00 MALIGNANT NEOPLASM METASTATIC TO LUNG (HCC): Status: ACTIVE | Noted: 2019-02-07

## 2022-03-18 PROBLEM — I26.99 PULMONARY EMBOLISM (HCC): Status: ACTIVE | Noted: 2019-05-03

## 2022-03-18 PROBLEM — J42 CHRONIC BRONCHITIS (HCC): Status: ACTIVE | Noted: 2019-05-03

## 2022-03-18 PROBLEM — Z86.711 HISTORY OF PULMONARY EMBOLISM: Status: ACTIVE | Noted: 2019-08-27

## 2022-03-18 PROBLEM — D05.11 BREAST NEOPLASM, TIS (DCIS), RIGHT: Status: ACTIVE | Noted: 2021-11-22

## 2022-03-18 PROBLEM — C78.00 LUNG METASTASES (HCC): Status: ACTIVE | Noted: 2019-05-03

## 2022-03-19 PROBLEM — E87.6 HYPOKALEMIA: Status: ACTIVE | Noted: 2019-08-27

## 2022-03-19 PROBLEM — G93.40 ACUTE ENCEPHALOPATHY: Status: ACTIVE | Noted: 2019-08-27

## 2022-03-19 PROBLEM — C50.919 BREAST CANCER (HCC): Status: ACTIVE | Noted: 2019-05-03

## 2022-03-19 PROBLEM — R79.89 ELEVATED TROPONIN: Status: ACTIVE | Noted: 2019-08-27

## 2022-03-19 PROBLEM — Z79.01 CHRONIC ANTICOAGULATION: Status: ACTIVE | Noted: 2019-08-27

## 2022-03-19 PROBLEM — I25.10 CAD (CORONARY ARTERY DISEASE): Status: ACTIVE | Noted: 2019-08-27

## 2022-03-19 PROBLEM — I49.01 VENTRICULAR FIBRILLATION (HCC): Status: ACTIVE | Noted: 2019-10-09

## 2022-03-19 PROBLEM — N39.0 UTI (URINARY TRACT INFECTION): Status: ACTIVE | Noted: 2019-08-27

## 2022-03-19 PROBLEM — R09.02 HYPOXIA: Status: ACTIVE | Noted: 2019-05-03

## 2022-03-19 PROBLEM — J30.1 SEASONAL ALLERGIC RHINITIS DUE TO POLLEN: Status: ACTIVE | Noted: 2019-04-12

## 2022-03-19 PROBLEM — I42.0 DILATED CARDIOMYOPATHY (HCC): Status: ACTIVE | Noted: 2019-05-03

## 2022-03-19 PROBLEM — E83.42 HYPOMAGNESEMIA: Status: ACTIVE | Noted: 2019-08-27

## 2022-03-19 PROBLEM — K81.9 CHOLECYSTITIS: Status: ACTIVE | Noted: 2019-09-18

## 2022-03-19 PROBLEM — R77.8 ELEVATED TROPONIN: Status: ACTIVE | Noted: 2019-08-27

## 2022-03-19 PROBLEM — R53.1 WEAKNESS GENERALIZED: Status: ACTIVE | Noted: 2019-08-27

## 2022-03-20 PROBLEM — K81.9 CHOLECYSTITIS, UNSPECIFIED: Status: ACTIVE | Noted: 2019-10-30

## 2022-03-20 PROBLEM — J20.9 ACUTE BRONCHITIS: Status: ACTIVE | Noted: 2018-10-15

## 2022-03-20 PROBLEM — R94.2 ABNORMAL PFT: Status: ACTIVE | Noted: 2017-07-10

## 2022-04-13 RX ORDER — DIGOXIN 125 MCG
TABLET ORAL
Qty: 90 TABLET | Refills: 3 | Status: SHIPPED | OUTPATIENT
Start: 2022-04-13

## 2022-04-27 NOTE — TELEPHONE ENCOUNTER
DANI FROM [facility] CALLED([phone]). NEEDS TO TALK TO NURSE REGARDING PT'S ORDER FOR NEBULIZER. PLEASE CALL. 1 = Total assistance

## 2022-04-28 ENCOUNTER — TRANSCRIBE ORDER (OUTPATIENT)
Dept: SCHEDULING | Age: 72
End: 2022-04-28

## 2022-04-28 DIAGNOSIS — C50.412 MALIGNANT NEOPLASM OF UPPER-OUTER QUADRANT OF LEFT FEMALE BREAST (HCC): Primary | ICD-10-CM

## 2022-05-04 ENCOUNTER — OFFICE VISIT (OUTPATIENT)
Dept: CARDIOLOGY CLINIC | Age: 72
End: 2022-05-04
Payer: MEDICARE

## 2022-05-04 VITALS
HEIGHT: 65 IN | OXYGEN SATURATION: 96 % | SYSTOLIC BLOOD PRESSURE: 123 MMHG | HEART RATE: 91 BPM | BODY MASS INDEX: 28.32 KG/M2 | WEIGHT: 170 LBS | DIASTOLIC BLOOD PRESSURE: 56 MMHG

## 2022-05-04 DIAGNOSIS — Z95.810 AICD (AUTOMATIC CARDIOVERTER/DEFIBRILLATOR) PRESENT: ICD-10-CM

## 2022-05-04 DIAGNOSIS — I50.22 SYSTOLIC CHF, CHRONIC (HCC): Primary | ICD-10-CM

## 2022-05-04 DIAGNOSIS — I42.0 DILATED CARDIOMYOPATHY (HCC): ICD-10-CM

## 2022-05-04 DIAGNOSIS — I10 ESSENTIAL HYPERTENSION: ICD-10-CM

## 2022-05-04 PROCEDURE — 99214 OFFICE O/P EST MOD 30 MIN: CPT | Performed by: INTERNAL MEDICINE

## 2022-05-04 PROCEDURE — G8427 DOCREV CUR MEDS BY ELIG CLIN: HCPCS | Performed by: INTERNAL MEDICINE

## 2022-05-04 PROCEDURE — G8432 DEP SCR NOT DOC, RNG: HCPCS | Performed by: INTERNAL MEDICINE

## 2022-05-04 PROCEDURE — G8754 DIAS BP LESS 90: HCPCS | Performed by: INTERNAL MEDICINE

## 2022-05-04 PROCEDURE — G8536 NO DOC ELDER MAL SCRN: HCPCS | Performed by: INTERNAL MEDICINE

## 2022-05-04 PROCEDURE — G9899 SCRN MAM PERF RSLTS DOC: HCPCS | Performed by: INTERNAL MEDICINE

## 2022-05-04 PROCEDURE — G8752 SYS BP LESS 140: HCPCS | Performed by: INTERNAL MEDICINE

## 2022-05-04 PROCEDURE — G8419 CALC BMI OUT NRM PARAM NOF/U: HCPCS | Performed by: INTERNAL MEDICINE

## 2022-05-04 PROCEDURE — 1101F PT FALLS ASSESS-DOCD LE1/YR: CPT | Performed by: INTERNAL MEDICINE

## 2022-05-04 PROCEDURE — G8399 PT W/DXA RESULTS DOCUMENT: HCPCS | Performed by: INTERNAL MEDICINE

## 2022-05-04 PROCEDURE — 3017F COLORECTAL CA SCREEN DOC REV: CPT | Performed by: INTERNAL MEDICINE

## 2022-05-04 PROCEDURE — 1090F PRES/ABSN URINE INCON ASSESS: CPT | Performed by: INTERNAL MEDICINE

## 2022-05-04 NOTE — PROGRESS NOTES
HISTORY OF PRESENT ILLNESS  Brianna Mendieta is a 67 y.o. female. Patient with cmp,chf.post  icd   On follow up patient denies any chest pains, palpitation or other significant symptoms. Patient is here for follow up of diagnostic tests. Results will be discussed. He feels extremely fatigued tired and weak. Recently reported decrease in renal function. Patient seen for transition of care visit. Discharge date 5/6/2019  Nurse encounter 5/6/2019  Physician encounter 5/8/2019. Admitted with acute CHF, acute PE, DVT. Patient had worsening cardiomyopathy. Symptoms are slowly improving significant improvement of shortness of breath. Orthopnea significantly better. No edema. Still feels tired on minimal activity exertion  2/2021  Patient seen today for follow-up. Her shortness of breath is stable. Denies any other complaint at present    Follow-up  Associated symptoms include shortness of breath. Pertinent negatives include no chest pain. Cardiomyopathy  The history is provided by the patient. This is a chronic problem. The problem has been resolved. Associated symptoms include shortness of breath. Pertinent negatives include no chest pain. Hypertension  The history is provided by the patient. This is a chronic problem. The problem occurs constantly. The problem has not changed since onset. Associated symptoms include shortness of breath. Pertinent negatives include no chest pain. CHF  The history is provided by the patient. This is a recurrent problem. The problem occurs constantly. The problem has been gradually improving. Associated symptoms include shortness of breath. Pertinent negatives include no chest pain. The symptoms are aggravated by exertion. The symptoms are relieved by rest.   Valvular Heart Disease  The history is provided by the patient. This is a chronic problem. The problem occurs constantly. The problem has not changed since onset. Associated symptoms include shortness of breath. Pertinent negatives include no chest pain. Review of Systems   Constitutional: Negative for chills and fever. HENT: Negative for nosebleeds. Eyes: Negative for blurred vision and double vision. Respiratory: Positive for shortness of breath. Negative for cough, hemoptysis, sputum production and wheezing. Cardiovascular: Negative for chest pain, palpitations, orthopnea, claudication, leg swelling and PND. Gastrointestinal: Negative for heartburn, nausea and vomiting. Musculoskeletal: Negative for myalgias. Skin: Negative for rash. Neurological: Negative for dizziness and weakness. Endo/Heme/Allergies: Does not bruise/bleed easily. Family History   Problem Relation Age of Onset    Hypertension Mother     High Cholesterol Mother     Heart Disease Father         paemaker implant at 80       Past Medical History:   Diagnosis Date    Adenocarcinoma of breast; locally advanced invasive ductal adenocarcinoma of left breast     Asthma     Breast cancer ([address] 2011    Chemo and Radiation Left     Breast cancer metastasized to lung ([address] 2019    Chemo    Breast cancer, right ([address] 2021    Chronic combined systolic and diastolic heart failure ([address]     Remains symptomatic, nyha class 3 ef improving.     Congestive heart failure, unspecified     chronic class ll    DVT of lower limb, acute ([address] 2019    Right leg, and PE    GERD (gastroesophageal reflux disease)     History of pulmonary embolus (PE) 2019    Hypertension     Long term current use of anticoagulant therapy     MVP (mitral valve prolapse)     Nonischemic cardiomyopathy (HCC)     EF 20-30% despite medical therapy    Osteopenia     Pacemaker 10/27/10    s/p implantation, without complication    Radiation therapy     Thyroid disease     goiter       Past Surgical History:   Procedure Laterality Date    COLONOSCOPY N/A 12/1/2020    COLONOSCOPY with polypectomy performed by Emanuel Jane MD at [address] HX APPENDECTOMY  1994    HX BREAST BIOPSY Right 11/16/2021    RIGHT BREAST EXCISIONAL BIOPSY WITH LOCALIZATION (TAG 11/2/21 @ [facility]) performed by Glen Ribeiro MD at [address] [place] [address] HX CATARACT REMOVAL Bilateral 10/19/2020    HX CERVICAL FUSION  2000    C5,C6    HX CHOLECYSTECTOMY  11/01/2019    HX HEART CATHETERIZATION      HX MASTECTOMY Right 1/4/2022    RIGHT MASTECTOMY performed by Glen Ribeiro MD at [address] [place] [address] HX PACEMAKER  10/27/10    s/p Medtronic biventricular AICD, without complication    HX PACEMAKER  10/2021    Battery changed    HX RADICAL MASTECTOMY  09/28/11    modified radical mastectomy and axillary dissection    IR CHOLECYSTOSTOMY PERCUTANEOUS  9/20/2019    IR INSERT TUNL CVC W PORT OVER 5 YEARS  1/16/2019    Removed    IR REMOVE TUNL CVAD W PORT/PUMP  9/16/2021    removed    [place] ABDOMEN SURGERY PROC UNLISTED  1994    Partial liver removal       Social History     Tobacco Use    Smoking status: Never Smoker    Smokeless tobacco: Never Used   Substance Use Topics    Alcohol use: No       Allergies   Allergen Reactions    Adhesive Other (comments)     blisters       Current Outpatient Medications   Medication Sig    digoxin (LANOXIN) 0.125 mg tablet TAKE 1 TABLET DAILY    spironolactone (ALDACTONE) 25 mg tablet Take 2 Tablets by mouth daily.  temazepam (RESTORIL) 30 mg capsule Take 30 mg by mouth nightly.  b complex vitamins tablet Take 1 Tablet by mouth daily.  multivitamin (ONE A DAY) tablet Take 1 Tablet by mouth daily.  ascorbic acid, vitamin C, (Vitamin C) 500 mg tablet Take 1,000 mg by mouth daily.  cholecalciferol (VITAMIN D3) (5000 Units/125 mcg) tab tablet Take 5,000 Units by mouth daily.  phytosterol/pantethine (CHOLESTOFF COMPLETE PO) Take 1 Tablet by mouth daily.  carvediloL (COREG) 25 mg tablet TAKE 1 TABLET TWICE DAILY  WITH MEALS    montelukast (SINGULAIR) 10 mg tablet Take 1 Tablet by mouth daily.  PATIENT NEEDS APPOINTMENT    Entresto 49-51 mg tab tablet TAKE 1 TABLET TWICE A DAY    apixaban (ELIQUIS) 2.5 mg tablet Take 1 Tab by mouth two (2) times a day.  furosemide (LASIX) 20 mg tablet Take 1 Tab by mouth daily. (Patient taking differently: Take 20 mg by mouth every Monday, Wednesday, Friday.)    DULoxetine (CYMBALTA) 60 mg capsule Take 60 mg by mouth daily.  raloxifene (EVISTA) 60 mg tablet Take 60 mg by mouth daily. No current facility-administered medications for this visit. Visit Vitals  BP (!) 123/56   Pulse 91   Ht 5' 5\" (1.651 m)   Wt 77.1 kg (170 lb)   SpO2 96%   BMI 28.29 kg/m²         Physical Exam  Constitutional:       Appearance: She is well-developed. HENT:      Head: Normocephalic and atraumatic. Eyes:      Conjunctiva/sclera: Conjunctivae normal.   Neck:      Thyroid: No thyromegaly. Vascular: No JVD. Trachea: No tracheal deviation. Cardiovascular:      Rate and Rhythm: Normal rate and regular rhythm. Chest Wall: PMI is not displaced. Pulses: No decreased pulses. Heart sounds: Murmur heard. High-pitched blowing holosystolic murmur is present with a grade of 2/6 at the apex. No gallop. No S3 sounds. Pulmonary:      Effort: No respiratory distress. Breath sounds: Wheezing present. No rales. Chest:      Chest wall: No tenderness. Abdominal:      Palpations: Abdomen is soft. Tenderness: There is no abdominal tenderness. Musculoskeletal:      Cervical back: Neck supple. Skin:     General: Skin is warm. Neurological:      Mental Status: She is alert and oriented to person, place, and time. Ms. Noni Cha has a reminder for a \"due or due soon\" health maintenance. I have asked that she contact her primary care provider for follow-up on this health maintenance. No flowsheet data found. SUMMARY:echo:6/2014  Left ventricle: The ventricle was mildly dilated.  Systolic function was  normal. Ejection fraction was estimated in the range of 50 % to 55 %.  There were no regional wall motion abnormalities. Doppler parameters were  consistent with abnormal left ventricular relaxation (grade 1 diastolic  dysfunction). Left atrium: The atrium was mildly dilated. Mitral valve: There was systolic bowing of the posterior leaflet, but  without diagnostic evidence for prolapse. There was mild to moderate  regurgitation. SUMMARY:echo:12/2014  Left ventricle: Systolic function was at the lower limits of normal.  Ejection fraction was estimated to be 53 %. There were no regional wall  motion abnormalities. Doppler parameters were consistent with abnormal  left ventricular relaxation (grade 1 diastolic dysfunction). Right ventricle: A pacing wire was present. Mitral valve: There was systolic bowing of the posterior leaflet, but  without diagnostic evidence for prolapse. There was mild regurgitation. Tricuspid valve: Pulmonary artery systolic pressure: 22 mmHg. 12/2015:echo    SUMMARY:  Left ventricle: Systolic function was normal. Ejection fraction was  estimated in the range of 50 % to 55 %. There was mild diffuse  hypokinesis. Doppler parameters were consistent with abnormal left  ventricular relaxation (grade 1 diastolic dysfunction). Mitral valve: There was systolic bowing of the anterior and posterior  leaflets, but without diagnostic evidence for prolapse. There was mild  regurgitation. Tricuspid valve: There was mild regurgitation. Tricuspid regurgitation  peak velocity: 2.3 m/sec. Pulmonary artery systolic pressure: 25 mmHg. pft-6/2017  Maximal Mid Expiratory Flow rate is reduced to 43 % predicted  Forced Expiratory Volume in one second is reduced to 69 % predicted  FEV 1% is reduced     Volumes:   All Volumes are reduced except for RV    Flow Volume Loop:  Nonspecific obstructive pattern in Flow Volume Loop    Bronchodilator:  No significant improvement with bronchodilator therapy    Diffusion:  Abnormal Diffusion Capacity reduced to 62 % predicted  DLCO normalizes to alveolar ventilation    Impression:  Moderate obstructive defect, Predominately small airways, Mild restrictive ventilatory defect, Reduced diffusion capacity indicating a decrease in alveolar surface area for gas exchange  I Have personally reviewed recent relevant labs available and discussed with patient    Interpretation Summary -6/2018  · Calculated left ventricular ejection fraction is 55%. Left ventricular mild concentric hypertrophy observed. Mild (grade 1) left ventricular diastolic dysfunction. · Left atrial cavity size is mildly dilated. · There was systolic bowing of the anterior and posterior leaflets, but without diagnostic evidence for prolapse. Mild mitral valve regurgitation. · Mild tricuspid valve regurgitation is present. Pulmonary arterial systolic pressure is 18 mmHg. · Mild pulmonic valve regurgitation is present. · Small pericardial effusion. Interpretation Summary 12/2018    · Left ventricular low normal systolic function. Estimated left ventricular ejection fraction is 51 - 55%. Visually measured ejection fraction. Normal left ventricular wall motion, no regional wall motion abnormality noted. Moderate (grade 2) left ventricular diastolic dysfunction. · There is no evidence of pulmonary hypertension. · Mild to moderate mitral valve regurgitation. · Left atrial cavity size is mildly dilated. Interpretation Summary 3/2019       · Normal cavity size, wall thickness and diastolic function. There is low normal systolic function. The estimated ejection fraction is 51 - 55%. No regional wall motion abnormality noted. Global longitudinal strain is -13.00%. Abnormal left ventricular strain. · Normal right ventricular size and function. Pacing wire present  · Mild to moderate mitral valve regurgitation. Mild prolapse of the posterior leaflet within the mitral valve. · Mild pulmonic valve regurgitation is present.   · Mild tricuspid valve regurgitation. Pulmonary arterial systolic pressure is 57.3 mmHg. Mild pulmonary hypertension. 5/2019    · Left Ventricle: Mild concentric hypertrophy. Severe global systolic dysfunction. Estimated left ventricular ejection fraction is 26 - 30%. Biplane method used to measure ejection fraction. Left ventricular global hypokinesis. · IVC/Hepatic Veins: Severely elevated central venous pressure (15+ mmHg); IVC diameter is larger than 21 mm and collapses less than 50% with respiration. · Pulmonary Artery: Mild pulmonary hypertension. Pulmonary arterial systolic pressure is 44 mmHg. · Pericardium: Trivial-to-small circumferential pericardial effusion measuring 10 mm. · Mitral Valve: Moderate mitral valve regurgitation. · Right Ventricle: Pacing wire present   Interpretation Summary 5/2019    · Acute occlusive thrombus present in the right peroneal vein. IMPRESSION: 5/2019     1. Positive examination for pulmonary emboli.   - There is likely a combination of acute and subacute PE in the lower lobe  segmental and subsegmental branch vessels. Interpretation Summary 7/2019    · Left Ventricle: Normal cavity size. Mild concentric hypertrophy. Severe systolic dysfunction. Estimated left ventricular ejection fraction is 21 - 25%. Abnormal left ventricular wall motion. Inconclusive left ventricular diastolic function. · Left Atrium: Severely dilated left atrium. · Mitral Valve: Mitral valve thickening. Mitral annular calcification. Moderate mitral valve regurgitation. · Tricuspid Valve: Mild tricuspid valve regurgitation is present. Pulmonary arterial systolic pressure is 78.3 mmHg. Mild pulmonary hypertension. · IVC/Hepatic Veins: Moderately elevated central venous pressure (10-15 mmHg); IVC diameter is larger than 21 mm and collapses more than 50% with respiration. · Pericardium: Trivial-to-small pericardial effusion. Interpretation Summary 9/2019    · Left Ventricle: Normal cavity size.  Upper normal wall thickness. Moderate-to-severe systolic dysfunction. Estimated left ventricular ejection fraction is 36 - 40%. Biplane method used to measure ejection fraction. Left ventricular global hypokinesis. · Pericardium: Trivial pericardial effusion. Interpretation Summary 8/2020      · LV: Estimated LVEF is 45 - 50%. Normal cavity size. Upper normal wall thickness. Wall motion: normal. Mild (grade 1) left ventricular diastolic dysfunction. · LA: Left Atrium volume index is 35.88 mL/m2. · RV: Pacer/ICD present. · MV: Mild mitral annular calcification. Mild mitral valve regurgitation is present. · TV: Mild tricuspid valve regurgitation is present. · PA: Pulmonary arterial systolic pressure is 24 mmHg. Interpretation Summary 7/2021    · LV: Estimated LVEF is 50 - 55%. Visually measured ejection fraction. Normal wall thickness and diastolic function. Dilated left ventricle. Low normal systolic function. Wall motion: normal.  · RV: Pacer/ICD present. Conclusion 10/2021    PROCEDURES   - Generator changeout of Medtronic  biventricular automatic implantable cardioverter defibrillator.   - Conscious sedation with versed and fentanyl. Assessment         ICD-10-CM ICD-9-CM    1. Systolic CHF, chronic (HCC)  I50.22 428.22 ECHO ADULT FOLLOW-UP OR LIMITED     769.6 METABOLIC PANEL, BASIC      DIGOXIN    Increase shortness of breath. No significant fluid overload. Class III   2. Dilated cardiomyopathy ([address]  I42.0 425.4 ECHO ADULT FOLLOW-UP OR LIMITED    Continue treatment monitor   3. AICD (automatic cardioverter/defibrillator) present  Z95.810 V45.02    4. Essential hypertension  I10 401.9     Stable monitor   Tounge swelling not related to lisinopril as she had swelling even after stopping lisinopril  7/2018  I will hold Lasix as recent decrease in renal function. No clinical sign of fluid overload. 10/2018  Shortness of breath due to acute bronchitis bilateral wheezing.   Will refer back to pulmonary. No sign of fluid overload. Will keep off Lasix  1/2019  Metastatic breast cancer now back on chemotherapy. Lung metastasis likely cause for shortness of breath which is improving. Low blood pressure will reduce Coreg to 6.25 twice a day. Continue lisinopril. Recheck echo in 2 months on chemotherapy  4/2019  Cardiac status stable. Echo EF remains stable continue Coreg and lisinopril. Check echo in 3 months. Patient remains on chemotherapy    5/2019  Recent admission with increased shortness of breath combination of pulmonary embolism and decompensated systolic heart failure with worsening ejection fraction. Class III CHF. 10 pound weight loss from peak. Continue current therapy. Continue anticoagulation. Follow-up 3 weeks  5/30/2019  Fluid overload stable. Remains weak. Follow-up echo in about a month to reassess LV function. 10/2019  Cardiac status stable. CHF compensated. Shortness of breath and edema improving. Continue Entresto. Aldactone increased to 50 mg a day due to hypokalemia. Lasix will be reduced to 2-3 times a week. Episode of ventricular fibrillation in 8/2019 noticed on ICD check. Patient had hypokalemia during that time will continue to monitor. If patient needs laparoscopic surgery her risk is high about 5 to 10%. If open surgical procedure is required it will carry high risk    1/2020  Cardiac status stable. She completed Chemotherapy in October and is doing well. She is tolerating Entresto. She takes lasix 2-3 x weekly as needed for edema. Reports blood work drawn this week with normal renal function and potassium. Reports an episode of syncope in Dr. Hoffman Amend office in November and b/p readings were labile at that time. ICD checked 11/2019 (after syncopal episode) no arrhythmias seen. Continue current medications. 7/2020  Cardiac status stable. Continue treatment. Follow-up echo  8/2020 virtual visit  Cardiac status stable. continue current treatment,lvef improving  11/2020  Cardiac status stable. Low normal blood pressure but asymptomatic continue monitoring. CHF compensated  2/2021  Stable cardiac status CHF compensated continue current medical management tolerating treatment for CHF with Entresto and Coreg. 5/2021  Stable cardiac status. Still feels fatigue and tired. LVEF is improving. She has cut down Coreg to 12.5 mg twice a day. Blood pressure stable. CHF compensated continue treatment  10/2021  Stable cardiac status. Recent generator change for ICD. Okay to proceed with breast surgery. Okay to hold Eliquis. Medium cardiac risk. Eliquis is used on prophylaxis to reduce DVT and PE  1/2022  Stable cardiac s/p generator change out. Also had mastectomy on the right side for recurrence of breast cancer. Stable postprocedure. Check digoxin level. Blood pressure on low normal side but asymptomatic  5/2022  Increase shortness of breath. No significant fluid overload. Reduce digoxin to half doses level was 1.3. Check limited echo. Recent creatinine had increased to 1.4 from 1. We will hold Lasix for 1 week  Orders Placed This Encounter    METABOLIC PANEL, BASIC     Standing Status:   Future     Standing Expiration Date:   6/3/2022    DIGOXIN     Standing Status:   Future     Standing Expiration Date:   11/4/2022    ECHO ADULT FOLLOW-UP OR LIMITED     Standing Status:   Future     Standing Expiration Date:   5/4/2024     Order Specific Question:   Contrast Enhancement (Bubble Study, Definity, Optison) may be used if criteria listed in established evidence-based protocol has been identified. Answer:   Yes     Order Specific Question:   Enhanced Imaging (Myocardial Strain, 3D post-processing) may be used if criteria listed in established evidence-based protocol has been identified. Answer:    Yes         Samuel Chapman MD

## 2022-05-04 NOTE — PROGRESS NOTES
1. Have you been to the ER, urgent care clinic since your last visit? Hospitalized since your last visit?     no  2. Have you seen or consulted any other health care providers outside of the [address] [place] [address] since your last visit? Include any pap smears or colon screening. Yes Where: pcp     3. Since your last visit, have you had any of the following symptoms? shortness of breath.

## 2022-05-06 ENCOUNTER — HOSPITAL ENCOUNTER (OUTPATIENT)
Dept: PET IMAGING | Age: 72
Discharge: HOME | End: 2022-05-06
Attending: PHYSICIAN ASSISTANT
Payer: MEDICARE

## 2022-05-06 DIAGNOSIS — C50.412 MALIGNANT NEOPLASM OF UPPER-OUTER QUADRANT OF LEFT FEMALE BREAST (HCC): ICD-10-CM

## 2022-05-06 PROCEDURE — A9552 F18 FDG: HCPCS

## 2022-05-06 PROCEDURE — 74011000250 HC RX REV CODE- 250

## 2022-05-06 RX ORDER — BARIUM SULFATE 20 MG/ML
675 SUSPENSION ORAL
Status: COMPLETED | OUTPATIENT
Start: 2022-05-06 | End: 2022-05-06

## 2022-05-06 RX ORDER — FLUDEOXYGLUCOSE F-18 200 MCI/ML
7.3 INJECTION INTRAVENOUS ONCE
Status: COMPLETED | OUTPATIENT
Start: 2022-05-06 | End: 2022-05-06

## 2022-05-06 RX ADMIN — BARIUM SULFATE 675 ML: 20 SUSPENSION ORAL at 17:56

## 2022-05-06 RX ADMIN — FLUDEOXYGLUCOSE F-18 7.3 MILLICURIE: 200 INJECTION INTRAVENOUS at 17:04

## 2022-05-12 ENCOUNTER — HOSPITAL ENCOUNTER (OUTPATIENT)
Dept: LAB | Age: 72
Discharge: HOME | End: 2022-05-12
Payer: MEDICARE

## 2022-05-12 DIAGNOSIS — I50.22 SYSTOLIC CHF, CHRONIC (HCC): ICD-10-CM

## 2022-05-12 LAB
ANION GAP SERPL CALC-SCNC: 7 MMOL/L (ref 3–18)
BUN SERPL-MCNC: 15 MG/DL (ref 7–18)
BUN/CREAT SERPL: 15 (ref 12–20)
CALCIUM SERPL-MCNC: 8.9 MG/DL (ref 8.5–10.1)
CHLORIDE SERPL-SCNC: 109 MMOL/L (ref 100–111)
CO2 SERPL-SCNC: 24 MMOL/L (ref 21–32)
CREAT SERPL-MCNC: 1 MG/DL (ref 0.6–1.3)
DIGOXIN SERPL-MCNC: 0.5 NG/ML (ref 0.9–2)
GLUCOSE SERPL-MCNC: 110 MG/DL (ref 74–99)
POTASSIUM SERPL-SCNC: 4.2 MMOL/L (ref 3.5–5.5)
SODIUM SERPL-SCNC: 140 MMOL/L (ref 136–145)

## 2022-05-12 PROCEDURE — 80048 BASIC METABOLIC PNL TOTAL CA: CPT

## 2022-05-12 PROCEDURE — 36415 COLL VENOUS BLD VENIPUNCTURE: CPT

## 2022-05-12 PROCEDURE — 80162 ASSAY OF DIGOXIN TOTAL: CPT

## 2022-05-13 ENCOUNTER — TELEPHONE (OUTPATIENT)
Dept: CARDIOLOGY CLINIC | Age: 72
End: 2022-05-13

## 2022-05-13 NOTE — TELEPHONE ENCOUNTER
Called and left detail message for patient per Dr. Lisette Hector regarding lab/test. Lab reviewed. Low digoxin level. Continue current treatment. If any questions to call office.

## 2022-05-13 NOTE — TELEPHONE ENCOUNTER
----- Message from Samuel Chapman MD sent at 5/13/2022  7:22 AM EDT -----  Low digoxin level.   Continue current treatment

## 2022-06-01 ENCOUNTER — OFFICE VISIT (OUTPATIENT)
Dept: CARDIOLOGY CLINIC | Age: 72
End: 2022-06-01
Payer: MEDICARE

## 2022-06-01 VITALS
SYSTOLIC BLOOD PRESSURE: 97 MMHG | WEIGHT: 170 LBS | OXYGEN SATURATION: 98 % | HEART RATE: 81 BPM | DIASTOLIC BLOOD PRESSURE: 44 MMHG | BODY MASS INDEX: 28.32 KG/M2 | HEIGHT: 65 IN

## 2022-06-01 DIAGNOSIS — Z95.810 AICD (AUTOMATIC CARDIOVERTER/DEFIBRILLATOR) PRESENT: ICD-10-CM

## 2022-06-01 DIAGNOSIS — I42.0 DILATED CARDIOMYOPATHY (HCC): ICD-10-CM

## 2022-06-01 DIAGNOSIS — I10 ESSENTIAL HYPERTENSION: ICD-10-CM

## 2022-06-01 DIAGNOSIS — I50.22 SYSTOLIC CHF, CHRONIC (HCC): Primary | ICD-10-CM

## 2022-06-01 PROCEDURE — G8536 NO DOC ELDER MAL SCRN: HCPCS | Performed by: INTERNAL MEDICINE

## 2022-06-01 PROCEDURE — G9899 SCRN MAM PERF RSLTS DOC: HCPCS | Performed by: INTERNAL MEDICINE

## 2022-06-01 PROCEDURE — 1123F ACP DISCUSS/DSCN MKR DOCD: CPT | Performed by: INTERNAL MEDICINE

## 2022-06-01 PROCEDURE — 99214 OFFICE O/P EST MOD 30 MIN: CPT | Performed by: INTERNAL MEDICINE

## 2022-06-01 PROCEDURE — G8417 CALC BMI ABV UP PARAM F/U: HCPCS | Performed by: INTERNAL MEDICINE

## 2022-06-01 PROCEDURE — G8427 DOCREV CUR MEDS BY ELIG CLIN: HCPCS | Performed by: INTERNAL MEDICINE

## 2022-06-01 PROCEDURE — G8754 DIAS BP LESS 90: HCPCS | Performed by: INTERNAL MEDICINE

## 2022-06-01 PROCEDURE — G8432 DEP SCR NOT DOC, RNG: HCPCS | Performed by: INTERNAL MEDICINE

## 2022-06-01 PROCEDURE — 3017F COLORECTAL CA SCREEN DOC REV: CPT | Performed by: INTERNAL MEDICINE

## 2022-06-01 PROCEDURE — 1090F PRES/ABSN URINE INCON ASSESS: CPT | Performed by: INTERNAL MEDICINE

## 2022-06-01 PROCEDURE — G8752 SYS BP LESS 140: HCPCS | Performed by: INTERNAL MEDICINE

## 2022-06-01 PROCEDURE — 1101F PT FALLS ASSESS-DOCD LE1/YR: CPT | Performed by: INTERNAL MEDICINE

## 2022-06-01 PROCEDURE — G8399 PT W/DXA RESULTS DOCUMENT: HCPCS | Performed by: INTERNAL MEDICINE

## 2022-06-01 NOTE — PROGRESS NOTES
HISTORY OF PRESENT ILLNESS  Oneida Dueñas is a 67 y.o. female. Patient with cmp,chf.post  icd   On follow up patient denies any chest pains, palpitation or other significant symptoms. Patient is here for follow up of diagnostic tests. Results will be discussed. He feels extremely fatigued tired and weak. Recently reported decrease in renal function. Patient seen for transition of care visit. Discharge date 5/6/2019  Nurse encounter 5/6/2019  Physician encounter 5/8/2019. Admitted with acute CHF, acute PE, DVT. Patient had worsening cardiomyopathy. Symptoms are slowly improving significant improvement of shortness of breath. Orthopnea significantly better. No edema. Still feels tired on minimal activity exertion  2/2021  Patient seen today for follow-up. Her shortness of breath is stable. Denies any other complaint at present    Follow-up  Associated symptoms include shortness of breath. Pertinent negatives include no chest pain. Cardiomyopathy  The history is provided by the patient. This is a chronic problem. The problem has been resolved. Associated symptoms include shortness of breath. Pertinent negatives include no chest pain. Hypertension  The history is provided by the patient. This is a chronic problem. The problem occurs constantly. The problem has not changed since onset. Associated symptoms include shortness of breath. Pertinent negatives include no chest pain. CHF  The history is provided by the patient. This is a recurrent problem. The problem occurs constantly. The problem has been gradually improving. Associated symptoms include shortness of breath. Pertinent negatives include no chest pain. The symptoms are aggravated by exertion. The symptoms are relieved by rest.   Valvular Heart Disease  The history is provided by the patient. This is a chronic problem. The problem occurs constantly. The problem has not changed since onset. Associated symptoms include shortness of breath. Pertinent negatives include no chest pain. Review of Systems   Constitutional: Negative for chills and fever. HENT: Negative for nosebleeds. Eyes: Negative for blurred vision and double vision. Respiratory: Positive for shortness of breath. Negative for cough, hemoptysis, sputum production and wheezing. Cardiovascular: Negative for chest pain, palpitations, orthopnea, claudication, leg swelling and PND. Gastrointestinal: Negative for heartburn, nausea and vomiting. Musculoskeletal: Negative for myalgias. Skin: Negative for rash. Neurological: Negative for dizziness and weakness. Endo/Heme/Allergies: Does not bruise/bleed easily. Family History   Problem Relation Age of Onset    Hypertension Mother     High Cholesterol Mother     Heart Disease Father         paemaker implant at 80       Past Medical History:   Diagnosis Date    Adenocarcinoma of breast; locally advanced invasive ductal adenocarcinoma of left breast     Asthma     Breast cancer ([address] 2011    Chemo and Radiation Left     Breast cancer metastasized to lung ([address] 2019    Chemo    Breast cancer, right ([address] 2021    Chronic combined systolic and diastolic heart failure ([address]     Remains symptomatic, nyha class 3 ef improving.     Congestive heart failure, unspecified     chronic class ll    DVT of lower limb, acute ([address] 2019    Right leg, and PE    GERD (gastroesophageal reflux disease)     History of pulmonary embolus (PE) 2019    Hypertension     Long term current use of anticoagulant therapy     MVP (mitral valve prolapse)     Nonischemic cardiomyopathy (HCC)     EF 20-30% despite medical therapy    Osteopenia     Pacemaker 10/27/10    s/p implantation, without complication    Radiation therapy     Thyroid disease     goiter       Past Surgical History:   Procedure Laterality Date    COLONOSCOPY N/A 12/1/2020    COLONOSCOPY with polypectomy performed by Anjelica Casillas MD at [address] HX APPENDECTOMY  1994    HX BREAST BIOPSY Right 11/16/2021    RIGHT BREAST EXCISIONAL BIOPSY WITH LOCALIZATION (TAG 11/2/21 @ [facility] 1300) performed by Charmaine Gil MD at [address] [place] [address] HX CATARACT REMOVAL Bilateral 10/19/2020    HX CERVICAL FUSION  2000    C5,C6    HX CHOLECYSTECTOMY  11/01/2019    HX HEART CATHETERIZATION      HX MASTECTOMY Right 1/4/2022    RIGHT MASTECTOMY performed by Charmaine Gil MD at [address] [place] [address] HX PACEMAKER  10/27/10    s/p Medtronic biventricular AICD, without complication    HX PACEMAKER  10/2021    Battery changed    HX RADICAL MASTECTOMY  09/28/11    modified radical mastectomy and axillary dissection    IR CHOLECYSTOSTOMY PERCUTANEOUS  9/20/2019    IR INSERT TUNL CVC W PORT OVER 5 YEARS  1/16/2019    Removed    IR REMOVE TUNL CVAD W PORT/PUMP  9/16/2021    removed    GA ABDOMEN SURGERY PROC UNLISTED  1994    Partial liver removal       Social History     Tobacco Use    Smoking status: Never Smoker    Smokeless tobacco: Never Used   Substance Use Topics    Alcohol use: No       Allergies   Allergen Reactions    Adhesive Other (comments)     blisters       Current Outpatient Medications   Medication Sig    digoxin (LANOXIN) 0.125 mg tablet TAKE 1 TABLET DAILY    spironolactone (ALDACTONE) 25 mg tablet Take 2 Tablets by mouth daily.  temazepam (RESTORIL) 30 mg capsule Take 30 mg by mouth nightly.  b complex vitamins tablet Take 1 Tablet by mouth daily.  multivitamin (ONE A DAY) tablet Take 1 Tablet by mouth daily.  ascorbic acid, vitamin C, (Vitamin C) 500 mg tablet Take 1,000 mg by mouth daily.  cholecalciferol (VITAMIN D3) (5000 Units/125 mcg) tab tablet Take 5,000 Units by mouth daily.  phytosterol/pantethine (CHOLESTOFF COMPLETE PO) Take 1 Tablet by mouth daily.  carvediloL (COREG) 25 mg tablet TAKE 1 TABLET TWICE DAILY  WITH MEALS    montelukast (SINGULAIR) 10 mg tablet Take 1 Tablet by mouth daily.  PATIENT NEEDS APPOINTMENT    Entresto 49-51 mg tab tablet TAKE 1 TABLET TWICE A DAY    apixaban (ELIQUIS) 2.5 mg tablet Take 1 Tab by mouth two (2) times a day.  furosemide (LASIX) 20 mg tablet Take 1 Tab by mouth daily. (Patient taking differently: Take 20 mg by mouth every Monday, Wednesday, Friday.)    DULoxetine (CYMBALTA) 60 mg capsule Take 60 mg by mouth daily.  raloxifene (EVISTA) 60 mg tablet Take 60 mg by mouth daily. No current facility-administered medications for this visit. Visit Vitals  BP (!) 97/44   Pulse 81   Ht 5' 5\" (1.651 m)   Wt 77.1 kg (170 lb)   SpO2 98%   BMI 28.29 kg/m²         Physical Exam  Constitutional:       Appearance: She is well-developed. HENT:      Head: Normocephalic and atraumatic. Eyes:      Conjunctiva/sclera: Conjunctivae normal.   Neck:      Thyroid: No thyromegaly. Vascular: No JVD. Trachea: No tracheal deviation. Cardiovascular:      Rate and Rhythm: Normal rate and regular rhythm. Chest Wall: PMI is not displaced. Pulses: No decreased pulses. Heart sounds: Murmur heard. High-pitched blowing holosystolic murmur is present with a grade of 2/6 at the apex. No gallop. No S3 sounds. Pulmonary:      Effort: No respiratory distress. Breath sounds: Wheezing present. No rales. Chest:      Chest wall: No tenderness. Abdominal:      Palpations: Abdomen is soft. Tenderness: There is no abdominal tenderness. Musculoskeletal:      Cervical back: Neck supple. Skin:     General: Skin is warm. Neurological:      Mental Status: She is alert and oriented to person, place, and time. Ms. Terell Escalera has a reminder for a \"due or due soon\" health maintenance. I have asked that she contact her primary care provider for follow-up on this health maintenance. No flowsheet data found. SUMMARY:echo:6/2014  Left ventricle: The ventricle was mildly dilated.  Systolic function was  normal. Ejection fraction was estimated in the range of 50 % to 55 %.  There were no regional wall motion abnormalities. Doppler parameters were  consistent with abnormal left ventricular relaxation (grade 1 diastolic  dysfunction). Left atrium: The atrium was mildly dilated. Mitral valve: There was systolic bowing of the posterior leaflet, but  without diagnostic evidence for prolapse. There was mild to moderate  regurgitation. SUMMARY:echo:12/2014  Left ventricle: Systolic function was at the lower limits of normal.  Ejection fraction was estimated to be 53 %. There were no regional wall  motion abnormalities. Doppler parameters were consistent with abnormal  left ventricular relaxation (grade 1 diastolic dysfunction). Right ventricle: A pacing wire was present. Mitral valve: There was systolic bowing of the posterior leaflet, but  without diagnostic evidence for prolapse. There was mild regurgitation. Tricuspid valve: Pulmonary artery systolic pressure: 22 mmHg. 12/2015:echo    SUMMARY:  Left ventricle: Systolic function was normal. Ejection fraction was  estimated in the range of 50 % to 55 %. There was mild diffuse  hypokinesis. Doppler parameters were consistent with abnormal left  ventricular relaxation (grade 1 diastolic dysfunction). Mitral valve: There was systolic bowing of the anterior and posterior  leaflets, but without diagnostic evidence for prolapse. There was mild  regurgitation. Tricuspid valve: There was mild regurgitation. Tricuspid regurgitation  peak velocity: 2.3 m/sec. Pulmonary artery systolic pressure: 25 mmHg. pft-6/2017  Maximal Mid Expiratory Flow rate is reduced to 43 % predicted  Forced Expiratory Volume in one second is reduced to 69 % predicted  FEV 1% is reduced     Volumes:   All Volumes are reduced except for RV    Flow Volume Loop:  Nonspecific obstructive pattern in Flow Volume Loop    Bronchodilator:  No significant improvement with bronchodilator therapy    Diffusion:  Abnormal Diffusion Capacity reduced to 62 % predicted  DLCO normalizes to alveolar ventilation    Impression:  Moderate obstructive defect, Predominately small airways, Mild restrictive ventilatory defect, Reduced diffusion capacity indicating a decrease in alveolar surface area for gas exchange  I Have personally reviewed recent relevant labs available and discussed with patient    Interpretation Summary -6/2018  · Calculated left ventricular ejection fraction is 55%. Left ventricular mild concentric hypertrophy observed. Mild (grade 1) left ventricular diastolic dysfunction. · Left atrial cavity size is mildly dilated. · There was systolic bowing of the anterior and posterior leaflets, but without diagnostic evidence for prolapse. Mild mitral valve regurgitation. · Mild tricuspid valve regurgitation is present. Pulmonary arterial systolic pressure is 18 mmHg. · Mild pulmonic valve regurgitation is present. · Small pericardial effusion. Interpretation Summary 12/2018    · Left ventricular low normal systolic function. Estimated left ventricular ejection fraction is 51 - 55%. Visually measured ejection fraction. Normal left ventricular wall motion, no regional wall motion abnormality noted. Moderate (grade 2) left ventricular diastolic dysfunction. · There is no evidence of pulmonary hypertension. · Mild to moderate mitral valve regurgitation. · Left atrial cavity size is mildly dilated. Interpretation Summary 3/2019       · Normal cavity size, wall thickness and diastolic function. There is low normal systolic function. The estimated ejection fraction is 51 - 55%. No regional wall motion abnormality noted. Global longitudinal strain is -13.00%. Abnormal left ventricular strain. · Normal right ventricular size and function. Pacing wire present  · Mild to moderate mitral valve regurgitation. Mild prolapse of the posterior leaflet within the mitral valve. · Mild pulmonic valve regurgitation is present.   · Mild tricuspid valve regurgitation. Pulmonary arterial systolic pressure is 75.6 mmHg. Mild pulmonary hypertension. 5/2019    · Left Ventricle: Mild concentric hypertrophy. Severe global systolic dysfunction. Estimated left ventricular ejection fraction is 26 - 30%. Biplane method used to measure ejection fraction. Left ventricular global hypokinesis. · IVC/Hepatic Veins: Severely elevated central venous pressure (15+ mmHg); IVC diameter is larger than 21 mm and collapses less than 50% with respiration. · Pulmonary Artery: Mild pulmonary hypertension. Pulmonary arterial systolic pressure is 44 mmHg. · Pericardium: Trivial-to-small circumferential pericardial effusion measuring 10 mm. · Mitral Valve: Moderate mitral valve regurgitation. · Right Ventricle: Pacing wire present   Interpretation Summary 5/2019    · Acute occlusive thrombus present in the right peroneal vein. IMPRESSION: 5/2019     1. Positive examination for pulmonary emboli.   - There is likely a combination of acute and subacute PE in the lower lobe  segmental and subsegmental branch vessels. Interpretation Summary 7/2019    · Left Ventricle: Normal cavity size. Mild concentric hypertrophy. Severe systolic dysfunction. Estimated left ventricular ejection fraction is 21 - 25%. Abnormal left ventricular wall motion. Inconclusive left ventricular diastolic function. · Left Atrium: Severely dilated left atrium. · Mitral Valve: Mitral valve thickening. Mitral annular calcification. Moderate mitral valve regurgitation. · Tricuspid Valve: Mild tricuspid valve regurgitation is present. Pulmonary arterial systolic pressure is 80.0 mmHg. Mild pulmonary hypertension. · IVC/Hepatic Veins: Moderately elevated central venous pressure (10-15 mmHg); IVC diameter is larger than 21 mm and collapses more than 50% with respiration. · Pericardium: Trivial-to-small pericardial effusion. Interpretation Summary 9/2019    · Left Ventricle: Normal cavity size.  Upper normal wall thickness. Moderate-to-severe systolic dysfunction. Estimated left ventricular ejection fraction is 36 - 40%. Biplane method used to measure ejection fraction. Left ventricular global hypokinesis. · Pericardium: Trivial pericardial effusion. Interpretation Summary 8/2020      · LV: Estimated LVEF is 45 - 50%. Normal cavity size. Upper normal wall thickness. Wall motion: normal. Mild (grade 1) left ventricular diastolic dysfunction. · LA: Left Atrium volume index is 35.88 mL/m2. · RV: Pacer/ICD present. · MV: Mild mitral annular calcification. Mild mitral valve regurgitation is present. · TV: Mild tricuspid valve regurgitation is present. · PA: Pulmonary arterial systolic pressure is 24 mmHg. Interpretation Summary 7/2021    · LV: Estimated LVEF is 50 - 55%. Visually measured ejection fraction. Normal wall thickness and diastolic function. Dilated left ventricle. Low normal systolic function. Wall motion: normal.  · RV: Pacer/ICD present. Conclusion 10/2021    PROCEDURES   - Generator changeout of Medtronic  biventricular automatic implantable cardioverter defibrillator.   - Conscious sedation with versed and fentanyl. Interpretation Summary 5/2022         Left Ventricle: Low normal left ventricular systolic function with a visually estimated EF of 50 - 55%. Left ventricle size is normal. Normal wall thickness. Normal wall motion.       Assessment         ICD-10-CM ICD-9-CM    1. Systolic CHF, chronic (HCC)  I50.22 428.22      428.0     Stable continue treatment monitor   2. Dilated cardiomyopathy (HCC)  I42.0 425.4     EF 50 to 55% on last echo continue monitoring and treatment   3. AICD (automatic cardioverter/defibrillator) present  Z95.810 V45.02     Normal function   4.  Essential hypertension  I10 401.9     Stable monitor   Tounge swelling not related to lisinopril as she had swelling even after stopping lisinopril  7/2018  I will hold Lasix as recent decrease in renal function. No clinical sign of fluid overload. 10/2018  Shortness of breath due to acute bronchitis bilateral wheezing. Will refer back to pulmonary. No sign of fluid overload. Will keep off Lasix  1/2019  Metastatic breast cancer now back on chemotherapy. Lung metastasis likely cause for shortness of breath which is improving. Low blood pressure will reduce Coreg to 6.25 twice a day. Continue lisinopril. Recheck echo in 2 months on chemotherapy  4/2019  Cardiac status stable. Echo EF remains stable continue Coreg and lisinopril. Check echo in 3 months. Patient remains on chemotherapy    5/2019  Recent admission with increased shortness of breath combination of pulmonary embolism and decompensated systolic heart failure with worsening ejection fraction. Class III CHF. 10 pound weight loss from peak. Continue current therapy. Continue anticoagulation. Follow-up 3 weeks  5/30/2019  Fluid overload stable. Remains weak. Follow-up echo in about a month to reassess LV function. 10/2019  Cardiac status stable. CHF compensated. Shortness of breath and edema improving. Continue Entresto. Aldactone increased to 50 mg a day due to hypokalemia. Lasix will be reduced to 2-3 times a week. Episode of ventricular fibrillation in 8/2019 noticed on ICD check. Patient had hypokalemia during that time will continue to monitor. If patient needs laparoscopic surgery her risk is high about 5 to 10%. If open surgical procedure is required it will carry high risk    1/2020  Cardiac status stable. She completed Chemotherapy in October and is doing well. She is tolerating Entresto. She takes lasix 2-3 x weekly as needed for edema. Reports blood work drawn this week with normal renal function and potassium. Reports an episode of syncope in Dr. Herminio Soler office in November and b/p readings were labile at that time. ICD checked 11/2019 (after syncopal episode) no arrhythmias seen. Continue current medications. 7/2020  Cardiac status stable. Continue treatment. Follow-up echo  8/2020 virtual visit  Cardiac status stable. continue current treatment,lvef improving  11/2020  Cardiac status stable. Low normal blood pressure but asymptomatic continue monitoring. CHF compensated  2/2021  Stable cardiac status CHF compensated continue current medical management tolerating treatment for CHF with Entresto and Coreg. 5/2021  Stable cardiac status. Still feels fatigue and tired. LVEF is improving. She has cut down Coreg to 12.5 mg twice a day. Blood pressure stable. CHF compensated continue treatment  10/2021  Stable cardiac status. Recent generator change for ICD. Okay to proceed with breast surgery. Okay to hold Eliquis. Medium cardiac risk. Eliquis is used on prophylaxis to reduce DVT and PE  1/2022  Stable cardiac s/p generator change out. Also had mastectomy on the right side for recurrence of breast cancer. Stable postprocedure. Check digoxin level. Blood pressure on low normal side but asymptomatic  5/2022  Increase shortness of breath. No significant fluid overload. Reduce digoxin to half doses level was 1.3. Check limited echo. Recent creatinine had increased to 1.4 from 1. We will hold Lasix for 1 week  6/2022  Stable cardiac status. Normal ejection fraction. Volume stable. Dig level low normal will continue current dose. Renal function better  No orders of the defined types were placed in this encounter.         Yakelin Leavitt MD

## 2022-07-28 RX ORDER — SPIRONOLACTONE 25 MG/1
TABLET ORAL
Qty: 180 TABLET | Refills: 1 | Status: SHIPPED | OUTPATIENT
Start: 2022-07-28

## 2022-07-28 RX ORDER — SACUBITRIL AND VALSARTAN 49; 51 MG/1; MG/1
TABLET, FILM COATED ORAL
Qty: 180 TABLET | Refills: 3 | Status: SHIPPED | OUTPATIENT
Start: 2022-07-28

## 2022-10-27 NOTE — ED NOTES
Patient seen by me as well. Recent diagnosis of bilateral PE Recent diagnosis of EF of 20% down from 50% in this month Shortness of breath on exertion Baseline tachycardia No real infectious symptoms patient will likely need admission. No antibiotics at this time.  
 
Jerry Alonso MD 
 
 Cosentyx Pregnancy And Lactation Text: This medication is Pregnancy Category B and is considered safe during pregnancy. It is unknown if this medication is excreted in breast milk.

## 2022-12-02 ENCOUNTER — OFFICE VISIT (OUTPATIENT)
Dept: CARDIOLOGY CLINIC | Age: 72
End: 2022-12-02
Payer: MEDICARE

## 2022-12-02 VITALS
HEIGHT: 65 IN | BODY MASS INDEX: 29.16 KG/M2 | HEART RATE: 90 BPM | OXYGEN SATURATION: 98 % | DIASTOLIC BLOOD PRESSURE: 59 MMHG | SYSTOLIC BLOOD PRESSURE: 112 MMHG | WEIGHT: 175 LBS

## 2022-12-02 DIAGNOSIS — I50.22 SYSTOLIC CHF, CHRONIC (HCC): Primary | ICD-10-CM

## 2022-12-02 DIAGNOSIS — I10 ESSENTIAL HYPERTENSION: ICD-10-CM

## 2022-12-02 DIAGNOSIS — I42.0 DILATED CARDIOMYOPATHY (HCC): ICD-10-CM

## 2022-12-02 DIAGNOSIS — Z95.810 AICD (AUTOMATIC CARDIOVERTER/DEFIBRILLATOR) PRESENT: ICD-10-CM

## 2022-12-02 RX ORDER — FUROSEMIDE 20 MG/1
20 TABLET ORAL AS NEEDED
Qty: 30 TABLET | Refills: 0
Start: 2022-12-02

## 2022-12-02 NOTE — PROGRESS NOTES
HISTORY OF PRESENT ILLNESS  Riccardo Munroe is a 67 y.o. female. Patient with cmp,chf.post  icd   On follow up patient denies any chest pains, palpitation or other significant symptoms. Patient is here for follow up of diagnostic tests. Results will be discussed. He feels extremely fatigued tired and weak. Recently reported decrease in renal function. Patient seen for transition of care visit. Discharge date 5/6/2019  Nurse encounter 5/6/2019  Physician encounter 5/8/2019. Admitted with acute CHF, acute PE, DVT. Patient had worsening cardiomyopathy. Symptoms are slowly improving significant improvement of shortness of breath. Orthopnea significantly better. No edema. Still feels tired on minimal activity exertion  2/2021  Patient seen today for follow-up. Her shortness of breath is stable. Denies any other complaint at present    Follow-up  Associated symptoms include shortness of breath. Pertinent negatives include no chest pain. Cardiomyopathy  The history is provided by the Patient. This is a chronic problem. The problem has been resolved. Associated symptoms include shortness of breath. Pertinent negatives include no chest pain. Hypertension  The history is provided by the Patient. This is a chronic problem. The problem occurs constantly. The problem has not changed since onset. Associated symptoms include shortness of breath. Pertinent negatives include no chest pain. CHF  The history is provided by the Patient. This is a recurrent problem. The problem occurs constantly. The problem has been gradually improving. Associated symptoms include shortness of breath. Pertinent negatives include no chest pain. The symptoms are aggravated by exertion. The symptoms are relieved by rest.   Valvular Heart Disease  The history is provided by the Patient. This is a chronic problem. The problem occurs constantly. The problem has not changed since onset. Associated symptoms include shortness of breath. Pertinent negatives include no chest pain. Review of Systems   Constitutional:  Negative for chills and fever. HENT:  Negative for nosebleeds. Eyes:  Negative for blurred vision and double vision. Respiratory:  Positive for shortness of breath. Negative for cough, hemoptysis, sputum production and wheezing. Cardiovascular:  Negative for chest pain, palpitations, orthopnea, claudication, leg swelling and PND. Gastrointestinal:  Negative for heartburn, nausea and vomiting. Musculoskeletal:  Negative for myalgias. Skin:  Negative for rash. Neurological:  Negative for dizziness and weakness. Endo/Heme/Allergies:  Does not bruise/bleed easily. Family History   Problem Relation Age of Onset    Hypertension Mother     High Cholesterol Mother     Heart Disease Father         paemaker implant at 80       Past Medical History:   Diagnosis Date    Adenocarcinoma of breast; locally advanced invasive ductal adenocarcinoma of left breast     Asthma     Breast cancer ([facility] [address] 2011    Chemo and Radiation Left     Breast cancer metastasized to lung [facility]) 2019    Chemo    Breast cancer, right ([facility] [address] 2021    Chronic combined systolic and diastolic heart failure (HCC)     Remains symptomatic, nyha class 3 ef improving.     Congestive heart failure, unspecified     chronic class ll    DVT of lower limb, acute ([facility] [address] 2019    Right leg, and PE    GERD (gastroesophageal reflux disease)     History of pulmonary embolus (PE) 2019    Hypertension     Long term current use of anticoagulant therapy     MVP (mitral valve prolapse)     Nonischemic cardiomyopathy (HCC)     EF 20-30% despite medical therapy    Osteopenia     Pacemaker 10/27/10    s/p implantation, without complication    Radiation therapy     Thyroid disease     goiter       Past Surgical History:   Procedure Laterality Date    COLONOSCOPY N/A 12/1/2020    COLONOSCOPY with polypectomy performed by Oscar De Oliveira MD at [place] [address] BREAST BIOPSY Right 11/16/2021    RIGHT BREAST EXCISIONAL BIOPSY WITH LOCALIZATION (TAG 11/2/21 @ [facility]) performed by Patricia Gordon MD at [address]    HX CATARACT REMOVAL Bilateral 10/19/2020    HX CERVICAL FUSION  2000    C5,C6    HX CHOLECYSTECTOMY  11/01/2019    HX HEART CATHETERIZATION      HX MASTECTOMY Right 1/4/2022    RIGHT MASTECTOMY performed by Patricia Gordon MD at [address]    HX PACEMAKER  10/27/10    s/p Medtronic biventricular AICD, without complication    HX PACEMAKER  10/2021    Battery changed    HX RADICAL MASTECTOMY  09/28/11    modified radical mastectomy and axillary dissection    IR CHOLECYSTOSTOMY PERCUTANEOUS  9/20/2019    IR INSERT TUNL CVC W PORT OVER 5 YEARS  1/16/2019    Removed    IR REMOVE TUNL CVAD W PORT/PUMP  9/16/2021    removed    [place] ABDOMEN SURGERY PROC UNLISTED  1994    Partial liver removal       Social History     Tobacco Use    Smoking status: Never    Smokeless tobacco: Never   Substance Use Topics    Alcohol use: No       Allergies   Allergen Reactions    Adhesive Other (comments)     blisters       Current Outpatient Medications   Medication Sig    Entresto 49-51 mg tab tablet TAKE 1 TABLET TWICE A DAY    spironolactone (ALDACTONE) 25 mg tablet TAKE 2 TABLETS DAILY    digoxin (LANOXIN) 0.125 mg tablet TAKE 1 TABLET DAILY    temazepam (RESTORIL) 30 mg capsule Take 30 mg by mouth nightly. b complex vitamins tablet Take 1 Tablet by mouth daily. multivitamin (ONE A DAY) tablet Take 1 Tablet by mouth daily. ascorbic acid, vitamin C, (Vitamin C) 500 mg tablet Take 1,000 mg by mouth daily. cholecalciferol (VITAMIN D3) (5000 Units/125 mcg) tab tablet Take 5,000 Units by mouth daily. phytosterol/pantethine (CHOLESTOFF COMPLETE PO) Take 1 Tablet by mouth daily. carvediloL (COREG) 25 mg tablet TAKE 1 TABLET TWICE DAILY  WITH MEALS    montelukast (SINGULAIR) 10 mg tablet Take 1 Tablet by mouth daily.  PATIENT NEEDS APPOINTMENT    apixaban (ELIQUIS) 2.5 mg tablet Take 1 Tab by mouth two (2) times a day. furosemide (LASIX) 20 mg tablet Take 1 Tab by mouth daily. (Patient taking differently: Take 20 mg by mouth every Monday, Wednesday, Friday.)    DULoxetine (CYMBALTA) 60 mg capsule Take 60 mg by mouth daily. raloxifene (EVISTA) 60 mg tablet Take 60 mg by mouth daily. No current facility-administered medications for this visit. Visit Vitals  Ht 5' 5\" (1.651 m)   BMI 28.29 kg/m²         Physical Exam  Constitutional:       Appearance: She is well-developed. HENT:      Head: Normocephalic and atraumatic. Eyes:      Conjunctiva/sclera: Conjunctivae normal.   Neck:      Thyroid: No thyromegaly. Vascular: No JVD. Trachea: No tracheal deviation. Cardiovascular:      Rate and Rhythm: Normal rate and regular rhythm. Chest Wall: PMI is not displaced. Pulses: No decreased pulses. Heart sounds: Murmur heard. High-pitched blowing holosystolic murmur is present with a grade of 2/6 at the apex. No gallop. No S3 sounds. Pulmonary:      Effort: No respiratory distress. Breath sounds: Wheezing present. No rales. Chest:      Chest wall: No tenderness. Abdominal:      Palpations: Abdomen is soft. Tenderness: There is no abdominal tenderness. Musculoskeletal:      Cervical back: Neck supple. Skin:     General: Skin is warm. Neurological:      Mental Status: She is alert and oriented to person, place, and time. Ms. Wing Mirza has a reminder for a \"due or due soon\" health maintenance. I have asked that she contact her primary care provider for follow-up on this health maintenance. No flowsheet data found. SUMMARY:echo:6/2014  Left ventricle: The ventricle was mildly dilated. Systolic function was  normal. Ejection fraction was estimated in the range of 50 % to 55 %. There were no regional wall motion abnormalities.  Doppler parameters were  consistent with abnormal left ventricular relaxation (grade 1 diastolic  dysfunction). Left atrium: The atrium was mildly dilated. Mitral valve: There was systolic bowing of the posterior leaflet, but  without diagnostic evidence for prolapse. There was mild to moderate  regurgitation. SUMMARY:echo:12/2014  Left ventricle: Systolic function was at the lower limits of normal.  Ejection fraction was estimated to be 53 %. There were no regional wall  motion abnormalities. Doppler parameters were consistent with abnormal  left ventricular relaxation (grade 1 diastolic dysfunction). Right ventricle: A pacing wire was present. Mitral valve: There was systolic bowing of the posterior leaflet, but  without diagnostic evidence for prolapse. There was mild regurgitation. Tricuspid valve: Pulmonary artery systolic pressure: 22 mmHg. 12/2015:echo    SUMMARY:  Left ventricle: Systolic function was normal. Ejection fraction was  estimated in the range of 50 % to 55 %. There was mild diffuse  hypokinesis. Doppler parameters were consistent with abnormal left  ventricular relaxation (grade 1 diastolic dysfunction). Mitral valve: There was systolic bowing of the anterior and posterior  leaflets, but without diagnostic evidence for prolapse. There was mild  regurgitation. Tricuspid valve: There was mild regurgitation. Tricuspid regurgitation  peak velocity: 2.3 m/sec. Pulmonary artery systolic pressure: 25 mmHg. pft-6/2017  Maximal Mid Expiratory Flow rate is reduced to 43 % predicted  Forced Expiratory Volume in one second is reduced to 69 % predicted  FEV 1% is reduced     Volumes:   All Volumes are reduced except for RV    Flow Volume Loop:  Nonspecific obstructive pattern in Flow Volume Loop    Bronchodilator:  No significant improvement with bronchodilator therapy    Diffusion:  Abnormal Diffusion Capacity reduced to 62 % predicted  DLCO normalizes to alveolar ventilation    Impression:  Moderate obstructive defect, Predominately small airways, Mild restrictive ventilatory defect, Reduced diffusion capacity indicating a decrease in alveolar surface area for gas exchange  I Have personally reviewed recent relevant labs available and discussed with patient    Interpretation Summary -6/2018  Calculated left ventricular ejection fraction is 55%. Left ventricular mild concentric hypertrophy observed. Mild (grade 1) left ventricular diastolic dysfunction. Left atrial cavity size is mildly dilated. There was systolic bowing of the anterior and posterior leaflets, but without diagnostic evidence for prolapse. Mild mitral valve regurgitation. Mild tricuspid valve regurgitation is present. Pulmonary arterial systolic pressure is 18 mmHg. Mild pulmonic valve regurgitation is present. Small pericardial effusion. Interpretation Summary 12/2018    Left ventricular low normal systolic function. Estimated left ventricular ejection fraction is 51 - 55%. Visually measured ejection fraction. Normal left ventricular wall motion, no regional wall motion abnormality noted. Moderate (grade 2) left ventricular diastolic dysfunction. There is no evidence of pulmonary hypertension. Mild to moderate mitral valve regurgitation. Left atrial cavity size is mildly dilated. Interpretation Summary 3/2019       Normal cavity size, wall thickness and diastolic function. There is low normal systolic function. The estimated ejection fraction is 51 - 55%. No regional wall motion abnormality noted. Global longitudinal strain is -13.00%. Abnormal left ventricular strain. Normal right ventricular size and function. Pacing wire present  Mild to moderate mitral valve regurgitation. Mild prolapse of the posterior leaflet within the mitral valve. Mild pulmonic valve regurgitation is present. Mild tricuspid valve regurgitation. Pulmonary arterial systolic pressure is 39.0 mmHg. Mild pulmonary hypertension. 5/2019    Left Ventricle: Mild concentric hypertrophy.  Severe global systolic dysfunction. Estimated left ventricular ejection fraction is 26 - 30%. Biplane method used to measure ejection fraction. Left ventricular global hypokinesis. IVC/Hepatic Veins: Severely elevated central venous pressure (15+ mmHg); IVC diameter is larger than 21 mm and collapses less than 50% with respiration. Pulmonary Artery: Mild pulmonary hypertension. Pulmonary arterial systolic pressure is 44 mmHg. Pericardium: Trivial-to-small circumferential pericardial effusion measuring 10 mm. Mitral Valve: Moderate mitral valve regurgitation. Right Ventricle: Pacing wire present   Interpretation Summary 5/2019    Acute occlusive thrombus present in the right peroneal vein. IMPRESSION: 5/2019     1. Positive examination for pulmonary emboli.   - There is likely a combination of acute and subacute PE in the lower lobe  segmental and subsegmental branch vessels. Interpretation Summary 7/2019    Left Ventricle: Normal cavity size. Mild concentric hypertrophy. Severe systolic dysfunction. Estimated left ventricular ejection fraction is 21 - 25%. Abnormal left ventricular wall motion. Inconclusive left ventricular diastolic function. Left Atrium: Severely dilated left atrium. Mitral Valve: Mitral valve thickening. Mitral annular calcification. Moderate mitral valve regurgitation. Tricuspid Valve: Mild tricuspid valve regurgitation is present. Pulmonary arterial systolic pressure is 73.4 mmHg. Mild pulmonary hypertension. IVC/Hepatic Veins: Moderately elevated central venous pressure (10-15 mmHg); IVC diameter is larger than 21 mm and collapses more than 50% with respiration. Pericardium: Trivial-to-small pericardial effusion. Interpretation Summary 9/2019    Left Ventricle: Normal cavity size. Upper normal wall thickness. Moderate-to-severe systolic dysfunction. Estimated left ventricular ejection fraction is 36 - 40%. Biplane method used to measure ejection fraction.  Left ventricular global hypokinesis. Pericardium: Trivial pericardial effusion. Interpretation Summary 8/2020      LV: Estimated LVEF is 45 - 50%. Normal cavity size. Upper normal wall thickness. Wall motion: normal. Mild (grade 1) left ventricular diastolic dysfunction. LA: Left Atrium volume index is 35.88 mL/m2. RV: Pacer/ICD present. MV: Mild mitral annular calcification. Mild mitral valve regurgitation is present. TV: Mild tricuspid valve regurgitation is present. PA: Pulmonary arterial systolic pressure is 24 mmHg. Interpretation Summary 7/2021    LV: Estimated LVEF is 50 - 55%. Visually measured ejection fraction. Normal wall thickness and diastolic function. Dilated left ventricle. Low normal systolic function. Wall motion: normal.  RV: Pacer/ICD present. Conclusion 10/2021    PROCEDURES   - Generator changeout of Medtronic  biventricular automatic implantable cardioverter defibrillator.   - Conscious sedation with versed and fentanyl. Interpretation Summary 5/2022         Left Ventricle: Low normal left ventricular systolic function with a visually estimated EF of 50 - 55%. Left ventricle size is normal. Normal wall thickness. Normal wall motion. Assessment         ICD-10-CM ICD-9-CM    1. Systolic CHF, chronic (HCC)  I50.22 428.22 SINGLE,DUAL,OR MULTIPLE LEAD IMPLANT CARD DEFIB SYST     428.0 IMPLANTABLE CARDIOVASULAR DB SYST    Stable compensated continue treatment      2. Dilated cardiomyopathy (HCC)  I42.0 425.4     Stable monitor continue treatment      3. AICD (automatic cardioverter/defibrillator) present  Z95.810 V45.02 SINGLE,DUAL,OR MULTIPLE LEAD IMPLANT CARD DEFIB SYST      IMPLANTABLE CARDIOVASULAR [address]     Office check today normal function      4. Essential hypertension  I10 401.9     Stable continue treatment      Tounge swelling not related to lisinopril as she had swelling even after stopping lisinopril  7/2018  I will hold Lasix as recent decrease in renal function.   No clinical sign of fluid overload. 10/2018  Shortness of breath due to acute bronchitis bilateral wheezing. Will refer back to pulmonary. No sign of fluid overload. Will keep off Lasix  1/2019  Metastatic breast cancer now back on chemotherapy. Lung metastasis likely cause for shortness of breath which is improving. Low blood pressure will reduce Coreg to 6.25 twice a day. Continue lisinopril. Recheck echo in 2 months on chemotherapy  4/2019  Cardiac status stable. Echo EF remains stable continue Coreg and lisinopril. Check echo in 3 months. Patient remains on chemotherapy    5/2019  Recent admission with increased shortness of breath combination of pulmonary embolism and decompensated systolic heart failure with worsening ejection fraction. Class III CHF. 10 pound weight loss from peak. Continue current therapy. Continue anticoagulation. Follow-up 3 weeks  5/30/2019  Fluid overload stable. Remains weak. Follow-up echo in about a month to reassess LV function. 10/2019  Cardiac status stable. CHF compensated. Shortness of breath and edema improving. Continue Entresto. Aldactone increased to 50 mg a day due to hypokalemia. Lasix will be reduced to 2-3 times a week. Episode of ventricular fibrillation in 8/2019 noticed on ICD check. Patient had hypokalemia during that time will continue to monitor. If patient needs laparoscopic surgery her risk is high about 5 to 10%. If open surgical procedure is required it will carry high risk    1/2020  Cardiac status stable. She completed Chemotherapy in October and is doing well. She is tolerating Entresto. She takes lasix 2-3 x weekly as needed for edema. Reports blood work drawn this week with normal renal function and potassium. Reports an episode of syncope in Dr. Merlene Early office in November and b/p readings were labile at that time. ICD checked 11/2019 (after syncopal episode) no arrhythmias seen. Continue current medications.    7/2020  Cardiac status stable. Continue treatment. Follow-up echo  8/2020 virtual visit  Cardiac status stable. continue current treatment,lvef improving  11/2020  Cardiac status stable. Low normal blood pressure but asymptomatic continue monitoring. CHF compensated  2/2021  Stable cardiac status CHF compensated continue current medical management tolerating treatment for CHF with Entresto and Coreg. 5/2021  Stable cardiac status. Still feels fatigue and tired. LVEF is improving. She has cut down Coreg to 12.5 mg twice a day. Blood pressure stable. CHF compensated continue treatment  10/2021  Stable cardiac status. Recent generator change for ICD. Okay to proceed with breast surgery. Okay to hold Eliquis. Medium cardiac risk. Eliquis is used on prophylaxis to reduce DVT and PE  1/2022  Stable cardiac s/p generator change out. Also had mastectomy on the right side for recurrence of breast cancer. Stable postprocedure. Check digoxin level. Blood pressure on low normal side but asymptomatic  5/2022  Increase shortness of breath. No significant fluid overload. Reduce digoxin to half doses level was 1.3. Check limited echo. Recent creatinine had increased to 1.4 from 1. We will hold Lasix for 1 week  6/2022  Stable cardiac status. Normal ejection fraction. Volume stable. Dig level low normal will continue current dose. Renal function better  12/2022  Stable cardiac status. CHF compensated. Continue treatment.   ICD function normal.  Has not required Lasix  Orders Placed This Encounter    IMPLANTABLE CARDIOVASULAR DB SYST    SINGLE,DUAL,OR MULTIPLE LEAD IMPLANT CARD DEFIB SYST     Order Specific Question:   Reason for Exam:     Answer:   January Guido MD

## 2022-12-02 NOTE — PROGRESS NOTES
1. Have you been to the ER, urgent care clinic since your last visit? Hospitalized since your last visit? No    2. Have you seen or consulted any other health care providers outside of the [address] [place] [address] since your last visit? Include any pap smears or colon screening.  Yes Where: PCP

## 2023-01-09 ENCOUNTER — TRANSCRIBE ORDER (OUTPATIENT)
Dept: SCHEDULING | Age: 73
End: 2023-01-09

## 2023-01-09 DIAGNOSIS — C50.412 MALIGNANT NEOPLASM OF UPPER-OUTER QUADRANT OF LEFT FEMALE BREAST (HCC): Primary | ICD-10-CM

## 2023-01-20 RX ORDER — SPIRONOLACTONE 25 MG/1
TABLET ORAL
Qty: 180 TABLET | Refills: 1 | Status: SHIPPED | OUTPATIENT
Start: 2023-01-20

## 2023-02-16 ENCOUNTER — HOSPITAL ENCOUNTER (OUTPATIENT)
Facility: HOSPITAL | Age: 73
Discharge: HOME | End: 2023-02-19

## 2023-02-16 DIAGNOSIS — C50.412 MALIGNANT NEOPLASM OF UPPER-OUTER QUADRANT OF LEFT FEMALE BREAST, UNSPECIFIED ESTROGEN RECEPTOR STATUS (HCC): ICD-10-CM

## 2023-02-23 ENCOUNTER — HOSPITAL ENCOUNTER (OUTPATIENT)
Facility: HOSPITAL | Age: 73
Discharge: HOME | End: 2023-02-23
Payer: MEDICARE

## 2023-02-23 PROCEDURE — 3430000000 HC RX DIAGNOSTIC RADIOPHARMACEUTICAL: Performed by: PHYSICIAN ASSISTANT

## 2023-02-23 PROCEDURE — 78815 PET IMAGE W/CT SKULL-THIGH: CPT

## 2023-02-23 PROCEDURE — A9552 F18 FDG: HCPCS | Performed by: PHYSICIAN ASSISTANT

## 2023-02-23 PROCEDURE — 2500000003 HC RX 250 WO HCPCS: Performed by: PHYSICIAN ASSISTANT

## 2023-02-23 RX ORDER — FLUDEOXYGLUCOSE F 18 200 MCI/ML
9.97 INJECTION, SOLUTION INTRAVENOUS
Status: COMPLETED | OUTPATIENT
Start: 2023-02-23 | End: 2023-02-23

## 2023-02-23 RX ADMIN — FLUDEOXYGLUCOSE F 18 9.97 MILLICURIE: 200 INJECTION, SOLUTION INTRAVENOUS at 14:15

## 2023-02-23 RX ADMIN — BARIUM SULFATE 450 ML: 21 SUSPENSION ORAL at 14:19

## 2023-03-13 RX ORDER — CARVEDILOL 25 MG/1
TABLET ORAL
Qty: 180 TABLET | Refills: 3 | Status: SHIPPED | OUTPATIENT
Start: 2023-03-13

## 2023-04-17 RX ORDER — DIGOXIN 125 MCG
TABLET ORAL
Qty: 90 TABLET | Refills: 3 | Status: SHIPPED | OUTPATIENT
Start: 2023-04-17

## 2023-05-31 ENCOUNTER — PROCEDURE VISIT (OUTPATIENT)
Age: 73
End: 2023-05-31
Payer: MEDICARE

## 2023-05-31 DIAGNOSIS — Z95.810 PRESENCE OF AUTOMATIC (IMPLANTABLE) CARDIAC DEFIBRILLATOR: Primary | ICD-10-CM

## 2023-05-31 DIAGNOSIS — I42.0 DILATED CARDIOMYOPATHY (HCC): ICD-10-CM

## 2023-05-31 DIAGNOSIS — I50.22 CHRONIC SYSTOLIC (CONGESTIVE) HEART FAILURE (HCC): ICD-10-CM

## 2023-05-31 PROCEDURE — 93297 REM INTERROG DEV EVAL ICPMS: CPT | Performed by: INTERNAL MEDICINE

## 2023-05-31 PROCEDURE — 93295 DEV INTERROG REMOTE 1/2/MLT: CPT | Performed by: INTERNAL MEDICINE

## 2023-05-31 PROCEDURE — 93296 REM INTERROG EVL PM/IDS: CPT | Performed by: INTERNAL MEDICINE

## 2023-06-02 ENCOUNTER — OFFICE VISIT (OUTPATIENT)
Age: 73
End: 2023-06-02
Payer: MEDICARE

## 2023-06-02 VITALS
BODY MASS INDEX: 29.32 KG/M2 | DIASTOLIC BLOOD PRESSURE: 54 MMHG | HEIGHT: 65 IN | WEIGHT: 176 LBS | HEART RATE: 95 BPM | SYSTOLIC BLOOD PRESSURE: 102 MMHG

## 2023-06-02 DIAGNOSIS — I50.22 CHRONIC SYSTOLIC (CONGESTIVE) HEART FAILURE (HCC): Primary | ICD-10-CM

## 2023-06-02 DIAGNOSIS — Z95.810 PRESENCE OF AUTOMATIC (IMPLANTABLE) CARDIAC DEFIBRILLATOR: ICD-10-CM

## 2023-06-02 DIAGNOSIS — I10 ESSENTIAL (PRIMARY) HYPERTENSION: ICD-10-CM

## 2023-06-02 DIAGNOSIS — I42.0 DILATED CARDIOMYOPATHY (HCC): ICD-10-CM

## 2023-06-02 PROCEDURE — 3017F COLORECTAL CA SCREEN DOC REV: CPT | Performed by: INTERNAL MEDICINE

## 2023-06-02 PROCEDURE — 1036F TOBACCO NON-USER: CPT | Performed by: INTERNAL MEDICINE

## 2023-06-02 PROCEDURE — 3074F SYST BP LT 130 MM HG: CPT | Performed by: INTERNAL MEDICINE

## 2023-06-02 PROCEDURE — G8399 PT W/DXA RESULTS DOCUMENT: HCPCS | Performed by: INTERNAL MEDICINE

## 2023-06-02 PROCEDURE — 1123F ACP DISCUSS/DSCN MKR DOCD: CPT | Performed by: INTERNAL MEDICINE

## 2023-06-02 PROCEDURE — 3078F DIAST BP <80 MM HG: CPT | Performed by: INTERNAL MEDICINE

## 2023-06-02 PROCEDURE — 99214 OFFICE O/P EST MOD 30 MIN: CPT | Performed by: INTERNAL MEDICINE

## 2023-06-02 PROCEDURE — G9899 SCRN MAM PERF RSLTS DOC: HCPCS | Performed by: INTERNAL MEDICINE

## 2023-06-02 PROCEDURE — G8427 DOCREV CUR MEDS BY ELIG CLIN: HCPCS | Performed by: INTERNAL MEDICINE

## 2023-06-02 PROCEDURE — 1090F PRES/ABSN URINE INCON ASSESS: CPT | Performed by: INTERNAL MEDICINE

## 2023-06-02 PROCEDURE — G8419 CALC BMI OUT NRM PARAM NOF/U: HCPCS | Performed by: INTERNAL MEDICINE

## 2023-06-02 ASSESSMENT — PATIENT HEALTH QUESTIONNAIRE - PHQ9
1. LITTLE INTEREST OR PLEASURE IN DOING THINGS: 0
DEPRESSION UNABLE TO ASSESS: FUNCTIONAL CAPACITY MOTIVATION LIMITS ACCURACY
SUM OF ALL RESPONSES TO PHQ9 QUESTIONS 1 & 2: 0
SUM OF ALL RESPONSES TO PHQ QUESTIONS 1-9: 0
2. FEELING DOWN, DEPRESSED OR HOPELESS: 0
SUM OF ALL RESPONSES TO PHQ QUESTIONS 1-9: 0

## 2023-06-02 NOTE — PROGRESS NOTES
1. Have you been to the ER, urgent care clinic since your last visit? Hospitalized since your last visit? No    2. Have you seen or consulted any other health care providers outside of the [address] [place] [address] since your last visit? Include any pap smears or colon screening.       No
reassess LV function. 10/2019  Cardiac status stable. CHF compensated. Shortness of breath and edema improving. Continue Entresto. Aldactone increased to 50 mg a day due to hypokalemia. Lasix will be reduced to 2-3 times a week. Episode of ventricular fibrillation in 8/2019 noticed on ICD check. Patient had hypokalemia during that time will continue to monitor. If patient needs laparoscopic surgery her risk is high about 5 to 10%. If open surgical procedure is required it will carry high risk    1/2020  Cardiac status stable. She completed Chemotherapy in October and is doing well. She is tolerating Entresto. She takes lasix 2-3 x weekly as needed for edema. Reports blood work drawn this week with normal renal function and potassium. Reports an episode of syncope in Dr. James Diallo office in November and b/p readings were labile at that time. ICD checked 11/2019 (after syncopal episode) no arrhythmias seen. Continue current medications. 7/2020  Cardiac status stable. Continue treatment. Follow-up echo  8/2020 virtual visit  Cardiac status stable. continue current treatment,lvef improving  11/2020  Cardiac status stable. Low normal blood pressure but asymptomatic continue monitoring. CHF compensated  2/2021  Stable cardiac status CHF compensated continue current medical management tolerating treatment for CHF with Entresto and Coreg. 5/2021  Stable cardiac status. Still feels fatigue and tired. LVEF is improving. She has cut down Coreg to 12.5 mg twice a day. Blood pressure stable. CHF compensated continue treatment  10/2021  Stable cardiac status. Recent generator change for ICD. Okay to proceed with breast surgery. Okay to hold Eliquis. Medium cardiac risk. Eliquis is used on prophylaxis to reduce DVT and PE  1/2022  Stable cardiac s/p generator change out. Also had mastectomy on the right side for recurrence of breast cancer. Stable postprocedure. Check digoxin level.   Blood pressure

## 2023-06-05 ENCOUNTER — HOSPITAL ENCOUNTER (OUTPATIENT)
Facility: HOSPITAL | Age: 73
Discharge: HOME | End: 2023-06-08
Payer: MEDICARE

## 2023-06-05 DIAGNOSIS — I50.22 CHRONIC SYSTOLIC (CONGESTIVE) HEART FAILURE (HCC): ICD-10-CM

## 2023-06-05 LAB
ANION GAP SERPL CALC-SCNC: 7 MMOL/L (ref 3–18)
BUN SERPL-MCNC: 16 MG/DL (ref 7–18)
BUN/CREAT SERPL: 16 (ref 12–20)
CALCIUM SERPL-MCNC: 9.8 MG/DL (ref 8.5–10.1)
CHLORIDE SERPL-SCNC: 102 MMOL/L (ref 100–111)
CO2 SERPL-SCNC: 26 MMOL/L (ref 21–32)
CREAT SERPL-MCNC: 0.97 MG/DL (ref 0.6–1.3)
DIGOXIN SERPL-MCNC: 0.7 NG/ML (ref 0.9–2)
GLUCOSE SERPL-MCNC: 115 MG/DL (ref 74–99)
POTASSIUM SERPL-SCNC: 4.9 MMOL/L (ref 3.5–5.5)
SODIUM SERPL-SCNC: 135 MMOL/L (ref 136–145)

## 2023-06-05 PROCEDURE — 80162 ASSAY OF DIGOXIN TOTAL: CPT

## 2023-06-05 PROCEDURE — 36415 COLL VENOUS BLD VENIPUNCTURE: CPT

## 2023-06-05 PROCEDURE — 80048 BASIC METABOLIC PNL TOTAL CA: CPT

## 2023-06-28 ENCOUNTER — TELEPHONE (OUTPATIENT)
Age: 73
End: 2023-06-28

## 2023-07-17 RX ORDER — SPIRONOLACTONE 25 MG/1
TABLET ORAL
Qty: 180 TABLET | Refills: 1 | Status: SHIPPED | OUTPATIENT
Start: 2023-07-17

## 2023-07-17 RX ORDER — SACUBITRIL AND VALSARTAN 49; 51 MG/1; MG/1
TABLET, FILM COATED ORAL
Qty: 180 TABLET | Refills: 3 | Status: SHIPPED | OUTPATIENT
Start: 2023-07-17

## 2023-08-03 ENCOUNTER — APPOINTMENT (OUTPATIENT)
Facility: HOSPITAL | Age: 73
End: 2023-08-03
Payer: MEDICARE

## 2023-08-03 ENCOUNTER — HOSPITAL ENCOUNTER (EMERGENCY)
Facility: HOSPITAL | Age: 73
Discharge: HOME | End: 2023-08-03
Attending: EMERGENCY MEDICINE
Payer: MEDICARE

## 2023-08-03 VITALS
RESPIRATION RATE: 20 BRPM | SYSTOLIC BLOOD PRESSURE: 103 MMHG | DIASTOLIC BLOOD PRESSURE: 58 MMHG | BODY MASS INDEX: 29.16 KG/M2 | HEIGHT: 65 IN | HEART RATE: 90 BPM | WEIGHT: 175 LBS | OXYGEN SATURATION: 99 % | TEMPERATURE: 99.2 F

## 2023-08-03 DIAGNOSIS — L02.91 ABSCESS: Primary | ICD-10-CM

## 2023-08-03 LAB
ALBUMIN SERPL-MCNC: 3.6 G/DL (ref 3.4–5)
ALBUMIN/GLOB SERPL: 0.9 (ref 0.8–1.7)
ALP SERPL-CCNC: 80 U/L (ref 45–117)
ALT SERPL-CCNC: 24 U/L (ref 13–56)
ANION GAP SERPL CALC-SCNC: 6 MMOL/L (ref 3–18)
AST SERPL-CCNC: 14 U/L (ref 10–38)
BASOPHILS # BLD: 0.1 K/UL (ref 0–0.1)
BASOPHILS NFR BLD: 1 % (ref 0–2)
BILIRUB SERPL-MCNC: 0.3 MG/DL (ref 0.2–1)
BUN SERPL-MCNC: 22 MG/DL (ref 7–18)
BUN/CREAT SERPL: 21 (ref 12–20)
CALCIUM SERPL-MCNC: 8.9 MG/DL (ref 8.5–10.1)
CHLORIDE SERPL-SCNC: 106 MMOL/L (ref 100–111)
CO2 SERPL-SCNC: 27 MMOL/L (ref 21–32)
CREAT SERPL-MCNC: 1.04 MG/DL (ref 0.6–1.3)
DIFFERENTIAL METHOD BLD: ABNORMAL
EOSINOPHIL # BLD: 0.2 K/UL (ref 0–0.4)
EOSINOPHIL NFR BLD: 3 % (ref 0–5)
ERYTHROCYTE [DISTWIDTH] IN BLOOD BY AUTOMATED COUNT: 12.6 % (ref 11.6–14.5)
GLOBULIN SER CALC-MCNC: 4.1 G/DL (ref 2–4)
GLUCOSE BLD STRIP.AUTO-MCNC: 94 MG/DL (ref 74–106)
GLUCOSE SERPL-MCNC: 104 MG/DL (ref 74–99)
HCT VFR BLD AUTO: 37.5 % (ref 35–45)
HGB BLD-MCNC: 12.5 G/DL (ref 12–16)
IMM GRANULOCYTES # BLD AUTO: 0.1 K/UL (ref 0–0.04)
IMM GRANULOCYTES NFR BLD AUTO: 1 % (ref 0–0.5)
LACTATE BLD-SCNC: 1.38 MMOL/L (ref 0.4–2)
LYMPHOCYTES # BLD: 1.7 K/UL (ref 0.9–3.6)
LYMPHOCYTES NFR BLD: 20 % (ref 21–52)
MCH RBC QN AUTO: 32.3 PG (ref 24–34)
MCHC RBC AUTO-ENTMCNC: 33.3 G/DL (ref 31–37)
MCV RBC AUTO: 96.9 FL (ref 78–100)
MONOCYTES # BLD: 0.6 K/UL (ref 0.05–1.2)
MONOCYTES NFR BLD: 7 % (ref 3–10)
NEUTS SEG # BLD: 5.9 K/UL (ref 1.8–8)
NEUTS SEG NFR BLD: 69 % (ref 40–73)
NRBC # BLD: 0 K/UL (ref 0–0.01)
NRBC BLD-RTO: 0 PER 100 WBC
PLATELET # BLD AUTO: 313 K/UL (ref 135–420)
PMV BLD AUTO: 9.9 FL (ref 9.2–11.8)
POTASSIUM SERPL-SCNC: 4.9 MMOL/L (ref 3.5–5.5)
PROT SERPL-MCNC: 7.7 G/DL (ref 6.4–8.2)
RBC # BLD AUTO: 3.87 M/UL (ref 4.2–5.3)
SERVICE CMNT-IMP: NORMAL
SODIUM SERPL-SCNC: 139 MMOL/L (ref 136–145)
WBC # BLD AUTO: 8.5 K/UL (ref 4.6–13.2)

## 2023-08-03 PROCEDURE — 99284 EMERGENCY DEPT VISIT MOD MDM: CPT

## 2023-08-03 PROCEDURE — 87205 SMEAR GRAM STAIN: CPT

## 2023-08-03 PROCEDURE — 83605 ASSAY OF LACTIC ACID: CPT

## 2023-08-03 PROCEDURE — 2580000003 HC RX 258

## 2023-08-03 PROCEDURE — 80053 COMPREHEN METABOLIC PANEL: CPT

## 2023-08-03 PROCEDURE — 85025 COMPLETE CBC W/AUTO DIFF WBC: CPT

## 2023-08-03 PROCEDURE — 96365 THER/PROPH/DIAG IV INF INIT: CPT

## 2023-08-03 PROCEDURE — 71045 X-RAY EXAM CHEST 1 VIEW: CPT

## 2023-08-03 PROCEDURE — 82962 GLUCOSE BLOOD TEST: CPT

## 2023-08-03 PROCEDURE — 87040 BLOOD CULTURE FOR BACTERIA: CPT

## 2023-08-03 PROCEDURE — 87070 CULTURE OTHR SPECIMN AEROBIC: CPT

## 2023-08-03 PROCEDURE — 6360000002 HC RX W HCPCS

## 2023-08-03 RX ORDER — CEPHALEXIN 500 MG/1
500 CAPSULE ORAL 2 TIMES DAILY
Qty: 14 CAPSULE | Refills: 0 | Status: SHIPPED | OUTPATIENT
Start: 2023-08-03 | End: 2023-08-10

## 2023-08-03 RX ORDER — CEPHALEXIN 500 MG/1
500 CAPSULE ORAL 2 TIMES DAILY
Qty: 14 CAPSULE | Refills: 0 | Status: SHIPPED | OUTPATIENT
Start: 2023-08-03 | End: 2023-08-03 | Stop reason: SDUPTHER

## 2023-08-03 RX ADMIN — PIPERACILLIN AND TAZOBACTAM 4500 MG: 4; .5 INJECTION, POWDER, LYOPHILIZED, FOR SOLUTION INTRAVENOUS at 14:56

## 2023-08-03 ASSESSMENT — ENCOUNTER SYMPTOMS
VOMITING: 0
NAUSEA: 0
DIARRHEA: 0
ABDOMINAL DISTENTION: 0
RHINORRHEA: 0
COLOR CHANGE: 1
ABDOMINAL PAIN: 0

## 2023-08-03 ASSESSMENT — PAIN - FUNCTIONAL ASSESSMENT: PAIN_FUNCTIONAL_ASSESSMENT: NONE - DENIES PAIN

## 2023-08-03 NOTE — ED TRIAGE NOTES
Pt states the area around her pacemaker is infected. Noticed 2 days ago. States the area is around the leads to her pacer. States she squeezed it and pus came out.  [facility] and they sent her here

## 2023-08-03 NOTE — ED NOTES
Discharge instructions reviewed with patient. Patient verbalized understanding. Patient advised to follow up as directed on discharge instructions. Patient denies questions, needs or concerns at this time. Patient verbalized understanding. No s/sx of distress noted.        Sima Wilkerson RN  08/03/23 [id number]

## 2023-08-03 NOTE — ED PROVIDER NOTES
visit. Records Reviewed: Nursing Notes and Old Medical Records    Is this patient to be included in the SEP-1 core measure? No Exclusion criteria - the patient is NOT to be included for SEP-1 Core Measure due to: Infection is not suspected    MEDICATIONS ADMINISTERED IN THE ED:         Medical Decision Making      79-year-old female presented ambulatory to the ED for pus draining near her ICD site. On physical exam she was well-appearing, afebrile, nontachycardic, and otherwise asymptomatic. Immediate septic work-up was ordered, and patient had no elevated white count, negative lactic acid, electrolytes were intact. A blood culture was obtained and a wound culture. Made multiple attempts to get in touch with patient's cardiologist, and the interventional cardiologist that placed ICD. However, both doctors are out of the country. Patient was given broad-spectrum antibiotics in ER. Patient was discharged with antibiotics and encouraged to follow-up with cardiology in the next 24 hours. Patient encouraged to return to ED immediately if worsening or new development of symptoms. Patient was stable at time of the discharge. Disposition Considerations : discharge home      Critical Care Time:         Joaquina Murdock     Diagnosis and Disposition     DIAGNOSIS:   1.  Abscess        DISPOSITION Decision To Discharge 08/03/2023 05:17:15 PM      PATIENT REFERRED TO:  Misha Guzman MD  [address] [phone]    Go in 1 day      Nancy Connell, [facility] [address]  [phone]    Go in 1 day        DISCHARGE MEDICATIONS:  Discharge Medication List as of 8/3/2023  5:24 PM        START taking these medications    Details   cephALEXin (KEFLEX) 500 MG capsule Take 1 capsule by mouth 2 times daily for 7 days, Disp-14 capsule, R-0Normal             DISCONTINUED MEDICATIONS:  Discharge Medication List as of 8/3/2023  5:24 PM

## 2023-08-05 LAB
BACTERIA SPEC CULT: NORMAL
GRAM STN SPEC: NORMAL
GRAM STN SPEC: NORMAL
SERVICE CMNT-IMP: NORMAL

## 2023-08-10 ENCOUNTER — OFFICE VISIT (OUTPATIENT)
Age: 73
End: 2023-08-10
Payer: MEDICARE

## 2023-08-10 VITALS
SYSTOLIC BLOOD PRESSURE: 117 MMHG | HEIGHT: 65 IN | OXYGEN SATURATION: 97 % | WEIGHT: 178 LBS | BODY MASS INDEX: 29.66 KG/M2 | HEART RATE: 92 BPM | DIASTOLIC BLOOD PRESSURE: 59 MMHG

## 2023-08-10 DIAGNOSIS — Z95.810 PRESENCE OF AUTOMATIC (IMPLANTABLE) CARDIAC DEFIBRILLATOR: ICD-10-CM

## 2023-08-10 DIAGNOSIS — I42.0 DILATED CARDIOMYOPATHY (HCC): ICD-10-CM

## 2023-08-10 DIAGNOSIS — I10 ESSENTIAL (PRIMARY) HYPERTENSION: ICD-10-CM

## 2023-08-10 DIAGNOSIS — I50.22 CHRONIC SYSTOLIC (CONGESTIVE) HEART FAILURE (HCC): Primary | ICD-10-CM

## 2023-08-10 PROCEDURE — 99214 OFFICE O/P EST MOD 30 MIN: CPT | Performed by: INTERNAL MEDICINE

## 2023-08-10 PROCEDURE — 1090F PRES/ABSN URINE INCON ASSESS: CPT | Performed by: INTERNAL MEDICINE

## 2023-08-10 PROCEDURE — G8419 CALC BMI OUT NRM PARAM NOF/U: HCPCS | Performed by: INTERNAL MEDICINE

## 2023-08-10 PROCEDURE — G8428 CUR MEDS NOT DOCUMENT: HCPCS | Performed by: INTERNAL MEDICINE

## 2023-08-10 PROCEDURE — 3017F COLORECTAL CA SCREEN DOC REV: CPT | Performed by: INTERNAL MEDICINE

## 2023-08-10 PROCEDURE — 1036F TOBACCO NON-USER: CPT | Performed by: INTERNAL MEDICINE

## 2023-08-10 PROCEDURE — G9899 SCRN MAM PERF RSLTS DOC: HCPCS | Performed by: INTERNAL MEDICINE

## 2023-08-10 PROCEDURE — 3074F SYST BP LT 130 MM HG: CPT | Performed by: INTERNAL MEDICINE

## 2023-08-10 PROCEDURE — 3078F DIAST BP <80 MM HG: CPT | Performed by: INTERNAL MEDICINE

## 2023-08-10 PROCEDURE — G8399 PT W/DXA RESULTS DOCUMENT: HCPCS | Performed by: INTERNAL MEDICINE

## 2023-08-10 PROCEDURE — 1123F ACP DISCUSS/DSCN MKR DOCD: CPT | Performed by: INTERNAL MEDICINE

## 2023-08-10 ASSESSMENT — PATIENT HEALTH QUESTIONNAIRE - PHQ9
SUM OF ALL RESPONSES TO PHQ QUESTIONS 1-9: 0
SUM OF ALL RESPONSES TO PHQ9 QUESTIONS 1 & 2: 0
1. LITTLE INTEREST OR PLEASURE IN DOING THINGS: 0
2. FEELING DOWN, DEPRESSED OR HOPELESS: 0
DEPRESSION UNABLE TO ASSESS: FUNCTIONAL CAPACITY MOTIVATION LIMITS ACCURACY
SUM OF ALL RESPONSES TO PHQ QUESTIONS 1-9: 0

## 2023-08-10 NOTE — PROGRESS NOTES
1. Have you been to the ER, urgent care clinic since your last visit? Hospitalized since your last visit? Yes, [facility]    2. Have you seen or consulted any other health care providers outside of the [address] [place] [address] Avenue since your last visit? Include any pap smears or colon screening.       No
change out. Also had mastectomy on the right side for recurrence of breast cancer. Stable postprocedure. Check digoxin level. Blood pressure on low normal side but asymptomatic  5/2022  Increase shortness of breath. No significant fluid overload. Reduce digoxin to half doses level was 1.3. Check limited echo. Recent creatinine had increased to 1.4 from 1. We will hold Lasix for 1 week  6/2022  Stable cardiac status. Normal ejection fraction. Volume stable. Dig level low normal will continue current dose. Renal function better  12/2022  Stable cardiac status. CHF compensated. Continue treatment. ICD function normal.  Has not required Lasix  6/2023  Stable cardiac status. CHF compensated. Continue current medical management monitor  7/2023  Patient presented today with recent ER visit. She had drainage over her ICD. Wound culture and blood cultures are negative. Currently site looks clean. No active abscess or drainage noted.   Antibiotic course completed will monitor clinically check echo for pacemaker lead as well as LV function

## 2023-09-05 ENCOUNTER — OFFICE VISIT (OUTPATIENT)
Age: 73
End: 2023-09-05
Payer: MEDICARE

## 2023-09-05 VITALS
OXYGEN SATURATION: 95 % | WEIGHT: 178 LBS | SYSTOLIC BLOOD PRESSURE: 100 MMHG | DIASTOLIC BLOOD PRESSURE: 57 MMHG | HEART RATE: 90 BPM | HEIGHT: 65 IN | BODY MASS INDEX: 29.66 KG/M2

## 2023-09-05 DIAGNOSIS — I50.22 CHRONIC SYSTOLIC (CONGESTIVE) HEART FAILURE (HCC): Primary | ICD-10-CM

## 2023-09-05 DIAGNOSIS — Z95.810 PRESENCE OF AUTOMATIC (IMPLANTABLE) CARDIAC DEFIBRILLATOR: ICD-10-CM

## 2023-09-05 DIAGNOSIS — I10 ESSENTIAL (PRIMARY) HYPERTENSION: ICD-10-CM

## 2023-09-05 DIAGNOSIS — I42.0 DILATED CARDIOMYOPATHY (HCC): ICD-10-CM

## 2023-09-05 PROCEDURE — 3078F DIAST BP <80 MM HG: CPT | Performed by: NURSE PRACTITIONER

## 2023-09-05 PROCEDURE — G9899 SCRN MAM PERF RSLTS DOC: HCPCS | Performed by: NURSE PRACTITIONER

## 2023-09-05 PROCEDURE — G8419 CALC BMI OUT NRM PARAM NOF/U: HCPCS | Performed by: NURSE PRACTITIONER

## 2023-09-05 PROCEDURE — G8427 DOCREV CUR MEDS BY ELIG CLIN: HCPCS | Performed by: NURSE PRACTITIONER

## 2023-09-05 PROCEDURE — 1036F TOBACCO NON-USER: CPT | Performed by: NURSE PRACTITIONER

## 2023-09-05 PROCEDURE — G8399 PT W/DXA RESULTS DOCUMENT: HCPCS | Performed by: NURSE PRACTITIONER

## 2023-09-05 PROCEDURE — 99214 OFFICE O/P EST MOD 30 MIN: CPT | Performed by: NURSE PRACTITIONER

## 2023-09-05 PROCEDURE — 3074F SYST BP LT 130 MM HG: CPT | Performed by: NURSE PRACTITIONER

## 2023-09-05 PROCEDURE — 1123F ACP DISCUSS/DSCN MKR DOCD: CPT | Performed by: NURSE PRACTITIONER

## 2023-09-05 PROCEDURE — 3017F COLORECTAL CA SCREEN DOC REV: CPT | Performed by: NURSE PRACTITIONER

## 2023-09-05 PROCEDURE — 1090F PRES/ABSN URINE INCON ASSESS: CPT | Performed by: NURSE PRACTITIONER

## 2023-09-05 ASSESSMENT — PATIENT HEALTH QUESTIONNAIRE - PHQ9
SUM OF ALL RESPONSES TO PHQ QUESTIONS 1-9: 0
2. FEELING DOWN, DEPRESSED OR HOPELESS: 0
SUM OF ALL RESPONSES TO PHQ QUESTIONS 1-9: 0
SUM OF ALL RESPONSES TO PHQ QUESTIONS 1-9: 0
1. LITTLE INTEREST OR PLEASURE IN DOING THINGS: 0
SUM OF ALL RESPONSES TO PHQ9 QUESTIONS 1 & 2: 0
SUM OF ALL RESPONSES TO PHQ QUESTIONS 1-9: 0

## 2023-09-05 NOTE — PROGRESS NOTES
1. Have you been to the ER, urgent care clinic since your last visit? Hospitalized since your last visit?     No
thickness. Normal wall motion. Echo  8/2023  Interpretation Summary    Result status: Final result       Left Ventricle: Normal left ventricular systolic function with a visually estimated EF of 55%. Left ventricle size is normal. Normal wall thickness. Normal wall motion. Normal diastolic function. Comparison Study Information    Prior Study    There is a prior study available for comparison. Prior study date: 7/20/2021. As compared to the previous study, there are no significant changes. No flowsheet data found. Assessment        Diagnosis Orders   1. Chronic systolic (congestive) heart failure (HCC)      Stable continue treatment monitor      2. Dilated cardiomyopathy (HCC)      Stable monitor      3. Presence of automatic (implantable) cardiac defibrillator      scant Drainage over ICD site. Looks improved. Antibiotic course completed. Cultures negative monitor      4. Essential (primary) hypertension      Stable continue current treatment            There are no discontinued medications. No orders of the defined types were placed in this encounter. Follow-up and Dispositions    Return in about 2 weeks (around 9/19/2023), or if symptoms worsen or fail to improve, for Follow up with Dr. Arthur Demarco. Tounge swelling not related to lisinopril as she had swelling even after stopping lisinopril  7/2018  I will hold Lasix as recent decrease in renal function. No clinical sign of fluid overload. 10/2018  Shortness of breath due to acute bronchitis bilateral wheezing. Will refer back to pulmonary. No sign of fluid overload. Will keep off Lasix  1/2019  Metastatic breast cancer now back on chemotherapy. Lung metastasis likely cause for shortness of breath which is improving. Low blood pressure will reduce Coreg to 6.25 twice a day. Continue lisinopril. Recheck echo in 2 months on chemotherapy  4/2019  Cardiac status stable.   Echo EF remains stable continue Coreg and

## 2023-09-19 ENCOUNTER — OFFICE VISIT (OUTPATIENT)
Age: 73
End: 2023-09-19
Payer: MEDICARE

## 2023-09-19 VITALS
BODY MASS INDEX: 30.32 KG/M2 | HEART RATE: 87 BPM | SYSTOLIC BLOOD PRESSURE: 98 MMHG | HEIGHT: 65 IN | WEIGHT: 182 LBS | OXYGEN SATURATION: 97 % | DIASTOLIC BLOOD PRESSURE: 47 MMHG

## 2023-09-19 DIAGNOSIS — I50.22 CHRONIC SYSTOLIC (CONGESTIVE) HEART FAILURE (HCC): Primary | ICD-10-CM

## 2023-09-19 DIAGNOSIS — Z95.810 PRESENCE OF AUTOMATIC (IMPLANTABLE) CARDIAC DEFIBRILLATOR: ICD-10-CM

## 2023-09-19 DIAGNOSIS — I42.0 DILATED CARDIOMYOPATHY (HCC): ICD-10-CM

## 2023-09-19 DIAGNOSIS — I10 ESSENTIAL (PRIMARY) HYPERTENSION: ICD-10-CM

## 2023-09-19 PROCEDURE — G8399 PT W/DXA RESULTS DOCUMENT: HCPCS | Performed by: INTERNAL MEDICINE

## 2023-09-19 PROCEDURE — 1123F ACP DISCUSS/DSCN MKR DOCD: CPT | Performed by: INTERNAL MEDICINE

## 2023-09-19 PROCEDURE — 3078F DIAST BP <80 MM HG: CPT | Performed by: INTERNAL MEDICINE

## 2023-09-19 PROCEDURE — 1090F PRES/ABSN URINE INCON ASSESS: CPT | Performed by: INTERNAL MEDICINE

## 2023-09-19 PROCEDURE — 1036F TOBACCO NON-USER: CPT | Performed by: INTERNAL MEDICINE

## 2023-09-19 PROCEDURE — G9899 SCRN MAM PERF RSLTS DOC: HCPCS | Performed by: INTERNAL MEDICINE

## 2023-09-19 PROCEDURE — 3074F SYST BP LT 130 MM HG: CPT | Performed by: INTERNAL MEDICINE

## 2023-09-19 PROCEDURE — 3017F COLORECTAL CA SCREEN DOC REV: CPT | Performed by: INTERNAL MEDICINE

## 2023-09-19 PROCEDURE — G8417 CALC BMI ABV UP PARAM F/U: HCPCS | Performed by: INTERNAL MEDICINE

## 2023-09-19 PROCEDURE — 99214 OFFICE O/P EST MOD 30 MIN: CPT | Performed by: INTERNAL MEDICINE

## 2023-09-19 PROCEDURE — G8428 CUR MEDS NOT DOCUMENT: HCPCS | Performed by: INTERNAL MEDICINE

## 2023-09-19 ASSESSMENT — PATIENT HEALTH QUESTIONNAIRE - PHQ9
SUM OF ALL RESPONSES TO PHQ QUESTIONS 1-9: 0
SUM OF ALL RESPONSES TO PHQ QUESTIONS 1-9: 0
1. LITTLE INTEREST OR PLEASURE IN DOING THINGS: 0
SUM OF ALL RESPONSES TO PHQ QUESTIONS 1-9: 0
DEPRESSION UNABLE TO ASSESS: FUNCTIONAL CAPACITY MOTIVATION LIMITS ACCURACY
SUM OF ALL RESPONSES TO PHQ QUESTIONS 1-9: 0

## 2023-09-19 NOTE — PROGRESS NOTES
1. Have you been to the ER, urgent care clinic since your last visit? Hospitalized since your last visit? No    2. Have you seen or consulted any other health care providers outside of the [address] [place] [address] since your last visit? Include any pap smears or colon screening.       No
Laterality Date    APPENDECTOMY  1994    BREAST BIOPSY Right 11/16/2021    RIGHT BREAST EXCISIONAL BIOPSY WITH LOCALIZATION (TAG 11/2/21 @ [facility] 1300) performed by Peter Khanna MD at [address] Bilateral 10/19/2020    CERVICAL FUSION  2000    C5,C6    CHOLECYSTECTOMY  11/01/2019    COLONOSCOPY N/A 12/1/2020    COLONOSCOPY with polypectomy performed by Chantell Thao MD at [facility]    IR [address]  9/20/2019    IR CHOLECYSTOSTOMY PERCUTANEOUS COMPLETE 9/20/2019 [facility] RAD ANGIO IR    IR CHOLECYSTOSTOMY PERCUTANEOUS COMPLETE  9/20/2019    IR PORT PLACEMENT EQUAL OR GREATER THAN 5 YEARS  1/16/2019    IR PORT PLACEMENT EQUAL OR GREATER THAN 5 YEARS 1/16/2019 [facility] RAD ANGIO IR    IR PORT PLACEMENT EQUAL OR GREATER THAN 5 YEARS  1/16/2019    Removed    IR REMOVE TUNNELED VAD W PORT  9/16/2021    removed    MASTECTOMY Right 1/4/2022    RIGHT MASTECTOMY performed by Peter Khanna MD at [facility], RADICAL  09/28/11    modified radical mastectomy and axillary dissection    PACEMAKER  10/27/10    s/p [employer] biventricular AICD, without complication    PACEMAKER  10/2021    Battery changed    [address]    Partial liver removal    US BREAST BIOPSY NEEDLE ADDITIONAL RIGHT Right 10/4/2021    US BREAST BIOPSY NEEDLE ADDITIONAL RIGHT 10/4/2021 [facility] RAD US    US BREAST BIOPSY W LOC DEVICE 1ST LESION RIGHT Right 10/4/2021    US BREAST NEEDLE BIOPSY RIGHT 10/4/2021 [facility] US       Social History     Tobacco Use    Smoking status: Never    Smokeless tobacco: Never   Substance Use Topics    Alcohol use: No       Allergies   Allergen Reactions    Adhesive Tape Other (See Comments)     blisters       Prior to Admission medications    Medication Sig Start Date End Date Taking?  Authorizing Provider   apixaban (ELIQUIS) 2.5 MG TABS tablet Take 1 tablet by mouth 2 times daily 8/10/23  Yes

## 2023-11-14 ENCOUNTER — PROCEDURE VISIT (OUTPATIENT)
Age: 73
End: 2023-11-14
Payer: MEDICARE

## 2023-11-14 DIAGNOSIS — I50.22 CHRONIC SYSTOLIC (CONGESTIVE) HEART FAILURE (HCC): Primary | ICD-10-CM

## 2023-11-14 DIAGNOSIS — Z95.810 PRESENCE OF AUTOMATIC (IMPLANTABLE) CARDIAC DEFIBRILLATOR: ICD-10-CM

## 2023-11-15 PROCEDURE — 93296 REM INTERROG EVL PM/IDS: CPT | Performed by: INTERNAL MEDICINE

## 2023-11-15 PROCEDURE — 93295 DEV INTERROG REMOTE 1/2/MLT: CPT | Performed by: INTERNAL MEDICINE

## 2023-11-15 PROCEDURE — 93297 REM INTERROG DEV EVAL ICPMS: CPT | Performed by: INTERNAL MEDICINE

## 2023-12-15 ENCOUNTER — HOSPITAL ENCOUNTER (OUTPATIENT)
Facility: HOSPITAL | Age: 73
Discharge: HOME | End: 2023-12-15
Payer: MEDICARE

## 2023-12-15 DIAGNOSIS — C50.412 MALIGNANT NEOPLASM OF UPPER-OUTER QUADRANT OF LEFT FEMALE BREAST, UNSPECIFIED ESTROGEN RECEPTOR STATUS (HCC): ICD-10-CM

## 2023-12-15 PROCEDURE — 2500000003 HC RX 250 WO HCPCS: Performed by: PHYSICIAN ASSISTANT

## 2023-12-15 PROCEDURE — 78815 PET IMAGE W/CT SKULL-THIGH: CPT

## 2023-12-15 PROCEDURE — 3430000000 HC RX DIAGNOSTIC RADIOPHARMACEUTICAL: Performed by: PHYSICIAN ASSISTANT

## 2023-12-15 PROCEDURE — A9552 F18 FDG: HCPCS | Performed by: PHYSICIAN ASSISTANT

## 2023-12-15 RX ORDER — FLUDEOXYGLUCOSE F-18 500 MCI/ML
12.53 INJECTION INTRAVENOUS
Status: COMPLETED | OUTPATIENT
Start: 2023-12-15 | End: 2023-12-15

## 2023-12-15 RX ADMIN — BARIUM SULFATE 450 ML: 21 SUSPENSION ORAL at 14:30

## 2023-12-15 RX ADMIN — FLUDEOXYGLUCOSE F-18 12.53 MILLICURIE: 500 INJECTION INTRAVENOUS at 14:30

## 2024-01-08 RX ORDER — SPIRONOLACTONE 25 MG/1
TABLET ORAL
Qty: 180 TABLET | Refills: 1 | Status: SHIPPED | OUTPATIENT
Start: 2024-01-08

## 2024-02-16 ENCOUNTER — PROCEDURE VISIT (OUTPATIENT)
Age: 74
End: 2024-02-16
Payer: MEDICARE

## 2024-02-16 DIAGNOSIS — I50.22 CHRONIC SYSTOLIC (CONGESTIVE) HEART FAILURE (HCC): Primary | ICD-10-CM

## 2024-02-16 DIAGNOSIS — Z95.810 PRESENCE OF AUTOMATIC (IMPLANTABLE) CARDIAC DEFIBRILLATOR: ICD-10-CM

## 2024-02-16 PROCEDURE — 93295 DEV INTERROG REMOTE 1/2/MLT: CPT | Performed by: INTERNAL MEDICINE

## 2024-02-16 PROCEDURE — 93297 REM INTERROG DEV EVAL ICPMS: CPT | Performed by: INTERNAL MEDICINE

## 2024-02-16 PROCEDURE — 93296 REM INTERROG EVL PM/IDS: CPT | Performed by: INTERNAL MEDICINE

## 2024-02-23 RX ORDER — CARVEDILOL 25 MG/1
TABLET ORAL
Qty: 180 TABLET | Refills: 3 | Status: SHIPPED | OUTPATIENT
Start: 2024-02-23

## 2024-03-26 ENCOUNTER — TELEPHONE (OUTPATIENT)
Age: 74
End: 2024-03-26

## 2024-04-01 RX ORDER — DIGOXIN 125 MCG
TABLET ORAL
Qty: 90 TABLET | Refills: 3 | Status: SHIPPED | OUTPATIENT
Start: 2024-04-01

## 2024-04-15 NOTE — TELEPHONE ENCOUNTER
rx for DuoNeb needs to be filled under Part B with ICD codes [FreeTextEntry1] : On exam his incision is clean dry and intact.  He is able to move around appropriately with full range of motion.  His incision is nontender.  He has good stability. [General Appearance - Well-Appearing] : Well appearing [General Appearance - Well Nourished] : well nourished [Oriented To Time, Place, And Person] : Oriented to person, place, and time [Sclera] : the sclera and conjunctiva were normal [Neck Cervical Mass (___cm)] : no neck mass was observed [Heart Rate And Rhythm] : heart rate was normal and rhythm regular [] : No respiratory distress [Abdomen Soft] : Soft [Normal Station and Gait] : gait and station were normal [Tenderness] : no tenderness [Swelling] : no swelling [Skin Changes - Describe changes:] : No skin changes noted [Full ROM Unless otherwise noted:] : Full range of motion unless otherwise noted: [LE  Motor Strength Normal unless otherwise noted:] : 5/5 strength in bilateral lower extemities unless otherwise noted. [Normal] : Sensation intact to light touch.

## 2024-05-10 ENCOUNTER — OFFICE VISIT (OUTPATIENT)
Age: 74
End: 2024-05-10
Payer: MEDICARE

## 2024-05-10 VITALS
SYSTOLIC BLOOD PRESSURE: 98 MMHG | DIASTOLIC BLOOD PRESSURE: 62 MMHG | HEART RATE: 101 BPM | WEIGHT: 178 LBS | OXYGEN SATURATION: 95 % | BODY MASS INDEX: 29.66 KG/M2 | HEIGHT: 65 IN

## 2024-05-10 DIAGNOSIS — Z95.0 HISTORY OF PACEMAKER: ICD-10-CM

## 2024-05-10 DIAGNOSIS — I48.91 ATRIAL FIBRILLATION, UNSPECIFIED TYPE (HCC): ICD-10-CM

## 2024-05-10 DIAGNOSIS — Z86.711 HISTORY OF PULMONARY EMBOLISM: ICD-10-CM

## 2024-05-10 DIAGNOSIS — I10 HYPERTENSION, UNSPECIFIED TYPE: ICD-10-CM

## 2024-05-10 DIAGNOSIS — Z86.79 HISTORY OF CARDIOMYOPATHY: Primary | ICD-10-CM

## 2024-05-10 PROCEDURE — 3078F DIAST BP <80 MM HG: CPT | Performed by: INTERNAL MEDICINE

## 2024-05-10 PROCEDURE — 1090F PRES/ABSN URINE INCON ASSESS: CPT | Performed by: INTERNAL MEDICINE

## 2024-05-10 PROCEDURE — 3074F SYST BP LT 130 MM HG: CPT | Performed by: INTERNAL MEDICINE

## 2024-05-10 PROCEDURE — G8399 PT W/DXA RESULTS DOCUMENT: HCPCS | Performed by: INTERNAL MEDICINE

## 2024-05-10 PROCEDURE — G8417 CALC BMI ABV UP PARAM F/U: HCPCS | Performed by: INTERNAL MEDICINE

## 2024-05-10 PROCEDURE — G8428 CUR MEDS NOT DOCUMENT: HCPCS | Performed by: INTERNAL MEDICINE

## 2024-05-10 PROCEDURE — 1036F TOBACCO NON-USER: CPT | Performed by: INTERNAL MEDICINE

## 2024-05-10 PROCEDURE — 99214 OFFICE O/P EST MOD 30 MIN: CPT | Performed by: INTERNAL MEDICINE

## 2024-05-10 PROCEDURE — 3017F COLORECTAL CA SCREEN DOC REV: CPT | Performed by: INTERNAL MEDICINE

## 2024-05-10 PROCEDURE — 1123F ACP DISCUSS/DSCN MKR DOCD: CPT | Performed by: INTERNAL MEDICINE

## 2024-05-10 RX ORDER — METOPROLOL TARTRATE 50 MG/1
50 TABLET, FILM COATED ORAL 2 TIMES DAILY
Qty: 60 TABLET | Refills: 5 | Status: SHIPPED | OUTPATIENT
Start: 2024-05-10 | End: 2024-05-10

## 2024-05-10 RX ORDER — METOPROLOL TARTRATE 50 MG/1
50 TABLET, FILM COATED ORAL 2 TIMES DAILY
Qty: 180 TABLET | Refills: 2 | Status: SHIPPED | OUTPATIENT
Start: 2024-05-10

## 2024-05-10 RX ORDER — ESOMEPRAZOLE MAGNESIUM 40 MG/1
40 GRANULE, DELAYED RELEASE ORAL DAILY
COMMUNITY

## 2024-05-10 ASSESSMENT — ENCOUNTER SYMPTOMS
SHORTNESS OF BREATH: 1
APNEA: 0
ABDOMINAL PAIN: 0
NAUSEA: 0
CONSTIPATION: 0
CHEST TIGHTNESS: 0
BLOOD IN STOOL: 0
DIARRHEA: 0
COLOR CHANGE: 0
COUGH: 0
WHEEZING: 0

## 2024-05-10 NOTE — PROGRESS NOTES
HISTORY OF PRESENT ILLNESS  Cynthia Sherman is a 74 y.o. female.    PMH Nonischemic CMP reduced ejection fraction with subsequent normalization, HTN, history of PE, history of PM  ----  CARDIAC STUDIES  ----  2d echo 8/18/2023   Left Ventricle: Normal left ventricular systolic function with a visually estimated EF of 55%. Left ventricle size is normal. Normal wall thickness. Normal wall motion. Normal diastolic function.   ----  Vascular duplex lower extremity venous bl 5/6/2019  Acute occlusive thrombus present in the right peroneal vein.   ----    Have you had Fatigue?  Yes    How long: Years   How bad: Moderate     2.   Have you had have you had Chest Pain? No      3.   Have you had Dyspnea (SOB)? Yes      How long: 3-4 Months    can you walk 2 blocks or a flight of stairs without SOB? No     4.   Have you had Orthopnea? No       5.   Have you had PND? No      6.   Have you had leg swelling? No         7.    Have you had any weight gain? No     8.    Have you had any palpitations? No      9.    Have you had any syncope? No      10. Do you have any wounds on legs? No     Reviewed with the patient and is as such.        Family History   Problem Relation Age of Onset    Hypertension Mother     High Cholesterol Mother     Heart Disease Father         paemaker implant at 85       Past Medical History:   Diagnosis Date    Adenocarcinoma of breast (HCC)     Asthma     Breast cancer (HCC) 2011    Chemo and Radiation Left     Breast cancer metastasized to lung (HCC) 2019    Chemo    Breast cancer, right (HCC) 2021    Chronic combined systolic and diastolic heart failure (HCC)     Remains symptomatic, nyha class 3 ef improving.    Congestive heart failure, unspecified     chronic class ll    DVT of lower limb, acute (HCC) 2019    Right leg, and PE    GERD (gastroesophageal reflux disease)     History of pulmonary embolus (PE) 2019    Hypertension     Long term current use of anticoagulant therapy     MVP (mitral valve

## 2024-05-10 NOTE — PROGRESS NOTES
Have you had Fatigue?  Yes    How long: Years   How bad: Moderate    2.   Have you had have you had Chest Pain? No     3.   Have you had Dyspnea (SOB)? Yes     How long: 3-4 Months    can you walk 2 blocks or a flight of stairs without SOB? No    4.   Have you had Orthopnea? No      5.   Have you had PND? No     6.   Have you had leg swelling? No     7.    Have you had any weight gain? No    8.    Have you had any palpitations? No      9.    Have you had any syncope? No     10. Do you have any wounds on legs? No

## 2024-05-10 NOTE — PATIENT INSTRUCTIONS
Learning About the Mediterranean Diet  What is the Mediterranean diet?     The Mediterranean diet is a style of eating rather than a diet plan. It features foods eaten in [place], [place], [place], and other countries along the [place] Sea. It emphasizes eating foods like fish, fruits, vegetables, beans, high-fiber breads and whole grains, nuts, and olive oil. This style of eating includes limited red meat, cheese, and sweets.  Why choose the Mediterranean diet?  A Mediterranean-style diet may improve heart health. It contains more fat than other heart-healthy diets. But the fats are mainly from nuts, unsaturated oils (such as fish oils and olive oil), and certain nut or seed oils (such as canola, soybean, or flaxseed oil). These fats may help protect the heart and blood vessels.  How can you get started on the Mediterranean diet?  Here are some things you can do to switch to a more Mediterranean way of eating.  What to eat  Eat a variety of fruits and vegetables each day, such as grapes, blueberries, tomatoes, broccoli, peppers, figs, olives, spinach, eggplant, beans, lentils, and chickpeas.  Eat a variety of whole-grain foods each day, such as oats, brown rice, and whole wheat bread, pasta, and couscous.  Eat fish at least 2 times a week. Try tuna, salmon, mackerel, lake trout, herring, or sardines.  Eat moderate amounts of low-fat dairy products, such as milk, cheese, or yogurt.  Eat moderate amounts of poultry and eggs.  Choose healthy (unsaturated) fats, such as nuts, olive oil, and certain nut or seed oils like canola, soybean, and flaxseed.  Limit unhealthy (saturated) fats, such as butter, palm oil, and coconut oil. And limit fats found in animal products, such as meat and dairy products made with whole milk. Try to eat red meat only a few times a month in very small amounts.  Limit sweets and desserts to only a few times a week. This includes sugar-sweetened drinks like soda.  The

## 2024-05-13 ENCOUNTER — HOSPITAL ENCOUNTER (OUTPATIENT)
Facility: HOSPITAL | Age: 74
Discharge: HOME | End: 2024-05-16
Payer: MEDICARE

## 2024-05-13 DIAGNOSIS — I48.91 ATRIAL FIBRILLATION, UNSPECIFIED TYPE (HCC): ICD-10-CM

## 2024-05-13 LAB — DIGOXIN SERPL-MCNC: 0.3 NG/ML (ref 0.9–2)

## 2024-05-13 PROCEDURE — 80162 ASSAY OF DIGOXIN TOTAL: CPT

## 2024-05-13 PROCEDURE — 36415 COLL VENOUS BLD VENIPUNCTURE: CPT

## 2024-05-17 ENCOUNTER — NURSE ONLY (OUTPATIENT)
Age: 74
End: 2024-05-17

## 2024-05-17 DIAGNOSIS — Z95.810 PRESENCE OF AUTOMATIC (IMPLANTABLE) CARDIAC DEFIBRILLATOR: ICD-10-CM

## 2024-05-17 DIAGNOSIS — I50.22 CHRONIC SYSTOLIC (CONGESTIVE) HEART FAILURE (HCC): Primary | ICD-10-CM

## 2024-06-06 ENCOUNTER — TELEPHONE (OUTPATIENT)
Age: 74
End: 2024-06-06

## 2024-06-06 NOTE — TELEPHONE ENCOUNTER
Patient called and stated since being seen on 5/10/24 in office and having some medication changes, her BP systolic  has been running high, around 161 and she is having heaviness in her chest and headache. Her BP today was 161/73. Please advise

## 2024-06-10 ENCOUNTER — TELEPHONE (OUTPATIENT)
Age: 74
End: 2024-06-10

## 2024-06-10 NOTE — TELEPHONE ENCOUNTER
6/6/24  @ 1:30 pm BP- 119/69  @ 9:30 pm 143/67  6/7/24 in am BP-132/81 P- 86  In pm BP- 130/64 P- 96  6/8/24 in am BP- 143/80 P- 85  6/9/24 in am BP-120/57 P- 73   In pm BP- 137/68 P - 95  6/10/24 in am BP- 130/74 P- 91

## 2024-07-03 RX ORDER — SACUBITRIL AND VALSARTAN 49; 51 MG/1; MG/1
TABLET, FILM COATED ORAL
Qty: 180 TABLET | Refills: 3 | Status: SHIPPED | OUTPATIENT
Start: 2024-07-03

## 2024-07-03 RX ORDER — SPIRONOLACTONE 25 MG/1
TABLET ORAL
Qty: 180 TABLET | Refills: 3 | Status: SHIPPED | OUTPATIENT
Start: 2024-07-03

## 2024-08-13 PROCEDURE — 93297 REM INTERROG DEV EVAL ICPMS: CPT | Performed by: INTERNAL MEDICINE

## 2024-08-13 PROCEDURE — 93295 DEV INTERROG REMOTE 1/2/MLT: CPT | Performed by: INTERNAL MEDICINE

## 2024-08-13 PROCEDURE — 93296 REM INTERROG EVL PM/IDS: CPT | Performed by: INTERNAL MEDICINE

## 2024-08-19 ENCOUNTER — NURSE ONLY (OUTPATIENT)
Age: 74
End: 2024-08-19
Payer: MEDICARE

## 2024-08-19 DIAGNOSIS — Z95.810 PRESENCE OF AUTOMATIC (IMPLANTABLE) CARDIAC DEFIBRILLATOR: ICD-10-CM

## 2024-08-19 DIAGNOSIS — I50.22 CHRONIC SYSTOLIC (CONGESTIVE) HEART FAILURE (HCC): Primary | ICD-10-CM

## 2024-08-19 NOTE — RESULT ENCOUNTER NOTE
Agree with the findings.  Pacemaker/ICD function is normal.  1 VT episode is 3 beat NSVT.  Continue monitoring.

## 2024-11-10 NOTE — PROGRESS NOTES
HISTORY OF PRESENT ILLNESS  Cynthia Sherman is a 74 y.o. female.    PMH Nonischemic CMP reduced ejection fraction with subsequent normalization, HTN, history of PE, history of PM  ----  CARDIAC STUDIES  ----  Interrogation 8/19/2024  [facility] :  BI-V AICD. 5.2 years left on battery. Lead impedance and threshold WNL. A paced <0.1 %, V paced 98.5 %. OP Vol WNL. 1 VT-NS episodes.  See scanned report   ----  2d echo 8/18/2023   Left Ventricle: Normal left ventricular systolic function with a visually estimated EF of 55%. Left ventricle size is normal. Normal wall thickness. Normal wall motion. Normal diastolic function.   ----  Vascular duplex lower extremity venous bl 5/6/2019  Acute occlusive thrombus present in the right peroneal vein.   ----    Have you had Fatigue?  No     2.   Have you had have you had Chest Pain? No      3.   Have you had Dyspnea (SOB) ? No   4.   Have you had Orthopnea? No      5.   Have you had PND? No   6.   Have you had leg swelling? No   7.    Have you had any weight gain? No     8. Have you had any palpitations? No      9. Have you had any syncope? Yes lightheaded  10. Do you have any wounds on legs?no       Reviewed with the patient and is as such.        Family History   Problem Relation Age of Onset    Hypertension Mother     High Cholesterol Mother     Heart Disease Father         paemaker implant at 85       Past Medical History:   Diagnosis Date    Adenocarcinoma of breast (HCC)     Asthma     Breast cancer (HCC) 2011    Chemo and Radiation Left     Breast cancer metastasized to lung (HCC) 2019    Chemo    Breast cancer, right (HCC) 2021    Chronic combined systolic and diastolic heart failure (HCC)     Remains symptomatic, nyha class 3 ef improving.    Congestive heart failure, unspecified     chronic class ll    DVT of lower limb, acute (HCC) 2019    Right leg, and PE    GERD (gastroesophageal reflux disease)     History of pulmonary embolus (PE) 2019    Hypertension     Long term

## 2024-11-20 ENCOUNTER — OFFICE VISIT (OUTPATIENT)
Age: 74
End: 2024-11-20
Payer: MEDICARE

## 2024-11-20 VITALS
DIASTOLIC BLOOD PRESSURE: 63 MMHG | WEIGHT: 169 LBS | BODY MASS INDEX: 28.16 KG/M2 | HEIGHT: 65 IN | OXYGEN SATURATION: 97 % | HEART RATE: 81 BPM | SYSTOLIC BLOOD PRESSURE: 139 MMHG

## 2024-11-20 DIAGNOSIS — Z86.711 HISTORY OF PULMONARY EMBOLISM: ICD-10-CM

## 2024-11-20 DIAGNOSIS — I48.91 ATRIAL FIBRILLATION, UNSPECIFIED TYPE (HCC): Primary | ICD-10-CM

## 2024-11-20 DIAGNOSIS — I42.8 OTHER CARDIOMYOPATHY (HCC): ICD-10-CM

## 2024-11-20 DIAGNOSIS — I10 HYPERTENSION, UNSPECIFIED TYPE: ICD-10-CM

## 2024-11-20 PROCEDURE — G8417 CALC BMI ABV UP PARAM F/U: HCPCS | Performed by: INTERNAL MEDICINE

## 2024-11-20 PROCEDURE — G8428 CUR MEDS NOT DOCUMENT: HCPCS | Performed by: INTERNAL MEDICINE

## 2024-11-20 PROCEDURE — G8399 PT W/DXA RESULTS DOCUMENT: HCPCS | Performed by: INTERNAL MEDICINE

## 2024-11-20 PROCEDURE — 1123F ACP DISCUSS/DSCN MKR DOCD: CPT | Performed by: INTERNAL MEDICINE

## 2024-11-20 PROCEDURE — G8484 FLU IMMUNIZE NO ADMIN: HCPCS | Performed by: INTERNAL MEDICINE

## 2024-11-20 PROCEDURE — 99214 OFFICE O/P EST MOD 30 MIN: CPT | Performed by: INTERNAL MEDICINE

## 2024-11-20 PROCEDURE — 3078F DIAST BP <80 MM HG: CPT | Performed by: INTERNAL MEDICINE

## 2024-11-20 PROCEDURE — 3075F SYST BP GE 130 - 139MM HG: CPT | Performed by: INTERNAL MEDICINE

## 2024-11-20 PROCEDURE — 1036F TOBACCO NON-USER: CPT | Performed by: INTERNAL MEDICINE

## 2024-11-20 PROCEDURE — 3017F COLORECTAL CA SCREEN DOC REV: CPT | Performed by: INTERNAL MEDICINE

## 2024-11-20 PROCEDURE — 1090F PRES/ABSN URINE INCON ASSESS: CPT | Performed by: INTERNAL MEDICINE

## 2024-11-20 RX ORDER — METOPROLOL TARTRATE 50 MG
50 TABLET ORAL 2 TIMES DAILY
Qty: 180 TABLET | Refills: 2 | Status: SHIPPED | OUTPATIENT
Start: 2024-11-20

## 2024-11-20 RX ORDER — SPIRONOLACTONE 25 MG/1
50 TABLET ORAL DAILY
Qty: 180 TABLET | Refills: 3 | Status: SHIPPED | OUTPATIENT
Start: 2024-11-20

## 2024-11-20 RX ORDER — SACUBITRIL AND VALSARTAN 49; 51 MG/1; MG/1
1 TABLET, FILM COATED ORAL 2 TIMES DAILY
Qty: 180 TABLET | Refills: 3 | Status: SHIPPED | OUTPATIENT
Start: 2024-11-20

## 2024-11-20 RX ORDER — DIGOXIN 125 MCG
125 TABLET ORAL DAILY
Qty: 90 TABLET | Refills: 3 | Status: SHIPPED | OUTPATIENT
Start: 2024-11-20

## 2024-11-20 ASSESSMENT — ENCOUNTER SYMPTOMS: SHORTNESS OF BREATH: 0

## 2024-11-20 NOTE — PROGRESS NOTES
Have you had Fatigue?  No    2.   Have you had have you had Chest Pain? No     3.   Have you had Dyspnea (SOB) ? No   4.   Have you had Orthopnea? No     5.   Have you had PND? No   6.   Have you had leg swelling? No   7.    Have you had any weight gain? No    8. Have you had any palpitations? No      9. Have you had any syncope? Yes   10. Do you have any wounds on legs?no

## 2024-11-20 NOTE — PATIENT INSTRUCTIONS
Learning About the Mediterranean Diet  What is the Mediterranean diet?     The Mediterranean diet is a style of eating rather than a diet plan. It features foods eaten in [place], [place], southern [place], and other countries along the [place] Sea. It emphasizes eating foods like fish, fruits, vegetables, beans, high-fiber breads and whole grains, nuts, and olive oil. This style of eating includes limited red meat, cheese, and sweets.  Why choose the Mediterranean diet?  A Mediterranean-style diet may improve heart health. It contains more fat than other heart-healthy diets. But the fats are mainly from nuts, unsaturated oils (such as fish oils and olive oil), and certain nut or seed oils (such as canola, soybean, or flaxseed oil). These fats may help protect the heart and blood vessels.  How can you get started on the Mediterranean diet?  Here are some things you can do to switch to a more Mediterranean way of eating.  What to eat  Eat a variety of fruits and vegetables each day, such as grapes, blueberries, tomatoes, broccoli, peppers, figs, olives, spinach, eggplant, beans, lentils, and chickpeas.  Eat a variety of whole-grain foods each day, such as oats, brown rice, and whole wheat bread, pasta, and couscous.  Eat fish at least 2 times a week. Try tuna, salmon, mackerel, lake trout, herring, or sardines.  Eat moderate amounts of low-fat dairy products, such as milk, cheese, or yogurt.  Eat moderate amounts of poultry and eggs.  Choose healthy (unsaturated) fats, such as nuts, olive oil, and certain nut or seed oils like canola, soybean, and flaxseed.  Limit unhealthy (saturated) fats, such as butter, palm oil, and coconut oil. And limit fats found in animal products, such as meat and dairy products made with whole milk. Try to eat red meat only a few times a month in very small amounts.  Limit sweets and desserts to only a few times a week. This includes sugar-sweetened drinks like soda.  The

## 2024-11-21 ENCOUNTER — NURSE ONLY (OUTPATIENT)
Age: 74
End: 2024-11-21

## 2024-11-21 DIAGNOSIS — Z95.810 PRESENCE OF AUTOMATIC (IMPLANTABLE) CARDIAC DEFIBRILLATOR: ICD-10-CM

## 2024-11-21 DIAGNOSIS — I50.22 CHRONIC SYSTOLIC (CONGESTIVE) HEART FAILURE (HCC): Primary | ICD-10-CM

## 2024-12-03 NOTE — LETTER
Flako Lorenzo 1950 
 
7/10/2017 Dear Sharyle Riddles, MD 
 
I had the pleasure of evaluating  Ms. Remi Childs in office today. Below are the relevant portions of my assessment and plan of care. ICD-10-CM ICD-9-CM 1. Chronic diastolic congestive heart failure (HCC) I50.32 428.32 SINGLE,DUAL,OR MULTIPLE LEAD IMPLANT CARD DEFIB SYST  
  428.0 IMPLANTABLE CARDIOVASULAR DB SYST  
 stable 
compensated 2. Essential hypertension I10 401.9 3. Nonischemic cardiomyopathy (HCC) I42.8 425.4   
 ef better 4. Automatic implantable cardioverter-defibrillator in situ Z95.810 V45.02 SINGLE,DUAL,OR MULTIPLE LEAD IMPLANT CARD DEFIB SYST  
   IMPLANTABLE CARDIOVASULAR DB SYST  
 stable 5. Abnormal PFT R94.2 794.2 referred to pulmonary Current Outpatient Prescriptions Medication Sig Dispense Refill  carvedilol (COREG) 25 mg tablet TAKE 1 TABLET TWICE A DAY  WITH MEALS 180 Tab 3  furosemide (LASIX) 20 mg tablet TAKE 1 TABLET DAILY 90 Tab 3  
 lisinopril (PRINIVIL, ZESTRIL) 10 mg tablet Take 1 Tab by mouth daily. 90 Tab 1  
 zaleplon (SONATA) 10 mg capsule Take 10 mg by mouth nightly.  oxyCODONE-acetaminophen (PERCOCET) 5-325 mg per tablet 1 or 2 tablets by mouth every 4 hours as needed 40 Tab 0  
 multivitamin (HAIR,SKIN & NAILS) tablet Take 1 Tab by mouth daily.  cyclobenzaprine (FLEXERIL) 10 mg tablet Take  by mouth three (3) times daily as needed.  MULTIVITAMIN PO Take  by mouth daily.  OTHER,NON-FORMULARY, daily. Vitamin D3 2000IU QD    
 raloxifene (EVISTA) 60 mg tablet Take  by mouth daily. Orders Placed This Encounter  IMPLANTABLE CARDIOVASULAR DB SYST  
 SINGLE,DUAL,OR MULTIPLE LEAD IMPLANT CARD DEFIB SYST Order Specific Question:   Reason for Exam: Answer:   icd If you have questions, please do not hesitate to call me. I look forward to following Ms. Trevino along with you. Sincerely, Prateek Panda MD 
 
 

[facility]

## 2025-01-17 NOTE — PROGRESS NOTES
1. Have you been to the ER, urgent care clinic since your last visit? Hospitalized since your last visit? No    2. Have you seen or consulted any other health care providers outside of the [address] [place] [address] since your last visit? Include any pap smears or colon screening.       Yes Reason for visit: Routine Unknown

## (undated) DEVICE — BE 105-8 BRONCHOSCOPE SWIVEL - 15MM ID/22MM OD (PATIENT PORT) X15MM OD (EQUIPMENT PORT). REUSABLE.  FITS COMPONENTS OF ADULT VENTILATOR CIRCUITS.  MOLDED OF POLYETHERIMIDE. INCLUDES TWO SILICONE RUBBER CAPS; ONE CAP ALLOWS FOR THE USE OF A SUCTIONING CATHETER WHILE THE OTHER CAP ALLOWS FOR THE USE OF A FIBER-OPTIC BRONCHOSCOPE WITHOUT SIGNIFICANT LOSS OF PEEP.: Brand: BE 105-8 BRONCHOSCOPE SWIVEL

## (undated) DEVICE — 3M™ STERI-STRIP™ COMPOUND BENZOIN TINCTURE 40 BAGS/CARTON 4 CARTONS/CASE C1544: Brand: 3M™ STERI-STRIP™

## (undated) DEVICE — SLEEVE COMPR STD 12 IN FOR 165IN CALF COMFORT VENODYNE SYS

## (undated) DEVICE — NDL BX EXCELON 21GX130CM --

## (undated) DEVICE — MAILER SLDE MICSCP 2 PLC --

## (undated) DEVICE — SYR 50ML SLIP TIP NSAF LF STRL --

## (undated) DEVICE — INSUFFLATION NEEDLE TO ESTABLISH PNEUMOPERITONEUM.: Brand: INSUFFLATION NEEDLE

## (undated) DEVICE — DERMABOND SKIN ADH 0.7ML -- DERMABOND ADVANCED 12/BX

## (undated) DEVICE — SUT SLK 2-0SH 30IN BLK --

## (undated) DEVICE — SUTURE ABSORBABLE BRAIDED 2-0 CT-1 27 IN UD VICRYL J259H

## (undated) DEVICE — FCPS BX RAD JAW 3 1.8MM --

## (undated) DEVICE — SLIDE MICRO PLAIN PRECLEAND --

## (undated) DEVICE — REM POLYHESIVE ADULT PATIENT RETURN ELECTRODE: Brand: VALLEYLAB

## (undated) DEVICE — SHEET, DRAPE, SPLIT, STERILE: Brand: MEDLINE

## (undated) DEVICE — SPONGE DRAIN NONWOVEN 4X4IN -- 2/PK

## (undated) DEVICE — REAG CYTO FIX 4OZ PMP SPRY --

## (undated) DEVICE — SUTURE MCRYL SZ 4 0 L18IN ABSRB VLT PS 1 L24MM 3 8 CIR REV Y682H

## (undated) DEVICE — AIRLIFE™ NASAL OXYGEN CANNULA CURVED, NONFLARED TIP WITH 14 FOOT (4.3 M) CRUSH-RESISTANT TUBING, OVER-THE-EAR STYLE: Brand: AIRLIFE™

## (undated) DEVICE — BIPOLAR FORCEPS CORD: Brand: VALLEYLAB

## (undated) DEVICE — ACCESS PLATFORM FOR MINIMALLY INVASIVE SURGERY: Brand: GELPOINT® ADVANCED ACCESS PLATFORM

## (undated) DEVICE — CONTAINER PREFIL FRMLN 40ML --

## (undated) DEVICE — PACK PROCEDURE SURG MAJ W/ BASIN LF

## (undated) DEVICE — CABLE PACE ALGTR CLP SAF 12FT --

## (undated) DEVICE — SUTURE MCRYL SZ 4-0 L18IN ABSRB UD L19MM PS-2 3/8 CIR PRIM Y496G

## (undated) DEVICE — KIT CLN UP BON SECOURS MARYV

## (undated) DEVICE — BLANKET WRM AD W50XL85.8IN PACU FULL BODY FORC AIR

## (undated) DEVICE — MEDI-VAC NON-CONDUCTIVE SUCTION TUBING: Brand: CARDINAL HEALTH

## (undated) DEVICE — STERILE POLYISOPRENE POWDER-FREE SURGICAL GLOVES: Brand: PROTEXIS

## (undated) DEVICE — PROBE SET W/ DRP

## (undated) DEVICE — FLEX ADVANTAGE 3000CC: Brand: FLEX ADVANTAGE

## (undated) DEVICE — FORCEPS BX L240CM JAW DIA2.8MM L CAP W/ NDL MIC MESH TOOTH

## (undated) DEVICE — COVER LT HNDL FLX

## (undated) DEVICE — GOWN,SIRUS,POLYRNF,SETINSLV,L,20/CS: Brand: MEDLINE

## (undated) DEVICE — 1860S HEALTH CARE RESPIRATOR N95 120EA/C: Brand: 3M™

## (undated) DEVICE — SYR 20ML LL STRL LF --

## (undated) DEVICE — KENDALL SCD EXPRESS SLEEVES, KNEE LENGTH, MEDIUM: Brand: KENDALL SCD

## (undated) DEVICE — GAUZE,SPONGE,4"X4",16PLY,STRL,LF,10/TRAY: Brand: MEDLINE

## (undated) DEVICE — ENDOLOOP SUT LIGATURE 18IN -- 12/BX PDS II

## (undated) DEVICE — ENDOSCOPY PUMP TUBING/ CAP SET: Brand: ERBE

## (undated) DEVICE — MASK SURG REG ORNG LEV 3 SFTY SEAL 4 LAYR SFT INNR LINING

## (undated) DEVICE — CATHETER SUCT TR FL TIP 14FR W/ O CTRL

## (undated) DEVICE — 1860 HEALTH CARE N95 MASK, 20EACH/BOX  6 BX/C: Brand: 3M™

## (undated) DEVICE — INTENDED FOR TISSUE SEPARATION, AND OTHER PROCEDURES THAT REQUIRE A SHARP SURGICAL BLADE TO PUNCTURE OR CUT.: Brand: BARD-PARKER SAFETY BLADES SIZE 15, STERILE

## (undated) DEVICE — GAUZE,SPONGE,4"X4",12PLY,STERILE,LF,2'S: Brand: MEDLINE

## (undated) DEVICE — 3M™ TEGADERM™ TRANSPARENT FILM DRESSING FRAME STYLE, 1626W, 4 IN X 4-3/4 IN (10 CM X 12 CM), 50/CT 4CT/CASE: Brand: 3M™ TEGADERM™

## (undated) DEVICE — SOLUTION LACTATED RINGERS INJECTION USP

## (undated) DEVICE — INTENDED FOR TISSUE SEPARATION, AND OTHER PROCEDURES THAT REQUIRE A SHARP SURGICAL BLADE TO PUNCTURE OR CUT.: Brand: BARD-PARKER ® CARBON RIB-BACK BLADES

## (undated) DEVICE — GARMENT,MEDLINE,DVT,INT,CALF,MED, GEN2: Brand: MEDLINE

## (undated) DEVICE — AIRLIFE™ NASAL OXYGEN CANNULA CURVED, FLARED TIP WITH 14 FOOT (4.3 M) CRUSH-RESISTANT TUBING, OVER-THE-EAR STYLE: Brand: AIRLIFE™

## (undated) DEVICE — SUTURE SZ 0 27IN 5/8 CIR UR-6  TAPER PT VIOLET ABSRB VICRYL J603H

## (undated) DEVICE — APPLICATOR FBR TIP L6IN COT TIP WOOD SHFT SWAB 2000 PER CA

## (undated) DEVICE — Device

## (undated) DEVICE — SUTURE VCRL SZ 3-0 L27IN ABSRB UD L26MM SH 1/2 CIR J416H

## (undated) DEVICE — NEEDLE HYPO 25GA L1.5IN BVL ORIENTED ECLIPSE

## (undated) DEVICE — LAPAROSCOPIC TROCAR SLEEVE/SINGLE USE: Brand: KII® OPTICAL ACCESS SYSTEM

## (undated) DEVICE — SET ADMIN L104IN 20 GTT GRAV RLER CLMP SMRT SITE NDL FREE

## (undated) DEVICE — APPLIER CLP L9.375IN APER 2.1MM CLS L3.8MM 20 SM TI CLP

## (undated) DEVICE — TRAP SPEC COLL POLYP POLYSTYR --

## (undated) DEVICE — ADHESIVE TISS CLOSURE 22X4 CM 4 CC HI VISC EXOFIN

## (undated) DEVICE — CANNULA ORIG TL CLR W FOAM CUSHIONS AND 14FT SUPL TB 3 CHN

## (undated) DEVICE — TRAY CENTRAL LINE BIOPATCH --

## (undated) DEVICE — MEDI-VAC SUCTION HIGH CAPACITY: Brand: CARDINAL HEALTH

## (undated) DEVICE — SOLUTION IRRIG 1000ML H2O STRL BLT

## (undated) DEVICE — AIRLIFE™ ADULT CUSHION NASAL CANNULA 14 FOOT (4.3) CRUSH-RESISTANT OXYGEN TUBING, AND U/CONNECT-IT ADAPTER: Brand: AIRLIFE™

## (undated) DEVICE — DRAPE TWL SURG 16X26IN BLU ORB04] ALLCARE INC]

## (undated) DEVICE — SINGLE USE ASPIRATION NEEDLE: Brand: SINGLE USE ASPIRATION NEEDLE

## (undated) DEVICE — SNARE ENDOSCP POLYP 2.4 MM 240 CM 10 MM 2.8 MM CAPTIVATOR

## (undated) DEVICE — BLADE TNGE REG 6IN WOOD STRL -- CONVERT TO ITEM 153408

## (undated) DEVICE — TRAP SUC MUCOUS 70ML -- MEDICHOICE MEDLINE

## (undated) DEVICE — KENDALL RADIOLUCENT FOAM MONITORING ELECTRODE RECTANGULAR SHAPE: Brand: KENDALL

## (undated) DEVICE — SYR 10ML LUER LOK 1/5ML GRAD --

## (undated) DEVICE — STRIP,CLOSURE,WOUND,MEDI-STRIP,1/2X4: Brand: MEDLINE

## (undated) DEVICE — GOWN ISOL IMPERV UNIV, DISP, OPEN BACK, BLUE --

## (undated) DEVICE — (D)SYR 10ML SLIP TIP 1/5ML GRD -- DISC BY MFR USE ITEM 338000

## (undated) DEVICE — SINGLE USE SUCTION VALVE MAJ-209: Brand: SINGLE USE SUCTION VALVE (STERILE)

## (undated) DEVICE — PLASMABLADE PS200-040 4.0: Brand: PLASMABLADE™

## (undated) DEVICE — LIMB HOLDER, WRIST/ANKLE: Brand: DEROYAL

## (undated) DEVICE — FLUFF AND POLYMER UNDERPAD,EXTRA HEAVY: Brand: WINGS

## (undated) DEVICE — SYRINGE MED 25GA 3ML L5/8IN SUBQ PLAS W/ DETACH NDL SFTY

## (undated) DEVICE — INTENDED FOR TISSUE SEPARATION, AND OTHER PROCEDURES THAT REQUIRE A SHARP SURGICAL BLADE TO PUNCTURE OR CUT.: Brand: BARD-PARKER SAFETY BLADES SIZE 11, STERILE

## (undated) DEVICE — NEBULIZER MISTY FAST 7 TBNG

## (undated) DEVICE — BLADE SURG NO10 C STL REUSE HNDL CONVENTIONAL DISP RIB BK

## (undated) DEVICE — DRESSING FOAM 4X6 DISP POSTOP MEPILEX BORD AG

## (undated) DEVICE — SINGLE USE BIOPSY VALVE MAJ-210: Brand: SINGLE USE BIOPSY VALVE (STERILE)

## (undated) DEVICE — MEDI-TRACE CADENCE ADULT, DEFIBRILLATION ELECTRODE -RTS  (10 PR/PK) - PHYSIO-CONTROL: Brand: MEDI-TRACE CADENCE

## (undated) DEVICE — Device: Brand: BALLOON

## (undated) DEVICE — ICE BAG INSERTS

## (undated) DEVICE — DRAPE SURG W25XL50IN E OPN CIR BND BG

## (undated) DEVICE — BITE BLOCK ENDOSCP UNIV AD 6 TO 9.4 MM

## (undated) DEVICE — TAPE,CLOTH/SILK,CURAD,3"X10YD,LF,40/CS: Brand: CURAD

## (undated) DEVICE — CATHETER IV 16GA L1.25IN POLYUR STR GRY HUB SFTY RADPQ DISP

## (undated) DEVICE — ADAPTER TBNG DIA15MM SWVL FBROPT BRONCHSCP TERM 2 AXIS PEEP

## (undated) DEVICE — SUT PROL 2-0 30IN CT1 BLU --

## (undated) DEVICE — AIRLIFE™ ADULT AEROSOL MASK VINYL, UNDER-THE-CHIN STYLE: Brand: AIRLIFE™

## (undated) DEVICE — PREP SKN CHLRAPRP APL 26ML STR --

## (undated) DEVICE — BASIN EMESIS 500CC ROSE 250/CS 60/PLT: Brand: MEDEGEN MEDICAL PRODUCTS, LLC

## (undated) DEVICE — SHEAR RMFG HARMONIC 5MMX36CM -- LAWSON OEM ITEM 322219

## (undated) DEVICE — AIRLIFE™ ADULT OXYGEN MASK VINYL, UNDER-THE-CHIN STYLE, MEDIUM CONCENTRATION MASK WITH 7 FEET (2.1 M) CRUSH-RESISTANT OXYGEN TUBING: Brand: AIRLIFE™

## (undated) DEVICE — SUTURE PERMA HND SZ 0 L18IN NONABSORBABLE BLK L30MM PSL REV 580H

## (undated) DEVICE — 3M™ IOBAN™ 2 ANTIMICROBIAL INCISE DRAPE 6640EZ: Brand: IOBAN™ 2

## (undated) DEVICE — GLOVE SURG SZ 65 CRM LTX FREE POLYISOPRENE POLYMER BEAD ANTI

## (undated) DEVICE — (D)PREP SKN CHLRAPRP APPL 26ML -- CONVERT TO ITEM 371833

## (undated) DEVICE — BRUSH CYTO BRONCHSCP 1.5/140MM -- CELLEBRITY

## (undated) DEVICE — SOLUTION IV 1000ML 0.9% SOD CHL

## (undated) DEVICE — COVADERM: Brand: DEROYAL

## (undated) DEVICE — DRAPE STRL ANGIO W/ 2 FLD COLLCTN PCH 86X135 218X343 CM 2